# Patient Record
Sex: MALE | Race: WHITE | Employment: OTHER | URBAN - METROPOLITAN AREA
[De-identification: names, ages, dates, MRNs, and addresses within clinical notes are randomized per-mention and may not be internally consistent; named-entity substitution may affect disease eponyms.]

---

## 2018-10-15 LAB — DIABETIC RETINOPATHY: NEGATIVE

## 2019-08-13 LAB
AVERAGE GLUCOSE: NORMAL
CHOLESTEROL, TOTAL: 113 MG/DL
CHOLESTEROL/HDL RATIO: 4.7
CREATININE, URINE: NORMAL
CREATININE: 1 MG/DL
HBA1C MFR BLD: 6.4 %
HDLC SERPL-MCNC: 24 MG/DL (ref 35–70)
LDL CHOLESTEROL CALCULATED: 56 MG/DL (ref 0–160)
MICROALBUMIN/CREAT 24H UR: 0.3 MG/G{CREAT}
MICROALBUMIN/CREAT UR-RTO: NORMAL
POTASSIUM (K+): 4.2
TRIGL SERPL-MCNC: 312 MG/DL
VLDLC SERPL CALC-MCNC: ABNORMAL MG/DL

## 2019-09-19 ENCOUNTER — APPOINTMENT (OUTPATIENT)
Dept: CT IMAGING | Age: 69
End: 2019-09-19
Payer: MEDICARE

## 2019-09-19 ENCOUNTER — HOSPITAL ENCOUNTER (EMERGENCY)
Age: 69
Discharge: HOME | End: 2019-09-19
Attending: EMERGENCY MEDICINE
Payer: MEDICARE

## 2019-09-19 VITALS
BODY MASS INDEX: 32.96 KG/M2 | SYSTOLIC BLOOD PRESSURE: 157 MMHG | RESPIRATION RATE: 20 BRPM | TEMPERATURE: 99.2 F | DIASTOLIC BLOOD PRESSURE: 85 MMHG | HEART RATE: 81 BPM | HEIGHT: 67 IN | OXYGEN SATURATION: 96 % | WEIGHT: 210 LBS

## 2019-09-19 DIAGNOSIS — R16.1 SPLENOMEGALY: ICD-10-CM

## 2019-09-19 DIAGNOSIS — N28.9 RENAL INSUFFICIENCY: ICD-10-CM

## 2019-09-19 DIAGNOSIS — R04.2 HEMOPTYSIS: Primary | ICD-10-CM

## 2019-09-19 LAB
APTT: 38.2 SEC (ref 24.5–35.1)
BASOPHILS ABSOLUTE: 0.08 E9/L (ref 0–0.2)
BASOPHILS RELATIVE PERCENT: 1 % (ref 0–2)
CO2: 24 MMOL/L (ref 22–29)
CO2: 26 MMOL/L (ref 22–29)
EOSINOPHILS ABSOLUTE: 0.21 E9/L (ref 0.05–0.5)
EOSINOPHILS RELATIVE PERCENT: 2.6 % (ref 0–6)
GFR AFRICAN AMERICAN: 52
GFR AFRICAN AMERICAN: 52
GFR NON-AFRICAN AMERICAN: 43 ML/MIN/1.73
GFR NON-AFRICAN AMERICAN: 43 ML/MIN/1.73
GLUCOSE BLD-MCNC: 189 MG/DL (ref 74–99)
GLUCOSE BLD-MCNC: 194 MG/DL (ref 74–99)
HCT VFR BLD CALC: 44.7 % (ref 37–54)
HEMOGLOBIN: 16 G/DL (ref 12.5–16.5)
IMMATURE GRANULOCYTES #: 0.03 E9/L
IMMATURE GRANULOCYTES %: 0.4 % (ref 0–5)
INR BLD: 1.1
LYMPHOCYTES ABSOLUTE: 2.46 E9/L (ref 1.5–4)
LYMPHOCYTES RELATIVE PERCENT: 30.7 % (ref 20–42)
MCH RBC QN AUTO: 30.7 PG (ref 26–35)
MCHC RBC AUTO-ENTMCNC: 35.8 % (ref 32–34.5)
MCV RBC AUTO: 85.8 FL (ref 80–99.9)
MONOCYTES ABSOLUTE: 0.6 E9/L (ref 0.1–0.95)
MONOCYTES RELATIVE PERCENT: 7.5 % (ref 2–12)
NEUTROPHILS ABSOLUTE: 4.64 E9/L (ref 1.8–7.3)
NEUTROPHILS RELATIVE PERCENT: 57.8 % (ref 43–80)
PDW BLD-RTO: 12.5 FL (ref 11.5–15)
PLATELET # BLD: 213 E9/L (ref 130–450)
PMV BLD AUTO: 10.4 FL (ref 7–12)
POC ANION GAP: 11 MMOL/L (ref 7–16)
POC ANION GAP: 11 MMOL/L (ref 7–16)
POC BUN: 24 MG/DL (ref 8–23)
POC BUN: 26 MG/DL (ref 8–23)
POC CHLORIDE: 101 MMOL/L (ref 100–108)
POC CHLORIDE: 103 MMOL/L (ref 100–108)
POC CREATININE: 1.6 MG/DL (ref 0.7–1.2)
POC CREATININE: 1.6 MG/DL (ref 0.7–1.2)
POC POTASSIUM: 3.9 MMOL/L (ref 3.5–5)
POC POTASSIUM: 3.9 MMOL/L (ref 3.5–5)
POC SODIUM: 138 MMOL/L (ref 132–146)
POC SODIUM: 138 MMOL/L (ref 132–146)
PROTHROMBIN TIME: 12.2 SEC (ref 9.3–12.4)
RBC # BLD: 5.21 E12/L (ref 3.8–5.8)
WBC # BLD: 8 E9/L (ref 4.5–11.5)

## 2019-09-19 PROCEDURE — 2580000003 HC RX 258

## 2019-09-19 PROCEDURE — 6360000004 HC RX CONTRAST MEDICATION: Performed by: RADIOLOGY

## 2019-09-19 PROCEDURE — 71275 CT ANGIOGRAPHY CHEST: CPT

## 2019-09-19 PROCEDURE — 85730 THROMBOPLASTIN TIME PARTIAL: CPT

## 2019-09-19 PROCEDURE — 82565 ASSAY OF CREATININE: CPT

## 2019-09-19 PROCEDURE — 36415 COLL VENOUS BLD VENIPUNCTURE: CPT

## 2019-09-19 PROCEDURE — 84520 ASSAY OF UREA NITROGEN: CPT

## 2019-09-19 PROCEDURE — 80051 ELECTROLYTE PANEL: CPT

## 2019-09-19 PROCEDURE — 99284 EMERGENCY DEPT VISIT MOD MDM: CPT

## 2019-09-19 PROCEDURE — 82947 ASSAY GLUCOSE BLOOD QUANT: CPT

## 2019-09-19 PROCEDURE — 85610 PROTHROMBIN TIME: CPT

## 2019-09-19 PROCEDURE — 85025 COMPLETE CBC W/AUTO DIFF WBC: CPT

## 2019-09-19 RX ORDER — LISINOPRIL 2.5 MG/1
2.5 TABLET ORAL DAILY
COMMUNITY
End: 2020-11-10

## 2019-09-19 RX ORDER — SODIUM CHLORIDE 9 MG/ML
INJECTION, SOLUTION INTRAVENOUS
Status: COMPLETED
Start: 2019-09-19 | End: 2019-09-19

## 2019-09-19 RX ORDER — 0.9 % SODIUM CHLORIDE 0.9 %
1000 INTRAVENOUS SOLUTION INTRAVENOUS ONCE
Status: COMPLETED | OUTPATIENT
Start: 2019-09-19 | End: 2019-09-19

## 2019-09-19 RX ORDER — OMEPRAZOLE 20 MG/1
40 CAPSULE, DELAYED RELEASE ORAL DAILY
COMMUNITY
End: 2021-11-16 | Stop reason: SDUPTHER

## 2019-09-19 RX ADMIN — IOPAMIDOL 80 ML: 755 INJECTION, SOLUTION INTRAVENOUS at 02:32

## 2019-09-19 RX ADMIN — SODIUM CHLORIDE 1000 ML: 9 INJECTION, SOLUTION INTRAVENOUS at 01:16

## 2019-09-19 RX ADMIN — Medication 1000 ML: at 01:16

## 2019-09-19 ASSESSMENT — ENCOUNTER SYMPTOMS
COUGH: 1
SHORTNESS OF BREATH: 0
VOMITING: 0
NAUSEA: 0
SINUS PRESSURE: 0
WHEEZING: 0
EYE PAIN: 0
ABDOMINAL PAIN: 0
SORE THROAT: 0
EYE REDNESS: 0
DIARRHEA: 0
BACK PAIN: 0
EYE DISCHARGE: 0

## 2019-09-19 NOTE — ED NOTES
Pt presents to ED with c/o coughing up blood. Pt reports coughing a few times at home while laying in bed and noticing small amounts of bright red blood in sputum. Pt currently has not had any blood in sputum since arrival to ED. Pt reports any recent illness/infection. Pt denies hx of COPD, emphysema. Pt reports tobacco cessation x 5 years. Pt denies dizziness/lightheadedness. Pt denies CP and SOB. Pt denies abd pain. Pt denies [address]. Pt denies numbness/tingling/weakness in any extremity. Pt is A&O x 4. Call light within reach. Bed In lowest position. Both side-rails up. NAD noted. Will continue to monitor.           Reid Shrestha RN  09/19/19 [id number]

## 2019-09-19 NOTE — ED PROVIDER NOTES
Patient is a 76 y/o male who presents to the ED with hemoptysis. Patient states he coughed up bright red blood tonight. He states that it was enough blood to fill a paper towel. He denies any pain. He denies any fever. He has had increased cough recently. He is a former smoker who quit approximately 5 years ago. He denies use of blood thinners. Review of Systems   Constitutional: Negative for chills and fever. HENT: Negative for ear pain, sinus pressure and sore throat. Eyes: Negative for pain, discharge and redness. Respiratory: Positive for cough. Negative for shortness of breath and wheezing. Cardiovascular: Negative for chest pain. Gastrointestinal: Negative for abdominal pain, diarrhea, nausea and vomiting. Genitourinary: Negative for dysuria and frequency. Musculoskeletal: Negative for arthralgias and back pain. Skin: Negative for rash and wound. Neurological: Negative for weakness and headaches. Hematological: Negative for adenopathy. All other systems reviewed and are negative. Physical Exam   Constitutional: He is oriented to person, place, and time. He appears well-developed and well-nourished. No distress. HENT:   Head: Normocephalic and atraumatic. Right Ear: External ear normal.   Left Ear: External ear normal.   Nose: Nose normal.   Mouth/Throat: Oropharynx is clear and moist.   Eyes: Pupils are equal, round, and reactive to light. Conjunctivae are normal.   Neck: Normal range of motion. Neck supple. Cardiovascular: Normal rate, regular rhythm and normal heart sounds. No murmur heard. Pulmonary/Chest: Effort normal and breath sounds normal. No stridor. No respiratory distress. He has no wheezes. He has no rales. Abdominal: Soft. Bowel sounds are normal. He exhibits distension. There is no tenderness. There is no guarding. Musculoskeletal: Normal range of motion. He exhibits no edema.    Neurological: He is alert and oriented to person, place, and during the hospital encounter of 09/19/19   CBC auto differential   Result Value Ref Range    WBC 8.0 4.5 - 11.5 E9/L    RBC 5.21 3.80 - 5.80 E12/L    Hemoglobin 16.0 12.5 - 16.5 g/dL    Hematocrit 44.7 37.0 - 54.0 %    MCV 85.8 80.0 - 99.9 fL    MCH 30.7 26.0 - 35.0 pg    MCHC 35.8 (H) 32.0 - 34.5 %    RDW 12.5 11.5 - 15.0 fL    Platelets 621 640 - 122 E9/L    MPV 10.4 7.0 - 12.0 fL    Neutrophils % 57.8 43.0 - 80.0 %    Immature Granulocytes % 0.4 0.0 - 5.0 %    Lymphocytes % 30.7 20.0 - 42.0 %    Monocytes % 7.5 2.0 - 12.0 %    Eosinophils % 2.6 0.0 - 6.0 %    Basophils % 1.0 0.0 - 2.0 %    Neutrophils Absolute 4.64 1.80 - 7.30 E9/L    Immature Granulocytes # 0.03 E9/L    Lymphocytes Absolute 2.46 1.50 - 4.00 E9/L    Monocytes Absolute 0.60 0.10 - 0.95 E9/L    Eosinophils Absolute 0.21 0.05 - 0.50 E9/L    Basophils Absolute 0.08 0.00 - 0.20 E9/L   Protime-INR   Result Value Ref Range    Protime 12.2 9.3 - 12.4 sec    INR 1.1    APTT   Result Value Ref Range    aPTT 38.2 (H) 24.5 - 35.1 sec   POCT Venous   Result Value Ref Range    POC Sodium 138 132 - 146 mmol/L    POC Potassium 3.9 3.5 - 5.0 mmol/L    POC Chloride 101 100 - 108 mmol/L    CO2 26 22 - 29 mmol/L    POC Anion Gap 11 7 - 16 mmol/L    POC Glucose 189 (H) 74 - 99 mg/dl    POC BUN 26 (H) 8 - 23 mg/dL    POC Creatinine 1.6 (H) 0.7 - 1.2 mg/dL    GFR Non-African American 43 >=60 mL/min/1.73    GFR African American 52    POCT Venous   Result Value Ref Range    POC Sodium 138 132 - 146 mmol/L    POC Potassium 3.9 3.5 - 5.0 mmol/L    POC Chloride 103 100 - 108 mmol/L    CO2 24 22 - 29 mmol/L    POC Anion Gap 11 7 - 16 mmol/L    POC Glucose 194 (H) 74 - 99 mg/dl    POC BUN 24 (H) 8 - 23 mg/dL    POC Creatinine 1.6 (H) 0.7 - 1.2 mg/dL    GFR Non-African American 43 >=60 mL/min/1.73    GFR  52        Radiology:  CTA CHEST W CONTRAST    (Results Pending)     CTA chest (ONRAD read): No evidence for pulmonary embolism, aortic dissection, or

## 2019-10-17 ENCOUNTER — PREP FOR PROCEDURE (OUTPATIENT)
Dept: PULMONOLOGY | Age: 69
End: 2019-10-17

## 2019-10-17 DIAGNOSIS — Z01.818 PRE-OP TESTING: Primary | ICD-10-CM

## 2019-10-17 DIAGNOSIS — R04.2 HEMOPTYSIS: ICD-10-CM

## 2019-10-17 RX ORDER — SODIUM CHLORIDE 0.9 % (FLUSH) 0.9 %
10 SYRINGE (ML) INJECTION PRN
Status: CANCELLED | OUTPATIENT
Start: 2019-10-17

## 2019-10-17 RX ORDER — SODIUM CHLORIDE 0.9 % (FLUSH) 0.9 %
10 SYRINGE (ML) INJECTION EVERY 12 HOURS SCHEDULED
Status: CANCELLED | OUTPATIENT
Start: 2019-10-17

## 2019-10-23 RX ORDER — SODIUM CHLORIDE 9 MG/ML
INJECTION, SOLUTION INTRAVENOUS CONTINUOUS
Status: CANCELLED | OUTPATIENT
Start: 2019-10-28

## 2019-10-24 ENCOUNTER — HOSPITAL ENCOUNTER (OUTPATIENT)
Dept: PREADMISSION TESTING | Age: 69
Discharge: HOME | End: 2019-10-24
Payer: MEDICARE

## 2019-10-24 VITALS
HEART RATE: 69 BPM | BODY MASS INDEX: 30.92 KG/M2 | HEIGHT: 68 IN | DIASTOLIC BLOOD PRESSURE: 78 MMHG | WEIGHT: 204 LBS | OXYGEN SATURATION: 96 % | TEMPERATURE: 98.2 F | SYSTOLIC BLOOD PRESSURE: 140 MMHG | RESPIRATION RATE: 16 BRPM

## 2019-10-24 DIAGNOSIS — Z01.818 PRE-OP TESTING: ICD-10-CM

## 2019-10-24 DIAGNOSIS — R04.2 HEMOPTYSIS: ICD-10-CM

## 2019-10-24 LAB
ANION GAP SERPL CALCULATED.3IONS-SCNC: 10 MMOL/L (ref 7–16)
APTT: 37.5 SEC (ref 24.5–35.1)
BUN BLDV-MCNC: 16 MG/DL (ref 8–23)
CALCIUM SERPL-MCNC: 9.1 MG/DL (ref 8.6–10.2)
CHLORIDE BLD-SCNC: 105 MMOL/L (ref 98–107)
CO2: 25 MMOL/L (ref 22–29)
CREAT SERPL-MCNC: 1 MG/DL (ref 0.7–1.2)
GFR AFRICAN AMERICAN: >60
GFR NON-AFRICAN AMERICAN: >60 ML/MIN/1.73
GLUCOSE BLD-MCNC: 148 MG/DL (ref 74–99)
INR BLD: 1.1
POTASSIUM REFLEX MAGNESIUM: 4.1 MMOL/L (ref 3.5–5)
PROTHROMBIN TIME: 12 SEC (ref 9.3–12.4)
SODIUM BLD-SCNC: 140 MMOL/L (ref 132–146)

## 2019-10-24 PROCEDURE — 80048 BASIC METABOLIC PNL TOTAL CA: CPT

## 2019-10-24 PROCEDURE — 85610 PROTHROMBIN TIME: CPT

## 2019-10-24 PROCEDURE — 36415 COLL VENOUS BLD VENIPUNCTURE: CPT

## 2019-10-24 PROCEDURE — 85730 THROMBOPLASTIN TIME PARTIAL: CPT

## 2019-10-28 ENCOUNTER — ANESTHESIA EVENT (OUTPATIENT)
Dept: ENDOSCOPY | Age: 69
End: 2019-10-28
Payer: MEDICARE

## 2019-10-28 ENCOUNTER — HOSPITAL ENCOUNTER (OUTPATIENT)
Age: 69
Setting detail: OUTPATIENT SURGERY
Discharge: HOME | End: 2019-10-28
Attending: INTERNAL MEDICINE | Admitting: INTERNAL MEDICINE
Payer: MEDICARE

## 2019-10-28 ENCOUNTER — ANESTHESIA (OUTPATIENT)
Dept: ENDOSCOPY | Age: 69
End: 2019-10-28
Payer: MEDICARE

## 2019-10-28 VITALS
WEIGHT: 201 LBS | HEART RATE: 74 BPM | HEIGHT: 68 IN | DIASTOLIC BLOOD PRESSURE: 80 MMHG | BODY MASS INDEX: 30.46 KG/M2 | TEMPERATURE: 97.4 F | RESPIRATION RATE: 20 BRPM | OXYGEN SATURATION: 95 % | SYSTOLIC BLOOD PRESSURE: 134 MMHG

## 2019-10-28 VITALS
DIASTOLIC BLOOD PRESSURE: 98 MMHG | OXYGEN SATURATION: 95 % | RESPIRATION RATE: 15 BRPM | SYSTOLIC BLOOD PRESSURE: 140 MMHG

## 2019-10-28 LAB — METER GLUCOSE: 133 MG/DL (ref 74–99)

## 2019-10-28 PROCEDURE — 87205 SMEAR GRAM STAIN: CPT

## 2019-10-28 PROCEDURE — 3609027000 HC BRONCHOSCOPY: Performed by: INTERNAL MEDICINE

## 2019-10-28 PROCEDURE — 88112 CYTOPATH CELL ENHANCE TECH: CPT

## 2019-10-28 PROCEDURE — 2500000003 HC RX 250 WO HCPCS: Performed by: INTERNAL MEDICINE

## 2019-10-28 PROCEDURE — 3700000001 HC ADD 15 MINUTES (ANESTHESIA): Performed by: INTERNAL MEDICINE

## 2019-10-28 PROCEDURE — 6360000002 HC RX W HCPCS: Performed by: NURSE ANESTHETIST, CERTIFIED REGISTERED

## 2019-10-28 PROCEDURE — 6370000000 HC RX 637 (ALT 250 FOR IP): Performed by: INTERNAL MEDICINE

## 2019-10-28 PROCEDURE — 7100000000 HC PACU RECOVERY - FIRST 15 MIN: Performed by: INTERNAL MEDICINE

## 2019-10-28 PROCEDURE — 7100000001 HC PACU RECOVERY - ADDTL 15 MIN: Performed by: INTERNAL MEDICINE

## 2019-10-28 PROCEDURE — 82962 GLUCOSE BLOOD TEST: CPT

## 2019-10-28 PROCEDURE — 3700000000 HC ANESTHESIA ATTENDED CARE: Performed by: INTERNAL MEDICINE

## 2019-10-28 PROCEDURE — 2580000003 HC RX 258: Performed by: NURSE ANESTHETIST, CERTIFIED REGISTERED

## 2019-10-28 PROCEDURE — 87206 SMEAR FLUORESCENT/ACID STAI: CPT

## 2019-10-28 PROCEDURE — 87015 SPECIMEN INFECT AGNT CONCNTJ: CPT

## 2019-10-28 PROCEDURE — 87070 CULTURE OTHR SPECIMN AEROBIC: CPT

## 2019-10-28 PROCEDURE — 2709999900 HC NON-CHARGEABLE SUPPLY: Performed by: INTERNAL MEDICINE

## 2019-10-28 PROCEDURE — 87116 MYCOBACTERIA CULTURE: CPT

## 2019-10-28 PROCEDURE — 7100000010 HC PHASE II RECOVERY - FIRST 15 MIN: Performed by: INTERNAL MEDICINE

## 2019-10-28 PROCEDURE — 7100000011 HC PHASE II RECOVERY - ADDTL 15 MIN: Performed by: INTERNAL MEDICINE

## 2019-10-28 PROCEDURE — 87102 FUNGUS ISOLATION CULTURE: CPT

## 2019-10-28 PROCEDURE — 2580000003 HC RX 258: Performed by: ANESTHESIOLOGY

## 2019-10-28 RX ORDER — PROPOFOL 10 MG/ML
INJECTION, EMULSION INTRAVENOUS PRN
Status: DISCONTINUED | OUTPATIENT
Start: 2019-10-28 | End: 2019-10-28 | Stop reason: SDUPTHER

## 2019-10-28 RX ORDER — MORPHINE SULFATE 2 MG/ML
1 INJECTION, SOLUTION INTRAMUSCULAR; INTRAVENOUS EVERY 5 MIN PRN
Status: DISCONTINUED | OUTPATIENT
Start: 2019-10-28 | End: 2019-10-28 | Stop reason: HOSPADM

## 2019-10-28 RX ORDER — SODIUM CHLORIDE 9 MG/ML
INJECTION, SOLUTION INTRAVENOUS CONTINUOUS PRN
Status: DISCONTINUED | OUTPATIENT
Start: 2019-10-28 | End: 2019-10-28 | Stop reason: SDUPTHER

## 2019-10-28 RX ORDER — ONDANSETRON 2 MG/ML
4 INJECTION INTRAMUSCULAR; INTRAVENOUS
Status: DISCONTINUED | OUTPATIENT
Start: 2019-10-28 | End: 2019-10-28 | Stop reason: HOSPADM

## 2019-10-28 RX ORDER — IPRATROPIUM BROMIDE AND ALBUTEROL SULFATE 2.5; .5 MG/3ML; MG/3ML
SOLUTION RESPIRATORY (INHALATION) PRN
Status: DISCONTINUED | OUTPATIENT
Start: 2019-10-28 | End: 2019-10-28 | Stop reason: ALTCHOICE

## 2019-10-28 RX ORDER — SODIUM CHLORIDE 0.9 % (FLUSH) 0.9 %
10 SYRINGE (ML) INJECTION EVERY 12 HOURS SCHEDULED
Status: DISCONTINUED | OUTPATIENT
Start: 2019-10-28 | End: 2019-10-28 | Stop reason: HOSPADM

## 2019-10-28 RX ORDER — SODIUM CHLORIDE 0.9 % (FLUSH) 0.9 %
10 SYRINGE (ML) INJECTION PRN
Status: DISCONTINUED | OUTPATIENT
Start: 2019-10-28 | End: 2019-10-28 | Stop reason: HOSPADM

## 2019-10-28 RX ORDER — LIDOCAINE HYDROCHLORIDE AND EPINEPHRINE 10; 10 MG/ML; UG/ML
INJECTION, SOLUTION INFILTRATION; PERINEURAL PRN
Status: DISCONTINUED | OUTPATIENT
Start: 2019-10-28 | End: 2019-10-28 | Stop reason: ALTCHOICE

## 2019-10-28 RX ORDER — LIDOCAINE HYDROCHLORIDE 10 MG/ML
INJECTION, SOLUTION INFILTRATION; PERINEURAL PRN
Status: DISCONTINUED | OUTPATIENT
Start: 2019-10-28 | End: 2019-10-28 | Stop reason: ALTCHOICE

## 2019-10-28 RX ORDER — SODIUM CHLORIDE 9 MG/ML
INJECTION, SOLUTION INTRAVENOUS CONTINUOUS
Status: DISCONTINUED | OUTPATIENT
Start: 2019-10-28 | End: 2019-10-28 | Stop reason: HOSPADM

## 2019-10-28 RX ORDER — MEPERIDINE HYDROCHLORIDE 25 MG/ML
12.5 INJECTION INTRAMUSCULAR; INTRAVENOUS; SUBCUTANEOUS EVERY 5 MIN PRN
Status: DISCONTINUED | OUTPATIENT
Start: 2019-10-28 | End: 2019-10-28 | Stop reason: HOSPADM

## 2019-10-28 RX ADMIN — PROPOFOL 50 MG: 10 INJECTION, EMULSION INTRAVENOUS at 07:17

## 2019-10-28 RX ADMIN — SODIUM CHLORIDE: 9 INJECTION, SOLUTION INTRAVENOUS at 07:05

## 2019-10-28 RX ADMIN — PROPOFOL 50 MG: 10 INJECTION, EMULSION INTRAVENOUS at 07:19

## 2019-10-28 RX ADMIN — PROPOFOL 50 MG: 10 INJECTION, EMULSION INTRAVENOUS at 07:16

## 2019-10-28 RX ADMIN — PROPOFOL 50 MG: 10 INJECTION, EMULSION INTRAVENOUS at 07:14

## 2019-10-28 RX ADMIN — SODIUM CHLORIDE: 9 INJECTION, SOLUTION INTRAVENOUS at 05:55

## 2019-10-28 ASSESSMENT — PAIN SCALES - GENERAL
PAINLEVEL_OUTOF10: 0

## 2019-10-28 ASSESSMENT — PAIN - FUNCTIONAL ASSESSMENT: PAIN_FUNCTIONAL_ASSESSMENT: 0-10

## 2019-10-29 LAB — GRAM STAIN ORDERABLE: NORMAL

## 2019-10-30 LAB
CULTURE, RESPIRATORY: NORMAL
SMEAR, RESPIRATORY: NORMAL

## 2019-11-11 LAB
DIABETIC RETINOPATHY: NEGATIVE
DIABETIC RETINOPATHY: NEGATIVE

## 2019-12-02 LAB
FUNGUS (MYCOLOGY) CULTURE: NORMAL
FUNGUS STAIN: NORMAL

## 2019-12-17 LAB
AFB CULTURE (MYCOBACTERIA): NORMAL
AFB SMEAR: NORMAL

## 2020-03-02 LAB
AVERAGE GLUCOSE: 189
CHOLESTEROL, TOTAL: 135 MG/DL
CHOLESTEROL/HDL RATIO: 6.8
CREATININE: 1.1 MG/DL
HBA1C MFR BLD: 8.2 %
HDLC SERPL-MCNC: 20 MG/DL (ref 35–70)
LDL CHOLESTEROL CALCULATED: ABNORMAL
POTASSIUM (K+): 4.5
TRIGL SERPL-MCNC: 557 MG/DL
VLDLC SERPL CALC-MCNC: 111 MG/DL

## 2020-04-28 VITALS — DIASTOLIC BLOOD PRESSURE: 80 MMHG | SYSTOLIC BLOOD PRESSURE: 126 MMHG | RESPIRATION RATE: 14 BRPM | HEART RATE: 76 BPM

## 2020-04-28 RX ORDER — LISINOPRIL AND HYDROCHLOROTHIAZIDE 12.5; 1 MG/1; MG/1
1 TABLET ORAL DAILY
COMMUNITY
End: 2021-01-15 | Stop reason: SDUPTHER

## 2020-06-01 VITALS
HEART RATE: 78 BPM | WEIGHT: 198 LBS | SYSTOLIC BLOOD PRESSURE: 110 MMHG | TEMPERATURE: 97.5 F | BODY MASS INDEX: 30.11 KG/M2 | DIASTOLIC BLOOD PRESSURE: 7 MMHG | RESPIRATION RATE: 14 BRPM

## 2020-06-03 VITALS
WEIGHT: 198 LBS | DIASTOLIC BLOOD PRESSURE: 74 MMHG | BODY MASS INDEX: 30.11 KG/M2 | RESPIRATION RATE: 14 BRPM | SYSTOLIC BLOOD PRESSURE: 110 MMHG | HEART RATE: 78 BPM | TEMPERATURE: 97.5 F

## 2020-10-26 ENCOUNTER — HOSPITAL ENCOUNTER (OUTPATIENT)
Age: 70
Discharge: HOME | End: 2020-10-28
Payer: MEDICARE

## 2020-10-26 ENCOUNTER — NURSE ONLY (OUTPATIENT)
Dept: FAMILY MEDICINE CLINIC | Age: 70
End: 2020-10-26
Payer: MEDICARE

## 2020-10-26 LAB
ALBUMIN SERPL-MCNC: 4.4 G/DL (ref 3.5–5.2)
ALP BLD-CCNC: 48 U/L (ref 40–129)
ALT SERPL-CCNC: 29 U/L (ref 0–40)
ANION GAP SERPL CALCULATED.3IONS-SCNC: 12 MMOL/L (ref 7–16)
AST SERPL-CCNC: 19 U/L (ref 0–39)
BASOPHILS ABSOLUTE: 0.05 E9/L (ref 0–0.2)
BASOPHILS RELATIVE PERCENT: 0.7 % (ref 0–2)
BILIRUB SERPL-MCNC: 0.5 MG/DL (ref 0–1.2)
BUN BLDV-MCNC: 25 MG/DL (ref 8–23)
CALCIUM SERPL-MCNC: 9.8 MG/DL (ref 8.6–10.2)
CHLORIDE BLD-SCNC: 99 MMOL/L (ref 98–107)
CHOLESTEROL, TOTAL: 126 MG/DL (ref 0–199)
CO2: 28 MMOL/L (ref 22–29)
CREAT SERPL-MCNC: 1.1 MG/DL (ref 0.7–1.2)
EOSINOPHILS ABSOLUTE: 0.23 E9/L (ref 0.05–0.5)
EOSINOPHILS RELATIVE PERCENT: 3.2 % (ref 0–6)
GFR AFRICAN AMERICAN: >60
GFR NON-AFRICAN AMERICAN: >60 ML/MIN/1.73
GLUCOSE BLD-MCNC: 113 MG/DL (ref 74–99)
HBA1C MFR BLD: 5.9 % (ref 4–5.6)
HCT VFR BLD CALC: 48.3 % (ref 37–54)
HDLC SERPL-MCNC: 24 MG/DL
HEMOGLOBIN: 16 G/DL (ref 12.5–16.5)
IMMATURE GRANULOCYTES #: 0.02 E9/L
IMMATURE GRANULOCYTES %: 0.3 % (ref 0–5)
LDL CHOLESTEROL CALCULATED: 40 MG/DL (ref 0–99)
LYMPHOCYTES ABSOLUTE: 2.08 E9/L (ref 1.5–4)
LYMPHOCYTES RELATIVE PERCENT: 29.2 % (ref 20–42)
MCH RBC QN AUTO: 29.3 PG (ref 26–35)
MCHC RBC AUTO-ENTMCNC: 33.1 % (ref 32–34.5)
MCV RBC AUTO: 88.3 FL (ref 80–99.9)
MONOCYTES ABSOLUTE: 0.49 E9/L (ref 0.1–0.95)
MONOCYTES RELATIVE PERCENT: 6.9 % (ref 2–12)
NEUTROPHILS ABSOLUTE: 4.25 E9/L (ref 1.8–7.3)
NEUTROPHILS RELATIVE PERCENT: 59.7 % (ref 43–80)
PDW BLD-RTO: 13.2 FL (ref 11.5–15)
PLATELET # BLD: 110 E9/L (ref 130–450)
PMV BLD AUTO: 11.3 FL (ref 7–12)
POTASSIUM SERPL-SCNC: 4.1 MMOL/L (ref 3.5–5)
RBC # BLD: 5.47 E12/L (ref 3.8–5.8)
SODIUM BLD-SCNC: 139 MMOL/L (ref 132–146)
TOTAL PROTEIN: 7.3 G/DL (ref 6.4–8.3)
TRIGL SERPL-MCNC: 309 MG/DL (ref 0–149)
TSH SERPL DL<=0.05 MIU/L-ACNC: 3.12 UIU/ML (ref 0.27–4.2)
VLDLC SERPL CALC-MCNC: 62 MG/DL
WBC # BLD: 7.1 E9/L (ref 4.5–11.5)

## 2020-10-26 PROCEDURE — 80053 COMPREHEN METABOLIC PANEL: CPT

## 2020-10-26 PROCEDURE — 85025 COMPLETE CBC W/AUTO DIFF WBC: CPT

## 2020-10-26 PROCEDURE — 90694 VACC AIIV4 NO PRSRV 0.5ML IM: CPT | Performed by: INTERNAL MEDICINE

## 2020-10-26 PROCEDURE — 83036 HEMOGLOBIN GLYCOSYLATED A1C: CPT

## 2020-10-26 PROCEDURE — 90732 PPSV23 VACC 2 YRS+ SUBQ/IM: CPT | Performed by: INTERNAL MEDICINE

## 2020-10-26 PROCEDURE — 36415 COLL VENOUS BLD VENIPUNCTURE: CPT

## 2020-10-26 PROCEDURE — G0009 ADMIN PNEUMOCOCCAL VACCINE: HCPCS | Performed by: INTERNAL MEDICINE

## 2020-10-26 PROCEDURE — 80061 LIPID PANEL: CPT

## 2020-10-26 PROCEDURE — G0008 ADMIN INFLUENZA VIRUS VAC: HCPCS | Performed by: INTERNAL MEDICINE

## 2020-10-26 PROCEDURE — 84443 ASSAY THYROID STIM HORMONE: CPT

## 2020-11-10 ENCOUNTER — OFFICE VISIT (OUTPATIENT)
Dept: FAMILY MEDICINE CLINIC | Age: 70
End: 2020-11-10
Payer: MEDICARE

## 2020-11-10 VITALS
SYSTOLIC BLOOD PRESSURE: 128 MMHG | DIASTOLIC BLOOD PRESSURE: 80 MMHG | BODY MASS INDEX: 31.82 KG/M2 | HEART RATE: 94 BPM | TEMPERATURE: 98 F | WEIGHT: 198 LBS | OXYGEN SATURATION: 95 % | HEIGHT: 66 IN

## 2020-11-10 PROBLEM — I10 ESSENTIAL HYPERTENSION: Status: ACTIVE | Noted: 2020-11-10

## 2020-11-10 PROBLEM — E11.9 TYPE 2 DIABETES MELLITUS WITHOUT COMPLICATION, WITHOUT LONG-TERM CURRENT USE OF INSULIN (HCC): Status: ACTIVE | Noted: 2020-11-10

## 2020-11-10 PROBLEM — K27.9 PEPTIC ULCER: Status: ACTIVE | Noted: 2020-11-10

## 2020-11-10 PROCEDURE — 99214 OFFICE O/P EST MOD 30 MIN: CPT | Performed by: INTERNAL MEDICINE

## 2020-11-10 ASSESSMENT — PATIENT HEALTH QUESTIONNAIRE - PHQ9
SUM OF ALL RESPONSES TO PHQ QUESTIONS 1-9: 0
1. LITTLE INTEREST OR PLEASURE IN DOING THINGS: 0
SUM OF ALL RESPONSES TO PHQ QUESTIONS 1-9: 0
2. FEELING DOWN, DEPRESSED OR HOPELESS: 0
SUM OF ALL RESPONSES TO PHQ9 QUESTIONS 1 & 2: 0
SUM OF ALL RESPONSES TO PHQ QUESTIONS 1-9: 0

## 2020-11-10 NOTE — PROGRESS NOTES
Subjective:     Chief Complaint   Patient presents with    3 Month Follow-Up   Follow-up on the diabetes, peptic ulcer disease,  He did spit some blood few months ago he had a CTA chest which was negative for PE  CT chest no abnormalities  He was seen by pulmonologist had a bronchoscopy negative denies spitting any blood  He was sent to GI Dr. Felecia Guillaume found to have peptic ulcer just treated by the GI now he is taking omeprazole 40 every day denies any epigastric pain denies any black stools denies any blood in the stool overall feeling very well  He had a cardiac catheterization in March 2017 by  which was negative for coronary disease  Chronic thrombocytopenia seen by hematology in the past is drinking occasionally not smoking    Past Medical History:   Diagnosis Date    Arthritis     Diabetes mellitus ([facility] [address]     GERD (gastroesophageal reflux disease)     Hyperglycemia     Hyperlipidemia     Hypertension     Lyme disease     Thrombocytopenia ([facility]         Social History     Socioeconomic History    Marital status:      Spouse name: Not on file    Number of children: Not on file    Years of education: Not on file    Highest education level: Not on file   Occupational History    Not on file   Social Needs    Financial resource strain: Not on file    Food insecurity     Worry: Not on file     Inability: Not on file    Transportation needs     Medical: Not on file     Non-medical: Not on file   Tobacco Use    Smoking status: Former Smoker     Packs/day: 1.00     Years: 27.00     Pack years: 27.00     Types: Cigarettes     Start date: 1990     Last attempt to quit: 2017     Years since quitting: 3.8    Smokeless tobacco: Never Used   Substance and Sexual Activity    Alcohol use: Yes     Comment: social    Drug use: Never    Sexual activity: Not on file   Lifestyle    Physical activity     Days per week: Not on file     Minutes per session: Not on file    Stress: Not on file Relationships    Social connections     Talks on phone: Not on file     Gets together: Not on file     Attends Synagogue service: Not on file     Active member of club or organization: Not on file     Attends meetings of clubs or organizations: Not on file     Relationship status: Not on file    Intimate partner violence     Fear of current or ex partner: Not on file     Emotionally abused: Not on file     Physically abused: Not on file     Forced sexual activity: Not on file   Other Topics Concern    Not on file   Social History Narrative    ** Merged History Encounter **             Past Surgical History:   Procedure Laterality Date    BRONCHOSCOPY N/A 10/28/2019    BRONCHOSCOPY performed by Radha Yuen MD at [address]  2017    dr Robbie Melgar  03/2017    COLONOSCOPY  03/2019    VASECTOMY          No family history on file. No Known Allergies     ROS  No acute distress  Cardiac: Denies any chest pain or palpitation  Respiratory: Denies any cough or shortness of breath  GI: No abdominal pain. Denies any nausea vomiting or diarrhea  : Denies any dysuria frequency or hematuria  Neuro: No headache or dizziness  Endocrine: No diabetes  Skin: normal  No recent weight gain or weight loss  Denies any change in vision    Objective:    /80   Pulse 94   Temp 98 °F (36.7 °C)   Ht 5' 6\" (1.676 m)   Wt 198 lb (89.8 kg)   SpO2 95%   BMI 31.96 kg/m²     Constitutional: Alert awake and oriented  Eyes: Pupils equal bilaterally. Extraocular muscles intact  Neck: no JVD adenopathy no bruit  Heart:  RRR, no murmurs, gallops, or rubs.   Lungs:    no wheeze, rales or rhonchi  Abd: bowel sounds present, nontender, nondistended, no masses  Extrem:  No clubbing, cyanosis, or edema  Neuro: AAOx3,No Focal deficit  Psychological: no depression or anxiety       Current Outpatient Medications   Medication Sig Dispense Refill    lisinopril-hydroCHLOROthiazide (PRINZIDE;ZESTORETIC) 10-12.5 MG per tablet Take 1 tablet by mouth daily      metFORMIN (GLUCOPHAGE) 500 MG tablet Take 500 mg by mouth 2 times daily       omeprazole (PRILOSEC) 20 MG delayed release capsule Take 40 mg by mouth daily       psyllium (KONSYL) 28.3 % PACK Take 1 packet by mouth nightly       No current facility-administered medications for this visit.          Last 3 BMP  Lab Results   Component Value Date/Time     10/26/2020 11:04 AM     10/24/2019 10:00 AM     08/12/2015 11:03 AM    K 4.1 10/26/2020 11:04 AM    K 4.5 03/02/2020    K 4.1 10/24/2019 10:00 AM    K 4.2 08/13/2019    K 4.7 08/12/2015 11:03 AM    CL 99 10/26/2020 11:04 AM     10/24/2019 10:00 AM     08/12/2015 11:03 AM    CO2 28 10/26/2020 11:04 AM    CO2 25 10/24/2019 10:00 AM    CO2 24 09/19/2019 02:18 AM    BUN 25 (H) 10/26/2020 11:04 AM    BUN 16 10/24/2019 10:00 AM    BUN 15 08/12/2015 11:03 AM    CREATININE 1.1 10/26/2020 11:04 AM    CREATININE 1.1 03/02/2020    CREATININE 1.0 10/24/2019 10:00 AM    CREATININE 1.6 (H) 09/19/2019 02:18 AM    CREATININE 1.6 (H) 09/19/2019 01:09 AM    CREATININE 1.0 08/13/2019    CREATININE 0.9 08/12/2015 11:03 AM    GLUCOSE 113 (H) 10/26/2020 11:04 AM    GLUCOSE 148 (H) 10/24/2019 10:00 AM    GLUCOSE 101 08/12/2015 11:03 AM    CALCIUM 9.8 10/26/2020 11:04 AM    CALCIUM 9.1 10/24/2019 10:00 AM    CALCIUM 9.3 08/12/2015 11:03 AM       Last 3 CMP:    Lab Results   Component Value Date/Time     10/26/2020 11:04 AM     10/24/2019 10:00 AM     08/12/2015 11:03 AM    K 4.1 10/26/2020 11:04 AM    K 4.5 03/02/2020    K 4.1 10/24/2019 10:00 AM    K 4.2 08/13/2019    K 4.7 08/12/2015 11:03 AM    CL 99 10/26/2020 11:04 AM     10/24/2019 10:00 AM     08/12/2015 11:03 AM    CO2 28 10/26/2020 11:04 AM    CO2 25 10/24/2019 10:00 AM    CO2 24 09/19/2019 02:18 AM    BUN 25 (H) 10/26/2020 11:04 AM    BUN 16 10/24/2019 10:00 AM BUN 15 08/12/2015 11:03 AM    CREATININE 1.1 10/26/2020 11:04 AM    CREATININE 1.1 03/02/2020    CREATININE 1.0 10/24/2019 10:00 AM    CREATININE 1.6 (H) 09/19/2019 02:18 AM    CREATININE 1.6 (H) 09/19/2019 01:09 AM    CREATININE 1.0 08/13/2019    CREATININE 0.9 08/12/2015 11:03 AM    GLUCOSE 113 (H) 10/26/2020 11:04 AM    GLUCOSE 148 (H) 10/24/2019 10:00 AM    GLUCOSE 101 08/12/2015 11:03 AM    CALCIUM 9.8 10/26/2020 11:04 AM    CALCIUM 9.1 10/24/2019 10:00 AM    CALCIUM 9.3 08/12/2015 11:03 AM    PROT 7.3 10/26/2020 11:04 AM    PROT 7.5 08/12/2015 11:03 AM    LABALBU 4.4 10/26/2020 11:04 AM    LABALBU 4.3 08/12/2015 11:03 AM    BILITOT 0.5 10/26/2020 11:04 AM    BILITOT 0.5 08/12/2015 11:03 AM    ALKPHOS 48 10/26/2020 11:04 AM    ALKPHOS 58 08/12/2015 11:03 AM    AST 19 10/26/2020 11:04 AM    AST 22 08/12/2015 11:03 AM    ALT 29 10/26/2020 11:04 AM    ALT 38 08/12/2015 11:03 AM        CBC:   Lab Results   Component Value Date/Time    WBC 7.1 10/26/2020 11:04 AM    RBC 5.47 10/26/2020 11:04 AM    HGB 16.0 10/26/2020 11:04 AM    HCT 48.3 10/26/2020 11:04 AM    MCV 88.3 10/26/2020 11:04 AM    MCH 29.3 10/26/2020 11:04 AM    MCHC 33.1 10/26/2020 11:04 AM    RDW 13.2 10/26/2020 11:04 AM     (L) 10/26/2020 11:04 AM    MPV 11.3 10/26/2020 11:04 AM       A1C:  Lab Results   Component Value Date/Time    LABA1C 5.9 (H) 10/26/2020 11:04 AM       Lipid panel:  Lab Results   Component Value Date    CHOL 126 10/26/2020    CHOL 135 03/02/2020    CHOL 113 08/13/2019    TRIG 309 10/26/2020    TRIG 557 03/02/2020    TRIG 312 08/13/2019    HDL 24 10/26/2020    HDL 20 03/02/2020    HDL 24 08/13/2019        Lab Results   Component Value Date/Time    PROT 7.3 10/26/2020 11:04 AM    PROT 7.5 08/12/2015 11:03 AM    INR 1.1 10/24/2019 10:00 AM    INR 1.1 09/19/2019 01:04 AM       No results found for: MG      Assessment. Alan Benavidez was seen today for 3 month follow-up.     Diagnoses and all orders for this visit:    Type 2 diabetes mellitus without complication, without long-term current use of insulin (HCC)  -     Comprehensive Metabolic Panel; Future  -     Lipid Panel; Future  -     Hemoglobin A1C; Future    Essential hypertension  -     Comprehensive Metabolic Panel; Future    Peptic ulcer    Thrombocytopenia (HCC)  -     CBC Auto Differential; Future    Screening PSA (prostate specific antigen)  -     Psa screening; Future       Patient Active Problem List   Diagnosis    Essential hypertension    Type 2 diabetes mellitus without complication, without long-term current use of insulin ([facility] [address]    Peptic ulcer       Plan: Labs reviewed A1c 5.9%  Doing very well  Cholesterol doing very well  No bleeding or bruising due to thrombocytopenia  Epigastric pain peptic ulcer resolved repeat endoscopy negative as per patient done by Dr. Precious Ferrer diet modification discussed  Received flu vaccine last week  Shingles vaccine he will get from the pharmacy  Healthy lifestyle, regular exercise, lifestyle diet modification discussed  Physical next visit  Colonoscopy as per Dr. Elaine Worley he usually send the patient a letter    Return in about 6 months (around 5/10/2021) for ANNUAL PHYSICAL.        Chandra Mulligan MD  2:17 PM  11/10/2020     DE

## 2020-12-16 NOTE — TELEPHONE ENCOUNTER
Pt left  msg requesting refill.     Patient is requesting a refill of:  Medication: metformin  Dose: 500 mg  Frequency: twice daily  [facility]      Last seen 11/10/2020  Next appt 5/11/2021

## 2020-12-17 RX ORDER — METFORMIN HYDROCHLORIDE 500 MG/1
TABLET, EXTENDED RELEASE ORAL
COMMUNITY
Start: 2020-10-07 | End: 2020-12-17 | Stop reason: SDUPTHER

## 2020-12-17 RX ORDER — METFORMIN HYDROCHLORIDE 500 MG/1
500 TABLET, EXTENDED RELEASE ORAL 2 TIMES DAILY
Qty: 180 TABLET | Refills: 1 | Status: SHIPPED
Start: 2020-12-17 | End: 2021-06-21

## 2021-01-18 RX ORDER — LISINOPRIL AND HYDROCHLOROTHIAZIDE 12.5; 1 MG/1; MG/1
1 TABLET ORAL DAILY
Qty: 90 TABLET | Refills: 1 | Status: SHIPPED
Start: 2021-01-18 | End: 2021-08-18

## 2021-05-11 ENCOUNTER — OFFICE VISIT (OUTPATIENT)
Dept: FAMILY MEDICINE CLINIC | Age: 71
End: 2021-05-11
Payer: MEDICARE

## 2021-05-11 VITALS
BODY MASS INDEX: 31.23 KG/M2 | OXYGEN SATURATION: 96 % | HEART RATE: 88 BPM | WEIGHT: 199 LBS | DIASTOLIC BLOOD PRESSURE: 70 MMHG | SYSTOLIC BLOOD PRESSURE: 112 MMHG | HEIGHT: 67 IN | TEMPERATURE: 97.7 F

## 2021-05-11 DIAGNOSIS — Z12.5 PROSTATE CANCER SCREENING: ICD-10-CM

## 2021-05-11 DIAGNOSIS — Z00.00 ROUTINE GENERAL MEDICAL EXAMINATION AT A HEALTH CARE FACILITY: Primary | ICD-10-CM

## 2021-05-11 DIAGNOSIS — E11.9 TYPE 2 DIABETES MELLITUS WITHOUT COMPLICATION, WITHOUT LONG-TERM CURRENT USE OF INSULIN (HCC): ICD-10-CM

## 2021-05-11 DIAGNOSIS — E78.5 HYPERLIPIDEMIA, UNSPECIFIED HYPERLIPIDEMIA TYPE: ICD-10-CM

## 2021-05-11 DIAGNOSIS — Z12.11 COLON CANCER SCREENING: ICD-10-CM

## 2021-05-11 DIAGNOSIS — I10 ESSENTIAL HYPERTENSION: ICD-10-CM

## 2021-05-11 DIAGNOSIS — M17.9 OSTEOARTHRITIS OF KNEE, UNSPECIFIED LATERALITY, UNSPECIFIED OSTEOARTHRITIS TYPE: ICD-10-CM

## 2021-05-11 DIAGNOSIS — D69.6 THROMBOCYTOPENIA (HCC): ICD-10-CM

## 2021-05-11 PROCEDURE — G0438 PPPS, INITIAL VISIT: HCPCS | Performed by: INTERNAL MEDICINE

## 2021-05-11 PROCEDURE — 99213 OFFICE O/P EST LOW 20 MIN: CPT | Performed by: INTERNAL MEDICINE

## 2021-05-11 SDOH — ECONOMIC STABILITY: FOOD INSECURITY: WITHIN THE PAST 12 MONTHS, THE FOOD YOU BOUGHT JUST DIDN'T LAST AND YOU DIDN'T HAVE MONEY TO GET MORE.: NEVER TRUE

## 2021-05-11 SDOH — ECONOMIC STABILITY: TRANSPORTATION INSECURITY
IN THE PAST 12 MONTHS, HAS LACK OF TRANSPORTATION KEPT YOU FROM MEETINGS, WORK, OR FROM GETTING THINGS NEEDED FOR DAILY LIVING?: NO

## 2021-05-11 ASSESSMENT — PATIENT HEALTH QUESTIONNAIRE - PHQ9
SUM OF ALL RESPONSES TO PHQ9 QUESTIONS 1 & 2: 0
SUM OF ALL RESPONSES TO PHQ QUESTIONS 1-9: 0
2. FEELING DOWN, DEPRESSED OR HOPELESS: 0

## 2021-05-11 ASSESSMENT — LIFESTYLE VARIABLES
HOW OFTEN DURING THE LAST YEAR HAVE YOU BEEN UNABLE TO REMEMBER WHAT HAPPENED THE NIGHT BEFORE BECAUSE YOU HAD BEEN DRINKING: 0
HOW OFTEN DO YOU HAVE A DRINK CONTAINING ALCOHOL: MONTHLY OR LESS
HOW OFTEN DO YOU HAVE SIX OR MORE DRINKS ON ONE OCCASION: 1
HOW OFTEN DURING THE LAST YEAR HAVE YOU BEEN UNABLE TO REMEMBER WHAT HAPPENED THE NIGHT BEFORE BECAUSE YOU HAD BEEN DRINKING: NEVER
AUDIT TOTAL SCORE: 0
HAVE YOU OR SOMEONE ELSE BEEN INJURED AS A RESULT OF YOUR DRINKING: NO
HOW OFTEN DURING THE LAST YEAR HAVE YOU FOUND THAT YOU WERE NOT ABLE TO STOP DRINKING ONCE YOU HAD STARTED: NEVER
HOW OFTEN DURING THE LAST YEAR HAVE YOU HAD A FEELING OF GUILT OR REMORSE AFTER DRINKING: NEVER
HAS A RELATIVE, FRIEND, DOCTOR, OR ANOTHER HEALTH PROFESSIONAL EXPRESSED CONCERN ABOUT YOUR DRINKING OR SUGGESTED YOU CUT DOWN: NO
HOW OFTEN DURING THE LAST YEAR HAVE YOU FAILED TO DO WHAT WAS NORMALLY EXPECTED FROM YOU BECAUSE OF DRINKING: NEVER
HOW OFTEN DURING THE LAST YEAR HAVE YOU FAILED TO DO WHAT WAS NORMALLY EXPECTED FROM YOU BECAUSE OF DRINKING: 0
AUDIT-C TOTAL SCORE: 0
HAS A RELATIVE, FRIEND, DOCTOR, OR ANOTHER HEALTH PROFESSIONAL EXPRESSED CONCERN ABOUT YOUR DRINKING OR SUGGESTED YOU CUT DOWN: 0
HOW MANY STANDARD DRINKS CONTAINING ALCOHOL DO YOU HAVE ON A TYPICAL DAY: 0
HOW MANY STANDARD DRINKS CONTAINING ALCOHOL DO YOU HAVE ON A TYPICAL DAY: ONE OR TWO
HOW OFTEN DURING THE LAST YEAR HAVE YOU NEEDED AN ALCOHOLIC DRINK FIRST THING IN THE MORNING TO GET YOURSELF GOING AFTER A NIGHT OF HEAVY DRINKING: NEVER
HOW OFTEN DURING THE LAST YEAR HAVE YOU NEEDED AN ALCOHOLIC DRINK FIRST THING IN THE MORNING TO GET YOURSELF GOING AFTER A NIGHT OF HEAVY DRINKING: 0
HOW OFTEN DURING THE LAST YEAR HAVE YOU FOUND THAT YOU WERE NOT ABLE TO STOP DRINKING ONCE YOU HAD STARTED: 0
HOW OFTEN DO YOU HAVE A DRINK CONTAINING ALCOHOL: 1
HOW OFTEN DO YOU HAVE SIX OR MORE DRINKS ON ONE OCCASION: LESS THAN MONTHLY
AUDIT-C TOTAL SCORE: 2

## 2021-05-11 NOTE — PROGRESS NOTES
kg)     Vitals:    05/11/21 1314   BP: 112/70   Pulse: 88   Temp: 97.7 °F (36.5 °C)   SpO2: 96%   Weight: 199 lb (90.3 kg)   Height: 5' 6.5\" (1.689 m)     Body mass index is 31.64 kg/m². Based upon direct observation of the patient, evaluation of cognition reveals recent and remote memory intact. Patient's complete Health Risk Assessment and screening values have been reviewed and are found in Flowsheets. The following problems were reviewed today and where indicated follow up appointments were made and/or referrals ordered. Positive Risk Factor Screenings with Interventions:            General Health and ACP:  General  In general, how would you say your health is?: Good  In the past 7 days, have you experienced any of the following?  New or Increased Pain, New or Increased Fatigue, Loneliness, Social Isolation, Stress or Anger?: None of These  Do you get the social and emotional support that you need?: Yes  Do you have a Living Will?: (!) No  Advance Directives     Power of [address] [place] [address] Will ACP-Advance Directive ACP-Power of     Not on File Not on File Not on File Not on File      General Health Risk Interventions:  · He denies any depression anxiety, working very hard in the yard, keeps very active, eating right, sleeping good, denies any suicidal ideation, physically very active, no falls,    Health Habits/Nutrition:  Health Habits/Nutrition  Do you exercise for at least 20 minutes 2-3 times per week?: Yes  Have you lost any weight without trying in the past 3 months?: No  Do you eat only one meal per day?: (!) Yes  Have you seen the dentist within the past year?: (!) No  Body mass index: (!) 31.63  Health Habits/Nutrition Interventions:  · Physically active, eating right, good appetite, eats only 1 meal a day, has a good appetite,    Hearing/Vision:  No exam data present  Hearing/Vision  Do you or your family notice any trouble with your hearing that hasn't been managed with hearing aids?: No  Do you have difficulty driving, watching TV, or doing any of your daily activities because of your eyesight?: (!) Yes  Have you had an eye exam within the past year?: Yes  Hearing/Vision Interventions:  · Vision concerns:  ophthalmology/optometry referral provided      Personalized Preventive Plan   Current Health Maintenance Status  Immunization History   Administered Date(s) Administered    COVID-19, Moderna, PF, 100mcg/0.5mL 01/27/2021, 02/24/2021    Influenza Virus Vaccine 12/19/2017    Influenza, Quadv, adjuvanted, 65 yrs +, IM, PF (Fluad) 10/26/2020    Pneumococcal Polysaccharide ([id number]) 10/26/2020        Health Maintenance   Topic Date Due    Hepatitis C screen  Never done    Diabetic foot exam  Never done    DTaP/Tdap/Td vaccine (1 - Tdap) Never done    Shingles Vaccine (1 of 2) Never done   [employer] Visit (AWV)  Never done    Diabetic microalbuminuria test  08/13/2020    A1C test (Diabetic or Prediabetic)  10/26/2021    Lipid screen  10/26/2021    Potassium monitoring  10/26/2021    Creatinine monitoring  10/26/2021    Diabetic retinal exam  11/11/2021    Colon cancer screen colonoscopy  03/11/2022    Flu vaccine  Completed    Pneumococcal 65+ years Vaccine  Completed    COVID-19 Vaccine  Completed    AAA screen  Completed    Hepatitis A vaccine  Aged Out    Hib vaccine  Aged Out    Meningococcal (ACWY) vaccine  Aged Out     Recommendations for [employer] Due: see orders and patient instructions/AVS.  . Recommended screening schedule for the next 5-10 years is provided to the patient in written form: see Patient Instructions/AVS.    Shruthi Mansfield was seen today for annual exam.    Diagnoses and all orders for this visit:    Routine general medical examination at a health care facility    Essential hypertension    Hyperlipidemia, unspecified hyperlipidemia type  -     COMPREHENSIVE METABOLIC PANEL; Future  -     LIPID PANEL;  Future    Type 2 diabetes mellitus without complication, without long-term current use of insulin ([facility]  -     COMPREHENSIVE METABOLIC PANEL; Future  -     HEMOGLOBIN A1C; Future    Osteoarthritis of knee, unspecified laterality, unspecified osteoarthritis type  -     CBC WITH AUTO DIFFERENTIAL; Future    Prostate cancer screening  -     PSA SCREENING;  Future    Thrombocytopenia (HCC)            Subjective:     Chief Complaint   Patient presents with    Annual Exam     medicare      Patient here for follow-up on the diabetes hypertension  Did not do the blood work as advised  Feeling very well overall,  Physically very active works hard in the yard,  Denies any chest pain or palpitation,  Denies any shortness of breath,  Been very compliant with the medication,  Sometimes throat is still dry in the morning when wakes up  Has snoring at night, denies any sore throat, denies any hoarseness, no dysphagia,    Had endoscopy by GI recently no acute pathology  Past Medical History:   Diagnosis Date    Arthritis     Diabetes mellitus ([facility]     GERD (gastroesophageal reflux disease)     Hyperglycemia     Hyperlipidemia     Hypertension     Lyme disease     Thrombocytopenia ([facility]         Social History     Socioeconomic History    Marital status:      Spouse name: Not on file    Number of children: Not on file    Years of education: Not on file    Highest education level: Not on file   Occupational History    Not on file   Social Needs    Financial resource strain: Not on file    Food insecurity     Worry: Never true     Inability: Never true    Transportation needs     Medical: No     Non-medical: No   Tobacco Use    Smoking status: Former Smoker     Packs/day: 1.00     Years: 27.00     Pack years: 27.00     Types: Cigarettes     Start date:      Quit date: 2017     Years since quittin.3    Smokeless tobacco: Never Used   Substance and Sexual Activity    Alcohol use: Yes     Comment: social    Drug use: Never    intact  Neck: no JVD adenopathy no bruit  Heart:  RRR, no murmurs, gallops, or rubs. Lungs:    no wheeze, rales or rhonchi  Abd: bowel sounds present, nontender, nondistended, no masses  Extrem:  No clubbing, cyanosis, or edema  Neuro: AAOx3,No Focal deficit  Psychological: no depression or anxiety   Rectal exam prostate slightly enlarged no masses no nodules stool Hemoccult negative    Current Outpatient Medications   Medication Sig Dispense Refill    lisinopril-hydroCHLOROthiazide (PRINZIDE;ZESTORETIC) 10-12.5 MG per tablet Take 1 tablet by mouth daily 90 tablet 1    metFORMIN (GLUCOPHAGE-XR) 500 MG extended release tablet Take 1 tablet by mouth 2 times daily 180 tablet 1    omeprazole (PRILOSEC) 20 MG delayed release capsule Take 40 mg by mouth daily       psyllium (KONSYL) 28.3 % PACK Take 1 packet by mouth nightly       No current facility-administered medications for this visit.          Last 3 BMP  Lab Results   Component Value Date/Time     10/26/2020 11:04 AM     10/24/2019 10:00 AM     08/12/2015 11:03 AM    K 4.1 10/26/2020 11:04 AM    K 4.5 03/02/2020    K 4.1 10/24/2019 10:00 AM    K 4.2 08/13/2019    K 4.7 08/12/2015 11:03 AM    CL 99 10/26/2020 11:04 AM     10/24/2019 10:00 AM     08/12/2015 11:03 AM    CO2 28 10/26/2020 11:04 AM    CO2 25 10/24/2019 10:00 AM    CO2 24 09/19/2019 02:18 AM    BUN 25 (H) 10/26/2020 11:04 AM    BUN 16 10/24/2019 10:00 AM    BUN 15 08/12/2015 11:03 AM    CREATININE 1.1 10/26/2020 11:04 AM    CREATININE 1.1 03/02/2020    CREATININE 1.0 10/24/2019 10:00 AM    CREATININE 1.6 (H) 09/19/2019 02:18 AM    CREATININE 1.6 (H) 09/19/2019 01:09 AM    CREATININE 1.0 08/13/2019    CREATININE 0.9 08/12/2015 11:03 AM    GLUCOSE 113 (H) 10/26/2020 11:04 AM    GLUCOSE 148 (H) 10/24/2019 10:00 AM    GLUCOSE 101 08/12/2015 11:03 AM    CALCIUM 9.8 10/26/2020 11:04 AM    CALCIUM 9.1 10/24/2019 10:00 AM    CALCIUM 9.3 08/12/2015 11:03 AM       Last 3 CMP: Lab Results   Component Value Date/Time     10/26/2020 11:04 AM     10/24/2019 10:00 AM     08/12/2015 11:03 AM    K 4.1 10/26/2020 11:04 AM    K 4.5 03/02/2020    K 4.1 10/24/2019 10:00 AM    K 4.2 08/13/2019    K 4.7 08/12/2015 11:03 AM    CL 99 10/26/2020 11:04 AM     10/24/2019 10:00 AM     08/12/2015 11:03 AM    CO2 28 10/26/2020 11:04 AM    CO2 25 10/24/2019 10:00 AM    CO2 24 09/19/2019 02:18 AM    BUN 25 (H) 10/26/2020 11:04 AM    BUN 16 10/24/2019 10:00 AM    BUN 15 08/12/2015 11:03 AM    CREATININE 1.1 10/26/2020 11:04 AM    CREATININE 1.1 03/02/2020    CREATININE 1.0 10/24/2019 10:00 AM    CREATININE 1.6 (H) 09/19/2019 02:18 AM    CREATININE 1.6 (H) 09/19/2019 01:09 AM    CREATININE 1.0 08/13/2019    CREATININE 0.9 08/12/2015 11:03 AM    GLUCOSE 113 (H) 10/26/2020 11:04 AM    GLUCOSE 148 (H) 10/24/2019 10:00 AM    GLUCOSE 101 08/12/2015 11:03 AM    CALCIUM 9.8 10/26/2020 11:04 AM    CALCIUM 9.1 10/24/2019 10:00 AM    CALCIUM 9.3 08/12/2015 11:03 AM    PROT 7.3 10/26/2020 11:04 AM    PROT 7.5 08/12/2015 11:03 AM    LABALBU 4.4 10/26/2020 11:04 AM    LABALBU 4.3 08/12/2015 11:03 AM    BILITOT 0.5 10/26/2020 11:04 AM    BILITOT 0.5 08/12/2015 11:03 AM    ALKPHOS 48 10/26/2020 11:04 AM    ALKPHOS 58 08/12/2015 11:03 AM    AST 19 10/26/2020 11:04 AM    AST 22 08/12/2015 11:03 AM    ALT 29 10/26/2020 11:04 AM    ALT 38 08/12/2015 11:03 AM        CBC:   Lab Results   Component Value Date/Time    WBC 7.1 10/26/2020 11:04 AM    RBC 5.47 10/26/2020 11:04 AM    HGB 16.0 10/26/2020 11:04 AM    HCT 48.3 10/26/2020 11:04 AM    MCV 88.3 10/26/2020 11:04 AM    MCH 29.3 10/26/2020 11:04 AM    MCHC 33.1 10/26/2020 11:04 AM    RDW 13.2 10/26/2020 11:04 AM     (L) 10/26/2020 11:04 AM    MPV 11.3 10/26/2020 11:04 AM       A1C:  Lab Results   Component Value Date/Time    LABA1C 5.9 (H) 10/26/2020 11:04 AM       Lipid panel:  Lab Results   Component Value Date    CHOL 126 10/26/2020 CHOL 135 03/02/2020    CHOL 113 08/13/2019    TRIG 309 10/26/2020    TRIG 557 03/02/2020    TRIG 312 08/13/2019    HDL 24 10/26/2020    HDL 20 03/02/2020    HDL 24 08/13/2019        Lab Results   Component Value Date/Time    PROT 7.3 10/26/2020 11:04 AM    PROT 7.5 08/12/2015 11:03 AM    INR 1.1 10/24/2019 10:00 AM    INR 1.1 09/19/2019 01:04 AM       No results found for: MG      Assessment. Omar Mon was seen today for annual exam.    Diagnoses and all orders for this visit:    Routine general medical examination at a health care facility    Essential hypertension    Hyperlipidemia, unspecified hyperlipidemia type  -     COMPREHENSIVE METABOLIC PANEL; Future  -     LIPID PANEL; Future    Type 2 diabetes mellitus without complication, without long-term current use of insulin (HCC)  -     COMPREHENSIVE METABOLIC PANEL; Future  -     HEMOGLOBIN A1C; Future    Osteoarthritis of knee, unspecified laterality, unspecified osteoarthritis type  -     CBC WITH AUTO DIFFERENTIAL; Future    Prostate cancer screening  -     PSA SCREENING; Future    Thrombocytopenia (HCC)       Patient Active Problem List   Diagnosis    Essential hypertension    Type 2 diabetes mellitus without complication, without long-term current use of insulin ([facility]    Peptic ulcer       Plan: Patient overall doing very well    Hypertension controlled continue lisinopril HCTZ    Diabetes type 2 continue Metformin check A1c    Hyperlipidemia low-cholesterol diet check lipid profile medication if not controlled    History of thrombocytopenia seen by hematologist in the past no bleeding    History of peptic ulcer disease doing okay taking Prilosec 40 mg daily    Needs repeat colonoscopy as per GI    Return in 6 months (on 11/11/2021) for Medicare Annual Wellness Visit in 1 year.        Ted Jimenez MD  1:57 PM  5/11/2021     [place]

## 2021-05-11 NOTE — PATIENT INSTRUCTIONS
Personalized Preventive Plan for [facility] - 5/11/2021  Medicare offers a range of preventive health benefits. Some of the tests and screenings are paid in full while other may be subject to a deductible, co-insurance, and/or copay. Some of these benefits include a comprehensive review of your medical history including lifestyle, illnesses that may run in your family, and various assessments and screenings as appropriate. After reviewing your medical record and screening and assessments performed today your provider may have ordered immunizations, labs, imaging, and/or referrals for you. A list of these orders (if applicable) as well as your Preventive Care list are included within your After Visit Summary for your review. Other Preventive Recommendations:    · A preventive eye exam performed by an eye specialist is recommended every 1-2 years to screen for glaucoma; cataracts, macular degeneration, and other eye disorders. · A preventive dental visit is recommended every 6 months. · Try to get at least 150 minutes of exercise per week or 10,000 steps per day on a pedometer . · Order or download the FREE \"Exercise & Physical Activity: Your Everyday Guide\" from [employer]. Call 5-505.189.6397 or search [employer] online. · You need 3524-0540 mg of calcium and 7950-7359 IU of vitamin D per day. It is possible to meet your calcium requirement with diet alone, but a vitamin D supplement is usually necessary to meet this goal.  · When exposed to the sun, use a sunscreen that protects against both UVA and UVB radiation with an SPF of 30 or greater. Reapply every 2 to 3 hours or after sweating, drying off with a towel, or swimming. · Always wear a seat belt when traveling in a car. Always wear a helmet when riding a bicycle or motorcycle.

## 2021-06-07 ENCOUNTER — HOSPITAL ENCOUNTER (EMERGENCY)
Age: 71
Discharge: HOME | End: 2021-06-07
Payer: MEDICARE

## 2021-06-07 ENCOUNTER — APPOINTMENT (OUTPATIENT)
Dept: GENERAL RADIOLOGY | Age: 71
End: 2021-06-07
Payer: MEDICARE

## 2021-06-07 VITALS
TEMPERATURE: 97.1 F | SYSTOLIC BLOOD PRESSURE: 146 MMHG | BODY MASS INDEX: 31.39 KG/M2 | WEIGHT: 200 LBS | OXYGEN SATURATION: 97 % | HEART RATE: 88 BPM | RESPIRATION RATE: 16 BRPM | HEIGHT: 67 IN | DIASTOLIC BLOOD PRESSURE: 87 MMHG

## 2021-06-07 DIAGNOSIS — S43.401A SPRAIN OF RIGHT SHOULDER, UNSPECIFIED SHOULDER SPRAIN TYPE, INITIAL ENCOUNTER: Primary | ICD-10-CM

## 2021-06-07 PROCEDURE — 6360000002 HC RX W HCPCS: Performed by: NURSE PRACTITIONER

## 2021-06-07 PROCEDURE — 73030 X-RAY EXAM OF SHOULDER: CPT

## 2021-06-07 PROCEDURE — 96372 THER/PROPH/DIAG INJ SC/IM: CPT

## 2021-06-07 PROCEDURE — 99283 EMERGENCY DEPT VISIT LOW MDM: CPT

## 2021-06-07 RX ORDER — NAPROXEN 375 MG/1
375 TABLET ORAL 2 TIMES DAILY PRN
Qty: 14 TABLET | Refills: 0 | Status: SHIPPED | OUTPATIENT
Start: 2021-06-07 | End: 2022-07-05

## 2021-06-07 RX ORDER — METHOCARBAMOL 750 MG/1
750 TABLET, FILM COATED ORAL
COMMUNITY
End: 2021-11-16

## 2021-06-07 RX ORDER — HYDROCODONE BITARTRATE AND ACETAMINOPHEN 5; 325 MG/1; MG/1
1 TABLET ORAL EVERY 6 HOURS PRN
Qty: 12 TABLET | Refills: 0 | Status: SHIPPED | OUTPATIENT
Start: 2021-06-07 | End: 2021-06-07 | Stop reason: CLARIF

## 2021-06-07 RX ORDER — KETOROLAC TROMETHAMINE 30 MG/ML
30 INJECTION, SOLUTION INTRAMUSCULAR; INTRAVENOUS ONCE
Status: COMPLETED | OUTPATIENT
Start: 2021-06-07 | End: 2021-06-07

## 2021-06-07 RX ORDER — HYDROCODONE BITARTRATE AND ACETAMINOPHEN 5; 325 MG/1; MG/1
1 TABLET ORAL EVERY 6 HOURS PRN
Qty: 12 TABLET | Refills: 0 | Status: SHIPPED | OUTPATIENT
Start: 2021-06-07 | End: 2021-06-10

## 2021-06-07 RX ADMIN — KETOROLAC TROMETHAMINE 30 MG: 30 INJECTION, SOLUTION INTRAMUSCULAR at 19:16

## 2021-06-07 ASSESSMENT — PAIN SCALES - GENERAL
PAINLEVEL_OUTOF10: 8
PAINLEVEL_OUTOF10: 8
PAINLEVEL_OUTOF10: 2

## 2021-06-07 ASSESSMENT — PAIN DESCRIPTION - DESCRIPTORS: DESCRIPTORS: ACHING

## 2021-06-07 ASSESSMENT — PAIN DESCRIPTION - ONSET: ONSET: ON-GOING

## 2021-06-07 ASSESSMENT — PAIN DESCRIPTION - ORIENTATION
ORIENTATION: RIGHT
ORIENTATION: RIGHT

## 2021-06-07 ASSESSMENT — PAIN DESCRIPTION - PAIN TYPE
TYPE: ACUTE PAIN
TYPE: ACUTE PAIN

## 2021-06-07 ASSESSMENT — PAIN DESCRIPTION - PROGRESSION: CLINICAL_PROGRESSION: RAPIDLY IMPROVING

## 2021-06-07 ASSESSMENT — PAIN DESCRIPTION - LOCATION
LOCATION: SHOULDER
LOCATION: SHOULDER

## 2021-06-07 ASSESSMENT — PAIN DESCRIPTION - FREQUENCY: FREQUENCY: CONTINUOUS

## 2021-06-07 NOTE — ED PROVIDER NOTES
[facility]  Department of Emergency Medicine   ED  Encounter Note  Admit Date/RoomTime: 2021  6:33 PM  ED Room:     NAME: Rachel Castillo  : 1950  MRN: [id number]     Chief Complaint:  Shoulder Pain (Pt had spider on right hand and threw it off hard, injuring right shoulder )    History of Present Illness       Rachel Castillo is a 72 y.o. old male who presents to the emergency department by private vehicle, for right shoulder pain that occurred approximately 2 hours prior to arrival.  He states he was taking the  out of his pool when a spider landed on his hand and he went to flick it off in the movement of his right arm went back so hard that he has difficulty and is unable to raise his right arm. He did take a methocarbamol about 1/2-hour and a half ago prior to arrival that was his significant others. Patient denies any previous injury to the area he is right-hand dominant. He denies any numbness or tingling. He denies any cervical pain. Patient has no prior history of pain/injury with regards to today's visit\"}. He is right handed. The patients tetanus status is unknown. Since onset the symptoms have been persistent and gradually worsening. His pain is aggraveated by any movement, any use of, certain movements or pressure on or palpation of painful area and relieved by nothing, as no treatment has been provided prior to this visit. He denies any head injury, headache, loss of consciousness, confusion, dizziness, neck pain, chest pain, abdominal pain, back pain, numbness, weakness, blurred vision, nausea or vomiting. ROS   Pertinent positives and negatives are stated within HPI, all other systems reviewed and are negative.     Past Medical History:  has a past medical history of Arthritis, Diabetes mellitus ([address] GERD (gastroesophageal reflux disease), Hyperglycemia, Hyperlipidemia, Hypertension, Lyme disease, and Thrombocytopenia [facility]).    Surgical History:  has a past surgical history that includes Cardiac catheterization (2017); bronchoscopy (N/A, 10/28/2019); Vasectomy; Colonoscopy (03/2019); and Cardiac catheterization (03/2017). Social History:  reports that he quit smoking about 4 years ago. His smoking use included cigarettes. He started smoking about 31 years ago. He has a 27.00 pack-year smoking history. He has never used smokeless tobacco. He reports current alcohol use. He reports that he does not use drugs. Family History: family history is not on file. Allergies: Patient has no known allergies. Physical Exam   Oxygen Saturation Interpretation: Normal.        ED Triage Vitals   BP Temp Temp Source Pulse Resp SpO2 Height Weight   06/07/21 1829 06/07/21 1829 06/07/21 1829 06/07/21 1829 06/07/21 1829 06/07/21 1829 06/07/21 1837 06/07/21 1837   (!) 146/87 97.1 °F (36.2 °C) Infrared 88 16 97 % 5' 7\" (1.702 m) 200 lb (90.7 kg)         Constitutional:  Alert, development consistent with age. Neck:  Normal ROM. Supple. Non-tender. Shoulder:  Right acromioclavicular joint. Tenderness: moderate              Swelling: None. Deformity: no.              ROM: diminished range with pain. Skin:  no wounds, erythema, or swelling. Neurovascular: Motor deficit: Patient unable to fully raise the arm above his head he is able to touch opposite shoulder and reach behind his back. Hand grasp strong. Sensory deficit: none. Sensation intact to light touch. Pulse deficit: none. Pulses intact. Capillary refill: normal.  Less than 3 seconds. Elbow:              Tenderness:  none. Swelling: None. Deformity: no.              ROM: full painless ROM. Skin:  no wounds, erythema, or swelling. Lymphatics: No lymphangitis or edema noted  Neurological:  Oriented. Motor functions intact.     Lab / Imaging Results   (All CNP reviewed today's visit with the patient and spouse / life partner in addition to providing specific details for the plan of care and counseling regarding the diagnosis and prognosis. Questions are answered at this time and are agreeable with the plan. Assessment      1. Sprain of right shoulder, unspecified shoulder sprain type, initial encounter      Plan   Discharge home. Patient condition is good    New Medications     Discharge Medication List as of 6/7/2021  8:33 PM      START taking these medications    Details   HYDROcodone-acetaminophen (NORCO) 5-325 MG per tablet Take 1 tablet by mouth every 6 hours as needed for Pain for up to 3 days. , Disp-12 tablet, R-0Print           Electronically signed by MARY Espinosa CNP   DD: 6/7/21  **This report was transcribed using voice recognition software. Every effort was made to ensure accuracy; however, inadvertent computerized transcription errors may be present.   END OF ED PROVIDER NOTE         MARY Espinosa CNP  06/10/21 0036

## 2021-06-08 ENCOUNTER — TELEPHONE (OUTPATIENT)
Dept: FAMILY MEDICINE CLINIC | Age: 71
End: 2021-06-08

## 2021-06-08 NOTE — TELEPHONE ENCOUNTER
Pt is requesting to sched ER f/u appt: [facility] 6/8, right shoulder pain. No availability for several weeks. Please advise.

## 2021-06-10 ENCOUNTER — OFFICE VISIT (OUTPATIENT)
Dept: FAMILY MEDICINE CLINIC | Age: 71
End: 2021-06-10
Payer: MEDICARE

## 2021-06-10 VITALS
DIASTOLIC BLOOD PRESSURE: 80 MMHG | BODY MASS INDEX: 31.79 KG/M2 | OXYGEN SATURATION: 97 % | HEART RATE: 85 BPM | SYSTOLIC BLOOD PRESSURE: 128 MMHG | WEIGHT: 203 LBS

## 2021-06-10 DIAGNOSIS — M77.8 SHOULDER TENDINITIS, RIGHT: Primary | ICD-10-CM

## 2021-06-10 DIAGNOSIS — M25.511 ACUTE PAIN OF RIGHT SHOULDER: ICD-10-CM

## 2021-06-10 DIAGNOSIS — E11.9 TYPE 2 DIABETES MELLITUS WITHOUT COMPLICATION, WITHOUT LONG-TERM CURRENT USE OF INSULIN (HCC): ICD-10-CM

## 2021-06-10 DIAGNOSIS — I10 ESSENTIAL HYPERTENSION: ICD-10-CM

## 2021-06-10 DIAGNOSIS — M77.8 TENDINITIS OF RIGHT SHOULDER: ICD-10-CM

## 2021-06-10 PROCEDURE — 96372 THER/PROPH/DIAG INJ SC/IM: CPT | Performed by: INTERNAL MEDICINE

## 2021-06-10 PROCEDURE — 99213 OFFICE O/P EST LOW 20 MIN: CPT | Performed by: INTERNAL MEDICINE

## 2021-06-10 RX ORDER — TRIAMCINOLONE ACETONIDE 40 MG/ML
40 INJECTION, SUSPENSION INTRA-ARTICULAR; INTRAMUSCULAR ONCE
Status: COMPLETED | OUTPATIENT
Start: 2021-06-10 | End: 2021-06-10

## 2021-06-10 RX ORDER — METHYLPREDNISOLONE 4 MG/1
TABLET ORAL
Qty: 1 KIT | Refills: 0 | Status: SHIPPED | OUTPATIENT
Start: 2021-06-10 | End: 2021-06-16

## 2021-06-10 RX ORDER — METHOCARBAMOL 750 MG/1
750 TABLET, FILM COATED ORAL 3 TIMES DAILY
Qty: 30 TABLET | Refills: 0 | Status: SHIPPED | OUTPATIENT
Start: 2021-06-10 | End: 2021-06-20

## 2021-06-10 RX ADMIN — TRIAMCINOLONE ACETONIDE 40 MG: 40 INJECTION, SUSPENSION INTRA-ARTICULAR; INTRAMUSCULAR at 15:40

## 2021-06-10 NOTE — PROGRESS NOTES
No Transportation Needs    Lack of Transportation (Medical): No    Lack of Transportation (Non-Medical): No   Physical Activity:     Days of Exercise per Week:     Minutes of Exercise per Session:    Stress:     Feeling of Stress :    Social Connections:     Frequency of Communication with Friends and Family:     Frequency of Social Gatherings with Friends and Family:     Attends Buddhism Services:     Active Member of Clubs or Organizations:     Attends Club or Organization Meetings:     Marital Status:    Intimate Partner Violence:     Fear of Current or Ex-Partner:     Emotionally Abused:     Physically Abused:     Sexually Abused:         Past Surgical History:   Procedure Laterality Date    BRONCHOSCOPY N/A 10/28/2019    BRONCHOSCOPY performed by Mary Ann Salguero MD at [address]  2017    dr Yousif Orf  03/2017    COLONOSCOPY  03/2019    VASECTOMY          No family history on file. No Known Allergies     ROS  No acute distress  Cardiac: Denies any chest pain or palpitation cardiac cath March 2017 with Dr. Maikol Connor no significant CAD  Respiratory: Denies any cough or shortness of breath  CTA chest November 2020 no acute pathology has seen Dr. Anne Sykes  GI: No abdominal pain. Denies any nausea vomiting or diarrhea  Colonoscopy March 2019 with Dr. Jesika Ortiz repeat in 3 years  : Denies any dysuria frequency or hematuria  Neuro: No headache or dizziness  Endocrine: hx  diabetes  Skin: normal  No recent weight gain or weight loss  Denies any change in vision    Objective:    /80   Pulse 85   Wt 203 lb (92.1 kg)   SpO2 97%   BMI 31.79 kg/m²     Constitutional: Alert awake and oriented  Eyes: Pupils equal bilaterally. Extraocular muscles intact  Neck: no JVD adenopathy no bruit  Heart:  RRR, no murmurs, gallops, or rubs.   Lungs:    no wheeze, rales or rhonchi  Abd: bowel sounds present, nontender, nondistended, no masses  Extrem: No clubbing, cyanosis, or edema  Neuro: AAOx3,No Focal deficit  Psychological: no depression or anxiety   Right shoulder localized tenderness at the deltoid tendon area, abduction against resistance not painful it was hard for him to touch the back with the right hand  There was no motor weakness  No sensory deficit  No neurological deficit    Current Outpatient Medications   Medication Sig Dispense Refill    methylPREDNISolone (MEDROL DOSEPACK) 4 MG tablet Take by mouth. 1 kit 0    methocarbamol (ROBAXIN-750) 750 MG tablet Take 1 tablet by mouth 3 times daily for 10 days 30 tablet 0    methocarbamol (ROBAXIN) 750 MG tablet Take 750 mg by mouth      HYDROcodone-acetaminophen (NORCO) 5-325 MG per tablet Take 1 tablet by mouth every 6 hours as needed for Pain for up to 3 days. 12 tablet 0    lisinopril-hydroCHLOROthiazide (PRINZIDE;ZESTORETIC) 10-12.5 MG per tablet Take 1 tablet by mouth daily 90 tablet 1    metFORMIN (GLUCOPHAGE-XR) 500 MG extended release tablet Take 1 tablet by mouth 2 times daily 180 tablet 1    omeprazole (PRILOSEC) 20 MG delayed release capsule Take 40 mg by mouth daily       psyllium (KONSYL) 28.3 % PACK Take 1 packet by mouth nightly       No current facility-administered medications for this visit.         Last 3 BMP  Lab Results   Component Value Date/Time     10/26/2020 11:04 AM     10/24/2019 10:00 AM     08/12/2015 11:03 AM    K 4.1 10/26/2020 11:04 AM    K 4.5 03/02/2020 12:00 AM    K 4.1 10/24/2019 10:00 AM    K 4.2 08/13/2019 12:00 AM    K 4.7 08/12/2015 11:03 AM    CL 99 10/26/2020 11:04 AM     10/24/2019 10:00 AM     08/12/2015 11:03 AM    CO2 28 10/26/2020 11:04 AM    CO2 25 10/24/2019 10:00 AM    CO2 24 09/19/2019 02:18 AM    BUN 25 (H) 10/26/2020 11:04 AM    BUN 16 10/24/2019 10:00 AM    BUN 15 08/12/2015 11:03 AM    CREATININE 1.1 10/26/2020 11:04 AM    CREATININE 1.1 03/02/2020 12:00 AM    CREATININE 1.0 10/24/2019 10:00 AM    CREATININE 1.6 10/26/2020 11:04 AM    HCT 48.3 10/26/2020 11:04 AM    MCV 88.3 10/26/2020 11:04 AM    MCH 29.3 10/26/2020 11:04 AM    MCHC 33.1 10/26/2020 11:04 AM    RDW 13.2 10/26/2020 11:04 AM     (L) 10/26/2020 11:04 AM    MPV 11.3 10/26/2020 11:04 AM       A1C:  Lab Results   Component Value Date/Time    LABA1C 5.9 (H) 10/26/2020 11:04 AM       Lipid panel:  Lab Results   Component Value Date    CHOL 126 10/26/2020    CHOL 135 03/02/2020    CHOL 113 08/13/2019    TRIG 309 10/26/2020    TRIG 557 03/02/2020    TRIG 312 08/13/2019    HDL 24 10/26/2020    HDL 20 03/02/2020    HDL 24 08/13/2019        Lab Results   Component Value Date/Time    PROT 7.3 10/26/2020 11:04 AM    PROT 7.5 08/12/2015 11:03 AM    INR 1.1 10/24/2019 10:00 AM    INR 1.1 09/19/2019 01:04 AM       No results found for: MG      Assessment. Weston Calvo was seen today for shoulder injury. Diagnoses and all orders for this visit:    Shoulder tendinitis, right  -     triamcinolone acetonide (KENALOG-40) injection 40 mg    Acute pain of right shoulder    Essential hypertension    Type 2 diabetes mellitus without complication, without long-term current use of insulin (HCC)    Tendinitis of right shoulder  -     External Referral To Physical Therapy    Other orders  -     methylPREDNISolone (MEDROL DOSEPACK) 4 MG tablet; Take by mouth. -     methocarbamol (ROBAXIN-750) 750 MG tablet;  Take 1 tablet by mouth 3 times daily for 10 days       Patient Active Problem List   Diagnosis    Essential hypertension    Type 2 diabetes mellitus without complication, without long-term current use of insulin (HCC)    Peptic ulcer    Thrombocytopenia (HCC)    Hyperlipidemia       Plan: Tendinitis right shoulder due to injury as above Medrol Dosepak, Kenalog IM right shoulder, physical therapy prescription given      For pain in the right shoulder try Robaxin-750 twice daily      Diabetes control    Patient was given [place] at the urgent care use only sparingly    Hypertension continue same meds  No follow-ups on file.        Monique Ignacio MD  3:46 PM  6/10/2021     DE

## 2021-06-21 RX ORDER — METFORMIN HYDROCHLORIDE 500 MG/1
500 TABLET, EXTENDED RELEASE ORAL 2 TIMES DAILY
Qty: 180 TABLET | Refills: 1 | Status: SHIPPED
Start: 2021-06-21 | End: 2021-10-28 | Stop reason: SDUPTHER

## 2021-08-18 RX ORDER — LISINOPRIL AND HYDROCHLOROTHIAZIDE 12.5; 1 MG/1; MG/1
1 TABLET ORAL DAILY
Qty: 30 TABLET | Refills: 1 | Status: SHIPPED
Start: 2021-08-18 | End: 2021-10-28 | Stop reason: SDUPTHER

## 2021-10-07 ENCOUNTER — NURSE TRIAGE (OUTPATIENT)
Dept: OTHER | Facility: CLINIC | Age: 71
End: 2021-10-07

## 2021-10-07 ENCOUNTER — TELEPHONE (OUTPATIENT)
Dept: ADMINISTRATIVE | Age: 71
End: 2021-10-07

## 2021-10-07 ENCOUNTER — TELEPHONE (OUTPATIENT)
Dept: FAMILY MEDICINE CLINIC | Age: 71
End: 2021-10-07

## 2021-10-07 DIAGNOSIS — J01.91 ACUTE RECURRENT SINUSITIS, UNSPECIFIED LOCATION: Primary | ICD-10-CM

## 2021-10-07 PROCEDURE — 99284 EMERGENCY DEPT VISIT MOD MDM: CPT

## 2021-10-07 NOTE — TELEPHONE ENCOUNTER
pts wife called in wanting to get Salli Binning scheduled with Dr Savannah Rutherford for a sinus infection. She stated that he has been spitting up mucous that contains blood. The call was transferred to [facility] @ nurse access.

## 2021-10-07 NOTE — TELEPHONE ENCOUNTER
----- Message from Manoj Santana sent at 10/7/2021  2:41 PM EDT -----  Subject: Appointment Request    Reason for Call: Routine Return from RN Triage    QUESTIONS  Type of Appointment? Established Patient  Reason for appointment request? Available appointments did not meet   patient need  Additional Information for Provider? Pt.'s wife Ernesto would like a call to   discuss an ENT for ptStephen Orozco spoke with someone earlier regarding referral   and would like to follow. Please advise.   ---------------------------------------------------------------------------  --------------  CALL BACK INFO  What is the best way for the office to contact you? OK to leave message on   voicemail  Preferred Call Back Phone Number? [phone]  ---------------------------------------------------------------------------  --------------  SCRIPT ANSWERS  Patient can be seen for a routine visit? Yes   Nurse Name? Kel Zhang  Have you been diagnosed with, awaiting test results for, or told that you   are suspected of having COVID-19 (Coronavirus)? (If patient has tested   negative or was tested as a requirement for work, school, or travel and   not based on symptoms, answer no)? No  Within the past two weeks have you developed any of the following symptoms   (answer no if symptoms have been present longer than 2 weeks or began   more than 2 weeks ago)? Fever or Chills, Cough, Shortness of breath or   difficulty breathing, Loss of taste or smell, Sore throat, Nasal   congestion, Sneezing or runny nose, Fatigue or generalized body aches   (answer no if pain is specific to a body part e.g. back pain), Diarrhea,   Headache? No  Have you had close contact with someone with COVID-19 in the last 14 days? No  (Service Expert - click yes below to proceed with [employer]   Scheduling)?  Yes

## 2021-10-07 NOTE — TELEPHONE ENCOUNTER
Reason for Disposition   Caller requesting an appointment, triage offered and declined   Nursing judgment    Answer Assessment - Initial Assessment Questions  1. REASON FOR CALL or QUESTION: \"What is your reason for calling today? \" or \"How can I best help you? \" or \"What question do you have that I can help answer? \"      Spouse is calling back in regards to referral to ENT . Sates that office/ PCP had asked that spouse call for referral . Assistance provided to reach Cara in Dr. Earl Rivas office for assistance with this. Protocols used: PCP CALL - NO TRIAGE-ADULT-AH, INFORMATION ONLY CALL - NO TRIAGE-ADULT-[place]    Received call from [facility]  at [facility]  with [employer]. States that spouse is on file having HIPPA consent. No Triage. Patient is not present. Care advice provided, patient verbalizes understanding; denies any other questions or concerns; instructed to call back for any new or worsening symptoms. Writer provided warm transfer to Cara at Dr Earl Rivas office. Attention Provider: Thank you for allowing me to participate in the care of your patient. The patient was connected to triage in response to information provided to the ECC/PSC. Please do not respond through this encounter as the response is not directed to a shared pool.

## 2021-10-08 ENCOUNTER — TELEPHONE (OUTPATIENT)
Dept: ADMINISTRATIVE | Age: 71
End: 2021-10-08

## 2021-10-08 ENCOUNTER — HOSPITAL ENCOUNTER (EMERGENCY)
Age: 71
Discharge: HOME | End: 2021-10-08
Attending: EMERGENCY MEDICINE
Payer: MEDICARE

## 2021-10-08 VITALS
OXYGEN SATURATION: 95 % | TEMPERATURE: 96.5 F | RESPIRATION RATE: 18 BRPM | DIASTOLIC BLOOD PRESSURE: 84 MMHG | HEART RATE: 78 BPM | SYSTOLIC BLOOD PRESSURE: 164 MMHG

## 2021-10-08 DIAGNOSIS — R04.0 EPISTAXIS: Primary | ICD-10-CM

## 2021-10-08 LAB
ANION GAP SERPL CALCULATED.3IONS-SCNC: 10 MMOL/L (ref 7–16)
BASOPHILS ABSOLUTE: 0.07 E9/L (ref 0–0.2)
BASOPHILS RELATIVE PERCENT: 0.9 % (ref 0–2)
BUN BLDV-MCNC: 17 MG/DL (ref 6–23)
CALCIUM SERPL-MCNC: 9.9 MG/DL (ref 8.6–10.2)
CHLORIDE BLD-SCNC: 103 MMOL/L (ref 98–107)
CO2: 27 MMOL/L (ref 22–29)
CREAT SERPL-MCNC: 0.9 MG/DL (ref 0.7–1.2)
EOSINOPHILS ABSOLUTE: 0.25 E9/L (ref 0.05–0.5)
EOSINOPHILS RELATIVE PERCENT: 3.1 % (ref 0–6)
GFR AFRICAN AMERICAN: >60
GFR NON-AFRICAN AMERICAN: >60 ML/MIN/1.73
GLUCOSE BLD-MCNC: 153 MG/DL (ref 74–99)
HCT VFR BLD CALC: 46.6 % (ref 37–54)
HEMOGLOBIN: 16.4 G/DL (ref 12.5–16.5)
IMMATURE GRANULOCYTES #: 0.02 E9/L
IMMATURE GRANULOCYTES %: 0.2 % (ref 0–5)
LYMPHOCYTES ABSOLUTE: 1.67 E9/L (ref 1.5–4)
LYMPHOCYTES RELATIVE PERCENT: 20.7 % (ref 20–42)
MCH RBC QN AUTO: 30 PG (ref 26–35)
MCHC RBC AUTO-ENTMCNC: 35.2 % (ref 32–34.5)
MCV RBC AUTO: 85.2 FL (ref 80–99.9)
MONOCYTES ABSOLUTE: 0.74 E9/L (ref 0.1–0.95)
MONOCYTES RELATIVE PERCENT: 9.2 % (ref 2–12)
NEUTROPHILS ABSOLUTE: 5.32 E9/L (ref 1.8–7.3)
NEUTROPHILS RELATIVE PERCENT: 65.9 % (ref 43–80)
PDW BLD-RTO: 12.8 FL (ref 11.5–15)
PLATELET # BLD: 223 E9/L (ref 130–450)
PMV BLD AUTO: 10.1 FL (ref 7–12)
POTASSIUM SERPL-SCNC: 3.9 MMOL/L (ref 3.5–5)
RBC # BLD: 5.47 E12/L (ref 3.8–5.8)
SODIUM BLD-SCNC: 140 MMOL/L (ref 132–146)
WBC # BLD: 8.1 E9/L (ref 4.5–11.5)

## 2021-10-08 PROCEDURE — 80048 BASIC METABOLIC PNL TOTAL CA: CPT

## 2021-10-08 PROCEDURE — 36415 COLL VENOUS BLD VENIPUNCTURE: CPT

## 2021-10-08 PROCEDURE — 85025 COMPLETE CBC W/AUTO DIFF WBC: CPT

## 2021-10-08 ASSESSMENT — ENCOUNTER SYMPTOMS
VOMITING: 0
DIARRHEA: 0
COUGH: 0
SORE THROAT: 0
SHORTNESS OF BREATH: 0
ABDOMINAL PAIN: 0
EYE REDNESS: 0
SINUS PRESSURE: 0
NAUSEA: 0
WHEEZING: 0
BACK PAIN: 0
EYE DISCHARGE: 0
EYE PAIN: 0

## 2021-10-08 NOTE — TELEPHONE ENCOUNTER
Pt was seen in the [address] [place] [address] ED yesterday for epistaxis, that was bleeding down into his throat. He is scheduled for the first available appt on 12/7. Pt wanted Dr to know that it is still bleeding today. Please review and r/s appt accordingly. Thank you.

## 2021-10-08 NOTE — ED PROVIDER NOTES
Patient with several days of scant bleeding in the posterior pharynx. He states he can feel it streaming down from his sinuses. He states this happened last year and he was evaluated with CTA and no exact source was pinpointed. No anticoagulation. No trauma. No recent illness or injury. The history is provided by the patient. Epistaxis  Location:  Unable to specify  Severity:  Mild  Duration:  3 days  Timing:  Constant  Progression:  Unchanged  Chronicity:  New  Context: not anticoagulants    Relieved by:  None tried  Worsened by:  Nothing  Ineffective treatments:  None tried  Associated symptoms: blood in oropharynx    Associated symptoms: no cough, no fever, no headaches and no sore throat         Review of Systems   Constitutional: Negative for chills and fever. HENT: Positive for nosebleeds. Negative for ear pain, sinus pressure and sore throat. Eyes: Negative for pain, discharge and redness. Respiratory: Negative for cough, shortness of breath and wheezing. Cardiovascular: Negative for chest pain. Gastrointestinal: Negative for abdominal pain, diarrhea, nausea and vomiting. Genitourinary: Negative for dysuria and frequency. Musculoskeletal: Negative for arthralgias and back pain. Skin: Negative for rash and wound. Neurological: Negative for weakness and headaches. Hematological: Negative for adenopathy. All other systems reviewed and are negative. Physical Exam  Vitals and nursing note reviewed. Constitutional:       Appearance: He is well-developed. HENT:      Head: Normocephalic and atraumatic. Nose: Nose normal.      Comments: No obvious source of bleeding in the nasal passages. Mouth/Throat:      Comments: There is small amount of blood in the posterior oropharynx. Eyes:      Pupils: Pupils are equal, round, and reactive to light. Cardiovascular:      Rate and Rhythm: Normal rate and regular rhythm. Heart sounds: Normal heart sounds.  No murmur heard.     Pulmonary:      Effort: Pulmonary effort is normal. No respiratory distress. Breath sounds: Normal breath sounds. No wheezing or rales. Abdominal:      General: Bowel sounds are normal.      Palpations: Abdomen is soft. Tenderness: There is no abdominal tenderness. There is no guarding or rebound. Musculoskeletal:      Cervical back: Normal range of motion and neck supple. Skin:     General: Skin is warm and dry. Neurological:      Mental Status: He is alert and oriented to person, place, and time. Cranial Nerves: No cranial nerve deficit. Coordination: Coordination normal.          Procedures     MDM            --------------------------------------------- PAST HISTORY ---------------------------------------------  Past Medical History:  has a past medical history of Arthritis, Diabetes mellitus ([facility] GERD (gastroesophageal reflux disease), Hyperglycemia, Hyperlipidemia, Hypertension, Lyme disease, and Thrombocytopenia ([facility] Past Surgical History:  has a past surgical history that includes Cardiac catheterization (2017); bronchoscopy (N/A, 10/28/2019); Vasectomy; Colonoscopy (03/2019); and Cardiac catheterization (03/2017). Social History:  reports that he quit smoking about 4 years ago. His smoking use included cigarettes. He started smoking about 31 years ago. He has a 27.00 pack-year smoking history. He has never used smokeless tobacco. He reports current alcohol use. He reports that he does not use drugs. Family History: family history is not on file. The patients home medications have been reviewed. Allergies: Patient has no known allergies.     -------------------------------------------------- RESULTS -------------------------------------------------  Labs:  Results for orders placed or performed during the hospital encounter of 10/08/21   CBC auto differential   Result Value Ref Range    WBC 8.1 4.5 - 11.5 E9/L    RBC 5.47 3.80 - 5.80 E12/L Hemoglobin 16.4 12.5 - 16.5 g/dL    Hematocrit 46.6 37.0 - 54.0 %    MCV 85.2 80.0 - 99.9 fL    MCH 30.0 26.0 - 35.0 pg    MCHC 35.2 (H) 32.0 - 34.5 %    RDW 12.8 11.5 - 15.0 fL    Platelets 216 417 - 724 E9/L    MPV 10.1 7.0 - 12.0 fL    Neutrophils % 65.9 43.0 - 80.0 %    Immature Granulocytes % 0.2 0.0 - 5.0 %    Lymphocytes % 20.7 20.0 - 42.0 %    Monocytes % 9.2 2.0 - 12.0 %    Eosinophils % 3.1 0.0 - 6.0 %    Basophils % 0.9 0.0 - 2.0 %    Neutrophils Absolute 5.32 1.80 - 7.30 E9/L    Immature Granulocytes # 0.02 E9/L    Lymphocytes Absolute 1.67 1.50 - 4.00 E9/L    Monocytes Absolute 0.74 0.10 - 0.95 E9/L    Eosinophils Absolute 0.25 0.05 - 0.50 E9/L    Basophils Absolute 0.07 0.00 - 0.20 K1/T   Basic Metabolic Panel   Result Value Ref Range    Sodium 140 132 - 146 mmol/L    Potassium 3.9 3.5 - 5.0 mmol/L    Chloride 103 98 - 107 mmol/L    CO2 27 22 - 29 mmol/L    Anion Gap 10 7 - 16 mmol/L    Glucose 153 (H) 74 - 99 mg/dL    BUN 17 6 - 23 mg/dL    CREATININE 0.9 0.7 - 1.2 mg/dL    GFR Non-African American >60 >=60 mL/min/1.73    GFR African American >60     Calcium 9.9 8.6 - 10.2 mg/dL       Radiology:  No orders to display       ------------------------- NURSING NOTES AND VITALS REVIEWED ---------------------------  Date / Time Roomed:  10/8/2021  1:23 AM  ED Bed Assignment:  [facility]/    The nursing notes within the ED encounter and vital signs as below have been reviewed. BP (!) 164/84   Pulse 78   Temp 96.5 °F (35.8 °C) (Temporal)   Resp 18   SpO2 95%   Oxygen Saturation Interpretation: Normal      ------------------------------------------ PROGRESS NOTES ------------------------------------------  2:59 AM EDT  I have spoken with the patient and discussed todays results, in addition to providing specific details for the plan of care and counseling regarding the diagnosis and prognosis. Their questions are answered at this time and they are agreeable with the plan.  I discussed at length with them reasons for immediate return here for re evaluation. They will followup with their Otolaryngologist and primary care physician by calling their office tomorrow. Patient has had no further epistaxis while in emergency department. Discussed the need for close outpatient follow-up. He understands the need for immediate return should he begin to have hemorrhage.    --------------------------------- ADDITIONAL PROVIDER NOTES ---------------------------------  At this time the patient is without objective evidence of an acute process requiring hospitalization or inpatient management. They have remained hemodynamically stable throughout their entire ED visit and are stable for discharge with outpatient follow-up. The plan has been discussed in detail and they are aware of the specific conditions for emergent return, as well as the importance of follow-up. New Prescriptions    No medications on file       Diagnosis:  1. Epistaxis        Disposition:  Patient's disposition: Discharge to home  Patient's condition is stable.           Vamsi Connell DO  10/08/21 0300

## 2021-10-11 ENCOUNTER — OFFICE VISIT (OUTPATIENT)
Dept: ENT CLINIC | Age: 71
End: 2021-10-11
Payer: MEDICARE

## 2021-10-11 VITALS
DIASTOLIC BLOOD PRESSURE: 89 MMHG | WEIGHT: 203 LBS | BODY MASS INDEX: 31.86 KG/M2 | HEIGHT: 67 IN | SYSTOLIC BLOOD PRESSURE: 136 MMHG

## 2021-10-11 DIAGNOSIS — R04.0 EPISTAXIS: Primary | ICD-10-CM

## 2021-10-11 PROCEDURE — 31231 NASAL ENDOSCOPY DX: CPT | Performed by: OTOLARYNGOLOGY

## 2021-10-11 PROCEDURE — 99204 OFFICE O/P NEW MOD 45 MIN: CPT | Performed by: OTOLARYNGOLOGY

## 2021-10-11 ASSESSMENT — ENCOUNTER SYMPTOMS
COUGH: 0
VOMITING: 0
SHORTNESS OF BREATH: 0

## 2021-10-11 NOTE — PROGRESS NOTES
[facility] Otolaryngology  Dr. Sri Shanks. SRINIVAS Hernandez Ms.Ed. New Consult       Patient Name:  Artur Juares  :  1950     CHIEF C/O:    Chief Complaint   Patient presents with    New Patient     patient states it started last monday. it stopped on Saturday 10/9/21    Epistaxis       HISTORY OBTAINED FROM:  patient    HISTORY OF PRESENT ILLNESS:       Julien Merida is a 80y.o. year old male, here today for seen in ed persistent epistaxis; unknown source coming more down back of throat more than anything else; stops spontaneous; has history remotely bleeding in the past; has not been cauterized in the recent timeframe, has no prior history of recent nasal packing. No current complaints of headaches vision change, blood pressure has been under control until today's appointment. No prior history of recurrent epistaxis requiring surgical interventions. No other complaints today of sore throat hoarseness shortness of breath neck mass tumor lesions no imaging studies, no recent medical therapies have been initiated. No change in medical therapy for now, no change in environment at home. Patient is not doing any, intranasal humidification.       Past Medical History:   Diagnosis Date    Arthritis     Diabetes mellitus ([facility]     GERD (gastroesophageal reflux disease)     Hyperglycemia     Hyperlipidemia     Hypertension     Lyme disease     Thrombocytopenia ([facility]      Past Surgical History:   Procedure Laterality Date    BRONCHOSCOPY N/A 10/28/2019    BRONCHOSCOPY performed by Arletha Schaumann, MD at [address] [facility]      dr Marquise Buitrago  2017    COLONOSCOPY  2019    VASECTOMY         Current Outpatient Medications:     lisinopril-hydroCHLOROthiazide (PRINZIDE;ZESTORETIC) 10-12.5 MG per tablet, TAKE 1 TABLET BY MOUTH DAILY, Disp: 30 tablet, Rfl: 1    metFORMIN (GLUCOPHAGE-XR) 500 MG extended release tablet, TAKE 1 TABLET BY MOUTH 2 TIMES DAILY, Disp: 180 tablet, Rfl: 1    methocarbamol (ROBAXIN) 750 MG tablet, Take 750 mg by mouth, Disp: , Rfl:     omeprazole (PRILOSEC) 20 MG delayed release capsule, Take 40 mg by mouth daily , Disp: , Rfl:     psyllium (KONSYL) 28.3 % PACK, Take 1 packet by mouth nightly, Disp: , Rfl:   Patient has no known allergies. Social History     Tobacco Use    Smoking status: Former Smoker     Packs/day: 1.00     Years: 27.00     Pack years: 27.00     Types: Cigarettes     Start date: 200     Quit date:      Years since quittin.7    Smokeless tobacco: Never Used   Vaping Use    Vaping Use: Never used   Substance Use Topics    Alcohol use: Yes     Comment: social    Drug use: Never     History reviewed. No pertinent family history. Review of Systems   Constitutional: Negative for chills and fever. HENT: Negative for ear discharge, hearing loss and nosebleeds. Respiratory: Negative for cough and shortness of breath. Cardiovascular: Negative for chest pain and palpitations. Gastrointestinal: Negative for vomiting. Skin: Negative for rash. Allergic/Immunologic: Negative for environmental allergies. Neurological: Negative for dizziness and headaches. Hematological: Does not bruise/bleed easily. All other systems reviewed and are negative. /89   Ht 5' 7\" (1.702 m)   Wt 203 lb (92.1 kg)   BMI 31.79 kg/m²   Physical Exam  Vitals and nursing note reviewed. Constitutional:       Appearance: He is well-developed. HENT:      Head: No contusion, masses or laceration. Jaw: No tenderness or swelling. Salivary Glands: Right salivary gland is not diffusely enlarged. Left salivary gland is not diffusely enlarged. Right Ear: Tympanic membrane, ear canal and external ear normal. No decreased hearing noted. No drainage. There is no impacted cerumen. Left Ear: Tympanic membrane, ear canal and external ear normal. No decreased hearing noted. No drainage.  There is no impacted cerumen. Nose: Septal deviation (dns to right) present. No nasal tenderness or mucosal edema. Right Nostril: No epistaxis. Left Nostril: No epistaxis. Right Turbinates: Not enlarged. Left Turbinates: Not enlarged. Mouth/Throat:      Mouth: No oral lesions. Palate: No mass. Eyes:      Pupils: Pupils are equal, round, and reactive to light. Neck:      Thyroid: No thyromegaly. Trachea: No tracheal deviation. Cardiovascular:      Rate and Rhythm: Normal rate. Pulmonary:      Effort: Pulmonary effort is normal. No respiratory distress. Musculoskeletal:         General: Normal range of motion. Cervical back: Normal range of motion. Lymphadenopathy:      Cervical: No cervical adenopathy. Skin:     General: Skin is warm. Findings: No erythema. Neurological:      Mental Status: He is alert. Cranial Nerves: No cranial nerve deficit. Procedure Note    Pre-operative Diagnosis: Epistaxis    Post-operative Diagnosis: same    Anesthesia: Lidocaine 2% and Ry-Synephrine 1/2%    Endoscopy Type: Bilateral nasal endoscopy    Procedure Details: The patient was placed in the sitting position. After topical anesthesia and decongestion, the 4 mm laryngoscope was passed. The nasal cavities, nasopharynx, oropharynx, hypopharynx, and larynx were all examined. Vocal cords were examined during respiration and phonation. The following findings were noted:    Findings: Nasal endoscopy reveals no significant vascularity requiring intranasal cautery, no sign of anterior or posterior varicosities or area of concern for possible epistaxis. Condition:  Stable. Patient tolerated procedure well.     Complications:  None    IMPRESSION/PLAN:  Patient seen and examined for history of recurrent epistaxis, no sign of active bleeding today, nasal endoscopy bilaterally reveals no significant areas for concern of possible epistaxis, patient will be placed on a short course of intranasal aspirin for nasal decongestion followed by significant increase in nasal hydration including saline rinses and may follow-up with ENT as needed    Dr. Jennifer Sarkar Otolaryngology/Facial Plastic Surgery Residency  Associate Clinical Professor:  Hiwot Grubbs, [facility]

## 2021-10-11 NOTE — PATIENT INSTRUCTIONS
Please get over the counter Afrin and use 2 sprays each nostril 3 daily for 3 days then throw it away. Do not blow your nose. After the 3 days of using afrin start using plain nasal saline 2 sprays each nostril 3-4 times daily. If you have to cough or sneeze, please do so with your mouth open. If nose starts to bleed again hold soft part of your nose tightly for minimum of 15 minutes without releasing.  If you cannot get it to stop after 20 minutes please call the office to be seen or if its out of office hours go to ED

## 2021-11-01 RX ORDER — METFORMIN HYDROCHLORIDE 500 MG/1
500 TABLET, EXTENDED RELEASE ORAL 2 TIMES DAILY
Qty: 180 TABLET | Refills: 1 | Status: SHIPPED
Start: 2021-11-01 | End: 2021-11-16 | Stop reason: SDUPTHER

## 2021-11-01 RX ORDER — LISINOPRIL AND HYDROCHLOROTHIAZIDE 12.5; 1 MG/1; MG/1
1 TABLET ORAL DAILY
Qty: 90 TABLET | Refills: 1 | Status: SHIPPED
Start: 2021-11-01 | End: 2021-11-16 | Stop reason: SDUPTHER

## 2021-11-16 ENCOUNTER — OFFICE VISIT (OUTPATIENT)
Dept: FAMILY MEDICINE CLINIC | Age: 71
End: 2021-11-16
Payer: MEDICARE

## 2021-11-16 VITALS
HEART RATE: 78 BPM | OXYGEN SATURATION: 97 % | BODY MASS INDEX: 31.95 KG/M2 | WEIGHT: 204 LBS | DIASTOLIC BLOOD PRESSURE: 80 MMHG | SYSTOLIC BLOOD PRESSURE: 130 MMHG | TEMPERATURE: 97.7 F

## 2021-11-16 DIAGNOSIS — Z12.5 PROSTATE CANCER SCREENING: ICD-10-CM

## 2021-11-16 DIAGNOSIS — E78.5 HYPERLIPIDEMIA, UNSPECIFIED HYPERLIPIDEMIA TYPE: ICD-10-CM

## 2021-11-16 DIAGNOSIS — K92.0 HEMATEMESIS WITHOUT NAUSEA: ICD-10-CM

## 2021-11-16 DIAGNOSIS — E11.9 TYPE 2 DIABETES MELLITUS WITHOUT COMPLICATION, WITHOUT LONG-TERM CURRENT USE OF INSULIN (HCC): ICD-10-CM

## 2021-11-16 DIAGNOSIS — M17.9 OSTEOARTHRITIS OF KNEE, UNSPECIFIED LATERALITY, UNSPECIFIED OSTEOARTHRITIS TYPE: ICD-10-CM

## 2021-11-16 DIAGNOSIS — I10 PRIMARY HYPERTENSION: ICD-10-CM

## 2021-11-16 DIAGNOSIS — R04.2 HEMOPTYSIS: Primary | ICD-10-CM

## 2021-11-16 LAB
ALBUMIN SERPL-MCNC: NORMAL G/DL
ALP BLD-CCNC: NORMAL U/L
ALT SERPL-CCNC: NORMAL U/L
ANION GAP SERPL CALCULATED.3IONS-SCNC: NORMAL MMOL/L
AST SERPL-CCNC: NORMAL U/L
AVERAGE GLUCOSE: NORMAL
BASOPHILS ABSOLUTE: NORMAL
BASOPHILS RELATIVE PERCENT: NORMAL
BILIRUB SERPL-MCNC: NORMAL MG/DL
BUN BLDV-MCNC: NORMAL MG/DL
CALCIUM SERPL-MCNC: NORMAL MG/DL
CHLORIDE BLD-SCNC: NORMAL MMOL/L
CHOLESTEROL, TOTAL: NORMAL
CHOLESTEROL/HDL RATIO: NORMAL
CO2: NORMAL
CREAT SERPL-MCNC: NORMAL MG/DL
EOSINOPHILS ABSOLUTE: NORMAL
EOSINOPHILS RELATIVE PERCENT: NORMAL
GFR CALCULATED: NORMAL
GLUCOSE BLD-MCNC: NORMAL MG/DL
HBA1C MFR BLD: 6.4 %
HCT VFR BLD CALC: NORMAL %
HDLC SERPL-MCNC: NORMAL MG/DL
HEMOGLOBIN: NORMAL
LDL CHOLESTEROL CALCULATED: NORMAL
LYMPHOCYTES ABSOLUTE: NORMAL
LYMPHOCYTES RELATIVE PERCENT: NORMAL
MCH RBC QN AUTO: NORMAL PG
MCHC RBC AUTO-ENTMCNC: NORMAL G/DL
MCV RBC AUTO: NORMAL FL
MONOCYTES ABSOLUTE: NORMAL
MONOCYTES RELATIVE PERCENT: NORMAL
NEUTROPHILS ABSOLUTE: NORMAL
NEUTROPHILS RELATIVE PERCENT: NORMAL
NONHDLC SERPL-MCNC: NORMAL MG/DL
PDW BLD-RTO: NORMAL %
PLATELET # BLD: NORMAL 10*3/UL
PMV BLD AUTO: NORMAL FL
POTASSIUM SERPL-SCNC: NORMAL MMOL/L
PROSTATE SPECIFIC ANTIGEN: NORMAL
RBC # BLD: NORMAL 10*6/UL
SODIUM BLD-SCNC: NORMAL MMOL/L
TOTAL PROTEIN: NORMAL
TRIGL SERPL-MCNC: NORMAL MG/DL
VLDLC SERPL CALC-MCNC: NORMAL MG/DL
WBC # BLD: NORMAL 10*3/UL

## 2021-11-16 PROCEDURE — 99214 OFFICE O/P EST MOD 30 MIN: CPT | Performed by: INTERNAL MEDICINE

## 2021-11-16 RX ORDER — FENOFIBRATE 145 MG/1
145 TABLET, COATED ORAL DAILY
Qty: 90 TABLET | Refills: 0 | Status: SHIPPED | OUTPATIENT
Start: 2021-11-16 | End: 2022-02-16 | Stop reason: SDUPTHER

## 2021-11-16 RX ORDER — LISINOPRIL AND HYDROCHLOROTHIAZIDE 12.5; 1 MG/1; MG/1
1 TABLET ORAL DAILY
Qty: 90 TABLET | Refills: 1 | Status: SHIPPED | OUTPATIENT
Start: 2021-11-16 | End: 2022-03-03 | Stop reason: SDUPTHER

## 2021-11-16 RX ORDER — OMEPRAZOLE 40 MG/1
40 CAPSULE, DELAYED RELEASE ORAL DAILY
Qty: 90 CAPSULE | Refills: 1 | Status: SHIPPED | OUTPATIENT
Start: 2021-11-16 | End: 2022-03-03 | Stop reason: SDUPTHER

## 2021-11-16 RX ORDER — METFORMIN HYDROCHLORIDE 500 MG/1
500 TABLET, EXTENDED RELEASE ORAL 2 TIMES DAILY
Qty: 180 TABLET | Refills: 1 | Status: SHIPPED | OUTPATIENT
Start: 2021-11-16 | End: 2022-03-03 | Stop reason: SDUPTHER

## 2021-11-16 NOTE — PROGRESS NOTES
Subjective:     Chief Complaint   Patient presents with    3 Month Follow-Up    Other     dry throat      Patient complains of dryness in the throat,  He was spitting up blood for 1 week he went to the ER on 2021 diagnosis epistaxis he was referred to ENT seen by Dr. Annalise Keith who did the nasal endoscopy which was okay advised to use nasal saline solution  Patient has similar symptoms in 2020, he had a CTA chest which was negative for PE  He was seen by Dr. Mario Michel found to have peptic ulcer taking omeprazole 40 mg    He was also seen by pulmonary Dr. Juan J Carballo had bronchoscopy which was negative    He has history of chronic thrombocytopenia doing better    Past Medical History:   Diagnosis Date    Arthritis     Diabetes mellitus ([facility] [address] [address]     GERD (gastroesophageal reflux disease)     Hyperglycemia     Hyperlipidemia     Hypertension     Lyme disease     Thrombocytopenia ([facility]         Social History     Socioeconomic History    Marital status:      Spouse name: Not on file    Number of children: Not on file    Years of education: Not on file    Highest education level: Not on file   Occupational History    Not on file   Tobacco Use    Smoking status: Former Smoker     Packs/day: 1.00     Years: 27.00     Pack years: 27.00     Types: Cigarettes     Start date:      Quit date:      Years since quittin.8    Smokeless tobacco: Never Used   Vaping Use    Vaping Use: Never used   Substance and Sexual Activity    Alcohol use: Yes     Comment: social    Drug use: Never    Sexual activity: Not on file   Other Topics Concern    Not on file   Social History Narrative    ** Merged History Encounter **          Social Determinants of Health     Financial Resource Strain:     Difficulty of Paying Living Expenses: Not on file   Food Insecurity: No Food Insecurity    Worried About Running Out of Food in the Last Year: Never true    [address] in the Last Year: Never true   Transportation Needs: No Transportation Needs    Lack of Transportation (Medical): No    Lack of Transportation (Non-Medical): No   Physical Activity:     Days of Exercise per Week: Not on file    Minutes of Exercise per Session: Not on file   Stress:     Feeling of Stress : Not on file   Social Connections:     Frequency of Communication with Friends and Family: Not on file    Frequency of Social Gatherings with Friends and Family: Not on file    Attends Rastafari Services: Not on file    Active Member of [address] [place] [address] or Organizations: Not on file    Attends Club or Organization Meetings: Not on file    Marital Status: Not on file   Intimate Partner Violence:     Fear of Current or Ex-Partner: Not on file    Emotionally Abused: Not on file    Physically Abused: Not on file    Sexually Abused: Not on file   Housing Stability:     Unable to Pay for Housing in the Last Year: Not on file    Number of [place] in the Last Year: Not on file    Unstable Housing in the Last Year: Not on file        Past Surgical History:   Procedure Laterality Date    BRONCHOSCOPY N/A 10/28/2019    BRONCHOSCOPY performed by Toshia Dixon MD at [address] [place] [address]  2017    dr Karne Castañeda  03/2017    COLONOSCOPY  03/2019    VASECTOMY          History reviewed. No pertinent family history. No Known Allergies     ROS  No acute distress  Cardiac: Denies any chest pain or palpitation  Cardiac catheterization March 2017 by Dr. Isabel Nuñez no significant coronary artery disease  Respiratory: Denies any cough or shortness of breath  CTA chest November 2020 - seen by pulmonologist Dr. Ehsan Andrade  GI: No abdominal pain.  Denies any nausea vomiting or diarrhea  Endoscopy November 2020  Colonoscopy March 2019 dr Jay Braden repeat in 3 years  : Denies any dysuria frequency or hematuria  Neuro: No headache or dizziness  Endocrine: History of diabetes   Skin: normal  No recent weight gain or weight loss  Denies any change in vision    Objective:    /80   Pulse 78   Temp 97.7 °F (36.5 °C)   Wt 204 lb (92.5 kg)   SpO2 97%   BMI 31.95 kg/m²     Constitutional: Alert awake and oriented  Eyes: Pupils equal bilaterally. Extraocular muscles intact  Neck: no JVD adenopathy no bruit  Heart:  RRR, no murmurs, gallops, or rubs. Lungs:    no wheeze, rales or rhonchi  Abd: bowel sounds present, nontender, nondistended, no masses  Extrem:  No clubbing, cyanosis, or edema  Neuro: AAOx3,No Focal deficit  Psychological: no depression or anxiety       Current Outpatient Medications   Medication Sig Dispense Refill    lisinopril-hydroCHLOROthiazide (PRINZIDE;ZESTORETIC) 10-12.5 MG per tablet Take 1 tablet by mouth daily 90 tablet 1    metFORMIN (GLUCOPHAGE-XR) 500 MG extended release tablet Take 1 tablet by mouth 2 times daily 180 tablet 1    omeprazole (PRILOSEC) 40 MG delayed release capsule Take 1 capsule by mouth daily 90 capsule 1    fenofibrate (TRICOR) 145 MG tablet Take 1 tablet by mouth daily 90 tablet 0    psyllium (KONSYL) 28.3 % PACK Take 1 packet by mouth nightly       No current facility-administered medications for this visit.         Last 3 BMP  Lab Results   Component Value Date/Time     10/08/2021 01:59 AM     10/26/2020 11:04 AM     10/24/2019 10:00 AM    K 3.9 10/08/2021 01:59 AM    K 4.1 10/26/2020 11:04 AM    K 4.5 03/02/2020 12:00 AM    K 4.1 10/24/2019 10:00 AM    K 4.2 08/13/2019 12:00 AM    K 4.7 08/12/2015 11:03 AM     10/08/2021 01:59 AM    CL 99 10/26/2020 11:04 AM     10/24/2019 10:00 AM    CO2 27 10/08/2021 01:59 AM    CO2 28 10/26/2020 11:04 AM    CO2 25 10/24/2019 10:00 AM    BUN 17 10/08/2021 01:59 AM    BUN 25 (H) 10/26/2020 11:04 AM    BUN 16 10/24/2019 10:00 AM    CREATININE 0.9 10/08/2021 01:59 AM    CREATININE 1.1 10/26/2020 11:04 AM    CREATININE 1.1 03/02/2020 12:00 AM    CREATININE 1.0 10/24/2019 10:00 AM    CREATININE 1.0 08/13/2019 12:00 AM    GLUCOSE 153 (H) 10/08/2021 01:59 AM    GLUCOSE 113 (H) 10/26/2020 11:04 AM    GLUCOSE 148 (H) 10/24/2019 10:00 AM    CALCIUM 9.9 10/08/2021 01:59 AM    CALCIUM 9.8 10/26/2020 11:04 AM    CALCIUM 9.1 10/24/2019 10:00 AM       Last 3 CMP:    Lab Results   Component Value Date/Time     10/08/2021 01:59 AM     10/26/2020 11:04 AM     10/24/2019 10:00 AM    K 3.9 10/08/2021 01:59 AM    K 4.1 10/26/2020 11:04 AM    K 4.5 03/02/2020 12:00 AM    K 4.1 10/24/2019 10:00 AM    K 4.2 08/13/2019 12:00 AM    K 4.7 08/12/2015 11:03 AM     10/08/2021 01:59 AM    CL 99 10/26/2020 11:04 AM     10/24/2019 10:00 AM    CO2 27 10/08/2021 01:59 AM    CO2 28 10/26/2020 11:04 AM    CO2 25 10/24/2019 10:00 AM    BUN 17 10/08/2021 01:59 AM    BUN 25 (H) 10/26/2020 11:04 AM    BUN 16 10/24/2019 10:00 AM    CREATININE 0.9 10/08/2021 01:59 AM    CREATININE 1.1 10/26/2020 11:04 AM    CREATININE 1.1 03/02/2020 12:00 AM    CREATININE 1.0 10/24/2019 10:00 AM    CREATININE 1.0 08/13/2019 12:00 AM    GLUCOSE 153 (H) 10/08/2021 01:59 AM    GLUCOSE 113 (H) 10/26/2020 11:04 AM    GLUCOSE 148 (H) 10/24/2019 10:00 AM    CALCIUM 9.9 10/08/2021 01:59 AM    CALCIUM 9.8 10/26/2020 11:04 AM    CALCIUM 9.1 10/24/2019 10:00 AM    PROT 7.3 10/26/2020 11:04 AM    PROT 7.5 08/12/2015 11:03 AM    LABALBU 4.4 10/26/2020 11:04 AM    LABALBU 4.3 08/12/2015 11:03 AM    BILITOT 0.5 10/26/2020 11:04 AM    BILITOT 0.5 08/12/2015 11:03 AM    ALKPHOS 48 10/26/2020 11:04 AM    ALKPHOS 58 08/12/2015 11:03 AM    AST 19 10/26/2020 11:04 AM    AST 22 08/12/2015 11:03 AM    ALT 29 10/26/2020 11:04 AM    ALT 38 08/12/2015 11:03 AM        CBC:   Lab Results   Component Value Date/Time    WBC 8.1 10/08/2021 02:11 AM    RBC 5.47 10/08/2021 02:11 AM    HGB 16.4 10/08/2021 02:11 AM    HCT 46.6 10/08/2021 02:11 AM    MCV 85.2 10/08/2021 02:11 AM    MCH 30.0 10/08/2021 02:11 AM    MCHC 35.2 (H) 10/08/2021 02:11 AM    RDW 12.8 10/08/2021 02:11 AM     10/08/2021 02:11 AM    MPV 10.1 10/08/2021 02:11 AM       A1C:  Lab Results   Component Value Date/Time    LABA1C 6.4 11/15/2021 12:00 AM       Lipid panel:  Lab Results   Component Value Date    CHOL 126 10/26/2020    CHOL 135 03/02/2020    CHOL 113 08/13/2019    TRIG 309 10/26/2020    TRIG 557 03/02/2020    TRIG 312 08/13/2019    HDL 24 10/26/2020    HDL 20 03/02/2020    HDL 24 08/13/2019        Lab Results   Component Value Date/Time    PROT 7.3 10/26/2020 11:04 AM    PROT 7.5 08/12/2015 11:03 AM    INR 1.1 10/24/2019 10:00 AM    INR 1.1 09/19/2019 01:04 AM       No results found for: MG      Assessment. Sara Torres was seen today for 3 month follow-up and other. Diagnoses and all orders for this visit:    Hemoptysis  -     CT CHEST W CONTRAST; Future    Hematemesis without nausea  -     External Referral To Gastroenterology    Primary hypertension    Type 2 diabetes mellitus without complication, without long-term current use of insulin ([facility] [address]  -     COMPREHENSIVE METABOLIC PANEL; Future  -     LIPID PANEL; Future    Hyperlipidemia, unspecified hyperlipidemia type  -     COMPREHENSIVE METABOLIC PANEL; Future  -     LIPID PANEL; Future    Other orders  -     lisinopril-hydroCHLOROthiazide (PRINZIDE;ZESTORETIC) 10-12.5 MG per tablet; Take 1 tablet by mouth daily  -     metFORMIN (GLUCOPHAGE-XR) 500 MG extended release tablet; Take 1 tablet by mouth 2 times daily  -     omeprazole (PRILOSEC) 40 MG delayed release capsule; Take 1 capsule by mouth daily  -     fenofibrate (TRICOR) 145 MG tablet; Take 1 tablet by mouth daily       Patient Active Problem List   Diagnosis    Essential hypertension    Type 2 diabetes mellitus without complication, without long-term current use of insulin (HCC)    Peptic ulcer    Thrombocytopenia (HCC)    Hyperlipidemia       Plan: Hemoptysis.   Breathing still not clear he says he has to spit out blood from the throat throat feels dry seen by ENT work-up negative repeat CT chest with contrast do not take Metformin 1 day before 1 day after  Patient was seen in pulmonary last year had a bronchoscopy which was negative  Advised to see pulmonary again patient will call and make his appointment after the CAT scan    Rule out hematemesis he had a peptic ulcer disease refer back to Dr. Pepe Saha    Hypertension good control continue lisinopril HCTZ 10/12.5    Hyperlipidemia triglycerides are high 972 start on fenofibrate 148 mg and repeat blood work in 3 months    Diabetes type 2 good control A1c 6.4% continue Metformin and diet control    Lab reports reviewed    Advised to follow with the GI and pulmonary  Referral was sent for Dr. Pepe Saha  Patient will call himself Dr. Nancy Skelton    Call for the CT chest report    I will be retiring in 2 months  He will see a another physician next visit  He will do blood work again  Appointment was made for 3 months to follow-up at  [facility] here    Return in about 4 months (around 3/16/2022).        Beth Gonzalez MD  11:40 AM  11/16/2021     DE

## 2021-11-18 ENCOUNTER — TELEPHONE (OUTPATIENT)
Dept: FAMILY MEDICINE CLINIC | Age: 71
End: 2021-11-18

## 2021-11-18 NOTE — TELEPHONE ENCOUNTER
I called to schedule patient for CT chest with contrast. Patient stated that he had changed his mind and was not going to have the CT done.

## 2021-11-18 NOTE — TELEPHONE ENCOUNTER
Patient was declining CT chest, I called and left a message for the patient,  I advised the patient to call back so I can discuss with

## 2021-12-16 ENCOUNTER — TELEPHONE (OUTPATIENT)
Dept: FAMILY MEDICINE CLINIC | Age: 71
End: 2021-12-16

## 2021-12-16 DIAGNOSIS — E11.9 TYPE 2 DIABETES MELLITUS WITHOUT COMPLICATION, WITHOUT LONG-TERM CURRENT USE OF INSULIN (HCC): ICD-10-CM

## 2021-12-16 DIAGNOSIS — R04.2 HEMOPTYSIS: Primary | ICD-10-CM

## 2021-12-16 NOTE — TELEPHONE ENCOUNTER
CT chest with contrast ordered, BMP ordered, office visit after the CT chest  Office visit and must keep office visit  Inform the patient about Metformin

## 2021-12-20 ENCOUNTER — TELEPHONE (OUTPATIENT)
Dept: FAMILY MEDICINE CLINIC | Age: 71
End: 2021-12-20

## 2021-12-20 DIAGNOSIS — R04.2 HEMOPTYSIS: Primary | ICD-10-CM

## 2021-12-20 NOTE — TELEPHONE ENCOUNTER
I submitted the authorization request to [employer] and they are requesting a chest xray be done before the ct chest will be approved. Please advise.  Thank you

## 2021-12-22 DIAGNOSIS — E11.9 TYPE 2 DIABETES MELLITUS WITHOUT COMPLICATION, WITHOUT LONG-TERM CURRENT USE OF INSULIN (HCC): ICD-10-CM

## 2021-12-22 DIAGNOSIS — E78.5 HYPERLIPIDEMIA, UNSPECIFIED HYPERLIPIDEMIA TYPE: ICD-10-CM

## 2021-12-22 LAB
ALBUMIN SERPL-MCNC: 4.3 G/DL (ref 3.5–5.2)
ALP BLD-CCNC: 47 U/L (ref 40–129)
ALT SERPL-CCNC: 22 U/L (ref 0–40)
ANION GAP SERPL CALCULATED.3IONS-SCNC: 14 MMOL/L (ref 7–16)
AST SERPL-CCNC: 17 U/L (ref 0–39)
BILIRUB SERPL-MCNC: 0.4 MG/DL (ref 0–1.2)
BUN BLDV-MCNC: 30 MG/DL (ref 6–23)
CALCIUM SERPL-MCNC: 10 MG/DL (ref 8.6–10.2)
CHLORIDE BLD-SCNC: 102 MMOL/L (ref 98–107)
CHOLESTEROL, TOTAL: 119 MG/DL (ref 0–199)
CO2: 25 MMOL/L (ref 22–29)
CREAT SERPL-MCNC: 1.2 MG/DL (ref 0.7–1.2)
GFR AFRICAN AMERICAN: >60
GFR NON-AFRICAN AMERICAN: 60 ML/MIN/1.73
GLUCOSE BLD-MCNC: 104 MG/DL (ref 74–99)
HDLC SERPL-MCNC: 28 MG/DL
LDL CHOLESTEROL CALCULATED: 62 MG/DL (ref 0–99)
POTASSIUM SERPL-SCNC: 4.1 MMOL/L (ref 3.5–5)
SODIUM BLD-SCNC: 141 MMOL/L (ref 132–146)
TOTAL PROTEIN: 7.3 G/DL (ref 6.4–8.3)
TRIGL SERPL-MCNC: 145 MG/DL (ref 0–149)
VLDLC SERPL CALC-MCNC: 29 MG/DL

## 2022-01-06 ENCOUNTER — OFFICE VISIT (OUTPATIENT)
Dept: FAMILY MEDICINE CLINIC | Age: 72
End: 2022-01-06
Payer: MEDICARE

## 2022-01-06 VITALS
SYSTOLIC BLOOD PRESSURE: 128 MMHG | HEART RATE: 64 BPM | OXYGEN SATURATION: 96 % | BODY MASS INDEX: 29.6 KG/M2 | WEIGHT: 189 LBS | DIASTOLIC BLOOD PRESSURE: 80 MMHG | TEMPERATURE: 97 F

## 2022-01-06 DIAGNOSIS — R59.0 ENLARGED LYMPH NODE IN NECK: Primary | ICD-10-CM

## 2022-01-06 DIAGNOSIS — R63.4 WEIGHT LOSS: ICD-10-CM

## 2022-01-06 DIAGNOSIS — R04.0 EPISTAXIS: ICD-10-CM

## 2022-01-06 DIAGNOSIS — R07.0 THROAT PAIN IN ADULT: ICD-10-CM

## 2022-01-06 DIAGNOSIS — R59.0 LYMPHADENOPATHY, ABDOMINAL: ICD-10-CM

## 2022-01-06 DIAGNOSIS — K27.9 PEPTIC ULCER: ICD-10-CM

## 2022-01-06 DIAGNOSIS — I10 ESSENTIAL HYPERTENSION: ICD-10-CM

## 2022-01-06 DIAGNOSIS — E11.9 TYPE 2 DIABETES MELLITUS WITHOUT COMPLICATION, WITHOUT LONG-TERM CURRENT USE OF INSULIN (HCC): ICD-10-CM

## 2022-01-06 DIAGNOSIS — K04.7 INFECTED TOOTH: ICD-10-CM

## 2022-01-06 PROCEDURE — 99214 OFFICE O/P EST MOD 30 MIN: CPT | Performed by: INTERNAL MEDICINE

## 2022-01-06 RX ORDER — AMOXICILLIN AND CLAVULANATE POTASSIUM 875; 125 MG/1; MG/1
1 TABLET, FILM COATED ORAL 2 TIMES DAILY
Qty: 20 TABLET | Refills: 0 | Status: SHIPPED | OUTPATIENT
Start: 2022-01-06 | End: 2022-01-16

## 2022-01-06 NOTE — PROGRESS NOTES
Subjective:     Chief Complaint   Patient presents with    Pharyngitis   Patient here for follow-up, from the office visit November 16, 2021,  He was spitting some blood for 1 week in October 2021 he had gone to the ER, he was diagnosed epistaxis, he was referred to Dr. Shayy Alanis, patient had nasal endoscopy which was negative,    Patient had similar symptoms in November 2020, he had a CTA chest negative for PE, he was seen by Dr. Chinedu Yu he had endoscopy found to have peptic ulcer he repeatedly endoscopy ulcer had healed,    Patient was also seen by Dr. Ángel Sanon, he had a bronchoscopy which was negative    Patient does have chronic thrombocytopenia doing better,    I had ordered the CT chest in November 2021 patient did not do the CAT scan I spoke to him, finally he agreed for the CT chest which was done January 5, 2021, it showed emphysema,  Nonspecific lymph nodes in the port of the liver measuring 1 cm,      He has noted a lump on the left side of the neck, he saw that 3 weeks ago,    He had a infected tooth on the left side  He had a tooth removed from the left side by Dr. Sarah Orourke oral surgeon in [facility]  2 weeks ago, patient had a lymph node that time on the left side of the neck, he was given amoxicillin which has not helped    He has sore throat on and off for the past 2 weeks      He has been trying to lose weight, he has quit drinking beer, no pop, no sugar, not eating junk, his triglycerides were high so he was working on diet and to control his sugar, he is exercising every day, he walks around his swimming pool about 12-14 laps every day,  He feels much better since he lost weight he has lost 14 pounds of weight over the past few    He is feeling much better since he lost weight he has more energy, he has a great appetite,    Past Medical History:   Diagnosis Date    Arthritis     Diabetes mellitus ([address]     GERD (gastroesophageal reflux disease)     Hyperglycemia     Hyperlipidemia     Hypertension  Lyme disease     Thrombocytopenia (HCC)         Social History     Socioeconomic History    Marital status:      Spouse name: Not on file    Number of children: Not on file    Years of education: Not on file    Highest education level: Not on file   Occupational History    Not on file   Tobacco Use    Smoking status: Former Smoker     Packs/day: 1.00     Years: 27.00     Pack years: 27.00     Types: Cigarettes     Start date: 200     Quit date: 2017     Years since quittin.0    Smokeless tobacco: Never Used   Vaping Use    Vaping Use: Never used   Substance and Sexual Activity    Alcohol use: Yes     Comment: social    Drug use: Never    Sexual activity: Not on file   Other Topics Concern    Not on file   Social History Narrative    ** Merged History Encounter **          Social Determinants of Health     Financial Resource Strain:     Difficulty of Paying Living Expenses: Not on file   Food Insecurity: No Food Insecurity    Worried About [address] [employer] in the Last Year: Never true    [facility] in the Last Year: Never true   Transportation Needs: No Transportation Needs    Lack of Transportation (Medical): No    Lack of Transportation (Non-Medical):  No   Physical Activity:     Days of Exercise per Week: Not on file    Minutes of Exercise per Session: Not on file   Stress:     Feeling of Stress : Not on file   Social Connections:     Frequency of Communication with Friends and Family: Not on file    Frequency of Social Gatherings with Friends and Family: Not on file    Attends Jehovah's witness Services: Not on file    Active Member of Clubs or Organizations: Not on file    Attends Club or Organization Meetings: Not on file    Marital Status: Not on file   Intimate Partner Violence:     Fear of Current or Ex-Partner: Not on file    Emotionally Abused: Not on file    Physically Abused: Not on file    Sexually Abused: Not on file   Housing Stability:     Unable to Pay 875-125 MG per tablet Take 1 tablet by mouth 2 times daily for 10 days 20 tablet 0    lisinopril-hydroCHLOROthiazide (PRINZIDE;ZESTORETIC) 10-12.5 MG per tablet Take 1 tablet by mouth daily 90 tablet 1    metFORMIN (GLUCOPHAGE-XR) 500 MG extended release tablet Take 1 tablet by mouth 2 times daily 180 tablet 1    omeprazole (PRILOSEC) 40 MG delayed release capsule Take 1 capsule by mouth daily 90 capsule 1    fenofibrate (TRICOR) 145 MG tablet Take 1 tablet by mouth daily 90 tablet 0    psyllium (KONSYL) 28.3 % PACK Take 1 packet by mouth nightly       No current facility-administered medications for this visit.         Last 3 BMP  Lab Results   Component Value Date/Time     12/22/2021 11:26 AM     10/08/2021 01:59 AM     10/26/2020 11:04 AM    K 4.1 12/22/2021 11:26 AM    K 3.9 10/08/2021 01:59 AM    K 4.1 10/26/2020 11:04 AM    K 4.1 10/24/2019 10:00 AM     12/22/2021 11:26 AM     10/08/2021 01:59 AM    CL 99 10/26/2020 11:04 AM    CO2 25 12/22/2021 11:26 AM    CO2 27 10/08/2021 01:59 AM    CO2 28 10/26/2020 11:04 AM    BUN 30 (H) 12/22/2021 11:26 AM    BUN 17 10/08/2021 01:59 AM    BUN 25 (H) 10/26/2020 11:04 AM    CREATININE 1.2 12/22/2021 11:26 AM    CREATININE 0.9 10/08/2021 01:59 AM    CREATININE 1.1 10/26/2020 11:04 AM    CREATININE 1.1 03/02/2020 12:00 AM    CREATININE 1.0 08/13/2019 12:00 AM    GLUCOSE 104 (H) 12/22/2021 11:26 AM    GLUCOSE 153 (H) 10/08/2021 01:59 AM    GLUCOSE 113 (H) 10/26/2020 11:04 AM    CALCIUM 10.0 12/22/2021 11:26 AM    CALCIUM 9.9 10/08/2021 01:59 AM    CALCIUM 9.8 10/26/2020 11:04 AM       Last 3 CMP:    Lab Results   Component Value Date/Time     12/22/2021 11:26 AM     10/08/2021 01:59 AM     10/26/2020 11:04 AM    K 4.1 12/22/2021 11:26 AM    K 3.9 10/08/2021 01:59 AM    K 4.1 10/26/2020 11:04 AM    K 4.1 10/24/2019 10:00 AM     12/22/2021 11:26 AM     10/08/2021 01:59 AM    CL 99 10/26/2020 11:04 AM    CO2 25 12/22/2021 11:26 AM    CO2 27 10/08/2021 01:59 AM    CO2 28 10/26/2020 11:04 AM    BUN 30 (H) 12/22/2021 11:26 AM    BUN 17 10/08/2021 01:59 AM    BUN 25 (H) 10/26/2020 11:04 AM    CREATININE 1.2 12/22/2021 11:26 AM    CREATININE 0.9 10/08/2021 01:59 AM    CREATININE 1.1 10/26/2020 11:04 AM    CREATININE 1.1 03/02/2020 12:00 AM    CREATININE 1.0 08/13/2019 12:00 AM    GLUCOSE 104 (H) 12/22/2021 11:26 AM    GLUCOSE 153 (H) 10/08/2021 01:59 AM    GLUCOSE 113 (H) 10/26/2020 11:04 AM    CALCIUM 10.0 12/22/2021 11:26 AM    CALCIUM 9.9 10/08/2021 01:59 AM    CALCIUM 9.8 10/26/2020 11:04 AM    PROT 7.3 12/22/2021 11:26 AM    PROT 7.3 10/26/2020 11:04 AM    PROT 7.5 08/12/2015 11:03 AM    LABALBU 4.3 12/22/2021 11:26 AM    LABALBU 4.4 10/26/2020 11:04 AM    LABALBU 4.3 08/12/2015 11:03 AM    BILITOT 0.4 12/22/2021 11:26 AM    BILITOT 0.5 10/26/2020 11:04 AM    BILITOT 0.5 08/12/2015 11:03 AM    ALKPHOS 47 12/22/2021 11:26 AM    ALKPHOS 48 10/26/2020 11:04 AM    ALKPHOS 58 08/12/2015 11:03 AM    AST 17 12/22/2021 11:26 AM    AST 19 10/26/2020 11:04 AM    AST 22 08/12/2015 11:03 AM    ALT 22 12/22/2021 11:26 AM    ALT 29 10/26/2020 11:04 AM    ALT 38 08/12/2015 11:03 AM        CBC:   Lab Results   Component Value Date/Time    WBC 8.1 10/08/2021 02:11 AM    RBC 5.47 10/08/2021 02:11 AM    HGB 16.4 10/08/2021 02:11 AM    HCT 46.6 10/08/2021 02:11 AM    MCV 85.2 10/08/2021 02:11 AM    MCH 30.0 10/08/2021 02:11 AM    MCHC 35.2 (H) 10/08/2021 02:11 AM    RDW 12.8 10/08/2021 02:11 AM     10/08/2021 02:11 AM    MPV 10.1 10/08/2021 02:11 AM       A1C:  Lab Results   Component Value Date/Time    LABA1C 6.4 11/15/2021 12:00 AM       Lipid panel:  Lab Results   Component Value Date    CHOL 119 12/22/2021    CHOL 126 10/26/2020    CHOL 135 03/02/2020    TRIG 145 12/22/2021    TRIG 309 10/26/2020    TRIG 557 03/02/2020    HDL 28 12/22/2021    HDL 24 10/26/2020    HDL 20 03/02/2020        Lab Results   Component Value Date/Time PROT 7.3 12/22/2021 11:26 AM    PROT 7.3 10/26/2020 11:04 AM    PROT 7.5 08/12/2015 11:03 AM    INR 1.1 10/24/2019 10:00 AM    INR 1.1 09/19/2019 01:04 AM       No results found for: MG      Daniela Doherty was seen today for pharyngitis. Diagnoses and all orders for this visit:    Enlarged lymph node in neck  -     [facility], Sheree Lindo DO, Otolaryngology, [place]    Peptic ulcer  -     External Referral To Gastroenterology    Lymphadenopathy, abdominal  -     External Referral To Gastroenterology    Weight loss  -     External Referral To Gastroenterology    Epistaxis  -     [facility] - Lesa Sherman DO, Otolaryngology, [place]    Throat pain in adult  -     [facility], Sheree Lindo DO, Otolaryngology, [place]    Infected tooth    Essential hypertension    Type 2 diabetes mellitus without complication, without long-term current use of insulin ([facility] [address]    Other orders  -     amoxicillin-clavulanate (AUGMENTIN) 875-125 MG per tablet;  Take 1 tablet by mouth 2 times daily for 10 days       Patient Active Problem List   Diagnosis    Essential hypertension    Type 2 diabetes mellitus without complication, without long-term current use of insulin (HCC)    Peptic ulcer    Thrombocytopenia (HCC)    Hyperlipidemia       Plan: Enlarged lymph node on the left side of the neck, this is reactive due to tooth infection on the left side, the tooth was removed 2 weeks ago, Augmentin 875 twice daily for 10 days,  Refer to ENT Dr. Claudeen Bishop, patient was seen by him recently, patient did have a nasal endoscopy,  I advised the patient to see ENT as soon as possible, if lymph node does not disappear he may need biopsy  He will discuss with the ENT      Peptic ulcer had healed patient had lymph node in the phylicia hepatis, on CT chest, referred to Dr. Fabiola Cardenas,  Patient had peptic ulcer before he had endoscopy with Dr. Vel Esparza  of last year  Referral was sent patient will make his appointment  Patient may need CT abdomen pelvis he will discuss with Dr. Nik Bonner, I did not order that I will be retiring in 3 days  He will discuss with Dr. Nik Bonner      Intentional weight due to aggressive diet control, he want to control his triglycerides and sugar, not drinking any pop, he used to drink 3 puffs/day,  Walking around the swimming pool regularly, 20-25 laps without difficulty  He feels much better since he lost weight his triglycerides have improved  He is not drinking any beer anymore    Hypertension very good control with lisinopril continue the      Diabetes type 2 much better controlled      Hyperlipidemia much improved triglycerides 145 with diet modification and exercise    I am retiring in 3 days, so I did not order any testing, I will not be here to look at there,    Told him he should follow-up with the ENT and discuss with him about the lymph node enlargement on the left side of the neck    At the same time follow-up with Dr. Nik Bonner for further evaluation of the lymph node around the phylicia hepatis    Patient will call and make an appointment as soon as possible      Patient will follow-up here in 6 weeks with the doctor who is coming here after I retire patient will make an appointment  Return in about 6 weeks (around 2/17/2022).        Teodora Caruso MD  4:00 PM  1/6/2022     DE

## 2022-01-19 ENCOUNTER — OFFICE VISIT (OUTPATIENT)
Dept: ENT CLINIC | Age: 72
End: 2022-01-19
Payer: MEDICARE

## 2022-01-19 VITALS
BODY MASS INDEX: 29.66 KG/M2 | DIASTOLIC BLOOD PRESSURE: 81 MMHG | WEIGHT: 189 LBS | HEART RATE: 81 BPM | HEIGHT: 67 IN | SYSTOLIC BLOOD PRESSURE: 140 MMHG

## 2022-01-19 DIAGNOSIS — K04.7 DENTAL INFECTION: Primary | ICD-10-CM

## 2022-01-19 DIAGNOSIS — R59.0 CERVICAL ADENOPATHY: ICD-10-CM

## 2022-01-19 PROCEDURE — 99204 OFFICE O/P NEW MOD 45 MIN: CPT | Performed by: OTOLARYNGOLOGY

## 2022-01-19 RX ORDER — CHLORHEXIDINE GLUCONATE 0.12 MG/ML
15 RINSE ORAL 2 TIMES DAILY
Qty: 420 ML | Refills: 0 | Status: SHIPPED | OUTPATIENT
Start: 2022-01-19 | End: 2022-02-02

## 2022-01-19 RX ORDER — METHYLPREDNISOLONE 4 MG/1
4 TABLET ORAL SEE ADMIN INSTRUCTIONS
Qty: 1 KIT | Refills: 0 | Status: SHIPPED | OUTPATIENT
Start: 2022-01-19 | End: 2022-01-25

## 2022-01-19 ASSESSMENT — ENCOUNTER SYMPTOMS
COUGH: 0
TROUBLE SWALLOWING: 0
SORE THROAT: 0
SHORTNESS OF BREATH: 0
FACIAL SWELLING: 0
VOICE CHANGE: 0
VOMITING: 0

## 2022-01-19 NOTE — PROGRESS NOTES
[facility] Otolaryngology  Dr. Bev Garcia. Diego Winn. Ms.Ed        Patient Name:  Emilia Maldonado  :  1950     CHIEF C/O:    Chief Complaint   Patient presents with    Other     Acute recurrent sinusitis       HISTORY OBTAINED FROM:  patient    HISTORY OF PRESENT ILLNESS:       Ambrosio Serrano is a 80y.o. year old male, here today for follow up of number 15 upper molar pulled since pulled had soreness in that area and submandibular swelling in left neck. Was placed on Augmentin by pcp and has no improvement. He does have submandibular fullness and swelling, without any associated hoarseness difficulty swallowing. No complaints of shortness of breath. No recent fever chills, no other complaint complaints of previous oral pharyngeal mass tumor lesions. No changes in voice. No current imaging studies have been conducted.       Past Medical History:   Diagnosis Date    Arthritis     Diabetes mellitus ([facility]     GERD (gastroesophageal reflux disease)     Hyperglycemia     Hyperlipidemia     Hypertension     Lyme disease     Thrombocytopenia ([facility]      Past Surgical History:   Procedure Laterality Date    BRONCHOSCOPY N/A 10/28/2019    BRONCHOSCOPY performed by Mike Romo MD at [address]  2017    dr Britany Gamez  2017    COLONOSCOPY  2019    TOOTH EXTRACTION Left 2021    VASECTOMY         Current Outpatient Medications:     lisinopril-hydroCHLOROthiazide (PRINZIDE;ZESTORETIC) 10-12.5 MG per tablet, Take 1 tablet by mouth daily, Disp: 90 tablet, Rfl: 1    metFORMIN (GLUCOPHAGE-XR) 500 MG extended release tablet, Take 1 tablet by mouth 2 times daily, Disp: 180 tablet, Rfl: 1    omeprazole (PRILOSEC) 40 MG delayed release capsule, Take 1 capsule by mouth daily, Disp: 90 capsule, Rfl: 1    fenofibrate (TRICOR) 145 MG tablet, Take 1 tablet by mouth daily, Disp: 90 tablet, Rfl: 0    psyllium (KONSYL) 28.3 % PACK, Take 1 packet by mouth nightly, Disp: , Rfl:   Patient has no known allergies. Social History     Tobacco Use    Smoking status: Former Smoker     Packs/day: 1.00     Years: 27.00     Pack years: 27.00     Types: Cigarettes     Start date:      Quit date: 2017     Years since quittin.0    Smokeless tobacco: Never Used   Vaping Use    Vaping Use: Never used   Substance Use Topics    Alcohol use: Yes     Comment: social    Drug use: Never     No family history on file. Review of Systems   Constitutional: Negative for chills and fever. HENT: Negative for congestion, ear discharge, facial swelling, hearing loss, mouth sores, sore throat, trouble swallowing and voice change. Respiratory: Negative for cough and shortness of breath. Cardiovascular: Negative for chest pain and palpitations. Gastrointestinal: Negative for vomiting. Skin: Negative for rash. Allergic/Immunologic: Negative for environmental allergies. Neurological: Negative for dizziness and headaches. Hematological: Does not bruise/bleed easily. All other systems reviewed and are negative. BP (!) 140/81 (Site: Left Upper Arm, Position: Sitting, Cuff Size: Medium Adult)   Pulse 81   Ht 5' 7\" (1.702 m)   Wt 189 lb (85.7 kg)   BMI 29.60 kg/m²   Physical Exam  Vitals and nursing note reviewed. Constitutional:       Appearance: He is well-developed. HENT:      Head: Normocephalic and atraumatic. No contusion, masses or laceration. Jaw: Swelling present. No tenderness. Comments: Left sided submandibular swelling after dental extraction     Right Ear: Tympanic membrane, ear canal and external ear normal. No decreased hearing noted. There is no impacted cerumen. Left Ear: Tympanic membrane, ear canal and external ear normal. No decreased hearing noted. There is no impacted cerumen. Nose: No congestion or rhinorrhea. Right Nostril: No epistaxis. Left Nostril: No epistaxis.       Mouth/Throat:      Mouth: No oral lesions. Dentition: Abnormal dentition. Tongue: No lesions. Palate: No mass. Eyes:      Pupils: Pupils are equal, round, and reactive to light. Neck:      Thyroid: No thyromegaly. Trachea: No tracheal deviation. Cardiovascular:      Rate and Rhythm: Normal rate. Pulmonary:      Effort: Pulmonary effort is normal. No respiratory distress. Musculoskeletal:         General: Normal range of motion. Cervical back: Normal range of motion. Lymphadenopathy:      Cervical: No cervical adenopathy. Skin:     General: Skin is warm. Findings: No erythema. Neurological:      Mental Status: He is alert. Cranial Nerves: No cranial nerve deficit. IMPRESSION/PLAN:  peridex swish and spit  Medrol does pack for neck swelling  Call oral surgeon for further workup regarding second maxillary molar likely contributing to active infection and associated lymphadenopathy  Follow up in 4 weeks  Continue nasal saline  Dr. Latosha Mcclain.  Otolaryngology Facial Plastic Surgery  :  [facility] Otolaryngology/Facial Plastic Surgery Residency  Associate Clinical Professor:  RIN Frederick  [facility]

## 2022-02-16 RX ORDER — FENOFIBRATE 145 MG/1
145 TABLET, COATED ORAL DAILY
Qty: 90 TABLET | Refills: 0 | Status: SHIPPED
Start: 2022-02-16 | End: 2022-05-03 | Stop reason: SDUPTHER

## 2022-02-18 ENCOUNTER — OFFICE VISIT (OUTPATIENT)
Dept: ENT CLINIC | Age: 72
End: 2022-02-18
Payer: MEDICARE

## 2022-02-18 VITALS
HEIGHT: 67 IN | HEART RATE: 75 BPM | SYSTOLIC BLOOD PRESSURE: 138 MMHG | BODY MASS INDEX: 29.66 KG/M2 | DIASTOLIC BLOOD PRESSURE: 83 MMHG | WEIGHT: 189 LBS

## 2022-02-18 DIAGNOSIS — Z01.812 PRE-OPERATIVE LABORATORY EXAMINATION: ICD-10-CM

## 2022-02-18 DIAGNOSIS — R22.1 MASS OF LEFT SIDE OF NECK: Primary | ICD-10-CM

## 2022-02-18 LAB
BUN BLDV-MCNC: 18 MG/DL (ref 6–23)
CREAT SERPL-MCNC: 1.1 MG/DL (ref 0.7–1.2)
GFR AFRICAN AMERICAN: >60
GFR NON-AFRICAN AMERICAN: >60 ML/MIN/1.73

## 2022-02-18 PROCEDURE — 99214 OFFICE O/P EST MOD 30 MIN: CPT | Performed by: OTOLARYNGOLOGY

## 2022-02-18 NOTE — PROGRESS NOTES
[facility] Otolaryngology  Dr. Vaibhav Jerez. Delroy Jung. Ms.Ed        Patient Name:  Halie Inman  :  1950     CHIEF C/O:    Chief Complaint   Patient presents with    Sinus Problem     still have the lymph node on the left side        HISTORY OBTAINED FROM:  patient    HISTORY OF PRESENT ILLNESS:       Frank Loo is a 80y.o. year old male, here today for follow up of history of nasal congestion and postnasal drainage, had a history of a second left lower mandibular molar infection, with associated lymphadenopathy, was seen by dental at that time and has been appropriately treated. He continues to have left-sided submandibular swelling, that he feels is mildly increased in size since his last follow-up. Denies any difficulty swallowing no sore throat fever chills hemoptysis shortness of breath stridor change in voice, no complaint of headaches or vision changes. Patient denies any other associated fever chills, no complaints of nausea vomiting chest pain.       Past Medical History:   Diagnosis Date    Arthritis     Diabetes mellitus ([address]     GERD (gastroesophageal reflux disease)     Hyperglycemia     Hyperlipidemia     Hypertension     Lyme disease     Thrombocytopenia ([address]      Past Surgical History:   Procedure Laterality Date    BRONCHOSCOPY N/A 10/28/2019    BRONCHOSCOPY performed by Cb Dejesus MD at [address]  2017    dr Ki Noel  2017    COLONOSCOPY  2019    TOOTH EXTRACTION Left 2021    VASECTOMY         Current Outpatient Medications:     fenofibrate (TRICOR) 145 MG tablet, Take 1 tablet by mouth daily, Disp: 90 tablet, Rfl: 0    lisinopril-hydroCHLOROthiazide (PRINZIDE;ZESTORETIC) 10-12.5 MG per tablet, Take 1 tablet by mouth daily, Disp: 90 tablet, Rfl: 1    metFORMIN (GLUCOPHAGE-XR) 500 MG extended release tablet, Take 1 tablet by mouth 2 times daily, Disp: 180 tablet, Rfl: 1    omeprazole (PRILOSEC) 40 MG delayed release capsule, Take 1 capsule by mouth daily, Disp: 90 capsule, Rfl: 1    psyllium (KONSYL) 28.3 % PACK, Take 1 packet by mouth nightly, Disp: , Rfl:   Patient has no known allergies. Social History     Tobacco Use    Smoking status: Former Smoker     Packs/day: 1.00     Years: 27.00     Pack years: 27.00     Types: Cigarettes     Start date:      Quit date:      Years since quittin.1    Smokeless tobacco: Never Used   Vaping Use    Vaping Use: Never used   Substance Use Topics    Alcohol use: Yes     Comment: social    Drug use: Never     No family history on file. Review of Systems   Constitutional: Negative for chills and fever. HENT: Positive for congestion. Negative for ear discharge, hearing loss, sore throat, tinnitus and voice change. Respiratory: Negative for cough and shortness of breath. Cardiovascular: Negative for chest pain and palpitations. Gastrointestinal: Negative for vomiting. Skin: Negative for rash. Allergic/Immunologic: Negative for environmental allergies. Neurological: Negative for dizziness and headaches. Hematological: Does not bruise/bleed easily. All other systems reviewed and are negative. /83 (Site: Left Upper Arm, Position: Sitting, Cuff Size: Medium Adult)   Pulse 75   Ht 5' 7\" (1.702 m)   Wt 189 lb (85.7 kg)   BMI 29.60 kg/m²   Physical Exam  Vitals and nursing note reviewed. Constitutional:       Appearance: He is well-developed. HENT:      Head: Normocephalic and atraumatic. Right Ear: Ear canal and external ear normal.      Left Ear: Ear canal and external ear normal.      Nose: No nasal deformity or septal deviation. Right Nostril: No foreign body. Left Nostril: No foreign body. Right Turbinates: Not enlarged or swollen. Left Turbinates: Not enlarged or swollen. Mouth/Throat:     Eyes:      Pupils: Pupils are equal, round, and reactive to light.    Neck:      Thyroid: No thyromegaly. Trachea: No tracheal deviation. Cardiovascular:      Rate and Rhythm: Normal rate. Pulmonary:      Effort: Pulmonary effort is normal. No respiratory distress. Musculoskeletal:         General: Normal range of motion. Cervical back: Normal range of motion. Lymphadenopathy:      Cervical: Cervical adenopathy present. Left cervical: Deep cervical adenopathy present. No superficial cervical adenopathy. Skin:     General: Skin is warm. Findings: No erythema. Neurological:      Mental Status: He is alert. Cranial Nerves: No cranial nerve deficit. IMPRESSION/PLAN:  Patient seen and examined today, lymphadenopathy of the left neck is approximate 2 and half centimeters, possible lingual fullness on physical exam today, recommend panendoscopy for left neck mass refractory to medical therapy and clearance of previous oral mandibular infection I will obtain a CT scan prior to surgery as well for localization for possible biopsy, risk and benefits reviewed and follow-up as indicated 1 week postoperatively. Patient also with a history of chronic allergic rhinitis congestion, will start Flonase daily to curb any other further symptoms. Dr. Red Moore.  Otolaryngology Facial Plastic Surgery  :  [address] [place] [address] Otolaryngology/Facial Plastic Surgery Residency  Associate Clinical Professor:  Rivas Tenorio, [facility]

## 2022-02-22 ENCOUNTER — TELEPHONE (OUTPATIENT)
Dept: ENT CLINIC | Age: 72
End: 2022-02-22

## 2022-02-22 ASSESSMENT — ENCOUNTER SYMPTOMS
VOICE CHANGE: 0
COUGH: 0
SORE THROAT: 0
VOMITING: 0
SHORTNESS OF BREATH: 0

## 2022-02-22 NOTE — TELEPHONE ENCOUNTER
Prior Authorization Form:      DEMOGRAPHICS:                     Patient Name:  Nelson Casey  Patient :  1950            Insurance:  Payor: Den Pagan / Plan: Dot [place] ESSENTIAL/PLUS / Product Type: *No Product type* /   Insurance ID Number:    Payor/Plan Subscr  Sex Relation Sub. Ins. ID Effective Group Num   1.  BCBS MEDICAREBailey Hailey 1950 Male Self [id number]                                    [id number]         DIAGNOSIS & PROCEDURE:                       Procedure/Operation: panendoscopy with biopsy           CPT Code: [id number]    Diagnosis:  Neck mass    ICD10 Code: r22.1    [place]  [facility] [place]geon:  Jay Garnett INFORMATION:                          Date: 3/17/22    Time: n/a              Anesthesia:  General                                                       Status:  Outpatient        Special Comments:  n/a       Electronically signed by Margoth Rivera MA on 2022 at 2:51 PM

## 2022-03-03 ENCOUNTER — HOSPITAL ENCOUNTER (OUTPATIENT)
Age: 72
Discharge: HOME | End: 2022-03-03
Payer: MEDICARE

## 2022-03-03 ENCOUNTER — OFFICE VISIT (OUTPATIENT)
Dept: FAMILY MEDICINE CLINIC | Age: 72
End: 2022-03-03
Payer: MEDICARE

## 2022-03-03 VITALS
WEIGHT: 189.2 LBS | TEMPERATURE: 97.6 F | BODY MASS INDEX: 29.7 KG/M2 | OXYGEN SATURATION: 94 % | DIASTOLIC BLOOD PRESSURE: 86 MMHG | RESPIRATION RATE: 18 BRPM | HEIGHT: 67 IN | HEART RATE: 66 BPM | SYSTOLIC BLOOD PRESSURE: 128 MMHG

## 2022-03-03 DIAGNOSIS — I10 ESSENTIAL HYPERTENSION: ICD-10-CM

## 2022-03-03 DIAGNOSIS — Z01.818 PREOPERATIVE CLEARANCE: Primary | ICD-10-CM

## 2022-03-03 DIAGNOSIS — E78.5 HYPERLIPIDEMIA, UNSPECIFIED HYPERLIPIDEMIA TYPE: ICD-10-CM

## 2022-03-03 DIAGNOSIS — E11.9 TYPE 2 DIABETES MELLITUS WITHOUT COMPLICATION, WITHOUT LONG-TERM CURRENT USE OF INSULIN (HCC): ICD-10-CM

## 2022-03-03 DIAGNOSIS — K21.9 GASTROESOPHAGEAL REFLUX DISEASE, UNSPECIFIED WHETHER ESOPHAGITIS PRESENT: ICD-10-CM

## 2022-03-03 LAB
ALBUMIN SERPL-MCNC: 4.7 G/DL (ref 3.5–5.2)
ALP BLD-CCNC: 50 U/L (ref 40–129)
ALT SERPL-CCNC: 20 U/L (ref 0–40)
ANION GAP SERPL CALCULATED.3IONS-SCNC: 9 MMOL/L (ref 7–16)
AST SERPL-CCNC: 17 U/L (ref 0–39)
BASOPHILS ABSOLUTE: 0.07 E9/L (ref 0–0.2)
BASOPHILS RELATIVE PERCENT: 0.9 % (ref 0–2)
BILIRUB SERPL-MCNC: 0.4 MG/DL (ref 0–1.2)
BUN BLDV-MCNC: 19 MG/DL (ref 6–23)
CALCIUM SERPL-MCNC: 10 MG/DL (ref 8.6–10.2)
CHLORIDE BLD-SCNC: 100 MMOL/L (ref 98–107)
CO2: 27 MMOL/L (ref 22–29)
CREAT SERPL-MCNC: 1.1 MG/DL (ref 0.7–1.2)
EOSINOPHILS ABSOLUTE: 0.21 E9/L (ref 0.05–0.5)
EOSINOPHILS RELATIVE PERCENT: 2.7 % (ref 0–6)
GFR AFRICAN AMERICAN: >60
GFR NON-AFRICAN AMERICAN: >60 ML/MIN/1.73
GLUCOSE BLD-MCNC: 94 MG/DL (ref 74–99)
HBA1C MFR BLD: 5.7 %
HCT VFR BLD CALC: 44.9 % (ref 37–54)
HEMOGLOBIN: 15.3 G/DL (ref 12.5–16.5)
IMMATURE GRANULOCYTES #: 0.02 E9/L
IMMATURE GRANULOCYTES %: 0.3 % (ref 0–5)
LYMPHOCYTES ABSOLUTE: 1.11 E9/L (ref 1.5–4)
LYMPHOCYTES RELATIVE PERCENT: 14.4 % (ref 20–42)
MCH RBC QN AUTO: 28.8 PG (ref 26–35)
MCHC RBC AUTO-ENTMCNC: 34.1 % (ref 32–34.5)
MCV RBC AUTO: 84.4 FL (ref 80–99.9)
MONOCYTES ABSOLUTE: 0.65 E9/L (ref 0.1–0.95)
MONOCYTES RELATIVE PERCENT: 8.4 % (ref 2–12)
NEUTROPHILS ABSOLUTE: 5.65 E9/L (ref 1.8–7.3)
NEUTROPHILS RELATIVE PERCENT: 73.3 % (ref 43–80)
PDW BLD-RTO: 13.7 FL (ref 11.5–15)
PLATELET # BLD: 274 E9/L (ref 130–450)
PMV BLD AUTO: 10.2 FL (ref 7–12)
POTASSIUM SERPL-SCNC: 4 MMOL/L (ref 3.5–5)
RBC # BLD: 5.32 E12/L (ref 3.8–5.8)
SODIUM BLD-SCNC: 136 MMOL/L (ref 132–146)
TOTAL PROTEIN: 8 G/DL (ref 6.4–8.3)
WBC # BLD: 7.7 E9/L (ref 4.5–11.5)

## 2022-03-03 PROCEDURE — 93000 ELECTROCARDIOGRAM COMPLETE: CPT | Performed by: FAMILY MEDICINE

## 2022-03-03 PROCEDURE — 80053 COMPREHEN METABOLIC PANEL: CPT

## 2022-03-03 PROCEDURE — 99204 OFFICE O/P NEW MOD 45 MIN: CPT | Performed by: FAMILY MEDICINE

## 2022-03-03 PROCEDURE — 83036 HEMOGLOBIN GLYCOSYLATED A1C: CPT | Performed by: FAMILY MEDICINE

## 2022-03-03 PROCEDURE — 85025 COMPLETE CBC W/AUTO DIFF WBC: CPT

## 2022-03-03 PROCEDURE — 36415 COLL VENOUS BLD VENIPUNCTURE: CPT

## 2022-03-03 RX ORDER — LISINOPRIL AND HYDROCHLOROTHIAZIDE 12.5; 1 MG/1; MG/1
1 TABLET ORAL DAILY
Qty: 90 TABLET | Refills: 1 | Status: SHIPPED
Start: 2022-03-03 | End: 2022-08-18 | Stop reason: SDUPTHER

## 2022-03-03 RX ORDER — METFORMIN HYDROCHLORIDE 500 MG/1
500 TABLET, EXTENDED RELEASE ORAL 2 TIMES DAILY
Qty: 180 TABLET | Refills: 1 | Status: SHIPPED
Start: 2022-03-03 | End: 2022-08-18 | Stop reason: SDUPTHER

## 2022-03-03 RX ORDER — OMEPRAZOLE 40 MG/1
40 CAPSULE, DELAYED RELEASE ORAL DAILY
Qty: 90 CAPSULE | Refills: 1 | Status: SHIPPED
Start: 2022-03-03 | End: 2022-08-18 | Stop reason: SDUPTHER

## 2022-03-03 NOTE — PROGRESS NOTES
mellitus ([facility]     GERD (gastroesophageal reflux disease)     Hyperglycemia     Hyperlipidemia     Hypertension     Lyme disease     [facility]        FAMILY HISTORY  History reviewed. No pertinent family history. SOCIAL HISTORY  Social History     Socioeconomic History    Marital status:      Spouse name: None    Number of children: None    Years of education: None    Highest education level: None   Occupational History    None   Tobacco Use    Smoking status: Former Smoker     Packs/day: 1.00     Years: 27.00     Pack years: 27.00     Types: Cigarettes     Start date:      Quit date:      Years since quittin.1    Smokeless tobacco: Never Used   Vaping Use    Vaping Use: Never used   Substance and Sexual Activity    Alcohol use: Yes     Comment: social    Drug use: Never    Sexual activity: None   Other Topics Concern    None   Social History Narrative    ** Merged History Encounter **          Social Determinants of Health     Financial Resource Strain:     Difficulty of Paying Living Expenses: Not on file   Food Insecurity: No Food Insecurity    Worried About Running Out of Food in the Last Year: Never true    Cheryle of Food in the Last Year: Never true   Transportation Needs: No Transportation Needs    Lack of Transportation (Medical): No    Lack of Transportation (Non-Medical):  No   Physical Activity:     Days of Exercise per Week: Not on file    Minutes of Exercise per Session: Not on file   Stress:     Feeling of Stress : Not on file   Social Connections:     Frequency of Communication with Friends and Family: Not on file    Frequency of Social Gatherings with Friends and Family: Not on file    Attends Latter day Services: Not on file    Active Member of Clubs or Organizations: Not on file    Attends Club or Organization Meetings: Not on file    Marital Status: Not on file   Intimate Partner Violence:     Fear of Current or Ex-Partner: Not on file    Emotionally Abused: Not on file    Physically Abused: Not on file    Sexually Abused: Not on file   Housing Stability:     Unable to Pay for Housing in the Last Year: Not on file    Number of [place] in the Last Year: Not on file    Unstable Housing in the Last Year: Not on file        SURGICAL HISTORY  Past Surgical History:   Procedure Laterality Date    BRONCHOSCOPY N/A 10/28/2019    BRONCHOSCOPY performed by Edilberto Rosenbaum MD at [address] [facility]  2017    dr Serina Mchugh  03/2017    COLONOSCOPY  03/2019    TOOTH EXTRACTION Left 12/28/2021    VASECTOMY                   CURRENT MEDICATIONS  Current Outpatient Medications   Medication Sig Dispense Refill    omeprazole (PRILOSEC) 40 MG delayed release capsule Take 1 capsule by mouth daily 90 capsule 1    metFORMIN (GLUCOPHAGE-XR) 500 MG extended release tablet Take 1 tablet by mouth 2 times daily 180 tablet 1    lisinopril-hydroCHLOROthiazide (PRINZIDE;ZESTORETIC) 10-12.5 MG per tablet Take 1 tablet by mouth daily 90 tablet 1    fenofibrate (TRICOR) 145 MG tablet Take 1 tablet by mouth daily 90 tablet 0    psyllium (KONSYL) 28.3 % PACK Take 1 packet by mouth nightly       No current facility-administered medications for this visit. ALLERGIES  No Known Allergies    PHYSICAL EXAM    /86   Pulse 66   Temp 97.6 °F (36.4 °C)   Resp 18   Ht 5' 7\" (1.702 m)   Wt 189 lb 3.2 oz (85.8 kg)   SpO2 94%   BMI 29.63 kg/m²     Physical Exam  Vitals and nursing note reviewed. Constitutional:       Appearance: Normal appearance. HENT:      Head: Normocephalic and atraumatic. Nose: No congestion or rhinorrhea. Mouth/Throat:      Mouth: Mucous membranes are moist.      Pharynx: Oropharynx is clear. Eyes:      Conjunctiva/sclera: Conjunctivae normal.   Cardiovascular:      Rate and Rhythm: Normal rate and regular rhythm. Heart sounds: Normal heart sounds.    Pulmonary: Effort: Pulmonary effort is normal.      Breath sounds: Normal breath sounds. Abdominal:      General: Bowel sounds are normal.      Palpations: Abdomen is soft. Tenderness: There is no abdominal tenderness. Musculoskeletal:      Cervical back: Neck supple. Skin:     General: Skin is warm. Neurological:      Mental Status: He is alert and oriented to person, place, and time.    Psychiatric:         Mood and Affect: Mood normal.       Component Ref Range & Units 3/3/22 1425 11/15/21 10/8/21 0211 10/26/20 1104 9/19/19 0104 8/12/15 1103   WBC 4.5 - 11.5 E9/L 7.7   R  8.1  7.1  8.0  7.7    RBC 3.80 - 5.80 E12/L 5.32   R  5.47  5.47  5.21  5.49    Hemoglobin 12.5 - 16.5 g/dL 15.3   R  16.4  16.0  16.0  16.4    Hematocrit 37.0 - 54.0 % 44.9   R  46.6  48.3  44.7  49.2    MCV 80.0 - 99.9 fL 84.4   R  85.2  88.3  85.8  89.5    MCH 26.0 - 35.0 pg 28.8   R  30.0  29.3  30.7  29.9    MCHC 32.0 - 34.5 % 34.1   R  35.2 High   33.1  35.8 High   33.4    RDW 11.5 - 15.0 fL 13.7   R  12.8  13.2  12.5  14.4    Platelets 951 - 259 S6/Q 274   R  223  110 Low   213  176    MPV 7.0 - 12.0 fL 10.2   R  10.1  11.3  10.4  9.7    Neutrophils % 43.0 - 80.0 % 73.3   R  65.9  59.7  57.8  57 R    Immature Granulocytes % 0.0 - 5.0 % 0.3   0.2  0.3  0.4     Lymphocytes % 20.0 - 42.0 % 14.4 Low    R  20.7  29.2  30.7  32 R    Monocytes % 2.0 - 12.0 % 8.4   R  9.2  6.9  7.5  7 R    Eosinophils % 0.0 - 6.0 % 2.7   3.1  3.2  2.6  3 R    Basophils % 0.0 - 2.0 % 0.9   R  0.9  0.7  1.0  1 R    Neutrophils Absolute 1.80 - 7.30 E9/L 5.65   R  5.32  4.25  4.64  4.37    Immature Granulocytes # E9/L 0.02   0.02  0.02  0.03     Lymphocytes Absolute 1.50 - 4.00 E9/L 1.11 Low    R  1.67  2.08  2.46  2.48    Monocytes Absolute 0.10 - 0.95 E9/L 0.65   R  0.74  0.49  0.60  0.55    Eosinophils Absolute 0.05 - 0.50 E9/L 0.21   R  0.25  0.23  0.21  0.26    Basophils Absolute 0.00 - 0.20 E9/L 0.07   R  0.07  0.05  0.08  0.04    Eosinophils % Sodium 132 - 146 mmol/L 136    141   R  140  139    Potassium 3.5 - 5.0 mmol/L 4.0    4.1   R  3.9  4.1    Chloride 98 - 107 mmol/L 100    102   R  103  99    CO2 22 - 29 mmol/L 27    25   R  27  28    Anion Gap 7 - 16 mmol/L 9    14   R  10  12    Glucose 74 - 99 mg/dL 94    104 High    R  153 High   113 High     BUN 6 - 23 mg/dL 19   18  30 High    R  17  25 High  R    CREATININE 0.7 - 1.2 mg/dL 1.1  1.1   1.2   R  0.9  1.1    GFR Non- >=60 mL/min/1.73 >60  >60 CM   60 CM   >60 CM  >60 CM    Comment: Chronic Kidney Disease: less than 60 ml/min/1.73 sq. m.         Kidney Failure: less than 15 ml/min/1.73 sq.m. Results valid for patients 18 years and older. GFR   >60  >60   >60   >60  >60    Calcium 8.6 - 10.2 mg/dL 10.0    10.0   R  9.9  9.8    Total Protein 6.4 - 8.3 g/dL 8.0    7.3   R   7.3    Albumin 3.5 - 5.2 g/dL 4.7    4.3   R   4.4    Total Bilirubin 0.0 - 1.2 mg/dL 0.4    0.4   R   0.5    Alkaline Phosphatase 40 - 129 U/L 50    47   R   48    ALT 0 - 40 U/L 20    22   R   29    AST 0 - 39 U/L 17    17   R   19      Component Ref Range & Units 3/3/22 1316 11/15/21 10/26/20 1104 3/2/20 8/13/19 8/12/15 1103   Hemoglobin A1C % 5.7  6.4  5.9 High  R  8.2  6.4  6.0 High       EKG done today shows a normal sinus rhythm without any ST or T wave changes    ASSESSMENT & PLAN    Anthony Maldonado was seen today for establish care. Diagnoses and all orders for this visit:    Preoperative clearance    Essential hypertension  -     lisinopril-hydroCHLOROthiazide (PRINZIDE;ZESTORETIC) 10-12.5 MG per tablet; Take 1 tablet by mouth daily  -     EKG 12 Lead  -     CBC with Auto Differential; Future    Hyperlipidemia, unspecified hyperlipidemia type    Type 2 diabetes mellitus without complication, without long-term current use of insulin (HCC)  -     metFORMIN (GLUCOPHAGE-XR) 500 MG extended release tablet;  Take 1 tablet by mouth 2 times daily  -     POCT glycosylated hemoglobin (Hb A1C)  -

## 2022-03-04 ASSESSMENT — ENCOUNTER SYMPTOMS
CONSTIPATION: 0
EYE DISCHARGE: 0
DIARRHEA: 0
SINUS PAIN: 0
PHOTOPHOBIA: 0
ABDOMINAL PAIN: 0
SHORTNESS OF BREATH: 0
EYE REDNESS: 0
RHINORRHEA: 0
BLOOD IN STOOL: 0
COUGH: 0

## 2022-03-11 NOTE — PROGRESS NOTES
Rishabh 37 PRE-ADMISSION TESTING GENERAL INSTRUCTIONS- [facility]-phone number:[phone]    GENERAL INSTRUCTIONS  [x] Antibacterial Soap shower Night before and/or AM of Surgery  [] Quan wipe instruction sheet and wipes given. [x] Nothing by mouth after midnight, including gum, candy, mints, or water. [x] You may brush your teeth, gargle, but do NOT swallow water. []Hibiclens shower  the night before and the morning of surgery. Do not use             Hibiclens on your face or head. [x]No smoking, chewing tobacco, illegal drugs, or alcohol within 24 hours of your surgery. [x] Jewelry, valuables or body piercing's should not be brought to the hospital. All body and/or tongue piercing's must be removed prior to arriving to hospital.  ALL hair pins must be removed. [x] Do not wear makeup, lotions, powders, deodorant. Nail polish as directed by the nurse. [x] Arrange transportation with a responsible adult  to and from the hospital. If you do not have a responsible adult  to transport you, you will need to make arrangements with a medical transportation company (i.e. [employer]. A Uber/taxi/bus is not appropriate unless you are accompanied by a responsible adult ). Arrange for someone to be with you for the remainder of the day and for 24 hours after your procedure due to having had anesthesia. Who will be your  for transportation? __wife________________   Who will be staying with you for 24 hrs after your procedure? ___wife_______________  [x] Bring insurance card and photo ID.  [] Transfusion Bracelet: Please bring with you to hospital, day of surgery  [] Bring urine specimen day of surgery. Any small container is acceptable. [] Use inhalers the morning of surgery and bring with you to hospital.  [] Bring copy of living will or healthcare power of  papers to be placed in your electronic record.   [] CPAP/BI-PAP: Please bring your machine if you are to spend the night in the hospital.     PARKING INSTRUCTIONS:   [x] Arrival Time:__1300___________  · [x] Parking lot '\"I\"  is located on [facility] (the corner of [facility] and [facility]). To enter, press the button and the gate will lift. A free token will be provided to exit the lot. One car per patient is allowed to park in this lot. All other cars are to park on [address] [place] [address] Street either in the parking garage or the handicap lot. [] To reach the [facility] lobby from [address] [place] [address], upon entering the hospital, take elevator B to the 3rd floor. EDUCATION INSTRUCTIONS:      [] Knee or hip replacement booklet & exercise pamphlets given. [] Alhaji Joshi placed in chart. [] Pre-admission Testing educational folder given  [] Incentive Spirometry,coughing & deep breathing exercises reviewed. []Medication information sheet(s)   []Fluoroscopy-Xray used in surgery reviewed with patient. Educational pamphlet placed in chart. []Pain: Post-op pain is normal and to be expected. You will be asked to rate your pain from 0-10(a zero is not acceptable-education is needed). Your post-op pain goal is:  [] Ask your nurse for your pain medication. [] Joint camp offered. [] Joint replacement booklets given. [] Other:___________________________    MEDICATION INSTRUCTIONS:   [x]Bring a complete list of your medications, please write the last time you took the medicine, give this list to the nurse. [x] Take the following medications the morning of surgery with 1-2 ounces of water:  prilosec  [x] Stop herbal supplements and vitamins 5 days before your surgery. [x] DO NOT take any diabetic medicine the morning of surgery. Follow instructions for insulin the day before surgery. [x] If you are diabetic and your blood sugar is low or you feel symptomatic, you may drink 1-2 ounces of apple juice or take a glucose tablet.   The morning of your procedure, you may call the pre-op area if you have concerns about your blood sugar [phone]. [] Use your inhalers the morning of surgery. Bring your emergency inhaler with you day of surgery. [x] Follow physician instructions regarding any blood thinners you may be taking. WHAT TO EXPECT:  [x] The day of surgery you will be greeted and checked in by the [facility] [employer] [facility].  In addition, you will be registered in the [facility] by a Patient Access Representative. Please bring your photo ID and insurance card. A nurse will greet you in accordance to the time you are needed in the pre-op area to prepare you for surgery. Please do not be discouraged if you are not greeted in the order you arrive as there are many variables that are involved in patient preparation. Your patience is greatly appreciated as you wait for your nurse. Please bring in items such as: books, magazines, newspapers, electronics, or any other items  to occupy your time in the waiting area. [x]  Delays may occur with surgery and staff will make a sincere effort to keep you informed of delays. If any delays occur with your procedure, we apologize ahead of time for your inconvenience as we recognize the value of your time.

## 2022-03-16 ENCOUNTER — ANESTHESIA EVENT (OUTPATIENT)
Dept: OPERATING ROOM | Age: 72
End: 2022-03-16
Payer: MEDICARE

## 2022-03-16 NOTE — H&P
MG extended release tablet, Take 1 tablet by mouth 2 times daily, Disp: 180 tablet, Rfl: 1    omeprazole (PRILOSEC) 40 MG delayed release capsule, Take 1 capsule by mouth daily, Disp: 90 capsule, Rfl: 1    psyllium (KONSYL) 28.3 % PACK, Take 1 packet by mouth nightly, Disp: , Rfl:      Patient has no known allergies. Social History            Tobacco Use    Smoking status: Former Smoker       Packs/day: 1.00       Years: 27.00       Pack years: 27.00       Types: Cigarettes       Start date:        Quit date:        Years since quittin.1    Smokeless tobacco: Never Used   Vaping Use    Vaping Use: Never used   Substance Use Topics    Alcohol use: Yes       Comment: social    Drug use: Never      Family History   No family history on file.        Review of Systems   Constitutional: Negative for chills and fever. HENT: Positive for congestion. Negative for ear discharge, hearing loss, sore throat, tinnitus and voice change. Respiratory: Negative for cough and shortness of breath. Cardiovascular: Negative for chest pain and palpitations. Gastrointestinal: Negative for vomiting. Skin: Negative for rash. Allergic/Immunologic: Negative for environmental allergies. Neurological: Negative for dizziness and headaches. Hematological: Does not bruise/bleed easily. All other systems reviewed and are negative.        /83 (Site: Left Upper Arm, Position: Sitting, Cuff Size: Medium Adult)   Pulse 75   Ht 5' 7\" (1.702 m)   Wt 189 lb (85.7 kg)   BMI 29.60 kg/m²   Physical Exam  Vitals and nursing note reviewed. Constitutional:       Appearance: He is well-developed. HENT:      Head: Normocephalic and atraumatic. Right Ear: Ear canal and external ear normal.      Left Ear: Ear canal and external ear normal.      Nose: No nasal deformity or septal deviation. Right Nostril: No foreign body. Left Nostril: No foreign body. Right Turbinates: Not enlarged or swollen.

## 2022-03-17 ENCOUNTER — HOSPITAL ENCOUNTER (OUTPATIENT)
Age: 72
Setting detail: OUTPATIENT SURGERY
Discharge: HOME | End: 2022-03-17
Attending: OTOLARYNGOLOGY | Admitting: OTOLARYNGOLOGY
Payer: MEDICARE

## 2022-03-17 ENCOUNTER — ANESTHESIA (OUTPATIENT)
Dept: OPERATING ROOM | Age: 72
End: 2022-03-17
Payer: MEDICARE

## 2022-03-17 VITALS
HEIGHT: 67 IN | HEART RATE: 73 BPM | WEIGHT: 189 LBS | RESPIRATION RATE: 18 BRPM | DIASTOLIC BLOOD PRESSURE: 85 MMHG | TEMPERATURE: 97 F | BODY MASS INDEX: 29.66 KG/M2 | OXYGEN SATURATION: 98 % | SYSTOLIC BLOOD PRESSURE: 127 MMHG

## 2022-03-17 VITALS — OXYGEN SATURATION: 98 % | DIASTOLIC BLOOD PRESSURE: 66 MMHG | SYSTOLIC BLOOD PRESSURE: 110 MMHG

## 2022-03-17 DIAGNOSIS — Z01.812 PRE-OPERATIVE LABORATORY EXAMINATION: Primary | ICD-10-CM

## 2022-03-17 DIAGNOSIS — G89.18 POST-OPERATIVE PAIN: ICD-10-CM

## 2022-03-17 LAB
ANION GAP SERPL CALCULATED.3IONS-SCNC: 9 MMOL/L (ref 7–16)
BUN BLDV-MCNC: 28 MG/DL (ref 6–23)
CALCIUM SERPL-MCNC: 9.5 MG/DL (ref 8.6–10.2)
CHLORIDE BLD-SCNC: 102 MMOL/L (ref 98–107)
CO2: 26 MMOL/L (ref 22–29)
CREAT SERPL-MCNC: 1 MG/DL (ref 0.7–1.2)
GFR AFRICAN AMERICAN: >60
GFR NON-AFRICAN AMERICAN: >60 ML/MIN/1.73
GLUCOSE BLD-MCNC: 98 MG/DL (ref 74–99)
HCT VFR BLD CALC: 43.2 % (ref 37–54)
HEMOGLOBIN: 14.8 G/DL (ref 12.5–16.5)
MCH RBC QN AUTO: 29.2 PG (ref 26–35)
MCHC RBC AUTO-ENTMCNC: 34.3 % (ref 32–34.5)
MCV RBC AUTO: 85.2 FL (ref 80–99.9)
METER GLUCOSE: 101 MG/DL (ref 74–99)
PDW BLD-RTO: 14.2 FL (ref 11.5–15)
PLATELET # BLD: 210 E9/L (ref 130–450)
PMV BLD AUTO: 10.4 FL (ref 7–12)
POTASSIUM SERPL-SCNC: 5.3 MMOL/L (ref 3.5–5)
RBC # BLD: 5.07 E12/L (ref 3.8–5.8)
SODIUM BLD-SCNC: 137 MMOL/L (ref 132–146)
WBC # BLD: 5.7 E9/L (ref 4.5–11.5)

## 2022-03-17 PROCEDURE — 3600000002 HC SURGERY LEVEL 2 BASE: Performed by: OTOLARYNGOLOGY

## 2022-03-17 PROCEDURE — 3600000012 HC SURGERY LEVEL 2 ADDTL 15MIN: Performed by: OTOLARYNGOLOGY

## 2022-03-17 PROCEDURE — 80048 BASIC METABOLIC PNL TOTAL CA: CPT

## 2022-03-17 PROCEDURE — 88305 TISSUE EXAM BY PATHOLOGIST: CPT

## 2022-03-17 PROCEDURE — 3700000000 HC ANESTHESIA ATTENDED CARE: Performed by: OTOLARYNGOLOGY

## 2022-03-17 PROCEDURE — 43191 ESOPHAGOSCOPY RIGID TRNSO DX: CPT | Performed by: OTOLARYNGOLOGY

## 2022-03-17 PROCEDURE — 6360000002 HC RX W HCPCS: Performed by: NURSE ANESTHETIST, CERTIFIED REGISTERED

## 2022-03-17 PROCEDURE — 6370000000 HC RX 637 (ALT 250 FOR IP): Performed by: OTOLARYNGOLOGY

## 2022-03-17 PROCEDURE — 7100000010 HC PHASE II RECOVERY - FIRST 15 MIN: Performed by: OTOLARYNGOLOGY

## 2022-03-17 PROCEDURE — 2580000003 HC RX 258: Performed by: NURSE ANESTHETIST, CERTIFIED REGISTERED

## 2022-03-17 PROCEDURE — 7100000011 HC PHASE II RECOVERY - ADDTL 15 MIN: Performed by: OTOLARYNGOLOGY

## 2022-03-17 PROCEDURE — 7100000001 HC PACU RECOVERY - ADDTL 15 MIN: Performed by: OTOLARYNGOLOGY

## 2022-03-17 PROCEDURE — 88331 PATH CONSLTJ SURG 1 BLK 1SPC: CPT

## 2022-03-17 PROCEDURE — 36415 COLL VENOUS BLD VENIPUNCTURE: CPT

## 2022-03-17 PROCEDURE — 31622 DX BRONCHOSCOPE/WASH: CPT | Performed by: OTOLARYNGOLOGY

## 2022-03-17 PROCEDURE — 88173 CYTOPATH EVAL FNA REPORT: CPT

## 2022-03-17 PROCEDURE — 85027 COMPLETE CBC AUTOMATED: CPT

## 2022-03-17 PROCEDURE — 2500000003 HC RX 250 WO HCPCS: Performed by: NURSE ANESTHETIST, CERTIFIED REGISTERED

## 2022-03-17 PROCEDURE — 3700000001 HC ADD 15 MINUTES (ANESTHESIA): Performed by: OTOLARYNGOLOGY

## 2022-03-17 PROCEDURE — 10021 FNA BX W/O IMG GDN 1ST LES: CPT | Performed by: OTOLARYNGOLOGY

## 2022-03-17 PROCEDURE — 31535 LARYNGOSCOPY W/BIOPSY: CPT | Performed by: OTOLARYNGOLOGY

## 2022-03-17 PROCEDURE — 2709999900 HC NON-CHARGEABLE SUPPLY: Performed by: OTOLARYNGOLOGY

## 2022-03-17 PROCEDURE — 82962 GLUCOSE BLOOD TEST: CPT

## 2022-03-17 PROCEDURE — 7100000000 HC PACU RECOVERY - FIRST 15 MIN: Performed by: OTOLARYNGOLOGY

## 2022-03-17 RX ORDER — SODIUM CHLORIDE 0.9 % (FLUSH) 0.9 %
5-40 SYRINGE (ML) INJECTION EVERY 12 HOURS SCHEDULED
Status: DISCONTINUED | OUTPATIENT
Start: 2022-03-17 | End: 2022-03-17 | Stop reason: HOSPADM

## 2022-03-17 RX ORDER — SODIUM CHLORIDE 0.9 % (FLUSH) 0.9 %
5-40 SYRINGE (ML) INJECTION PRN
Status: DISCONTINUED | OUTPATIENT
Start: 2022-03-17 | End: 2022-03-17 | Stop reason: HOSPADM

## 2022-03-17 RX ORDER — ESMOLOL HYDROCHLORIDE 10 MG/ML
INJECTION INTRAVENOUS PRN
Status: DISCONTINUED | OUTPATIENT
Start: 2022-03-17 | End: 2022-03-17 | Stop reason: SDUPTHER

## 2022-03-17 RX ORDER — PROPOFOL 10 MG/ML
INJECTION, EMULSION INTRAVENOUS PRN
Status: DISCONTINUED | OUTPATIENT
Start: 2022-03-17 | End: 2022-03-17 | Stop reason: SDUPTHER

## 2022-03-17 RX ORDER — AMOXICILLIN 500 MG/1
500 CAPSULE ORAL 3 TIMES DAILY
Qty: 21 CAPSULE | Refills: 0 | Status: SHIPPED | OUTPATIENT
Start: 2022-03-17 | End: 2022-03-24

## 2022-03-17 RX ORDER — FENTANYL CITRATE 50 UG/ML
INJECTION, SOLUTION INTRAMUSCULAR; INTRAVENOUS PRN
Status: DISCONTINUED | OUTPATIENT
Start: 2022-03-17 | End: 2022-03-17 | Stop reason: SDUPTHER

## 2022-03-17 RX ORDER — SODIUM CHLORIDE 9 MG/ML
25 INJECTION, SOLUTION INTRAVENOUS PRN
Status: DISCONTINUED | OUTPATIENT
Start: 2022-03-17 | End: 2022-03-17 | Stop reason: HOSPADM

## 2022-03-17 RX ORDER — ONDANSETRON 8 MG/1
8 TABLET, ORALLY DISINTEGRATING ORAL EVERY 8 HOURS PRN
Qty: 30 TABLET | Refills: 0 | Status: SHIPPED | OUTPATIENT
Start: 2022-03-17 | End: 2022-07-05 | Stop reason: CLARIF

## 2022-03-17 RX ORDER — GLYCOPYRROLATE 1 MG/5 ML
SYRINGE (ML) INTRAVENOUS PRN
Status: DISCONTINUED | OUTPATIENT
Start: 2022-03-17 | End: 2022-03-17 | Stop reason: SDUPTHER

## 2022-03-17 RX ORDER — ONDANSETRON 2 MG/ML
INJECTION INTRAMUSCULAR; INTRAVENOUS PRN
Status: DISCONTINUED | OUTPATIENT
Start: 2022-03-17 | End: 2022-03-17 | Stop reason: SDUPTHER

## 2022-03-17 RX ORDER — ONDANSETRON 2 MG/ML
4 INJECTION INTRAMUSCULAR; INTRAVENOUS
Status: DISCONTINUED | OUTPATIENT
Start: 2022-03-17 | End: 2022-03-17 | Stop reason: HOSPADM

## 2022-03-17 RX ORDER — NEOSTIGMINE METHYLSULFATE 1 MG/ML
INJECTION, SOLUTION INTRAVENOUS PRN
Status: DISCONTINUED | OUTPATIENT
Start: 2022-03-17 | End: 2022-03-17 | Stop reason: SDUPTHER

## 2022-03-17 RX ORDER — LIDOCAINE HYDROCHLORIDE 10 MG/ML
INJECTION, SOLUTION EPIDURAL; INFILTRATION; INTRACAUDAL; PERINEURAL PRN
Status: DISCONTINUED | OUTPATIENT
Start: 2022-03-17 | End: 2022-03-17 | Stop reason: SDUPTHER

## 2022-03-17 RX ORDER — FENTANYL CITRATE 50 UG/ML
25 INJECTION, SOLUTION INTRAMUSCULAR; INTRAVENOUS EVERY 5 MIN PRN
Status: DISCONTINUED | OUTPATIENT
Start: 2022-03-17 | End: 2022-03-17 | Stop reason: HOSPADM

## 2022-03-17 RX ORDER — METHYLPREDNISOLONE 4 MG/1
TABLET ORAL
Qty: 1 KIT | Refills: 0 | Status: SHIPPED | OUTPATIENT
Start: 2022-03-17 | End: 2022-03-23

## 2022-03-17 RX ORDER — ROCURONIUM BROMIDE 10 MG/ML
INJECTION, SOLUTION INTRAVENOUS PRN
Status: DISCONTINUED | OUTPATIENT
Start: 2022-03-17 | End: 2022-03-17 | Stop reason: SDUPTHER

## 2022-03-17 RX ORDER — TRAMADOL HYDROCHLORIDE 50 MG/1
50 TABLET ORAL EVERY 6 HOURS PRN
Qty: 8 TABLET | Refills: 0 | Status: SHIPPED | OUTPATIENT
Start: 2022-03-17 | End: 2022-03-24

## 2022-03-17 RX ORDER — DEXAMETHASONE SODIUM PHOSPHATE 10 MG/ML
INJECTION INTRAMUSCULAR; INTRAVENOUS PRN
Status: DISCONTINUED | OUTPATIENT
Start: 2022-03-17 | End: 2022-03-17 | Stop reason: SDUPTHER

## 2022-03-17 RX ORDER — SODIUM CHLORIDE 9 MG/ML
INJECTION, SOLUTION INTRAVENOUS CONTINUOUS PRN
Status: DISCONTINUED | OUTPATIENT
Start: 2022-03-17 | End: 2022-03-17 | Stop reason: SDUPTHER

## 2022-03-17 RX ADMIN — Medication 3 MG: at 17:13

## 2022-03-17 RX ADMIN — ESMOLOL HYDROCHLORIDE 30 MG: 100 INJECTION, SOLUTION INTRAVENOUS at 16:59

## 2022-03-17 RX ADMIN — FENTANYL CITRATE 150 MCG: 50 INJECTION, SOLUTION INTRAMUSCULAR; INTRAVENOUS at 16:38

## 2022-03-17 RX ADMIN — SODIUM CHLORIDE: 9 INJECTION, SOLUTION INTRAVENOUS at 16:30

## 2022-03-17 RX ADMIN — ROCURONIUM BROMIDE 50 MG: 10 SOLUTION INTRAVENOUS at 16:38

## 2022-03-17 RX ADMIN — ONDANSETRON 4 MG: 2 INJECTION INTRAMUSCULAR; INTRAVENOUS at 16:43

## 2022-03-17 RX ADMIN — Medication 0.6 MG: at 17:13

## 2022-03-17 RX ADMIN — FENTANYL CITRATE 50 MCG: 50 INJECTION, SOLUTION INTRAMUSCULAR; INTRAVENOUS at 16:36

## 2022-03-17 RX ADMIN — DEXAMETHASONE SODIUM PHOSPHATE 10 MG: 10 INJECTION INTRAMUSCULAR; INTRAVENOUS at 16:43

## 2022-03-17 RX ADMIN — PROPOFOL 170 MG: 10 INJECTION, EMULSION INTRAVENOUS at 16:38

## 2022-03-17 RX ADMIN — LIDOCAINE HYDROCHLORIDE 2 ML: 10 INJECTION, SOLUTION EPIDURAL; INFILTRATION; INTRACAUDAL; PERINEURAL at 16:38

## 2022-03-17 ASSESSMENT — PULMONARY FUNCTION TESTS
PIF_VALUE: 1
PIF_VALUE: 18
PIF_VALUE: 20
PIF_VALUE: 1
PIF_VALUE: 10
PIF_VALUE: 17
PIF_VALUE: 11
PIF_VALUE: 28
PIF_VALUE: 15
PIF_VALUE: 19
PIF_VALUE: 20
PIF_VALUE: 19
PIF_VALUE: 18
PIF_VALUE: 18
PIF_VALUE: 0
PIF_VALUE: 21
PIF_VALUE: 10
PIF_VALUE: 1
PIF_VALUE: 18
PIF_VALUE: 15
PIF_VALUE: 21
PIF_VALUE: 18
PIF_VALUE: 35
PIF_VALUE: 18
PIF_VALUE: 20
PIF_VALUE: 18
PIF_VALUE: 21
PIF_VALUE: 37
PIF_VALUE: 1
PIF_VALUE: 20
PIF_VALUE: 17
PIF_VALUE: 20
PIF_VALUE: 16
PIF_VALUE: 20
PIF_VALUE: 2
PIF_VALUE: 20
PIF_VALUE: 10
PIF_VALUE: 18
PIF_VALUE: 20
PIF_VALUE: 2
PIF_VALUE: 19
PIF_VALUE: 11
PIF_VALUE: 1
PIF_VALUE: 24
PIF_VALUE: 18
PIF_VALUE: 19

## 2022-03-17 ASSESSMENT — PAIN SCALES - GENERAL
PAINLEVEL_OUTOF10: 0

## 2022-03-17 ASSESSMENT — PAIN - FUNCTIONAL ASSESSMENT: PAIN_FUNCTIONAL_ASSESSMENT: 0-10

## 2022-03-17 NOTE — ANESTHESIA POSTPROCEDURE EVALUATION
Department of Anesthesiology  Postprocedure Note    Patient: Tomás Crisostomo  MRN: [id number]  YOB: 1950  Date of evaluation: 3/17/2022  Time:  5:51 PM     Procedure Summary     Date: 03/17/22 Room / Location: [facility] OR 08 / [facility]    Anesthesia Start: 8585 Anesthesia Stop: 9857    Procedure: PANENDOSCOPY WITH BIOPSY (Left Mouth) Diagnosis: (NECK MASS)    Surgeons: Don Meléndez DO Responsible Provider: Laura Matos MD    Anesthesia Type: general ASA Status: 3          Anesthesia Type: general    Eneida Phase I: Eneida Score: 8    Eneida Phase II:      Last vitals: Reviewed and per EMR flowsheets.        Anesthesia Post Evaluation    Patient location during evaluation: PACU  Patient participation: complete - patient participated  Level of consciousness: awake  Pain score: 0  Airway patency: patent  Nausea & Vomiting: no nausea  Complications: no  Cardiovascular status: hemodynamically stable  Respiratory status: acceptable  Hydration status: stable

## 2022-03-17 NOTE — ANESTHESIA PRE PROCEDURE
Department of Anesthesiology  Preprocedure Note       Name:  Adiel Davis   Age:  80 y.o.  :  1950                                          MRN:  [id number]         Date:  3/17/2022      Surgeon: Sam Casas):  Madina Harris DO    Procedure: Procedure(s):  PANENDOSCOPY WITH BIOPSY    Medications prior to admission:   Prior to Admission medications    Medication Sig Start Date End Date Taking? Authorizing Provider   traMADol (ULTRAM) 50 MG tablet Take 1 tablet by mouth every 6 hours as needed for Pain for up to 7 days. Intended supply: 3 days. Take lowest dose possible to manage pain 3/17/22 3/24/22 Yes Heidi Leal DO   amoxicillin (AMOXIL) 500 MG capsule Take 1 capsule by mouth 3 times daily for 7 days 3/17/22 3/24/22 Yes Heidi Leal DO   ondansetron (ZOFRAN ODT) 8 MG TBDP disintegrating tablet Take 1 tablet by mouth every 8 hours as needed for Nausea or Vomiting 3/17/22  Yes Heidi Herrera DO   methylPREDNISolone (MEDROL DOSEPACK) 4 MG tablet Take by mouth.  3/17/22 3/23/22 Yes Heidi Leal DO   omeprazole (PRILOSEC) 40 MG delayed release capsule Take 1 capsule by mouth daily 3/3/22  Yes Gerson Musa DO   metFORMIN (GLUCOPHAGE-XR) 500 MG extended release tablet Take 1 tablet by mouth 2 times daily 3/3/22  Yes Gerson Musa DO   lisinopril-hydroCHLOROthiazide (PRINZIDE;ZESTORETIC) 10-12.5 MG per tablet Take 1 tablet by mouth daily 3/3/22  Yes Gerson Musa DO   fenofibrate (TRICOR) 145 MG tablet Take 1 tablet by mouth daily 22  Yes ReplyBuy Player, DO   psyllium (KONSYL) 28.3 % PACK Take 1 packet by mouth nightly   Yes Historical Provider, MD   naproxen (NAPROSYN) 375 MG tablet Take 1 tablet by mouth 2 times daily as needed for Pain (take with food and full glass of water.) 21  Adama Herbert, APRN - CNP       Current medications:    Current Facility-Administered Medications   Medication Dose Route Frequency Provider Last Rate Last Admin    sodium chloride flush 0.9 % injection 5-40 mL  5-40 mL IntraVENous 2 times per day Geroldine Anna, DO        sodium chloride flush 0.9 % injection 5-40 mL  5-40 mL IntraVENous PRN Geroldine Anna, DO        0.9 % sodium chloride infusion  25 mL IntraVENous PRN Geroldine Anna, DO           Allergies:  No Known Allergies    Problem List:    Patient Active Problem List   Diagnosis Code    Essential hypertension I10    Type 2 diabetes mellitus without complication, without long-term current use of insulin ([address] E11.9    Peptic ulcer K27.9    Thrombocytopenia ([address] D69.6    Hyperlipidemia E78.5       Past Medical History:        Diagnosis Date    Arthritis     Diabetes mellitus ([address]     GERD (gastroesophageal reflux disease)     Hyperglycemia     Hyperlipidemia     Hypertension     Lyme disease     Thrombocytopenia ([address]        Past Surgical History:        Procedure Laterality Date    BRONCHOSCOPY N/A 10/28/2019    BRONCHOSCOPY performed by Nils Chadwick MD at [address] Kitty Boateng  2017    COLONOSCOPY  2019    TOOTH EXTRACTION Left 2021    VASECTOMY         Social History:    Social History     Tobacco Use    Smoking status: Former Smoker     Packs/day: 1.00     Years: 27.00     Pack years: 27.00     Types: Cigarettes     Start date:      Quit date: 2017     Years since quittin.2    Smokeless tobacco: Never Used   Substance Use Topics    Alcohol use: Yes     Comment: social                                Counseling given: Not Answered      Vital Signs (Current):   Vitals:    22 1306   BP: (!) 148/82   Pulse: 70   Resp: 16   Temp: 98 °F (36.7 °C)   TempSrc: Temporal   SpO2: 99%   Weight: 189 lb (85.7 kg)   Height: 5' 7\" (1.702 m)                                              BP Readings from Last 3 Encounters:   22 (!) 148/82   22 128/86   22 138/83       NPO Status: Time of last liquid consumption: 2300                        Time of last solid consumption: 2100                        Date of last liquid consumption: 03/16/22                        Date of last solid food consumption: 03/16/22    BMI:   Wt Readings from Last 3 Encounters:   03/17/22 189 lb (85.7 kg)   03/03/22 189 lb 3.2 oz (85.8 kg)   02/18/22 189 lb (85.7 kg)     Body mass index is 29.6 kg/m². CBC:   Lab Results   Component Value Date    WBC 5.7 03/17/2022    RBC 5.07 03/17/2022    HGB 14.8 03/17/2022    HCT 43.2 03/17/2022    MCV 85.2 03/17/2022    RDW 14.2 03/17/2022     03/17/2022       CMP:   Lab Results   Component Value Date     03/17/2022    K 5.3 03/17/2022    K 4.1 10/24/2019     03/17/2022    CO2 26 03/17/2022    BUN 28 03/17/2022    CREATININE 1.0 03/17/2022    CREATININE 1.1 03/02/2020    GFRAA >60 03/17/2022    LABGLOM >60 03/17/2022    GLUCOSE 98 03/17/2022    PROT 8.0 03/03/2022    CALCIUM 9.5 03/17/2022    BILITOT 0.4 03/03/2022    ALKPHOS 50 03/03/2022    AST 17 03/03/2022    ALT 20 03/03/2022       POC Tests: No results for input(s): POCGLU, POCNA, POCK, POCCL, POCBUN, POCHEMO, POCHCT in the last 72 hours.     Coags:   Lab Results   Component Value Date    PROTIME 12.0 10/24/2019    INR 1.1 10/24/2019    APTT 37.5 10/24/2019       HCG (If Applicable): No results found for: PREGTESTUR, PREGSERUM, HCG, HCGQUANT     ABGs: No results found for: PHART, PO2ART, PPD4ZGG, WQG9PDP, BEART, P9IHMEIQ     Type & Screen (If Applicable):  No results found for: LABABO, LABRH    Drug/Infectious Status (If Applicable):  No results found for: HIV, HEPCAB    COVID-19 Screening (If Applicable): No results found for: COVID19        Anesthesia Evaluation    Airway: Mallampati: II  TM distance: >3 FB   Neck ROM: full  Mouth opening: > = 3 FB Dental:          Pulmonary: breath sounds clear to auscultation                             Cardiovascular:    (+) hypertension:,         Rhythm: regular                      Neuro/Psych: GI/Hepatic/Renal:   (+) GERD:, PUD,           Endo/Other:    (+) DiabetesType II DM, , .                 Abdominal:             Vascular: Other Findings:             Anesthesia Plan      general     ASA 3       Induction: intravenous. MIPS: Postoperative opioids intended and Prophylactic antiemetics administered. Anesthetic plan and risks discussed with patient. Plan discussed with CRNA.                   Maria Madden MD   3/17/2022

## 2022-03-17 NOTE — PROGRESS NOTES
INTRAOPERATIVE CONSULTATION (with FROZEN SECTION)    A. Left tongue base: Lingual tonsil. Negative for malignancy.

## 2022-03-25 ENCOUNTER — OFFICE VISIT (OUTPATIENT)
Dept: ENT CLINIC | Age: 72
End: 2022-03-25

## 2022-03-25 VITALS
DIASTOLIC BLOOD PRESSURE: 80 MMHG | BODY MASS INDEX: 29.66 KG/M2 | WEIGHT: 189 LBS | SYSTOLIC BLOOD PRESSURE: 120 MMHG | HEIGHT: 67 IN

## 2022-03-25 DIAGNOSIS — R59.0 CERVICAL ADENOPATHY: Primary | ICD-10-CM

## 2022-03-25 PROCEDURE — 99024 POSTOP FOLLOW-UP VISIT: CPT | Performed by: OTOLARYNGOLOGY

## 2022-03-25 ASSESSMENT — ENCOUNTER SYMPTOMS
COUGH: 0
VOMITING: 0
SHORTNESS OF BREATH: 0
VOICE CHANGE: 0
SORE THROAT: 0

## 2022-03-25 NOTE — PROGRESS NOTES
[facility] Otolaryngology  Dr. Ni Nascimento. MANUEL Hernandez. Ms.Ed. New Consult       Patient Name:  Johnson Rm  :  1950     CHIEF C/O:    Chief Complaint   Patient presents with    Post-Op Check     patient here for biopsy results panendoscopy        HISTORY OBTAINED FROM:  patient    HISTORY OF PRESENT ILLNESS:       Carly Oshea is a 80y.o. year old male, here today for 1-week follow-up for biopsy and panendoscopy for left oropharyngeal soft tissue lesion seen on CT scan. Reports that neck swelling has gone down, not currently having any pain, difficulties chewing/swallowing, or trismus. No recent weight changes, fevers, or chills.        Past Medical History:   Diagnosis Date    Arthritis     Diabetes mellitus ([facility]     GERD (gastroesophageal reflux disease)     Hyperglycemia     Hyperlipidemia     Hypertension     Lyme disease     Thrombocytopenia ([facility]      Past Surgical History:   Procedure Laterality Date    BRONCHOSCOPY N/A 10/28/2019    BRONCHOSCOPY performed by Argenis Alejo MD at [address] [facility]      dr Carole Luciano  2017    COLONOSCOPY  2019    LARYNGOSCOPY Left 3/17/2022    PANENDOSCOPY WITH BIOPSY performed by Truman Willams DO at [address] [facility] Left 2021    VASECTOMY         Current Outpatient Medications:     ondansetron (ZOFRAN ODT) 8 MG TBDP disintegrating tablet, Take 1 tablet by mouth every 8 hours as needed for Nausea or Vomiting, Disp: 30 tablet, Rfl: 0    omeprazole (PRILOSEC) 40 MG delayed release capsule, Take 1 capsule by mouth daily, Disp: 90 capsule, Rfl: 1    metFORMIN (GLUCOPHAGE-XR) 500 MG extended release tablet, Take 1 tablet by mouth 2 times daily, Disp: 180 tablet, Rfl: 1    lisinopril-hydroCHLOROthiazide (PRINZIDE;ZESTORETIC) 10-12.5 MG per tablet, Take 1 tablet by mouth daily, Disp: 90 tablet, Rfl: 1    fenofibrate (TRICOR) 145 MG tablet, Take 1 tablet by mouth daily, Disp: 90 tablet, Rfl: 0    psyllium (KONSYL) 28.3 % PACK, Take 1 packet by mouth nightly, Disp: , Rfl:   Patient has no known allergies. Social History     Tobacco Use    Smoking status: Former Smoker     Packs/day: 1.00     Years: 27.00     Pack years: 27.00     Types: Cigarettes     Start date:      Quit date:      Years since quittin.2    Smokeless tobacco: Never Used   Vaping Use    Vaping Use: Never used   Substance Use Topics    Alcohol use: Yes     Comment: social    Drug use: Never     History reviewed. No pertinent family history. Review of Systems   Constitutional: Negative for chills and fever. HENT: Negative for congestion, ear discharge, hearing loss, sore throat, tinnitus and voice change. Respiratory: Negative for cough and shortness of breath. Cardiovascular: Negative for chest pain and palpitations. Gastrointestinal: Negative for vomiting. Skin: Negative for rash. Allergic/Immunologic: Negative for environmental allergies. Neurological: Negative for dizziness and headaches. Hematological: Does not bruise/bleed easily. All other systems reviewed and are negative. /80   Ht 5' 7\" (1.702 m)   Wt 189 lb (85.7 kg)   BMI 29.60 kg/m²   Physical Exam  Vitals and nursing note reviewed. Constitutional:       Appearance: He is well-developed. HENT:      Head: Normocephalic and atraumatic. Right Ear: Ear canal and external ear normal.      Left Ear: Ear canal and external ear normal.      Nose: No nasal deformity or septal deviation. Right Nostril: No foreign body. Left Nostril: No foreign body. Right Turbinates: Not enlarged or swollen. Left Turbinates: Not enlarged or swollen. Mouth/Throat:     Eyes:      Pupils: Pupils are equal, round, and reactive to light. Neck:      Thyroid: No thyromegaly. Trachea: No tracheal deviation. Cardiovascular:      Rate and Rhythm: Normal rate.    Pulmonary:      Effort: Pulmonary effort is normal. No respiratory distress. Musculoskeletal:         General: Normal range of motion. Cervical back: Normal range of motion. Lymphadenopathy:      Cervical: Cervical adenopathy present. Left cervical: Deep cervical adenopathy present. No superficial cervical adenopathy. Skin:     General: Skin is warm. Findings: No erythema. Neurological:      Mental Status: He is alert. Cranial Nerves: No cranial nerve deficit. IMPRESSION/PLAN:  Biopsies negative for malignancy  Finish antibiotics  Follow-up in 8 weeks to continue to monitor lesion and if any symptoms arise call to come in sooner. Dr. Jules Oakley.  Otolaryngology Facial Plastic Surgery  :[facility] Otolaryngology/Facial Plastic Surgery Residency  Associate Clinical Professor:  Camille Castañeda [facility]  1950      I have discussed the case, including pertinent history and exam findings with the resident. I have seen and examined the patient and the key elements of the encounter have been performed by me. I agree with the assessment, plan and orders as documented by the resident. Patient here for follow up of medical problems. Remainder of medical problems as per resident note.       [address], DO  4/7/22

## 2022-05-03 ENCOUNTER — OFFICE VISIT (OUTPATIENT)
Dept: FAMILY MEDICINE CLINIC | Age: 72
End: 2022-05-03
Payer: MEDICARE

## 2022-05-03 VITALS
OXYGEN SATURATION: 96 % | SYSTOLIC BLOOD PRESSURE: 130 MMHG | WEIGHT: 193 LBS | RESPIRATION RATE: 18 BRPM | HEART RATE: 74 BPM | HEIGHT: 67 IN | BODY MASS INDEX: 30.29 KG/M2 | TEMPERATURE: 98.2 F | DIASTOLIC BLOOD PRESSURE: 82 MMHG

## 2022-05-03 DIAGNOSIS — I10 ESSENTIAL HYPERTENSION: ICD-10-CM

## 2022-05-03 DIAGNOSIS — E78.5 HYPERLIPIDEMIA, UNSPECIFIED HYPERLIPIDEMIA TYPE: Primary | ICD-10-CM

## 2022-05-03 PROCEDURE — 99214 OFFICE O/P EST MOD 30 MIN: CPT | Performed by: FAMILY MEDICINE

## 2022-05-03 RX ORDER — FENOFIBRATE 145 MG/1
145 TABLET, COATED ORAL DAILY
Qty: 90 TABLET | Refills: 0 | Status: SHIPPED
Start: 2022-05-03 | End: 2022-08-18 | Stop reason: SDUPTHER

## 2022-05-03 ASSESSMENT — ENCOUNTER SYMPTOMS
GASTROINTESTINAL NEGATIVE: 1
PHOTOPHOBIA: 0
EYE REDNESS: 0
SINUS PAIN: 0
RHINORRHEA: 0
COUGH: 0
EYE DISCHARGE: 0
SHORTNESS OF BREATH: 0

## 2022-05-03 NOTE — PROGRESS NOTES
5/3/2022    Antonio Bustamante    Chief Complaint   Patient presents with    Diabetes     Patient here for follow up. HPI  History was obtained from patient. Parker Aldridge is a 80 y.o. male who presents today with the followin. Hyperlipidemia, unspecified hyperlipidemia type    2. Essential hypertension    Patient is here for follow-up of hypertension hypertriglyceridemia and diabetes. Patient has no complaints today. Patient is taking all of his medications as prescribed. REVIEW OF SYMPTOMS    Review of Systems   Constitutional: Negative for chills, fatigue and fever. HENT: Negative for congestion, mouth sores, postnasal drip, rhinorrhea and sinus pain. Eyes: Negative for photophobia, discharge and redness. Respiratory: Negative for cough and shortness of breath. Cardiovascular: Negative for chest pain. Gastrointestinal: Negative. Genitourinary: Negative for difficulty urinating. Neurological: Negative for headaches. PAST MEDICAL HISTORY  Past Medical History:   Diagnosis Date    Arthritis     [facility]     GERD (gastroesophageal reflux disease)     Hyperglycemia     Hyperlipidemia     Hypertension     Lyme disease     Thrombocytopenia ([facility]        FAMILY HISTORY  No family history on file.     SOCIAL HISTORY  Social History     Socioeconomic History    Marital status:      Spouse name: None    Number of children: None    Years of education: None    Highest education level: None   Occupational History    None   Tobacco Use    Smoking status: Former Smoker     Packs/day: 1.00     Years: 27.00     Pack years: 27.00     Types: Cigarettes     Start date:      Quit date:      Years since quittin.3    Smokeless tobacco: Never Used   Vaping Use    Vaping Use: Never used   Substance and Sexual Activity    Alcohol use: Yes     Comment: social    Drug use: Never    Sexual activity: None   Other Topics Concern    None   Social History Narrative ** Merged History Encounter **          Social Determinants of Health     Financial Resource Strain:     Difficulty of Paying Living Expenses: Not on file   Food Insecurity: No Food Insecurity    Worried About Running Out of Food in the Last Year: Never true    Cheryle of Food in the Last Year: Never true   Transportation Needs: No Transportation Needs    Lack of Transportation (Medical): No    Lack of Transportation (Non-Medical):  No   Physical Activity:     Days of Exercise per Week: Not on file    Minutes of Exercise per Session: Not on file   Stress:     Feeling of Stress : Not on file   Social Connections:     Frequency of Communication with Friends and Family: Not on file    Frequency of Social Gatherings with Friends and Family: Not on file    Attends Restorationist Services: Not on file    Active Member of [address] [place] [address] [employer] or Organizations: Not on file    Attends Club or Organization Meetings: Not on file    Marital Status: Not on file   Intimate Partner Violence:     Fear of Current or Ex-Partner: Not on file    Emotionally Abused: Not on file    Physically Abused: Not on file    Sexually Abused: Not on file   Housing Stability:     Unable to Pay for Housing in the Last Year: Not on file    Number of [place] in the Last Year: Not on file    Unstable Housing in the Last Year: Not on file        SURGICAL HISTORY  Past Surgical History:   Procedure Laterality Date    BRONCHOSCOPY N/A 10/28/2019    BRONCHOSCOPY performed by Jessa Grove MD at [address] [facility]  2017    dr Boris Cobb  03/2017    COLONOSCOPY  03/2019    LARYNGOSCOPY Left 3/17/2022    PANENDOSCOPY WITH BIOPSY performed by Cordelia Orellana DO at [address] Left 12/28/2021    VASECTOMY                   CURRENT MEDICATIONS  Current Outpatient Medications   Medication Sig Dispense Refill    fenofibrate (TRICOR) 145 MG tablet Take 1 tablet by mouth daily 90 tablet 0    ondansetron (ZOFRAN ODT) 8 MG TBDP disintegrating tablet Take 1 tablet by mouth every 8 hours as needed for Nausea or Vomiting 30 tablet 0    omeprazole (PRILOSEC) 40 MG delayed release capsule Take 1 capsule by mouth daily 90 capsule 1    metFORMIN (GLUCOPHAGE-XR) 500 MG extended release tablet Take 1 tablet by mouth 2 times daily 180 tablet 1    lisinopril-hydroCHLOROthiazide (PRINZIDE;ZESTORETIC) 10-12.5 MG per tablet Take 1 tablet by mouth daily 90 tablet 1    psyllium (KONSYL) 28.3 % PACK Take 1 packet by mouth nightly       No current facility-administered medications for this visit. ALLERGIES  No Known Allergies    PHYSICAL EXAM    /82   Pulse 74   Temp 98.2 °F (36.8 °C)   Resp 18   Ht 5' 7\" (1.702 m)   Wt 193 lb (87.5 kg)   SpO2 96%   BMI 30.23 kg/m²     Physical Exam  Vitals and nursing note reviewed. Constitutional:       Appearance: Normal appearance. HENT:      Nose: No congestion or rhinorrhea. Mouth/Throat:      Mouth: Mucous membranes are moist.      Pharynx: Oropharynx is clear. Eyes:      Conjunctiva/sclera: Conjunctivae normal.   Cardiovascular:      Rate and Rhythm: Normal rate and regular rhythm. Heart sounds: Normal heart sounds. Pulmonary:      Effort: Pulmonary effort is normal.      Breath sounds: Normal breath sounds. Musculoskeletal:      Cervical back: Neck supple. Skin:     General: Skin is warm. Neurological:      Mental Status: He is alert and oriented to person, place, and time. ASSESSMENT & PLAN    Alan Benavidez was seen today for diabetes. Diagnoses and all orders for this visit:    Hyperlipidemia, unspecified hyperlipidemia type  -     fenofibrate (TRICOR) 145 MG tablet; Take 1 tablet by mouth daily    Essential hypertension    Patient will continue with all his current medications. Return in about 3 months (around 8/3/2022). Electronically signed by Joan Espinoza.  Marika Ramirez DO on 5/3/2022

## 2022-05-27 ENCOUNTER — OFFICE VISIT (OUTPATIENT)
Dept: ENT CLINIC | Age: 72
End: 2022-05-27
Payer: MEDICARE

## 2022-05-27 VITALS
SYSTOLIC BLOOD PRESSURE: 148 MMHG | DIASTOLIC BLOOD PRESSURE: 83 MMHG | BODY MASS INDEX: 30.04 KG/M2 | HEART RATE: 75 BPM | HEIGHT: 67 IN | WEIGHT: 191.4 LBS

## 2022-05-27 DIAGNOSIS — R59.0 CERVICAL ADENOPATHY: Primary | ICD-10-CM

## 2022-05-27 DIAGNOSIS — R22.1 MASS OF LEFT SIDE OF NECK: ICD-10-CM

## 2022-05-27 PROCEDURE — 1123F ACP DISCUSS/DSCN MKR DOCD: CPT | Performed by: OTOLARYNGOLOGY

## 2022-05-27 PROCEDURE — 99213 OFFICE O/P EST LOW 20 MIN: CPT | Performed by: OTOLARYNGOLOGY

## 2022-05-27 NOTE — PATIENT INSTRUCTIONS
SURGERY:_____/_____/_____    Nothing to eat or drink after midnight the night before surgery unless surgery is at [facility] or otherwise instructed by the hospital.    DO NOT TAKE ANY ASPIRIN PRODUCTS 7 days prior to surgery. Tylenol only. No Advil, Motrin, Aleve, or Ibuprofen. IF YOU ARE ON BLOOD THINNERS (PLAVIX, COUMADIN, ELIQUIS ETC) THESE WILL ALSO NEED TO BE HELD. Any illegal drugs in your system (including Marijuana even if legally prescribed) will result in your surgery being cancelled. Please be sure to check with our office or the hospital on time frame for the drugs to be out of your system. SHOULD YOUR INSURANCE CHANGE AT ANY TIME YOU MUST CONTACT OUR OFFICE. FAILURE TO DO SO MAY RESULT IN YOUR SURGERY BEING RESCHEDULED OR YOU MAY BE CHARGED AS SELF-PAY. Due to the high demand for surgery at our practice, if you cancel or reschedule your surgery two (2) times we may not reschedule you. If you need FMLA or Short Term Disability paperwork completed for your surgery, please complete your portion, ensure your name and date of birth are on them and fax them to [phone] asap. Paperwork can take up to 2 weeks to be completed. Per current [facility] protocols, COVID Testing is NOT required prior to procedure UNLESS you are symptomatic. (Vaccinated or Unvaccinated)- [facility] requires COVID Testing 72 hours prior to surgery. If you need medical clearance, you are responsible to contact your physician(s) to schedule the appointment. If clearance is not completed within 30 days of your surgery it may be cancelled. Our office will fax the appropriate forms that need to be completed to your physician(s).     The location of your surgery will call you the day prior to your surgery date to let you know what time you have to be there and any other details. (they usually don't call until late afternoon- early evening.)- IF YOU HAVE QUESTIONS REGARDING THE TIME OF YOUR SURGERY, PLEASE CALL THE FACILITY YOU ARE SCHEDULED AT.     ·       [address], [address] [facility] will call you a couple days prior to surgery and give you further instructions, if you have any questions, you can reach them at [phone]    ·       [address] [facility] will call you a couple days prior to surgery and give you further instructions, if you have any questions, you can reach them at [phone]    ·       [facility], [address] [facility], [facility] will call you a couple days prior to surgery and give you further instructions, if you have any questions, you can reach them at [phone]     ·       [facility]), [address],  [place], [address] [facility] will call you a couple days prior to surgery and give you further instructions, if you have any questions, you can reach them at [phone]      Pre-Surgery/Anesthesia Video ([facility] ONLY)  Located on [address]:  1. Scroll over Health Information  2. Select Audio and Video  3. Select [employer]  4. Select Your child and Anesthesia  5.  Select Pre surgery [facility]    FOOD RESTRICTIONS--[facility] ONLY  Solid Food/Milk Products --------- Stop 8 hours prior to Surgery  Formula --------- Stop 6 hours prior to Surgery  Breast Milk ------- Stop 4 hours prior to Surgery  Clear liquids (water, Gatorade, Pedialyte) - Stop 2 hours prior to Surgery

## 2022-05-27 NOTE — PROGRESS NOTES
[facility] Otolaryngology  Dr. Andriy Linares. Ms.Ed        Patient Name:  Ragini Fernández  :  1950     CHIEF C/O:    Chief Complaint   Patient presents with    Other     pt. states after panendo he has had a sore throat, sharp pain        HISTORY OBTAINED FROM:  patient    HISTORY OF PRESENT ILLNESS:       Mildred Castleman is a 80y.o. year old male, here today for follow up of persistent left-sided neck fullness without associated difficulty swallowing. Patient has a known history of previous lymph node lymphadenopathy with panendoscopy and biopsy that was negative for malignancy. No complaints of new shortness of breath or stridor weight loss headaches vision changes no complaints of ear pain tinnitus vertigo or hearing loss.       Past Medical History:   Diagnosis Date    Arthritis     [facility] ([facility]     GERD (gastroesophageal reflux disease)     Hyperglycemia     Hyperlipidemia     Hypertension     Lyme disease     Thrombocytopenia ([facility]      Past Surgical History:   Procedure Laterality Date    BRONCHOSCOPY N/A 10/28/2019    BRONCHOSCOPY performed by Marc Martinez MD at [address] [facility]  2017    dr Breanne Wong  2017    COLONOSCOPY  2019    LARYNGOSCOPY Left 3/17/2022    PANENDOSCOPY WITH BIOPSY performed by Phil Seymour DO at [address] [facility] Left 2021    VASECTOMY         Current Outpatient Medications:     fenofibrate (TRICOR) 145 MG tablet, Take 1 tablet by mouth daily, Disp: 90 tablet, Rfl: 0    omeprazole (PRILOSEC) 40 MG delayed release capsule, Take 1 capsule by mouth daily, Disp: 90 capsule, Rfl: 1    metFORMIN (GLUCOPHAGE-XR) 500 MG extended release tablet, Take 1 tablet by mouth 2 times daily, Disp: 180 tablet, Rfl: 1    lisinopril-hydroCHLOROthiazide (PRINZIDE;ZESTORETIC) 10-12.5 MG per tablet, Take 1 tablet by mouth daily, Disp: 90 tablet, Rfl: 1    ondansetron (ZOFRAN ODT) 8 MG TBDP disintegrating tablet, Take 1 tablet by mouth every 8 hours as needed for Nausea or Vomiting, Disp: 30 tablet, Rfl: 0    psyllium (KONSYL) 28.3 % PACK, Take 1 packet by mouth nightly (Patient not taking: Reported on 2022), Disp: , Rfl:   Patient has no known allergies. Social History     Tobacco Use    Smoking status: Former Smoker     Packs/day: 1.00     Years: 27.00     Pack years: 27.00     Types: Cigarettes     Start date: 200     Quit date:      Years since quittin.4    Smokeless tobacco: Never Used   Vaping Use    Vaping Use: Never used   Substance Use Topics    Alcohol use: Not Currently    Drug use: Never     No family history on file. Review of Systems   Constitutional: Negative for chills and fever. HENT: Positive for sore throat. Negative for congestion, ear discharge, hearing loss, mouth sores, postnasal drip, rhinorrhea, sinus pressure, trouble swallowing and voice change. Respiratory: Negative for cough and shortness of breath. Cardiovascular: Negative for chest pain and palpitations. Gastrointestinal: Negative for vomiting. Skin: Negative for rash. Allergic/Immunologic: Negative for environmental allergies. Neurological: Negative for dizziness and headaches. Hematological: Does not bruise/bleed easily. All other systems reviewed and are negative. BP (!) 148/83 (Site: Left Upper Arm, Position: Sitting, Cuff Size: Medium Adult)   Pulse 75   Ht 5' 7\" (1.702 m)   Wt 191 lb 6.4 oz (86.8 kg)   BMI 29.98 kg/m²   Physical Exam  Vitals and nursing note reviewed. Constitutional:       Appearance: He is well-developed. HENT:      Head: Normocephalic. Right Ear: Tympanic membrane and ear canal normal.      Left Ear: Tympanic membrane and ear canal normal.      Nose: Congestion present. No rhinorrhea. Mouth/Throat:      Mouth: No injury, oral lesions or angioedema. Tongue: No lesions. Tongue does not deviate from midline. Palate:  Mass and lesions present. Pharynx: No pharyngeal swelling, oropharyngeal exudate, posterior oropharyngeal erythema or uvula swelling. Eyes:      Pupils: Pupils are equal, round, and reactive to light. Neck:      Thyroid: No thyromegaly. Trachea: No tracheal deviation. Cardiovascular:      Rate and Rhythm: Normal rate. Pulmonary:      Effort: Pulmonary effort is normal. No respiratory distress. Musculoskeletal:         General: Normal range of motion. Cervical back: Normal range of motion. Lymphadenopathy:      Cervical: No cervical adenopathy. Skin:     General: Skin is warm. Findings: No erythema. Neurological:      Mental Status: He is alert. Cranial Nerves: No cranial nerve deficit. IMPRESSION/PLAN:  Patient seen and examined with a previous history of panendoscopy with biopsy negative for malignancy with persistent left-sided neck lymphadenopathy, with possible now left lingual tonsil hypertrophy and firmness to palpation concerning for persistent malignancy. Recommend patient undergo repeat panendoscopy with biopsy risk and benefits reviewed patient will follow-up postoperatively with results. Dr. Sudheer Jones.  Otolaryngology Facial Plastic Surgery  :  [facility] Otolaryngology/Facial Plastic Surgery Residency  Associate Clinical Professor:  Irma Singh [facility]

## 2022-06-03 ENCOUNTER — TELEPHONE (OUTPATIENT)
Dept: ENT CLINIC | Age: 72
End: 2022-06-03

## 2022-06-03 NOTE — TELEPHONE ENCOUNTER
Prior Authorization Form:      DEMOGRAPHICS:                     Patient Name:  Kwame Lees  Patient :  1950            Insurance:  Payor: Arjun Jung / Plan: Omar Yan ESSENTIAL/PLUS / Product Type: *No Product type* /   Insurance ID Number:    Payor/Plan Subscr  Sex Relation Sub. Ins. ID Effective Group Num   1.  [address] REYES 1950 Male Self [id number]                                    [id number]         DIAGNOSIS & PROCEDURE:                       Procedure/Operation: excision left level II lymph node; panendoscopy            CPT Code: 97220 [id number]    Diagnosis:  Neck mass, lingual tonsil hypertrophy     ICD10 Code: r22.1 j35.1    [place]  [facility]    Surgeon:  J Luis Alvarado INFORMATION:                          Date: 22    Time: n/a              Anesthesia:  General                                                       Status:  Outpatient        Special Comments:  ** rescheduled from 22 **     Electronically signed by Radha Grant on 6/3/2022 at 3:08 PM

## 2022-06-09 ENCOUNTER — TELEPHONE (OUTPATIENT)
Dept: ENT CLINIC | Age: 72
End: 2022-06-09

## 2022-06-09 NOTE — TELEPHONE ENCOUNTER
Spoke w/ Andrew Ignacio. Pt is unable to keep the Sx date for 07/05/22. Andrew Ignacio has a procedure herself, and and Vinod Nuno has a NP appt w/a new PCP same day. Requesting to be rescheduled.

## 2022-06-13 ENCOUNTER — TELEPHONE (OUTPATIENT)
Dept: FAMILY MEDICINE CLINIC | Age: 72
End: 2022-06-13

## 2022-06-13 NOTE — TELEPHONE ENCOUNTER
for Pt to call and schedule appt with dr Teja Carmona for surgical clearance.  Pt is scheduled for surgery with ENT on 7/5/22       Surgical clearance form is in [facility]

## 2022-06-14 NOTE — TELEPHONE ENCOUNTER
Erin is not on patient's HIPPA; per HIPPA/communications form do not discuss anything with anyone other than pt. Lm for pt to call office regarding this request.     Lm for erin that I unfortunately cannot reschedule with her the pt will need to call the office himself.

## 2022-06-14 NOTE — TELEPHONE ENCOUNTER
Pt called back; sx rsd to 7/21/22. Pt will be est w/ Dr [employer] on 7/5/22 for clearance. Will fax form to be completed.

## 2022-06-23 ASSESSMENT — ENCOUNTER SYMPTOMS
VOMITING: 0
SINUS PRESSURE: 0
SORE THROAT: 1
SHORTNESS OF BREATH: 0
RHINORRHEA: 0
VOICE CHANGE: 0
TROUBLE SWALLOWING: 0
COUGH: 0

## 2022-07-05 PROBLEM — K21.9 GASTROESOPHAGEAL REFLUX DISEASE WITHOUT ESOPHAGITIS: Status: ACTIVE | Noted: 2022-07-05

## 2022-07-15 NOTE — PROGRESS NOTES
[facility] PRE-ADMISSION TESTING GENERAL INSTRUCTIONS- [facility]-phone number:[phone]    GENERAL INSTRUCTIONS  [x] Antibacterial Soap shower Night before and/or AM of Surgery  [] Quan wipe instruction sheet and wipes given. [x] Nothing by mouth after midnight, including gum, candy, mints, or water. [x] You may brush your teeth, gargle, but do NOT swallow water. []Hibiclens shower  the night before and the morning of surgery. Do not use             Hibiclens on your face or head. [x]No smoking, chewing tobacco, illegal drugs, or alcohol within 24 hours of your surgery. [x] Jewelry, valuables or body piercing's should not be brought to the hospital. All body and/or tongue piercing's must be removed prior to arriving to hospital.  ALL hair pins must be removed. [x] Do not wear makeup, lotions, powders, deodorant. Nail polish as directed by the nurse. [x] Arrange transportation with a responsible adult  to and from the hospital. If you do not have a responsible adult  to transport you, you will need to make arrangements with a medical transportation company (i.e. [employer]. A Uber/taxi/bus is not appropriate unless you are accompanied by a responsible adult ). Arrange for someone to be with you for the remainder of the day and for 24 hours after your procedure due to having had anesthesia. Who will be your  for transportation?__________________   Who will be staying with you for 24 hrs after your procedure?__________________  [x] Bring insurance card and photo ID.  [] Transfusion Bracelet: Please bring with you to hospital, day of surgery  [] Bring urine specimen day of surgery. Any small container is acceptable. [] Use inhalers the morning of surgery and bring with you to hospital.  [] Bring copy of living will or healthcare power of  papers to be placed in your electronic record.   [] CPAP/BI-PAP: Please bring your machine if you are to spend the night in the hospital.     PARKING INSTRUCTIONS:   [x] Arrival Time:_____0815________  [x] Parking lot '\"I\"  is located on [facility] (the corner of [facility] and [facility]). To enter, press the button and the gate will lift. A free token will be provided to exit the lot. One car per patient is allowed to park in this lot. All other cars are to park on [address] [place] [address] either in the parking garage or the handicap lot. [] To reach the [facility] lobby from [address] [place] [address], upon entering the hospital, take elevator B to the 3rd floor. EDUCATION INSTRUCTIONS:      [] Knee or hip replacement booklet & exercise pamphlets given. [] Alhaji Joshi placed in chart. [] Pre-admission Testing educational folder given  [] Incentive Spirometry,coughing & deep breathing exercises reviewed. []Medication information sheet(s)   []Fluoroscopy-Xray used in surgery reviewed with patient. Educational pamphlet placed in chart. []Pain: Post-op pain is normal and to be expected. You will be asked to rate your pain from 0-10(a zero is not acceptable-education is needed). Your post-op pain goal is:  [] Ask your nurse for your pain medication. [] Joint camp offered. [] Joint replacement booklets given. [] Other:___________________________    MEDICATION INSTRUCTIONS:   [x]Bring a complete list of your medications, please write the last time you took the medicine, give this list to the nurse. [x] Take the following medications the morning of surgery with 1-2 ounces of water: prilosec  [] Stop herbal supplements and vitamins 5 days before your surgery. [] DO NOT take any diabetic medicine the morning of surgery. Follow instructions for insulin the day before surgery. [] If you are diabetic and your blood sugar is low or you feel symptomatic, you may drink 1-2 ounces of apple juice or take a glucose tablet.   The morning of your procedure, you may call the pre-op area if you have concerns about your blood sugar [phone]. [] Use your inhalers the morning of surgery. Bring your emergency inhaler with you day of surgery. [] Follow physician instructions regarding any blood thinners you may be taking. WHAT TO EXPECT:  [x] The day of surgery you will be greeted and checked in by the [employer].  In addition, you will be registered in the [facility] by a Patient Access Representative. Please bring your photo ID and insurance card. A nurse will greet you in accordance to the time you are needed in the pre-op area to prepare you for surgery. Please do not be discouraged if you are not greeted in the order you arrive as there are many variables that are involved in patient preparation. Your patience is greatly appreciated as you wait for your nurse. Please bring in items such as: books, magazines, newspapers, electronics, or any other items  to occupy your time in the waiting area. []  Delays may occur with surgery and staff will make a sincere effort to keep you informed of delays. If any delays occur with your procedure, we apologize ahead of time for your inconvenience as we recognize the value of your time.

## 2022-07-20 ENCOUNTER — ANESTHESIA EVENT (OUTPATIENT)
Dept: OPERATING ROOM | Age: 72
End: 2022-07-20
Payer: MEDICARE

## 2022-07-20 NOTE — PROGRESS NOTES
Patient notified of time change for surgery scheduled on 7/21.   Scheduled at 8:30 am.  Arrival time 6:30 am.

## 2022-07-20 NOTE — H&P
ondansetron (ZOFRAN ODT) 8 MG TBDP disintegrating tablet, Take 1 tablet by mouth every 8 hours as needed for Nausea or Vomiting, Disp: 30 tablet, Rfl: 0    psyllium (KONSYL) 28.3 % PACK, Take 1 packet by mouth nightly (Patient not taking: Reported on 2022), Disp: , Rfl:     Patient has no known allergies. Social History            Tobacco Use    Smoking status: Former Smoker       Packs/day: 1.00       Years: 27.00       Pack years: 27.00       Types: Cigarettes       Start date: 200       Quit date:        Years since quittin.4    Smokeless tobacco: Never Used   Vaping Use    Vaping Use: Never used   Substance Use Topics    Alcohol use: Not Currently    Drug use: Never      Family History   No family history on file. Review of Systems  Constitutional: Negative for chills and fever. HENT: Positive for sore throat. Negative for congestion, ear discharge, hearing loss, mouth sores, postnasal drip, rhinorrhea, sinus pressure, trouble swallowing and voice change. Respiratory: Negative for cough and shortness of breath. Cardiovascular: Negative for chest pain and palpitations. Gastrointestinal: Negative for vomiting. Skin: Negative for rash. Allergic/Immunologic: Negative for environmental allergies. Neurological: Negative for dizziness and headaches. Hematological: Does not bruise/bleed easily. All other systems reviewed and are negative. BP (!) 148/83 (Site: Left Upper Arm, Position: Sitting, Cuff Size: Medium Adult)   Pulse 75   Ht 5' 7\" (1.702 m)   Wt 191 lb 6.4 oz (86.8 kg)   BMI 29.98 kg/m²   Physical Exam  Vitals and nursing note reviewed. Constitutional:       Appearance: He is well-developed. HENT:     Head: Normocephalic. Right Ear: Tympanic membrane and ear canal normal.      Left Ear: Tympanic membrane and ear canal normal.      Nose: Congestion present. No rhinorrhea. Mouth/Throat:      Mouth: No injury, oral lesions or angioedema.       Tongue: No lesions. Tongue does not deviate from midline. Palate: Mass and lesions present. Pharynx: No pharyngeal swelling, oropharyngeal exudate, posterior oropharyngeal erythema or uvula swelling. Eyes:     Pupils: Pupils are equal, round, and reactive to light. Neck:     Thyroid: No thyromegaly. Trachea: No tracheal deviation. Cardiovascular:     Rate and Rhythm: Normal rate. Pulmonary:     Effort: Pulmonary effort is normal. No respiratory distress. Musculoskeletal:         General: Normal range of motion. Cervical back: Normal range of motion. Lymphadenopathy:     Cervical: No cervical adenopathy. Skin:     General: Skin is warm. Findings: No erythema. Neurological:     Mental Status: He is alert. Cranial Nerves: No cranial nerve deficit. IMPRESSION/PLAN:  Patient seen and examined with a previous history of panendoscopy with biopsy negative for malignancy with persistent left-sided neck lymphadenopathy, with possible now left lingual tonsil hypertrophy and firmness to palpation concerning for persistent malignancy. Recommend patient undergo repeat panendoscopy with biopsy risk and benefits reviewed patient will follow-up postoperatively with results.     Electronically signed by Alyce Lo DO on 7/20/2022 at 8:08 AM

## 2022-07-21 ENCOUNTER — ANESTHESIA (OUTPATIENT)
Dept: OPERATING ROOM | Age: 72
End: 2022-07-21
Payer: MEDICARE

## 2022-07-21 ENCOUNTER — HOSPITAL ENCOUNTER (OUTPATIENT)
Age: 72
Setting detail: OUTPATIENT SURGERY
Discharge: HOME HEALTH | End: 2022-07-21
Attending: OTOLARYNGOLOGY | Admitting: OTOLARYNGOLOGY
Payer: MEDICARE

## 2022-07-21 VITALS
HEIGHT: 67 IN | SYSTOLIC BLOOD PRESSURE: 108 MMHG | TEMPERATURE: 97.6 F | DIASTOLIC BLOOD PRESSURE: 53 MMHG | BODY MASS INDEX: 29.35 KG/M2 | WEIGHT: 187 LBS | RESPIRATION RATE: 16 BRPM | HEART RATE: 78 BPM | OXYGEN SATURATION: 96 %

## 2022-07-21 DIAGNOSIS — C10.9 SQUAMOUS CELL CARCINOMA OF OROPHARYNX (HCC): ICD-10-CM

## 2022-07-21 DIAGNOSIS — Z01.818 PRE-OP TESTING: Primary | ICD-10-CM

## 2022-07-21 DIAGNOSIS — G89.18 POST-OP PAIN: ICD-10-CM

## 2022-07-21 DIAGNOSIS — R22.1 NECK MASS: ICD-10-CM

## 2022-07-21 LAB
ANION GAP SERPL CALCULATED.3IONS-SCNC: 13 MMOL/L (ref 7–16)
BUN BLDV-MCNC: 27 MG/DL (ref 6–23)
CALCIUM SERPL-MCNC: 9.3 MG/DL (ref 8.6–10.2)
CHLORIDE BLD-SCNC: 104 MMOL/L (ref 98–107)
CO2: 22 MMOL/L (ref 22–29)
CREAT SERPL-MCNC: 1.2 MG/DL (ref 0.7–1.2)
GFR AFRICAN AMERICAN: >60
GFR NON-AFRICAN AMERICAN: 60 ML/MIN/1.73
GLUCOSE BLD-MCNC: 131 MG/DL (ref 74–99)
HCT VFR BLD CALC: 44.2 % (ref 37–54)
HEMOGLOBIN: 15.1 G/DL (ref 12.5–16.5)
MCH RBC QN AUTO: 28.7 PG (ref 26–35)
MCHC RBC AUTO-ENTMCNC: 34.2 % (ref 32–34.5)
MCV RBC AUTO: 83.9 FL (ref 80–99.9)
METER GLUCOSE: 131 MG/DL (ref 74–99)
PDW BLD-RTO: 13.5 FL (ref 11.5–15)
PLATELET # BLD: 234 E9/L (ref 130–450)
PMV BLD AUTO: 10.3 FL (ref 7–12)
POTASSIUM SERPL-SCNC: 3.9 MMOL/L (ref 3.5–5)
RBC # BLD: 5.27 E12/L (ref 3.8–5.8)
SODIUM BLD-SCNC: 139 MMOL/L (ref 132–146)
WBC # BLD: 6.8 E9/L (ref 4.5–11.5)

## 2022-07-21 PROCEDURE — 2580000003 HC RX 258: Performed by: STUDENT IN AN ORGANIZED HEALTH CARE EDUCATION/TRAINING PROGRAM

## 2022-07-21 PROCEDURE — 2709999900 HC NON-CHARGEABLE SUPPLY: Performed by: OTOLARYNGOLOGY

## 2022-07-21 PROCEDURE — 7100000001 HC PACU RECOVERY - ADDTL 15 MIN: Performed by: OTOLARYNGOLOGY

## 2022-07-21 PROCEDURE — 88342 IMHCHEM/IMCYTCHM 1ST ANTB: CPT

## 2022-07-21 PROCEDURE — 38510 BIOPSY/REMOVAL LYMPH NODES: CPT | Performed by: OTOLARYNGOLOGY

## 2022-07-21 PROCEDURE — 7100000000 HC PACU RECOVERY - FIRST 15 MIN: Performed by: OTOLARYNGOLOGY

## 2022-07-21 PROCEDURE — 3700000000 HC ANESTHESIA ATTENDED CARE: Performed by: OTOLARYNGOLOGY

## 2022-07-21 PROCEDURE — 6370000000 HC RX 637 (ALT 250 FOR IP): Performed by: OTOLARYNGOLOGY

## 2022-07-21 PROCEDURE — 31536 LARYNGOSCOPY W/BX & OP SCOPE: CPT | Performed by: OTOLARYNGOLOGY

## 2022-07-21 PROCEDURE — 7100000010 HC PHASE II RECOVERY - FIRST 15 MIN: Performed by: OTOLARYNGOLOGY

## 2022-07-21 PROCEDURE — 82962 GLUCOSE BLOOD TEST: CPT

## 2022-07-21 PROCEDURE — 3700000001 HC ADD 15 MINUTES (ANESTHESIA): Performed by: OTOLARYNGOLOGY

## 2022-07-21 PROCEDURE — 80048 BASIC METABOLIC PNL TOTAL CA: CPT

## 2022-07-21 PROCEDURE — 88305 TISSUE EXAM BY PATHOLOGIST: CPT

## 2022-07-21 PROCEDURE — 2500000003 HC RX 250 WO HCPCS: Performed by: OTOLARYNGOLOGY

## 2022-07-21 PROCEDURE — 3600000012 HC SURGERY LEVEL 2 ADDTL 15MIN: Performed by: OTOLARYNGOLOGY

## 2022-07-21 PROCEDURE — 6360000002 HC RX W HCPCS: Performed by: NURSE ANESTHETIST, CERTIFIED REGISTERED

## 2022-07-21 PROCEDURE — 7100000011 HC PHASE II RECOVERY - ADDTL 15 MIN: Performed by: OTOLARYNGOLOGY

## 2022-07-21 PROCEDURE — 85027 COMPLETE CBC AUTOMATED: CPT

## 2022-07-21 PROCEDURE — 3600000002 HC SURGERY LEVEL 2 BASE: Performed by: OTOLARYNGOLOGY

## 2022-07-21 PROCEDURE — 6360000002 HC RX W HCPCS: Performed by: STUDENT IN AN ORGANIZED HEALTH CARE EDUCATION/TRAINING PROGRAM

## 2022-07-21 PROCEDURE — 36415 COLL VENOUS BLD VENIPUNCTURE: CPT

## 2022-07-21 PROCEDURE — 6370000000 HC RX 637 (ALT 250 FOR IP)

## 2022-07-21 PROCEDURE — 2500000003 HC RX 250 WO HCPCS: Performed by: NURSE ANESTHETIST, CERTIFIED REGISTERED

## 2022-07-21 PROCEDURE — 6360000002 HC RX W HCPCS: Performed by: ANESTHESIOLOGY

## 2022-07-21 PROCEDURE — 88331 PATH CONSLTJ SURG 1 BLK 1SPC: CPT

## 2022-07-21 RX ORDER — GLYCOPYRROLATE 0.2 MG/ML
INJECTION INTRAMUSCULAR; INTRAVENOUS PRN
Status: DISCONTINUED | OUTPATIENT
Start: 2022-07-21 | End: 2022-07-21 | Stop reason: SDUPTHER

## 2022-07-21 RX ORDER — SODIUM CHLORIDE, SODIUM LACTATE, POTASSIUM CHLORIDE, CALCIUM CHLORIDE 600; 310; 30; 20 MG/100ML; MG/100ML; MG/100ML; MG/100ML
INJECTION, SOLUTION INTRAVENOUS CONTINUOUS
Status: DISCONTINUED | OUTPATIENT
Start: 2022-07-21 | End: 2022-07-21 | Stop reason: HOSPADM

## 2022-07-21 RX ORDER — SODIUM CHLORIDE 9 MG/ML
INJECTION, SOLUTION INTRAVENOUS PRN
Status: DISCONTINUED | OUTPATIENT
Start: 2022-07-21 | End: 2022-07-21 | Stop reason: HOSPADM

## 2022-07-21 RX ORDER — SODIUM CHLORIDE 0.9 % (FLUSH) 0.9 %
5-40 SYRINGE (ML) INJECTION PRN
Status: DISCONTINUED | OUTPATIENT
Start: 2022-07-21 | End: 2022-07-21 | Stop reason: HOSPADM

## 2022-07-21 RX ORDER — ONDANSETRON 4 MG/1
4 TABLET, ORALLY DISINTEGRATING ORAL EVERY 8 HOURS PRN
Qty: 12 TABLET | Refills: 0 | Status: SHIPPED | OUTPATIENT
Start: 2022-07-21 | End: 2022-07-26

## 2022-07-21 RX ORDER — FENTANYL CITRATE 50 UG/ML
INJECTION, SOLUTION INTRAMUSCULAR; INTRAVENOUS PRN
Status: DISCONTINUED | OUTPATIENT
Start: 2022-07-21 | End: 2022-07-21 | Stop reason: SDUPTHER

## 2022-07-21 RX ORDER — CEPHALEXIN 500 MG/1
500 CAPSULE ORAL 2 TIMES DAILY
Qty: 14 CAPSULE | Refills: 0 | Status: SHIPPED | OUTPATIENT
Start: 2022-07-21 | End: 2022-07-28

## 2022-07-21 RX ORDER — PREDNISONE 10 MG/1
40 TABLET ORAL DAILY
Qty: 20 TABLET | Refills: 0 | Status: SHIPPED | OUTPATIENT
Start: 2022-07-21 | End: 2022-07-26

## 2022-07-21 RX ORDER — DEXAMETHASONE SODIUM PHOSPHATE 10 MG/ML
INJECTION INTRAMUSCULAR; INTRAVENOUS PRN
Status: DISCONTINUED | OUTPATIENT
Start: 2022-07-21 | End: 2022-07-21 | Stop reason: SDUPTHER

## 2022-07-21 RX ORDER — ONDANSETRON 2 MG/ML
4 INJECTION INTRAMUSCULAR; INTRAVENOUS
Status: DISCONTINUED | OUTPATIENT
Start: 2022-07-21 | End: 2022-07-21 | Stop reason: HOSPADM

## 2022-07-21 RX ORDER — LIDOCAINE HYDROCHLORIDE AND EPINEPHRINE 10; 10 MG/ML; UG/ML
INJECTION, SOLUTION INFILTRATION; PERINEURAL PRN
Status: DISCONTINUED | OUTPATIENT
Start: 2022-07-21 | End: 2022-07-21 | Stop reason: HOSPADM

## 2022-07-21 RX ORDER — SODIUM CHLORIDE 0.9 % (FLUSH) 0.9 %
5-40 SYRINGE (ML) INJECTION EVERY 12 HOURS SCHEDULED
Status: DISCONTINUED | OUTPATIENT
Start: 2022-07-21 | End: 2022-07-21 | Stop reason: HOSPADM

## 2022-07-21 RX ORDER — ONDANSETRON 2 MG/ML
INJECTION INTRAMUSCULAR; INTRAVENOUS PRN
Status: DISCONTINUED | OUTPATIENT
Start: 2022-07-21 | End: 2022-07-21 | Stop reason: SDUPTHER

## 2022-07-21 RX ORDER — PROPOFOL 10 MG/ML
INJECTION, EMULSION INTRAVENOUS PRN
Status: DISCONTINUED | OUTPATIENT
Start: 2022-07-21 | End: 2022-07-21 | Stop reason: SDUPTHER

## 2022-07-21 RX ORDER — MIDAZOLAM HYDROCHLORIDE 1 MG/ML
INJECTION INTRAMUSCULAR; INTRAVENOUS PRN
Status: DISCONTINUED | OUTPATIENT
Start: 2022-07-21 | End: 2022-07-21 | Stop reason: SDUPTHER

## 2022-07-21 RX ORDER — HYDROCODONE BITARTRATE AND ACETAMINOPHEN 7.5; 325 MG/1; MG/1
1 TABLET ORAL EVERY 6 HOURS PRN
Qty: 20 TABLET | Refills: 0 | Status: SHIPPED | OUTPATIENT
Start: 2022-07-21 | End: 2022-07-26

## 2022-07-21 RX ORDER — NEOSTIGMINE METHYLSULFATE 1 MG/ML
INJECTION, SOLUTION INTRAVENOUS PRN
Status: DISCONTINUED | OUTPATIENT
Start: 2022-07-21 | End: 2022-07-21 | Stop reason: SDUPTHER

## 2022-07-21 RX ORDER — HYDROCODONE BITARTRATE AND ACETAMINOPHEN 7.5; 325 MG/1; MG/1
TABLET ORAL
Status: COMPLETED
Start: 2022-07-21 | End: 2022-07-21

## 2022-07-21 RX ORDER — MORPHINE SULFATE 2 MG/ML
1 INJECTION, SOLUTION INTRAMUSCULAR; INTRAVENOUS EVERY 5 MIN PRN
Status: DISCONTINUED | OUTPATIENT
Start: 2022-07-21 | End: 2022-07-21 | Stop reason: HOSPADM

## 2022-07-21 RX ORDER — ROCURONIUM BROMIDE 10 MG/ML
INJECTION, SOLUTION INTRAVENOUS PRN
Status: DISCONTINUED | OUTPATIENT
Start: 2022-07-21 | End: 2022-07-21 | Stop reason: SDUPTHER

## 2022-07-21 RX ORDER — HYDROCODONE BITARTRATE AND ACETAMINOPHEN 7.5; 325 MG/1; MG/1
1 TABLET ORAL ONCE
Status: COMPLETED | OUTPATIENT
Start: 2022-07-21 | End: 2022-07-21

## 2022-07-21 RX ORDER — LIDOCAINE HYDROCHLORIDE 20 MG/ML
INJECTION, SOLUTION EPIDURAL; INFILTRATION; INTRACAUDAL; PERINEURAL PRN
Status: DISCONTINUED | OUTPATIENT
Start: 2022-07-21 | End: 2022-07-21 | Stop reason: SDUPTHER

## 2022-07-21 RX ADMIN — HYDROMORPHONE HYDROCHLORIDE 0.5 MG: 1 INJECTION, SOLUTION INTRAMUSCULAR; INTRAVENOUS; SUBCUTANEOUS at 12:03

## 2022-07-21 RX ADMIN — HYDROMORPHONE HYDROCHLORIDE 0.5 MG: 1 INJECTION, SOLUTION INTRAMUSCULAR; INTRAVENOUS; SUBCUTANEOUS at 11:55

## 2022-07-21 RX ADMIN — FENTANYL CITRATE 25 MCG: 50 INJECTION, SOLUTION INTRAMUSCULAR; INTRAVENOUS at 11:28

## 2022-07-21 RX ADMIN — CEFAZOLIN 2000 MG: 2 INJECTION, POWDER, FOR SOLUTION INTRAMUSCULAR; INTRAVENOUS at 10:13

## 2022-07-21 RX ADMIN — GLYCOPYRROLATE 0.6 MG: 0.2 INJECTION, SOLUTION INTRAMUSCULAR; INTRAVENOUS at 11:12

## 2022-07-21 RX ADMIN — HYDROCODONE BITARTRATE AND ACETAMINOPHEN 1 TABLET: 7.5; 325 TABLET ORAL at 13:59

## 2022-07-21 RX ADMIN — DEXAMETHASONE SODIUM PHOSPHATE 10 MG: 10 INJECTION INTRAMUSCULAR; INTRAVENOUS at 10:14

## 2022-07-21 RX ADMIN — FENTANYL CITRATE 100 MCG: 50 INJECTION, SOLUTION INTRAMUSCULAR; INTRAVENOUS at 09:58

## 2022-07-21 RX ADMIN — ROCURONIUM BROMIDE 40 MG: 10 INJECTION, SOLUTION INTRAVENOUS at 09:58

## 2022-07-21 RX ADMIN — Medication 60 MG: at 09:58

## 2022-07-21 RX ADMIN — MIDAZOLAM 2 MG: 1 INJECTION INTRAMUSCULAR; INTRAVENOUS at 09:51

## 2022-07-21 RX ADMIN — SODIUM CHLORIDE, POTASSIUM CHLORIDE, SODIUM LACTATE AND CALCIUM CHLORIDE: 600; 310; 30; 20 INJECTION, SOLUTION INTRAVENOUS at 07:35

## 2022-07-21 RX ADMIN — ONDANSETRON 4 MG: 2 INJECTION INTRAMUSCULAR; INTRAVENOUS at 10:57

## 2022-07-21 RX ADMIN — SODIUM CHLORIDE, POTASSIUM CHLORIDE, SODIUM LACTATE AND CALCIUM CHLORIDE: 600; 310; 30; 20 INJECTION, SOLUTION INTRAVENOUS at 11:25

## 2022-07-21 RX ADMIN — ROCURONIUM BROMIDE 10 MG: 10 INJECTION, SOLUTION INTRAVENOUS at 10:47

## 2022-07-21 RX ADMIN — FENTANYL CITRATE 50 MCG: 50 INJECTION, SOLUTION INTRAMUSCULAR; INTRAVENOUS at 11:23

## 2022-07-21 RX ADMIN — Medication 3 MG: at 11:12

## 2022-07-21 RX ADMIN — FENTANYL CITRATE 25 MCG: 50 INJECTION, SOLUTION INTRAMUSCULAR; INTRAVENOUS at 10:57

## 2022-07-21 RX ADMIN — FENTANYL CITRATE 50 MCG: 50 INJECTION, SOLUTION INTRAMUSCULAR; INTRAVENOUS at 10:47

## 2022-07-21 RX ADMIN — PROPOFOL 200 MG: 10 INJECTION, EMULSION INTRAVENOUS at 09:58

## 2022-07-21 ASSESSMENT — PAIN DESCRIPTION - DESCRIPTORS
DESCRIPTORS: ACHING;BURNING
DESCRIPTORS: SHARP
DESCRIPTORS: SORE

## 2022-07-21 ASSESSMENT — PAIN SCALES - GENERAL
PAINLEVEL_OUTOF10: 6
PAINLEVEL_OUTOF10: 4
PAINLEVEL_OUTOF10: 6
PAINLEVEL_OUTOF10: 6
PAINLEVEL_OUTOF10: 4
PAINLEVEL_OUTOF10: 6

## 2022-07-21 ASSESSMENT — PAIN DESCRIPTION - LOCATION
LOCATION: NECK
LOCATION: NECK

## 2022-07-21 ASSESSMENT — PAIN - FUNCTIONAL ASSESSMENT: PAIN_FUNCTIONAL_ASSESSMENT: 0-10

## 2022-07-21 ASSESSMENT — LIFESTYLE VARIABLES: SMOKING_STATUS: 0

## 2022-07-21 ASSESSMENT — PAIN DESCRIPTION - ORIENTATION: ORIENTATION: LEFT

## 2022-07-21 NOTE — ANESTHESIA PRE PROCEDURE
Department of Anesthesiology  Preprocedure Note       Name:  Kelsie Westbrook   Age:  71 y.o.  :  1950                                          MRN:  [id number]         Date:  2022      Surgeon: Hugo Molina):  Aidan Pardo DO    Procedure: Procedure(s):  EXCISION LEFT LEVEL II LYMPH NODE, PANENDOSCOPY  PANENDOSCOPY    Medications prior to admission:   Prior to Admission medications    Medication Sig Start Date End Date Taking?  Authorizing Provider   fenofibrate (TRICOR) 145 MG tablet Take 1 tablet by mouth daily 5/3/22   Emerita, DO   omeprazole (PRILOSEC) 40 MG delayed release capsule Take 1 capsule by mouth daily 3/3/22   Emerita Sires, DO   metFORMIN (GLUCOPHAGE-XR) 500 MG extended release tablet Take 1 tablet by mouth 2 times daily 3/3/22   Emerita Sires, DO   lisinopril-hydroCHLOROthiazide (PRINZIDE;ZESTORETIC) 10-12.5 MG per tablet Take 1 tablet by mouth daily 3/3/22   Emerita Sires, DO   psyllium (KONSYL) 28.3 % PACK Take 1 packet by mouth nightly     Historical Provider, MD       Current medications:    Current Facility-Administered Medications   Medication Dose Route Frequency Provider Last Rate Last Admin    lactated ringers infusion   IntraVENous Continuous Hemant Hudson,  mL/hr at 22 0735 New Bag at 22 0735    sodium chloride flush 0.9 % injection 5-40 mL  5-40 mL IntraVENous 2 times per day Hemant Emmanuelwood, DO        sodium chloride flush 0.9 % injection 5-40 mL  5-40 mL IntraVENous PRN Hemant Emmanuelwood, DO        0.9 % sodium chloride infusion   IntraVENous PRN Hemant Hudson, DO        ceFAZolin (ANCEF) 2,000 mg in sterile water 20 mL IV syringe  2,000 mg IntraVENous On Call to [address], DO           Allergies:  No Known Allergies    Problem List:    Patient Active Problem List   Diagnosis Code    Essential hypertension I10    Type 2 diabetes mellitus without complication, without long-term current use of insulin ([facility] E11.9    Peptic ulcer K27.9    Thrombocytopenia (HCC) D69.6    Dyslipidemia E78.5    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    Cervical adenopathy R59.0    Gastroesophageal reflux disease without esophagitis K21.9       Past Medical History:        Diagnosis Date    Arthritis     Diabetes mellitus ([facility]     GERD (gastroesophageal reflux disease)     Hyperlipidemia     Hypertension     Lyme disease     Thrombocytopenia ([facility]        Past Surgical History:        Procedure Laterality Date    BRONCHOSCOPY N/A 10/28/2019    BRONCHOSCOPY performed by Olamide Patel MD at [address]  2017    dr Lisa Dominguez  2017    COLONOSCOPY  2019    LARYNGOSCOPY Left 3/17/2022    PANENDOSCOPY WITH BIOPSY performed by Shauna Thompson DO at [address] EXTRACTION Left 2021    VASECTOMY         Social History:    Social History     Tobacco Use    Smoking status: Former     Packs/day: 1.00     Years: 27.00     Pack years: 27.00     Types: Cigarettes     Start date:      Quit date:      Years since quittin.5    Smokeless tobacco: Never   Substance Use Topics    Alcohol use: Not Currently                                Counseling given: Not Answered      Vital Signs (Current):   Vitals:    07/15/22 1146 22 0656   BP:  (!) 143/79   Pulse:  82   Resp:  18   Temp:  36.1 °C (97 °F)   TempSrc:  Temporal   SpO2:  97%   Weight: 195 lb (88.5 kg) 187 lb (84.8 kg)   Height: 5' 7\" (1.702 m) 5' 7\" (1.702 m)                                              BP Readings from Last 3 Encounters:   22 (!) 143/79   22 128/80   22 108/72       NPO Status: Time of last liquid consumption: 430                        Time of last solid consumption:                         Date of last liquid consumption: 22                        Date of last solid food consumption: 22    BMI:   Wt Readings from Last 3 Encounters:   07/21/22 187 lb (84.8 kg)   07/06/22 190 lb (86.2 kg)   07/05/22 189 lb (85.7 kg)     Body mass index is 29.29 kg/m². CBC:   Lab Results   Component Value Date/Time    WBC 5.6 07/06/2022 08:16 AM    WBC 5.7 03/17/2022 01:20 PM    RBC 5.38 07/06/2022 08:16 AM    HGB 15.8 07/06/2022 08:16 AM    HCT 47.2 07/06/2022 08:16 AM    MCV 87.7 07/06/2022 08:16 AM    RDW 13.0 07/06/2022 08:16 AM     07/06/2022 08:16 AM     03/17/2022 01:20 PM       CMP:   Lab Results   Component Value Date/Time     07/06/2022 08:16 AM    K 4.7 07/06/2022 08:16 AM    K 4.1 10/24/2019 10:00 AM     07/06/2022 08:16 AM    CO2 25 07/06/2022 08:16 AM    BUN 25 07/06/2022 08:16 AM    CREATININE 1.22 07/06/2022 08:16 AM    CREATININE 1.1 03/02/2020 12:00 AM    GFRAA >60 03/17/2022 01:20 PM    LABGLOM >60 03/17/2022 01:20 PM    GLUCOSE 114 07/06/2022 08:16 AM    PROT 7.3 07/06/2022 08:16 AM    CALCIUM 9.5 07/06/2022 08:16 AM    BILITOT 0.3 07/06/2022 08:16 AM    ALKPHOS 50 07/06/2022 08:16 AM    ALKPHOS 50 03/03/2022 02:25 PM    AST 20 07/06/2022 08:16 AM    ALT 26 07/06/2022 08:16 AM       POC Tests: No results for input(s): POCGLU, POCNA, POCK, POCCL, POCBUN, POCHEMO, POCHCT in the last 72 hours. Coags:   Lab Results   Component Value Date/Time    PROTIME 12.0 10/24/2019 10:00 AM    INR 1.1 10/24/2019 10:00 AM    APTT 37.5 10/24/2019 10:00 AM       HCG (If Applicable): No results found for: PREGTESTUR, PREGSERUM, HCG, HCGQUANT     ABGs: No results found for: PHART, PO2ART, GKI4ZDS, OFC4VCL, BEART, Y8CEVYET     Type & Screen (If Applicable):  No results found for: LABABO, LABRH    Drug/Infectious Status (If Applicable):  Lab Results   Component Value Date/Time    HEPCAB NON-REACTIVE 07/06/2022 08:16 AM       COVID-19 Screening (If Applicable): No results found for: COVID19    EKG 03/03/2022:  Sinus  Rhythm   Low voltage with rightward P-axis and rotation -possible pulmonary disease.       Anesthesia Evaluation  Patient summary reviewed and Nursing notes reviewed no history of anesthetic complications:   Airway: Mallampati: III  TM distance: >3 FB   Neck ROM: limited  Comment: Patient reports soreness of neck with movement and extension of the neck. Patient complains of soreness of the throat on the left. Mouth opening: > = 3 FB   Dental:      Comment: Patient denies any chipped or loose teeth. Pulmonary: breath sounds clear to auscultation      (-) not a current smoker (Former smoker)                          ROS comment: Persistent Lt. Neck fullness. Lymphadenopathy  Sore throat Lt. Side of mouth   Cardiovascular:  Exercise tolerance: good (>4 METS),   (+) hypertension:, past MI (Patient state he has a hx of MI. Cardiac catherization showed no blockages. ):, hyperlipidemia      ECG reviewed  Rhythm: regular  Rate: normal           Beta Blocker:  Not on Beta Blocker         Neuro/Psych:   (+) headaches: tension headaches,             GI/Hepatic/Renal:   (+) GERD: well controlled, PUD, liver disease (Benign polyp on liver):,          ROS comment: Dysphagia Lt. Side of mouth. Endo/Other:    (+) DiabetesType II DM, well controlled, , : arthritis:., .                  ROS comment: Lyme disease Abdominal:             Vascular: negative vascular ROS. - DVT and PE. Other Findings:           Anesthesia Plan      general     ASA 3       Induction: intravenous. Anesthetic plan and risks discussed with patient. Use of blood products discussed with patient whom consented to blood products. Plan discussed with CRNA and attending. Jasper Zhu RN, [facility]   7/21/2022      Patient seen and examined, chart reviewed, agree with above findings. Anesthetic plan, risks, benefits, alternatives, and personnel involved discussed with patient and his wife. Patient verbalized an understanding and agreed to proceed. NPO status confirmed.     Anesthetic plan discussed with care team members and agreed upon.     Felicity Pitts DO   7/21/2022  9:23 AM

## 2022-07-21 NOTE — OP NOTE
Operative Note      Patient: Ender Rubio  YOB: 1950  MRN: [id number]    Date of Procedure: 7/21/2022    Pre-Op Diagnosis: NECK MASS, LINGUAL TONSIL HYPERTROPHY    Post-Op Diagnosis: Same       Procedure(s):  EXCISION LEFT LEVEL II LYMPH NODE, PANENDOSCOPY  PANENDOSCOPY    Surgeon(s):  Joaquina Bee DO    Assistant:   First Assistant: Niki Isaacs  Resident: Beto Burden DO    Anesthesia: General    Estimated Blood Loss (mL): less than 50     Complications: None    Specimens:   ID Type Source Tests Collected by Time Destination   A : LEFT NECK MASS Tissue Tissue SURGICAL PATHOLOGY Joaquina Bee,  7/21/2022 1027    B : LEFT NECK MASS LEVEL II Tissue Tissue SURGICAL PATHOLOGY Joaquina Bee,  7/21/2022 1034    C : LEFT BASE OF TONGUE Tissue Tissue SURGICAL PATHOLOGY Joaquina Bee, DO 7/21/2022 1054    D : RIGHT BASE OF TONGUE Tissue Tissue SURGICAL PATHOLOGY Joaquina Bee, DO 7/21/2022 1057    E : MIDLINE TONGUE Tissue Tissue SURGICAL PATHOLOGY Joaquina Bee,  7/21/2022 1059        Implants:  * No implants in log *      Drains: * No LDAs found *    Findings: left base of tongue mass, left cervical lymphadenopathy    Detailed Description of Procedure: Indication: PT is a 71 y.o.   male who presents with a history of persistent left cervical lymphadenopathy and oropharyngeal mass. Procedure:  Pt was consented preoperatively, taken to the OR and identified appropriately. Pt was placed in the supine position and given to anesthesia for induction. Once under anesthesia pt was prepped and draped in sterile fashion. Local anesthetic consisting of 10 mL of 1% lidocaine with epinephrine was then injected into the left neck just below the angle of the mandible. A #15 blade scalpel was used to make an incision 5 cm wide over the left submandibular area where the most prominent lymph node was found.  Once the blade was through the skin and subcutaneous tissue, Hemostats were used to dissect around the lymph node. The greater auricular nerve and external jugular vein were identified and spared. The engaged lymph node was too large and connected to surrounding structures to remove in its entirety so the decision was made to obtain a wedge resection of a portion of the lymph node which was sent for both frozen section analysis (results shown below) and permanent pathology. Hemostasis was achieved with bipolar cautery and placement of surgicel. Wound was thoroughly irrigated with normal saline. 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 A 3-0 vicryl suture was then used to close the deep dermal layers. 4-0 monocryl  was then used to close the skin incision. Mastisol and steri strips were then used to cover the incision. Patient was then turned 90 degrees, and the bed was elevated to my hip   height. A tooth guard was placed in the patient's   oral cavity to protect his upper teeth. Direct laryngoscopy  A Dedo scope was placed in the patient's oral cavity. All areas of oropharynx and hypopharynx were evaluated starting with the right base of the tongue, left base of tongue right vallecula and left vallecula, then proceeding down to the left piriform sinus and left parapharyngeal space. The scope was then moved to the right piriform sinus and parapharyngeal area. The scope was then used to lift the posterior aspect of the epiglottis to evaluate the larynx, bilateral aryepiglottic folds, false vocal cords, anterior commissure, true vocal cords and subglottis. There were abnormalities found. Biopsies were taken of the right base of the tongue and left base of tongue  as well as midline base of tongue. These were sent off for pathology. Once this was all accomplished, some adrenaline pledgets were placed on the area where the biopsies were   taken to calm the bleeding down. These were then removed. The Dedo   scope was removed. The patient was then turned back to Anesthesia for appropriate awakening. The patient's oral cavity was examined after the tooth guard was removed and teeth were all intact. Patient was then turned back to anesthesia for appropriate awakening. Patient tolerated procedure well.     Dr. Kat Correa was present for the entire case    Electronically signed by Jamin Garcia DO on 7/21/2022 at 11:26 AM

## 2022-07-21 NOTE — PROGRESS NOTES
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     Michelle De La Cruz MD

## 2022-07-21 NOTE — PROGRESS NOTES
CLINICAL PHARMACY NOTE: MEDS TO BEDS    Total # of Prescriptions Filled: 4   The following medications were delivered to the patient:  Cephalexin 500 mg  Ondansetron 4 mg ODT  Prednisone 10 mg  Hydrocodone/apap 7.5-325  Picked up in the phamacy    Additional Documentation:

## 2022-07-21 NOTE — DISCHARGE INSTRUCTIONS
Esophagoscopy/Laryngoscopy Post-op Instructions  Dr George Bellamy  [phone]      After surgery you should feel well, although you may not feel your normal self immediately. Rest at home and do not make any important decisions or sign important documents until the day after your surgery. You may experience any of the following after your operation:    Sleepiness from anesthesia or other drugs given to you at the time of your surgery. Fatigue just from having surgery. Nausea occurs sometimes, but depends largely on your own reaction to surgery and drugs. These discomforts should improve rapidly and are usually gone the next day. You may speed your own recovery by the following precautions and self-care:    Rest your voice. You should have an appointment to see your doctor after surgery. Please call the office tomorrow if you do not already have an appointment. Start out with liquids to soft food today, and advance as tolerated. Resume pre-op medications (unless otherwise instructed by your doctor)      You may generally resume your usual activities as rapidly as you feel like it. Some people bounce back faster than others; some people need more time to feel good. Take your time and look after yourself and you will soon return to your usual good health. However, no drinking alcohol, driving, running, bicycle riding for 24 hours. You should have responsible adult  with you for 24 hours. 5. You have steri strips covering the incision in your neck. Leave steri strips in place. You may shower after 24 hours and pat dry the steri strips. If any problems occur or if you have any further questions, please call your doctor as soon as possible. If you find that you cannot reach your doctor but feel that your condition needs a doctors attention go to an emergency room.       I have received a copy and understand these instructions:      ____________________________________________________________________  (signature patient/responsible adult )

## 2022-07-21 NOTE — H&P
Kelsie Westbrook was seen and re-examined preoperatively today, July 21, 2022. There was no substantial change in his physical and medical status. Patient is fit for the proposed surgical procedure. All questions were appropriately addressed and had no further questions regarding the risks, benefits, and alternatives of the procedure. Kelsie Westbrook and family wished to proceed.     Madhu Arroyo DO  Resident Physician  [facility]  Otolaryngology Residency  7/21/2022  7:00 AM

## 2022-07-22 DIAGNOSIS — C10.2 MALIGNANT NEOPLASM OF LATERAL WALL OF OROPHARYNX (HCC): ICD-10-CM

## 2022-07-22 DIAGNOSIS — C10.9 SQUAMOUS CELL CARCINOMA OF OROPHARYNX (HCC): Primary | ICD-10-CM

## 2022-07-22 NOTE — ANESTHESIA POSTPROCEDURE EVALUATION
Department of Anesthesiology  Postprocedure Note    Patient: Oda Lesch  MRN: [id number]  YOB: 1950  Date of evaluation: 7/21/2022      Procedure Summary     Date: 07/21/22 Room / Location: [facility] / [address]    Anesthesia Start: 8002 Anesthesia Stop: 5226    Procedures:       EXCISION LEFT LEVEL II LYMPH NODE, PANENDOSCOPY (Left: Neck)      PANENDOSCOPY (Left: Throat) Diagnosis:       Neck mass      (NECK MASS, LINGUAL TONSIL HYPERTROPHY)    Surgeons: Shauna Thompson DO Responsible Provider: Lali Carrizales DO    Anesthesia Type: general ASA Status: 3          Anesthesia Type: No value filed.     Eneida Phase I: Eneida Score: 10    Eneida Phase II: Eneida Score: 10      Anesthesia Post Evaluation    Patient location during evaluation: PACU  Patient participation: complete - patient participated  Level of consciousness: awake and alert  Pain score: 1  Airway patency: patent  Nausea & Vomiting: no nausea and no vomiting  Complications: no  Cardiovascular status: hemodynamically stable  Respiratory status: acceptable  Hydration status: euvolemic

## 2022-07-29 ENCOUNTER — OFFICE VISIT (OUTPATIENT)
Dept: ENT CLINIC | Age: 72
End: 2022-07-29

## 2022-07-29 VITALS
DIASTOLIC BLOOD PRESSURE: 82 MMHG | HEART RATE: 98 BPM | HEIGHT: 67 IN | SYSTOLIC BLOOD PRESSURE: 141 MMHG | WEIGHT: 185.1 LBS | BODY MASS INDEX: 29.05 KG/M2

## 2022-07-29 DIAGNOSIS — R59.0 CERVICAL ADENOPATHY: ICD-10-CM

## 2022-07-29 DIAGNOSIS — C10.9 SQUAMOUS CELL CARCINOMA OF OROPHARYNX (HCC): Primary | ICD-10-CM

## 2022-07-29 DIAGNOSIS — C10.2 MALIGNANT NEOPLASM OF LATERAL WALL OF OROPHARYNX (HCC): ICD-10-CM

## 2022-07-29 PROCEDURE — 99024 POSTOP FOLLOW-UP VISIT: CPT | Performed by: OTOLARYNGOLOGY

## 2022-07-29 RX ORDER — GLUCOSAMINE HCL/CHONDROITIN SU 500-400 MG
CAPSULE ORAL
Qty: 50 STRIP | Refills: 0 | Status: SHIPPED | OUTPATIENT
Start: 2022-07-29

## 2022-08-01 ENCOUNTER — HOSPITAL ENCOUNTER (OUTPATIENT)
Dept: NUCLEAR MEDICINE | Age: 72
Discharge: HOME | End: 2022-08-01
Payer: MEDICARE

## 2022-08-01 DIAGNOSIS — C10.2 MALIGNANT NEOPLASM OF LATERAL WALL OF OROPHARYNX (HCC): ICD-10-CM

## 2022-08-01 DIAGNOSIS — C10.9 SQUAMOUS CELL CARCINOMA OF OROPHARYNX (HCC): ICD-10-CM

## 2022-08-01 PROCEDURE — 78815 PET IMAGE W/CT SKULL-THIGH: CPT

## 2022-08-01 PROCEDURE — 3430000000 HC RX DIAGNOSTIC RADIOPHARMACEUTICAL: Performed by: RADIOLOGY

## 2022-08-01 PROCEDURE — A9552 F18 FDG: HCPCS | Performed by: RADIOLOGY

## 2022-08-01 RX ORDER — FLUDEOXYGLUCOSE F 18 200 MCI/ML
10 INJECTION, SOLUTION INTRAVENOUS
Status: COMPLETED | OUTPATIENT
Start: 2022-08-01 | End: 2022-08-01

## 2022-08-01 RX ADMIN — FLUDEOXYGLUCOSE F 18 10 MILLICURIE: 200 INJECTION, SOLUTION INTRAVENOUS at 11:06

## 2022-08-02 PROBLEM — C10.9 SQUAMOUS CELL CARCINOMA OF OROPHARYNX (HCC): Status: ACTIVE | Noted: 2022-08-02

## 2022-08-03 ENCOUNTER — HOSPITAL ENCOUNTER (OUTPATIENT)
Dept: INFUSION THERAPY | Age: 72
Discharge: HOME | End: 2022-08-03
Payer: MEDICARE

## 2022-08-03 ENCOUNTER — OFFICE VISIT (OUTPATIENT)
Dept: ONCOLOGY | Age: 72
End: 2022-08-03
Payer: MEDICARE

## 2022-08-03 ENCOUNTER — TELEPHONE (OUTPATIENT)
Dept: CASE MANAGEMENT | Age: 72
End: 2022-08-03

## 2022-08-03 VITALS
HEART RATE: 83 BPM | WEIGHT: 184 LBS | HEIGHT: 67 IN | BODY MASS INDEX: 28.88 KG/M2 | TEMPERATURE: 97.7 F | OXYGEN SATURATION: 97 % | SYSTOLIC BLOOD PRESSURE: 114 MMHG | DIASTOLIC BLOOD PRESSURE: 65 MMHG

## 2022-08-03 DIAGNOSIS — C10.9 OROPHARYNGEAL CANCER (HCC): ICD-10-CM

## 2022-08-03 DIAGNOSIS — C10.9 OROPHARYNGEAL CANCER (HCC): Primary | ICD-10-CM

## 2022-08-03 LAB — TSH SERPL DL<=0.05 MIU/L-ACNC: 2.2 UIU/ML (ref 0.27–4.2)

## 2022-08-03 PROCEDURE — 36415 COLL VENOUS BLD VENIPUNCTURE: CPT

## 2022-08-03 PROCEDURE — 99214 OFFICE O/P EST MOD 30 MIN: CPT

## 2022-08-03 PROCEDURE — 99205 OFFICE O/P NEW HI 60 MIN: CPT | Performed by: INTERNAL MEDICINE

## 2022-08-03 NOTE — TELEPHONE ENCOUNTER
Met with patient and his wife, Sadie Rene, during his initial consultation with Dr. Enrike Chatterjee for his  recent head/neck cancer diagnosis. Introduced myself and explained my role with patients receiving treatment at our center. Patient was friendly and receptive. Instructed on next steps including radiation oncologist consult, scheduled 8/16/22 at [facility], audiology test and palliative care consult per Dr. Enrike Chatterjee' recommendations and follow up care. Provided patient with transportation resource list and literature on [employer] and Patient Resource Head and Neck Cancer booklet. Reviewed resources available to him  such as Social Work and [employer]. Patient denies any weight loss and states appetite is good but he states he is having a lot of anxiety affecting his sleep. Dr. Enrike Chatterjee to place referral to palliative care at [facility]. Patient and family are requesting to follow up after initial Oncology and Radiation Oncology consults at [facility] ([facility] AT [place] due to where they live. Dr. Enrike Chatterjee aware and agreeable to this. New patient nursing assessment, medical history, surgical history, family history completed. Medication list reviewed and updated. Provided with my contact information and instructed patient to call me with questions or concerns. Verbalizes understanding. Patient appreciative of visit. Will send update to Nurse Navigator at [address] [place] [address]  to follow.

## 2022-08-03 NOTE — PROGRESS NOTES
Astrid Pendleton  1950 71 y.o. Referring Physician: Dr. Marley Gil    PCP: Haritha Smith,     Vitals:    08/03/22 1446   BP: 114/65   Pulse: 83   Temp: 97.7 °F (36.5 °C)   SpO2: 97%        Wt Readings from Last 3 Encounters:   08/03/22 184 lb (83.5 kg)   08/02/22 179 lb (81.2 kg)   07/29/22 185 lb 1.6 oz (84 kg)        Body mass index is 28.82 kg/m². Chief Complaint:   Chief Complaint   Patient presents with    New Patient    Cancer     SCC OF OROPHARYNX, NECK MASS          Cancer Staging  No matching staging information was found for the patient. Prior Radiation Therapy? NO    Concurrent Chemo/radiation? NO    Prior Chemotherapy? NO    Prior Hormonal Therapy? NO    Head and Neck Cancer? YES        Current Outpatient Medications:     NONFORMULARY, Take by mouth daily Hemp gummies OTC - patient states helps with his blood sugar Not ordered by physician per patient, Disp: , Rfl:     blood glucose monitor strips, Test 3 times a day & as needed for symptoms of irregular blood glucose. Dispense sufficient amount for indicated testing frequency plus additional to accommodate PRN testing needs. , Disp: 50 strip, Rfl: 0    fenofibrate (TRICOR) 145 MG tablet, Take 1 tablet by mouth daily, Disp: 90 tablet, Rfl: 0    omeprazole (PRILOSEC) 40 MG delayed release capsule, Take 1 capsule by mouth daily, Disp: 90 capsule, Rfl: 1    metFORMIN (GLUCOPHAGE-XR) 500 MG extended release tablet, Take 1 tablet by mouth 2 times daily, Disp: 180 tablet, Rfl: 1    lisinopril-hydroCHLOROthiazide (PRINZIDE;ZESTORETIC) 10-12.5 MG per tablet, Take 1 tablet by mouth daily, Disp: 90 tablet, Rfl: 1    psyllium (KONSYL) 28.3 % PACK, Take 1 packet by mouth nightly , Disp: , Rfl:        Past Medical History:   Diagnosis Date    Arthritis     Diabetes mellitus ([facility] [address]     GERD (gastroesophageal reflux disease)     Hyperlipidemia     Hypertension     Lyme disease     Thrombocytopenia ([facility]        Past Surgical History:   Procedure Laterality Date    BRONCHOSCOPY N/A 10/28/2019    BRONCHOSCOPY performed by Vy Sheets MD at [address]      dr Linda Leung  2017    COLONOSCOPY  2019    LARYNGOSCOPY Left 3/17/2022    PANENDOSCOPY WITH BIOPSY performed by Sree Macias DO at [facility] 15 Left 2022    PANENDOSCOPY performed by Sree Macias DO at [address] [facility] Left 2022    EXCISION LEFT LEVEL II LYMPH NODE, PANENDOSCOPY performed by Sree Macias DO at [address] EXTRACTION Left 2021    VASECTOMY         History reviewed. No pertinent family history. Social History     Socioeconomic History    Marital status:      Spouse name: Not on file    Number of children: 1    Years of education: Not on file    Highest education level: Not on file   Occupational History    Not on file   Tobacco Use    Smoking status: Former     Packs/day: 1.00     Years: 27.00     Pack years: 27.00     Types: Cigarettes     Start date: [address] [facility]     Quit date:      Years since quittin.5    Smokeless tobacco: Never   Vaping Use    Vaping Use: Never used   Substance and Sexual Activity    Alcohol use: Not Currently    Drug use: Never    Sexual activity: Not on file   Other Topics Concern    Not on file   Social History Narrative    ** Merged History Encounter **          Social Determinants of Health     Financial Resource Strain: Low Risk     Difficulty of Paying Living Expenses: Not hard at all   Food Insecurity: No Food Insecurity    Worried About Running Out of Food in the Last Year: Never true    Ran Out of Food in the Last Year: Never true   Transportation Needs: Not on file   Physical Activity: Not on file   Stress: Not on file   Social Connections: Not on file   Intimate Partner Violence: Not on file   Housing Stability: Not on file           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           REVIEW OF SYSTEMS: <<For Level 5, 10 or more systems>>     Pacemaker/Defibulator/ICD:  No    Mediport: No           FALLS RISK SCREENING ASSESSMENT    Instructions:  Assess the patient and [place] the appropriate indicators that are present for fall risk identification. Total the numbers circled and assign a fall risk score from Table 2.  Reassess patient at a minimum every 12 weeks or with status change. Assessment   Date  8/3/2022     1. Mental Ability: confusion/cognitively impaired No - 0       2. Elimination Issues: incontinence, frequency No - 0       3. Ambulatory: use of assistive devices (walker, cane, off-loading devices), attached to equipment (IV pole, oxygen) No - 0     4. Sensory Limitations: dizziness, vertigo, impaired vision No - 0       5. Age 72 years or greater - 1       10. Medication: diuretics, strong analgesics, hypnotics, sedatives, antihypertensive agents   Yes - 3   7. Falls:  recent history of falls within the last 3 months (not to include slipping or tripping)   No - 0   TOTAL 4    If score of 4 or greater was education given? Yes       TABLE 2   Risk Score Risk Level Plan of Care   0-3 Little or  No Risk 1. Provide assistance as indicated for ambulation activities  2. Reorient confused/cognitively impaired patient  3. Call-light/bell within patient's reach  4. Chair/bed in low position, stretcher/bed with siderails up except when performing patient care activities  5. Educate patient/family/caregiver on falls prevention  6.  Reassess in 12 weeks or with any noted change in patient condition which places them at a risk for a fall   4-6 Moderate Risk 1. Provide assistance as indicated for ambulation activities  2. Reorient confused/cognitively impaired patient  3. Call-light/bell within patient's reach  4. Chair/bed in low position, stretcher/bed with siderails up except when performing patient care activities  5. Educate patient/family/caregiver on falls prevention  6. Falls risk precaution (Yellow sticker Level II) placed on patient chart   7 or   Higher High Risk 1. Place patient in easily observable treatment room  2. Patient attended at all times by family member or staff  3. Provide assistance as indicated for ambulation activities  4. Reorient confused/cognitively impaired patient  5. Call-light/bell within patient's reach  6. Chair/bed in low position, stretcher/bed with siderails up except when performing patient care activities  7. Educate patient/family/caregiver on falls prevention  8. Falls risk precaution (Yellow sticker Level III) placed on patient chart           MALNUTRITION RISK SCREENING ASSESSMENT    Instructions:  Assess the patient and enter the appropriate indicators that are present for nutrition risk identification. Total the numbers entered and assign a risk score. Follow the appropriate action for total score listed below. Assessment   Date  8/3/2022     Have you lost weight without trying? 0- No     Have you been eating poorly because of a decreased appetite? 0- No   3. Do you have a diagnosis of head and neck cancer? 2- Yes                                                                                    TOTAL 2        Score of 0-1: No action  Score 2 or greater:   For Non-Diabetic Patient: Recommend adding Ensure Enlive 2 x daily and provide patient with Ensure wellness bag with coupons  For Diabetic Patient: Recommend adding Glucerna Shake 2 x daily and provide patient with Glucerna Wellness bag with coupons  Route to the dietitian via [address]    Are you having  difficulty performing daily routine tasks  due to fatigue or weakness (ie: bathing/showering, dressing, housework, meal prep, work, child Dairl Meyer): No     Do you have any arm flexibility/ROM restrictions, swelling or pain that limit activity: No     Any changes in memory, attention/focus that impact daily activities: No     Do you avoid participation in leisure/social activity due weakness, fatigue or pain: No     ARE ANY OF THE ABOVE ARE ANSWERED YES: No          PT ASSESSMENT FOR REFERRAL    Have you had any recent falls in past 2 months: No     Do you have difficulty  going up/down stairs: No     Are you having difficulty walking: No     Do you often hold onto furniture/environmental supports or feel off balance when you are walking: No     Do you need to take rest breaks when you are walking: No     Any pain on scale of 1-10 that limits your mobility: No 0/10    ARE ANY OF THE ABOVE ARE ANSWERED YES: No             PREHAB AUDIOLOGY REFERRAL    - Is patient planned to receive Cisplatin? Yes. Audiology referral request sent to Dr. Rachel Andrade MD.    - Is patient complaining of new onset hearing loss? No. Patient is not complaining of new onset hearing loss.         Neville Moreno RN

## 2022-08-03 NOTE — PROGRESS NOTES
Department of [facility] Oncology  Attending Consult Note    Reason for Visit: Consultation on a patient with p16 + Oropharyngeal Squamous Cell Carcinoma    Referring Physician: Oneyda Holguin DO    PCP:  Peter Phan DO    History of Present Illness:  67-year-old male who presented to the ENT team for persistent left-sided neck fullness without associated difficulty swallowing    CT soft tissue neck 02/23/2022: Mass located in the left aspect of floor of the mouth extending into the left oropharyngeal soft tissue concerning for squamous cell carcinoma  Multiple enlarged necrotic left anterior cervical chain lymph nodes    Panendoscopy on 3/17/2022:  Findings: L lingual tonsil hypertrophy, biopsy negative, left level II neck node FNA performed   FNA left neck mass level 2: Inconclusive for malignant cells. Few atypical squamous cells with degenerative change  A. Tongue, left base, biopsy:   Squamous epithelial-lined lymphoid tissue with reactive germinal centers, compatible with lingual tonsil. Negative for dysplasia; Negative for malignancy. B.  Tongue, left base, biopsy:   Squamous epithelial-lined lymphoid tissue with reactive germinal centers, compatible with lingual tonsil. Focal lobules of benign mucinous glands. Negative for dysplasia; Negative for malignancy    On 07/21/2022: Panendoscopy excision left level 2 lymph node  Findings:  left base of tongue mass, left cervical lymphadenopathy  A. 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 See comment. B. 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 C.  Left base of tongue, biopsy: Squamous mucosa, negative for neoplasm. D.  Right base of tongue, biopsy: Squamous mucosa, negative for neoplasm. E.  Midline tongue, biopsy: Squamous mucosa, negative for neoplasm.    Comment:   The squamous carcinoma is diffusely and strongly positive for p16 immunostain, supporting the above interpretation    PET/CT scan 08/01/2022: There is a large hypermetabolic mass centered in the left oropharynx involving the left lateral oropharynx, left tonsillar and left base of tongue regions extending superiorly to the left soft palate and inferiorly to the left floor of mouth and left vallecula. Maximum SUV of this mass measures 17.4. The mass causes narrowing of the oropharyngeal airway. There are multiple hypermetabolic enlarged, necrotic and someone confluent left level 2 and left level 3 lymph nodes. Confluent left level 2 adenopathy demonstrates a maximum SUV of 15.8. Additional hypermetabolic left level 5/3 junction lymph node is noted with maximum SUV 5.0  Mild focal hypermetabolic activity is noted in the left parapharyngeal/retropharyngeal region (maximum SUV 3.8) which could represent an underlying small lymph node. There is a hypermetabolic right level 2 lymph node with maximum SUV 6.5    Referred to our clinic for further evaluation and treatment    Review of Systems;  CONSTITUTIONAL: No fever, chills. Fair appetite and energy level. ENMT: Eyes: No diplopia; Nose: No epistaxis. Mouth: + sore throat. Neck: Left neck swelling  RESPIRATORY: No hemoptysis, shortness of breath, cough. CARDIOVASCULAR: No chest pain, palpitations. GASTROINTESTINAL: No nausea/vomiting, abdominal pain  GENITOURINARY: No dysuria, urinary frequency, hematuria. NEURO: No syncope, presyncope, headache.   Remainder:  ROS NEGATIVE    Past Medical History:      Diagnosis Date    Arthritis     Diabetes mellitus (HCC)     GERD (gastroesophageal reflux disease)     Hyperlipidemia     Hypertension     Lyme disease     Thrombocytopenia ([facility] [address]      Past Surgical History:      Procedure Laterality Date    BRONCHOSCOPY N/A 10/28/2019    BRONCHOSCOPY performed by Treva Ragland MD at [address] [place] [address] Road  2017    dr Jane Mccloud 2017    COLONOSCOPY  2019    LARYNGOSCOPY Left 3/17/2022    PANENDOSCOPY WITH BIOPSY performed by Macey Alford DO at [facility] 15 Left 2022    PANENDOSCOPY performed by Macey Alford DO at [place] [facility] Left 2022    EXCISION LEFT LEVEL II LYMPH NODE, PANENDOSCOPY performed by Macey Alford, DO at [address] EXTRACTION Left 2021    VASECTOMY       Family History:  No family history on file. Medications:  Reviewed and reconciled.     Social History:  Social History     Socioeconomic History    Marital status:      Spouse name: Not on file    Number of children: 1    Years of education: Not on file    Highest education level: Not on file   Occupational History    Not on file   Tobacco Use    Smoking status: Former     Packs/day: 1.00     Years: 27.00     Pack years: 27.00     Types: Cigarettes     Start date:      Quit date:      Years since quittin.5    Smokeless tobacco: Never   Vaping Use    Vaping Use: Never used   Substance and Sexual Activity    Alcohol use: Not Currently    Drug use: Never    Sexual activity: Not on file   Other Topics Concern    Not on file   Social History Narrative    ** Merged History Encounter **          Social Determinants of Health     Financial Resource Strain: Low Risk     Difficulty of Paying Living Expenses: Not hard at all   Food Insecurity: No Food Insecurity    Worried About Running Out of Food in the Last Year: Never true    Ran Out of Food in the Last Year: Never true   Transportation Needs: Not on file   Physical Activity: Not on file   Stress: Not on file   Social Connections: Not on file   Intimate Partner Violence: Not on file   Housing Stability: Not on file     Allergies:  No Known Allergies    Physical Exam:  /65   Pulse 83   Temp 97.7 °F (36.5 °C)   Ht 5' 7\" (1.702 m)   Wt 184 lb (83.5 kg)   SpO2 97%   BMI 28.82 kg/m²   GENERAL: Alert, oriented x 3, not in acute distress. HEENT: PERRLA; EOMI. Oropharynx clear. NECK: Supple. Left Cervical LN  LUNGS: Good air entry bilaterally. No wheezing, crackles or ronchi. CARDIOVASCULAR: Regular rate. No murmurs, rubs or gallops. ABDOMEN: Soft. Non-tender, non-distended. EXTREMITIES: Without clubbing, cyanosis, or edema. NEUROLOGIC: No focal deficits. ECOG PS 1    Lab Results   Component Value Date    WBC 6.8 07/21/2022    HGB 15.1 07/21/2022    HCT 44.2 07/21/2022    MCV 83.9 07/21/2022     07/21/2022     Lab Results   Component Value Date     07/21/2022    K 3.9 07/21/2022     07/21/2022    CO2 22 07/21/2022    BUN 27 (H) 07/21/2022    CREATININE 1.2 07/21/2022    GLUCOSE 131 (H) 07/21/2022    CALCIUM 9.3 07/21/2022    PROT 7.3 07/06/2022    LABALBU 4.7 07/06/2022    BILITOT 0.3 07/06/2022    ALKPHOS 50 07/06/2022    AST 20 07/06/2022    ALT 26 07/06/2022    LABGLOM 60 07/21/2022    GFRAA >60 07/21/2022     Impression/Plan:  80-year-old male with history of p16+ left Oropharyngeal Squamous Cell Carcinoma    CT soft tissue neck 02/23/2022: Mass located in the left aspect of floor of the mouth extending into the left oropharyngeal soft tissue concerning for squamous cell carcinoma  Multiple enlarged necrotic left anterior cervical chain lymph nodes    Panendoscopy on 3/17/2022:  Findings: L lingual tonsil hypertrophy, biopsy negative, left level II neck node FNA performed   FNA left neck mass level 2: Inconclusive for malignant cells. Few atypical squamous cells with degenerative change  A. Tongue, left base, biopsy:   Squamous epithelial-lined lymphoid tissue with reactive germinal centers, compatible with lingual tonsil. Negative for dysplasia; Negative for malignancy. B.  Tongue, left base, biopsy:   Squamous epithelial-lined lymphoid tissue with reactive germinal centers, compatible with lingual tonsil. Focal lobules of benign mucinous glands. Negative for dysplasia;  Negative for malignancy    On 07/21/2022: Panendoscopy excision left level 2 lymph node  Findings:  left base of tongue mass, left cervical lymphadenopathy  A. 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 See comment. B. 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 C.  Left base of tongue, biopsy: Squamous mucosa, negative for neoplasm. D.  Right base of tongue, biopsy: Squamous mucosa, negative for neoplasm. E.  Midline tongue, biopsy: Squamous mucosa, negative for neoplasm. Comment:   The squamous carcinoma is diffusely and strongly positive for p16 immunostain, supporting the above interpretation    PET/CT scan 08/01/2022: There is a large hypermetabolic mass centered in the left oropharynx involving the left lateral oropharynx, left tonsillar and left base of tongue regions extending superiorly to the left soft palate and inferiorly to the left floor of mouth and left vallecula. Maximum SUV of this mass measures 17.4. The mass causes narrowing of the oropharyngeal airway. There are multiple hypermetabolic enlarged, necrotic and someone confluent left level 2 and left level 3 lymph nodes. Confluent left level 2 adenopathy demonstrates a maximum SUV of 15.8. Additional hypermetabolic left level 5/3 junction lymph node is noted with maximum SUV 5.0  Mild focal hypermetabolic activity is noted in the left parapharyngeal/retropharyngeal region (maximum SUV 3.8) which could represent an underlying small lymph node. There is a hypermetabolic right level 2 lymph node with maximum SUV 6.5    Pathology NCCN guidelines reviewed  Recommended concurrent chemoradiation therapy with weekly Cisplatin. Side effects reviewed. He agreed to proceed. Authorization to be obtained. Radiation Oncology consult on 08/16/2022.  Prehab    Return to clinic when authorization obtained for Cycle # 1 weekly Cisplatin     Thank you for allowing us to participate in the care of Mr. Denise Lopez MD   8/3/2022

## 2022-08-04 ENCOUNTER — TELEPHONE (OUTPATIENT)
Dept: PALLATIVE CARE | Age: 72
End: 2022-08-04

## 2022-08-04 NOTE — TELEPHONE ENCOUNTER
Called and spoke with [facility] regarding referral for Palliative care from Dr. Ioana Cornell. [facility] accepted appointment 8/24/22.

## 2022-08-05 ENCOUNTER — TELEPHONE (OUTPATIENT)
Dept: ONCOLOGY | Age: 72
End: 2022-08-05

## 2022-08-05 ENCOUNTER — PROCEDURE VISIT (OUTPATIENT)
Dept: AUDIOLOGY | Age: 72
End: 2022-08-05
Payer: MEDICARE

## 2022-08-05 DIAGNOSIS — C10.9 SQUAMOUS CELL CARCINOMA OF OROPHARYNX (HCC): Primary | ICD-10-CM

## 2022-08-05 PROCEDURE — 92557 COMPREHENSIVE HEARING TEST: CPT | Performed by: AUDIOLOGIST

## 2022-08-05 PROCEDURE — 92567 TYMPANOMETRY: CPT | Performed by: AUDIOLOGIST

## 2022-08-05 PROCEDURE — 92588 EVOKED AUDITORY TST COMPLETE: CPT | Performed by: AUDIOLOGIST

## 2022-08-05 NOTE — TELEPHONE ENCOUNTER
Sw followed up with pt at the request of cancer center staff. Pt denied any needs at this time. Pt was encouraged to reach out should any needs arise. Pt reported being appreciative of the call.       Paz Sykes MSW, JOSE ANTONIOW-S  Oncology Social Worker

## 2022-08-05 NOTE — PROGRESS NOTES
This patient was referred for Ototoxic Monitoring. Therefore, baselines were established for audiometric thresholds. In order to monitor hearing changes in a timely manner, testing included high frequency audiometry, per [employer] Guidelines. Testing was accomplished using an AudioStar Pro Clinical Audiometer and utilizing calibrated ear phones/inserts. Extra time to test high frequencies was 15 minutes. This patient was referred for high frequency audiometric/tympanometric testing by Gary Ramos MD due to cisplatin treatment. Patient reported tinnitus in both ears, pain in the left ear due to surgery and noise exposure from . Patient denied any drainage, dizziness, or fullness. Audiometry using pure tone air and bone conduction testing revealed hearing sensitivity within normal limits through 2000 Hz, sloping to a moderately-severe sensorineural hearing loss at 3000 Hz, rising to normal in the right ear. The left ear revealed essentially normal hearing through 2000 Hz sloping to moderately-severe sensorineural hearing loss. Reliability was good. Speech reception thresholds were in good agreement with the pure tone averages, bilaterally. Speech discrimination scores were excellent, bilaterally. High frequency audiometry was completed and revealed mild sloping to severe at 12.9 kHz and no responses through 20 kHz in the right ear. The left ear showed a moderately severe sloping to severe at 14 kHz rising to normal through 20 kHz with no response at 18 kHz. Otoacoustic Emissions were completed and revealed absent cochlear responses, bilaterally. Tympanometry revealed normal middle ear peak pressure and compliance, in the right ear and normal middle ear peak pressure and low compliance in the left ear. The results were reviewed with the patient.    Repeat testing is suggested with any of the following symptoms: tinnitus, aural fullness, decreased hearing or vertigo. ANNE Rosales. Audiology Intern (4th Year)     I have discussed the case, including pertinent history and exam findings with the audiology extern. I have seen and examined the patient and the key elements of the encounter have been performed by me. I agree with the assessment, plan and orders as documented by the audiology extern.    Leidy Miller CCC/CHAY  Audiologist  [id number]  NPI#:  [id number]

## 2022-08-11 ENCOUNTER — TELEPHONE (OUTPATIENT)
Dept: INFUSION THERAPY | Age: 72
End: 2022-08-11

## 2022-08-11 ENCOUNTER — EVALUATION (OUTPATIENT)
Dept: SPEECH THERAPY | Age: 72
End: 2022-08-11
Payer: MEDICARE

## 2022-08-11 DIAGNOSIS — R13.12 OROPHARYNGEAL DYSPHAGIA: Primary | ICD-10-CM

## 2022-08-11 PROCEDURE — 92610 EVALUATE SWALLOWING FUNCTION: CPT | Performed by: SPEECH-LANGUAGE PATHOLOGIST

## 2022-08-11 NOTE — TELEPHONE ENCOUNTER
Phone call placed to Julien unno/t dx of oropharyngeal cancer to discuss his nutritional needs for possible upcoming treatment. Left message and asked for him to return call.

## 2022-08-16 ENCOUNTER — TELEPHONE (OUTPATIENT)
Dept: CASE MANAGEMENT | Age: 72
End: 2022-08-16

## 2022-08-16 ENCOUNTER — HOSPITAL ENCOUNTER (OUTPATIENT)
Dept: RADIATION ONCOLOGY | Age: 72
Discharge: HOME | End: 2022-08-16
Payer: MEDICARE

## 2022-08-16 ENCOUNTER — TELEPHONE (OUTPATIENT)
Dept: INFUSION THERAPY | Age: 72
End: 2022-08-16

## 2022-08-16 VITALS
HEART RATE: 79 BPM | DIASTOLIC BLOOD PRESSURE: 70 MMHG | WEIGHT: 187 LBS | TEMPERATURE: 97.4 F | SYSTOLIC BLOOD PRESSURE: 130 MMHG | BODY MASS INDEX: 29.29 KG/M2 | RESPIRATION RATE: 18 BRPM | OXYGEN SATURATION: 97 %

## 2022-08-16 PROCEDURE — 99205 OFFICE O/P NEW HI 60 MIN: CPT

## 2022-08-16 PROCEDURE — 99205 OFFICE O/P NEW HI 60 MIN: CPT | Performed by: SPECIALIST

## 2022-08-16 NOTE — PROGRESS NOTES
Radiation Oncology Consult Note         8/16/2022    Lashawn Zurita  71 y.o.   1950    REFERRING PROVIDER: Vika Novoa    PCP:  Estevan Andrea DO    CHIEF COMPLAINT: Pain & sore throat    DIAGNOSIS: [id number] p16-positive squamous cell carcinoma, Left Base of Tongue/Tonsil    STAGING: Cancer Staging  No matching staging information was found for the patient. HISTORY OF PRESENT ILLNESS: Mr. Lashawn Zurita  is a 71y.o. year old male presenting with a persistent left-sided neck mass with difficulty swallowing. Incidentally has a history of epistaxis and underwent a panendoscopy and biopsy in March 17, 2022. This was essentially negative for malignancy. Unfortunately the patient continued to have persistent sore throat with sharp pain which was initially associated with a second lower mandibular molar infection with associated lymphadenopathy. However given the persistence the patient was brought back for a panendoscopy and both biopsy on July 21, 2022. This yielded a HPV positive, p16 positive squamous cell carcinoma involving the left level 2 venita mass. . This is of the base of tongue however for negative for malignancy. The patient's work-up which included a PET/CT on 8/1/2022 revealed a large hypermetabolic mass centered involving the left oropharynx involving the left lateral oropharynx left tonsil and left base of tongue extending superiorly to involve the soft palate and inferiorly to involve the left floor of mouth and left vallecula. Also noted were multiple hypermetabolic and large necrotic and confluent left zones 2 and 3 lymph nodes. Also noted was mild activity in a left parapharyngeal retropharyngeal node. Also noted was a right level 2 node. No other evidence of hypermetabolic metastatic disease is appreciated. The patient now comes to us for consideration of nonoperative treatment options.     PAST MEDICAL HISTORY:      Diagnosis Date    Arthritis     Cancer ([facility] [address] 2022 oropharynx    Diabetes mellitus ([facility] [address]     GERD (gastroesophageal reflux disease)     Hyperlipidemia     Hypertension     Lyme disease     Thrombocytopenia ([facility] [address]        PAST SURGICAL HISTORY:      Procedure Laterality Date    BRONCHOSCOPY N/A 10/28/2019    BRONCHOSCOPY performed by Felicity Sanchez MD at [facility]  2017    dr Manuel Woodward  03/2017    COLONOSCOPY  03/2019    LARYNGOSCOPY Left 3/17/2022    PANENDOSCOPY WITH BIOPSY performed by Brandon Kirkland DO at [place] [address] Left 7/21/2022    PANENDOSCOPY performed by Brandon Kirkland DO at [facility] Left 7/21/2022    EXCISION LEFT LEVEL II LYMPH NODE, PANENDOSCOPY performed by Brandon Kirkland DO at [address] EXTRACTION Left 12/28/2021    VASECTOMY         No Known Allergies    MEDICATIONS:  Medications reviewed and reconciled. Current Outpatient Medications   Medication Sig Dispense Refill    NONFORMULARY Take by mouth daily Hemp gummies OTC - patient states helps with his blood sugar  Not ordered by physician per patient      blood glucose monitor strips Test 3 times a day & as needed for symptoms of irregular blood glucose. Dispense sufficient amount for indicated testing frequency plus additional to accommodate PRN testing needs. 50 strip 0    fenofibrate (TRICOR) 145 MG tablet Take 1 tablet by mouth daily 90 tablet 0    omeprazole (PRILOSEC) 40 MG delayed release capsule Take 1 capsule by mouth daily 90 capsule 1    metFORMIN (GLUCOPHAGE-XR) 500 MG extended release tablet Take 1 tablet by mouth 2 times daily 180 tablet 1    lisinopril-hydroCHLOROthiazide (PRINZIDE;ZESTORETIC) 10-12.5 MG per tablet Take 1 tablet by mouth daily 90 tablet 1    psyllium (KONSYL) 28.3 % PACK Take 1 packet by mouth nightly        No current facility-administered medications for this encounter.        FAMILY HISTORY:  Family History   Problem Relation Age of Onset    High Blood Pressure Mother High Blood Pressure Father     Cancer Father 80        kidney    Abdominal aortic aneurysm Father        SOCIAL HISTORY:       reports that he quit smoking about 5 years ago. His smoking use included cigarettes. He started smoking about 32 years ago. He has a 27.00 pack-year smoking history. He has never used smokeless tobacco..   reports that he does not currently use alcohol. .   reports that he does not currently use drugs. REVIEW OF SYSTEMS:  Obtained from the patient, chart review and nursing assessment. General ROS: positive for  - fatigue  Psychological ROS: positive for - anxiety  Ophthalmic ROS: negative  ENT ROS: positive for - epistaxis, nasal congestion, nasal discharge, and sore throat  negative for - headaches, hearing change, oral lesions, sneezing, tinnitus, or visual changes  Allergy and Immunology ROS: negative  Hematological and Lymphatic ROS: negative  Endocrine ROS: negative  Breast ROS: negative  Respiratory ROS: no cough, shortness of breath, or wheezing  Cardiovascular ROS: no chest pain or dyspnea on exertion  Gastrointestinal ROS: no abdominal pain, change in bowel habits, or black or bloody stools  Genito-Urinary ROS: no dysuria, trouble voiding, or hematuria  Musculoskeletal ROS: negative  Neurological ROS: no TIA or stroke symptoms  Dermatological ROS: negative    PHYSICAL EXAMINATION:      There were no vitals filed for this visit. Wt Readings from Last 3 Encounters:   08/03/22 184 lb (83.5 kg)   08/02/22 179 lb (81.2 kg)   07/29/22 185 lb 1.6 oz (84 kg)       KARNOSKY PERFORMANCE STATUS:  100    Constitutional: A well developed, well nourished 71 y.o. male who is alert, oriented, cooperative and in no apparent distress. EENT: EOMI LUIS M. No icterus. No conjunctival injections. External canals are patent and no discharge was appreciated. Neck: Supple without thyromegaly. There is bilateral cervical adenopathy. On the left it measures 5 x 4.5 cm fixed and matted. Also satellite nodules are noted in zone III. On the right there is a 1 cm nodule in zone II, soft, nontender and mobile. Respiratory: Chest was symmetrical without dullness to percussion. Breath sounds bilaterally were clear to auscultation. No wheezes, rhonchi or rales. Breasts: Deferred    Cardiovascular: Regular without murmur. Abdomen: Obese, rounded and soft without organomegaly. No rebound, guarding or  rigidity. Lymphatic: No lymphadenopathy. Musculoskeletal: Ambulates without assistance. No gross muscle weakness. Muscle size, tone and strength are normal. No involuntary movements. Extremities:  No lower extremity edema, ulcerations, tenderness, varicosities or erythema. Coordination appears adequate. Sensory function appears intact. Skin:  Warm and dry. Examination of color, turgor and pigmentation was relatively normal. No bruises or skin rashes. Neurological/Psychiatric: General behavior, level of consciousness, thought content is normal. The patient's emotional status is normal.  Cranial nerves II-XII are grossly intact. RADIATION SAFETY AND ONCOLOGIC TREATMENT SUPPORT:    - Previous radiation history:  No  - History of autoimmune or connective tissue disease:  No  - Nutritional support/ PEG:  Not applicable  - Dental evaluation:  Not applicable  -  requested:  Not asked for.  - Oncology Nurse navigator requested:  - Transportation for daily treatment:  Self    TUMOR MARKERS:   Lab Results   Component Value Date/Time    PSA 3.27 08/12/2015 11:03 AM       IMAGING REVIEWED:  2/23/2022 CT of the soft tissue of the neck with contrast:  Impression   1. Mass located in left aspect of floor of the mouth extending into left   oropharyngeal soft tissue concerning for squamous cell carcinoma. 2. Multiple enlarged necrotic left anterior cervical chain lymph nodes. PET/CT could be helpful for further evaluation.        8/1/2022 PET/CT skull through the mid thigh:  Impression   Large hypermetabolic mass centered in the left oropharynx as described above   is consistent with malignancy. Multiple hypermetabolic lymph nodes in the left neck are concerning for venita   involvement. There is a hypermetabolic right level 2 cervical lymph node which is   indeterminate, could be reactive from recent procedure or from malignant   involvement. Otherwise, no evidence of FDG avid distant metastatic disease. PATHOLOGY REVIEWED:  3/17/2022 biopsy of the left base of tongue:  Diagnosis:   A. Tongue, left base, biopsy:   Squamous epithelial-lined lymphoid tissue with reactive germinal centers,   compatible with lingual tonsil. Negative for dysplasia; Negative for malignancy. B.  Tongue, left base, biopsy:   Squamous epithelial-lined lymphoid tissue with reactive germinal centers,   compatible with lingual tonsil. Focal lobules of benign mucinous glands. Negative for dysplasia; Negative for malignancy. 7/21/2022 panendoscopy with biopsy of the base of tongue:  Diagnosis:   A. 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 See comment. B. Left neck mass, level 2: Metastatic HPV-related squamous carcinoma   involving lymphoid tissue, see comment. C.  Left base of tongue, biopsy: Squamous mucosa, negative for neoplasm. D.  Right base of tongue, biopsy: Squamous mucosa, negative for neoplasm. E.  Midline tongue, biopsy: Squamous mucosa, negative for neoplasm. Comment:   The squamous carcinoma is diffusely and strongly positive for   p16 immunostain, supporting the above interpretation. Intradepartmental   consultation is obtained.      IMPRESSION:   52-year-old male with a [id number] p16-positive squamous cell carcinoma of the left base of tongue    DISCUSSION/PLAN:     Had an extensive discussion with the patient and his wife Enrrique Marino today regarding our clinical and radiographic findings of a left oropharyngeal carcinoma. I discussed the typical approach for chemoradiotherapy for this disease as summarized below:    HPV/p16 status:  Although HPV positive carcinomas have been reported to portend a better prognosis, the approach to treatment is currently the same for oropharyngeal tumors of the same stage. This is further complicated by the presence or absence of a smoking history. To date, no prospective randomized studies have confirmed an alternative approach for p16+ tumors. Surgery vs. XRT  The  ORATOR (phase II) trial compared radiation therapy to surgical resection surgery versus for patients with  oropharyngeal cancer suggested similar survival & functional outcomes between RT and surgery, but different toxicities profiles based on each approach [Lancet Oncol. 2019;20(10):1349. Epub 2019 Aug 12]. In 68 patients with mostly HPV+ (88 percent, T1-2N0-2, with venita disease ? 4 cm) resectable disease were randomly assigned to initial treatment with either definitive RT (70 Gy) or TORS plus elective neck dissection [Lancet Oncol. 2019;20(10):1349. Epub 2019 Aug 12]. Patients in the definitive RT group who were LN positive received chemotherapy. Those in the surgery group received adjuvant RT, with or without chemotherapy, based on the presence of intermediate- or high-risk pathologic features. With a median follow-up of 27 months, definitive RT modest, though not statistically significant improvement in swallowing-related quality of life when compared to surgery, based on the MD [facility]. Among those treated with TORS, adjuvant RT was not associated with worsened swallowing-related quality of life at 1-year compared with surgery alone. Overall and progression-free survival were similar for both groups (hazard ratio [HR] for OS 0.83, 95% CI 0.21-8.35; HR for progression-free survival 1.07, 95% CI 0.28-4.01). Grade ? 2 toxicities were also similar (97 versus 91 percent).  Not surprisingly, the toxicity profiles varied based on the treatment approach. With definitive RT resulted in higher rates of hearing loss (21 v 15%), tinnitus (32 v 6%), neutropenia (9 v 0%), and constipation (26 v 6%), with most toxicities likely related to the addition of chemotherapy, which was more frequent in the definitive RT group (approximately 68% v only 24% in the surgery group). In contrast, surgery resulted in higher rates of trismus (24 v 3%) and oropharyngeal bleeding (12 v. 3%), including one bleeding-related death. Concurrent Chemotherapy  Although multiple clinical trials and the [facility] meta-analysis demonstrated a survival benefit from chemoradiation [Radiother Oncol. 2009;92(1):4], the optimal concurrent regimen has not been defined. The [address] [place] [address] meta-analysis found a larger benefit for platinum-based as compared with other chemotherapy regimens. Although these various regimens have not been compared to each other in a head-to-head comparison in appropriately powered randomized control clinical trials. The benefit of concurrent chemoradiation can also be characterized as age-related  with a loss of benefit in older adult, medically-impaired patients, and thus substituting concurrent chemoradiation for altered fractionation or targeted or  immunotherapeutic regimens may be a reasonable alternative for this patient population Thersa [employer] Oncol Biol Phys. 4606;62(3 Suppl):S112-4]. Radiation therapy  The standard RT approach for patients with advanced locoregional disease treated with concurrent chemoradiotherapy is to use once-daily fractionation with intensity-modulated RT (IMRT) and image-guided RT (IGRT). Altered-fractionation RT schedules, including accelerated RT and hyperfractionation, have been investigated to overcome accelerated repopulation and to safely escalate the dose.  Hyperfractionation regimens improve both local control and overall survival compared with once-daily (conventional) fractionation when RT is administered as a single modality. Accelerated fractionation improves locoregional control, but its effect on overall survival is less than that with hyperfractionation [Lancet. 0467;687(1301):319]. Furthermore, twice-daily treatment schedules place greater time demands on the patient and the treatment facility, and they may create problems with compliance. The Radiation Therapy Oncology Group (RTOG) 0129 trial compared standard-fractionation RT plus concurrent cisplatin versus accelerated-fractionation RT plus cisplatin and found that the two treatment schedules were equivalent to one another [J Clin Oncol. 2014;3234):3857. Epub 2014 Nov 3]. Results of RTOG 1016, a non-inferiority trial of radiation therapy plus cetuximab or cisplatin in HPV+ oropharyngeal cancer showed cetuximab had inferior overall survival compared to cisplatin containing regimen. Raúl et al. RTOG [address]; 48(1) 7-16. Marc et al. MELANIE Guidelines. Pract Radiat Oncol 2017; 7: 246-253. NCCN guidelines, version 2020 is used to help review treatment recommendations. Based on the above I recommended [address] in 37 fractions with concurrent chemoradiotherapy. Our encounter today lasted 75 minutes 72 of which was spent counseling him regarding concurrent therapy advising him of the increased risk of treatment related toxicity with concurrent therapy and the need for more aggressive management including nutritional support. Procedures and process involved with CT simulation and daily radiation treatments were explained. Toxicities, both expected and less common, were reviewed in detail. Questions were answered to their apparent satisfaction. Thank you for the opportunity to participate in multidisciplinary management of this remarkable and pleasant patient.       Opal Apgar, MD, GEOVANNY, Ananya Bernabe, [address] [place] [address]    Department of Radiation Oncology  [facility])

## 2022-08-16 NOTE — TELEPHONE ENCOUNTER
Met with patient and wife during his initial consultation with Dr Anika Mohan for his Head and Neck cancer diagnosis. Introduced myself and explained Nurse Navigator role with patients receiving treatment at our centers. He previously met with Barak Brown, Nurse Navigator at his consult with Dr Dank Moody. Patient requests treatment at [facility] and was updated that he will follow with Adam Montalvo Nurse Navigator at that facility. Patient was friendly and receptive. Next steps include Dental clearance, CT simulation and concurrent Chemo/Radiation treatments per Dr Anson Macias recommendations and follow up care. Provided patient with literature on ACS Radiation Therapy and Head and Neck Radiation Treatment and Your Mouth booklet. Reviewed resources available including Social Work and [employer]. [address] available appointment for clearance was 8/29/22 at 3pm. They will call if an opening comes up sooner. Provided patient with Dental Clinic appointment information and provided with their phone number. Patient is going to call to see if he can get a sooner appointment. Encouraged patient to call our office with questions or concerns. Verbalizes understanding. Patient appreciative of visit. Will forward information on to [address] at [facility].

## 2022-08-16 NOTE — TELEPHONE ENCOUNTER
Scarlet Allan returned previous phone call from this clinician. Discussed some of the challenges that he may face during his upcoming concurrent chemotherapy/rad treatment for oropharyngeal cancer. Scarlet Allan reports usual weight of 185-187# but had lost some weight and now trying to gain back. He reports good appetite now, but trying to watch sugars d/t dx of DM. Reports blood sugars usually not over 156. He tried Ensure Supplement yesterday and liked it. Explained different supplements that can be tried and recipes to use with supplements to increase nutritional value. Encouraged him to try to gain 5-10# to help prepare for upcoming months and risk of weight loss during treatment. Also discussed with patient that if oral nutrition can not be maintained that a feeding tube may need to be placed. He reported that Dr. vIon Zee also mentioned that to him today during his radiation consult. He agreed to have information sent via traditional mail. Items sent included Nutrition for Head and Neck Cancer, Recipes for High-Calorie Drinks, High Calorie/Protein Foods. Contact information included if further questions arise.

## 2022-08-16 NOTE — PROGRESS NOTES
Radhaangel Gil  1950 71 y.o. Referring Physician: Dr. Dannie Juarez    PCP: Kunal Larry, DO     Vitals:    08/16/22 1008   BP: 130/70   Pulse: 79   Resp: 18   Temp: 97.4 °F (36.3 °C)   SpO2: 97%        Wt Readings from Last 3 Encounters:   08/16/22 187 lb (84.8 kg)   08/03/22 184 lb (83.5 kg)   08/02/22 179 lb (81.2 kg)        Body mass index is 29.29 kg/m². Chief Complaint: No chief complaint on file. Cancer Staging  No matching staging information was found for the patient. Prior Radiation Therapy? NO    Concurrent Chemo/radiation? NO    Prior Chemotherapy? NO    Prior Hormonal Therapy? NO    Head and Neck Cancer? No, patient does NOT have HN cancer. Current Outpatient Medications   Medication Sig Dispense Refill    NONFORMULARY Take by mouth daily Hemp gummies OTC - patient states helps with his blood sugar  Not ordered by physician per patient      blood glucose monitor strips Test 3 times a day & as needed for symptoms of irregular blood glucose. Dispense sufficient amount for indicated testing frequency plus additional to accommodate PRN testing needs. 50 strip 0    fenofibrate (TRICOR) 145 MG tablet Take 1 tablet by mouth daily 90 tablet 0    omeprazole (PRILOSEC) 40 MG delayed release capsule Take 1 capsule by mouth daily 90 capsule 1    metFORMIN (GLUCOPHAGE-XR) 500 MG extended release tablet Take 1 tablet by mouth 2 times daily 180 tablet 1    lisinopril-hydroCHLOROthiazide (PRINZIDE;ZESTORETIC) 10-12.5 MG per tablet Take 1 tablet by mouth daily 90 tablet 1    psyllium (KONSYL) 28.3 % PACK Take 1 packet by mouth nightly        No current facility-administered medications for this encounter.        Past Medical History:   Diagnosis Date    Arthritis     Cancer ([facility] 2022    oropharynx    Diabetes mellitus ([facility]     GERD (gastroesophageal reflux disease)     Hyperlipidemia     Hypertension     Lyme disease     Thrombocytopenia ([facility]        Past Surgical History: Procedure Laterality Date    BRONCHOSCOPY N/A 10/28/2019    BRONCHOSCOPY performed by Laura Aguilera MD at [address]      dr Daniel Jacobs  2017    COLONOSCOPY  2019    LARYNGOSCOPY Left 3/17/2022    PANENDOSCOPY WITH BIOPSY performed by Cheryle Agee, DO at [address] Left 2022    PANENDOSCOPY performed by Cheryle Agee, DO at [address] Left 2022    EXCISION LEFT LEVEL II LYMPH NODE, PANENDOSCOPY performed by Cheryle Agee, DO at [address] EXTRACTION Left 2021    VASECTOMY         Family History   Problem Relation Age of Onset    High Blood Pressure Mother     High Blood Pressure Father     Cancer Father 80        kidney    Abdominal aortic aneurysm Father        Social History     Socioeconomic History    Marital status:      Spouse name: Not on file    Number of children: 1    Years of education: Not on file    Highest education level: Not on file   Occupational History    Not on file   Tobacco Use    Smoking status: Former     Packs/day: 1.00     Years: 27.00     Pack years: 27.00     Types: Cigarettes     Start date:      Quit date:      Years since quittin.6    Smokeless tobacco: Never   Vaping Use    Vaping Use: Never used   Substance and Sexual Activity    Alcohol use: Not Currently    Drug use: Not Currently     Comment: marijuana in past...not for years    Sexual activity: Not on file   Other Topics Concern    Not on file   Social History Narrative    ** Merged History Encounter **          Social Determinants of Health     Financial Resource Strain: Low Risk     Difficulty of Paying Living Expenses: Not hard at all   Food Insecurity: No Food Insecurity    Worried About Running Out of Food in the Last Year: Never true    Ran Out of Food in the Last Year: Never true   Transportation Needs: Not on file   Physical Activity: Not on file   Stress: Not on file Social Connections: Not on file   Intimate Partner Violence: Not on file   Housing Stability: Not on file           Occupation: retired  Retired:  YES: Patient is retired from [employer]. REVIEW OF SYSTEMS: <<For Level 5, 10 or more systems>> Approximately >20mins spent with patient and his wife about radiation therapy to the head and neck utilizing handouts and slides. He noted for some length in the past that he would be spitting out blood. He saw his PCP ,Dr. Eli Montes who referred him to Dr. Timi Montenegro. He has been following this issue and had a repeat panendoscopy done on 7/21/22 with pathology the presented p16+ left oropharyngeal SCC. PET scan done on 8/1/22 presented venita involvement with the tumor at left BOT. He is following with Dr. Milton Pacheco for medical oncology for concurrent chemo/RT. He did sign consent and we gave him a dental appointment with our dental clinic for clearance. [facility]. All questions were answered from a nursing perspective and they expressed understanding of care. Pacemaker/Defibulator/ICD:  No    Mediport: No        FALLS RISK SCREENING ASSESSMENT    Instructions:  Assess the patient and enter the appropriate indicators that are present for fall risk identification. Total the numbers entered and assign a fall risk score from Table 2.  Reassess patient at a minimum every 12 weeks or with status change. Assessment   Date  8/16/2022     1. Mental Ability: confusion/cognitively impaired No - 0       2. Elimination Issues: incontinence, frequency No - 0       3. Ambulatory: use of assistive devices (walker, cane, off-loading devices), attached to equipment (IV pole, oxygen) No - 0     4. Sensory Limitations: dizziness, vertigo, impaired vision Yes-3       5. Age 72 years or greater - 1       10. Medication: diuretics, strong analgesics, hypnotics, sedatives, antihypertensive agents   Yes - 3   7.   Falls: recent history of falls within the last 3 months (not to include slipping or tripping)   No - 0   TOTAL 7    If score of 4 or greater was education given? Yes       TABLE 2   Risk Score Risk Level Plan of Care   0-3 Little or  No Risk 1. Provide assistance as indicated for ambulation activities  2. Reorient confused/cognitively impaired patient  3. Call-light/bell within patient's reach  4. Chair/bed in low position, stretcher/bed with siderails up except when performing patient care activities  5. Educate patient/family/caregiver on falls prevention  6.  Reassess in 12 weeks or with any noted change in patient condition which places them at a risk for a fall   4-6 Moderate Risk 1. Provide assistance as indicated for ambulation activities  2. Reorient confused/cognitively impaired patient  3. Call-light/bell within patient's reach  4. Chair/bed in low position, stretcher/bed with siderails up except when performing patient care activities  5. Educate patient/family/caregiver on falls prevention  6. Falls risk precaution (Yellow sticker Level II) placed on patient chart   7 or   Higher High Risk 1. Place patient in easily observable treatment room  2. Patient attended at all times by family member or staff  3. Provide assistance as indicated for ambulation activities  4. Reorient confused/cognitively impaired patient  5. Call-light/bell within patient's reach  6. Chair/bed in low position, stretcher/bed with siderails up except when performing patient care activities  7. Educate patient/family/caregiver on falls prevention  8. Falls risk precaution (Yellow sticker Level III) placed on patient chart           MALNUTRITION RISK SCREENING ASSESSMENT    Instructions:  Assess the patient and enter the appropriate indicators that are present for nutrition risk identification. Total the numbers entered and assign a risk score. Follow the appropriate action for total score listed below.      Assessment Date  8/16/2022     Have you lost weight without trying? 2- Yes, 5.1 kg to 10 kg     Have you been eating poorly because of a decreased appetite? 0- No   3. Do you have a diagnosis of head and neck cancer? 2- Yes                                                                                    TOTAL 2          Score of 0-1: No action  Score 2 or greater: For Non-Diabetic Patient: Recommend adding Ensure Complete 2 x daily and provide patient with Ensure wellness bag with coupons  For Diabetic Patient: Recommend adding Glucerna Shake 2 x daily and provide patient with Glucerna Wellness bag with coupons  Route to the dietitian via [employer]    Are you having difficulty performing daily routine tasks due to fatigue or weakness (ie: bathing/showering, dressing, housework, meal prep, work, , etc): No     Do you have any arm flexibility/ROM restrictions, swelling or pain that limit activity: No     Any changes in memory, attention/focus that impact daily activities: No     Do you avoid participation in leisure/social activity due to weakness, fatigue or pain: No     ARE ANY OF THE ABOVE ARE ANSWERED YES: No          PT ASSESSMENT FOR REFERRAL    Have you had any recent falls in the past 2 months: No     Do you have difficulty going up/down stairs: No     Are you having difficulty walking: No     Do you often hold onto furniture/environmental supports or feel off balance when you are walking: No     Do you need to take rest breaks when you are walking: No     Any pain on a scale of 1-10 that limits your mobility: No 0/10    ARE ANY OF THE ABOVE ARE ANSWERED YES: No                     PELVIC FLOOR DYSFUNCTION SCREENING ASSESSMENT    - Do you have any pelvic pain? No     - Do you have any fecal constipation or fecal incontinence? No     - Do you have any urinary incontinence or difficulty starting to urinate?  No     ARE ANY OF THE ABOVE ARE ANSWERED YES: No          PREHAB AUDIOLOGY REFERRAL    - Is patient planned to receive Cisplatin? No. This patient is not planned to start Cisplatin. - Is patient planned to receive radiation therapy that may be directed toward auditory canals or nerves? No. Patient is not planned to start radiation therapy to auditory canals or nerves. - Is patient complaining of new onset hearing loss? No. Patient is not complaining of new onset hearing loss. Patient education given on radiation therapy to the head and neck. The patient expresses understanding and acceptance of instructions.  Georgie Shoemaker RN 8/16/2022 10:12 AM           Georgie Shoemaker RN

## 2022-08-18 ENCOUNTER — TELEPHONE (OUTPATIENT)
Dept: RADIATION ONCOLOGY | Age: 72
End: 2022-08-18

## 2022-08-18 NOTE — TELEPHONE ENCOUNTER
Rad Onc nurse revisited with [facility] and moved his appointment to 1000 8/19/2022 to see if bruno has clearance for RT and then he will go to [address] [place] [address] at 1200. Sam Lyles for [facility] was notified of all the instructions given to the patient and Raymon Vo his wife was given all the details and confirmed he would be at both appointments.

## 2022-08-19 ENCOUNTER — TELEPHONE (OUTPATIENT)
Dept: CASE MANAGEMENT | Age: 72
End: 2022-08-19

## 2022-08-19 ENCOUNTER — HOSPITAL ENCOUNTER (OUTPATIENT)
Dept: RADIATION ONCOLOGY | Age: 72
Discharge: HOME | End: 2022-08-19
Payer: MEDICARE

## 2022-08-19 PROCEDURE — 77334 RADIATION TREATMENT AID(S): CPT | Performed by: SPECIALIST

## 2022-08-20 PROBLEM — Z01.818 PRE-OP TESTING: Status: RESOLVED | Noted: 2022-07-21 | Resolved: 2022-08-20

## 2022-08-23 NOTE — PROGRESS NOTES
[facility]  Outpatient Speech Therapy  Phone: [phone]   Fax:  [phone]    SPEECH/LANGUAGE PATHOLOGY  CLINICAL ASSESSMENT OF SWALLOWING FUNCTION   and PLAN OF CARE:      PATIENT NAME:  Beni Gamez  (male)     MRN:  [id number]    :  1950  (71 y.o.)  STATUS:  Outpatient clinic   TODAY'S DATE:  2022  REFERRING PROVIDER:    Dr. Bertha Sales: [facility]  Date of order:  2022  EVALUATING THERAPIST: TIMI Bridges    CERTIFICATION/RECERTIFICATION PERIOD: 2022 to 11/10/22  INSURANCE PROVIDER:  Payor: Candice Jefferson / Plan: Corbin Puff ESSENTIAL/PLUS / Product Type: *No Product type* /    INSURANCE ID:  [id number] - (Medicare Managed)    CPT Codes  EVALUATION: 85374  bedside swallow eval    TREATMENT:   Requesting treatment authorization for  12 visits over 12 weeks focusing on the following CPT codes:     12416  dysphagia tx        REFERRING/TREATMENT DIAGNOSIS: Oropharyngeal cancer                  RESULTS:    DYSPHAGIA DIAGNOSIS:   Clinical indicators of normal swallow function      DIET RECOMMENDATIONS:  Regular consistency solids (IDDSI level 7) with  thin liquids (IDDSI level 0). This may be modified as he progresses through radiation treatments and will be reported in therapy progress notes.       FEEDING RECOMMENDATIONS:     Assistance level:  No assistance needed      Compensatory strategies recommended: Small bites/sips and Alternate solids and liquids    SPEECH THERAPY  PLAN OF CARE   The dysphagia POC is established based on physician order, dysphagia diagnosis and results of clinical assessment     Outpatient SLP intervention for dysphagia management is recommended 1 time per week to address the established treatment plan    Conditions Requiring Skilled Therapeutic Intervention for dysphagia:    Patients with head and neck cancer can experience a series of acute and chronic sequelae. Dysphagia can be the most life-changing and challenging symptom to rehabilitate [employer] 2021.). Both acute and late effects result in adverse sequelae including aspiration, feeding tube dependence, and nutritional deficiencies (Blanka Cage & Jostin Kramer., 2009.). Specific dysphagia interventions to include:     Compensatory strategy training   Therapeutic exercises    Specific instructions for next treatment:  initiate instruction of therapeutic exercises  and initiate instruction of compensatory strategies    Patient Treatment Goals:    Short Term Goals:  Pt will complete oral motor strength/ coordination exercises; BOTR strength/ROM exercises; and laryngeal strength/ROM exercises to maintain current level of function as he progresses through radiation treatments for head/neck cancer. Long Term Goals:   Education regarding the Prehab program for head and neck cancer patients was provided. Per patient request, contact information was provided for patient to make an appointment . Patient/family Goal:    To continue to be able to eat regular foods and drink regular liquids through his radiation treatments for head/neck cancer    Plan of care discussed with Patient   The Patient understand(s) the diagnosis, prognosis and plan of care     Rehabilitation Potential/Prognosis: good                    ADMITTING DIAGNOSIS: oropharyngeal cancer    PATIENT REPORT/COMPLAINT: denies difficulty swallowing      PRIOR LEVEL OF SWALLOW FUNCTION:    PAST HISTORY OF DYSPHAGIA?: none reported    Home diet: Regular consistency solids (IDDSI level 7) with  thin liquids (IDDSI level 0)    PROCEDURE:  Consistencies Administered During the Evaluation   Liquids: thin liquid   Solids:  pureed foods and solid foods      Method of Intake:   cup, straw, spoon  Self fed      Position:   Seated, upright    CLINICAL ASSESSMENT:  Oral Stage:        The oral stage of swallowing was within functional limits for consistencies administered      Pharyngeal Stage:    No signs of aspiration were noted during this evaluation however, silent aspiration cannot be ruled out at bedside. If silent aspiration is suspected, a Videofluoroscopic Study of Swallowing (MBS) is recommended and requires a physician order. Cognition:   Within functional limits for this exam    Oral Peripheral Examination   Adequate lingual/labial strength     Current Respiratory Status    room air     Parameters of Speech Production  Respiration:  Adequate for speech production  Quality:   Within functional limits  Intensity: Within functional limits    Volitional Swallow: present     Volitional Cough:   present     Pain: No pain reported. EDUCATION:   The Speech Language Pathologist (SLP) completed education regarding results of evaluation and that intervention is warranted at this time. Learner: Patient  Education: Reviewed results and recommendations of this evaluation, Reviewed diet and strategies, and Reviewed recommendations for follow-up  Evaluation of Education:  Ardena Sever understanding    This plan may be re-evaluated and revised as warranted. Treatment goals discussed with Patient   The Patient understand(s) the diagnosis, prognosis and plan of care     Evaluation Time includes thorough review of current medical information, gathering information on past medical history/social history and prior level of function, completion of standardized testing/informal observation of tasks, assessment of data and education on plan of care and goals. The admitting diagnosis and active problem list, as listed below have been reviewed prior to initiation of this evaluation.         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 Jacque Shah MA/KYM-SLP  [address] [facility].           Phone: [phone]       If you have any questions or concerns, please don't hesitate to call. Thank you for your referral.    Physician/Provider Signature:________________________________Date:__________________  By signing above, the therapists plan is approved by the physician/provider. All patients under [employer] must be signed by physician/provider.

## 2022-08-24 ENCOUNTER — OFFICE VISIT (OUTPATIENT)
Dept: PALLATIVE CARE | Age: 72
End: 2022-08-24
Payer: MEDICARE

## 2022-08-24 VITALS
HEART RATE: 76 BPM | SYSTOLIC BLOOD PRESSURE: 128 MMHG | DIASTOLIC BLOOD PRESSURE: 76 MMHG | WEIGHT: 186.4 LBS | BODY MASS INDEX: 29.19 KG/M2 | TEMPERATURE: 97.2 F

## 2022-08-24 DIAGNOSIS — C10.9 OROPHARYNGEAL CANCER (HCC): Primary | ICD-10-CM

## 2022-08-24 PROCEDURE — 99203 OFFICE O/P NEW LOW 30 MIN: CPT | Performed by: NURSE PRACTITIONER

## 2022-08-24 PROCEDURE — 1123F ACP DISCUSS/DSCN MKR DOCD: CPT | Performed by: NURSE PRACTITIONER

## 2022-08-24 PROCEDURE — 99204 OFFICE O/P NEW MOD 45 MIN: CPT | Performed by: NURSE PRACTITIONER

## 2022-08-24 RX ORDER — IBUPROFEN 800 MG/1
800 TABLET ORAL
Qty: 270 TABLET | Refills: 1 | Status: CANCELLED | OUTPATIENT
Start: 2022-08-24

## 2022-08-24 RX ORDER — IBUPROFEN 600 MG/1
600 TABLET ORAL EVERY 6 HOURS PRN
Qty: 360 TABLET | Refills: 1 | Status: SHIPPED | OUTPATIENT
Start: 2022-08-24

## 2022-08-24 RX ORDER — HYDROXYZINE PAMOATE 25 MG/1
25 CAPSULE ORAL 3 TIMES DAILY PRN
Qty: 28 CAPSULE | Refills: 0 | Status: SHIPPED | OUTPATIENT
Start: 2022-08-24 | End: 2022-09-07

## 2022-08-24 NOTE — PROGRESS NOTES
Department of Palliative Medicine  Ambulatory Note  Provider: MARY Tobias - CNP      Location of service: [place] [address]  Chief Complaint: Manoj Cobos is a 71 y.o. 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 Deisy Garcia for oncology    Pain due to neoplasm  -Tylenol 650 mg as needed- using once daily  -Start ibuprofen 600 mg every 6 hours as needed  -Start Magic mouthwash    Insomnia  -Recently started melatonin   -Vistaril as needed    Anxiety  -Start vistaril 25 mg every 8 hours as needed    Follow Up:  4 weeks. Encouraged to call with any questions, concerns, needs, or changes in symptoms. Subjective:     Manoj Cobos is a 71 y.o. male with significant past medical history of squamous cell carcinoma of the left base of the tongue and tonsil, diabetes mellitus, Lyme disease, hypertension, arthritis who was referred to [address] [place] [address] for symptom management. Plan is for concurrent chemo and radiation. Met with Mayankgonzalo Levi and his wife at the outpatient clinic. I introduced palliative medicine. He expresses that he is very anxious to start radiation treatments. His main complaints are pain and anxiety. He complains of pain in the left side of his neck. He describes the pain as a tightness and swelling of his neck, sore throat, and some sharp and stabbing pains. Currently he is using Tylenol once a day. He states that the Tylenol is helpful. We discussed increasing the frequency of the Tylenol and also alternating with ibuprofen. Also will add Magic mouthwash. We discussed good oral hygiene and continuing to use his hydrogen peroxide as needed. His wife explains that he has been much more anxious recently. He has also had difficulty falling asleep. He states that last night was the first night he took melatonin which she states was helpful.   Explained to continue the minutes in face-to-face counseling and coordination of care regarding goals of care and symptom management. Note: This report was completed using [employer] voiced recognition software. Every effort has been made to ensure accuracy; however, inadvertent computerized transcription errors may be present.

## 2022-08-25 NOTE — PROGRESS NOTES
[facility] Otolaryngology  Dr. Rianna Beaulieu. Cesar Moyer, [address] [facility] Follow Up        Patient Name:  Marla Urena  :  1950     CHIEF C/O:    Chief Complaint   Patient presents with    Post-Op Check     1 wk, L jaw lymph node removal, pt denies pain at incision, sore throat is resolving, pt would like to discuss bandage changes        HISTORY OBTAINED FROM:  patient    HISTORY OF PRESENT ILLNESS:       Valentin Trent is a 71y.o. year old male, here today for follow up of status post excisional biopsy of lymphadenopathy, as well as direct laryngoscopy and biopsy. Patient was found to have squamous cell carcinoma p16 positive consistent with radiographic and concerning findings on previous examinations. Patient has been referred to radiation, medical oncology as well as PET scan for continued evaluation and work-up. Extensive conversation was had with the patient about the current diagnosis and likely prognosis as well as treatment options which appear to be much more favorable for a chemo and radiation based therapeutics due to the unknown location and significant bulky lymphadenopathy that is currently present. Review of Systems    BP (!) 141/82 (Site: Left Upper Arm, Position: Sitting, Cuff Size: Large Adult)   Pulse 98   Ht 5' 7\" (1.702 m)   Wt 185 lb 1.6 oz (84 kg)   BMI 28.99 kg/m²   Physical Exam      IMPRESSION/PLAN:  Patient is seen and examined with [id number] p16 positive squamous of carcinoma of the left head neck. Recommendation for the patient undergo chemo and radiation per radiation and medical oncology close follow-up with ENT with repeat serial endoscopy, as well as undergo PET scan for continued work-up. Dr. July Burns Otolaryngology/Facial Plastic Surgery Residency  Associate Clinical Professor:  Mortimer Rummer, [facility]

## 2022-08-29 ENCOUNTER — HOSPITAL ENCOUNTER (OUTPATIENT)
Dept: RADIATION ONCOLOGY | Age: 72
Discharge: HOME | End: 2022-08-29
Payer: MEDICARE

## 2022-08-29 PROCEDURE — 77300 RADIATION THERAPY DOSE PLAN: CPT | Performed by: SPECIALIST

## 2022-08-29 PROCEDURE — 77301 RADIOTHERAPY DOSE PLAN IMRT: CPT | Performed by: SPECIALIST

## 2022-08-29 PROCEDURE — 77338 DESIGN MLC DEVICE FOR IMRT: CPT | Performed by: SPECIALIST

## 2022-08-30 ENCOUNTER — TELEPHONE (OUTPATIENT)
Dept: CASE MANAGEMENT | Age: 72
End: 2022-08-30

## 2022-08-30 DIAGNOSIS — C10.9 OROPHARYNGEAL CANCER (HCC): Primary | ICD-10-CM

## 2022-08-30 DIAGNOSIS — C10.9 SQUAMOUS CELL CARCINOMA OF OROPHARYNX (HCC): ICD-10-CM

## 2022-08-30 NOTE — TELEPHONE ENCOUNTER
[address] department stated patient's radiation therapy plan is ready to start. Updated Dr. Oliverio Dixon that patient will be scheduled for his concurrent treatments on 09/06/2022. He was agreeable. Updated radiation techKATHERIN, she contacted patient to inform he'll come to the clinic on 09/02/2022 for port films, then lab work will be done the same day for his Cisplatin treatment on 09/06/2022. Audiology evaluation was done 08/09/2022 and dental clearance done 08/19/2022. Paulina Aviles  RN, ADN, BSE, OCN  Patient Nurse Navigator

## 2022-08-31 DIAGNOSIS — C10.9 SQUAMOUS CELL CARCINOMA OF OROPHARYNX (HCC): Primary | ICD-10-CM

## 2022-08-31 RX ORDER — PROCHLORPERAZINE MALEATE 10 MG
10 TABLET ORAL EVERY 6 HOURS PRN
Qty: 40 TABLET | Refills: 3 | Status: SHIPPED | OUTPATIENT
Start: 2022-08-31

## 2022-09-02 ENCOUNTER — HOSPITAL ENCOUNTER (OUTPATIENT)
Dept: INFUSION THERAPY | Age: 72
Discharge: HOME | End: 2022-09-02
Payer: MEDICARE

## 2022-09-02 ENCOUNTER — TELEPHONE (OUTPATIENT)
Dept: CASE MANAGEMENT | Age: 72
End: 2022-09-02

## 2022-09-02 DIAGNOSIS — C10.9 OROPHARYNGEAL CANCER (HCC): ICD-10-CM

## 2022-09-02 LAB
ALBUMIN SERPL-MCNC: 4.4 G/DL (ref 3.5–5.2)
ALP BLD-CCNC: 42 U/L (ref 40–129)
ALT SERPL-CCNC: 24 U/L (ref 0–40)
ANION GAP SERPL CALCULATED.3IONS-SCNC: 9 MMOL/L (ref 7–16)
AST SERPL-CCNC: 17 U/L (ref 0–39)
BASOPHILS ABSOLUTE: 0.04 E9/L (ref 0–0.2)
BASOPHILS RELATIVE PERCENT: 0.8 % (ref 0–2)
BILIRUB SERPL-MCNC: 0.4 MG/DL (ref 0–1.2)
BUN BLDV-MCNC: 19 MG/DL (ref 6–23)
CALCIUM SERPL-MCNC: 10.2 MG/DL (ref 8.6–10.2)
CHLORIDE BLD-SCNC: 102 MMOL/L (ref 98–107)
CO2: 28 MMOL/L (ref 22–29)
CREAT SERPL-MCNC: 1.2 MG/DL (ref 0.7–1.2)
EOSINOPHILS ABSOLUTE: 0.19 E9/L (ref 0.05–0.5)
EOSINOPHILS RELATIVE PERCENT: 3.6 % (ref 0–6)
GFR AFRICAN AMERICAN: >60
GFR NON-AFRICAN AMERICAN: 60 ML/MIN/1.73
GLUCOSE BLD-MCNC: 136 MG/DL (ref 74–99)
HCT VFR BLD CALC: 43.7 % (ref 37–54)
HEMOGLOBIN: 14.8 G/DL (ref 12.5–16.5)
IMMATURE GRANULOCYTES #: 0.02 E9/L
IMMATURE GRANULOCYTES %: 0.4 % (ref 0–5)
LYMPHOCYTES ABSOLUTE: 0.61 E9/L (ref 1.5–4)
LYMPHOCYTES RELATIVE PERCENT: 11.5 % (ref 20–42)
MAGNESIUM: 1.9 MG/DL (ref 1.6–2.6)
MCH RBC QN AUTO: 29.5 PG (ref 26–35)
MCHC RBC AUTO-ENTMCNC: 33.9 % (ref 32–34.5)
MCV RBC AUTO: 87.2 FL (ref 80–99.9)
MONOCYTES ABSOLUTE: 0.45 E9/L (ref 0.1–0.95)
MONOCYTES RELATIVE PERCENT: 8.5 % (ref 2–12)
NEUTROPHILS ABSOLUTE: 3.98 E9/L (ref 1.8–7.3)
NEUTROPHILS RELATIVE PERCENT: 75.2 % (ref 43–80)
PDW BLD-RTO: 13.2 FL (ref 11.5–15)
PLATELET # BLD: 222 E9/L (ref 130–450)
PMV BLD AUTO: 10.2 FL (ref 7–12)
POTASSIUM SERPL-SCNC: 3.8 MMOL/L (ref 3.5–5)
RBC # BLD: 5.01 E12/L (ref 3.8–5.8)
SODIUM BLD-SCNC: 139 MMOL/L (ref 132–146)
TOTAL PROTEIN: 7.1 G/DL (ref 6.4–8.3)
WBC # BLD: 5.3 E9/L (ref 4.5–11.5)

## 2022-09-02 PROCEDURE — 80053 COMPREHEN METABOLIC PANEL: CPT

## 2022-09-02 PROCEDURE — 85025 COMPLETE CBC W/AUTO DIFF WBC: CPT

## 2022-09-02 PROCEDURE — 99214 OFFICE O/P EST MOD 30 MIN: CPT

## 2022-09-02 PROCEDURE — 77470 SPECIAL RADIATION TREATMENT: CPT | Performed by: SPECIALIST

## 2022-09-02 PROCEDURE — 83735 ASSAY OF MAGNESIUM: CPT

## 2022-09-02 PROCEDURE — 36415 COLL VENOUS BLD VENIPUNCTURE: CPT

## 2022-09-02 NOTE — PROGRESS NOTES
Spoke with patient about treatment medications (cisplatin). We went over the protocol to be used, what to expect as far as side effects, and when to call with problems. This was intended to reinforce the education done by Dr. Tammy Nick. Prescriptions were sent to patient's local pharmacy for prochlorperazine. Patient was provided medication handout to take home and patient was told to call if there are any questions once they had a chance to absorb the information. Patient had the opportunity to ask questions with all questions being answered to their satisfaction. The patient expressed understanding and acceptance of instructions.     Rosemary Chaney [place] 9/2/2022 11:26 AM

## 2022-09-02 NOTE — TELEPHONE ENCOUNTER
Met with patient while he was in the clinic for his chemotherapy teaching and radiation port films. He's already met with nurse navigators at [facility], but will start his concurrent treatment at [address] [place] [address],[address] on 09/06/2022 since it's closer to his home. Introduced this nurse navigator, gave contact information and reminded of other supportive services in clinic:  and dietitian. Patient is , denied issues with living arrangements, transportation, insurance and prescription coverage. He reported current appetite as good, denying unplanned weight loss. He denied issues with swallowing and said drinks a lot of water due to the accumulation of mucus in his throat. Patient is diabetic, informed he'll need to monitor his blood sugars well since he'll receive a steroid as a pre-medication for chemo. Informed the clinic dietitian will be following with him for additional support while on active treatments. Reviewed his concurrent regime, informing to notify Dr. Claudius Hammans if he has issues with hearing loss or problems with urination. Reviewed gentle skin care, encouraged to apply Aquaphor/Eucerin Cream, 2-3 times a day only after radiation therapy, to use an electric razor if needing to shave. Discussed doing baking soda and salt water rinses, provided a copy of the recipe. Informed this nurse navigator spoke with KATHERIN Austin, speech therapist, this morning to update he'll be starting his concurrent treatments on 09/06/2022. Provided patient with her contact information to contact her so she can discuss when he'll begin therapy. Reviewed appointments and provided patient with an updated clinic calendar. Informed he'll see Dr. Claudius Hammans and receive his cycle 1 Cisplatin treatments weekly while on radiation therapy and both treatments start 09/06/2022. Informed he'll see both medical and radiation oncologists weekly and have lab work drawn the day prior to each chemo treatment.  Patient is aware he sees palliative medicine on 09/14/2022 in this clinic. Encouraged patient to notify staff during his concurrent treatments of any concerns, he verbalized understanding. Discussed and gave patient the following information: ACS What Can I Do to Take Care of Myself During Chemotherapy, ACS Getting Help for Fatigue, ACS Dry Mouth handout/thick saliva handout/saliva substitute product information, [address] [place] [address] brochure, list of hydration options, Eating and Drinking During Chemotherapy information, high calorie food list and Cancer [facility] Online support handout. Patient denied needs today, encouraged to call should any arise, he verbalized understanding. JoseR amon Winn RN, ADN, BSE, OCN Patient Nurse Navigator

## 2022-09-02 NOTE — PROGRESS NOTES
CHEMOTHERAPY TEACHING    Chemotherapy teaching performed today for patient   The teaching included:    1. Rationale for chemotherapy    2. Actions of chemotherapy    3. Actions of pre and/or post chemotherapy medications    4. Administration plan: approximate length of treatment and interval between doses. A.  Chemotherapeutic Agents:  CISplatin    5. Management of side effects:     A. Nausea and vomiting:  Eat light the day of treatment  Dietary alterations: no greasy or spicy foods. Stay away from favorites. B.  Alopecia:  Obtain hairpiece prior to hair loss  Alternatives to wigs  C. Bone Marrow Depression:  Frequent CBC - Tyler count (lab time prior to appointment with doctor)   D.  WBC:  Function and recovery  Precautionary measures when low (temperatures BID - avoid ill people/crowds)  E. Hemoglobin:  Function and recovery  Signs/symptoms if low  Possibility of transfusion  F.  Platelets:  Function and recovery  Signs/symptoms if low    6. Mental status changes post chemotherapy:  A. Patient will need a  after 1st treatment (pre-meds)  B. Encourage family to stay with patient 24 hours post treatment. 7.  Sexuality and Reproduction:   A. Teratogenic effects of chemotherapy (prevent pregnancy during treatment                     and at least 2 months post therapy). B. Sperm banking (young males). 8.  Encourage patient to call clinic with questions. 9.  Copy of home going instructions    10. Written consent obtained  11. Patient viewed chemotherapy teaching DVD \"Understanding Chemotherapy\" by the          [facility]. After answering the patient's questions, the patient received \"Chemotherapy and You\" booklet. A thermometer was given to the patient.         Angeles Galo RN  9/2/2022

## 2022-09-06 ENCOUNTER — HOSPITAL ENCOUNTER (OUTPATIENT)
Dept: RADIATION ONCOLOGY | Age: 72
Discharge: HOME | End: 2022-09-06
Payer: MEDICARE

## 2022-09-06 ENCOUNTER — OFFICE VISIT (OUTPATIENT)
Dept: ONCOLOGY | Age: 72
End: 2022-09-06
Payer: MEDICARE

## 2022-09-06 ENCOUNTER — HOSPITAL ENCOUNTER (OUTPATIENT)
Dept: INFUSION THERAPY | Age: 72
Discharge: HOME | End: 2022-09-06
Payer: MEDICARE

## 2022-09-06 ENCOUNTER — TELEPHONE (OUTPATIENT)
Dept: INFUSION THERAPY | Age: 72
End: 2022-09-06

## 2022-09-06 VITALS
OXYGEN SATURATION: 99 % | DIASTOLIC BLOOD PRESSURE: 79 MMHG | HEIGHT: 67 IN | TEMPERATURE: 97.4 F | BODY MASS INDEX: 29.78 KG/M2 | WEIGHT: 189.7 LBS | SYSTOLIC BLOOD PRESSURE: 136 MMHG | HEART RATE: 77 BPM

## 2022-09-06 VITALS
OXYGEN SATURATION: 96 % | DIASTOLIC BLOOD PRESSURE: 70 MMHG | RESPIRATION RATE: 18 BRPM | BODY MASS INDEX: 29.63 KG/M2 | SYSTOLIC BLOOD PRESSURE: 138 MMHG | TEMPERATURE: 97.5 F | WEIGHT: 189.2 LBS | HEART RATE: 84 BPM

## 2022-09-06 VITALS — SYSTOLIC BLOOD PRESSURE: 136 MMHG | DIASTOLIC BLOOD PRESSURE: 87 MMHG | HEART RATE: 67 BPM

## 2022-09-06 DIAGNOSIS — C10.9 SQUAMOUS CELL CARCINOMA OF OROPHARYNX (HCC): Primary | ICD-10-CM

## 2022-09-06 DIAGNOSIS — C10.9 OROPHARYNGEAL CANCER (HCC): Primary | ICD-10-CM

## 2022-09-06 PROCEDURE — 96366 THER/PROPH/DIAG IV INF ADDON: CPT

## 2022-09-06 PROCEDURE — 6360000002 HC RX W HCPCS: Performed by: INTERNAL MEDICINE

## 2022-09-06 PROCEDURE — 2580000003 HC RX 258: Performed by: INTERNAL MEDICINE

## 2022-09-06 PROCEDURE — 96375 TX/PRO/DX INJ NEW DRUG ADDON: CPT

## 2022-09-06 PROCEDURE — 99214 OFFICE O/P EST MOD 30 MIN: CPT | Performed by: INTERNAL MEDICINE

## 2022-09-06 PROCEDURE — 77014 PR CT GUIDANCE PLACEMENT RAD THERAPY FIELDS: CPT | Performed by: SPECIALIST

## 2022-09-06 PROCEDURE — 96415 CHEMO IV INFUSION ADDL HR: CPT

## 2022-09-06 PROCEDURE — 1123F ACP DISCUSS/DSCN MKR DOCD: CPT | Performed by: INTERNAL MEDICINE

## 2022-09-06 PROCEDURE — 77386 HC NTSTY MODUL RAD TX DLVR CPLX: CPT | Performed by: SPECIALIST

## 2022-09-06 PROCEDURE — 96413 CHEMO IV INFUSION 1 HR: CPT

## 2022-09-06 PROCEDURE — 96367 TX/PROPH/DG ADDL SEQ IV INF: CPT

## 2022-09-06 RX ORDER — SODIUM CHLORIDE 9 MG/ML
INJECTION, SOLUTION INTRAVENOUS CONTINUOUS
Status: CANCELLED | OUTPATIENT
Start: 2022-10-11

## 2022-09-06 RX ORDER — SODIUM CHLORIDE 9 MG/ML
5-40 INJECTION INTRAVENOUS PRN
Status: CANCELLED | OUTPATIENT
Start: 2022-09-27

## 2022-09-06 RX ORDER — ONDANSETRON 2 MG/ML
8 INJECTION INTRAMUSCULAR; INTRAVENOUS
Status: CANCELLED | OUTPATIENT
Start: 2022-10-11

## 2022-09-06 RX ORDER — ONDANSETRON 2 MG/ML
8 INJECTION INTRAMUSCULAR; INTRAVENOUS
Status: CANCELLED | OUTPATIENT
Start: 2022-09-27

## 2022-09-06 RX ORDER — SODIUM CHLORIDE 9 MG/ML
5-250 INJECTION, SOLUTION INTRAVENOUS PRN
Status: CANCELLED | OUTPATIENT
Start: 2022-09-20

## 2022-09-06 RX ORDER — SODIUM CHLORIDE 9 MG/ML
INJECTION, SOLUTION INTRAVENOUS CONTINUOUS
Status: CANCELLED | OUTPATIENT
Start: 2022-10-04

## 2022-09-06 RX ORDER — PALONOSETRON HYDROCHLORIDE 0.05 MG/ML
0.25 INJECTION, SOLUTION INTRAVENOUS ONCE
Status: CANCELLED | OUTPATIENT
Start: 2022-10-04 | End: 2022-10-04

## 2022-09-06 RX ORDER — SODIUM CHLORIDE 9 MG/ML
5-250 INJECTION, SOLUTION INTRAVENOUS PRN
Status: CANCELLED | OUTPATIENT
Start: 2022-10-11

## 2022-09-06 RX ORDER — HEPARIN SODIUM (PORCINE) LOCK FLUSH IV SOLN 100 UNIT/ML 100 UNIT/ML
500 SOLUTION INTRAVENOUS PRN
Status: CANCELLED | OUTPATIENT
Start: 2022-09-06

## 2022-09-06 RX ORDER — ALBUTEROL SULFATE 90 UG/1
4 AEROSOL, METERED RESPIRATORY (INHALATION) PRN
Status: CANCELLED | OUTPATIENT
Start: 2022-10-04

## 2022-09-06 RX ORDER — HEPARIN SODIUM (PORCINE) LOCK FLUSH IV SOLN 100 UNIT/ML 100 UNIT/ML
500 SOLUTION INTRAVENOUS PRN
Status: CANCELLED | OUTPATIENT
Start: 2022-09-27

## 2022-09-06 RX ORDER — SODIUM CHLORIDE 0.9 % (FLUSH) 0.9 %
5-40 SYRINGE (ML) INJECTION PRN
Status: CANCELLED | OUTPATIENT
Start: 2022-10-11

## 2022-09-06 RX ORDER — DEXAMETHASONE SODIUM PHOSPHATE 10 MG/ML
10 INJECTION, SOLUTION INTRAMUSCULAR; INTRAVENOUS ONCE
Status: CANCELLED | OUTPATIENT
Start: 2022-09-20 | End: 2022-09-20

## 2022-09-06 RX ORDER — ALBUTEROL SULFATE 90 UG/1
4 AEROSOL, METERED RESPIRATORY (INHALATION) PRN
Status: CANCELLED | OUTPATIENT
Start: 2022-09-06

## 2022-09-06 RX ORDER — ALBUTEROL SULFATE 90 UG/1
4 AEROSOL, METERED RESPIRATORY (INHALATION) PRN
Status: CANCELLED | OUTPATIENT
Start: 2022-09-27

## 2022-09-06 RX ORDER — MEPERIDINE HYDROCHLORIDE 25 MG/ML
12.5 INJECTION INTRAMUSCULAR; INTRAVENOUS; SUBCUTANEOUS PRN
Status: CANCELLED | OUTPATIENT
Start: 2022-09-27

## 2022-09-06 RX ORDER — SODIUM CHLORIDE 9 MG/ML
5-250 INJECTION, SOLUTION INTRAVENOUS PRN
Status: CANCELLED | OUTPATIENT
Start: 2022-09-13

## 2022-09-06 RX ORDER — ONDANSETRON 2 MG/ML
8 INJECTION INTRAMUSCULAR; INTRAVENOUS
Status: CANCELLED | OUTPATIENT
Start: 2022-10-04

## 2022-09-06 RX ORDER — DIPHENHYDRAMINE HYDROCHLORIDE 50 MG/ML
50 INJECTION INTRAMUSCULAR; INTRAVENOUS
Status: CANCELLED | OUTPATIENT
Start: 2022-09-20

## 2022-09-06 RX ORDER — EPINEPHRINE 1 MG/ML
0.3 INJECTION, SOLUTION, CONCENTRATE INTRAVENOUS PRN
Status: CANCELLED | OUTPATIENT
Start: 2022-09-20

## 2022-09-06 RX ORDER — LANOLIN ALCOHOL/MO/W.PET/CERES
3 CREAM (GRAM) TOPICAL NIGHTLY PRN
COMMUNITY

## 2022-09-06 RX ORDER — PALONOSETRON HYDROCHLORIDE 0.05 MG/ML
0.25 INJECTION, SOLUTION INTRAVENOUS ONCE
Status: CANCELLED | OUTPATIENT
Start: 2022-09-27 | End: 2022-09-27

## 2022-09-06 RX ORDER — SODIUM CHLORIDE 0.9 % (FLUSH) 0.9 %
5-40 SYRINGE (ML) INJECTION PRN
Status: CANCELLED | OUTPATIENT
Start: 2022-09-20

## 2022-09-06 RX ORDER — ALBUTEROL SULFATE 90 UG/1
4 AEROSOL, METERED RESPIRATORY (INHALATION) PRN
Status: CANCELLED | OUTPATIENT
Start: 2022-09-20

## 2022-09-06 RX ORDER — SODIUM CHLORIDE 9 MG/ML
5-250 INJECTION, SOLUTION INTRAVENOUS PRN
Status: CANCELLED | OUTPATIENT
Start: 2022-09-06

## 2022-09-06 RX ORDER — SODIUM CHLORIDE 0.9 % (FLUSH) 0.9 %
5-40 SYRINGE (ML) INJECTION PRN
Status: CANCELLED | OUTPATIENT
Start: 2022-09-27

## 2022-09-06 RX ORDER — SODIUM CHLORIDE 9 MG/ML
INJECTION, SOLUTION INTRAVENOUS CONTINUOUS
Status: CANCELLED | OUTPATIENT
Start: 2022-09-06

## 2022-09-06 RX ORDER — DIPHENHYDRAMINE HYDROCHLORIDE 50 MG/ML
50 INJECTION INTRAMUSCULAR; INTRAVENOUS
Status: CANCELLED | OUTPATIENT
Start: 2022-10-11

## 2022-09-06 RX ORDER — DEXAMETHASONE SODIUM PHOSPHATE 10 MG/ML
10 INJECTION, SOLUTION INTRAMUSCULAR; INTRAVENOUS ONCE
Status: CANCELLED | OUTPATIENT
Start: 2022-10-11 | End: 2022-10-11

## 2022-09-06 RX ORDER — DEXAMETHASONE SODIUM PHOSPHATE 10 MG/ML
10 INJECTION, SOLUTION INTRAMUSCULAR; INTRAVENOUS ONCE
Status: CANCELLED | OUTPATIENT
Start: 2022-09-13 | End: 2022-09-13

## 2022-09-06 RX ORDER — SODIUM CHLORIDE 0.9 % (FLUSH) 0.9 %
5-40 SYRINGE (ML) INJECTION PRN
Status: CANCELLED | OUTPATIENT
Start: 2022-09-06

## 2022-09-06 RX ORDER — MEPERIDINE HYDROCHLORIDE 25 MG/ML
12.5 INJECTION INTRAMUSCULAR; INTRAVENOUS; SUBCUTANEOUS PRN
Status: CANCELLED | OUTPATIENT
Start: 2022-09-06

## 2022-09-06 RX ORDER — SODIUM CHLORIDE 9 MG/ML
INJECTION, SOLUTION INTRAVENOUS CONTINUOUS
Status: CANCELLED | OUTPATIENT
Start: 2022-09-13

## 2022-09-06 RX ORDER — SODIUM CHLORIDE 9 MG/ML
5-250 INJECTION, SOLUTION INTRAVENOUS PRN
Status: CANCELLED | OUTPATIENT
Start: 2022-09-27

## 2022-09-06 RX ORDER — HEPARIN SODIUM (PORCINE) LOCK FLUSH IV SOLN 100 UNIT/ML 100 UNIT/ML
500 SOLUTION INTRAVENOUS PRN
Status: CANCELLED | OUTPATIENT
Start: 2022-10-11

## 2022-09-06 RX ORDER — EPINEPHRINE 1 MG/ML
0.3 INJECTION, SOLUTION, CONCENTRATE INTRAVENOUS PRN
Status: CANCELLED | OUTPATIENT
Start: 2022-09-06

## 2022-09-06 RX ORDER — SODIUM CHLORIDE 0.9 % (FLUSH) 0.9 %
5-40 SYRINGE (ML) INJECTION PRN
Status: CANCELLED | OUTPATIENT
Start: 2022-09-13

## 2022-09-06 RX ORDER — DIPHENHYDRAMINE HYDROCHLORIDE 50 MG/ML
50 INJECTION INTRAMUSCULAR; INTRAVENOUS
Status: CANCELLED | OUTPATIENT
Start: 2022-09-13

## 2022-09-06 RX ORDER — DEXAMETHASONE SODIUM PHOSPHATE 10 MG/ML
10 INJECTION, SOLUTION INTRAMUSCULAR; INTRAVENOUS ONCE
Status: CANCELLED | OUTPATIENT
Start: 2022-09-27 | End: 2022-09-27

## 2022-09-06 RX ORDER — SODIUM CHLORIDE 9 MG/ML
5-40 INJECTION INTRAVENOUS PRN
Status: CANCELLED | OUTPATIENT
Start: 2022-09-13

## 2022-09-06 RX ORDER — HEPARIN SODIUM (PORCINE) LOCK FLUSH IV SOLN 100 UNIT/ML 100 UNIT/ML
500 SOLUTION INTRAVENOUS PRN
Status: CANCELLED | OUTPATIENT
Start: 2022-09-13

## 2022-09-06 RX ORDER — SODIUM CHLORIDE 9 MG/ML
INJECTION, SOLUTION INTRAVENOUS CONTINUOUS
Status: CANCELLED | OUTPATIENT
Start: 2022-09-27

## 2022-09-06 RX ORDER — DEXAMETHASONE SODIUM PHOSPHATE 10 MG/ML
10 INJECTION, SOLUTION INTRAMUSCULAR; INTRAVENOUS ONCE
Status: CANCELLED | OUTPATIENT
Start: 2022-10-04 | End: 2022-10-04

## 2022-09-06 RX ORDER — ONDANSETRON 2 MG/ML
8 INJECTION INTRAMUSCULAR; INTRAVENOUS
Status: CANCELLED | OUTPATIENT
Start: 2022-09-13

## 2022-09-06 RX ORDER — PALONOSETRON HYDROCHLORIDE 0.05 MG/ML
0.25 INJECTION, SOLUTION INTRAVENOUS ONCE
Status: CANCELLED | OUTPATIENT
Start: 2022-09-06 | End: 2022-09-06

## 2022-09-06 RX ORDER — EPINEPHRINE 1 MG/ML
0.3 INJECTION, SOLUTION, CONCENTRATE INTRAVENOUS PRN
Status: CANCELLED | OUTPATIENT
Start: 2022-09-13

## 2022-09-06 RX ORDER — SODIUM CHLORIDE 9 MG/ML
5-40 INJECTION INTRAVENOUS PRN
Status: CANCELLED | OUTPATIENT
Start: 2022-09-06

## 2022-09-06 RX ORDER — ACETAMINOPHEN 325 MG/1
650 TABLET ORAL
Status: CANCELLED | OUTPATIENT
Start: 2022-09-20

## 2022-09-06 RX ORDER — PALONOSETRON HYDROCHLORIDE 0.05 MG/ML
0.25 INJECTION, SOLUTION INTRAVENOUS ONCE
Status: CANCELLED | OUTPATIENT
Start: 2022-09-13 | End: 2022-09-13

## 2022-09-06 RX ORDER — ACETAMINOPHEN 325 MG/1
650 TABLET ORAL
Status: CANCELLED | OUTPATIENT
Start: 2022-10-11

## 2022-09-06 RX ORDER — PALONOSETRON HYDROCHLORIDE 0.05 MG/ML
0.25 INJECTION, SOLUTION INTRAVENOUS ONCE
Status: CANCELLED | OUTPATIENT
Start: 2022-09-20 | End: 2022-09-20

## 2022-09-06 RX ORDER — ACETAMINOPHEN 325 MG/1
650 TABLET ORAL
Status: CANCELLED | OUTPATIENT
Start: 2022-09-27

## 2022-09-06 RX ORDER — HEPARIN SODIUM (PORCINE) LOCK FLUSH IV SOLN 100 UNIT/ML 100 UNIT/ML
500 SOLUTION INTRAVENOUS PRN
Status: CANCELLED | OUTPATIENT
Start: 2022-10-04

## 2022-09-06 RX ORDER — ACETAMINOPHEN 325 MG/1
650 TABLET ORAL
Status: CANCELLED | OUTPATIENT
Start: 2022-09-13

## 2022-09-06 RX ORDER — SODIUM CHLORIDE 9 MG/ML
5-40 INJECTION INTRAVENOUS PRN
Status: CANCELLED | OUTPATIENT
Start: 2022-10-11

## 2022-09-06 RX ORDER — ALBUTEROL SULFATE 90 UG/1
4 AEROSOL, METERED RESPIRATORY (INHALATION) PRN
Status: CANCELLED | OUTPATIENT
Start: 2022-09-13

## 2022-09-06 RX ORDER — SODIUM CHLORIDE 9 MG/ML
5-250 INJECTION, SOLUTION INTRAVENOUS PRN
Status: CANCELLED | OUTPATIENT
Start: 2022-10-04

## 2022-09-06 RX ORDER — SODIUM CHLORIDE 9 MG/ML
INJECTION, SOLUTION INTRAVENOUS CONTINUOUS
Status: CANCELLED | OUTPATIENT
Start: 2022-09-20

## 2022-09-06 RX ORDER — MEPERIDINE HYDROCHLORIDE 25 MG/ML
12.5 INJECTION INTRAMUSCULAR; INTRAVENOUS; SUBCUTANEOUS PRN
Status: CANCELLED | OUTPATIENT
Start: 2022-10-04

## 2022-09-06 RX ORDER — PALONOSETRON HYDROCHLORIDE 0.05 MG/ML
0.25 INJECTION, SOLUTION INTRAVENOUS ONCE
Status: COMPLETED | OUTPATIENT
Start: 2022-09-06 | End: 2022-09-06

## 2022-09-06 RX ORDER — DEXAMETHASONE SODIUM PHOSPHATE 10 MG/ML
10 INJECTION INTRAMUSCULAR; INTRAVENOUS ONCE
Status: COMPLETED | OUTPATIENT
Start: 2022-09-06 | End: 2022-09-06

## 2022-09-06 RX ORDER — PALONOSETRON HYDROCHLORIDE 0.05 MG/ML
0.25 INJECTION, SOLUTION INTRAVENOUS ONCE
Status: CANCELLED | OUTPATIENT
Start: 2022-10-11 | End: 2022-10-11

## 2022-09-06 RX ORDER — SODIUM CHLORIDE 9 MG/ML
5-40 INJECTION INTRAVENOUS PRN
Status: CANCELLED | OUTPATIENT
Start: 2022-10-04

## 2022-09-06 RX ORDER — DIPHENHYDRAMINE HYDROCHLORIDE 50 MG/ML
50 INJECTION INTRAMUSCULAR; INTRAVENOUS
Status: CANCELLED | OUTPATIENT
Start: 2022-09-27

## 2022-09-06 RX ORDER — EPINEPHRINE 1 MG/ML
0.3 INJECTION, SOLUTION, CONCENTRATE INTRAVENOUS PRN
Status: CANCELLED | OUTPATIENT
Start: 2022-10-04

## 2022-09-06 RX ORDER — SODIUM CHLORIDE 9 MG/ML
5-250 INJECTION, SOLUTION INTRAVENOUS PRN
Status: DISCONTINUED | OUTPATIENT
Start: 2022-09-06 | End: 2022-09-07 | Stop reason: HOSPADM

## 2022-09-06 RX ORDER — ACETAMINOPHEN 325 MG/1
650 TABLET ORAL
Status: CANCELLED | OUTPATIENT
Start: 2022-09-06

## 2022-09-06 RX ORDER — ONDANSETRON 2 MG/ML
8 INJECTION INTRAMUSCULAR; INTRAVENOUS
Status: CANCELLED | OUTPATIENT
Start: 2022-09-06

## 2022-09-06 RX ORDER — SODIUM CHLORIDE 9 MG/ML
5-40 INJECTION INTRAVENOUS PRN
Status: CANCELLED | OUTPATIENT
Start: 2022-09-20

## 2022-09-06 RX ORDER — ACETAMINOPHEN 325 MG/1
650 TABLET ORAL
Status: CANCELLED | OUTPATIENT
Start: 2022-10-04

## 2022-09-06 RX ORDER — ONDANSETRON 2 MG/ML
8 INJECTION INTRAMUSCULAR; INTRAVENOUS
Status: CANCELLED | OUTPATIENT
Start: 2022-09-20

## 2022-09-06 RX ORDER — EPINEPHRINE 1 MG/ML
0.3 INJECTION, SOLUTION, CONCENTRATE INTRAVENOUS PRN
Status: CANCELLED | OUTPATIENT
Start: 2022-09-27

## 2022-09-06 RX ORDER — HEPARIN SODIUM (PORCINE) LOCK FLUSH IV SOLN 100 UNIT/ML 100 UNIT/ML
500 SOLUTION INTRAVENOUS PRN
Status: CANCELLED | OUTPATIENT
Start: 2022-09-20

## 2022-09-06 RX ORDER — DEXAMETHASONE SODIUM PHOSPHATE 10 MG/ML
10 INJECTION, SOLUTION INTRAMUSCULAR; INTRAVENOUS ONCE
Status: CANCELLED | OUTPATIENT
Start: 2022-09-06 | End: 2022-09-06

## 2022-09-06 RX ORDER — DIPHENHYDRAMINE HYDROCHLORIDE 50 MG/ML
50 INJECTION INTRAMUSCULAR; INTRAVENOUS
Status: CANCELLED | OUTPATIENT
Start: 2022-09-06

## 2022-09-06 RX ORDER — MEPERIDINE HYDROCHLORIDE 25 MG/ML
12.5 INJECTION INTRAMUSCULAR; INTRAVENOUS; SUBCUTANEOUS PRN
Status: CANCELLED | OUTPATIENT
Start: 2022-09-13

## 2022-09-06 RX ORDER — DIPHENHYDRAMINE HYDROCHLORIDE 50 MG/ML
50 INJECTION INTRAMUSCULAR; INTRAVENOUS
Status: CANCELLED | OUTPATIENT
Start: 2022-10-04

## 2022-09-06 RX ORDER — MEPERIDINE HYDROCHLORIDE 25 MG/ML
12.5 INJECTION INTRAMUSCULAR; INTRAVENOUS; SUBCUTANEOUS PRN
Status: CANCELLED | OUTPATIENT
Start: 2022-10-11

## 2022-09-06 RX ORDER — EPINEPHRINE 1 MG/ML
0.3 INJECTION, SOLUTION, CONCENTRATE INTRAVENOUS PRN
Status: CANCELLED | OUTPATIENT
Start: 2022-10-11

## 2022-09-06 RX ORDER — ALBUTEROL SULFATE 90 UG/1
4 AEROSOL, METERED RESPIRATORY (INHALATION) PRN
Status: CANCELLED | OUTPATIENT
Start: 2022-10-11

## 2022-09-06 RX ORDER — SODIUM CHLORIDE 0.9 % (FLUSH) 0.9 %
5-40 SYRINGE (ML) INJECTION PRN
Status: CANCELLED | OUTPATIENT
Start: 2022-10-04

## 2022-09-06 RX ORDER — MEPERIDINE HYDROCHLORIDE 25 MG/ML
12.5 INJECTION INTRAMUSCULAR; INTRAVENOUS; SUBCUTANEOUS PRN
Status: CANCELLED | OUTPATIENT
Start: 2022-09-20

## 2022-09-06 RX ADMIN — DEXAMETHASONE SODIUM PHOSPHATE 10 MG: 10 INJECTION INTRAMUSCULAR; INTRAVENOUS at 08:58

## 2022-09-06 RX ADMIN — SODIUM CHLORIDE 25 ML/HR: 9 INJECTION, SOLUTION INTRAVENOUS at 08:57

## 2022-09-06 RX ADMIN — FOSAPREPITANT 150 MG: 150 INJECTION, POWDER, LYOPHILIZED, FOR SOLUTION INTRAVENOUS at 09:09

## 2022-09-06 RX ADMIN — POTASSIUM CHLORIDE: 2 INJECTION, SOLUTION, CONCENTRATE INTRAVENOUS at 09:53

## 2022-09-06 RX ADMIN — PALONOSETRON 0.25 MG: 0.25 INJECTION, SOLUTION INTRAVENOUS at 08:58

## 2022-09-06 NOTE — TELEPHONE ENCOUNTER
Melony Haines  9/6/2022  Wt Readings from Last 10 Encounters:   09/06/22 189 lb 11.2 oz (86 kg)   08/24/22 186 lb 6.4 oz (84.6 kg)   08/16/22 187 lb (84.8 kg)   08/03/22 184 lb (83.5 kg)   08/02/22 179 lb (81.2 kg)   07/29/22 185 lb 1.6 oz (84 kg)   07/21/22 187 lb (84.8 kg)   07/06/22 190 lb (86.2 kg)   07/05/22 189 lb (85.7 kg)   05/27/22 191 lb 6.4 oz (86.8 kg)     Ht Readings from Last 1 Encounters:   09/06/22 5' 7\" (1.702 m)     BMI=29.71    Assessment: Visited with Salina Loyda and his wife Erin while receiving Cisplatin infusion today. 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 Today is first treatment for both. He reports he is anxious about treatment. He reports eating 3 meals per day and will snack throughout the day. Wife reports eating less amounts at meals. He was recently started on Magic Mouthwash for mouth pain and it has been effective with less pain reported. He utilizes Metamucil at times for constipation and adequate fluids was stressed with use of Metamucil. He reports drinking 5-6 bottles of water per day. Previous information was mailed to him and it was received and wife has been utilizing the information provided and encouraging eating frequently throughout the day. Discussed what to expect with radiation treatment, side effects and building of side effects. Addressed feeding tube and the benefits of tube feeding. He did not have a port placed but said it may be needed. Discussed a tube can be placed if needed and if a port is needed to consider getting both at the same time. He voiced understanding. Explained weight maintenance is important during treatment. Cindy Francis voices concern over blood sugars, and gets concerned when above 150. Explained if diabetic supplements such as Glucerna or Boost Glucose Control are used that additives such as heavy cream can be added to increase calories d/t they are generally lower in calories.  Encouraged Salina Scales and wife to call with questions as they arise. Will continue to follow. Weight change: 3.1% weight gain x 1 month, 1% weight loss x 3 months, weight stable compared to 6 months ago. Appetite: Eating 3 meals per day (wife reports eating less) and drinking ONS 1 per day (has tried Ensure, Boost Plus and lower sugar options and has liked them)  Nutritional Side Effects: constipation, mouth pain, phlegm  Calculated Needs: 32-35kcals/kg/ETI=4153-3156rrtnu, 1.3-1.5gm/kg/MYQ=733-902pr protein, 1ml/kcal=2750-3000kcals  Malnutrition Status: High risk  Nutrition Diagnosis: High risk r/t squamous cell ca of oropharynx (H&N cancer) and starting concurrent chemo/rad tx. Pre-Hab Eligible?: Yes    Recommendations: Encourage 5-6 nutrient dense meals per day with 2 bottles of oral nutritional supplement of choice, increase as needed when eating less or losing weight.      Cesar Elias RD

## 2022-09-06 NOTE — PROGRESS NOTES
Baljit Burrell  9/6/2022  Wt Readings from Last 3 Encounters:   09/06/22 189 lb 3.2 oz (85.8 kg)   09/06/22 189 lb 11.2 oz (86 kg)   08/24/22 186 lb 6.4 oz (84.6 kg)     Body mass index is 29.63 kg/m². [facility]    Patient was seen today for weekly visit. Comfort Alteration  KPS:90%  Fatigue: None    Mucous Membrane Alteration  Mucositis Due to Radiation: Yes  Thrush: No  Voice Changes: Yes, improvement    Nutritional Alteration  Anorexia: No   Nausea: No   Vomiting: No     Skin Alteration   Sensation:no    Radiation Dermatitis:  no    Ventilation Alteration  Cough:No    Emotional  Coping: effective    Injury, potential bleeding or infection: no    Radioprotectant Tolerance  No            Lab Results   Component Value Date    WBC 5.3 09/02/2022     09/02/2022         /70   Pulse 84   Temp 97.5 °F (36.4 °C) (Temporal)   Resp 18   Wt 189 lb 3.2 oz (85.8 kg)   SpO2 96%   BMI 29.63 kg/m²   BP within normal range? yes       Assessment/Plan:1/37fx;200/7400cGy completed and tolerating RT well.     Abby Devine RN

## 2022-09-06 NOTE — PROGRESS NOTES
Department of [facility] Oncology  Attending Clinic Note    Reason for Visit: Follow-up on a patient with p16 + Oropharyngeal Squamous Cell Carcinoma    PCP:  Tierney Hendrix DO    History of Present Illness:  69-year-old male who presented to the ENT team for persistent left-sided neck fullness without associated difficulty swallowing    CT soft tissue neck 02/23/2022: Mass located in the left aspect of floor of the mouth extending into the left oropharyngeal soft tissue concerning for squamous cell carcinoma  Multiple enlarged necrotic left anterior cervical chain lymph nodes    Panendoscopy on 3/17/2022:  Findings: L lingual tonsil hypertrophy, biopsy negative, left level II neck node FNA performed   FNA left neck mass level 2: Inconclusive for malignant cells. Few atypical squamous cells with degenerative change  A. Tongue, left base, biopsy:   Squamous epithelial-lined lymphoid tissue with reactive germinal centers, compatible with lingual tonsil. Negative for dysplasia; Negative for malignancy. B.  Tongue, left base, biopsy:   Squamous epithelial-lined lymphoid tissue with reactive germinal centers, compatible with lingual tonsil. Focal lobules of benign mucinous glands. Negative for dysplasia; Negative for malignancy    On 07/21/2022: Panendoscopy excision left level 2 lymph node  Findings:  left base of tongue mass, left cervical lymphadenopathy  A. 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 See comment. B. 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 C.  Left base of tongue, biopsy: Squamous mucosa, negative for neoplasm. D.  Right base of tongue, biopsy: Squamous mucosa, negative for neoplasm. E.  Midline tongue, biopsy: Squamous mucosa, negative for neoplasm.    Comment:   The squamous carcinoma is diffusely and strongly positive for p16 immunostain, supporting the above interpretation    PET/CT scan 08/01/2022: There is a large hypermetabolic mass centered in the left oropharynx involving the left lateral oropharynx, left tonsillar and left base of tongue regions extending superiorly to the left soft palate and inferiorly to the left floor of mouth and left vallecula. Maximum SUV of this mass measures 17.4. The mass causes narrowing of the oropharyngeal airway. There are multiple hypermetabolic enlarged, necrotic and someone confluent left level 2 and left level 3 lymph nodes. Confluent left level 2 adenopathy demonstrates a maximum SUV of 15.8. Additional hypermetabolic left level 5/3 junction lymph node is noted with maximum SUV 5.0  Mild focal hypermetabolic activity is noted in the left parapharyngeal/retropharyngeal region (maximum SUV 3.8) which could represent an underlying small lymph node. There is a hypermetabolic right level 2 lymph node with maximum SUV 6.5    Recommended concurrent chemoradiation therapy with weekly Cisplatin. Side effects reviewed. He agreed to proceed. Authorization obtained. Today 09/06/2022. No fever chills. Fair appetite and energy level. Anxious to get started    Review of Systems;  CONSTITUTIONAL: No fever, chills. Fair appetite and energy level. ENMT: Eyes: No diplopia; Nose: No epistaxis. Mouth: + sore throat. Neck: Left neck swelling  RESPIRATORY: No hemoptysis, shortness of breath, cough. CARDIOVASCULAR: No chest pain, palpitations. GASTROINTESTINAL: No nausea/vomiting, abdominal pain  GENITOURINARY: No dysuria, urinary frequency, hematuria. NEURO: No syncope, presyncope, headache. Remainder:  ROS NEGATIVE    Past Medical History:      Diagnosis Date    Arthritis     Cancer ([facility] [address] 2022    oropharynx    Diabetes mellitus ([facility] [address]     GERD (gastroesophageal reflux disease)     Hyperlipidemia     Hypertension     Lyme disease     Thrombocytopenia (HCC)      Medications:  Reviewed and reconciled.     Allergies:  No Known Allergies    Physical Exam:  /79   Pulse 77   Temp 97.4 °F (36.3 °C)   Ht 5' 7\" (1.702 m)   Wt 189 lb 11.2 oz (86 kg)   SpO2 99%   BMI 29.71 kg/m²   GENERAL: Alert, oriented x 3, not in acute distress. HEENT: PERRLA; EOMI. Oropharynx clear. NECK: Supple. Left Cervical LN  LUNGS: Good air entry bilaterally. No wheezing, crackles or ronchi. CARDIOVASCULAR: Regular rate. No murmurs, rubs or gallops. ABDOMEN: Soft. Non-tender, non-distended. EXTREMITIES: Without clubbing, cyanosis, or edema. NEUROLOGIC: No focal deficits. ECOG PS 1    Lab Results   Component Value Date    WBC 5.3 09/02/2022    HGB 14.8 09/02/2022    HCT 43.7 09/02/2022    MCV 87.2 09/02/2022     09/02/2022     Lab Results   Component Value Date     09/02/2022    K 3.8 09/02/2022     09/02/2022    CO2 28 09/02/2022    BUN 19 09/02/2022    CREATININE 1.2 09/02/2022    GLUCOSE 136 (H) 09/02/2022    CALCIUM 10.2 09/02/2022    PROT 7.1 09/02/2022    LABALBU 4.4 09/02/2022    BILITOT 0.4 09/02/2022    ALKPHOS 42 09/02/2022    AST 17 09/02/2022    ALT 24 09/02/2022    LABGLOM 60 09/02/2022    GFRAA >60 09/02/2022     Impression/Plan:  43-year-old male with history of p16+ left Oropharyngeal Squamous Cell Carcinoma    CT soft tissue neck 02/23/2022: Mass located in the left aspect of floor of the mouth extending into the left oropharyngeal soft tissue concerning for squamous cell carcinoma  Multiple enlarged necrotic left anterior cervical chain lymph nodes    Panendoscopy on 3/17/2022:  Findings: L lingual tonsil hypertrophy, biopsy negative, left level II neck node FNA performed   FNA left neck mass level 2: Inconclusive for malignant cells. Few atypical squamous cells with degenerative change  A. Tongue, left base, biopsy:   Squamous epithelial-lined lymphoid tissue with reactive germinal centers, compatible with lingual tonsil. Negative for dysplasia; Negative for malignancy.      B.  Tongue, left base, biopsy: Squamous epithelial-lined lymphoid tissue with reactive germinal centers, compatible with lingual tonsil. Focal lobules of benign mucinous glands. Negative for dysplasia; Negative for malignancy    On 07/21/2022: Panendoscopy excision left level 2 lymph node  Findings:  left base of tongue mass, left cervical lymphadenopathy  A. 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 See comment. B. 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 C.  Left base of tongue, biopsy: Squamous mucosa, negative for neoplasm. D.  Right base of tongue, biopsy: Squamous mucosa, negative for neoplasm. E.  Midline tongue, biopsy: Squamous mucosa, negative for neoplasm. Comment:   The squamous carcinoma is diffusely and strongly positive for p16 immunostain, supporting the above interpretation    PET/CT scan 08/01/2022: There is a large hypermetabolic mass centered in the left oropharynx involving the left lateral oropharynx, left tonsillar and left base of tongue regions extending superiorly to the left soft palate and inferiorly to the left floor of mouth and left vallecula. Maximum SUV of this mass measures 17.4. The mass causes narrowing of the oropharyngeal airway. There are multiple hypermetabolic enlarged, necrotic and someone confluent left level 2 and left level 3 lymph nodes. Confluent left level 2 adenopathy demonstrates a maximum SUV of 15.8. Additional hypermetabolic left level 5/3 junction lymph node is noted with maximum SUV 5.0  Mild focal hypermetabolic activity is noted in the left parapharyngeal/retropharyngeal region (maximum SUV 3.8) which could represent an underlying small lymph node. There is a hypermetabolic right level 2 lymph node with maximum SUV 6.5    Recommended concurrent chemoradiation therapy with weekly Cisplatin. Side effects reviewed. He agreed to proceed.   Authorization obtained. Cycle # 1 weekly Cisplatin is today 09/06/2022. Labs reviewed ok to proceed. Prescribed compazine prn for nausea.      RTC next week for Cycle # 2 weekly Cisplatin     Christina Cutler MD   9/6/2022

## 2022-09-07 ENCOUNTER — HOSPITAL ENCOUNTER (OUTPATIENT)
Dept: RADIATION ONCOLOGY | Age: 72
Discharge: HOME | End: 2022-09-07
Payer: MEDICARE

## 2022-09-07 PROCEDURE — 77386 HC NTSTY MODUL RAD TX DLVR CPLX: CPT | Performed by: SPECIALIST

## 2022-09-07 PROCEDURE — 77014 PR CT GUIDANCE PLACEMENT RAD THERAPY FIELDS: CPT | Performed by: SPECIALIST

## 2022-09-07 NOTE — PROGRESS NOTES
DEPARTMENT OF RADIATION ONCOLOGY ON TREATMENT VISIT         9/7/2022      NAME:  Madiha REYES Kika Larios OF BIRTH:  1950    Diagnosis: HN CA    SUBJECTIVE:   Karena Osman has now received fractionated external beam radiation therapy - ongoing. Past medical, surgical, social and family histories reviewed and updated as indicated. Pain: controlled    ALLERGIES:  Patient has no known allergies. Current Outpatient Medications   Medication Sig Dispense Refill    melatonin 3 MG TABS tablet Take 3 mg by mouth daily      prochlorperazine (COMPAZINE) 10 MG tablet Take 1 tablet by mouth every 6 hours as needed (nausea/vomiting) 40 tablet 3    hydrOXYzine pamoate (VISTARIL) 25 MG capsule Take 1 capsule by mouth 3 times daily as needed for Anxiety 28 capsule 0    Magic Mouthwash (MIRACLE MOUTHWASH) Swish and spit 5 mLs 4 times daily as needed for Irritation or Pain Benadryl 12.5 mg/ml Maalox 10 ml/5ml  lidocaine 2%/5ml 480 mL 0    ibuprofen (ADVIL;MOTRIN) 600 MG tablet Take 1 tablet by mouth every 6 hours as needed for Pain 360 tablet 1    fenofibrate (TRICOR) 145 MG tablet Take 1 tablet by mouth daily 90 tablet 1    lisinopril-hydroCHLOROthiazide (PRINZIDE;ZESTORETIC) 10-12.5 MG per tablet Take 1 tablet by mouth daily 90 tablet 1    metFORMIN (GLUCOPHAGE-XR) 500 MG extended release tablet Take 1 tablet by mouth 2 times daily 180 tablet 1    omeprazole (PRILOSEC) 40 MG delayed release capsule Take 1 capsule by mouth daily 90 capsule 1    NONFORMULARY Take by mouth daily Hemp gummies OTC - patient states helps with his blood sugar  Not ordered by physician per patient      blood glucose monitor strips Test 3 times a day & as needed for symptoms of irregular blood glucose. Dispense sufficient amount for indicated testing frequency plus additional to accommodate PRN testing needs.  50 strip 0    psyllium (KONSYL) 28.3 % PACK Take 1 packet by mouth nightly        No current facility-administered medications for this encounter. OBJECTIVE:  Alert and fully ambulatory. Pleasant and conversant. Physical Examination: General appearance - alert, well appearing, and in no distress. Wt Readings from Last 3 Encounters:   09/06/22 189 lb 3.2 oz (85.8 kg)   09/06/22 189 lb 11.2 oz (86 kg)   08/24/22 186 lb 6.4 oz (84.6 kg)         ASSESSMENT/PLAN:     Patient is tolerating treatments well with expected toxicities. RBA were reviewed prior to first fraction and PRN. Current and planned dose reviewed. Goals of treatment and potential side effects were reviewed with the patient PRN. Treatment imaging has been personally reviewed for accuracy and precision. Questions answered to apparent satisfaction. Treatments will continue as planned. Radha Ocampo.  Colin Daniel MD MS DABR  Radiation Oncologist        [facility]): [phone] /// FAX: [phone]  [facility]): [phone] /// FAX: [phone]  [address] [place] [address]): [phone] /// FAX: [phone]

## 2022-09-07 NOTE — PATIENT INSTRUCTIONS
Continue daily fractionated radiation therapy as scheduled. Please see weekly OTV note and intial consultation letter in [facility] for clinical details. Marcelyn Mohs. Austen Diggs MD MS [facility]:  [phone]   FAX: [phone] [address]:  [phone]   FAX:    [phone]  [facility]:  [phone]   FAX:  [phone]  Email: [email or web address]

## 2022-09-08 ENCOUNTER — HOSPITAL ENCOUNTER (OUTPATIENT)
Dept: RADIATION ONCOLOGY | Age: 72
Discharge: HOME | End: 2022-09-08
Payer: MEDICARE

## 2022-09-08 PROCEDURE — 77014 PR CT GUIDANCE PLACEMENT RAD THERAPY FIELDS: CPT | Performed by: SPECIALIST

## 2022-09-08 PROCEDURE — 77386 HC NTSTY MODUL RAD TX DLVR CPLX: CPT | Performed by: SPECIALIST

## 2022-09-09 ENCOUNTER — HOSPITAL ENCOUNTER (OUTPATIENT)
Dept: RADIATION ONCOLOGY | Age: 72
Discharge: HOME | End: 2022-09-09
Payer: MEDICARE

## 2022-09-09 PROCEDURE — 77014 PR CT GUIDANCE PLACEMENT RAD THERAPY FIELDS: CPT | Performed by: SPECIALIST

## 2022-09-09 PROCEDURE — 77386 HC NTSTY MODUL RAD TX DLVR CPLX: CPT | Performed by: SPECIALIST

## 2022-09-09 PROCEDURE — 77427 RADIATION TX MANAGEMENT X5: CPT | Performed by: RADIOLOGY

## 2022-09-12 ENCOUNTER — HOSPITAL ENCOUNTER (OUTPATIENT)
Dept: RADIATION ONCOLOGY | Age: 72
Discharge: HOME | End: 2022-09-12
Payer: MEDICARE

## 2022-09-12 ENCOUNTER — HOSPITAL ENCOUNTER (OUTPATIENT)
Dept: INFUSION THERAPY | Age: 72
Discharge: HOME | End: 2022-09-12
Payer: MEDICARE

## 2022-09-12 DIAGNOSIS — C10.9 SQUAMOUS CELL CARCINOMA OF OROPHARYNX (HCC): ICD-10-CM

## 2022-09-12 LAB
ALBUMIN SERPL-MCNC: 4.3 G/DL (ref 3.5–5.2)
ALP BLD-CCNC: 47 U/L (ref 40–129)
ALT SERPL-CCNC: 28 U/L (ref 0–40)
ANION GAP SERPL CALCULATED.3IONS-SCNC: 12 MMOL/L (ref 7–16)
AST SERPL-CCNC: 17 U/L (ref 0–39)
BASOPHILS ABSOLUTE: 0.04 E9/L (ref 0–0.2)
BASOPHILS RELATIVE PERCENT: 0.5 % (ref 0–2)
BILIRUB SERPL-MCNC: 0.4 MG/DL (ref 0–1.2)
BUN BLDV-MCNC: 22 MG/DL (ref 6–23)
CALCIUM SERPL-MCNC: 10.1 MG/DL (ref 8.6–10.2)
CHLORIDE BLD-SCNC: 98 MMOL/L (ref 98–107)
CO2: 26 MMOL/L (ref 22–29)
CREAT SERPL-MCNC: 1 MG/DL (ref 0.7–1.2)
EOSINOPHILS ABSOLUTE: 0.17 E9/L (ref 0.05–0.5)
EOSINOPHILS RELATIVE PERCENT: 2.2 % (ref 0–6)
GFR AFRICAN AMERICAN: >60
GFR NON-AFRICAN AMERICAN: >60 ML/MIN/1.73
GLUCOSE BLD-MCNC: 115 MG/DL (ref 74–99)
HCT VFR BLD CALC: 45.1 % (ref 37–54)
HEMOGLOBIN: 15.4 G/DL (ref 12.5–16.5)
IMMATURE GRANULOCYTES #: 0.02 E9/L
IMMATURE GRANULOCYTES %: 0.3 % (ref 0–5)
LYMPHOCYTES ABSOLUTE: 0.64 E9/L (ref 1.5–4)
LYMPHOCYTES RELATIVE PERCENT: 8.2 % (ref 20–42)
MAGNESIUM: 2 MG/DL (ref 1.6–2.6)
MCH RBC QN AUTO: 29 PG (ref 26–35)
MCHC RBC AUTO-ENTMCNC: 34.1 % (ref 32–34.5)
MCV RBC AUTO: 84.9 FL (ref 80–99.9)
MONOCYTES ABSOLUTE: 0.76 E9/L (ref 0.1–0.95)
MONOCYTES RELATIVE PERCENT: 9.7 % (ref 2–12)
NEUTROPHILS ABSOLUTE: 6.19 E9/L (ref 1.8–7.3)
NEUTROPHILS RELATIVE PERCENT: 79.1 % (ref 43–80)
PDW BLD-RTO: 13 FL (ref 11.5–15)
PLATELET # BLD: 263 E9/L (ref 130–450)
PMV BLD AUTO: 9.8 FL (ref 7–12)
POTASSIUM SERPL-SCNC: 4.4 MMOL/L (ref 3.5–5)
RBC # BLD: 5.31 E12/L (ref 3.8–5.8)
SODIUM BLD-SCNC: 136 MMOL/L (ref 132–146)
TOTAL PROTEIN: 7.1 G/DL (ref 6.4–8.3)
WBC # BLD: 7.8 E9/L (ref 4.5–11.5)

## 2022-09-12 PROCEDURE — 77386 HC NTSTY MODUL RAD TX DLVR CPLX: CPT | Performed by: SPECIALIST

## 2022-09-12 PROCEDURE — 80053 COMPREHEN METABOLIC PANEL: CPT

## 2022-09-12 PROCEDURE — 36415 COLL VENOUS BLD VENIPUNCTURE: CPT

## 2022-09-12 PROCEDURE — 85025 COMPLETE CBC W/AUTO DIFF WBC: CPT

## 2022-09-12 PROCEDURE — 77014 PR CT GUIDANCE PLACEMENT RAD THERAPY FIELDS: CPT | Performed by: SPECIALIST

## 2022-09-12 PROCEDURE — 83735 ASSAY OF MAGNESIUM: CPT

## 2022-09-12 PROCEDURE — 77336 RADIATION PHYSICS CONSULT: CPT | Performed by: SPECIALIST

## 2022-09-13 ENCOUNTER — HOSPITAL ENCOUNTER (OUTPATIENT)
Dept: RADIATION ONCOLOGY | Age: 72
Discharge: HOME | End: 2022-09-13
Payer: MEDICARE

## 2022-09-13 ENCOUNTER — TELEPHONE (OUTPATIENT)
Dept: INFUSION THERAPY | Age: 72
End: 2022-09-13

## 2022-09-13 ENCOUNTER — HOSPITAL ENCOUNTER (OUTPATIENT)
Dept: INFUSION THERAPY | Age: 72
Discharge: HOME | End: 2022-09-13
Payer: MEDICARE

## 2022-09-13 ENCOUNTER — OFFICE VISIT (OUTPATIENT)
Dept: ONCOLOGY | Age: 72
End: 2022-09-13
Payer: MEDICARE

## 2022-09-13 VITALS
HEART RATE: 83 BPM | RESPIRATION RATE: 16 BRPM | TEMPERATURE: 97.5 F | OXYGEN SATURATION: 96 % | DIASTOLIC BLOOD PRESSURE: 72 MMHG | SYSTOLIC BLOOD PRESSURE: 140 MMHG

## 2022-09-13 VITALS
HEART RATE: 92 BPM | TEMPERATURE: 97.6 F | BODY MASS INDEX: 29.02 KG/M2 | WEIGHT: 184.9 LBS | DIASTOLIC BLOOD PRESSURE: 73 MMHG | SYSTOLIC BLOOD PRESSURE: 111 MMHG | OXYGEN SATURATION: 99 % | HEIGHT: 67 IN

## 2022-09-13 VITALS
WEIGHT: 184.9 LBS | BODY MASS INDEX: 28.96 KG/M2 | OXYGEN SATURATION: 97 % | HEART RATE: 83 BPM | TEMPERATURE: 97.2 F | SYSTOLIC BLOOD PRESSURE: 131 MMHG | DIASTOLIC BLOOD PRESSURE: 77 MMHG | RESPIRATION RATE: 18 BRPM

## 2022-09-13 DIAGNOSIS — C10.9 SQUAMOUS CELL CARCINOMA OF OROPHARYNX (HCC): Primary | ICD-10-CM

## 2022-09-13 DIAGNOSIS — C76.0 HEAD AND NECK CANCER (HCC): Primary | ICD-10-CM

## 2022-09-13 PROCEDURE — 6360000002 HC RX W HCPCS

## 2022-09-13 PROCEDURE — 6360000002 HC RX W HCPCS: Performed by: INTERNAL MEDICINE

## 2022-09-13 PROCEDURE — 1123F ACP DISCUSS/DSCN MKR DOCD: CPT | Performed by: INTERNAL MEDICINE

## 2022-09-13 PROCEDURE — 77014 PR CT GUIDANCE PLACEMENT RAD THERAPY FIELDS: CPT | Performed by: SPECIALIST

## 2022-09-13 PROCEDURE — 96413 CHEMO IV INFUSION 1 HR: CPT

## 2022-09-13 PROCEDURE — 99214 OFFICE O/P EST MOD 30 MIN: CPT | Performed by: INTERNAL MEDICINE

## 2022-09-13 PROCEDURE — 96415 CHEMO IV INFUSION ADDL HR: CPT

## 2022-09-13 PROCEDURE — 96367 TX/PROPH/DG ADDL SEQ IV INF: CPT

## 2022-09-13 PROCEDURE — 77386 HC NTSTY MODUL RAD TX DLVR CPLX: CPT | Performed by: SPECIALIST

## 2022-09-13 PROCEDURE — 99999 PR OFFICE/OUTPT VISIT,PROCEDURE ONLY: CPT | Performed by: RADIOLOGY

## 2022-09-13 PROCEDURE — 96375 TX/PRO/DX INJ NEW DRUG ADDON: CPT

## 2022-09-13 PROCEDURE — 2580000003 HC RX 258: Performed by: INTERNAL MEDICINE

## 2022-09-13 RX ORDER — DEXAMETHASONE SODIUM PHOSPHATE 10 MG/ML
INJECTION INTRAMUSCULAR; INTRAVENOUS
Status: COMPLETED
Start: 2022-09-13 | End: 2022-09-13

## 2022-09-13 RX ORDER — SODIUM CHLORIDE 9 MG/ML
5-250 INJECTION, SOLUTION INTRAVENOUS PRN
Status: DISCONTINUED | OUTPATIENT
Start: 2022-09-13 | End: 2022-09-14 | Stop reason: HOSPADM

## 2022-09-13 RX ORDER — SODIUM CHLORIDE 0.9 % (FLUSH) 0.9 %
5-40 SYRINGE (ML) INJECTION PRN
Status: DISCONTINUED | OUTPATIENT
Start: 2022-09-13 | End: 2022-09-14 | Stop reason: HOSPADM

## 2022-09-13 RX ORDER — DEXAMETHASONE SODIUM PHOSPHATE 10 MG/ML
10 INJECTION INTRAMUSCULAR; INTRAVENOUS ONCE
Status: COMPLETED | OUTPATIENT
Start: 2022-09-13 | End: 2022-09-13

## 2022-09-13 RX ORDER — PALONOSETRON HYDROCHLORIDE 0.05 MG/ML
INJECTION, SOLUTION INTRAVENOUS
Status: COMPLETED
Start: 2022-09-13 | End: 2022-09-13

## 2022-09-13 RX ORDER — PALONOSETRON HYDROCHLORIDE 0.05 MG/ML
0.25 INJECTION, SOLUTION INTRAVENOUS ONCE
Status: COMPLETED | OUTPATIENT
Start: 2022-09-13 | End: 2022-09-13

## 2022-09-13 RX ADMIN — DEXAMETHASONE SODIUM PHOSPHATE 10 MG: 10 INJECTION INTRAMUSCULAR; INTRAVENOUS at 09:11

## 2022-09-13 RX ADMIN — SODIUM CHLORIDE 250 ML/HR: 9 INJECTION, SOLUTION INTRAVENOUS at 09:07

## 2022-09-13 RX ADMIN — PALONOSETRON 0.25 MG: 0.25 INJECTION, SOLUTION INTRAVENOUS at 09:09

## 2022-09-13 RX ADMIN — PALONOSETRON HYDROCHLORIDE 0.25 MG: 0.05 INJECTION, SOLUTION INTRAVENOUS at 09:09

## 2022-09-13 RX ADMIN — SODIUM CHLORIDE 150 MG: 9 INJECTION, SOLUTION INTRAVENOUS at 09:39

## 2022-09-13 RX ADMIN — POTASSIUM CHLORIDE: 2 INJECTION, SOLUTION, CONCENTRATE INTRAVENOUS at 10:15

## 2022-09-13 NOTE — PROGRESS NOTES
DEPARTMENT OF RADIATION ONCOLOGY ON TREATMENT VISIT         9/13/2022      NAME:  Daily Ogden OF BIRTH:  1950    Diagnosis: HN CA    SUBJECTIVE:   My Arce has now received fractionated external beam radiation therapy - ongoing. Past medical, surgical, social and family histories reviewed and updated as indicated. Pain: controlled    ALLERGIES:  Patient has no known allergies. Current Outpatient Medications   Medication Sig Dispense Refill    melatonin 3 MG TABS tablet Take 3 mg by mouth daily      prochlorperazine (COMPAZINE) 10 MG tablet Take 1 tablet by mouth every 6 hours as needed (nausea/vomiting) 40 tablet 3    Magic Mouthwash (MIRACLE MOUTHWASH) Swish and spit 5 mLs 4 times daily as needed for Irritation or Pain Benadryl 12.5 mg/ml Maalox 10 ml/5ml  lidocaine 2%/5ml 480 mL 0    ibuprofen (ADVIL;MOTRIN) 600 MG tablet Take 1 tablet by mouth every 6 hours as needed for Pain 360 tablet 1    fenofibrate (TRICOR) 145 MG tablet Take 1 tablet by mouth daily 90 tablet 1    lisinopril-hydroCHLOROthiazide (PRINZIDE;ZESTORETIC) 10-12.5 MG per tablet Take 1 tablet by mouth daily 90 tablet 1    metFORMIN (GLUCOPHAGE-XR) 500 MG extended release tablet Take 1 tablet by mouth 2 times daily 180 tablet 1    omeprazole (PRILOSEC) 40 MG delayed release capsule Take 1 capsule by mouth daily 90 capsule 1    NONFORMULARY Take by mouth daily Hemp gummies OTC - patient states helps with his blood sugar  Not ordered by physician per patient      blood glucose monitor strips Test 3 times a day & as needed for symptoms of irregular blood glucose. Dispense sufficient amount for indicated testing frequency plus additional to accommodate PRN testing needs. 50 strip 0    psyllium (KONSYL) 28.3 % PACK Take 1 packet by mouth nightly        No current facility-administered medications for this encounter.      Facility-Administered Medications Ordered in Other Encounters   Medication Dose Route Frequency Provider Last Rate Last Admin    0.9 % sodium chloride infusion  5-250 mL/hr IntraVENous PRN Dmitry Zamora MD   Stopped at 09/13/22 1238    sodium chloride flush 0.9 % injection 5-40 mL  5-40 mL IntraVENous PRN Dmitry Zamora MD               OBJECTIVE:  Alert and fully ambulatory. Pleasant and conversant. Physical Examination: General appearance - alert, well appearing, and in no distress. Wt Readings from Last 3 Encounters:   09/13/22 184 lb 14.4 oz (83.9 kg)   09/13/22 184 lb 14.4 oz (83.9 kg)   09/06/22 189 lb 3.2 oz (85.8 kg)         ASSESSMENT/PLAN:     Patient is tolerating treatments well with expected toxicities. RBA were reviewed prior to first fraction and PRN. Current and planned dose reviewed. Goals of treatment and potential side effects were reviewed with the patient PRN. Treatment imaging has been personally reviewed for accuracy and precision. Questions answered to apparent satisfaction. Treatments will continue as planned. Cory Pate.  Anika Zamarripa MD MS DABR  Radiation Oncologist        [facility] ([address] [place] [address]): [phone] /// FAX: [phone]  [facility]): [phone] /// FAX: [phone]  [address] [place] [address]): [phone] /// FAX: [phone]

## 2022-09-13 NOTE — TELEPHONE ENCOUNTER
32-35kcals/kg/ULD=3045-3256qvsmb, 1.3-1.5gm/kg/PZE=010-977wo protein, 1ml/kcal=2700-2900ml fluids  Malnutrition Status: High risk  Nutrition Diagnosis: High risk r/t H & N cancer and undergoing concurrent chemo/rad tx. Pre-Hab Eligible?: Yes    Recommendations: Encourage 3 meals/day with 3 snacks per day. Consume 2-3 ONS of choice per day.      Marielena Mejía RD

## 2022-09-13 NOTE — PATIENT INSTRUCTIONS
Continue daily fractionated radiation therapy as scheduled. Please see weekly OTV note and intial consultation letter in [facility] for clinical details. Vivi Beltran. Marcia Rashid MD MS [place] [facility]:  [phone]   FAX: [phone]  [facility] [address]:  [phone]   FAX:    [phone]  [address] [place] [address] Road:  [phone]   FAX:  [phone]  Email: [email or web address]

## 2022-09-13 NOTE — PROGRESS NOTES
René Falls  9/13/2022  Wt Readings from Last 3 Encounters:   09/13/22 181 lb 14.4 oz (82.5 kg)   09/13/22 184 lb 14.4 oz (83.9 kg)   09/06/22 189 lb 3.2 oz (85.8 kg)     Body mass index is 28.49 kg/m². Treatment Area:Oropharynx    Patient was seen today for weekly visit. Comfort Alteration  KPS:90%  Fatigue: None    Mucous Membrane Alteration  Mucositis Due to Radiation: Yes  Thrush: No  Voice Changes: Yes, pt feels it is improving    Nutritional Alteration  Anorexia: Yes, pt states he is eating well  Nausea: No   Vomiting: No     Skin Alteration   Sensation:none    Radiation Dermatitis:  no    Ventilation Alteration  Cough:No    Emotional  Coping: effective    Injury, potential bleeding or infection: no    Radioprotectant Tolerance  No            Lab Results   Component Value Date    WBC 7.8 09/12/2022     09/12/2022         /77   Pulse 83   Temp 97.2 °F (36.2 °C) (Temporal)   Resp 18   Wt 181 lb 14.4 oz (82.5 kg) Comment: pt weighed in Med Onc  SpO2 97%   BMI 28.49 kg/m²   BP within normal range? yes       Assessment/Plan:6/37fx;1200/7400cGy completed and tolerating. 5.2 Lb. Wt loss since last week, but patient states he feels he has a good appetite. No intake/output data recorded.      Eliezer Peck RN

## 2022-09-13 NOTE — PROGRESS NOTES
[facility] Oncology  Attending Clinic Note    Reason for Visit: Follow-up on a patient with p16 + Oropharyngeal Squamous Cell Carcinoma    PCP:  Sherlean Blizzard, DO    History of Present Illness:  67-year-old male who presented to the ENT team for persistent left-sided neck fullness without associated difficulty swallowing    CT soft tissue neck 02/23/2022: Mass located in the left aspect of floor of the mouth extending into the left oropharyngeal soft tissue concerning for squamous cell carcinoma  Multiple enlarged necrotic left anterior cervical chain lymph nodes    Panendoscopy on 3/17/2022:  Findings: L lingual tonsil hypertrophy, biopsy negative, left level II neck node FNA performed   FNA left neck mass level 2: Inconclusive for malignant cells. Few atypical squamous cells with degenerative change  A. Tongue, left base, biopsy:   Squamous epithelial-lined lymphoid tissue with reactive germinal centers, compatible with lingual tonsil. Negative for dysplasia; Negative for malignancy. B.  Tongue, left base, biopsy:   Squamous epithelial-lined lymphoid tissue with reactive germinal centers, compatible with lingual tonsil. Focal lobules of benign mucinous glands. Negative for dysplasia; Negative for malignancy    On 07/21/2022: Panendoscopy excision left level 2 lymph node  Findings:  left base of tongue mass, left cervical lymphadenopathy  A. 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 See comment. B. 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 C.  Left base of tongue, biopsy: Squamous mucosa, negative for neoplasm. D.  Right base of tongue, biopsy: Squamous mucosa, negative for neoplasm. E.  Midline tongue, biopsy: Squamous mucosa, negative for neoplasm.    Comment:   The squamous carcinoma is diffusely and strongly positive for p16 immunostain, supporting the above interpretation    PET/CT scan 08/01/2022: There is a large hypermetabolic mass centered in the left oropharynx involving the left lateral oropharynx, left tonsillar and left base of tongue regions extending superiorly to the left soft palate and inferiorly to the left floor of mouth and left vallecula. Maximum SUV of this mass measures 17.4. The mass causes narrowing of the oropharyngeal airway. There are multiple hypermetabolic enlarged, necrotic and someone confluent left level 2 and left level 3 lymph nodes. Confluent left level 2 adenopathy demonstrates a maximum SUV of 15.8. Additional hypermetabolic left level 5/3 junction lymph node is noted with maximum SUV 5.0  Mild focal hypermetabolic activity is noted in the left parapharyngeal/retropharyngeal region (maximum SUV 3.8) which could represent an underlying small lymph node. There is a hypermetabolic right level 2 lymph node with maximum SUV 6.5    Recommended concurrent chemoradiation therapy with weekly Cisplatin. Side effects reviewed. He agreed to proceed. Authorization obtained. Cycle # 1 weekly Cisplatin was on 09/06/2022  Today 09/13/2022. No fever chills. Fair appetite and energy level. Intermittent nausea vomiting. Left neck swelling going down. He would like to know what to eat while on chemoRT    Review of Systems;  CONSTITUTIONAL: No fever, chills. Fair appetite and energy level. ENMT: Eyes: No diplopia; Nose: No epistaxis. Mouth: + sore throat. Neck: Left neck swelling going down  RESPIRATORY: No hemoptysis, shortness of breath, cough. CARDIOVASCULAR: No chest pain, palpitations. GASTROINTESTINAL: Intermittent nausea vomiting  GENITOURINARY: No dysuria, urinary frequency, hematuria. NEURO: No syncope, presyncope, headache.   Remainder:  ROS NEGATIVE    Past Medical History:      Diagnosis Date    Arthritis     Cancer ([address] 2022    oropharynx    Diabetes mellitus ([address]     GERD (gastroesophageal reflux disease) Hyperlipidemia     Hypertension     Lyme disease     Thrombocytopenia (HCC)      Medications:  Reviewed and reconciled. Allergies:  No Known Allergies    Physical Exam:  /73   Pulse 92   Temp 97.6 °F (36.4 °C)   Ht 5' 7\" (1.702 m)   Wt 184 lb 14.4 oz (83.9 kg)   SpO2 99%   BMI 28.96 kg/m²   GENERAL: Alert, oriented x 3, not in acute distress. HEENT: PERRLA; EOMI. Oropharynx clear. NECK: Supple. Left Cervical LN decrease in size  LUNGS: Good air entry bilaterally. No wheezing, crackles or ronchi. CARDIOVASCULAR: Regular rate. No murmurs, rubs or gallops. ABDOMEN: Soft. Non-tender, non-distended. EXTREMITIES: Without clubbing, cyanosis, or edema. NEUROLOGIC: No focal deficits. ECOG PS 1    Lab Results   Component Value Date    WBC 7.8 09/12/2022    HGB 15.4 09/12/2022    HCT 45.1 09/12/2022    MCV 84.9 09/12/2022     09/12/2022     Lab Results   Component Value Date     09/12/2022    K 4.4 09/12/2022    CL 98 09/12/2022    CO2 26 09/12/2022    BUN 22 09/12/2022    CREATININE 1.0 09/12/2022    GLUCOSE 115 (H) 09/12/2022    CALCIUM 10.1 09/12/2022    PROT 7.1 09/12/2022    LABALBU 4.3 09/12/2022    BILITOT 0.4 09/12/2022    ALKPHOS 47 09/12/2022    AST 17 09/12/2022    ALT 28 09/12/2022    LABGLOM >60 09/12/2022    GFRAA >60 09/12/2022     Lab Results   Component Value Date/Time    MG 2.0 09/12/2022 11:20 AM     Impression/Plan:  72-year-old male with history of p16+ left Oropharyngeal Squamous Cell Carcinoma    CT soft tissue neck 02/23/2022: Mass located in the left aspect of floor of the mouth extending into the left oropharyngeal soft tissue concerning for squamous cell carcinoma  Multiple enlarged necrotic left anterior cervical chain lymph nodes    Panendoscopy on 3/17/2022:  Findings: L lingual tonsil hypertrophy, biopsy negative, left level II neck node FNA performed   FNA left neck mass level 2: Inconclusive for malignant cells.   Few atypical squamous cells with degenerative change  A. Tongue, left base, biopsy:   Squamous epithelial-lined lymphoid tissue with reactive germinal centers, compatible with lingual tonsil. Negative for dysplasia; Negative for malignancy. B.  Tongue, left base, biopsy:   Squamous epithelial-lined lymphoid tissue with reactive germinal centers, compatible with lingual tonsil. Focal lobules of benign mucinous glands. Negative for dysplasia; Negative for malignancy    On 07/21/2022: Panendoscopy excision left level 2 lymph node  Findings:  left base of tongue mass, left cervical lymphadenopathy  A. 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 See comment. B. 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 C.  Left base of tongue, biopsy: Squamous mucosa, negative for neoplasm. D.  Right base of tongue, biopsy: Squamous mucosa, negative for neoplasm. E.  Midline tongue, biopsy: Squamous mucosa, negative for neoplasm. Comment:   The squamous carcinoma is diffusely and strongly positive for p16 immunostain, supporting the above interpretation    PET/CT scan 08/01/2022: There is a large hypermetabolic mass centered in the left oropharynx involving the left lateral oropharynx, left tonsillar and left base of tongue regions extending superiorly to the left soft palate and inferiorly to the left floor of mouth and left vallecula. Maximum SUV of this mass measures 17.4. The mass causes narrowing of the oropharyngeal airway. There are multiple hypermetabolic enlarged, necrotic and someone confluent left level 2 and left level 3 lymph nodes. Confluent left level 2 adenopathy demonstrates a maximum SUV of 15.8.   Additional hypermetabolic left level 5/3 junction lymph node is noted with maximum SUV 5.0  Mild focal hypermetabolic activity is noted in the left parapharyngeal/retropharyngeal region (maximum SUV 3.8) which could represent an underlying small lymph node. There is a hypermetabolic right level 2 lymph node with maximum SUV 6.5    Recommended concurrent chemoradiation therapy with weekly Cisplatin. Side effects reviewed. He agreed to proceed. Authorization obtained. Cycle # 1 weekly Cisplatin was on 09/06/2022  Cycle # 2 weekly Cisplatin is today 09/13/2022. Labs reviewed ok to proceed. Recommended Compazine as needed for nausea.   Dietary team consulted    RTC next week for Cycle # 3 weekly Cisplatin     Angeles Cobos MD   9/13/2022

## 2022-09-14 ENCOUNTER — HOSPITAL ENCOUNTER (OUTPATIENT)
Dept: RADIATION ONCOLOGY | Age: 72
Discharge: HOME | End: 2022-09-14
Payer: MEDICARE

## 2022-09-14 ENCOUNTER — OFFICE VISIT (OUTPATIENT)
Dept: PALLATIVE CARE | Age: 72
End: 2022-09-14
Payer: MEDICARE

## 2022-09-14 VITALS
OXYGEN SATURATION: 97 % | TEMPERATURE: 97.3 F | WEIGHT: 187 LBS | DIASTOLIC BLOOD PRESSURE: 67 MMHG | HEART RATE: 68 BPM | BODY MASS INDEX: 29.29 KG/M2 | SYSTOLIC BLOOD PRESSURE: 114 MMHG

## 2022-09-14 DIAGNOSIS — Z51.5 PALLIATIVE CARE BY SPECIALIST: Primary | ICD-10-CM

## 2022-09-14 PROCEDURE — 99212 OFFICE O/P EST SF 10 MIN: CPT | Performed by: NURSE PRACTITIONER

## 2022-09-14 PROCEDURE — 77014 PR CT GUIDANCE PLACEMENT RAD THERAPY FIELDS: CPT | Performed by: SPECIALIST

## 2022-09-14 PROCEDURE — 77386 HC NTSTY MODUL RAD TX DLVR CPLX: CPT | Performed by: SPECIALIST

## 2022-09-14 PROCEDURE — 99213 OFFICE O/P EST LOW 20 MIN: CPT | Performed by: NURSE PRACTITIONER

## 2022-09-14 PROCEDURE — 1123F ACP DISCUSS/DSCN MKR DOCD: CPT | Performed by: NURSE PRACTITIONER

## 2022-09-14 RX ORDER — TRAZODONE HYDROCHLORIDE 50 MG/1
50 TABLET ORAL NIGHTLY
Qty: 30 TABLET | Refills: 0 | Status: SHIPPED | OUTPATIENT
Start: 2022-09-14 | End: 2022-10-26

## 2022-09-14 RX ORDER — GUAIFENESIN 600 MG/1
600 TABLET, EXTENDED RELEASE ORAL 2 TIMES DAILY
Qty: 60 TABLET | Refills: 0 | Status: SHIPPED | OUTPATIENT
Start: 2022-09-14 | End: 2022-10-14

## 2022-09-14 RX ORDER — DOCUSATE SODIUM 100 MG/1
100 CAPSULE, LIQUID FILLED ORAL 2 TIMES DAILY PRN
Qty: 60 CAPSULE | Refills: 0 | Status: SHIPPED | OUTPATIENT
Start: 2022-09-14 | End: 2022-10-14

## 2022-09-14 NOTE — PROGRESS NOTES
Department of Palliative Medicine  Ambulatory Note  Provider: MARY Peters CNP      Location of service: [place] [address]  Chief Complaint: Baljit Burrell is a 71 y.o. male with chief complaint of anxiety    HPI: Baljit Burrell is a 71 y.o. male with significant past medical history of squamous cell carcinoma of the left base of the tongue and tonsil, diabetes mellitus, Lyme disease, hypertension, arthritis who was referred to [address] [place] [address] for symptom management. Plan is for concurrent chemo and radiation. Assessment/Plan      Squamous cell carcinoma of the left base of tongue and tonsil  -Follows with Dr. Daja Shepherd for oncology    Pain due to neoplasm  -Tylenol 650 mg as needed  -Continue ibuprofen 600 mg every 6 hours as needed  -Magic mouthwash  -Tetracaine lollipops    Insomnia  -Continue melatonin   -Start trazodone 50 mg nightly    Anxiety  -DC vistaril 25 mg     Constipation  -Start Colace  -Continue Metamucil    Follow Up:  4 weeks. Encouraged to call with any questions, concerns, needs, or changes in symptoms. Subjective:     Met with Cristino Sandhoff at the outpatient clinic. His main complaints are a sore throat and not being able to sleep through the night. He tried the Vistaril, but it caused him to be short of breath. He states that he can fall asleep, but wakes up multiple times throughout the night. He does express that he believes his anxiety has been improving since starting treatment and that he has noticed the mass shrinking. He is using the ibuprofen once or twice daily and he states that it helps with the pain. He does complain of a sore throat and has been using the Magic mouthwash. He has not had trouble eating or swallowing. He denied any loss of appetite.   He has Compazine at home for nausea which she states is beneficial.    Pain Assessment   Ratin  Description: pressure, sharp, and stabbing  Duration: months  Frequency: daily  Location: left neck  Alleviating Factors: pain medication and ice  Exacerbating Factors: unable to associate with any factor  Effect: change in function, interference with activities, sleep, and mood    Past Medical History:   Diagnosis Date    Arthritis     Cancer ([facility] 2022    oropharynx    Diabetes mellitus ([facility]     GERD (gastroesophageal reflux disease)     Hyperlipidemia     Hypertension     Lyme disease     Thrombocytopenia ([facility]        Past Surgical History:   Procedure Laterality Date    BRONCHOSCOPY N/A 10/28/2019    BRONCHOSCOPY performed by Mindy Garcia MD at [facility]  2017    dr Mary Ceron  03/2017    COLONOSCOPY  03/2019    LARYNGOSCOPY Left 3/17/2022    PANENDOSCOPY WITH BIOPSY performed by Rodolfo Fontana DO at [place] [address] Left 7/21/2022    PANENDOSCOPY performed by Rodolfo Fontana DO at [address] [place] [address] Left 7/21/2022    EXCISION LEFT LEVEL II LYMPH NODE, PANENDOSCOPY performed by Rodolfo Fontana DO at [address] EXTRACTION Left 12/28/2021    VASECTOMY         Family History   Problem Relation Age of Onset    High Blood Pressure Mother     High Blood Pressure Father     Cancer Father 80        kidney    Abdominal aortic aneurysm Father        ROS: UNLESS STATED ABOVE PATIENT DENIES:  CONSTITUTIONAL:  fever, chill, rigors, nausea, vomiting, fatigue. HEENT: blurry vision, double vision, hearing problem, tinnitus, hoarseness, dysphagia, odynophagia  RESPIRATORY: cough, shortness of breath, sputum expectoration. CARDIOVASCULAR:  Chest pain/pressure, palpitation, syncope, irregular beats  GASTROINTESTINAL:  abdominal or rectal pain, diarrhea, constipation, . GENITOURINARY:  Burning, frequency, urgency, incontinence, discharge  INTEGUMENTARY: rash, wound, pruritis  HEMATOLOGIC/LYMPHATIC:  Swelling, sores, gum bleeding, easy bruising, pica.   MUSCULOSKELETAL:  pain, edema, joint swelling or redness  NEUROLOGICAL:  light headed, dizziness, loss of consciousness, weakness, change in memory, seizures, tremors    Objective:     Physical Exam  Wt Readings from Last 3 Encounters:   09/14/22 187 lb (84.8 kg)   09/13/22 184 lb 14.4 oz (83.9 kg)   09/13/22 184 lb 14.4 oz (83.9 kg)     /67   Pulse 68   Temp 97.3 °F (36.3 °C)   Wt 187 lb (84.8 kg)   SpO2 97% Comment: RA  BMI 29.29 kg/m²     Gen:  Alert, appears stated age, well nourished, in no acute distress  HEENT:  Normocephalic, no drainage,  Neck:  Supple  Lungs:  non labored breathing  Heart[de-identified]  Regular rate  Abd:  Soft,   M/S/Ext:  moving all extremities   Skin:  no visible lesions  Neuro:  PERRL, Alert, oriented x 3; following commands    [place] Symptom Assessment Score   [place] Score 9/14/2022 8/24/2022   Pain Score 5 6   Tiredness Score 4 2   Nausea Score 4 Not nauseated   Depression Score 7 3   Anxiety Score 8 8   Drowsiness Score 4 3   Appetite Score 8 3   Wellbeing Score 5 3   Dyspnea Score 3 No shortness of breath   Other Problem Score 8 Best possible response   Total Assessment Score(calculated) 56 28     Assessed by: patient and provider. Current Medications:  Medications reviewed: yes    Controlled Substances Monitoring: [place]ARRS reviewed 9/14/22. RX Monitoring 6/7/2021   Periodic Controlled Substance Monitoring Possible medication side effects, risk of tolerance/dependence & alternative treatments discussed. ;No signs of potential drug abuse or diversion identified. Amish Hill, APRN - CNP   Palliative Care Department     Time/Communication  Greater than 50% of time spent, total 20 minutes in face-to-face counseling and coordination of care regarding goals of care and symptom management. Note: This report was completed using [employer] voiced recognition software. Every effort has been made to ensure accuracy; however, inadvertent computerized transcription errors may be present.

## 2022-09-15 ENCOUNTER — HOSPITAL ENCOUNTER (OUTPATIENT)
Dept: RADIATION ONCOLOGY | Age: 72
Discharge: HOME | End: 2022-09-15
Payer: MEDICARE

## 2022-09-15 PROCEDURE — 77014 PR CT GUIDANCE PLACEMENT RAD THERAPY FIELDS: CPT | Performed by: SPECIALIST

## 2022-09-15 PROCEDURE — 77386 HC NTSTY MODUL RAD TX DLVR CPLX: CPT | Performed by: SPECIALIST

## 2022-09-16 ENCOUNTER — HOSPITAL ENCOUNTER (OUTPATIENT)
Dept: RADIATION ONCOLOGY | Age: 72
Discharge: HOME | End: 2022-09-16
Payer: MEDICARE

## 2022-09-16 PROCEDURE — 77386 HC NTSTY MODUL RAD TX DLVR CPLX: CPT | Performed by: SPECIALIST

## 2022-09-16 PROCEDURE — 77427 RADIATION TX MANAGEMENT X5: CPT | Performed by: RADIOLOGY

## 2022-09-16 PROCEDURE — 77014 PR CT GUIDANCE PLACEMENT RAD THERAPY FIELDS: CPT | Performed by: SPECIALIST

## 2022-09-19 ENCOUNTER — TELEPHONE (OUTPATIENT)
Dept: INFUSION THERAPY | Age: 72
End: 2022-09-19

## 2022-09-19 ENCOUNTER — HOSPITAL ENCOUNTER (OUTPATIENT)
Dept: RADIATION ONCOLOGY | Age: 72
Discharge: HOME | End: 2022-09-19
Payer: MEDICARE

## 2022-09-19 PROCEDURE — 77386 HC NTSTY MODUL RAD TX DLVR CPLX: CPT | Performed by: SPECIALIST

## 2022-09-19 PROCEDURE — 77014 PR CT GUIDANCE PLACEMENT RAD THERAPY FIELDS: CPT | Performed by: SPECIALIST

## 2022-09-19 PROCEDURE — 77336 RADIATION PHYSICS CONSULT: CPT | Performed by: SPECIALIST

## 2022-09-19 NOTE — TELEPHONE ENCOUNTER
Spoke with Randie Harada while in for radtx today was 10/37. He reports odynophagia and decreased intake because of it. He denies dysphagia. Reported taking supplements such as Ensure Complete and [place] Breakfast Essentials. Eating soft foods such as eggs, and also had toast. But d/t odynophagia he is eating less. Also complained of hypogeusia. Further discussed feeding tube as an option to meet his nutritional needs d/t odynophagia and pain will continue to build as radiation continues. Discussed how feeding is administered usually with a syringe using gravity and that the formula is similar to what he has been consuming orally (ONS). Explained usually home health will come to complete tube feeding teaching. He said he worries most about having a surgical procedure. Provided him with information about how formula is administered, flushes and cleaning of a feeding tube. He said he would think about it some more. He returns tomorrow for visit with Dr. Lisa Pleitez. Encouraged him to further discuss with Dr. Lisa Pleitez tomorrow. Will continue to follow.

## 2022-09-20 ENCOUNTER — TELEPHONE (OUTPATIENT)
Dept: INFUSION THERAPY | Age: 72
End: 2022-09-20

## 2022-09-20 ENCOUNTER — HOSPITAL ENCOUNTER (OUTPATIENT)
Dept: RADIATION ONCOLOGY | Age: 72
Discharge: HOME | End: 2022-09-20
Payer: MEDICARE

## 2022-09-20 ENCOUNTER — HOSPITAL ENCOUNTER (OUTPATIENT)
Dept: INFUSION THERAPY | Age: 72
Discharge: HOME | End: 2022-09-20
Payer: MEDICARE

## 2022-09-20 ENCOUNTER — OFFICE VISIT (OUTPATIENT)
Dept: ONCOLOGY | Age: 72
End: 2022-09-20
Payer: MEDICARE

## 2022-09-20 VITALS
BODY MASS INDEX: 27.69 KG/M2 | SYSTOLIC BLOOD PRESSURE: 117 MMHG | OXYGEN SATURATION: 96 % | WEIGHT: 176.8 LBS | DIASTOLIC BLOOD PRESSURE: 70 MMHG | RESPIRATION RATE: 18 BRPM | HEART RATE: 96 BPM | TEMPERATURE: 98.2 F

## 2022-09-20 VITALS
WEIGHT: 176.2 LBS | OXYGEN SATURATION: 99 % | HEART RATE: 98 BPM | TEMPERATURE: 97.6 F | BODY MASS INDEX: 27.65 KG/M2 | SYSTOLIC BLOOD PRESSURE: 115 MMHG | HEIGHT: 67 IN | DIASTOLIC BLOOD PRESSURE: 83 MMHG

## 2022-09-20 VITALS
SYSTOLIC BLOOD PRESSURE: 105 MMHG | HEART RATE: 80 BPM | OXYGEN SATURATION: 98 % | DIASTOLIC BLOOD PRESSURE: 67 MMHG | TEMPERATURE: 97.9 F | RESPIRATION RATE: 14 BRPM

## 2022-09-20 DIAGNOSIS — C10.9 SQUAMOUS CELL CARCINOMA OF OROPHARYNX (HCC): Primary | ICD-10-CM

## 2022-09-20 DIAGNOSIS — C76.0 HEAD AND NECK CANCER (HCC): Primary | ICD-10-CM

## 2022-09-20 LAB
ALBUMIN SERPL-MCNC: 4 G/DL (ref 3.5–5.2)
ALP BLD-CCNC: 52 U/L (ref 40–129)
ALT SERPL-CCNC: 31 U/L (ref 0–40)
ANION GAP SERPL CALCULATED.3IONS-SCNC: 12 MMOL/L (ref 7–16)
AST SERPL-CCNC: 21 U/L (ref 0–39)
BASOPHILS ABSOLUTE: 0 E9/L (ref 0–0.2)
BASOPHILS RELATIVE PERCENT: 0.3 % (ref 0–2)
BILIRUB SERPL-MCNC: 0.6 MG/DL (ref 0–1.2)
BUN BLDV-MCNC: 29 MG/DL (ref 6–23)
CALCIUM SERPL-MCNC: 10 MG/DL (ref 8.6–10.2)
CHLORIDE BLD-SCNC: 91 MMOL/L (ref 98–107)
CO2: 26 MMOL/L (ref 22–29)
CREAT SERPL-MCNC: 1 MG/DL (ref 0.7–1.2)
EOSINOPHILS ABSOLUTE: 0.26 E9/L (ref 0.05–0.5)
EOSINOPHILS RELATIVE PERCENT: 2.6 % (ref 0–6)
GFR AFRICAN AMERICAN: >60
GFR NON-AFRICAN AMERICAN: >60 ML/MIN/1.73
GLUCOSE BLD-MCNC: 119 MG/DL (ref 74–99)
HCT VFR BLD CALC: 41.5 % (ref 37–54)
HEMOGLOBIN: 14.3 G/DL (ref 12.5–16.5)
LYMPHOCYTES ABSOLUTE: 0.4 E9/L (ref 1.5–4)
LYMPHOCYTES RELATIVE PERCENT: 3.5 % (ref 20–42)
MAGNESIUM: 1.9 MG/DL (ref 1.6–2.6)
MCH RBC QN AUTO: 29.6 PG (ref 26–35)
MCHC RBC AUTO-ENTMCNC: 34.5 % (ref 32–34.5)
MCV RBC AUTO: 85.9 FL (ref 80–99.9)
MONOCYTES ABSOLUTE: 0.7 E9/L (ref 0.1–0.95)
MONOCYTES RELATIVE PERCENT: 7 % (ref 2–12)
NEUTROPHILS ABSOLUTE: 8.7 E9/L (ref 1.8–7.3)
NEUTROPHILS RELATIVE PERCENT: 86.8 % (ref 43–80)
NUCLEATED RED BLOOD CELLS: 0.9 /100 WBC
PDW BLD-RTO: 12.9 FL (ref 11.5–15)
PLATELET # BLD: 212 E9/L (ref 130–450)
PMV BLD AUTO: 9.6 FL (ref 7–12)
POTASSIUM SERPL-SCNC: 3.7 MMOL/L (ref 3.5–5)
RBC # BLD: 4.83 E12/L (ref 3.8–5.8)
SODIUM BLD-SCNC: 129 MMOL/L (ref 132–146)
TOTAL PROTEIN: 7.2 G/DL (ref 6.4–8.3)
WBC # BLD: 10 E9/L (ref 4.5–11.5)

## 2022-09-20 PROCEDURE — 99215 OFFICE O/P EST HI 40 MIN: CPT | Performed by: INTERNAL MEDICINE

## 2022-09-20 PROCEDURE — 96366 THER/PROPH/DIAG IV INF ADDON: CPT

## 2022-09-20 PROCEDURE — 77386 HC NTSTY MODUL RAD TX DLVR CPLX: CPT | Performed by: SPECIALIST

## 2022-09-20 PROCEDURE — 80053 COMPREHEN METABOLIC PANEL: CPT

## 2022-09-20 PROCEDURE — 77014 PR CT GUIDANCE PLACEMENT RAD THERAPY FIELDS: CPT | Performed by: SPECIALIST

## 2022-09-20 PROCEDURE — 96413 CHEMO IV INFUSION 1 HR: CPT

## 2022-09-20 PROCEDURE — 96375 TX/PRO/DX INJ NEW DRUG ADDON: CPT

## 2022-09-20 PROCEDURE — 6360000002 HC RX W HCPCS: Performed by: INTERNAL MEDICINE

## 2022-09-20 PROCEDURE — 1123F ACP DISCUSS/DSCN MKR DOCD: CPT | Performed by: INTERNAL MEDICINE

## 2022-09-20 PROCEDURE — 36415 COLL VENOUS BLD VENIPUNCTURE: CPT

## 2022-09-20 PROCEDURE — 96415 CHEMO IV INFUSION ADDL HR: CPT

## 2022-09-20 PROCEDURE — 85025 COMPLETE CBC W/AUTO DIFF WBC: CPT

## 2022-09-20 PROCEDURE — 99999 PR OFFICE/OUTPT VISIT,PROCEDURE ONLY: CPT | Performed by: RADIOLOGY

## 2022-09-20 PROCEDURE — 83735 ASSAY OF MAGNESIUM: CPT

## 2022-09-20 PROCEDURE — 96367 TX/PROPH/DG ADDL SEQ IV INF: CPT

## 2022-09-20 PROCEDURE — 2580000003 HC RX 258: Performed by: INTERNAL MEDICINE

## 2022-09-20 RX ORDER — SODIUM CHLORIDE 9 MG/ML
5-250 INJECTION, SOLUTION INTRAVENOUS PRN
Status: CANCELLED | OUTPATIENT
Start: 2022-09-20

## 2022-09-20 RX ORDER — SODIUM CHLORIDE 9 MG/ML
5-250 INJECTION, SOLUTION INTRAVENOUS PRN
Status: DISCONTINUED | OUTPATIENT
Start: 2022-09-20 | End: 2022-09-21 | Stop reason: HOSPADM

## 2022-09-20 RX ORDER — PALONOSETRON HYDROCHLORIDE 0.05 MG/ML
0.25 INJECTION, SOLUTION INTRAVENOUS ONCE
Status: COMPLETED | OUTPATIENT
Start: 2022-09-20 | End: 2022-09-20

## 2022-09-20 RX ORDER — DEXAMETHASONE SODIUM PHOSPHATE 10 MG/ML
10 INJECTION INTRAMUSCULAR; INTRAVENOUS ONCE
Status: COMPLETED | OUTPATIENT
Start: 2022-09-20 | End: 2022-09-20

## 2022-09-20 RX ORDER — HEPARIN SODIUM (PORCINE) LOCK FLUSH IV SOLN 100 UNIT/ML 100 UNIT/ML
500 SOLUTION INTRAVENOUS PRN
Status: CANCELLED | OUTPATIENT
Start: 2022-09-20

## 2022-09-20 RX ORDER — 0.9 % SODIUM CHLORIDE 0.9 %
1000 INTRAVENOUS SOLUTION INTRAVENOUS ONCE
Status: COMPLETED | OUTPATIENT
Start: 2022-09-20 | End: 2022-09-20

## 2022-09-20 RX ORDER — 0.9 % SODIUM CHLORIDE 0.9 %
1000 INTRAVENOUS SOLUTION INTRAVENOUS ONCE
Status: CANCELLED | OUTPATIENT
Start: 2022-09-20 | End: 2022-09-20

## 2022-09-20 RX ORDER — SODIUM CHLORIDE 0.9 % (FLUSH) 0.9 %
5-40 SYRINGE (ML) INJECTION PRN
Status: CANCELLED | OUTPATIENT
Start: 2022-09-20

## 2022-09-20 RX ADMIN — SODIUM CHLORIDE 150 MG: 9 INJECTION, SOLUTION INTRAVENOUS at 12:41

## 2022-09-20 RX ADMIN — SODIUM CHLORIDE 1000 ML: 9 INJECTION, SOLUTION INTRAVENOUS at 12:16

## 2022-09-20 RX ADMIN — SODIUM CHLORIDE 20 ML/HR: 9 INJECTION, SOLUTION INTRAVENOUS at 12:14

## 2022-09-20 RX ADMIN — DEXAMETHASONE SODIUM PHOSPHATE 10 MG: 10 INJECTION INTRAMUSCULAR; INTRAVENOUS at 12:18

## 2022-09-20 RX ADMIN — PALONOSETRON 0.25 MG: 0.25 INJECTION, SOLUTION INTRAVENOUS at 12:17

## 2022-09-20 RX ADMIN — POTASSIUM CHLORIDE: 2 INJECTION, SOLUTION, CONCENTRATE INTRAVENOUS at 13:25

## 2022-09-20 ASSESSMENT — PAIN DESCRIPTION - DESCRIPTORS: DESCRIPTORS: BURNING

## 2022-09-20 ASSESSMENT — PAIN DESCRIPTION - LOCATION: LOCATION: THROAT

## 2022-09-20 ASSESSMENT — PAIN DESCRIPTION - FREQUENCY: FREQUENCY: CONTINUOUS

## 2022-09-20 NOTE — TELEPHONE ENCOUNTER
Serena Ly  9/20/2022  Wt Readings from Last 10 Encounters:   09/20/22 176 lb 3.2 oz (79.9 kg)   09/20/22 176 lb 12.8 oz (80.2 kg)   09/14/22 187 lb (84.8 kg)   09/13/22 184 lb 14.4 oz (83.9 kg)   09/13/22 184 lb 14.4 oz (83.9 kg)   09/06/22 189 lb 3.2 oz (85.8 kg)   09/06/22 189 lb 11.2 oz (86 kg)   08/24/22 186 lb 6.4 oz (84.6 kg)   08/16/22 187 lb (84.8 kg)   08/03/22 184 lb (83.5 kg)     Ht Readings from Last 1 Encounters:   09/20/22 5' 7\" (1.702 m)     BMI=27.6    Assessment: Visited with Randie Harada prior to OTV with Dr. Cuate Rick today. Being treated with concurrent chemo/radiation treatment for oropharyngeal cancer. Discussed his  weight loss and poor intake associated with odynophagia. He said he is able to swallow but it is so painful that he only ate a few bites yesterday but did drink 2 Ensure Complete and a [place] Essential. He wondered if her could live off drinking Ensure Complete and no solid food. I explained that is possible but he would have to drink about 7 bottles per day and as the radiation treatments continue (currently 11/37) that the pain would also increase as radiation builds on itself. Randie Harada voiced that he would never be able to consume that much orally, that d/t the pain it was difficult drinking/eating what he did yesterday. Encouraged Randie Harada to talk with Dr. Cuate Rick and Dr. Lisa Pleitez regarding feeding tube. Patient spoke with physicians and has decided to proceed with PEG placement. Randie Harada voiced he does not have a preference in home health companies and agreed to referral to [facility] for TF teaching, EN formula and supplies including split guaze, piston sytringes and paper tape. Randie Harada will be dependent upon tube feeding for the provision of recommended calories and protein. PO intake is limited to only sips and small amounts, needed to maintain swallowing function. Enteral feeding is necessary for >90 days to maintain nutrition.      Weight change: 4.38%

## 2022-09-20 NOTE — ADDENDUM NOTE
Encounter addended by: Berna Walton MD on: 9/20/2022 2:25 PM   Actions taken: Order list changed, Diagnosis association updated

## 2022-09-20 NOTE — PROGRESS NOTES
Ariadne Netter  9/20/2022  Wt Readings from Last 3 Encounters:   09/20/22 176 lb 12.8 oz (80.2 kg)   09/14/22 187 lb (84.8 kg)   09/13/22 184 lb 14.4 oz (83.9 kg)     Body mass index is 27.69 kg/m². Treatment Area:[id number] Lt BOT BLHN    Patient was seen today for weekly visit. Comfort Alteration  KPS:100%  Fatigue: Mild    Mucous Membrane Alteration  Mucositis Due to Radiation: Yes  Thrush: Yes  Voice Changes: No    Nutritional Alteration  Anorexia: Yes   Nausea: No   Vomiting: No     Skin Alteration   Sensation:none    Radiation Dermatitis:  no    Ventilation Alteration  Cough:No    Emotional  Coping: effective    Injury, potential bleeding or infection: no    Radioprotectant Tolerance  No            Lab Results   Component Value Date    WBC 10.0 09/20/2022     09/20/2022         /70   Pulse 96   Temp 98.2 °F (36.8 °C) (Temporal)   Resp 18   Wt 176 lb 12.8 oz (80.2 kg)   SpO2 96%   BMI 27.69 kg/m²   BP within normal range? yes       Assessment/Plan:11/37;2200/7400cGy completed. Sore throat 10/10. Patient states he has magic mouthwash and it is not helping give him relief. Patient states he has the lollipop lozenges and does not like them. Patient cannot tolerate getting solid foods down. Using Ensure, but concerned if he is consuming enough caloric intake. Matteo, Cesar Bermeo, met with patient today.     Annia Mcgovern RN

## 2022-09-20 NOTE — PROGRESS NOTES
DEPARTMENT OF RADIATION ONCOLOGY ON TREATMENT VISIT         9/20/2022      NAME:  Valentin Lastred OF BIRTH:  1950    Diagnosis: HN CA    SUBJECTIVE:   Marla Urena has now received fractionated external beam radiation therapy - ongoing. Past medical, surgical, social and family histories reviewed and updated as indicated. Pain: controlled    ALLERGIES:  Patient has no known allergies. Current Outpatient Medications   Medication Sig Dispense Refill    traZODone (DESYREL) 50 MG tablet Take 1 tablet by mouth nightly 30 tablet 0    guaiFENesin (MUCINEX) 600 MG extended release tablet Take 1 tablet by mouth 2 times daily 60 tablet 0    docusate sodium (COLACE) 100 MG capsule Take 1 capsule by mouth 2 times daily as needed for Constipation 60 capsule 0    melatonin 3 MG TABS tablet Take 3 mg by mouth daily      prochlorperazine (COMPAZINE) 10 MG tablet Take 1 tablet by mouth every 6 hours as needed (nausea/vomiting) 40 tablet 3    Magic Mouthwash (MIRACLE MOUTHWASH) Swish and spit 5 mLs 4 times daily as needed for Irritation or Pain Benadryl 12.5 mg/ml Maalox 10 ml/5ml  lidocaine 2%/5ml 480 mL 0    ibuprofen (ADVIL;MOTRIN) 600 MG tablet Take 1 tablet by mouth every 6 hours as needed for Pain 360 tablet 1    fenofibrate (TRICOR) 145 MG tablet Take 1 tablet by mouth daily 90 tablet 1    lisinopril-hydroCHLOROthiazide (PRINZIDE;ZESTORETIC) 10-12.5 MG per tablet Take 1 tablet by mouth daily 90 tablet 1    metFORMIN (GLUCOPHAGE-XR) 500 MG extended release tablet Take 1 tablet by mouth 2 times daily 180 tablet 1    omeprazole (PRILOSEC) 40 MG delayed release capsule Take 1 capsule by mouth daily 90 capsule 1    NONFORMULARY Take by mouth daily Hemp gummies OTC - patient states helps with his blood sugar  Not ordered by physician per patient      blood glucose monitor strips Test 3 times a day & as needed for symptoms of irregular blood glucose.  Dispense sufficient amount for indicated testing frequency plus additional to accommodate PRN testing needs. 50 strip 0    psyllium (KONSYL) 28.3 % PACK Take 1 packet by mouth nightly        No current facility-administered medications for this encounter. OBJECTIVE:  Alert and fully ambulatory. Pleasant and conversant. Physical Examination: General appearance - alert, well appearing, and in no distress. Wt Readings from Last 3 Encounters:   09/20/22 176 lb 3.2 oz (79.9 kg)   09/20/22 176 lb 12.8 oz (80.2 kg)   09/14/22 187 lb (84.8 kg)         ASSESSMENT/PLAN:     Patient is tolerating treatments well with expected toxicities. RBA were reviewed prior to first fraction and PRN. Current and planned dose reviewed. Goals of treatment and potential side effects were reviewed with the patient PRN. Treatment imaging has been personally reviewed for accuracy and precision. Questions answered to apparent satisfaction. Treatments will continue as planned. -PEG indicated  -agree with Nutrition  -pt amenable         Juan Proctor.  MD MS ODALIS Velazco  Radiation Oncologist        [facility] ([address] [place] [address]): [phone] /// FAX: [phone]  [facility]): [phone] /// FAX: [phone]  [address] [place] [address]): [phone] /// FAX: [phone]

## 2022-09-20 NOTE — PATIENT INSTRUCTIONS
Continue daily fractionated radiation therapy as scheduled. Please see weekly OTV note and intial consultation letter in [facility] for clinical details. Radha Ocampo. Colin Daniel MD MS [address]:  [phone]   FAX: [phone] [address]:  [phone]   FAX:    [phone] [address]:  [phone]   FAX:  [phone]  Email: [email or web address]

## 2022-09-20 NOTE — PROGRESS NOTES
Department of [facility] Oncology  Attending Clinic Note    Reason for Visit: Follow-up on a patient with p16 + Oropharyngeal Squamous Cell Carcinoma    PCP:  Stacey Figueroa DO    History of Present Illness:  52-year-old male who presented to the ENT team for persistent left-sided neck fullness without associated difficulty swallowing    CT soft tissue neck 02/23/2022: Mass located in the left aspect of floor of the mouth extending into the left oropharyngeal soft tissue concerning for squamous cell carcinoma  Multiple enlarged necrotic left anterior cervical chain lymph nodes    Panendoscopy on 3/17/2022:  Findings: L lingual tonsil hypertrophy, biopsy negative, left level II neck node FNA performed   FNA left neck mass level 2: Inconclusive for malignant cells. Few atypical squamous cells with degenerative change  A. Tongue, left base, biopsy:   Squamous epithelial-lined lymphoid tissue with reactive germinal centers, compatible with lingual tonsil. Negative for dysplasia; Negative for malignancy. B.  Tongue, left base, biopsy:   Squamous epithelial-lined lymphoid tissue with reactive germinal centers, compatible with lingual tonsil. Focal lobules of benign mucinous glands. Negative for dysplasia; Negative for malignancy    On 07/21/2022: Panendoscopy excision left level 2 lymph node  Findings:  left base of tongue mass, left cervical lymphadenopathy  A. 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 See comment. B. 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 C.  Left base of tongue, biopsy: Squamous mucosa, negative for neoplasm. D.  Right base of tongue, biopsy: Squamous mucosa, negative for neoplasm. E.  Midline tongue, biopsy: Squamous mucosa, negative for neoplasm.    Comment:   The squamous carcinoma is diffusely and strongly positive for p16 immunostain, supporting the above interpretation    PET/CT scan 08/01/2022: There is a large hypermetabolic mass centered in the left oropharynx involving the left lateral oropharynx, left tonsillar and left base of tongue regions extending superiorly to the left soft palate and inferiorly to the left floor of mouth and left vallecula. Maximum SUV of this mass measures 17.4. The mass causes narrowing of the oropharyngeal airway. There are multiple hypermetabolic enlarged, necrotic and someone confluent left level 2 and left level 3 lymph nodes. Confluent left level 2 adenopathy demonstrates a maximum SUV of 15.8. Additional hypermetabolic left level 5/3 junction lymph node is noted with maximum SUV 5.0  Mild focal hypermetabolic activity is noted in the left parapharyngeal/retropharyngeal region (maximum SUV 3.8) which could represent an underlying small lymph node. There is a hypermetabolic right level 2 lymph node with maximum SUV 6.5    Recommended concurrent chemoradiation therapy with weekly Cisplatin. Side effects reviewed. He agreed to proceed. Authorization obtained. Cycle # 1 weekly Cisplatin was on 09/06/2022  Cycle # 2 weekly Cisplatin was on 09/13/2022. Today 09/20/2022; No fever, chills. Decline in appetite and energy level. Sore throat unable to eat and drink. Intermittent N/V    Review of Systems;  CONSTITUTIONAL: No fever, chills. Decline in appetite and energy level. ENMT: Eyes: No diplopia; Nose: No epistaxis. Mouth: + sore throat. RESPIRATORY: No hemoptysis, shortness of breath, cough. CARDIOVASCULAR: No chest pain, palpitations. GASTROINTESTINAL: Intermittent nausea/vomiting  GENITOURINARY: No dysuria, urinary frequency, hematuria. NEURO: No syncope, presyncope, headache.   Remainder: ROS NEGATIVE    Past Medical History:      Diagnosis Date    Arthritis     Cancer ([address] 2022    oropharynx    Diabetes mellitus ([address]     GERD (gastroesophageal reflux disease)     Hyperlipidemia     Hypertension     Lyme disease     Thrombocytopenia (HCC)      Medications:  Reviewed and reconciled. Allergies:  No Known Allergies    Physical Exam:  /83   Pulse 98   Temp 97.6 °F (36.4 °C)   Ht 5' 7\" (1.702 m)   Wt 176 lb 3.2 oz (79.9 kg)   SpO2 99%   BMI 27.60 kg/m²   GENERAL: Alert, oriented x 3, tired  HEENT: PERRLA; EOMI. Oropharynx dry. Mucositis    NECK: Supple. Left Cervical LN decrease in size  LUNGS: Good air entry bilaterally. No wheezing, crackles or ronchi. CARDIOVASCULAR: Regular rate. No murmurs, rubs or gallops. ABDOMEN: Soft. Non-tender, non-distended. EXTREMITIES: Without clubbing, cyanosis, or edema. NEUROLOGIC: No focal deficits. ECOG PS 1-2    Lab Results   Component Value Date    WBC 10.0 09/20/2022    HGB 14.3 09/20/2022    HCT 41.5 09/20/2022    MCV 85.9 09/20/2022     09/20/2022     Lab Results   Component Value Date     (L) 09/20/2022    K 3.7 09/20/2022    CL 91 (L) 09/20/2022    CO2 26 09/20/2022    BUN 29 (H) 09/20/2022    CREATININE 1.0 09/20/2022    GLUCOSE 119 (H) 09/20/2022    CALCIUM 10.0 09/20/2022    PROT 7.2 09/20/2022    LABALBU 4.0 09/20/2022    BILITOT 0.6 09/20/2022    ALKPHOS 52 09/20/2022    AST 21 09/20/2022    ALT 31 09/20/2022    LABGLOM >60 09/20/2022    GFRAA >60 09/20/2022     Lab Results   Component Value Date/Time    MG 1.9 09/20/2022 10:29 AM     Impression/Plan:  23-year-old male with history of p16+ left Oropharyngeal Squamous Cell Carcinoma    CT soft tissue neck 02/23/2022: Mass located in the left aspect of floor of the mouth extending into the left oropharyngeal soft tissue concerning for squamous cell carcinoma  Multiple enlarged necrotic left anterior cervical chain lymph nodes    Panendoscopy on 3/17/2022:  Findings: L lingual tonsil hypertrophy, biopsy negative, left level II neck node FNA performed   FNA left neck mass level 2: Inconclusive for malignant cells. Few atypical squamous cells with degenerative change  A.   Tongue, left base, biopsy:   Squamous epithelial-lined lymphoid tissue with reactive germinal centers, compatible with lingual tonsil. Negative for dysplasia; Negative for malignancy. B.  Tongue, left base, biopsy:   Squamous epithelial-lined lymphoid tissue with reactive germinal centers, compatible with lingual tonsil. Focal lobules of benign mucinous glands. Negative for dysplasia; Negative for malignancy    On 07/21/2022: Panendoscopy excision left level 2 lymph node  Findings:  left base of tongue mass, left cervical lymphadenopathy  A. 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 See comment. B. 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 C.  Left base of tongue, biopsy: Squamous mucosa, negative for neoplasm. D.  Right base of tongue, biopsy: Squamous mucosa, negative for neoplasm. E.  Midline tongue, biopsy: Squamous mucosa, negative for neoplasm. Comment:   The squamous carcinoma is diffusely and strongly positive for p16 immunostain, supporting the above interpretation    PET/CT scan 08/01/2022: There is a large hypermetabolic mass centered in the left oropharynx involving the left lateral oropharynx, left tonsillar and left base of tongue regions extending superiorly to the left soft palate and inferiorly to the left floor of mouth and left vallecula. Maximum SUV of this mass measures 17.4. The mass causes narrowing of the oropharyngeal airway. There are multiple hypermetabolic enlarged, necrotic and someone confluent left level 2 and left level 3 lymph nodes. Confluent left level 2 adenopathy demonstrates a maximum SUV of 15.8. Additional hypermetabolic left level 5/3 junction lymph node is noted with maximum SUV 5.0  Mild focal hypermetabolic activity is noted in the left parapharyngeal/retropharyngeal region (maximum SUV 3.8) which could represent an underlying small lymph node.     There is a hypermetabolic right level 2 lymph node with maximum SUV 6.5    Recommended concurrent chemoradiation therapy with weekly Cisplatin. Side effects reviewed. He agreed to proceed. Authorization obtained. Cycle # 1 weekly Cisplatin was on 09/06/2022  Cycle # 2 weekly Cisplatin was on 09/13/2022. Cycle # 3 weekly Cisplatin is given today 09/20/2022. Labs reviewed ok to proceed. 1L NS0.9% over 1 hour today 09/20/2022. Dietary consult appreciated. Recommended PEG Tube placement. General surgery consulted. Rad Onc input appreciated.   RTC next week for Cycle # 4 weekly Cisplatin      Ananda Montemayor MD   9/20/2022

## 2022-09-21 ENCOUNTER — TELEPHONE (OUTPATIENT)
Dept: SURGERY | Age: 72
End: 2022-09-21

## 2022-09-21 ENCOUNTER — HOSPITAL ENCOUNTER (OUTPATIENT)
Dept: RADIATION ONCOLOGY | Age: 72
Discharge: HOME | End: 2022-09-21
Payer: MEDICARE

## 2022-09-21 PROCEDURE — 77386 HC NTSTY MODUL RAD TX DLVR CPLX: CPT | Performed by: SPECIALIST

## 2022-09-21 PROCEDURE — 77014 PR CT GUIDANCE PLACEMENT RAD THERAPY FIELDS: CPT | Performed by: SPECIALIST

## 2022-09-21 NOTE — TELEPHONE ENCOUNTER
Per the order of Dr. Tamara Baeza, patient has been scheduled for peg tube insertion on 22. Patient instructed to please contact our office with any questions. Surgery form faxed to [facility] and fax confirmation received. Dr. Estefany Farrell to enter orders. Prior Authorization Form:      DEMOGRAPHICS:                     Patient Name:  Oda Lesch  Patient :  1950            Insurance:  Payor: David  / Plan: [facility] ESSENTIAL/PLUS / Product Type: *No Product type* /   Insurance ID Number:    Payer/Plan Subscr  Sex Relation Sub. Ins. ID Effective Group Num   1.  [facility] 1950 [facility] Self [id number] [facility] [address] [facility] [address]                                    [id number]         DIAGNOSIS & PROCEDURE:                       Procedure/Operation: PEG TUBE INSERTION           CPT Code: 26484    Diagnosis:  Malnutrition    ICD10 Code: R13.10    Location:  [facility]    Surgeon:  Duane Baltazar INFORMATION:                          Date: 22    Time: TBD              Anesthesia:  MAC/TIVA                                                       Status:  Outpatient        Special Comments:  N/A       Electronically signed by Hay Mckee LPN on 3/93/1828 at 2:87 PM

## 2022-09-22 ENCOUNTER — PREP FOR PROCEDURE (OUTPATIENT)
Dept: SURGERY | Age: 72
End: 2022-09-22

## 2022-09-22 ENCOUNTER — HOSPITAL ENCOUNTER (OUTPATIENT)
Dept: RADIATION ONCOLOGY | Age: 72
Discharge: HOME | End: 2022-09-22
Payer: MEDICARE

## 2022-09-22 PROCEDURE — 77014 PR CT GUIDANCE PLACEMENT RAD THERAPY FIELDS: CPT | Performed by: SPECIALIST

## 2022-09-22 PROCEDURE — 77386 HC NTSTY MODUL RAD TX DLVR CPLX: CPT | Performed by: SPECIALIST

## 2022-09-22 RX ORDER — SODIUM CHLORIDE 9 MG/ML
25 INJECTION, SOLUTION INTRAVENOUS PRN
Status: CANCELLED | OUTPATIENT
Start: 2022-09-22

## 2022-09-22 RX ORDER — SODIUM CHLORIDE 0.9 % (FLUSH) 0.9 %
5-40 SYRINGE (ML) INJECTION PRN
Status: CANCELLED | OUTPATIENT
Start: 2022-09-22

## 2022-09-22 RX ORDER — SODIUM CHLORIDE 0.9 % (FLUSH) 0.9 %
5-40 SYRINGE (ML) INJECTION EVERY 12 HOURS SCHEDULED
Status: CANCELLED | OUTPATIENT
Start: 2022-09-22

## 2022-09-23 ENCOUNTER — HOSPITAL ENCOUNTER (OUTPATIENT)
Dept: INFUSION THERAPY | Age: 72
Discharge: HOME | End: 2022-09-23
Payer: MEDICARE

## 2022-09-23 ENCOUNTER — HOSPITAL ENCOUNTER (OUTPATIENT)
Dept: RADIATION ONCOLOGY | Age: 72
Discharge: HOME | End: 2022-09-23
Payer: MEDICARE

## 2022-09-23 VITALS
TEMPERATURE: 98 F | HEART RATE: 78 BPM | DIASTOLIC BLOOD PRESSURE: 68 MMHG | SYSTOLIC BLOOD PRESSURE: 114 MMHG | OXYGEN SATURATION: 100 % | RESPIRATION RATE: 16 BRPM

## 2022-09-23 DIAGNOSIS — R11.0 NAUSEA: Primary | ICD-10-CM

## 2022-09-23 DIAGNOSIS — C10.9 SQUAMOUS CELL CARCINOMA OF OROPHARYNX (HCC): ICD-10-CM

## 2022-09-23 PROCEDURE — 77386 HC NTSTY MODUL RAD TX DLVR CPLX: CPT | Performed by: SPECIALIST

## 2022-09-23 PROCEDURE — 77014 PR CT GUIDANCE PLACEMENT RAD THERAPY FIELDS: CPT | Performed by: SPECIALIST

## 2022-09-23 PROCEDURE — 96360 HYDRATION IV INFUSION INIT: CPT

## 2022-09-23 PROCEDURE — 2580000003 HC RX 258

## 2022-09-23 RX ORDER — SODIUM CHLORIDE 0.9 % (FLUSH) 0.9 %
5-40 SYRINGE (ML) INJECTION PRN
Status: CANCELLED | OUTPATIENT
Start: 2022-09-30

## 2022-09-23 RX ORDER — 0.9 % SODIUM CHLORIDE 0.9 %
1000 INTRAVENOUS SOLUTION INTRAVENOUS ONCE
Status: CANCELLED | OUTPATIENT
Start: 2022-09-30 | End: 2022-09-30

## 2022-09-23 RX ORDER — SODIUM CHLORIDE 9 MG/ML
INJECTION, SOLUTION INTRAVENOUS
Status: COMPLETED
Start: 2022-09-23 | End: 2022-09-23

## 2022-09-23 RX ORDER — 0.9 % SODIUM CHLORIDE 0.9 %
1000 INTRAVENOUS SOLUTION INTRAVENOUS ONCE
Status: CANCELLED | OUTPATIENT
Start: 2022-09-23 | End: 2022-09-23

## 2022-09-23 RX ORDER — 0.9 % SODIUM CHLORIDE 0.9 %
1000 INTRAVENOUS SOLUTION INTRAVENOUS ONCE
Status: COMPLETED | OUTPATIENT
Start: 2022-09-23 | End: 2022-09-23

## 2022-09-23 RX ORDER — SODIUM CHLORIDE 9 MG/ML
5-250 INJECTION, SOLUTION INTRAVENOUS PRN
Status: CANCELLED | OUTPATIENT
Start: 2022-09-30

## 2022-09-23 RX ORDER — HEPARIN SODIUM (PORCINE) LOCK FLUSH IV SOLN 100 UNIT/ML 100 UNIT/ML
500 SOLUTION INTRAVENOUS PRN
Status: CANCELLED | OUTPATIENT
Start: 2022-09-30

## 2022-09-23 RX ADMIN — Medication 1000 ML: at 11:26

## 2022-09-23 RX ADMIN — SODIUM CHLORIDE 1000 ML: 9 INJECTION, SOLUTION INTRAVENOUS at 11:26

## 2022-09-26 ENCOUNTER — HOSPITAL ENCOUNTER (OUTPATIENT)
Dept: INFUSION THERAPY | Age: 72
Discharge: HOME | End: 2022-09-26

## 2022-09-26 ENCOUNTER — APPOINTMENT (OUTPATIENT)
Dept: GENERAL RADIOLOGY | Age: 72
End: 2022-09-26
Payer: MEDICARE

## 2022-09-26 ENCOUNTER — HOSPITAL ENCOUNTER (OUTPATIENT)
Dept: RADIATION ONCOLOGY | Age: 72
Discharge: HOME | End: 2022-09-26
Payer: MEDICARE

## 2022-09-26 ENCOUNTER — HOSPITAL ENCOUNTER (EMERGENCY)
Age: 72
Discharge: HOME | End: 2022-09-26
Attending: EMERGENCY MEDICINE
Payer: MEDICARE

## 2022-09-26 ENCOUNTER — APPOINTMENT (OUTPATIENT)
Dept: CT IMAGING | Age: 72
End: 2022-09-26
Payer: MEDICARE

## 2022-09-26 VITALS
SYSTOLIC BLOOD PRESSURE: 108 MMHG | BODY MASS INDEX: 25.43 KG/M2 | OXYGEN SATURATION: 99 % | DIASTOLIC BLOOD PRESSURE: 77 MMHG | TEMPERATURE: 97.4 F | RESPIRATION RATE: 18 BRPM | WEIGHT: 162 LBS | HEART RATE: 84 BPM | HEIGHT: 67 IN

## 2022-09-26 DIAGNOSIS — R13.10 DYSPHAGIA, UNSPECIFIED TYPE: ICD-10-CM

## 2022-09-26 DIAGNOSIS — E86.0 DEHYDRATION: Primary | ICD-10-CM

## 2022-09-26 DIAGNOSIS — K57.32 DIVERTICULITIS OF COLON: ICD-10-CM

## 2022-09-26 LAB
ALBUMIN SERPL-MCNC: 3.4 G/DL (ref 3.5–5.2)
ALP BLD-CCNC: 53 U/L (ref 40–129)
ALT SERPL-CCNC: 41 U/L (ref 0–40)
ANION GAP SERPL CALCULATED.3IONS-SCNC: 10 MMOL/L (ref 7–16)
AST SERPL-CCNC: 16 U/L (ref 0–39)
BACTERIA: ABNORMAL /HPF
BASOPHILS ABSOLUTE: 0.05 E9/L (ref 0–0.2)
BASOPHILS RELATIVE PERCENT: 0.9 % (ref 0–2)
BILIRUB SERPL-MCNC: 0.7 MG/DL (ref 0–1.2)
BILIRUBIN URINE: NEGATIVE
BLOOD, URINE: NEGATIVE
BUN BLDV-MCNC: 43 MG/DL (ref 6–23)
CALCIUM SERPL-MCNC: 9.8 MG/DL (ref 8.6–10.2)
CHLORIDE BLD-SCNC: 92 MMOL/L (ref 98–107)
CLARITY: CLEAR
CO2: 28 MMOL/L (ref 22–29)
COLOR: YELLOW
CREAT SERPL-MCNC: 1 MG/DL (ref 0.7–1.2)
EOSINOPHILS ABSOLUTE: 0 E9/L (ref 0.05–0.5)
EOSINOPHILS RELATIVE PERCENT: 0.6 % (ref 0–6)
EPITHELIAL CELLS, UA: ABNORMAL /HPF
GFR AFRICAN AMERICAN: >60
GFR NON-AFRICAN AMERICAN: >60 ML/MIN/1.73
GLUCOSE BLD-MCNC: 128 MG/DL (ref 74–99)
GLUCOSE URINE: NEGATIVE MG/DL
HCT VFR BLD CALC: 39.1 % (ref 37–54)
HEMOGLOBIN: 13.5 G/DL (ref 12.5–16.5)
KETONES, URINE: NEGATIVE MG/DL
LACTIC ACID: 1.5 MMOL/L (ref 0.5–2.2)
LEUKOCYTE ESTERASE, URINE: NEGATIVE
LIPASE: 28 U/L (ref 13–60)
LYMPHOCYTES ABSOLUTE: 0.11 E9/L (ref 1.5–4)
LYMPHOCYTES RELATIVE PERCENT: 1.7 % (ref 20–42)
MCH RBC QN AUTO: 29.3 PG (ref 26–35)
MCHC RBC AUTO-ENTMCNC: 34.5 % (ref 32–34.5)
MCV RBC AUTO: 85 FL (ref 80–99.9)
MONOCYTES ABSOLUTE: 0.58 E9/L (ref 0.1–0.95)
MONOCYTES RELATIVE PERCENT: 11.3 % (ref 2–12)
NEUTROPHILS ABSOLUTE: 4.56 E9/L (ref 1.8–7.3)
NEUTROPHILS RELATIVE PERCENT: 86.1 % (ref 43–80)
NITRITE, URINE: NEGATIVE
PDW BLD-RTO: 13.2 FL (ref 11.5–15)
PH UA: 6 (ref 5–9)
PLATELET # BLD: 218 E9/L (ref 130–450)
PMV BLD AUTO: 9.6 FL (ref 7–12)
POLYCHROMASIA: ABNORMAL
POTASSIUM REFLEX MAGNESIUM: 3.8 MMOL/L (ref 3.5–5)
PROTEIN UA: 30 MG/DL
RBC # BLD: 4.6 E12/L (ref 3.8–5.8)
RBC UA: ABNORMAL /HPF (ref 0–2)
SODIUM BLD-SCNC: 130 MMOL/L (ref 132–146)
SPECIFIC GRAVITY UA: 1.02 (ref 1–1.03)
TOTAL PROTEIN: 6.9 G/DL (ref 6.4–8.3)
TROPONIN, HIGH SENSITIVITY: 23 NG/L (ref 0–11)
TROPONIN, HIGH SENSITIVITY: 8 NG/L (ref 0–11)
UROBILINOGEN, URINE: 1 E.U./DL
WBC # BLD: 5.3 E9/L (ref 4.5–11.5)
WBC UA: ABNORMAL /HPF (ref 0–5)

## 2022-09-26 PROCEDURE — 80053 COMPREHEN METABOLIC PANEL: CPT

## 2022-09-26 PROCEDURE — 83690 ASSAY OF LIPASE: CPT

## 2022-09-26 PROCEDURE — 2580000003 HC RX 258: Performed by: STUDENT IN AN ORGANIZED HEALTH CARE EDUCATION/TRAINING PROGRAM

## 2022-09-26 PROCEDURE — 74177 CT ABD & PELVIS W/CONTRAST: CPT

## 2022-09-26 PROCEDURE — 71045 X-RAY EXAM CHEST 1 VIEW: CPT

## 2022-09-26 PROCEDURE — 85025 COMPLETE CBC W/AUTO DIFF WBC: CPT

## 2022-09-26 PROCEDURE — 83605 ASSAY OF LACTIC ACID: CPT

## 2022-09-26 PROCEDURE — 93005 ELECTROCARDIOGRAM TRACING: CPT | Performed by: STUDENT IN AN ORGANIZED HEALTH CARE EDUCATION/TRAINING PROGRAM

## 2022-09-26 PROCEDURE — 84484 ASSAY OF TROPONIN QUANT: CPT

## 2022-09-26 PROCEDURE — 71275 CT ANGIOGRAPHY CHEST: CPT

## 2022-09-26 PROCEDURE — 6360000004 HC RX CONTRAST MEDICATION: Performed by: RADIOLOGY

## 2022-09-26 PROCEDURE — 81001 URINALYSIS AUTO W/SCOPE: CPT

## 2022-09-26 PROCEDURE — 99285 EMERGENCY DEPT VISIT HI MDM: CPT

## 2022-09-26 RX ORDER — 0.9 % SODIUM CHLORIDE 0.9 %
1000 INTRAVENOUS SOLUTION INTRAVENOUS ONCE
Status: COMPLETED | OUTPATIENT
Start: 2022-09-26 | End: 2022-09-26

## 2022-09-26 RX ORDER — SUCRALFATE ORAL 1 G/10ML
1 SUSPENSION ORAL 4 TIMES DAILY
Qty: 1200 ML | Refills: 3 | Status: SHIPPED | OUTPATIENT
Start: 2022-09-26

## 2022-09-26 RX ADMIN — IOPAMIDOL 75 ML: 755 INJECTION, SOLUTION INTRAVENOUS at 13:09

## 2022-09-26 RX ADMIN — SODIUM CHLORIDE 1000 ML: 9 INJECTION, SOLUTION INTRAVENOUS at 11:41

## 2022-09-26 ASSESSMENT — ENCOUNTER SYMPTOMS
WHEEZING: 0
NAUSEA: 0
VOMITING: 0
SHORTNESS OF BREATH: 0
SINUS PRESSURE: 0
COUGH: 0
DIARRHEA: 0
EYE PAIN: 0
EYE REDNESS: 0
SORE THROAT: 0
ABDOMINAL PAIN: 1
BACK PAIN: 0
EYE DISCHARGE: 0

## 2022-09-26 ASSESSMENT — PAIN - FUNCTIONAL ASSESSMENT: PAIN_FUNCTIONAL_ASSESSMENT: 0-10

## 2022-09-26 ASSESSMENT — PAIN SCALES - GENERAL: PAINLEVEL_OUTOF10: 10

## 2022-09-26 ASSESSMENT — PAIN DESCRIPTION - LOCATION: LOCATION: MOUTH;THROAT

## 2022-09-26 NOTE — ED PROVIDER NOTES
Department of Emergency Medicine   ED  Provider Note  Admit Date/RoomTime: 9/26/2022 10:13 AM  ED Room: 13/13          History of Present Illness:  9/26/22, Time: 10:39 AM EDT  Chief Complaint   Patient presents with    Fatigue     Dysphgia-difficulty swallowing from radiation treatments for heada nd neck ca-decreased po intake weight loss            Darinel Ritchie is a 71 y.o. male presenting to the ED for fatigue and malaise, beginning approximately 1 week ago. The complaint has been constant, severe in severity, and worsened by his radiation. Patient states that he was recently diagnosed with throat cancer and just received radiation. Since then he has felt poor and states he has not had any appetite and has not been eating. He states he is scheduled for a PEG on Friday. He otherwise endorses some pain in his chest and abdomen. He also mentions his stool has recently turned black. He denies any fevers, shortness of breath, nausea, vomiting, or dysuria. Review of Systems   Constitutional:  Positive for appetite change and fatigue. Negative for chills and fever. HENT:  Negative for ear pain, sinus pressure and sore throat. Eyes:  Negative for pain, discharge and redness. Respiratory:  Negative for cough, shortness of breath and wheezing. Cardiovascular:  Positive for chest pain. Gastrointestinal:  Positive for abdominal pain. Negative for diarrhea, nausea and vomiting. Genitourinary:  Negative for dysuria and frequency. Musculoskeletal:  Negative for arthralgias and back pain. Skin:  Negative for rash and wound. Neurological:  Positive for weakness (Generalized). Negative for headaches. Hematological:  Negative for adenopathy.    All other systems reviewed and are negative.       --------------------------------------------- PAST HISTORY ---------------------------------------------  Past Medical History:  has a past medical history of Arthritis, Cancer ([facility] Diabetes mellitus ([facility] GERD (gastroesophageal reflux disease), Hyperlipidemia, Hypertension, Lyme disease, and Thrombocytopenia ([facility] Past Surgical History:  has a past surgical history that includes Cardiac catheterization (2017); bronchoscopy (N/A, 10/28/2019); Vasectomy; Colonoscopy (03/2019); Cardiac catheterization (03/2017); Tooth Extraction (Left, 12/28/2021); laryngoscopy (Left, 3/17/2022); Neck surgery (Left, 7/21/2022); and laryngoscopy (Left, 7/21/2022). Social History:  reports that he quit smoking about 5 years ago. His smoking use included cigarettes. He started smoking about 32 years ago. He has a 27.00 pack-year smoking history. He has never used smokeless tobacco. He reports that he does not currently use alcohol. He reports that he does not currently use drugs. Family History: family history includes Abdominal aortic aneurysm in his father; Cancer (age of onset: 80) in his father; High Blood Pressure in his father and mother. . Unless otherwise noted, family history is non contributory    The patients home medications have been reviewed. Allergies: Patient has no known allergies. ---------------------------------------------------PHYSICAL EXAM--------------------------------------    Physical Exam  Vitals and nursing note reviewed. Constitutional:       Appearance: He is well-developed. HENT:      Head: Normocephalic and atraumatic. Mouth/Throat:      Mouth: Mucous membranes are moist.      Comments: Oral ulcerations  Eyes:      Conjunctiva/sclera: Conjunctivae normal.   Cardiovascular:      Rate and Rhythm: Normal rate and regular rhythm. Heart sounds: Normal heart sounds. No murmur heard. Pulmonary:      Effort: Pulmonary effort is normal. No respiratory distress. Breath sounds: Normal breath sounds. No wheezing or rales. Abdominal:      General: Bowel sounds are normal.      Palpations: Abdomen is soft. Tenderness: no abdominal tenderness There is no guarding or rebound. Musculoskeletal:         General: No tenderness or deformity. Cervical back: Normal range of motion and neck supple. Skin:     General: Skin is warm and dry. Neurological:      Mental Status: He is alert and oriented to person, place, and time. Cranial Nerves: No cranial nerve deficit. Coordination: Coordination normal.          -------------------------------------------------- RESULTS -------------------------------------------------  I have personally reviewed all laboratory and imaging results for this patient. Results are listed below.      LABS: (Lab results interpreted by me)  Results for orders placed or performed during the hospital encounter of 09/26/22   CBC with Auto Differential   Result Value Ref Range    WBC 5.3 4.5 - 11.5 E9/L    RBC 4.60 3.80 - 5.80 E12/L    Hemoglobin 13.5 12.5 - 16.5 g/dL    Hematocrit 39.1 37.0 - 54.0 %    MCV 85.0 80.0 - 99.9 fL    MCH 29.3 26.0 - 35.0 pg    MCHC 34.5 32.0 - 34.5 %    RDW 13.2 11.5 - 15.0 fL    Platelets 857 542 - 513 E9/L    MPV 9.6 7.0 - 12.0 fL    Neutrophils % 86.1 (H) 43.0 - 80.0 %    Lymphocytes % 1.7 (L) 20.0 - 42.0 %    Monocytes % 11.3 2.0 - 12.0 %    Eosinophils % 0.6 0.0 - 6.0 %    Basophils % 0.9 0.0 - 2.0 %    Neutrophils Absolute 4.56 1.80 - 7.30 E9/L    Lymphocytes Absolute 0.11 (L) 1.50 - 4.00 E9/L    Monocytes Absolute 0.58 0.10 - 0.95 E9/L    Eosinophils Absolute 0.00 (L) 0.05 - 0.50 E9/L    Basophils Absolute 0.05 0.00 - 0.20 E9/L    Polychromasia 1+    Comprehensive Metabolic Panel w/ Reflex to MG   Result Value Ref Range    Sodium 130 (L) 132 - 146 mmol/L    Potassium reflex Magnesium 3.8 3.5 - 5.0 mmol/L    Chloride 92 (L) 98 - 107 mmol/L    CO2 28 22 - 29 mmol/L    Anion Gap 10 7 - 16 mmol/L    Glucose 128 (H) 74 - 99 mg/dL    BUN 43 (H) 6 - 23 mg/dL    Creatinine 1.0 0.7 - 1.2 mg/dL    GFR Non-African American >60 >=60 mL/min/1.73    GFR African American >60     Calcium 9.8 8.6 - 10.2 mg/dL    Total Protein 6.9 6.4 - 8.3 g/dL    Albumin 3.4 (L) 3.5 - 5.2 g/dL    Total Bilirubin 0.7 0.0 - 1.2 mg/dL    Alkaline Phosphatase 53 40 - 129 U/L    ALT 41 (H) 0 - 40 U/L    AST 16 0 - 39 U/L   Troponin   Result Value Ref Range    Troponin, High Sensitivity 23 (H) 0 - 11 ng/L   Lipase   Result Value Ref Range    Lipase 28 13 - 60 U/L   Lactic Acid   Result Value Ref Range    Lactic Acid 1.5 0.5 - 2.2 mmol/L   Urinalysis with Microscopic   Result Value Ref Range    Color, UA Yellow Straw/Yellow    Clarity, UA Clear Clear    Glucose, Ur Negative Negative mg/dL    Bilirubin Urine Negative Negative    Ketones, Urine Negative Negative mg/dL    Specific Gravity, UA 1.020 1.005 - 1.030    Blood, Urine Negative Negative    pH, UA 6.0 5.0 - 9.0    Protein, UA 30 (A) Negative mg/dL    Urobilinogen, Urine 1.0 <2.0 E.U./dL    Nitrite, Urine Negative Negative    Leukocyte Esterase, Urine Negative Negative    WBC, UA 0-1 0 - 5 /HPF    RBC, UA NONE 0 - 2 /HPF    Epithelial Cells, UA RARE /HPF    Bacteria, UA NONE SEEN None Seen /HPF   Troponin   Result Value Ref Range    Troponin, High Sensitivity 8 0 - 11 ng/L   EKG 12 Lead   Result Value Ref Range    Ventricular Rate 99 BPM    Atrial Rate 99 BPM    P-R Interval 178 ms    QRS Duration 100 ms    Q-T Interval 340 ms    QTc Calculation (Bazett) 436 ms    P Axis 28 degrees    R Axis 1 degrees    T Axis 26 degrees   ,       RADIOLOGY:  Interpreted by Radiologist unless otherwise specified  CTA PULMONARY W CONTRAST   Final Result   No evidence of pulmonary embolism or acute pulmonary abnormality. CT ABDOMEN PELVIS W IV CONTRAST Additional Contrast? None   Final Result   Diffuse colonic diverticulosis with mild stranding of the fat in the left   lower quadrant, concerning for early diverticulitis. No evidence of obstructive uropathy or acute appendicitis. Mild fatty liver. XR CHEST PORTABLE   Final Result   No acute process.                           ------------------------- NURSING NOTES AND VITALS REVIEWED ---------------------------   The nursing notes within the ED encounter and vital signs as below have been reviewed by myself  /77   Pulse 84   Temp 97.4 °F (36.3 °C)   Resp 18   Ht 5' 7\" (1.702 m)   Wt 162 lb (73.5 kg)   SpO2 99%   BMI 25.37 kg/m²     Oxygen Saturation Interpretation: Normal    The patients available past medical records and past encounters were reviewed. ------------------------------ ED COURSE/MEDICAL DECISION MAKING----------------------  Medications   0.9 % sodium chloride bolus (0 mLs IntraVENous Stopped 9/26/22 1249)   iopamidol (ISOVUE-370) 76 % injection 75 mL (75 mLs IntraVENous Given 9/26/22 1309)            Medical Decision Making:     Patient presents with fatigue and poor appetite. Labwork was largely unremarkable. EKG did not meet STEMI criteria. Imaging did not show any acute cardiopulmonary pathology however there was possible early diverticulitis. Given the questionable mild uncomplicated findings on imaging and stable vitals, patient will not treated with antibiotics. Patient will be discharged with a prescription for liquid carafate to help with his swallowing. Patient advised to follow with his oncologist for further evaluation and care. Patient discharged home. ED Course as of 09/26/22 2309   Mon Sep 26, 2022   1128   ATTENDING PROVIDER ATTESTATION:     I have personally performed and/or participated in the history, exam, medical decision making, and procedures and agree with all pertinent clinical information unless otherwise noted. I have also reviewed and agree with the past medical, family and social history unless otherwise noted. I have discussed this patient in detail with the resident and provided the instruction and education regarding the evidence-based evaluation and treatment of difficulty swallowing and sores in the mouth. He is currently undergoing radiation for CA.   He is scheduled for a PEG tube this Friday. Any EKG that may have been performed has been personally reviewed by me and I agree with the documentation as noted by the resident. He feels weak and fatigued. He admits to abdominal pain. My findings: Manoj Cobos is a 71 y.o. male whom is in no distress. Physical exam reveals dry mucous membranes with thick yellow secretions in the oral cavity. Ulcerations of the buccal mucosa and tongue appreciated. Heart rate is elevated and regular and lungs CTA. No peripheral edema noted. My plan: Symptomatic and supportive care. Will provide hydration and further evaluation. Electronically signed by Marilou Reed DO on 9/26/22 at 11:28 AM EDT       [JS]   1141 EKG shows normal sinus rhythm with a ventricular rate of 99 bpm.  Normal axis. Does not meet STEMI criteria. Comparable to previous EKG on 3/3/2022. As interpreted by myself the ED physician. [BB]      ED Course User Index  [BB] Carroll Oliver DO  [JS] Marilou Reed DO        Re-Evaluations:  Patient was reevaluted and was improved. This patient's ED course included: a personal history and physicial examination and re-evaluation prior to disposition    This patient has remained hemodynamically stable during their ED course. Consultations:  None      Critical Care: None       Counseling: The emergency provider has spoken with the patient and spouse/SO and discussed todays results, in addition to providing specific details for the plan of care and counseling regarding the diagnosis and prognosis. Questions are answered at this time and they are agreeable with the plan.       --------------------------------- IMPRESSION AND DISPOSITION ---------------------------------    IMPRESSION  1. Dehydration    2. Dysphagia, unspecified type    3.  Diverticulitis of colon        DISPOSITION  Disposition: Discharge to home  Patient condition is stable    Patient was seen and evaluated by both myself and Memo Baxter, .      NOTE: This report was transcribed using voice recognition software.  Every effort was made to ensure accuracy; however, inadvertent computerized transcription errors may be present           Mariaelena Ash DO  Resident  09/26/22 735 [facility] Hospital[facility]  Resident  09/26/22 2060

## 2022-09-26 NOTE — ED NOTES
Pt verbalizes understanding of discharge instructions, medications and follow up.  Pt ambulatory out of ED, sloane, A&O Bina Cannon RN  09/26/22 [id number]

## 2022-09-27 ENCOUNTER — APPOINTMENT (OUTPATIENT)
Dept: RADIATION ONCOLOGY | Age: 72
End: 2022-09-27
Payer: MEDICARE

## 2022-09-27 ENCOUNTER — OFFICE VISIT (OUTPATIENT)
Dept: ONCOLOGY | Age: 72
End: 2022-09-27
Payer: MEDICARE

## 2022-09-27 ENCOUNTER — HOSPITAL ENCOUNTER (OUTPATIENT)
Dept: RADIATION ONCOLOGY | Age: 72
Discharge: HOME | End: 2022-09-27
Payer: MEDICARE

## 2022-09-27 ENCOUNTER — TELEPHONE (OUTPATIENT)
Dept: CASE MANAGEMENT | Age: 72
End: 2022-09-27

## 2022-09-27 ENCOUNTER — HOSPITAL ENCOUNTER (OUTPATIENT)
Dept: INFUSION THERAPY | Age: 72
Discharge: HOME | End: 2022-09-27
Payer: MEDICARE

## 2022-09-27 ENCOUNTER — TELEPHONE (OUTPATIENT)
Dept: PALLATIVE CARE | Age: 72
End: 2022-09-27

## 2022-09-27 ENCOUNTER — TELEPHONE (OUTPATIENT)
Dept: INFUSION THERAPY | Age: 72
End: 2022-09-27

## 2022-09-27 VITALS
HEART RATE: 86 BPM | TEMPERATURE: 97.5 F | DIASTOLIC BLOOD PRESSURE: 65 MMHG | SYSTOLIC BLOOD PRESSURE: 139 MMHG | RESPIRATION RATE: 16 BRPM | OXYGEN SATURATION: 95 %

## 2022-09-27 VITALS
HEIGHT: 67 IN | DIASTOLIC BLOOD PRESSURE: 76 MMHG | OXYGEN SATURATION: 95 % | HEART RATE: 98 BPM | SYSTOLIC BLOOD PRESSURE: 128 MMHG | BODY MASS INDEX: 26.24 KG/M2 | TEMPERATURE: 97.9 F | WEIGHT: 167.2 LBS

## 2022-09-27 DIAGNOSIS — R11.0 NAUSEA: Primary | ICD-10-CM

## 2022-09-27 DIAGNOSIS — C10.9 SQUAMOUS CELL CARCINOMA OF OROPHARYNX (HCC): ICD-10-CM

## 2022-09-27 DIAGNOSIS — C10.9 SQUAMOUS CELL CARCINOMA OF OROPHARYNX (HCC): Primary | ICD-10-CM

## 2022-09-27 LAB
EKG ATRIAL RATE: 99 BPM
EKG P AXIS: 28 DEGREES
EKG P-R INTERVAL: 178 MS
EKG Q-T INTERVAL: 340 MS
EKG QRS DURATION: 100 MS
EKG QTC CALCULATION (BAZETT): 436 MS
EKG R AXIS: 1 DEGREES
EKG T AXIS: 26 DEGREES
EKG VENTRICULAR RATE: 99 BPM

## 2022-09-27 PROCEDURE — 2580000003 HC RX 258

## 2022-09-27 PROCEDURE — 99215 OFFICE O/P EST HI 40 MIN: CPT | Performed by: INTERNAL MEDICINE

## 2022-09-27 PROCEDURE — 1123F ACP DISCUSS/DSCN MKR DOCD: CPT | Performed by: INTERNAL MEDICINE

## 2022-09-27 PROCEDURE — 96360 HYDRATION IV INFUSION INIT: CPT

## 2022-09-27 RX ORDER — SODIUM CHLORIDE 0.9 % (FLUSH) 0.9 %
5-40 SYRINGE (ML) INJECTION PRN
Status: DISCONTINUED | OUTPATIENT
Start: 2022-09-27 | End: 2022-09-28 | Stop reason: HOSPADM

## 2022-09-27 RX ORDER — 0.9 % SODIUM CHLORIDE 0.9 %
1000 INTRAVENOUS SOLUTION INTRAVENOUS ONCE
Status: CANCELLED | OUTPATIENT
Start: 2022-09-28 | End: 2022-09-28

## 2022-09-27 RX ORDER — HEPARIN SODIUM (PORCINE) LOCK FLUSH IV SOLN 100 UNIT/ML 100 UNIT/ML
500 SOLUTION INTRAVENOUS PRN
Status: CANCELLED | OUTPATIENT
Start: 2022-09-28

## 2022-09-27 RX ORDER — SODIUM CHLORIDE 9 MG/ML
INJECTION, SOLUTION INTRAVENOUS
Status: COMPLETED
Start: 2022-09-27 | End: 2022-09-27

## 2022-09-27 RX ORDER — 0.9 % SODIUM CHLORIDE 0.9 %
1000 INTRAVENOUS SOLUTION INTRAVENOUS ONCE
Status: COMPLETED | OUTPATIENT
Start: 2022-09-27 | End: 2022-09-27

## 2022-09-27 RX ORDER — HEPARIN SODIUM (PORCINE) LOCK FLUSH IV SOLN 100 UNIT/ML 100 UNIT/ML
500 SOLUTION INTRAVENOUS PRN
Status: CANCELLED | OUTPATIENT
Start: 2022-09-27

## 2022-09-27 RX ORDER — SODIUM CHLORIDE 0.9 % (FLUSH) 0.9 %
5-40 SYRINGE (ML) INJECTION PRN
Status: CANCELLED | OUTPATIENT
Start: 2022-09-27

## 2022-09-27 RX ORDER — 0.9 % SODIUM CHLORIDE 0.9 %
1000 INTRAVENOUS SOLUTION INTRAVENOUS ONCE
Status: CANCELLED | OUTPATIENT
Start: 2022-09-27 | End: 2022-09-27

## 2022-09-27 RX ORDER — SODIUM CHLORIDE 9 MG/ML
5-250 INJECTION, SOLUTION INTRAVENOUS PRN
Status: CANCELLED | OUTPATIENT
Start: 2022-09-28

## 2022-09-27 RX ORDER — SODIUM CHLORIDE 9 MG/ML
5-250 INJECTION, SOLUTION INTRAVENOUS PRN
Status: CANCELLED | OUTPATIENT
Start: 2022-09-27

## 2022-09-27 RX ORDER — SODIUM CHLORIDE 0.9 % (FLUSH) 0.9 %
5-40 SYRINGE (ML) INJECTION PRN
Status: CANCELLED | OUTPATIENT
Start: 2022-09-28

## 2022-09-27 RX ADMIN — SODIUM CHLORIDE 1000 ML: 9 INJECTION, SOLUTION INTRAVENOUS at 14:15

## 2022-09-27 RX ADMIN — Medication 1000 ML: at 14:15

## 2022-09-27 NOTE — TELEPHONE ENCOUNTER
Patient did not show for his chemotherapy appointment with Dr. Amparo Strong this morning. Called patient, he stated he went to the ER yesterday due to feeling weak, hasn't eaten for 9 days and has developed increased oral mucus. He stated he wants to wait until his PEG tube is placed on 09/30/2022 and he has something in his stomach so he's feeling better before resuming his chemotherapy and radiation treatments. Informed him he needs to see his oncologists today to discuss his plan of care and strongly encouraged him to come to the clinic now to see Dr. Amparo Strong and the radiation oncologist, he verbalized understanding. Updated Dr. Layla Dooley, medical oncology RN, Jenny Romero, radiation RN, radiation techs that patient wants seen to discuss plan of care only. Antonieta Dawson  RN, ADN, BSE, OCN  Patient Nurse Navigator

## 2022-09-27 NOTE — PROGRESS NOTES
Raimundo Barraza  9/27/2022  Wt Readings from Last 3 Encounters:   09/27/22 167 lb 3.2 oz (75.8 kg)   09/26/22 162 lb (73.5 kg)   09/20/22 176 lb 3.2 oz (79.9 kg)     There is no height or weight on file to calculate BMI. Treatment Area:PTV 5000Lt BOT BLHN    Patient was seen today for weekly visit. Comfort Alteration  KPS:100%  Fatigue: Severe    Mucous Membrane Alteration  Mucositis Due to Radiation: Yes, thick mucous  Thrush: Yes  Voice Changes: Yes    Nutritional Alteration  Anorexia: Yes   Nausea: Yes   Vomiting: No     Skin Alteration   Sensation:slighty red    Radiation Dermatitis:  no    Ventilation Alteration  Cough:Yes    Emotional  Coping: effective    Injury, potential bleeding or infection: no    Radioprotectant Tolerance  na            Lab Results   Component Value Date    WBC 5.3 09/26/2022     09/26/2022         There were no vitals taken for this visit. BP within normal range? Vitals taken at [facility] prior to this visit       Assessment/Plan:14/37fx;2800/7400cGy completed. Patient rates pain in his mouth, teeth and throat to be a 10/10. States he has not eaten in approximately 9 days. States he is having his PEG tibe put in this Friday on 09/30/22. Patient did not receivehis chemotherapy today and states he does not want to continue with radiation treatments until he has his PEG tube placed and can get some nutrition and strength back. Per Dr. Michel Schmitt, [facility] to place patient on a break from his RT until after his PEG tube placement on 09/30/22.  Emailed Valente To MA, requesting if they could possibly see patient tomorrow while here in [facility] Onc for Fluid Hydration, per Dr. Marcia Peralta's request.    Daniel Hickman RN

## 2022-09-27 NOTE — PROGRESS NOTES
Department of [facility] Oncology  Attending Clinic Note    Reason for Visit: Follow-up on a patient with p16 + Oropharyngeal Squamous Cell Carcinoma    PCP:  Rupa Denis DO    History of Present Illness:  73-year-old male who presented to the ENT team for persistent left-sided neck fullness without associated difficulty swallowing    CT soft tissue neck 02/23/2022: Mass located in the left aspect of floor of the mouth extending into the left oropharyngeal soft tissue concerning for squamous cell carcinoma  Multiple enlarged necrotic left anterior cervical chain lymph nodes    Panendoscopy on 3/17/2022:  Findings: L lingual tonsil hypertrophy, biopsy negative, left level II neck node FNA performed   FNA left neck mass level 2: Inconclusive for malignant cells. Few atypical squamous cells with degenerative change  A. Tongue, left base, biopsy:   Squamous epithelial-lined lymphoid tissue with reactive germinal centers, compatible with lingual tonsil. Negative for dysplasia; Negative for malignancy. B.  Tongue, left base, biopsy:   Squamous epithelial-lined lymphoid tissue with reactive germinal centers, compatible with lingual tonsil. Focal lobules of benign mucinous glands. Negative for dysplasia; Negative for malignancy    On 07/21/2022: Panendoscopy excision left level 2 lymph node  Findings:  left base of tongue mass, left cervical lymphadenopathy  A. 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 See comment. B. 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 C.  Left base of tongue, biopsy: Squamous mucosa, negative for neoplasm. D.  Right base of tongue, biopsy: Squamous mucosa, negative for neoplasm. E.  Midline tongue, biopsy: Squamous mucosa, negative for neoplasm.    Comment:   The squamous carcinoma is diffusely and strongly positive for p16 immunostain, supporting the above interpretation    PET/CT scan 08/01/2022: There is a large hypermetabolic mass centered in the left oropharynx involving the left lateral oropharynx, left tonsillar and left base of tongue regions extending superiorly to the left soft palate and inferiorly to the left floor of mouth and left vallecula. Maximum SUV of this mass measures 17.4. The mass causes narrowing of the oropharyngeal airway. There are multiple hypermetabolic enlarged, necrotic and someone confluent left level 2 and left level 3 lymph nodes. Confluent left level 2 adenopathy demonstrates a maximum SUV of 15.8. Additional hypermetabolic left level 5/3 junction lymph node is noted with maximum SUV 5.0  Mild focal hypermetabolic activity is noted in the left parapharyngeal/retropharyngeal region (maximum SUV 3.8) which could represent an underlying small lymph node. There is a hypermetabolic right level 2 lymph node with maximum SUV 6.5    Recommended concurrent chemoradiation therapy with weekly Cisplatin. Side effects reviewed. He agreed to proceed. Authorization obtained. Cycle # 1 weekly Cisplatin was on 09/06/2022  Cycle # 2 weekly Cisplatin was on 09/13/2022. Cycle # 3 weekly Cisplatin was on 09/20/2022. Today 09/27/2022; ER visit yesterday for fatigue dysphagia dehydration. Imaging did not show any acute cardiopulmonary pathology however there was possible early diverticulitis. Given uncomplicated findings on imaging and stable vitals he was not started on antibiotics. He was discharged with prescription for liquid Carafate to help him with his swallowing. Review of Systems;  CONSTITUTIONAL: No fever, chills. Decline in appetite and energy level. ENMT: Eyes: No diplopia; Nose: No epistaxis. Mouth: + sore throat. RESPIRATORY: No hemoptysis, shortness of breath, cough. CARDIOVASCULAR: No chest pain, palpitations.   GASTROINTESTINAL: No N/V. +Dysphagia  GENITOURINARY: No dysuria, urinary frequency, hematuria. NEURO: No syncope, presyncope, headache. Remainder: ROS NEGATIVE    Past Medical History:      Diagnosis Date    Arthritis     Cancer ([facility] [address] 2022    oropharynx    Diabetes mellitus ([facility]     GERD (gastroesophageal reflux disease)     Hyperlipidemia     Hypertension     Lyme disease     Thrombocytopenia (HCC)      Medications:  Reviewed and reconciled. Allergies:  No Known Allergies    Physical Exam:  /76   Pulse 98   Temp 97.9 °F (36.6 °C)   Ht 5' 7\" (1.702 m)   Wt 167 lb 3.2 oz (75.8 kg)   SpO2 95%   BMI 26.19 kg/m²   GENERAL: Alert, oriented x 3, tired  LUNGS: Good air entry bilaterally. No wheezing, crackles or ronchi. CARDIOVASCULAR: Regular rate. No murmurs, rubs or gallops. EXTREMITIES: Without clubbing, cyanosis, or edema. NEUROLOGIC: No focal deficits. ECOG PS 2    Lab Results   Component Value Date    WBC 5.3 09/26/2022    HGB 13.5 09/26/2022    HCT 39.1 09/26/2022    MCV 85.0 09/26/2022     09/26/2022     Lab Results   Component Value Date     (L) 09/26/2022    K 3.8 09/26/2022    CL 92 (L) 09/26/2022    CO2 28 09/26/2022    BUN 43 (H) 09/26/2022    CREATININE 1.0 09/26/2022    GLUCOSE 128 (H) 09/26/2022    CALCIUM 9.8 09/26/2022    PROT 6.9 09/26/2022    LABALBU 3.4 (L) 09/26/2022    BILITOT 0.7 09/26/2022    ALKPHOS 53 09/26/2022    AST 16 09/26/2022    ALT 41 (H) 09/26/2022    LABGLOM >60 09/26/2022    GFRAA >60 09/26/2022     Lab Results   Component Value Date/Time    MG 1.9 09/20/2022 10:29 AM     Impression/Plan:  23-year-old male with history of p16+ left Oropharyngeal Squamous Cell Carcinoma    CT soft tissue neck 02/23/2022:  Mass located in the left aspect of floor of the mouth extending into the left oropharyngeal soft tissue concerning for squamous cell carcinoma  Multiple enlarged necrotic left anterior cervical chain lymph nodes    Panendoscopy on 3/17/2022:  Findings: L lingual tonsil hypertrophy, biopsy negative, left level II neck node FNA performed   FNA left neck mass level 2: Inconclusive for malignant cells. Few atypical squamous cells with degenerative change  A. Tongue, left base, biopsy:   Squamous epithelial-lined lymphoid tissue with reactive germinal centers, compatible with lingual tonsil. Negative for dysplasia; Negative for malignancy. B.  Tongue, left base, biopsy:   Squamous epithelial-lined lymphoid tissue with reactive germinal centers, compatible with lingual tonsil. Focal lobules of benign mucinous glands. Negative for dysplasia; Negative for malignancy    On 07/21/2022: Panendoscopy excision left level 2 lymph node  Findings:  left base of tongue mass, left cervical lymphadenopathy  A. 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 See comment. B. 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 C.  Left base of tongue, biopsy: Squamous mucosa, negative for neoplasm. D.  Right base of tongue, biopsy: Squamous mucosa, negative for neoplasm. E.  Midline tongue, biopsy: Squamous mucosa, negative for neoplasm. Comment:   The squamous carcinoma is diffusely and strongly positive for p16 immunostain, supporting the above interpretation    PET/CT scan 08/01/2022: There is a large hypermetabolic mass centered in the left oropharynx involving the left lateral oropharynx, left tonsillar and left base of tongue regions extending superiorly to the left soft palate and inferiorly to the left floor of mouth and left vallecula. Maximum SUV of this mass measures 17.4. The mass causes narrowing of the oropharyngeal airway. There are multiple hypermetabolic enlarged, necrotic and someone confluent left level 2 and left level 3 lymph nodes. Confluent left level 2 adenopathy demonstrates a maximum SUV of 15.8.   Additional hypermetabolic left level 5/3 junction lymph node is noted with maximum SUV 5.0  Mild focal hypermetabolic activity is noted in the left parapharyngeal/retropharyngeal region (maximum SUV 3.8) which could represent an underlying small lymph node. There is a hypermetabolic right level 2 lymph node with maximum SUV 6.5    Recommended concurrent chemoradiation therapy with weekly Cisplatin. Side effects reviewed. He agreed to proceed. Authorization obtained. Cycle # 1 weekly Cisplatin was on 09/06/2022  Cycle # 2 weekly Cisplatin was on 09/13/2022. Cycle # 3 weekly Cisplatin was on 09/20/2022. ER visit 09/26/2022 fatigue dysphagia dehydration; CTA chest 9/26/2022 no evidence of PE or acute pulmonary abnormality. CT abdomen/pelvis 09/26/2022 diffuse colonic diverticulosis with mild stranding of the fat in the left lower quadrant concerning for early diverticulitis. No evidence of obstructive uropathy or acute appendicitis. Imaging reviewed. ER visit reviewed. Given IV fluid with improvement. Hold cycle # 4 weekly Cisplatin today 09/27/2022. Labs reviewed. Recommended IVF daily for 3 days starting today 09/27/2022. PEG tube insertion on Friday 09/30/2022. RTC next week for re-evaluation and possible Cycle # 4 weekly Cisplatin.   Palliative care team consulted for symptom management    Soheila Garces MD   9/27/2022

## 2022-09-27 NOTE — TELEPHONE ENCOUNTER
We have a patient roosevelt Seaman, :1950, seen in office today by Dr. George Sales. The patient was unable to receive his chemo or radiation today, stating he was too weak, increased mucous, unable to swallow and pain of 10/10 in his mouth, teeth, and throat. Patient's wife stating that Palliative told him they could not prescribe Oxycodone for him. Asking for a stronger prescription for his sore throat. Dr. George Sales asked me to reach out to you guys and see if you could possibly see him tomorrow. Patient is coming in for hydration with Med Onc tomorrow, but unkown what time as of yet. Gave them my phone number and informed them to call me when they know what time. Patient also has an appointment with his ENT, Dr. Ellis Gil tomorrow at 11:30 am. Let me know if you have any questions concerning this. And Thank you in advance for anything you can do to get Mr. Johan Corey seen by Palliative.     Bossman Art, RN, CNOR

## 2022-09-27 NOTE — TELEPHONE ENCOUNTER
Adrian Borne  9/27/2022  Wt Readings from Last 10 Encounters:   09/27/22 167 lb 3.2 oz (75.8 kg)   09/26/22 162 lb (73.5 kg)   09/20/22 176 lb 3.2 oz (79.9 kg)   09/20/22 176 lb 12.8 oz (80.2 kg)   09/14/22 187 lb (84.8 kg)   09/13/22 184 lb 14.4 oz (83.9 kg)   09/13/22 184 lb 14.4 oz (83.9 kg)   09/06/22 189 lb 3.2 oz (85.8 kg)   09/06/22 189 lb 11.2 oz (86 kg)   08/24/22 186 lb 6.4 oz (84.6 kg)     Ht Readings from Last 1 Encounters:   09/27/22 5' 7\" (1.702 m)     BMI=26.19    Assessment: Visited with Alvina Xavier while in receiving IV hydration. He has received IV hydration numerous days over past week and is planned for IV hydration til PEG tube placed on 9/30/22. He complains of severe odynophagia and not eating. During hydration I provided him with Ensure Clear but he did not like that either. Suggested Gatorade Protein for more nutrition than Propel. Also increase to more than 2 bottles/day,  to try to improve to 4 bottles per day. Phone call placed to [facility] and spoke with Iggy Salguero. Iggy Salguero reports that they will be doing tube feeding teaching and be providing tube feeding formula and supplies. [facility] aware that PEG is being placed on 9/30/22 and said they will find out when medically cleared to begin feedings to arrange first appointment on that date. [facility] aware that he currently is taking in very little nutrition/hydration orally d/t odynophagia. Will continue to follow. Weight change: 5.43% significant weight loss x 1 week, 11.53% significant weight loss x 1 month  Appetite: Poor appetite, drinking Propel 2 bottles per day. Has not drank any ONS since Saturday (had Boost VHC 1 carton)  Nutritional Side Effects: odynophagia, dysgeusia, anorexia  Malnutrition Status: Severe  Nutrition Diagnosis: Severe malnutrition r/t oropharyngeal cancer AEB significant weight loss, consuming <500kcals per day for >5 days, significant muscle wasting and fat loss.     Recommendations: PEG tube placement and beginning tube feeding as soon as medically cleared.     Queen Ana RD

## 2022-09-28 ENCOUNTER — OFFICE VISIT (OUTPATIENT)
Dept: PALLATIVE CARE | Age: 72
End: 2022-09-28
Payer: MEDICARE

## 2022-09-28 ENCOUNTER — OFFICE VISIT (OUTPATIENT)
Dept: ENT CLINIC | Age: 72
End: 2022-09-28
Payer: MEDICARE

## 2022-09-28 ENCOUNTER — HOSPITAL ENCOUNTER (OUTPATIENT)
Dept: INFUSION THERAPY | Age: 72
Discharge: HOME | End: 2022-09-28
Payer: MEDICARE

## 2022-09-28 ENCOUNTER — APPOINTMENT (OUTPATIENT)
Dept: RADIATION ONCOLOGY | Age: 72
End: 2022-09-28
Payer: MEDICARE

## 2022-09-28 VITALS
HEART RATE: 90 BPM | BODY MASS INDEX: 26.63 KG/M2 | SYSTOLIC BLOOD PRESSURE: 94 MMHG | TEMPERATURE: 97.3 F | WEIGHT: 170 LBS | DIASTOLIC BLOOD PRESSURE: 60 MMHG

## 2022-09-28 VITALS
WEIGHT: 169.3 LBS | DIASTOLIC BLOOD PRESSURE: 71 MMHG | HEIGHT: 67 IN | HEART RATE: 103 BPM | SYSTOLIC BLOOD PRESSURE: 106 MMHG | BODY MASS INDEX: 26.57 KG/M2

## 2022-09-28 VITALS
TEMPERATURE: 97.4 F | OXYGEN SATURATION: 99 % | HEART RATE: 87 BPM | SYSTOLIC BLOOD PRESSURE: 108 MMHG | DIASTOLIC BLOOD PRESSURE: 56 MMHG | RESPIRATION RATE: 16 BRPM

## 2022-09-28 DIAGNOSIS — Z51.5 PALLIATIVE CARE BY SPECIALIST: Primary | ICD-10-CM

## 2022-09-28 DIAGNOSIS — C10.9 OROPHARYNGEAL CANCER (HCC): ICD-10-CM

## 2022-09-28 DIAGNOSIS — G89.3 PAIN DUE TO NEOPLASM: ICD-10-CM

## 2022-09-28 DIAGNOSIS — C10.2 MALIGNANT NEOPLASM OF LATERAL WALL OF OROPHARYNX (HCC): ICD-10-CM

## 2022-09-28 DIAGNOSIS — C10.9 SQUAMOUS CELL CARCINOMA OF OROPHARYNX (HCC): ICD-10-CM

## 2022-09-28 DIAGNOSIS — C10.9 SQUAMOUS CELL CARCINOMA OF OROPHARYNX (HCC): Primary | ICD-10-CM

## 2022-09-28 DIAGNOSIS — R59.0 CERVICAL ADENOPATHY: ICD-10-CM

## 2022-09-28 DIAGNOSIS — R11.0 NAUSEA: Primary | ICD-10-CM

## 2022-09-28 PROCEDURE — 1123F ACP DISCUSS/DSCN MKR DOCD: CPT | Performed by: OTOLARYNGOLOGY

## 2022-09-28 PROCEDURE — 99213 OFFICE O/P EST LOW 20 MIN: CPT | Performed by: OTOLARYNGOLOGY

## 2022-09-28 PROCEDURE — 99213 OFFICE O/P EST LOW 20 MIN: CPT | Performed by: NURSE PRACTITIONER

## 2022-09-28 PROCEDURE — 1123F ACP DISCUSS/DSCN MKR DOCD: CPT | Performed by: NURSE PRACTITIONER

## 2022-09-28 PROCEDURE — 2580000003 HC RX 258: Performed by: INTERNAL MEDICINE

## 2022-09-28 PROCEDURE — 96360 HYDRATION IV INFUSION INIT: CPT

## 2022-09-28 RX ORDER — LIDOCAINE HYDROCHLORIDE 20 MG/ML
15 SOLUTION OROPHARYNGEAL
Qty: 600 ML | Refills: 1 | Status: SHIPPED | OUTPATIENT
Start: 2022-09-28 | End: 2022-10-13

## 2022-09-28 RX ORDER — OXYCODONE HYDROCHLORIDE 10 MG/1
10 TABLET ORAL EVERY 4 HOURS PRN
Qty: 84 TABLET | Refills: 0 | Status: SHIPPED
Start: 2022-09-28 | End: 2022-10-12 | Stop reason: SDUPTHER

## 2022-09-28 RX ORDER — SODIUM CHLORIDE 9 MG/ML
5-250 INJECTION, SOLUTION INTRAVENOUS PRN
Status: CANCELLED | OUTPATIENT
Start: 2022-09-29

## 2022-09-28 RX ORDER — 0.9 % SODIUM CHLORIDE 0.9 %
1000 INTRAVENOUS SOLUTION INTRAVENOUS ONCE
Status: COMPLETED | OUTPATIENT
Start: 2022-09-28 | End: 2022-09-28

## 2022-09-28 RX ORDER — HEPARIN SODIUM (PORCINE) LOCK FLUSH IV SOLN 100 UNIT/ML 100 UNIT/ML
500 SOLUTION INTRAVENOUS PRN
Status: CANCELLED | OUTPATIENT
Start: 2022-09-29

## 2022-09-28 RX ORDER — 0.9 % SODIUM CHLORIDE 0.9 %
1000 INTRAVENOUS SOLUTION INTRAVENOUS ONCE
Status: CANCELLED | OUTPATIENT
Start: 2022-09-29 | End: 2022-09-29

## 2022-09-28 RX ORDER — FLUCONAZOLE 100 MG/1
100 TABLET ORAL DAILY
Qty: 14 TABLET | Refills: 0 | Status: SHIPPED | OUTPATIENT
Start: 2022-09-28 | End: 2022-10-12

## 2022-09-28 RX ORDER — SODIUM CHLORIDE 0.9 % (FLUSH) 0.9 %
5-40 SYRINGE (ML) INJECTION PRN
Status: CANCELLED | OUTPATIENT
Start: 2022-09-29

## 2022-09-28 RX ADMIN — SODIUM CHLORIDE 1000 ML: 9 INJECTION, SOLUTION INTRAVENOUS at 09:07

## 2022-09-28 ASSESSMENT — PAIN DESCRIPTION - DESCRIPTORS: DESCRIPTORS: BURNING

## 2022-09-28 ASSESSMENT — PAIN DESCRIPTION - LOCATION: LOCATION: THROAT

## 2022-09-28 ASSESSMENT — PAIN SCALES - GENERAL: PAINLEVEL_OUTOF10: 10

## 2022-09-28 ASSESSMENT — PAIN DESCRIPTION - FREQUENCY: FREQUENCY: CONTINUOUS

## 2022-09-28 NOTE — PROGRESS NOTES
[facility] Otolaryngology  Dr. Kirstin Moreno. Sulma Ibarra. Ms.Ed        Patient Name:  Amber Herron  :  1950     CHIEF C/O:    Chief Complaint   Patient presents with    Post-Op Check     Mouth burnt and unnable to eart for 20 ndays . Getting a peg tube Friday        HISTORY OBTAINED FROM:  patient    HISTORY OF PRESENT ILLNESS:       Willie Bates is a 71y.o. year old male, here today for follow up of here for undergoing current chemo and radiation for hypopharyngeal carcinoma with associated metastatic lymphadenopathy. Patient is in the acute phase of radiation and chemotherapy, with increased difficulty in swallowing and oral intake, he is scheduled for a PEG tube placement in the next 48 hours. He is tolerating mild liquid diet and clear liquids at this time. Denies any new changes in difficulty breathing or shortness of breath. No complaints of nausea vomit this time. He has had weight loss at this point, up to 12 pounds. No other complaints today or recent fever or chills. Patient has been able to currently meet all of his radiation goals and chemotherapy appointments.       Past Medical History:   Diagnosis Date    Arthritis     Cancer ([address]     oropharynx    Diabetes mellitus ([address]     GERD (gastroesophageal reflux disease)     Hyperlipidemia     Hypertension     Lyme disease     Thrombocytopenia ([address]      Past Surgical History:   Procedure Laterality Date    BRONCHOSCOPY N/A 10/28/2019    BRONCHOSCOPY performed by Mario Dockery MD at [address] [facility] [facility]  2017    dr Simin Myrick  2017    COLONOSCOPY  2019    GASTROSTOMY TUBE PLACEMENT N/A 2022    PEG TUBE PLACEMENT performed by London Vega MD at [facility] Left 3/17/2022    PANENDOSCOPY WITH BIOPSY performed by Ramu Arceo DO at [facility] Left 2022    PANENDOSCOPY performed by Ramu Arceo DO at [address] [place] [address] Left 2022 EXCISION LEFT LEVEL II LYMPH NODE, PANENDOSCOPY performed by Mohit Bazan DO at [address] EXTRACTION Left 12/28/2021    VASECTOMY         Current Outpatient Medications:     sucralfate (CARAFATE) 1 GM/10ML suspension, Take 10 mLs by mouth 4 times daily, Disp: 1200 mL, Rfl: 3    traZODone (DESYREL) 50 MG tablet, Take 1 tablet by mouth nightly, Disp: 30 tablet, Rfl: 0    melatonin 3 MG TABS tablet, Take 3 mg by mouth nightly as needed, Disp: , Rfl:     prochlorperazine (COMPAZINE) 10 MG tablet, Take 1 tablet by mouth every 6 hours as needed (nausea/vomiting), Disp: 40 tablet, Rfl: 3    ibuprofen (ADVIL;MOTRIN) 600 MG tablet, Take 1 tablet by mouth every 6 hours as needed for Pain, Disp: 360 tablet, Rfl: 1    fenofibrate (TRICOR) 145 MG tablet, Take 1 tablet by mouth daily (Patient taking differently: Take 145 mg by mouth daily Not taking), Disp: 90 tablet, Rfl: 1    lisinopril-hydroCHLOROthiazide (PRINZIDE;ZESTORETIC) 10-12.5 MG per tablet, Take 1 tablet by mouth daily (Patient taking differently: Take 1 tablet by mouth daily Not taking bp lo), Disp: 90 tablet, Rfl: 1    metFORMIN (GLUCOPHAGE-XR) 500 MG extended release tablet, Take 1 tablet by mouth 2 times daily (Patient taking differently: Take 500 mg by mouth 2 times daily Not taking sugar low), Disp: 180 tablet, Rfl: 1    omeprazole (PRILOSEC) 40 MG delayed release capsule, Take 1 capsule by mouth daily, Disp: 90 capsule, Rfl: 1    blood glucose monitor strips, Test 3 times a day & as needed for symptoms of irregular blood glucose. Dispense sufficient amount for indicated testing frequency plus additional to accommodate PRN testing needs. , Disp: 50 strip, Rfl: 0    psyllium (KONSYL) 28.3 % PACK, Take 1 packet by mouth nightly, Disp: , Rfl:     silver sulfADIAZINE (SILVADENE) 1 % cream, Apply topically to affected areas 2-3 times per day as needed. , Disp: 50 g, Rfl: 3    oxyCODONE HCl (OXY-IR) 10 MG immediate release tablet, Take 1 tablet by mouth every 4 hours as needed for Pain for up to 14 days. Intended supply: 30 days, Disp: 84 tablet, Rfl: 0    nystatin (MYCOSTATIN) 268559 UNIT/ML suspension, Take 5 mLs by mouth 4 times daily for 10 days Retain in mouth as long as possible, Disp: 200 mL, Rfl: 0    Magic Mouthwash (MIRACLE MOUTHWASH), Swish and spit 5 mLs 4 times daily as needed for Irritation or Pain Benadryl 12.5 mg/ml Maalox 10 ml/5ml  lidocaine 2%/5ml, Disp: 480 mL, Rfl: 3    sennosides-docusate sodium (SENOKOT-S) 8.6-50 MG tablet, Take 2 tablets by mouth 2 times daily as needed for Constipation, Disp: 120 tablet, Rfl: 0    polyethylene glycol (GLYCOLAX) 17 GM/SCOOP powder, Take 17 g by mouth daily, Disp: 510 g, Rfl: 5  Patient has no known allergies. Social History     Tobacco Use    Smoking status: Former     Packs/day: 1.00     Years: 27.00     Pack years: 27.00     Types: Cigarettes     Start date:      Quit date:      Years since quittin.8    Smokeless tobacco: Never   Vaping Use    Vaping Use: Never used   Substance Use Topics    Alcohol use: Not Currently    Drug use: Not Currently     Comment: marijuana in past...not for years     Family History   Problem Relation Age of Onset    High Blood Pressure Mother     High Blood Pressure Father     Cancer Father 80        kidney    Abdominal aortic aneurysm Father        Review of Systems   Constitutional:  Negative for chills and fever. HENT:  Positive for sore throat and trouble swallowing. Negative for ear discharge, hearing loss, sinus pressure, sneezing and tinnitus. Respiratory:  Negative for cough and shortness of breath. Cardiovascular:  Negative for chest pain and palpitations. Gastrointestinal:  Negative for vomiting. Skin:  Negative for rash. Allergic/Immunologic: Negative for environmental allergies. Neurological:  Negative for dizziness and headaches. Hematological:  Does not bruise/bleed easily. All other systems reviewed and are negative.     /71 (Site: Left Upper Arm, Position: Sitting, Cuff Size: Medium Adult)   Pulse (!) 103   Ht 5' 7\" (1.702 m)   Wt 169 lb 4.8 oz (76.8 kg)   BMI 26.52 kg/m²   Physical Exam  Vitals and nursing note reviewed. Constitutional:       Appearance: He is well-developed. HENT:      Head: Normocephalic and atraumatic. Right Ear: Tympanic membrane and ear canal normal.      Left Ear: Tympanic membrane and ear canal normal.      Nose: No congestion or rhinorrhea. Mouth/Throat:     Eyes:      Pupils: Pupils are equal, round, and reactive to light. Neck:      Thyroid: No thyromegaly. Trachea: No tracheal deviation. Cardiovascular:      Rate and Rhythm: Normal rate. Pulmonary:      Effort: Pulmonary effort is normal. No respiratory distress. Musculoskeletal:         General: Normal range of motion. Cervical back: Normal range of motion. Lymphadenopathy:      Cervical: No cervical adenopathy. Skin:     General: Skin is warm. Findings: No erythema. Neurological:      Mental Status: He is alert. Cranial Nerves: No cranial nerve deficit. IMPRESSION/PLAN:  Patient seen and examined with history of hypopharyngeal carcinoma squamous cell undergoing active chemo and radiation therapy which we will follow-up for assessment of completion of therapy in approximately 2 to 3 months. He is undergoing PEG tube placement for dehydration and poor oral intake, it was again reiterated that oral intake is of maximal importance at this time to continue even swallowing just plain liquids such as water as he tolerates. Dr. Lexus Cerrato.  Otolaryngology Facial Plastic Surgery  :  [facility] Otolaryngology/Facial Plastic Surgery Residency  Associate Clinical Professor:  [facility]

## 2022-09-28 NOTE — PROGRESS NOTES
Department of Palliative Medicine  Ambulatory Note  Provider: MARY Paul CNP      Location of service: [place] [address]  Chief Complaint: Marla Urena is a 71 y.o. male with chief complaint of anxiety    HPI: Marla Urena is a 71 y.o. 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 Plan is for concurrent chemo and radiation. Assessment/Plan      Squamous cell carcinoma of the left base of tongue and tonsil  -Follows with Dr. Jannette Molina for oncology    Pain due to neoplasm:  -Oxycodone 10 mg Q 4 PRN  -Lidocaine viscous  -Carafate solution QID  -Tylenol 650 mg as needed  -ibuprofen 600 mg every 6 hours as needed  -Magic mouthwash QID PRN  -Tetracaine lollipops  -Plan for PEG placement    Insomnia  -melatonin   -Has not needed Trazodone    Constipation  -Colace  -Continue Metamucil    Mucositis/Thrush:   -Nystatin and Diflucan    Follow Up:  4 weeks. Encouraged to call with any questions, concerns, needs, or changes in symptoms. Subjective:     Met with Valentin Trent at the outpatient clinic. He reports he reports that he now has severe pain in his mouth, which impairs his ability to be able to eat, unfortunately due to this his radiation therapy has been held, and he is to have a PEG tube placed by end of the week. He does have Magic mouthwash, as well as Carafate, but his pain continues to be severe. The pain is worsened by eating and drinking, especially swallowing. He is noted to have significant mucositis, as well as what appears to be slight thrush. Discussed options at length, will provide oxycodone 10 mg which he may take every 4 hours for severe pain. We also will provide Diflucan and nystatin, instructions were given regarding use these medications.   Additionally will be provided expectoration. CARDIOVASCULAR:  Chest pain/pressure, palpitation, syncope, irregular beats  GASTROINTESTINAL:  abdominal or rectal pain, diarrhea, constipation, . GENITOURINARY:  Burning, frequency, urgency, incontinence, discharge  INTEGUMENTARY: rash, wound, pruritis  HEMATOLOGIC/LYMPHATIC:  Swelling, sores, gum bleeding, easy bruising, pica. MUSCULOSKELETAL:  pain, edema, joint swelling or redness  NEUROLOGICAL:  light headed, dizziness, loss of consciousness, weakness, change in memory, seizures, tremors    Objective:     Physical Exam  Wt Readings from Last 3 Encounters:   09/28/22 170 lb (77.1 kg)   09/27/22 167 lb 3.2 oz (75.8 kg)   09/26/22 162 lb (73.5 kg)     BP 94/60   Pulse 90   Temp 97.3 °F (36.3 °C)   Wt 170 lb (77.1 kg)   BMI 26.63 kg/m²     Gen:  Alert, appears stated age, well nourished, in no acute distress  HEENT:  Normocephalic, no drainage,  Neck:  Supple  Lungs:  non labored breathing  Heart[de-identified]  Regular rate  Abd:  Soft,   M/S/Ext:  moving all extremities   Skin:  no visible lesions  Neuro:  PERRL, Alert, oriented x 3; following commands    [place] Symptom Assessment Score   [place] Score 9/28/2022 9/14/2022 8/24/2022   Pain Score Worst possible pain 5 6   Tiredness Score 6 4 2   Nausea Score 4 4 Not nauseated   Depression Score 5 7 3   Anxiety Score 7 8 8   Drowsiness Score Not drowsy 4 3   Appetite Score 1 8 3   Wellbeing Score 4 5 3   Dyspnea Score No shortness of breath 3 No shortness of breath   Other Problem Score 1 8 Best possible response   Total Assessment Score(calculated) 38 56 28     Assessed by: patient and provider. Current Medications:  Medications reviewed: yes    Controlled Substances Monitoring: [place] reviewed 9/28/22. RX Monitoring 9/28/2022   Periodic Controlled Substance Monitoring Possible medication side effects, risk of tolerance/dependence & alternative treatments discussed. ;No signs of potential drug abuse or diversion identified.;Obtaining appropriate

## 2022-09-29 ENCOUNTER — APPOINTMENT (OUTPATIENT)
Dept: RADIATION ONCOLOGY | Age: 72
End: 2022-09-29
Payer: MEDICARE

## 2022-09-29 ENCOUNTER — HOSPITAL ENCOUNTER (OUTPATIENT)
Dept: INFUSION THERAPY | Age: 72
Discharge: HOME | End: 2022-09-29
Payer: MEDICARE

## 2022-09-29 ENCOUNTER — ANESTHESIA EVENT (OUTPATIENT)
Dept: ENDOSCOPY | Age: 72
End: 2022-09-29
Payer: MEDICARE

## 2022-09-29 VITALS
DIASTOLIC BLOOD PRESSURE: 64 MMHG | HEART RATE: 84 BPM | OXYGEN SATURATION: 98 % | SYSTOLIC BLOOD PRESSURE: 110 MMHG | TEMPERATURE: 98.3 F

## 2022-09-29 DIAGNOSIS — R11.0 NAUSEA: Primary | ICD-10-CM

## 2022-09-29 DIAGNOSIS — C10.9 SQUAMOUS CELL CARCINOMA OF OROPHARYNX (HCC): ICD-10-CM

## 2022-09-29 LAB
6-MAM, QUANTITATIVE, URINE: <10
6-MONOACETYLMORPHINE, URINE: NOT DETECTED
7-AMINOCLONAZEPAM, QUANTITATIVE, URINE: <50
ALCOHOL URINE: NOT DETECTED
ALPHA-HYDROXYALPRAZOLAM, QUANTITATIVE, URINE: <50
ALPHA-HYDROXYMIDAZOLAM, QUANTITATIVE, URINE: <50
ALPHA-HYDROXYTRIAZOLAM, QUANTITATIVE, URINE: <50
ALPRAZOLAM URINE QUANT: <50
AMPHETAMINE SCREEN, URINE: NOT DETECTED
BARBITURATE SCREEN URINE: NOT DETECTED
BENZODIAZEPINE SCREEN, URINE: NOT DETECTED
BUPRENORPHINE URINE: NOT DETECTED
CANNABINOID SCREEN URINE: NOT DETECTED
CHLORDIAZEPOXIDE, QUANTITATIVE, URINE: <50
CLONAZEPAM, QUANTITATIVE, URINE: <50
COCAINE METABOLITE SCREEN URINE: NOT DETECTED
CODEINE, QUANTITATIVE, URINE: <50
COMMENT: NORMAL
DIAZEPAM URINE QUANT: <50
FENTANYL SCREEN, URINE: NOT DETECTED
FLUNITRAZEPAM, QUANTITATIVE, URINE: <50
FLURAZEPAM, QUANTITATIVE, URINE: <50
HYDROCODONE, QUANTITATIVE, URINE: <50
HYDROMORPHONE, QUANTITATIVE, URINE: <50
INTEGRITY CHECK, CREATININE, URINE: 118.4
INTEGRITY CHECK, OXIDANT, URINE: <40
INTEGRITY CHECK, PH, URINE: 6.2 (ref 4.5–9)
INTEGRITY CHECK, SPECIFIC GRAVITY, URINE: 1.02 (ref 1–1.03)
INTEGRITY CHECK, SPECIMEN INTEGRITY, URINE: ABNORMAL
LORAZEPAM URINE QUANT: <50
Lab: ABNORMAL
METHADONE SCREEN, URINE: NOT DETECTED
MIDAZOLAM URINE QUANT: <50
MORPHINE, QUANTITATIVE, URINE: <50
NORDIAZEPAM URINE QUANT: <50
NORHYDROCODONE, QUANTITATIVE, URINE: <50
NOROXYCODONE, QUANTITATIVE, URINE: >1000
OPIATE SCREEN URINE: NOT DETECTED
OXAZEPAM URINE QUANT: <50
OXYCODONE URINE, QUANTITATIVE: >1000
OXYCODONE URINE: POSITIVE
OXYMORPHONE, QUANTITATIVE, URINE: >1000
PHENCYCLIDINE SCREEN URINE: NOT DETECTED
TEMAZEPAM, QUANTITATIVE, URINE: <50
TRAMADOL SCREEN URINE: NOT DETECTED

## 2022-09-29 PROCEDURE — 96360 HYDRATION IV INFUSION INIT: CPT

## 2022-09-29 PROCEDURE — 2580000003 HC RX 258: Performed by: INTERNAL MEDICINE

## 2022-09-29 RX ORDER — HEPARIN SODIUM (PORCINE) LOCK FLUSH IV SOLN 100 UNIT/ML 100 UNIT/ML
500 SOLUTION INTRAVENOUS PRN
Status: CANCELLED | OUTPATIENT
Start: 2022-09-30

## 2022-09-29 RX ORDER — SODIUM CHLORIDE 9 MG/ML
5-250 INJECTION, SOLUTION INTRAVENOUS PRN
Status: CANCELLED | OUTPATIENT
Start: 2022-09-30

## 2022-09-29 RX ORDER — SODIUM CHLORIDE 0.9 % (FLUSH) 0.9 %
5-40 SYRINGE (ML) INJECTION PRN
Status: CANCELLED | OUTPATIENT
Start: 2022-09-30

## 2022-09-29 RX ORDER — 0.9 % SODIUM CHLORIDE 0.9 %
1000 INTRAVENOUS SOLUTION INTRAVENOUS ONCE
Status: COMPLETED | OUTPATIENT
Start: 2022-09-29 | End: 2022-09-29

## 2022-09-29 RX ORDER — SODIUM CHLORIDE 0.9 % (FLUSH) 0.9 %
5-40 SYRINGE (ML) INJECTION PRN
Status: DISCONTINUED | OUTPATIENT
Start: 2022-09-29 | End: 2022-09-30 | Stop reason: HOSPADM

## 2022-09-29 RX ORDER — 0.9 % SODIUM CHLORIDE 0.9 %
1000 INTRAVENOUS SOLUTION INTRAVENOUS ONCE
Status: CANCELLED | OUTPATIENT
Start: 2022-09-30 | End: 2022-09-30

## 2022-09-29 RX ADMIN — SODIUM CHLORIDE 1000 ML: 9 INJECTION, SOLUTION INTRAVENOUS at 11:15

## 2022-09-29 NOTE — PROGRESS NOTES
[facility]                                                                                                                    PRE OP INSTRUCTIONS FOR  Emma Dooley        Date: 9/29/2022    Date of surgery: 9/30/2022    Arrival Time: Hospital will call you between 5pm and 7pm with your final arrival time for surgery    Nothing by mouth (NPO) as instructed. __midnight __________________________________________________________________    Take the following medications with a small sip of water on the morning of Surgery:      Diabetics may take evening dose of insulin but none after midnight. If you feel symptomatic or low blood sugar morning of surgery drink 1-2 ounces of apple juice only. Aspirin, Ibuprofen, Advil, Naproxen, Vitamin E and other Anti-inflammatory products should be stopped  before surgery  as directed by your physician. Take Tylenol only unless instructed otherwise by your surgeon. Check with your Doctor regarding stopping Plavix, Coumadin, Lovenox, Eliquis, Effient, or other blood thinners. Do not smoke,use illicit drugs and do not drink any alcoholic beverages 24 hours prior to surgery. You may brush your teeth the morning of surgery. DO NOT SWALLOW WATER    You MUST make arrangements for a responsible adult to take you home after your surgery. You will not be allowed to leave alone or drive yourself home. It is strongly suggested someone stay with you the first 24 hrs. Your surgery will be cancelled if you do not have a ride home. PEDIATRIC PATIENTS ONLY:  A parent/legal guardian must accompany a child scheduled for surgery and plan to stay at the hospital until the child is discharged. Please do not bring other children with you. Please wear simple, loose fitting clothing to the hospital.  Arthea Hippo not bring valuables (money, credit cards, checkbooks, etc.) Do not wear any makeup (including no eye makeup) or nail polish on your fingers or toes.     DO NOT wear any jewelry or piercings on day of surgery. All body piercing jewelry must be removed. Shower the night before surgery with _x__Antibacterial soap /TETE WIPES________    TOTAL JOINT REPLACEMENT/HYSTERECTOMY PATIENTS ONLY---Remember to bring Blood Bank bracelet to the hospital on the day of surgery. If you have a Living Will and Durable Power of  for Healthcare, please bring in a copy. If appropriate bring crutches, inspirex, WALKER, CANE etc... Notify your Surgeon if you develop any illness between now and surgery time, cough, cold, fever, sore throat, nausea, vomiting, etc.  Please notify your surgeon if you experience dizziness, shortness of breath or blurred vision between now & the time of your surgery. If you have ___dentures, they will be removed before going to the OR; we will provide you a container. If you wear ___contact lenses or ___glasses, they will be removed; please bring a case for them. To provide excellent care visitors will be limited to 2 in the room at any given time. Please bring picture ID and insurance card. During flu season no children under the age of 15 are permitted in the hospital for the safety of all patients. Other                  Please call AMBULATORY CARE if you have any further questions.    [address]     [address]

## 2022-09-30 ENCOUNTER — HOSPITAL ENCOUNTER (OUTPATIENT)
Age: 72
Setting detail: OUTPATIENT SURGERY
Discharge: HOME | End: 2022-09-30
Attending: SURGERY | Admitting: SURGERY
Payer: MEDICARE

## 2022-09-30 ENCOUNTER — APPOINTMENT (OUTPATIENT)
Dept: RADIATION ONCOLOGY | Age: 72
End: 2022-09-30
Payer: MEDICARE

## 2022-09-30 ENCOUNTER — ANESTHESIA (OUTPATIENT)
Dept: ENDOSCOPY | Age: 72
End: 2022-09-30
Payer: MEDICARE

## 2022-09-30 ENCOUNTER — APPOINTMENT (OUTPATIENT)
Dept: INFUSION THERAPY | Age: 72
End: 2022-09-30
Payer: MEDICARE

## 2022-09-30 VITALS
TEMPERATURE: 97.1 F | HEART RATE: 83 BPM | DIASTOLIC BLOOD PRESSURE: 67 MMHG | BODY MASS INDEX: 26.06 KG/M2 | SYSTOLIC BLOOD PRESSURE: 125 MMHG | HEIGHT: 67 IN | OXYGEN SATURATION: 95 % | WEIGHT: 166 LBS | RESPIRATION RATE: 18 BRPM

## 2022-09-30 PROBLEM — R13.14 PHARYNGOESOPHAGEAL DYSPHAGIA: Status: ACTIVE | Noted: 2022-09-30

## 2022-09-30 LAB
METER GLUCOSE: 150 MG/DL (ref 74–99)
METER GLUCOSE: 53 MG/DL (ref 74–99)

## 2022-09-30 PROCEDURE — 3609013300 HC EGD TUBE PLACEMENT: Performed by: SURGERY

## 2022-09-30 PROCEDURE — 3700000000 HC ANESTHESIA ATTENDED CARE: Performed by: SURGERY

## 2022-09-30 PROCEDURE — 99024 POSTOP FOLLOW-UP VISIT: CPT | Performed by: SURGERY

## 2022-09-30 PROCEDURE — 2580000003 HC RX 258: Performed by: ANESTHESIOLOGY

## 2022-09-30 PROCEDURE — 2709999900 HC NON-CHARGEABLE SUPPLY: Performed by: SURGERY

## 2022-09-30 PROCEDURE — 6360000002 HC RX W HCPCS: Performed by: SURGERY

## 2022-09-30 PROCEDURE — 7100000011 HC PHASE II RECOVERY - ADDTL 15 MIN: Performed by: SURGERY

## 2022-09-30 PROCEDURE — 2500000003 HC RX 250 WO HCPCS: Performed by: NURSE ANESTHETIST, CERTIFIED REGISTERED

## 2022-09-30 PROCEDURE — 7100000010 HC PHASE II RECOVERY - FIRST 15 MIN: Performed by: SURGERY

## 2022-09-30 PROCEDURE — 2580000003 HC RX 258: Performed by: NURSE ANESTHETIST, CERTIFIED REGISTERED

## 2022-09-30 PROCEDURE — 6360000002 HC RX W HCPCS: Performed by: NURSE ANESTHETIST, CERTIFIED REGISTERED

## 2022-09-30 PROCEDURE — 2500000003 HC RX 250 WO HCPCS: Performed by: ANESTHESIOLOGY

## 2022-09-30 PROCEDURE — 2580000003 HC RX 258: Performed by: SURGERY

## 2022-09-30 PROCEDURE — 82962 GLUCOSE BLOOD TEST: CPT

## 2022-09-30 PROCEDURE — 43246 EGD PLACE GASTROSTOMY TUBE: CPT | Performed by: SURGERY

## 2022-09-30 RX ORDER — SODIUM CHLORIDE 0.9 % (FLUSH) 0.9 %
5-40 SYRINGE (ML) INJECTION EVERY 12 HOURS SCHEDULED
Status: DISCONTINUED | OUTPATIENT
Start: 2022-09-30 | End: 2022-09-30 | Stop reason: HOSPADM

## 2022-09-30 RX ORDER — DEXTROSE MONOHYDRATE 25 G/50ML
25 INJECTION, SOLUTION INTRAVENOUS PRN
Status: CANCELLED | OUTPATIENT
Start: 2022-09-30

## 2022-09-30 RX ORDER — MEPERIDINE HYDROCHLORIDE 25 MG/ML
12.5 INJECTION INTRAMUSCULAR; INTRAVENOUS; SUBCUTANEOUS EVERY 5 MIN PRN
Status: CANCELLED | OUTPATIENT
Start: 2022-09-30

## 2022-09-30 RX ORDER — SODIUM CHLORIDE 9 MG/ML
INJECTION, SOLUTION INTRAVENOUS CONTINUOUS
Status: DISCONTINUED | OUTPATIENT
Start: 2022-09-30 | End: 2022-09-30 | Stop reason: HOSPADM

## 2022-09-30 RX ORDER — LIDOCAINE HYDROCHLORIDE 20 MG/ML
INJECTION, SOLUTION EPIDURAL; INFILTRATION; INTRACAUDAL; PERINEURAL PRN
Status: DISCONTINUED | OUTPATIENT
Start: 2022-09-30 | End: 2022-09-30 | Stop reason: SDUPTHER

## 2022-09-30 RX ORDER — SODIUM CHLORIDE 9 MG/ML
INJECTION, SOLUTION INTRAVENOUS PRN
Status: CANCELLED | OUTPATIENT
Start: 2022-09-30

## 2022-09-30 RX ORDER — SODIUM CHLORIDE 0.9 % (FLUSH) 0.9 %
5-40 SYRINGE (ML) INJECTION PRN
Status: CANCELLED | OUTPATIENT
Start: 2022-09-30

## 2022-09-30 RX ORDER — SODIUM CHLORIDE 0.9 % (FLUSH) 0.9 %
5-40 SYRINGE (ML) INJECTION EVERY 12 HOURS SCHEDULED
Status: CANCELLED | OUTPATIENT
Start: 2022-09-30

## 2022-09-30 RX ORDER — DEXTROSE MONOHYDRATE 25 G/50ML
25 INJECTION, SOLUTION INTRAVENOUS PRN
Status: DISCONTINUED | OUTPATIENT
Start: 2022-09-30 | End: 2022-09-30 | Stop reason: HOSPADM

## 2022-09-30 RX ORDER — ONDANSETRON 2 MG/ML
4 INJECTION INTRAMUSCULAR; INTRAVENOUS
Status: CANCELLED | OUTPATIENT
Start: 2022-09-30 | End: 2022-10-01

## 2022-09-30 RX ORDER — FENTANYL CITRATE 50 UG/ML
50 INJECTION, SOLUTION INTRAMUSCULAR; INTRAVENOUS EVERY 5 MIN PRN
Status: CANCELLED | OUTPATIENT
Start: 2022-09-30

## 2022-09-30 RX ORDER — SODIUM CHLORIDE 0.9 % (FLUSH) 0.9 %
5-40 SYRINGE (ML) INJECTION PRN
Status: DISCONTINUED | OUTPATIENT
Start: 2022-09-30 | End: 2022-09-30 | Stop reason: HOSPADM

## 2022-09-30 RX ORDER — PROPOFOL 10 MG/ML
INJECTION, EMULSION INTRAVENOUS PRN
Status: DISCONTINUED | OUTPATIENT
Start: 2022-09-30 | End: 2022-09-30 | Stop reason: SDUPTHER

## 2022-09-30 RX ORDER — FENTANYL CITRATE 50 UG/ML
25 INJECTION, SOLUTION INTRAMUSCULAR; INTRAVENOUS EVERY 5 MIN PRN
Status: CANCELLED | OUTPATIENT
Start: 2022-09-30

## 2022-09-30 RX ORDER — SODIUM CHLORIDE 9 MG/ML
25 INJECTION, SOLUTION INTRAVENOUS PRN
Status: DISCONTINUED | OUTPATIENT
Start: 2022-09-30 | End: 2022-09-30 | Stop reason: HOSPADM

## 2022-09-30 RX ORDER — SODIUM CHLORIDE 9 MG/ML
INJECTION, SOLUTION INTRAVENOUS CONTINUOUS PRN
Status: DISCONTINUED | OUTPATIENT
Start: 2022-09-30 | End: 2022-09-30 | Stop reason: SDUPTHER

## 2022-09-30 RX ADMIN — LIDOCAINE HYDROCHLORIDE 100 MG: 20 INJECTION, SOLUTION EPIDURAL; INFILTRATION; INTRACAUDAL; PERINEURAL at 07:44

## 2022-09-30 RX ADMIN — SODIUM CHLORIDE: 9 INJECTION, SOLUTION INTRAVENOUS at 07:10

## 2022-09-30 RX ADMIN — PROPOFOL 120 MG: 10 INJECTION, EMULSION INTRAVENOUS at 07:44

## 2022-09-30 RX ADMIN — CEFAZOLIN 2000 MG: 2 INJECTION, POWDER, FOR SOLUTION INTRAMUSCULAR; INTRAVENOUS at 07:42

## 2022-09-30 RX ADMIN — SODIUM CHLORIDE, PRESERVATIVE FREE 10 ML: 5 INJECTION INTRAVENOUS at 07:00

## 2022-09-30 RX ADMIN — SODIUM CHLORIDE: 900 INJECTION, SOLUTION INTRAVENOUS at 07:40

## 2022-09-30 RX ADMIN — DEXTROSE MONOHYDRATE 25 G: 25 INJECTION, SOLUTION INTRAVENOUS at 07:06

## 2022-09-30 ASSESSMENT — PAIN DESCRIPTION - LOCATION
LOCATION: ABDOMEN
LOCATION: ABDOMEN

## 2022-09-30 ASSESSMENT — PAIN - FUNCTIONAL ASSESSMENT: PAIN_FUNCTIONAL_ASSESSMENT: 0-10

## 2022-09-30 ASSESSMENT — PAIN DESCRIPTION - DESCRIPTORS
DESCRIPTORS: ACHING
DESCRIPTORS: NAGGING;SORE
DESCRIPTORS: ACHING

## 2022-09-30 ASSESSMENT — PAIN SCALES - GENERAL
PAINLEVEL_OUTOF10: 1
PAINLEVEL_OUTOF10: 1

## 2022-09-30 NOTE — H&P
General Surgery H and P    Patient's Name/Date of Birth: Caitlin Fatima / 1950    Date: September 30, 2022     Consulting Surgeon: Manuela Dubin M.D.    PCP: Yon Diaz DO     Chief Complaint: failure to take adequate po, dysphagia    HPI:   Caitlin Fatima is a 71 y.o. male who presents for  evaluation of dysphagia and failure to take adequate po.        Past Medical History:   Diagnosis Date    Arthritis     Cancer ([facility] 2022    oropharynx    Diabetes mellitus ([facility]     GERD (gastroesophageal reflux disease)     Hyperlipidemia     Hypertension     Lyme disease     Thrombocytopenia ([facility]        Past Surgical History:   Procedure Laterality Date    BRONCHOSCOPY N/A 10/28/2019    BRONCHOSCOPY performed by Rafy Bryant MD at [facility]  2017    dr Billy Cohen  03/2017    COLONOSCOPY  03/2019    LARYNGOSCOPY Left 3/17/2022    PANENDOSCOPY WITH BIOPSY performed by Cinthia Lira DO at [facility] Left 7/21/2022    PANENDOSCOPY performed by Cinthia Lira DO at [address] [facility] [address] Left 7/21/2022    EXCISION LEFT LEVEL II LYMPH NODE, PANENDOSCOPY performed by Cinthia Lira DO at [address] EXTRACTION Left 12/28/2021    VASECTOMY         Current Facility-Administered Medications   Medication Dose Route Frequency Provider Last Rate Last Admin    0.9 % sodium chloride infusion   IntraVENous Continuous Taurus Ashford MD        sodium chloride flush 0.9 % injection 5-40 mL  5-40 mL IntraVENous 2 times per day Lori Tristan MD        sodium chloride flush 0.9 % injection 5-40 mL  5-40 mL IntraVENous PRN Lori Tristan MD        0.9 % sodium chloride infusion  25 mL IntraVENous PRN Lori Tristan MD        ceFAZolin (ANCEF) 2,000 mg in sterile water 20 mL IV syringe  2,000 mg IntraVENous On Call to Margi Ritchie MD           No Known Allergies    The patient has a family history that is negative for severe cardiovascular or respiratory issues, negative for reaction to anesthesia.     Social History     Socioeconomic History    Marital status:      Spouse name: Not on file    Number of children: 1    Years of education: Not on file    Highest education level: Not on file   Occupational History    Not on file   Tobacco Use    Smoking status: Former     Packs/day: 1.00     Years: 27.00     Pack years: 27.00     Types: Cigarettes     Start date:      Quit date:      Years since quittin.7    Smokeless tobacco: Never   Vaping Use    Vaping Use: Never used   Substance and Sexual Activity    Alcohol use: Not Currently    Drug use: Not Currently     Comment: marijuana in past...not for years    Sexual activity: Not on file   Other Topics Concern    Not on file   Social History Narrative    ** Merged History Encounter **          Social Determinants of Health     Financial Resource Strain: Low Risk     Difficulty of Paying Living Expenses: Not hard at all   Food Insecurity: No Food Insecurity    Worried About Running Out of Food in the Last Year: Never true    Ran Out of Food in the Last Year: Never true   Transportation Needs: Not on file   Physical Activity: Not on file   Stress: Not on file   Social Connections: Not on file   Intimate Partner Violence: Not on file   Housing Stability: Not on file           Review of Systems  General ROS: negative for - chills, fatigue or fever  ENT ROS: negative for - headaches, hearing change or nasal congestion  Endocrine ROS: negative for - breast changes or galactorrhea, no polyuria  Breast ROS: negative for - new or changing breast lumps   Respiratory ROS: negative for - hemoptysis, orthopnea or pleuritic pain  Cardiovascular ROS: negative for - dyspnea on exertion, edema or loss of consciousness  Gastrointestinal ROS: negative for - blood in stools, heartburn, hematemesis or melena  Musculoskeletal ROS: negative for - gait disturbance  Dermatological ROS: negative for - acne, dry skin or eczema  Neuro ROS: no recent memory loss or mood swings. Physical exam:   /70   Pulse 89   Temp 97.4 °F (36.3 °C) (Temporal)   Resp 16   Ht 5' 7\" (1.702 m)   Wt 166 lb (75.3 kg)   SpO2 97%   BMI 26.00 kg/m²   General appearance:  NAD  Head: NCAT, PERRLA, EOMI, red conjunctiva  Neck: supple, no masses  Lungs: CTAB, equal chest rise bilateral  Heart: Reg rate  Abdomen: soft, nontender, nondistended,  Skin; no lesions  Gu: no cva tenderness  Extremities: extremities normal, atraumatic, no cyanosis or edema    Assessment:  71 y.o. male with dysphagia, failure to take adequate po    Plan:  Cbc, cmp, INR  For PEG  Discussed the risk, benefits and alternatives of surgery including wound infections, bleeding, scar  and the risks of  anesthetic including MI, CVA, sudden death or reactions to anesthetic medications. The patient understands the risks and alternatives. All questions were answered to the patient's satisfaction and they freely signed the consent.         London Vega MD  9/30/2022  6:41 AM

## 2022-09-30 NOTE — ANESTHESIA POSTPROCEDURE EVALUATION
Department of Anesthesiology  Postprocedure Note    Patient: Adrain Jalloh  MRN: [id number]  YOB: 1950  Date of evaluation: 9/30/2022      Procedure Summary     Date: 09/30/22 Room / Location: [address] [place] [address] / [address]    Anesthesia Start: Mark Sotelo Anesthesia Stop: 6003    Procedure: PEG TUBE PLACEMENT Diagnosis:       Protein-calorie malnutrition, unspecified severity ([address]      (Protein-calorie malnutrition, unspecified severity ([address] Cuco Singletary)    Surgeons: Naveen Houston MD Responsible Provider: Pippa Goncalves MD    Anesthesia Type: MAC ASA Status: 2          Anesthesia Type: No value filed.     Eneida Phase I: Eneida Score: 10    Eneida Phase II: Eneida Score: 10      Anesthesia Post Evaluation    Patient location during evaluation: PACU  Patient participation: complete - patient participated  Level of consciousness: awake  Pain score: 3  Airway patency: patent  Nausea & Vomiting: no nausea  Cardiovascular status: blood pressure returned to baseline  Respiratory status: acceptable  Hydration status: euvolemic

## 2022-09-30 NOTE — OP NOTE
DATE OF PROCEDURE: 9/30/2022     PREOPERATIVE DIAGNOSIS: Failure to take adequate PO    POSTOPERATIVE DIAGNOSIS/FINDINGS: same    SURGEON: Gaby Nava M.D.    ASSISTANT: none    OPERATION: 1. Esophagogastroduodenoscopy 2. Percutaneous Endoscopic Gastrostomy (PEG)  placement    ANESTHESIA: Local monitored anesthesia. BLOOD LOSS: 0ML    COMPLICATIONS: None. PRIOR TO EXAM: Gen: comfortable, no distress, awake and alert; Lungs: Clear;  Heart:regular rate and rhythm, normal S1S2     BRIEF HISTORY:  This is a 71 y.o. male who presents with the complaint of failure to take adequate PO. My recommendation is to proceed with esophagogastroduodenoscopy/ PEG tube placement. The patient was advised of the risks, benefits, complications and options including the risk of bleeding and perforation. The patient understood and agreed to proceed. Under [facility] anesthesia, the patient was positioned in the left side down decubitus position. A bite block was inserted. Under direct visualization the scope was passed through the oral cavity, into the esophagus and then into the stomach. The scope was then passed through a normal appearing pylorus into the duodenum. The proximal duodenum and bulb were unremarkable. The scope was pulled back into the stomach and retroflexed. Visualization on the gastroesophageal junction and fundus was unremarkable. The scope was then straightened and the distal stomach was inspected. This showed no evidence of gastritis or ulcer. No biopsies were performed. A good one to one was obtained with the scope in the distal stomach    The area of the abdomen was prepped with iodine. 10 cc1% lidocaine was used for local anesthetic for the skin and subcutaneous tissue. The 11 blade was used to make a skin incision. The 18 gauge angio cath was used to stick the skin into the stomach. This was performed under direct visualization. Next the wire was placed through the needle using the seldinger technique. The snare was used to grasp the wire. The  EGDscope with wire was pulled out through the patient's mouth. The peg tube was then attached to the wire and pulled back into the stomach under direct visualization. The endocscope confirmed good placement of the peg tube. The peg tube was placed at 3 cm at the skin. The air was sucked out of the stomach. An abdominal binder was ordered and the peg tube was placed to gravity drainage. THE PATIENT TOLERATED THE PROCEDURE.       PEG tube at 3cm at the skin      Malick Ennis MD  9/30/2022  7:50 AM

## 2022-09-30 NOTE — ANESTHESIA PRE PROCEDURE
Department of Anesthesiology  Preprocedure Note       Name:  Gabrielle Callahan   Age:  71 y.o.  :  1950                                          MRN:  [id number]         Date:  2022      Surgeon: Ramana Query):  Venita Jones MD    Procedure: Procedure(s):  PEG TUBE PLACEMENT    Medications prior to admission:   Prior to Admission medications    Medication Sig Start Date End Date Taking? Authorizing Provider   oxyCODONE HCl (OXY-IR) 10 MG immediate release tablet Take 1 tablet by mouth every 4 hours as needed for Pain for up to 14 days.  Intended supply: 30 days 9/28/22 10/12/22  MARY Mchugh CNP   lidocaine viscous hcl (XYLOCAINE) 2 % SOLN solution Take 15 mLs by mouth every 3 hours as needed for Irritation 9/28/22 10/13/22  MARY Mchugh CNP   nystatin (MYCOSTATIN) 763923 UNIT/ML suspension Take 5 mLs by mouth 4 times daily for 10 days Retain in mouth as long as possible 9/28/22 10/8/22  MARY Mchugh CNP   fluconazole (DIFLUCAN) 100 MG tablet Take 1 tablet by mouth daily for 14 days 9/28/22 10/12/22  MARY Mchugh CNP   sucralfate (CARAFATE) 1 GM/10ML suspension Take 10 mLs by mouth 4 times daily 22   Hamilton Cintron DO   traZODone (DESYREL) 50 MG tablet Take 1 tablet by mouth nightly 9/14/22 10/14/22  MARY Bethea CNP   guaiFENesin (MUCINEX) 600 MG extended release tablet Take 1 tablet by mouth 2 times daily  Patient taking differently: Take 600 mg by mouth 2 times daily Not taking 9/14/22 10/14/22  MARY Bethea CNP   docusate sodium (COLACE) 100 MG capsule Take 1 capsule by mouth 2 times daily as needed for Constipation  Patient taking differently: Take 100 mg by mouth 2 times daily as needed for Constipation Not taking 9/14/22 10/14/22  MARY Bethea CNP   melatonin 3 MG TABS tablet Take 3 mg by mouth nightly as needed    Historical Provider, MD   prochlorperazine (COMPAZINE) 10 MG tablet Take 1 tablet by mouth every 6 hours as needed (nausea/vomiting) 8/31/22   Monica Lozoya MD   Magic Mouthwash (MIRACLE MOUTHWASH) Swish and spit 5 mLs 4 times daily as needed for Irritation or Pain Benadryl 12.5 mg/ml Maalox 10 ml/5ml  lidocaine 2%/5ml 8/24/22   MARY Britt CNP   ibuprofen (ADVIL;MOTRIN) 600 MG tablet Take 1 tablet by mouth every 6 hours as needed for Pain 8/24/22   MARY Britt CNP   fenofibrate (TRICOR) 145 MG tablet Take 1 tablet by mouth daily  Patient taking differently: Take 145 mg by mouth daily Not taking 8/18/22   Lowell Farmer, DO   lisinopril-hydroCHLOROthiazide (PRINZIDE;ZESTORETIC) 10-12.5 MG per tablet Take 1 tablet by mouth daily  Patient taking differently: Take 1 tablet by mouth daily Not taking bp lo 8/18/22   Lowell Farmer, DO   metFORMIN (GLUCOPHAGE-XR) 500 MG extended release tablet Take 1 tablet by mouth 2 times daily  Patient taking differently: Take 500 mg by mouth 2 times daily Not taking sugar low 8/18/22   Jason Foy, DO   omeprazole (PRILOSEC) 40 MG delayed release capsule Take 1 capsule by mouth daily 8/18/22   Lowell Farmer, DO   blood glucose monitor strips Test 3 times a day & as needed for symptoms of irregular blood glucose. Dispense sufficient amount for indicated testing frequency plus additional to accommodate PRN testing needs.  7/29/22   Kris Hernandez, DO   psyllium (KONSYL) 28.3 % PACK Take 1 packet by mouth nightly    Historical Provider, MD       Current medications:    Current Facility-Administered Medications   Medication Dose Route Frequency Provider Last Rate Last Admin    0.9 % sodium chloride infusion   IntraVENous Continuous Amandeep Kennedy  mL/hr at 09/30/22 0710 New Bag at 09/30/22 0710    sodium chloride flush 0.9 % injection 5-40 mL  5-40 mL IntraVENous 2 times per day Milo Mcmanus MD   10 mL at 09/30/22 0700    sodium chloride flush 0.9 % injection 5-40 mL  5-40 mL IntraVENous PRN Milo Mcmanus MD        0.9 % sodium chloride infusion  25 mL IntraVENous PRN Susanna Michaels MD        ceFAZolin (ANCEF) 2,000 mg in sterile water 20 mL IV syringe  2,000 mg IntraVENous On Call to [address] [place] [address] MD Harmeet        dextrose 50 % IV solution  25 g IntraVENous PRN Maureen Lemons MD   25 g at 09/30/22 0706       Allergies:  No Known Allergies    Problem List:    Patient Active Problem List   Diagnosis Code    Essential hypertension I10    Type 2 diabetes mellitus without complication, without long-term current use of insulin (HCC) E11.9    Peptic ulcer K27.9    Thrombocytopenia ([address] D69.6    Dyslipidemia E78.5    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    Cervical adenopathy R59.0    Gastroesophageal reflux disease without esophagitis K21.9    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    Nausea R11.0       Past Medical History:        Diagnosis Date    Arthritis     Cancer ([facility] 2022    oropharynx    Diabetes mellitus ([facility]     GERD (gastroesophageal reflux disease)     Hyperlipidemia     Hypertension     Lyme disease     Thrombocytopenia ([address]        Past Surgical History:        Procedure Laterality Date    BRONCHOSCOPY N/A 10/28/2019    BRONCHOSCOPY performed by Froy Toribio MD at [address] [facility]  2017    dr Laura Smith  03/2017    COLONOSCOPY  03/2019    LARYNGOSCOPY Left 3/17/2022    PANENDOSCOPY WITH BIOPSY performed by Otoniel Rooney DO at [facility] Left 7/21/2022    PANENDOSCOPY performed by Otoniel Rooney DO at [address] [place] [address] Left 7/21/2022    EXCISION LEFT LEVEL II LYMPH NODE, PANENDOSCOPY performed by Otoniel Rooney DO at [facility] 41 EXTRACTION Left 12/28/2021    VASECTOMY         Social History:    Social History     Tobacco Use    Smoking status: Former     Packs/day: 1.00     Years: 27.00     Pack years: 27.00     Types: Cigarettes     Start date: 1990     Quit date: 2014 Years since quittin.7    Smokeless tobacco: Never   Substance Use Topics    Alcohol use: Not Currently                                Counseling given: Not Answered      Vital Signs (Current):   Vitals:    22 1346 22 0628   BP:  113/70   Pulse:  89   Resp:  16   Temp:  97.4 °F (36.3 °C)   TempSrc:  Temporal   SpO2:  97%   Weight: 169 lb (76.7 kg) 166 lb (75.3 kg)   Height: 5' 7\" (1.702 m) 5' 7\" (1.702 m)                                              BP Readings from Last 3 Encounters:   22 113/70   22 110/64   22 (!) 108/56       NPO Status: Time of last liquid consumption:                         Time of last solid consumption:                         Date of last liquid consumption: 22                        Date of last solid food consumption: 22    BMI:   Wt Readings from Last 3 Encounters:   22 166 lb (75.3 kg)   22 169 lb 4.8 oz (76.8 kg)   22 170 lb (77.1 kg)     Body mass index is 26 kg/m².     CBC:   Lab Results   Component Value Date/Time    WBC 5.3 2022 11:34 AM    RBC 4.60 2022 11:34 AM    RBC 5.38 2022 08:16 AM    HGB 13.5 2022 11:34 AM    HCT 39.1 2022 11:34 AM    MCV 85.0 2022 11:34 AM    RDW 13.2 2022 11:34 AM     2022 11:34 AM       CMP:   Lab Results   Component Value Date/Time     2022 11:34 AM    K 3.8 2022 11:34 AM    CL 92 2022 11:34 AM    CO2 28 2022 11:34 AM    BUN 43 2022 11:34 AM    CREATININE 1.0 2022 11:34 AM    CREATININE 1.1 2020 12:00 AM    GFRAA >60 2022 11:34 AM    LABGLOM >60 2022 11:34 AM    GLUCOSE 128 2022 11:34 AM    GLUCOSE 114 2022 08:16 AM    PROT 6.9 2022 11:34 AM    CALCIUM 9.8 2022 11:34 AM    BILITOT 0.7 2022 11:34 AM    ALKPHOS 53 2022 11:34 AM    AST 16 2022 11:34 AM    ALT 41 2022 11:34 AM       POC Tests: No results for input(s): POCGLU, Marli Leyden, POCCL, POCBUN, POCHEMO, POCHCT in the last 72 hours. Coags:   Lab Results   Component Value Date/Time    PROTIME 12.0 10/24/2019 10:00 AM    INR 1.1 10/24/2019 10:00 AM    APTT 37.5 10/24/2019 10:00 AM       HCG (If Applicable): No results found for: PREGTESTUR, PREGSERUM, HCG, HCGQUANT     ABGs: No results found for: PHART, PO2ART, ZZI0YDP, ENG7ARR, BEART, M1TLNTEU     Type & Screen (If Applicable):  No results found for: LABABO, LABRH    Drug/Infectious Status (If Applicable):  Lab Results   Component Value Date/Time    HEPCAB NON-REACTIVE 07/06/2022 08:16 AM       COVID-19 Screening (If Applicable): No results found for: COVID19        Anesthesia Evaluation  Patient summary reviewed  Airway: Mallampati: II  TM distance: >3 FB   Neck ROM: full  Mouth opening: > = 3 FB   Dental: normal exam         Pulmonary:Negative Pulmonary ROS breath sounds clear to auscultation                             Cardiovascular:    (+) hypertension:,         Rhythm: regular  Rate: normal           Beta Blocker:  Not on Beta Blocker         Neuro/Psych:   Negative Neuro/Psych ROS              GI/Hepatic/Renal:   (+) GERD:, PUD,           Endo/Other:    (+) DiabetesType II DM, , malignancy/cancer. Abdominal:       Abdomen: soft. Vascular: Other Findings:           Anesthesia Plan      MAC     ASA 2       Induction: intravenous. Anesthetic plan and risks discussed with patient. Plan discussed with CRNA.                     Taz Enriquez MD   9/30/2022

## 2022-09-30 NOTE — DISCHARGE INSTRUCTIONS
Percutaneous Endoscopic Gastrostomy: What to Expect at [facility]  PEG is a procedure to make an opening between the skin of your belly and your stomach. The doctor put a thin tube called a gastrostomy tube (also called G-tube, PEG tube, or feeding tube) into your stomach through the opening. The tube can put liquid nutrition, fluid, and medicines directly into your stomach. The tube also may be used to drain liquid or air from the stomach. Your belly may feel sore, like you pulled a muscle, for several days. Your doctor will give you pain medicine for this. It will take about a week for the skin around your feeding tube to heal. You may have some yellowish mucus where the feeding tube comes out of your belly. This is normal. It's not a sign of infection. You will need to learn how to use and care for your feeding tube. Your doctor may recommend that you have a nurse or dietitian visit you at home to help you get started with your feeding tube. At first you may need a friend or family member to help you with your tube feedings. But with practice, you may be able to do it yourself. A feeding tube can break down over time. If this happens, the tube will be removed and replaced. Sometimes a tube is removed if you have an infection that is getting worse. Sometimes a tube will come out by itself. Your doctor will give you instructions about what to do if this happens. This care sheet gives you a general idea about how long it will take for you to recover. But each person recovers at a different pace. Follow the steps below to feel better as quickly as possible. How can you care for yourself at home? Activity    Rest when you feel tired. Getting enough sleep will help you recover. Try to walk each day. Start by walking a little more than you did the day before. Bit by bit, increase the amount you walk. Walking boosts blood flow and helps prevent pneumonia and constipation.      Ask your doctor when you can drive again. Until your doctor says it is okay, avoid lifting anything that would make you strain. This may include heavy grocery bags and milk containers, a heavy briefcase or backpack, cat litter or dog food bags, a vacuum , or a child. You can shower as usual. Pat dry the skin around your feeding tube. Do not take a bath unless your doctor tells you it is okay. Diet    Follow your doctor's instructions about eating and drinking. Follow your doctor's instructions about what nutrition and fluids to feed through your tube. Medicines    Your doctor will tell you if and when you can restart your medicines. You will also be given instructions about taking any new medicines. If you stopped taking aspirin or some other blood thinner, your doctor will tell you when to start taking it again. If you take medicine through your feeding tube: Follow exactly your doctor's instructions about how to do this. Do not try to put whole pills in your feeding tube. They may get stuck. Ask your doctor if liquid medicine is available. Do not mix your medicine with tube-feeding formula. This can cause a clog in the feeding tube. Do not put more than one medicine down your feeding tube at a time. Flush the tube with water before and after you put each medicine down your tube. Be safe with medicines. Take pain medicines exactly as directed. If the doctor gave you a prescription medicine for pain, take it as prescribed. If you are not taking a prescription pain medicine, ask your doctor if you can take an over-the-counter medicine. Do not take two or more pain medicines at the same time unless the doctor told you to. Many pain medicines have acetaminophen, which is Tylenol. Too much acetaminophen (Tylenol) can be harmful. If you think your pain medicine is making you sick to your stomach: Take your pain medicine after meals (unless your doctor has told you not to).   Ask your doctor for a different pain medicine. If your doctor prescribed antibiotics, take them as directed. Do not stop taking them just because you feel better. You need to take the full course of antibiotics. Incision care    Your doctor or nurse will show you how to care for the skin around the tube. Be sure to follow the instructions on keeping the area clean. Wash the skin around your feeding tube daily with warm, soapy water, and pat it dry. Other cleaning products, such as hydrogen peroxide, can make the wound heal more slowly. You may cover the area with a gauze bandage if it weeps or rubs against clothing. Change the bandage every day. Keep the area clean and dry. Using your feeding tube    Follow your doctor's instructions about using the feeding tube. Your doctor or nurse will:  Tell you what to put through the tube. Teach you how to watch for a leaking tube, infection at the tube site, or a clog in the tube. Tell you what activities you can do. Keep your feeding tube clamped unless you are using it. Keep the tube taped to your skin at all times, and leave some slack in the tube. This helps prevent pain and keeps the tube from coming out. Wash your hands before you handle the tube and tube-feeding formula. Wash the top of the can of formula before you open it. Keep the formula in the refrigerator after you open it. Follow your doctor's instructions about how long formula can sit out at room temperature. Sit up or keep your head up during the feeding and for 30 to 60 minutes after. If you feel sick to your stomach or have stomach cramps during the tube feeding, slow the rate that the formula comes through the tube. Then gradually increase the rate as you can tolerate it. Follow-up care is a key part of your treatment and safety. Be sure to make and go to all appointments, and call your doctor if you are having problems.  It's also a good idea to know your test results and keep a list of the medicines you take. When should you call for help? Call 911 anytime you think you may need emergency care. For example, call if:    You pass out (lose consciousness). You have severe trouble breathing. You have sudden chest pain and shortness of breath, or you cough up blood. You have severe belly pain. Call your doctor now or seek immediate medical care if:    You have pain that does not get better after you take pain medicine. You have a fever over 100°F.     You have signs of infection, such as: Increased pain, swelling, warmth, or redness. Red streaks leading from the area. Pus draining from the area. A fever. The stitches that hold the feeding tube in place are loose or come out. Your feeding tube comes out. Your feeding tube is clogged, or liquid will not go down the tube. You cough a lot or have other trouble during tube feedings. You have nausea, vomiting, or diarrhea. Watch closely for any changes in your health, and be sure to contact your doctor if:    You have any problems with your feeding tube. Where can you learn more? Go to https://[employer]obango. org and sign in to your View[employer] account. Enter X003 in the [facility] box to learn more about \"Percutaneous Endoscopic Gastrostomy: What to Expect at Home. \"     If you do not have an account, please click on the \"Sign Up Now\" link. Current as of: June 6, 2022               Content Version: 13.4  © 2006-2022 [employer]. Care instructions adapted under license by [facility] ([facility] [place]). If you have questions about a medical condition or this instruction, always ask your healthcare professional. [place] [address] any warranty or liability for your use of this information.     Dressing changes with gauze provided as needed

## 2022-10-03 ENCOUNTER — TELEPHONE (OUTPATIENT)
Dept: RADIATION ONCOLOGY | Age: 72
End: 2022-10-03

## 2022-10-03 ENCOUNTER — HOSPITAL ENCOUNTER (OUTPATIENT)
Dept: RADIATION ONCOLOGY | Age: 72
Discharge: HOME | End: 2022-10-03
Payer: MEDICARE

## 2022-10-03 ENCOUNTER — HOSPITAL ENCOUNTER (OUTPATIENT)
Dept: INFUSION THERAPY | Age: 72
Discharge: HOME | End: 2022-10-03
Payer: MEDICARE

## 2022-10-03 DIAGNOSIS — C10.9 OROPHARYNGEAL CANCER (HCC): ICD-10-CM

## 2022-10-03 LAB
ALBUMIN SERPL-MCNC: 3 G/DL (ref 3.5–5.2)
ALP BLD-CCNC: 71 U/L (ref 40–129)
ALT SERPL-CCNC: 43 U/L (ref 0–40)
ANION GAP SERPL CALCULATED.3IONS-SCNC: 9 MMOL/L (ref 7–16)
AST SERPL-CCNC: 19 U/L (ref 0–39)
BASOPHILS ABSOLUTE: 0.03 E9/L (ref 0–0.2)
BASOPHILS RELATIVE PERCENT: 0.9 % (ref 0–2)
BILIRUB SERPL-MCNC: 0.4 MG/DL (ref 0–1.2)
BUN BLDV-MCNC: 19 MG/DL (ref 6–23)
CALCIUM SERPL-MCNC: 9 MG/DL (ref 8.6–10.2)
CHLORIDE BLD-SCNC: 100 MMOL/L (ref 98–107)
CO2: 29 MMOL/L (ref 22–29)
CREAT SERPL-MCNC: 0.8 MG/DL (ref 0.7–1.2)
EOSINOPHILS ABSOLUTE: 0.06 E9/L (ref 0.05–0.5)
EOSINOPHILS RELATIVE PERCENT: 1.8 % (ref 0–6)
GFR AFRICAN AMERICAN: >60
GFR NON-AFRICAN AMERICAN: >60 ML/MIN/1.73
GLUCOSE BLD-MCNC: 156 MG/DL (ref 74–99)
HCT VFR BLD CALC: 34.5 % (ref 37–54)
HEMOGLOBIN: 11.9 G/DL (ref 12.5–16.5)
LYMPHOCYTES ABSOLUTE: 0.21 E9/L (ref 1.5–4)
LYMPHOCYTES RELATIVE PERCENT: 6.2 % (ref 20–42)
MAGNESIUM: 1.8 MG/DL (ref 1.6–2.6)
MCH RBC QN AUTO: 29.5 PG (ref 26–35)
MCHC RBC AUTO-ENTMCNC: 34.5 % (ref 32–34.5)
MCV RBC AUTO: 85.6 FL (ref 80–99.9)
MONOCYTES ABSOLUTE: 0.18 E9/L (ref 0.1–0.95)
MONOCYTES RELATIVE PERCENT: 5.3 % (ref 2–12)
NEUTROPHILS ABSOLUTE: 3.01 E9/L (ref 1.8–7.3)
NEUTROPHILS RELATIVE PERCENT: 85.8 % (ref 43–80)
PDW BLD-RTO: 14.1 FL (ref 11.5–15)
PLATELET # BLD: 183 E9/L (ref 130–450)
PMV BLD AUTO: 9.8 FL (ref 7–12)
POTASSIUM SERPL-SCNC: 3.4 MMOL/L (ref 3.5–5)
RBC # BLD: 4.03 E12/L (ref 3.8–5.8)
SODIUM BLD-SCNC: 138 MMOL/L (ref 132–146)
TOTAL PROTEIN: 6.3 G/DL (ref 6.4–8.3)
WBC # BLD: 3.5 E9/L (ref 4.5–11.5)

## 2022-10-03 PROCEDURE — 83735 ASSAY OF MAGNESIUM: CPT

## 2022-10-03 PROCEDURE — 77014 PR CT GUIDANCE PLACEMENT RAD THERAPY FIELDS: CPT | Performed by: SPECIALIST

## 2022-10-03 PROCEDURE — 85025 COMPLETE CBC W/AUTO DIFF WBC: CPT

## 2022-10-03 PROCEDURE — 77427 RADIATION TX MANAGEMENT X5: CPT | Performed by: RADIOLOGY

## 2022-10-03 PROCEDURE — 77386 HC NTSTY MODUL RAD TX DLVR CPLX: CPT | Performed by: SPECIALIST

## 2022-10-03 PROCEDURE — 36415 COLL VENOUS BLD VENIPUNCTURE: CPT

## 2022-10-03 PROCEDURE — 80053 COMPREHEN METABOLIC PANEL: CPT

## 2022-10-03 PROCEDURE — 77336 RADIATION PHYSICS CONSULT: CPT | Performed by: SPECIALIST

## 2022-10-03 NOTE — TELEPHONE ENCOUNTER
Spoke with Yuly Swift briefly while in for radiation therapy. He had PEG tube placed on 9/30/22. He said he feels better now that he is getting some nutrition. He reports the Glucerna 1.5 was delivered but he has used the [facility] that he previously purchased d/t it is higher in calories. He said he administered 3 cartons of that yesterday and is tolerating without difficulty. This does not meet his calorie needs. Encouraged him to still eat orally to maintain swallowing muscles. He denied difficulty with self administration and cleaning. He has an OTV with Dr. Alondra Wright tomorrow. Will f/u with him regarding tolerance and administration of more TF formula to meet his needs as tolerated.

## 2022-10-04 ENCOUNTER — TELEPHONE (OUTPATIENT)
Dept: INFUSION THERAPY | Age: 72
End: 2022-10-04

## 2022-10-04 ENCOUNTER — HOSPITAL ENCOUNTER (OUTPATIENT)
Dept: RADIATION ONCOLOGY | Age: 72
Discharge: HOME | End: 2022-10-04
Payer: MEDICARE

## 2022-10-04 ENCOUNTER — OFFICE VISIT (OUTPATIENT)
Dept: ONCOLOGY | Age: 72
End: 2022-10-04
Payer: MEDICARE

## 2022-10-04 ENCOUNTER — HOSPITAL ENCOUNTER (OUTPATIENT)
Dept: INFUSION THERAPY | Age: 72
Discharge: HOME | End: 2022-10-04
Payer: MEDICARE

## 2022-10-04 ENCOUNTER — TELEPHONE (OUTPATIENT)
Dept: CASE MANAGEMENT | Age: 72
End: 2022-10-04

## 2022-10-04 VITALS
HEART RATE: 86 BPM | OXYGEN SATURATION: 96 % | WEIGHT: 169.2 LBS | BODY MASS INDEX: 26.5 KG/M2 | TEMPERATURE: 97.5 F | DIASTOLIC BLOOD PRESSURE: 57 MMHG | RESPIRATION RATE: 18 BRPM | SYSTOLIC BLOOD PRESSURE: 92 MMHG

## 2022-10-04 VITALS
BODY MASS INDEX: 26.26 KG/M2 | HEIGHT: 67 IN | SYSTOLIC BLOOD PRESSURE: 100 MMHG | OXYGEN SATURATION: 97 % | WEIGHT: 167.3 LBS | TEMPERATURE: 98.1 F | DIASTOLIC BLOOD PRESSURE: 63 MMHG | HEART RATE: 88 BPM

## 2022-10-04 VITALS — DIASTOLIC BLOOD PRESSURE: 54 MMHG | SYSTOLIC BLOOD PRESSURE: 93 MMHG | RESPIRATION RATE: 16 BRPM | HEART RATE: 78 BPM

## 2022-10-04 DIAGNOSIS — C10.9 OROPHARYNGEAL CANCER (HCC): Primary | ICD-10-CM

## 2022-10-04 DIAGNOSIS — C76.0 HEAD AND NECK CANCER (HCC): Primary | ICD-10-CM

## 2022-10-04 DIAGNOSIS — C10.9 SQUAMOUS CELL CARCINOMA OF OROPHARYNX (HCC): Primary | ICD-10-CM

## 2022-10-04 PROCEDURE — 96415 CHEMO IV INFUSION ADDL HR: CPT

## 2022-10-04 PROCEDURE — 6360000002 HC RX W HCPCS

## 2022-10-04 PROCEDURE — 2580000003 HC RX 258: Performed by: INTERNAL MEDICINE

## 2022-10-04 PROCEDURE — 6370000000 HC RX 637 (ALT 250 FOR IP): Performed by: INTERNAL MEDICINE

## 2022-10-04 PROCEDURE — 77014 PR CT GUIDANCE PLACEMENT RAD THERAPY FIELDS: CPT | Performed by: SPECIALIST

## 2022-10-04 PROCEDURE — 96375 TX/PRO/DX INJ NEW DRUG ADDON: CPT

## 2022-10-04 PROCEDURE — 96367 TX/PROPH/DG ADDL SEQ IV INF: CPT

## 2022-10-04 PROCEDURE — 96413 CHEMO IV INFUSION 1 HR: CPT

## 2022-10-04 PROCEDURE — 99214 OFFICE O/P EST MOD 30 MIN: CPT | Performed by: INTERNAL MEDICINE

## 2022-10-04 PROCEDURE — 77386 HC NTSTY MODUL RAD TX DLVR CPLX: CPT | Performed by: SPECIALIST

## 2022-10-04 PROCEDURE — 1123F ACP DISCUSS/DSCN MKR DOCD: CPT | Performed by: INTERNAL MEDICINE

## 2022-10-04 PROCEDURE — 6360000002 HC RX W HCPCS: Performed by: INTERNAL MEDICINE

## 2022-10-04 RX ORDER — DEXAMETHASONE SODIUM PHOSPHATE 10 MG/ML
10 INJECTION INTRAMUSCULAR; INTRAVENOUS ONCE
Status: COMPLETED | OUTPATIENT
Start: 2022-10-04 | End: 2022-10-04

## 2022-10-04 RX ORDER — POTASSIUM CHLORIDE 20 MEQ/1
20 TABLET, EXTENDED RELEASE ORAL ONCE
Status: COMPLETED | OUTPATIENT
Start: 2022-10-04 | End: 2022-10-04

## 2022-10-04 RX ORDER — PALONOSETRON HYDROCHLORIDE 0.05 MG/ML
INJECTION, SOLUTION INTRAVENOUS
Status: COMPLETED
Start: 2022-10-04 | End: 2022-10-04

## 2022-10-04 RX ORDER — PALONOSETRON HYDROCHLORIDE 0.05 MG/ML
0.25 INJECTION, SOLUTION INTRAVENOUS ONCE
Status: COMPLETED | OUTPATIENT
Start: 2022-10-04 | End: 2022-10-04

## 2022-10-04 RX ORDER — SODIUM CHLORIDE 9 MG/ML
5-250 INJECTION, SOLUTION INTRAVENOUS PRN
Status: DISCONTINUED | OUTPATIENT
Start: 2022-10-04 | End: 2022-10-05 | Stop reason: HOSPADM

## 2022-10-04 RX ORDER — CEPHALEXIN 500 MG/1
500 CAPSULE ORAL 4 TIMES DAILY
Qty: 28 CAPSULE | Refills: 0 | Status: SHIPPED | OUTPATIENT
Start: 2022-10-04 | End: 2022-10-11

## 2022-10-04 RX ORDER — SODIUM CHLORIDE 0.9 % (FLUSH) 0.9 %
5-40 SYRINGE (ML) INJECTION PRN
Status: DISCONTINUED | OUTPATIENT
Start: 2022-10-04 | End: 2022-10-05 | Stop reason: HOSPADM

## 2022-10-04 RX ORDER — DEXAMETHASONE SODIUM PHOSPHATE 10 MG/ML
INJECTION INTRAMUSCULAR; INTRAVENOUS
Status: COMPLETED
Start: 2022-10-04 | End: 2022-10-04

## 2022-10-04 RX ORDER — HEPARIN SODIUM (PORCINE) LOCK FLUSH IV SOLN 100 UNIT/ML 100 UNIT/ML
500 SOLUTION INTRAVENOUS PRN
Status: DISCONTINUED | OUTPATIENT
Start: 2022-10-04 | End: 2022-10-05 | Stop reason: HOSPADM

## 2022-10-04 RX ADMIN — DEXAMETHASONE SODIUM PHOSPHATE 10 MG: 10 INJECTION INTRAMUSCULAR; INTRAVENOUS at 12:54

## 2022-10-04 RX ADMIN — SODIUM CHLORIDE 30 ML/HR: 9 INJECTION, SOLUTION INTRAVENOUS at 12:48

## 2022-10-04 RX ADMIN — SODIUM CHLORIDE 150 MG: 9 INJECTION, SOLUTION INTRAVENOUS at 13:18

## 2022-10-04 RX ADMIN — PALONOSETRON 0.25 MG: 0.25 INJECTION, SOLUTION INTRAVENOUS at 12:51

## 2022-10-04 RX ADMIN — POTASSIUM CHLORIDE: 2 INJECTION, SOLUTION, CONCENTRATE INTRAVENOUS at 13:53

## 2022-10-04 RX ADMIN — POTASSIUM CHLORIDE 20 MEQ: 1500 TABLET, EXTENDED RELEASE ORAL at 12:50

## 2022-10-04 RX ADMIN — PALONOSETRON HYDROCHLORIDE 0.25 MG: 0.05 INJECTION, SOLUTION INTRAVENOUS at 12:51

## 2022-10-04 RX ADMIN — SODIUM CHLORIDE, PRESERVATIVE FREE 10 ML: 5 INJECTION INTRAVENOUS at 12:56

## 2022-10-04 ASSESSMENT — PAIN DESCRIPTION - PAIN TYPE: TYPE: CHRONIC PAIN

## 2022-10-04 ASSESSMENT — PAIN DESCRIPTION - DESCRIPTORS: DESCRIPTORS: SORE

## 2022-10-04 ASSESSMENT — PAIN SCALES - GENERAL: PAINLEVEL_OUTOF10: 6

## 2022-10-04 ASSESSMENT — PAIN DESCRIPTION - FREQUENCY: FREQUENCY: CONTINUOUS

## 2022-10-04 ASSESSMENT — PAIN DESCRIPTION - LOCATION: LOCATION: THROAT

## 2022-10-04 NOTE — PROGRESS NOTES
Department of [facility] Oncology  Attending Clinic Note    Reason for Visit: Follow-up on a patient with p16 + Oropharyngeal Squamous Cell Carcinoma    PCP:  Reilly Hightower DO    History of Present Illness:  80-year-old male who presented to the ENT team for persistent left-sided neck fullness without associated difficulty swallowing    CT soft tissue neck 02/23/2022: Mass located in the left aspect of floor of the mouth extending into the left oropharyngeal soft tissue concerning for squamous cell carcinoma  Multiple enlarged necrotic left anterior cervical chain lymph nodes    Panendoscopy on 3/17/2022:  Findings: L lingual tonsil hypertrophy, biopsy negative, left level II neck node FNA performed   FNA left neck mass level 2: Inconclusive for malignant cells. Few atypical squamous cells with degenerative change  A. Tongue, left base, biopsy:   Squamous epithelial-lined lymphoid tissue with reactive germinal centers, compatible with lingual tonsil. Negative for dysplasia; Negative for malignancy. B.  Tongue, left base, biopsy:   Squamous epithelial-lined lymphoid tissue with reactive germinal centers, compatible with lingual tonsil. Focal lobules of benign mucinous glands. Negative for dysplasia; Negative for malignancy    On 07/21/2022: Panendoscopy excision left level 2 lymph node  Findings:  left base of tongue mass, left cervical lymphadenopathy  A. 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 See comment. B. 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 C.  Left base of tongue, biopsy: Squamous mucosa, negative for neoplasm. D.  Right base of tongue, biopsy: Squamous mucosa, negative for neoplasm. E.  Midline tongue, biopsy: Squamous mucosa, negative for neoplasm.    Comment:   The squamous carcinoma is diffusely and strongly positive for p16 immunostain, supporting the above interpretation    PET/CT scan 08/01/2022: There is a large hypermetabolic mass centered in the left oropharynx involving the left lateral oropharynx, left tonsillar and left base of tongue regions extending superiorly to the left soft palate and inferiorly to the left floor of mouth and left vallecula. Maximum SUV of this mass measures 17.4. The mass causes narrowing of the oropharyngeal airway. There are multiple hypermetabolic enlarged, necrotic and someone confluent left level 2 and left level 3 lymph nodes. Confluent left level 2 adenopathy demonstrates a maximum SUV of 15.8. Additional hypermetabolic left level 5/3 junction lymph node is noted with maximum SUV 5.0  Mild focal hypermetabolic activity is noted in the left parapharyngeal/retropharyngeal region (maximum SUV 3.8) which could represent an underlying small lymph node. There is a hypermetabolic right level 2 lymph node with maximum SUV 6.5    Recommended concurrent chemoradiation therapy with weekly Cisplatin. Side effects reviewed. He agreed to proceed. Authorization obtained. Cycle # 1 weekly Cisplatin was on 09/06/2022  Cycle # 2 weekly Cisplatin was on 09/13/2022. Cycle # 3 weekly Cisplatin was on 09/20/2022. ER visit 09/26/2022 fatigue dysphagia dehydration; CTA chest 9/26/2022 no evidence of PE or acute pulmonary abnormality. CT abdomen/pelvis 09/26/2022 diffuse colonic diverticulosis with mild stranding of the fat in the left lower quadrant concerning for early diverticulitis. No evidence of obstructive uropathy or acute appendicitis. Given IV fluid with improvement. IVF daily for 3 days started 09/27/2022. PEG tube inserted on 09/30/2022. Today 10/04/2022; No fever chills. Fair energy level. No nausea/vomiting. He is doing ok with tube feeds    Review of Systems;  CONSTITUTIONAL: No fever, chills. Fair energy level. ENMT: Eyes: No diplopia; Nose: No epistaxis. Mouth: Sore throat.    RESPIRATORY: No hemoptysis, shortness of breath, cough. CARDIOVASCULAR: No chest pain, palpitations. GASTROINTESTINAL: No N/V. Dysphagia  GENITOURINARY: No dysuria, urinary frequency, hematuria. NEURO: No syncope, presyncope, headache. Remainder: ROS NEGATIVE    Past Medical History:      Diagnosis Date    Arthritis     Cancer ([facility] [address] 2022    oropharynx    Diabetes mellitus ([facility]     GERD (gastroesophageal reflux disease)     Hyperlipidemia     Hypertension     Lyme disease     Thrombocytopenia (HCC)      Medications:  Reviewed and reconciled. Allergies:  No Known Allergies    Physical Exam:  /63   Pulse 88   Temp 98.1 °F (36.7 °C)   Ht 5' 7\" (1.702 m)   Wt 167 lb 4.8 oz (75.9 kg)   SpO2 97%   BMI 26.20 kg/m²   GENERAL: Alert, oriented x 3, tired  LUNGS: Good air entry bilaterally. No wheezing, crackles or ronchi. CARDIOVASCULAR: Regular rate. No murmurs, rubs or gallops. EXTREMITIES: Without clubbing, cyanosis, or edema. Right finger cellulitis  NEUROLOGIC: No focal deficits. ECOG PS 1-2    Lab Results   Component Value Date    WBC 3.5 (L) 10/03/2022    HGB 11.9 (L) 10/03/2022    HCT 34.5 (L) 10/03/2022    MCV 85.6 10/03/2022     10/03/2022     Lab Results   Component Value Date     10/03/2022    K 3.4 (L) 10/03/2022     10/03/2022    CO2 29 10/03/2022    BUN 19 10/03/2022    CREATININE 0.8 10/03/2022    GLUCOSE 156 (H) 10/03/2022    CALCIUM 9.0 10/03/2022    PROT 6.3 (L) 10/03/2022    LABALBU 3.0 (L) 10/03/2022    BILITOT 0.4 10/03/2022    ALKPHOS 71 10/03/2022    AST 19 10/03/2022    ALT 43 (H) 10/03/2022    LABGLOM >60 10/03/2022    GFRAA >60 10/03/2022     Lab Results   Component Value Date/Time    MG 1.8 10/03/2022 10:50 AM     Impression/Plan:  28-year-old male with history of p16+ left Oropharyngeal Squamous Cell Carcinoma    CT soft tissue neck 02/23/2022:  Mass located in the left aspect of floor of the mouth extending into the left oropharyngeal soft tissue concerning for squamous cell carcinoma  Multiple enlarged necrotic left anterior cervical chain lymph nodes    Panendoscopy on 3/17/2022:  Findings: L lingual tonsil hypertrophy, biopsy negative, left level II neck node FNA performed   FNA left neck mass level 2: Inconclusive for malignant cells. Few atypical squamous cells with degenerative change  A. Tongue, left base, biopsy:   Squamous epithelial-lined lymphoid tissue with reactive germinal centers, compatible with lingual tonsil. Negative for dysplasia; Negative for malignancy. B.  Tongue, left base, biopsy:   Squamous epithelial-lined lymphoid tissue with reactive germinal centers, compatible with lingual tonsil. Focal lobules of benign mucinous glands. Negative for dysplasia; Negative for malignancy    On 07/21/2022: Panendoscopy excision left level 2 lymph node  Findings:  left base of tongue mass, left cervical lymphadenopathy  A. 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 See comment. B. 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 C.  Left base of tongue, biopsy: Squamous mucosa, negative for neoplasm. D.  Right base of tongue, biopsy: Squamous mucosa, negative for neoplasm. E.  Midline tongue, biopsy: Squamous mucosa, negative for neoplasm. Comment:   The squamous carcinoma is diffusely and strongly positive for p16 immunostain, supporting the above interpretation    PET/CT scan 08/01/2022: There is a large hypermetabolic mass centered in the left oropharynx involving the left lateral oropharynx, left tonsillar and left base of tongue regions extending superiorly to the left soft palate and inferiorly to the left floor of mouth and left vallecula. Maximum SUV of this mass measures 17.4. The mass causes narrowing of the oropharyngeal airway.    There are multiple hypermetabolic enlarged, necrotic and someone confluent left level 2 and left level 3 lymph nodes. Confluent left level 2 adenopathy demonstrates a maximum SUV of 15.8. Additional hypermetabolic left level 5/3 junction lymph node is noted with maximum SUV 5.0  Mild focal hypermetabolic activity is noted in the left parapharyngeal/retropharyngeal region (maximum SUV 3.8) which could represent an underlying small lymph node. There is a hypermetabolic right level 2 lymph node with maximum SUV 6.5    Recommended concurrent chemoradiation therapy with weekly Cisplatin. Side effects reviewed. He agreed to proceed. Authorization obtained. Cycle # 1 weekly Cisplatin was on 09/06/2022  Cycle # 2 weekly Cisplatin was on 09/13/2022. Cycle # 3 weekly Cisplatin was on 09/20/2022. ER visit 09/26/2022 fatigue dysphagia dehydration; CTA chest 9/26/2022 no evidence of PE or acute pulmonary abnormality. CT abdomen/pelvis 09/26/2022 diffuse colonic diverticulosis with mild stranding of the fat in the left lower quadrant concerning for early diverticulitis. No evidence of obstructive uropathy or acute appendicitis. Given IV fluid with improvement. IVF daily for 3 days started 09/27/2022. PEG tube inserted on 09/30/2022. Procedure note reviewed. Dietary team consulted. Cycle # 4 weekly Cisplatin is today 10/04/2022. Labs reviewed ok to proceed. HypoK+ to be replaced. Finger cellulitis prescribed antibiotics Keflex.   Tolerating tube feeds  RTC next week for Cycle # 5 weekly Cisplatin    Aleksander Guo MD   10/4/2022

## 2022-10-04 NOTE — PROGRESS NOTES
Jyoti Mosher  10/4/2022  Wt Readings from Last 3 Encounters:   10/04/22 169 lb 3.2 oz (76.7 kg)   22 166 lb (75.3 kg)   22 169 lb 4.8 oz (76.8 kg)     Body mass index is 26.5 kg/m². Treatment Area:H & N    Patient was seen today for weekly visit. Comfort Alteration  KPS:100%  Fatigue: Moderate    Mucous Membrane Alteration  Mucositis Due to Radiation: Yes  Thrush: Yes  Voice Changes: Yes    Nutritional Alteration  Anorexia: Yes   Nausea: Yes   Vomiting: No     Skin Alteration   Sensation:none    Radiation Dermatitis:  no    Ventilation Alteration  Cough:Yes    Emotional  Coping: effective    Injury, potential bleeding or infection: no    Radioprotectant Tolerance  No            Lab Results   Component Value Date    WBC 3.5 (L) 10/03/2022     10/03/2022         BP (!) 92/57   Pulse 86   Temp 97.5 °F (36.4 °C) (Temporal)   Resp 18   Wt 169 lb 3.2 oz (76.7 kg)   SpO2 96%   BMI 26.50 kg/m²   BP within normal range? no   -if no, manually recheck in 5-10 min  NEW BP readin/62  BP within normal range? no   -if no, notify attending provider for further instruction.  notified. Assessment/Plan:fx:3200/7400cGy completed and tolerating RT well. Patient had PEG tube placed on 22 and states he is feeling better, still not eating any food po, but states he is drinking lots of water po and using his PEG tube. Patient states he is going to try and eat some tomato soup today. Patient's BP is low, going over for chemo after today's appointment. Encouraged to drink plenty of fluids and if patient starts to feel dizzy or lightheaded at all to go to an Urgent Care or ED. Patient denies any dizziness or lightheadedness at this time.     Marilynn Joe RN

## 2022-10-04 NOTE — TELEPHONE ENCOUNTER
Met with patient prior to cycle 4 Cisplatin and after completing today's radiation therapy. He denied issues with changes in hearing or urination and stated he feels better since being able to use his PEG tube. He's following with the clinic dietitian for nutritional support. Patient denied significant issues with swallowing, stated the Mycostatin and Diflucan are helping, but he doesn't like using the lidocaine. Encouraged him to continue doing salt water and baking soda rinses every 2-3 hours and for dry mouth/thick saliva try Biotene or other saliva substitute, avoid oral products with alcohol and use a cool mist humidifier. Reminded his Keflex prescription was sent to his [facility]. Reminded patient of his palliative care appointment on 10/12/2022 and follow up with Dr. Courtney Tristan on 12/30/2022. He stated he's working with clinic staff regarding his medical bills and will be bringing his paperwork in this week. Patient is aware he'll return next week for cycle 5 Cisplatin and the clinic  stated she'll provide him with an updated calendar when it's available. Patient denied needs today, encouraged to call clinic with questions, he verbalized understanding.  Sommer Arias  RN, ADN, BSE, OCN  Patient Nurse Navigator

## 2022-10-04 NOTE — TELEPHONE ENCOUNTER
Kylie Mackey  10/4/2022  Wt Readings from Last 10 Encounters:   10/04/22 167 lb 4.8 oz (75.9 kg)   10/04/22 169 lb 3.2 oz (76.7 kg)   09/30/22 166 lb (75.3 kg)   09/28/22 169 lb 4.8 oz (76.8 kg)   09/28/22 170 lb (77.1 kg)   09/27/22 167 lb 3.2 oz (75.8 kg)   09/26/22 162 lb (73.5 kg)   09/20/22 176 lb 3.2 oz (79.9 kg)   09/20/22 176 lb 12.8 oz (80.2 kg)   09/14/22 187 lb (84.8 kg)     Ht Readings from Last 1 Encounters:   10/04/22 5' 7\" (1.702 m)     BMI=26.2    Assessment: Visited with Jose G Wang and his wife while in for OTV with Dr. Rome Madison. Lisa Sandoval is utilizing his PEG tube for all his nutrition. He has been using the Boost [facility] he had left over from when he was taking it orally. He said he used 4 cartons yesterday. Discussed the formula sent by [facility] is Glucerna 1.5 and he will need to use 7 cartons per day to maintain his weight. And that flushes previously recommended were based on Glucerna 1.5. He voiced that he doesn't want to have to give himself feeding so often. I explained since he is tolerating them without difficulty that he could increase amount at each feeding to 1 1/2 cartons at each feeding, therefore reducing the number of feeding times per day. He voiced understanding. Wife said she found feeding bags on [address] and was thinking of getting them, so they could give it via gravity from bag while he watches TV. Discussed that bag will need discarded after each feeding d/t bacteria that can grown and feeding bag. They voiced understanding. They said they would only use it when he is too lazy to use the syringe. Information provided for syringe feedings as a reinforcement. No questions at this time.      Weight change: 1.47% weight loss x 1 week, 11.7% significant weight loss x 1 month, 11.38% significant weight loss x 3 months  Appetite: Poor intake d/t odynophagia  Nutritional Side Effects: odynophagia  Calculated Needs: 32-35kcal/kg/TXT=3092-5943aynil, 1.3-1.5gm/kg/EIR=773-724gd protein, 1ml/kcal=2400-2700ml fluids  Malnutrition Status: Severe  Nutrition Diagnosis: Severe malnutrition r/t oropharyngeal cancer with concurrent chemo/rad tx AEB significant weight loss, calorie intake orally <50% x 2 weeks, muscle wasting noted to clavicle and temple region. Recommendations: Utilize PEG tube with Glucerna 1.5 7 cartons per day with 60ml water before feeding and 90ml water after feeding. Continue to eat soft foods as tolerated to maintain swallowing muscles.      Fabricio Krause RD

## 2022-10-05 ENCOUNTER — HOSPITAL ENCOUNTER (OUTPATIENT)
Dept: RADIATION ONCOLOGY | Age: 72
Discharge: HOME | End: 2022-10-05
Payer: MEDICARE

## 2022-10-05 PROCEDURE — 77386 HC NTSTY MODUL RAD TX DLVR CPLX: CPT | Performed by: SPECIALIST

## 2022-10-05 PROCEDURE — 77014 PR CT GUIDANCE PLACEMENT RAD THERAPY FIELDS: CPT | Performed by: SPECIALIST

## 2022-10-06 ENCOUNTER — HOSPITAL ENCOUNTER (OUTPATIENT)
Dept: RADIATION ONCOLOGY | Age: 72
Discharge: HOME | End: 2022-10-06
Payer: MEDICARE

## 2022-10-06 ENCOUNTER — TELEPHONE (OUTPATIENT)
Dept: INFUSION THERAPY | Age: 72
End: 2022-10-06

## 2022-10-06 PROCEDURE — 77014 PR CT GUIDANCE PLACEMENT RAD THERAPY FIELDS: CPT | Performed by: SPECIALIST

## 2022-10-06 PROCEDURE — 77386 HC NTSTY MODUL RAD TX DLVR CPLX: CPT | Performed by: SPECIALIST

## 2022-10-07 ENCOUNTER — HOSPITAL ENCOUNTER (OUTPATIENT)
Dept: RADIATION ONCOLOGY | Age: 72
Discharge: HOME | End: 2022-10-07
Payer: MEDICARE

## 2022-10-07 ENCOUNTER — HOSPITAL ENCOUNTER (OUTPATIENT)
Dept: INFUSION THERAPY | Age: 72
Discharge: HOME | End: 2022-10-07
Payer: MEDICARE

## 2022-10-07 DIAGNOSIS — C10.9 OROPHARYNGEAL CANCER (HCC): ICD-10-CM

## 2022-10-07 LAB
ALBUMIN SERPL-MCNC: 3.5 G/DL (ref 3.5–5.2)
ALP BLD-CCNC: 64 U/L (ref 40–129)
ALT SERPL-CCNC: 25 U/L (ref 0–40)
ANION GAP SERPL CALCULATED.3IONS-SCNC: 7 MMOL/L (ref 7–16)
AST SERPL-CCNC: 15 U/L (ref 0–39)
BASOPHILS ABSOLUTE: 0 E9/L (ref 0–0.2)
BASOPHILS RELATIVE PERCENT: 0.7 % (ref 0–2)
BILIRUB SERPL-MCNC: 0.4 MG/DL (ref 0–1.2)
BUN BLDV-MCNC: 24 MG/DL (ref 6–23)
CALCIUM SERPL-MCNC: 9.1 MG/DL (ref 8.6–10.2)
CHLORIDE BLD-SCNC: 100 MMOL/L (ref 98–107)
CO2: 30 MMOL/L (ref 22–29)
CREAT SERPL-MCNC: 0.9 MG/DL (ref 0.7–1.2)
EOSINOPHILS ABSOLUTE: 0.03 E9/L (ref 0.05–0.5)
EOSINOPHILS RELATIVE PERCENT: 0.9 % (ref 0–6)
GFR AFRICAN AMERICAN: >60
GFR NON-AFRICAN AMERICAN: >60 ML/MIN/1.73
GLUCOSE BLD-MCNC: 97 MG/DL (ref 74–99)
HCT VFR BLD CALC: 35 % (ref 37–54)
HEMOGLOBIN: 11.6 G/DL (ref 12.5–16.5)
LYMPHOCYTES ABSOLUTE: 0.35 E9/L (ref 1.5–4)
LYMPHOCYTES RELATIVE PERCENT: 12.2 % (ref 20–42)
MAGNESIUM: 2.1 MG/DL (ref 1.6–2.6)
MCH RBC QN AUTO: 29.6 PG (ref 26–35)
MCHC RBC AUTO-ENTMCNC: 33.1 % (ref 32–34.5)
MCV RBC AUTO: 89.3 FL (ref 80–99.9)
MONOCYTES ABSOLUTE: 0.32 E9/L (ref 0.1–0.95)
MONOCYTES RELATIVE PERCENT: 11.3 % (ref 2–12)
NEUTROPHILS ABSOLUTE: 2.2 E9/L (ref 1.8–7.3)
NEUTROPHILS RELATIVE PERCENT: 75.7 % (ref 43–80)
PDW BLD-RTO: 14.5 FL (ref 11.5–15)
PLATELET # BLD: 213 E9/L (ref 130–450)
PMV BLD AUTO: 10 FL (ref 7–12)
POTASSIUM SERPL-SCNC: 4.8 MMOL/L (ref 3.5–5)
RBC # BLD: 3.92 E12/L (ref 3.8–5.8)
SODIUM BLD-SCNC: 137 MMOL/L (ref 132–146)
TOTAL PROTEIN: 6.7 G/DL (ref 6.4–8.3)
WBC # BLD: 2.9 E9/L (ref 4.5–11.5)

## 2022-10-07 PROCEDURE — 77014 PR CT GUIDANCE PLACEMENT RAD THERAPY FIELDS: CPT | Performed by: SPECIALIST

## 2022-10-07 PROCEDURE — 77386 HC NTSTY MODUL RAD TX DLVR CPLX: CPT | Performed by: SPECIALIST

## 2022-10-07 PROCEDURE — 36415 COLL VENOUS BLD VENIPUNCTURE: CPT

## 2022-10-07 PROCEDURE — 85025 COMPLETE CBC W/AUTO DIFF WBC: CPT

## 2022-10-07 PROCEDURE — 80053 COMPREHEN METABOLIC PANEL: CPT

## 2022-10-07 PROCEDURE — 83735 ASSAY OF MAGNESIUM: CPT

## 2022-10-09 NOTE — PROGRESS NOTES
tablet Take 1 tablet by mouth every 6 hours as needed for Pain 360 tablet 1    fenofibrate (TRICOR) 145 MG tablet Take 1 tablet by mouth daily (Patient taking differently: Take 145 mg by mouth daily Not taking) 90 tablet 1    lisinopril-hydroCHLOROthiazide (PRINZIDE;ZESTORETIC) 10-12.5 MG per tablet Take 1 tablet by mouth daily (Patient taking differently: Take 1 tablet by mouth daily Not taking bp lo) 90 tablet 1    metFORMIN (GLUCOPHAGE-XR) 500 MG extended release tablet Take 1 tablet by mouth 2 times daily (Patient taking differently: Take 500 mg by mouth 2 times daily Not taking sugar low) 180 tablet 1    omeprazole (PRILOSEC) 40 MG delayed release capsule Take 1 capsule by mouth daily 90 capsule 1    blood glucose monitor strips Test 3 times a day & as needed for symptoms of irregular blood glucose. Dispense sufficient amount for indicated testing frequency plus additional to accommodate PRN testing needs. 50 strip 0    psyllium (KONSYL) 28.3 % PACK Take 1 packet by mouth nightly       No current facility-administered medications for this encounter. OBJECTIVE:  Alert and fully ambulatory. Pleasant and conversant. Physical Examination: General appearance - alert, well appearing, and in no distress. Wt Readings from Last 3 Encounters:   10/04/22 169 lb 3.2 oz (76.7 kg)   10/04/22 167 lb 4.8 oz (75.9 kg)   09/30/22 166 lb (75.3 kg)         ASSESSMENT/PLAN:     Patient is tolerating treatments well with expected toxicities. RBA were reviewed prior to first fraction and PRN. Current and planned dose reviewed. Goals of treatment and potential side effects were reviewed with the patient PRN. Treatment imaging has been personally reviewed for accuracy and precision. Questions answered to apparent satisfaction. Treatments will continue as planned. Gwen Dee.  Bay Alexander MD MS INÉSR  Radiation Oncologist        PHYSICIANS [facility] ([facility]): [phone] /// FAX: [phone]  [address] [facility]): [phone] /// FAX: [phone]  [address] [place] [address] [facility]): [phone] /// FAX: [phone]

## 2022-10-09 NOTE — PATIENT INSTRUCTIONS
Continue daily fractionated radiation therapy as scheduled. Please see weekly OTV note and intial consultation letter in [facility] for clinical details. Raad Massiel. Miko Anderson MD MS [facility]:  [phone]   FAX: [phone]  [address] [facility]:  [phone]   FAX:    [phone] [address]:  [phone]   FAX:  [phone]  Email: [email or web address]

## 2022-10-10 ENCOUNTER — HOSPITAL ENCOUNTER (OUTPATIENT)
Dept: INFUSION THERAPY | Age: 72
Discharge: HOME | End: 2022-10-10

## 2022-10-10 ENCOUNTER — HOSPITAL ENCOUNTER (OUTPATIENT)
Dept: RADIATION ONCOLOGY | Age: 72
Discharge: HOME | End: 2022-10-10
Payer: MEDICARE

## 2022-10-10 PROCEDURE — 77386 HC NTSTY MODUL RAD TX DLVR CPLX: CPT | Performed by: SPECIALIST

## 2022-10-10 PROCEDURE — 77336 RADIATION PHYSICS CONSULT: CPT | Performed by: SPECIALIST

## 2022-10-10 PROCEDURE — 77014 PR CT GUIDANCE PLACEMENT RAD THERAPY FIELDS: CPT | Performed by: SPECIALIST

## 2022-10-10 PROCEDURE — 77427 RADIATION TX MANAGEMENT X5: CPT | Performed by: RADIOLOGY

## 2022-10-11 ENCOUNTER — TELEPHONE (OUTPATIENT)
Dept: INFUSION THERAPY | Age: 72
End: 2022-10-11

## 2022-10-11 ENCOUNTER — HOSPITAL ENCOUNTER (OUTPATIENT)
Dept: RADIATION ONCOLOGY | Age: 72
Discharge: HOME | End: 2022-10-11
Payer: MEDICARE

## 2022-10-11 ENCOUNTER — OFFICE VISIT (OUTPATIENT)
Dept: ONCOLOGY | Age: 72
End: 2022-10-11
Payer: MEDICARE

## 2022-10-11 ENCOUNTER — HOSPITAL ENCOUNTER (OUTPATIENT)
Dept: INFUSION THERAPY | Age: 72
Discharge: HOME | End: 2022-10-11
Payer: MEDICARE

## 2022-10-11 VITALS
OXYGEN SATURATION: 98 % | TEMPERATURE: 97.5 F | BODY MASS INDEX: 25.87 KG/M2 | DIASTOLIC BLOOD PRESSURE: 75 MMHG | HEART RATE: 84 BPM | RESPIRATION RATE: 18 BRPM | SYSTOLIC BLOOD PRESSURE: 108 MMHG | WEIGHT: 165.2 LBS

## 2022-10-11 VITALS — SYSTOLIC BLOOD PRESSURE: 106 MMHG | DIASTOLIC BLOOD PRESSURE: 62 MMHG | HEART RATE: 71 BPM | RESPIRATION RATE: 12 BRPM

## 2022-10-11 VITALS
OXYGEN SATURATION: 99 % | DIASTOLIC BLOOD PRESSURE: 66 MMHG | HEART RATE: 87 BPM | WEIGHT: 165.4 LBS | SYSTOLIC BLOOD PRESSURE: 106 MMHG | BODY MASS INDEX: 25.96 KG/M2 | TEMPERATURE: 97.6 F | HEIGHT: 67 IN

## 2022-10-11 DIAGNOSIS — C10.9 OROPHARYNGEAL CANCER (HCC): Primary | ICD-10-CM

## 2022-10-11 DIAGNOSIS — R11.0 NAUSEA: ICD-10-CM

## 2022-10-11 DIAGNOSIS — C76.0 HEAD AND NECK CANCER (HCC): Primary | ICD-10-CM

## 2022-10-11 DIAGNOSIS — C10.9 SQUAMOUS CELL CARCINOMA OF OROPHARYNX (HCC): Primary | ICD-10-CM

## 2022-10-11 PROCEDURE — 1123F ACP DISCUSS/DSCN MKR DOCD: CPT | Performed by: INTERNAL MEDICINE

## 2022-10-11 PROCEDURE — 2580000003 HC RX 258: Performed by: INTERNAL MEDICINE

## 2022-10-11 PROCEDURE — 6360000002 HC RX W HCPCS: Performed by: INTERNAL MEDICINE

## 2022-10-11 PROCEDURE — 96367 TX/PROPH/DG ADDL SEQ IV INF: CPT

## 2022-10-11 PROCEDURE — 77386 HC NTSTY MODUL RAD TX DLVR CPLX: CPT | Performed by: SPECIALIST

## 2022-10-11 PROCEDURE — 96415 CHEMO IV INFUSION ADDL HR: CPT

## 2022-10-11 PROCEDURE — 96375 TX/PRO/DX INJ NEW DRUG ADDON: CPT

## 2022-10-11 PROCEDURE — 2580000003 HC RX 258

## 2022-10-11 PROCEDURE — 99999 PR OFFICE/OUTPT VISIT,PROCEDURE ONLY: CPT | Performed by: RADIOLOGY

## 2022-10-11 PROCEDURE — 96413 CHEMO IV INFUSION 1 HR: CPT

## 2022-10-11 PROCEDURE — 99214 OFFICE O/P EST MOD 30 MIN: CPT | Performed by: INTERNAL MEDICINE

## 2022-10-11 PROCEDURE — 77014 PR CT GUIDANCE PLACEMENT RAD THERAPY FIELDS: CPT | Performed by: SPECIALIST

## 2022-10-11 RX ORDER — SODIUM CHLORIDE 9 MG/ML
5-250 INJECTION, SOLUTION INTRAVENOUS PRN
Status: CANCELLED | OUTPATIENT
Start: 2022-10-12

## 2022-10-11 RX ORDER — SODIUM CHLORIDE 9 MG/ML
5-250 INJECTION, SOLUTION INTRAVENOUS PRN
Status: DISCONTINUED | OUTPATIENT
Start: 2022-10-11 | End: 2022-10-12 | Stop reason: HOSPADM

## 2022-10-11 RX ORDER — DEXAMETHASONE SODIUM PHOSPHATE 10 MG/ML
10 INJECTION INTRAMUSCULAR; INTRAVENOUS ONCE
Status: COMPLETED | OUTPATIENT
Start: 2022-10-11 | End: 2022-10-11

## 2022-10-11 RX ORDER — SODIUM CHLORIDE 0.9 % (FLUSH) 0.9 %
5-40 SYRINGE (ML) INJECTION PRN
Status: CANCELLED | OUTPATIENT
Start: 2022-10-12

## 2022-10-11 RX ORDER — 0.9 % SODIUM CHLORIDE 0.9 %
1000 INTRAVENOUS SOLUTION INTRAVENOUS ONCE
Status: CANCELLED | OUTPATIENT
Start: 2022-10-11 | End: 2022-10-11

## 2022-10-11 RX ORDER — SODIUM CHLORIDE 9 MG/ML
INJECTION, SOLUTION INTRAVENOUS
Status: COMPLETED
Start: 2022-10-11 | End: 2022-10-11

## 2022-10-11 RX ORDER — HEPARIN SODIUM (PORCINE) LOCK FLUSH IV SOLN 100 UNIT/ML 100 UNIT/ML
500 SOLUTION INTRAVENOUS PRN
Status: CANCELLED | OUTPATIENT
Start: 2022-10-12

## 2022-10-11 RX ORDER — SODIUM CHLORIDE 0.9 % (FLUSH) 0.9 %
5-40 SYRINGE (ML) INJECTION PRN
Status: DISCONTINUED | OUTPATIENT
Start: 2022-10-11 | End: 2022-10-12 | Stop reason: HOSPADM

## 2022-10-11 RX ORDER — 0.9 % SODIUM CHLORIDE 0.9 %
1000 INTRAVENOUS SOLUTION INTRAVENOUS ONCE
Status: CANCELLED | OUTPATIENT
Start: 2022-10-12 | End: 2022-10-12

## 2022-10-11 RX ORDER — 0.9 % SODIUM CHLORIDE 0.9 %
1000 INTRAVENOUS SOLUTION INTRAVENOUS ONCE
Status: COMPLETED | OUTPATIENT
Start: 2022-10-11 | End: 2022-10-11

## 2022-10-11 RX ORDER — PALONOSETRON HYDROCHLORIDE 0.05 MG/ML
0.25 INJECTION, SOLUTION INTRAVENOUS ONCE
Status: COMPLETED | OUTPATIENT
Start: 2022-10-11 | End: 2022-10-11

## 2022-10-11 RX ADMIN — DEXAMETHASONE SODIUM PHOSPHATE 10 MG: 10 INJECTION INTRAMUSCULAR; INTRAVENOUS at 12:22

## 2022-10-11 RX ADMIN — PALONOSETRON 0.25 MG: 0.25 INJECTION, SOLUTION INTRAVENOUS at 12:19

## 2022-10-11 RX ADMIN — POTASSIUM CHLORIDE: 2 INJECTION, SOLUTION, CONCENTRATE INTRAVENOUS at 13:18

## 2022-10-11 RX ADMIN — SODIUM CHLORIDE 20 ML/HR: 9 INJECTION, SOLUTION INTRAVENOUS at 12:11

## 2022-10-11 RX ADMIN — SODIUM CHLORIDE 150 MG: 9 INJECTION, SOLUTION INTRAVENOUS at 12:37

## 2022-10-11 RX ADMIN — Medication 1000 ML: at 12:17

## 2022-10-11 RX ADMIN — SODIUM CHLORIDE 1000 ML: 9 INJECTION, SOLUTION INTRAVENOUS at 12:17

## 2022-10-11 RX ADMIN — SODIUM CHLORIDE, PRESERVATIVE FREE 10 ML: 5 INJECTION INTRAVENOUS at 12:28

## 2022-10-11 ASSESSMENT — PAIN DESCRIPTION - FREQUENCY: FREQUENCY: CONTINUOUS

## 2022-10-11 ASSESSMENT — PAIN DESCRIPTION - PAIN TYPE: TYPE: CHRONIC PAIN

## 2022-10-11 ASSESSMENT — PAIN DESCRIPTION - DESCRIPTORS: DESCRIPTORS: BURNING

## 2022-10-11 ASSESSMENT — PAIN SCALES - GENERAL: PAINLEVEL_OUTOF10: 10

## 2022-10-11 ASSESSMENT — PAIN DESCRIPTION - LOCATION: LOCATION: THROAT

## 2022-10-11 NOTE — PROGRESS NOTES
Margo Bruno  10/11/2022  Wt Readings from Last 3 Encounters:   10/11/22 165 lb 3.2 oz (74.9 kg)   10/11/22 165 lb 6.4 oz (75 kg)   10/04/22 169 lb 3.2 oz (76.7 kg)     Body mass index is 25.87 kg/m². Treatment Area:PTV 5000Left BOT [facility]    Patient was seen today for weekly visit. Comfort Alteration  KPS:90%  Fatigue: Mild    Mucous Membrane Alteration  Mucositis Due to Radiation: Yes  Thrush: Yes  Voice Changes: Yes    Nutritional Alteration  Anorexia: Yes   Nausea: Yes , taking anti nausea meds  Vomiting: No     Skin Alteration   Sensation:red     Radiation Dermatitis:  no    Ventilation Alteration  Cough:Yes    Emotional  Coping: effective    Injury, potential bleeding or infection: no    Radioprotectant Tolerance  na            Lab Results   Component Value Date    WBC 2.9 (L) 10/07/2022     10/07/2022         /75   Pulse 84   Temp 97.5 °F (36.4 °C) (Temporal)   Resp 18   Wt 165 lb 3.2 oz (74.9 kg)   SpO2 98%   BMI 25.87 kg/m²   BP within normal range? yes       Assessment/Plan:21/37fx;4200/7400cGy and tolerating.     Dillon Ventura RN

## 2022-10-11 NOTE — PATIENT INSTRUCTIONS
Continue daily fractionated radiation therapy as scheduled. Please see weekly OTV note and intial consultation letter in [facility] for clinical details. Adonay Mullins. Elda Kim MD MS [facility] [place]:  [phone]   FAX: [phone]  [facility]:  [phone]   FAX:    [phone]  [address] [place] [address] Road:  [phone]   FAX:  [phone]  Email: [email or web address]

## 2022-10-11 NOTE — PROGRESS NOTES
Department of [facility] Oncology  Attending Clinic Note    Reason for Visit: Follow-up on a patient with p16 + Oropharyngeal Squamous Cell Carcinoma    PCP:  Mariza Lea DO    History of Present Illness:  41-year-old male who presented to the ENT team for persistent left-sided neck fullness without associated difficulty swallowing    CT soft tissue neck 02/23/2022: Mass located in the left aspect of floor of the mouth extending into the left oropharyngeal soft tissue concerning for squamous cell carcinoma  Multiple enlarged necrotic left anterior cervical chain lymph nodes    Panendoscopy on 3/17/2022:  Findings: L lingual tonsil hypertrophy, biopsy negative, left level II neck node FNA performed   FNA left neck mass level 2: Inconclusive for malignant cells. Few atypical squamous cells with degenerative change  A. Tongue, left base, biopsy:   Squamous epithelial-lined lymphoid tissue with reactive germinal centers, compatible with lingual tonsil. Negative for dysplasia; Negative for malignancy. B.  Tongue, left base, biopsy:   Squamous epithelial-lined lymphoid tissue with reactive germinal centers, compatible with lingual tonsil. Focal lobules of benign mucinous glands. Negative for dysplasia; Negative for malignancy    On 07/21/2022: Panendoscopy excision left level 2 lymph node  Findings:  left base of tongue mass, left cervical lymphadenopathy  A. 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 See comment. B. 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 C.  Left base of tongue, biopsy: Squamous mucosa, negative for neoplasm. D.  Right base of tongue, biopsy: Squamous mucosa, negative for neoplasm. E.  Midline tongue, biopsy: Squamous mucosa, negative for neoplasm.    Comment:   The squamous carcinoma is diffusely and strongly positive for p16 immunostain, supporting the above interpretation    PET/CT scan 08/01/2022: There is a large hypermetabolic mass centered in the left oropharynx involving the left lateral oropharynx, left tonsillar and left base of tongue regions extending superiorly to the left soft palate and inferiorly to the left floor of mouth and left vallecula. Maximum SUV of this mass measures 17.4. The mass causes narrowing of the oropharyngeal airway. There are multiple hypermetabolic enlarged, necrotic and someone confluent left level 2 and left level 3 lymph nodes. Confluent left level 2 adenopathy demonstrates a maximum SUV of 15.8. Additional hypermetabolic left level 5/3 junction lymph node is noted with maximum SUV 5.0  Mild focal hypermetabolic activity is noted in the left parapharyngeal/retropharyngeal region (maximum SUV 3.8) which could represent an underlying small lymph node. There is a hypermetabolic right level 2 lymph node with maximum SUV 6.5    Recommended concurrent chemoradiation therapy with weekly Cisplatin. Side effects reviewed. He agreed to proceed. Authorization obtained. Cycle # 1 weekly Cisplatin was on 09/06/2022  Cycle # 2 weekly Cisplatin was on 09/13/2022. Cycle # 3 weekly Cisplatin was on 09/20/2022. ER visit 09/26/2022 fatigue dysphagia dehydration; CTA chest 9/26/2022 no evidence of PE or acute pulmonary abnormality. CT abdomen/pelvis 09/26/2022 diffuse colonic diverticulosis with mild stranding of the fat in the left lower quadrant concerning for early diverticulitis. No evidence of obstructive uropathy or acute appendicitis. Given IV fluid with improvement. IVF daily for 3 days started 09/27/2022. PEG tube inserted on 09/30/2022. Cycle # 4 weekly Cisplatin was on 10/04/2022. Today 10/11/2022: No fever, chills. Fair energy level. No N/V. Tolerating tube feeds. + Constipation    Review of Systems;  CONSTITUTIONAL: No fever, chills. Fair energy level. ENMT: Eyes: No diplopia; Nose: No epistaxis.  Mouth: Sore throat. RESPIRATORY: No hemoptysis, shortness of breath, cough. CARDIOVASCULAR: No chest pain, palpitations. GASTROINTESTINAL: No N/V. Tolerating tube feeds. + constipation. GENITOURINARY: No dysuria, urinary frequency, hematuria. NEURO: No syncope, presyncope, headache. Remainder: ROS NEGATIVE    Past Medical History:      Diagnosis Date    Arthritis     Cancer ([facility] 2022    oropharynx    Diabetes mellitus ([facility]     GERD (gastroesophageal reflux disease)     Hyperlipidemia     Hypertension     Lyme disease     Thrombocytopenia (HCC)      Medications:  Reviewed and reconciled. Allergies:  No Known Allergies    Physical Exam:  /66   Pulse 87   Temp 97.6 °F (36.4 °C)   Ht 5' 7\" (1.702 m)   Wt 165 lb 6.4 oz (75 kg)   SpO2 99%   BMI 25.91 kg/m²   GENERAL: Alert, oriented x 3, not in distress  LUNGS: Good air entry bilaterally. No wheezing, crackles or ronchi. CARDIOVASCULAR: Regular rate. No murmurs, rubs or gallops. EXTREMITIES: Without clubbing, cyanosis, or edema. NEUROLOGIC: No focal deficits. ECOG PS 1    Lab Results   Component Value Date    WBC 2.9 (L) 10/07/2022    HGB 11.6 (L) 10/07/2022    HCT 35.0 (L) 10/07/2022    MCV 89.3 10/07/2022     10/07/2022     Lab Results   Component Value Date     10/07/2022    K 4.8 10/07/2022     10/07/2022    CO2 30 (H) 10/07/2022    BUN 24 (H) 10/07/2022    CREATININE 0.9 10/07/2022    GLUCOSE 97 10/07/2022    CALCIUM 9.1 10/07/2022    PROT 6.7 10/07/2022    LABALBU 3.5 10/07/2022    BILITOT 0.4 10/07/2022    ALKPHOS 64 10/07/2022    AST 15 10/07/2022    ALT 25 10/07/2022    LABGLOM >60 10/07/2022    GFRAA >60 10/07/2022     Lab Results   Component Value Date/Time    MG 2.1 10/07/2022 11:52 AM     Impression/Plan:  76-year-old male with history of p16+ left Oropharyngeal Squamous Cell Carcinoma    CT soft tissue neck 02/23/2022:  Mass located in the left aspect of floor of the mouth extending into the left oropharyngeal soft tissue concerning for squamous cell carcinoma  Multiple enlarged necrotic left anterior cervical chain lymph nodes    Panendoscopy on 3/17/2022:  Findings: L lingual tonsil hypertrophy, biopsy negative, left level II neck node FNA performed   FNA left neck mass level 2: Inconclusive for malignant cells. Few atypical squamous cells with degenerative change  A. Tongue, left base, biopsy:   Squamous epithelial-lined lymphoid tissue with reactive germinal centers, compatible with lingual tonsil. Negative for dysplasia; Negative for malignancy. B.  Tongue, left base, biopsy:   Squamous epithelial-lined lymphoid tissue with reactive germinal centers, compatible with lingual tonsil. Focal lobules of benign mucinous glands. Negative for dysplasia; Negative for malignancy    On 07/21/2022: Panendoscopy excision left level 2 lymph node  Findings:  left base of tongue mass, left cervical lymphadenopathy  A. 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 See comment. B. 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 C.  Left base of tongue, biopsy: Squamous mucosa, negative for neoplasm. D.  Right base of tongue, biopsy: Squamous mucosa, negative for neoplasm. E.  Midline tongue, biopsy: Squamous mucosa, negative for neoplasm. Comment:   The squamous carcinoma is diffusely and strongly positive for p16 immunostain, supporting the above interpretation    PET/CT scan 08/01/2022: There is a large hypermetabolic mass centered in the left oropharynx involving the left lateral oropharynx, left tonsillar and left base of tongue regions extending superiorly to the left soft palate and inferiorly to the left floor of mouth and left vallecula. Maximum SUV of this mass measures 17.4. The mass causes narrowing of the oropharyngeal airway.    There are multiple hypermetabolic enlarged, necrotic and someone

## 2022-10-11 NOTE — PROGRESS NOTES
DEPARTMENT OF RADIATION ONCOLOGY ON TREATMENT VISIT         10/11/2022      NAME:  Kvng Yoon Edda OF BIRTH:  1950    Diagnosis: HN    SUBJECTIVE:   Rosio Butcher has now received fractionated external beam radiation therapy - ongoing. Past medical, surgical, social and family histories reviewed and updated as indicated. Pain: controlled    ALLERGIES:  Patient has no known allergies. Current Outpatient Medications   Medication Sig Dispense Refill    cephALEXin (KEFLEX) 500 MG capsule Take 1 capsule by mouth 4 times daily for 7 days 28 capsule 0    oxyCODONE HCl (OXY-IR) 10 MG immediate release tablet Take 1 tablet by mouth every 4 hours as needed for Pain for up to 14 days.  Intended supply: 30 days 84 tablet 0    lidocaine viscous hcl (XYLOCAINE) 2 % SOLN solution Take 15 mLs by mouth every 3 hours as needed for Irritation 600 mL 1    fluconazole (DIFLUCAN) 100 MG tablet Take 1 tablet by mouth daily for 14 days 14 tablet 0    sucralfate (CARAFATE) 1 GM/10ML suspension Take 10 mLs by mouth 4 times daily 1200 mL 3    traZODone (DESYREL) 50 MG tablet Take 1 tablet by mouth nightly 30 tablet 0    guaiFENesin (MUCINEX) 600 MG extended release tablet Take 1 tablet by mouth 2 times daily (Patient taking differently: Take 600 mg by mouth 2 times daily Not taking) 60 tablet 0    docusate sodium (COLACE) 100 MG capsule Take 1 capsule by mouth 2 times daily as needed for Constipation (Patient taking differently: Take 100 mg by mouth 2 times daily as needed for Constipation Not taking) 60 capsule 0    melatonin 3 MG TABS tablet Take 3 mg by mouth nightly as needed      prochlorperazine (COMPAZINE) 10 MG tablet Take 1 tablet by mouth every 6 hours as needed (nausea/vomiting) 40 tablet 3    Magic Mouthwash (MIRACLE MOUTHWASH) Swish and spit 5 mLs 4 times daily as needed for Irritation or Pain Benadryl 12.5 mg/ml Maalox 10 ml/5ml  lidocaine 2%/5ml 480 mL 0    ibuprofen (ADVIL;MOTRIN) 600 MG tablet Take 1 tablet by mouth every 6 hours as needed for Pain 360 tablet 1    fenofibrate (TRICOR) 145 MG tablet Take 1 tablet by mouth daily (Patient taking differently: Take 145 mg by mouth daily Not taking) 90 tablet 1    lisinopril-hydroCHLOROthiazide (PRINZIDE;ZESTORETIC) 10-12.5 MG per tablet Take 1 tablet by mouth daily (Patient taking differently: Take 1 tablet by mouth daily Not taking bp lo) 90 tablet 1    metFORMIN (GLUCOPHAGE-XR) 500 MG extended release tablet Take 1 tablet by mouth 2 times daily (Patient taking differently: Take 500 mg by mouth 2 times daily Not taking sugar low) 180 tablet 1    omeprazole (PRILOSEC) 40 MG delayed release capsule Take 1 capsule by mouth daily 90 capsule 1    blood glucose monitor strips Test 3 times a day & as needed for symptoms of irregular blood glucose. Dispense sufficient amount for indicated testing frequency plus additional to accommodate PRN testing needs. 50 strip 0    psyllium (KONSYL) 28.3 % PACK Take 1 packet by mouth nightly       No current facility-administered medications for this encounter. Facility-Administered Medications Ordered in Other Encounters   Medication Dose Route Frequency Provider Last Rate Last Admin    0.9 % sodium chloride infusion  5-250 mL/hr IntraVENous PRN Ruth Wise MD 20 mL/hr at 10/11/22 1211 20 mL/hr at 10/11/22 1211    CISplatin (PLATINOL) 80 mg, potassium chloride 20 mEq, magnesium sulfate 2,000 mg in sodium chloride 0.9 % 1,000 mL IVPB   IntraVENous Once Ruth Wise  mL/hr at 10/11/22 1318 New Bag at 10/11/22 1318    sodium chloride flush 0.9 % injection 5-40 mL  5-40 mL IntraVENous PRN Ruth Wise MD   10 mL at 10/11/22 1228           OBJECTIVE:  Alert and fully ambulatory. Pleasant and conversant. Physical Examination: General appearance - alert, well appearing, and in no distress.           Wt Readings from Last 3 Encounters:   10/11/22 165 lb 3.2 oz (74.9 kg)   10/11/22 165 lb 6.4 oz (75 kg) 10/04/22 169 lb 3.2 oz (76.7 kg)         ASSESSMENT/PLAN:     Patient is tolerating treatments well with expected toxicities. RBA were reviewed prior to first fraction and PRN. Current and planned dose reviewed. Goals of treatment and potential side effects were reviewed with the patient PRN. Treatment imaging has been personally reviewed for accuracy and precision. Questions answered to apparent satisfaction. Treatments will continue as planned. Mary Anne Chandra.  Leticia Cervantes MD MS DABR  Radiation Oncologist        [facility]): [phone] /// FAX: [phone]  [facility]): [phone] /// FAX: [phone]  [facility] Marylene Soja): [phone] /// FAX: [phone]

## 2022-10-12 ENCOUNTER — OFFICE VISIT (OUTPATIENT)
Dept: PALLATIVE CARE | Age: 72
End: 2022-10-12
Payer: MEDICARE

## 2022-10-12 ENCOUNTER — HOSPITAL ENCOUNTER (OUTPATIENT)
Dept: RADIATION ONCOLOGY | Age: 72
Discharge: HOME | End: 2022-10-12
Payer: MEDICARE

## 2022-10-12 VITALS
BODY MASS INDEX: 26.47 KG/M2 | DIASTOLIC BLOOD PRESSURE: 69 MMHG | SYSTOLIC BLOOD PRESSURE: 126 MMHG | HEART RATE: 79 BPM | WEIGHT: 169 LBS

## 2022-10-12 DIAGNOSIS — C10.9 OROPHARYNGEAL CANCER (HCC): ICD-10-CM

## 2022-10-12 DIAGNOSIS — Z51.5 PALLIATIVE CARE BY SPECIALIST: ICD-10-CM

## 2022-10-12 DIAGNOSIS — G89.3 PAIN DUE TO NEOPLASM: ICD-10-CM

## 2022-10-12 PROCEDURE — 99213 OFFICE O/P EST LOW 20 MIN: CPT | Performed by: NURSE PRACTITIONER

## 2022-10-12 PROCEDURE — 99211 OFF/OP EST MAY X REQ PHY/QHP: CPT

## 2022-10-12 PROCEDURE — 1123F ACP DISCUSS/DSCN MKR DOCD: CPT | Performed by: NURSE PRACTITIONER

## 2022-10-12 PROCEDURE — 77014 PR CT GUIDANCE PLACEMENT RAD THERAPY FIELDS: CPT | Performed by: SPECIALIST

## 2022-10-12 PROCEDURE — 77386 HC NTSTY MODUL RAD TX DLVR CPLX: CPT | Performed by: SPECIALIST

## 2022-10-12 RX ORDER — OXYCODONE HYDROCHLORIDE 10 MG/1
10 TABLET ORAL EVERY 4 HOURS PRN
Qty: 84 TABLET | Refills: 0 | Status: SHIPPED | OUTPATIENT
Start: 2022-10-12 | End: 2022-10-26

## 2022-10-12 RX ORDER — POLYETHYLENE GLYCOL 3350 17 G/17G
17 POWDER, FOR SOLUTION ORAL DAILY
Qty: 510 G | Refills: 5 | Status: SHIPPED | OUTPATIENT
Start: 2022-10-12 | End: 2022-11-11

## 2022-10-12 RX ORDER — SENNA AND DOCUSATE SODIUM 50; 8.6 MG/1; MG/1
2 TABLET, FILM COATED ORAL 2 TIMES DAILY PRN
Qty: 120 TABLET | Refills: 0 | Status: SHIPPED
Start: 2022-10-12 | End: 2022-10-26 | Stop reason: SDUPTHER

## 2022-10-12 NOTE — PROGRESS NOTES
Department of Palliative Medicine  Ambulatory Note  Provider: MARY Klein CNP      Location of service: [place] [address]  Chief Complaint: Nichelle Body is a 71 y.o. male with chief complaint of anxiety    HPI: Nichelle Body is a 71 y.o. male with significant past medical history of squamous cell carcinoma of the left base of the tongue and tonsil, diabetes mellitus, Lyme disease, hypertension, arthritis who was referred to [address] [place] [address] for symptom management. Plan is for concurrent chemo and radiation. Assessment/Plan      Squamous cell carcinoma of the left base of tongue and tonsil  -Follows with Dr. Pepper Lundborg for oncology    Pain due to neoplasm:  -Oxycodone 10 mg Q 4 PRN  -Lidocaine viscous  -Carafate solution QID  -Tylenol 650 mg as needed  -ibuprofen 600 mg every 6 hours as needed  -Magic mouthwash QID PRN  -Tetracaine lollipops  -Plan for PEG placement    Insomnia  -melatonin   -Has not needed Trazodone    Constipation  -stop Colace  -Continue Metamucil  -Start senna S  -Start GlycoLax    Mucositis/Thrush:  -Nystatin     Follow Up:  2 weeks. Encouraged to call with any questions, concerns, needs, or changes in symptoms. Subjective:     Met with Amparo Corado at the outpatient clinic. He explains that the adjustments in pain medications have helped. He states that he is just using his oxycodone once or twice daily. He states that it helps relieve the pain almost completely. He is worried about taking it too frequently. We discussed that he could also cut the pill in half and use it more frequently. He is using his Magic mouthwash, Carafate, tetracaine lollipops 3-4 times a day. He states that he is sleeping well. He has had some increased indigestion from the PEG tube feedings. We discussed taking over-the-counter Maalox or Pepcid. He states that he is working with the dietitian. He also complains of constipation. He is currently taking Colace.   Explained to take senna S2 times daily and start GlycoLax. He states that he has nausea after chemo but that the Zofran works well. He denies any shortness of breath. Pain Assessment   Rating:  10  Description: pressure, sharp, and stabbing  Duration: months  Frequency: daily  Location: left neck  Alleviating Factors: pain medication and ice  Exacerbating Factors: unable to associate with any factor  Effect: change in function, interference with activities, sleep, and mood    Past Medical History:   Diagnosis Date    Arthritis     Cancer ([facility] 2022    oropharynx    Diabetes mellitus ([facility]     GERD (gastroesophageal reflux disease)     Hyperlipidemia     Hypertension     Lyme disease     Thrombocytopenia ([facility]        Past Surgical History:   Procedure Laterality Date    BRONCHOSCOPY N/A 10/28/2019    BRONCHOSCOPY performed by Anil Saravia MD at [address] [place] [address]  2017    dr Savannah Oliva  03/2017    COLONOSCOPY  03/2019    GASTROSTOMY TUBE PLACEMENT N/A 9/30/2022    PEG TUBE PLACEMENT performed by Bill South MD at [facility] Left 3/17/2022    PANENDOSCOPY WITH BIOPSY performed by Kat Ovalle DO at [facility] Left 7/21/2022    PANENDOSCOPY performed by Kat Ovalle DO at [address] [facility] Left 7/21/2022    EXCISION LEFT LEVEL II LYMPH NODE, PANENDOSCOPY performed by Kat Ovalle DO at [address] EXTRACTION Left 12/28/2021    VASECTOMY         Family History   Problem Relation Age of Onset    High Blood Pressure Mother     High Blood Pressure Father     Cancer Father 80        kidney    Abdominal aortic aneurysm Father        ROS: UNLESS STATED ABOVE PATIENT DENIES:  CONSTITUTIONAL:  fever, chill, rigors, nausea, vomiting, fatigue. HEENT: blurry vision, double vision, hearing problem, tinnitus, hoarseness, dysphagia, odynophagia  RESPIRATORY: cough, shortness of breath, sputum expectoration.   CARDIOVASCULAR: Chest pain/pressure, palpitation, syncope, irregular beats  GASTROINTESTINAL:  abdominal or rectal pain, diarrhea, constipation, . GENITOURINARY:  Burning, frequency, urgency, incontinence, discharge  INTEGUMENTARY: rash, wound, pruritis  HEMATOLOGIC/LYMPHATIC:  Swelling, sores, gum bleeding, easy bruising, pica. MUSCULOSKELETAL:  pain, edema, joint swelling or redness  NEUROLOGICAL:  light headed, dizziness, loss of consciousness, weakness, change in memory, seizures, tremors    Objective:     Physical Exam  Wt Readings from Last 3 Encounters:   10/12/22 169 lb (76.7 kg)   10/11/22 165 lb 3.2 oz (74.9 kg)   10/11/22 165 lb 6.4 oz (75 kg)     /69   Pulse 79   Wt 169 lb (76.7 kg)   BMI 26.47 kg/m²     Gen:  Alert, appears stated age, well nourished, in no acute distress  HEENT:  Normocephalic, no drainage,  Neck:  Supple  Lungs:  non labored breathing  Heart[de-identified]  Regular rate  Abd:  Soft,   M/S/Ext:  moving all extremities   Skin:  no visible lesions  Neuro:  PERRL, Alert, oriented x 3; following commands    Proctor Symptom Assessment Score   Proctor Score 10/12/2022 9/28/2022 9/14/2022 8/24/2022   Pain Score Worst possible pain Worst possible pain 5 6   Tiredness Score 6 6 4 2   Nausea Score 3 4 4 Not nauseated   Depression Score 2 5 7 3   Anxiety Score 4 7 8 8   Drowsiness Score 2 Not drowsy 4 3   Appetite Score 6 1 8 3   Wellbeing Score Best feeling of wellbeing 4 5 3   Dyspnea Score No shortness of breath No shortness of breath 3 No shortness of breath   Other Problem Score 7 1 8 Best possible response   Total Assessment Score(calculated) 40 38 56 28     Assessed by: patient and provider. Current Medications:  Medications reviewed: yes    Controlled Substances Monitoring: [place] reviewed 10/12/22. RX Monitoring 9/28/2022   Periodic Controlled Substance Monitoring Possible medication side effects, risk of tolerance/dependence & alternative treatments discussed. ;No signs of potential drug abuse or diversion identified.;Obtaining appropriate analgesic effect of treatment. ;Assessed functional status. MARY Fabian CNP   Palliative Care Department     Time/Communication  Greater than 50% of time spent, total 25 minutes in face-to-face counseling and coordination of care regarding goals of care and symptom management. Note: This report was completed using [employer] voiced recognition software. Every effort has been made to ensure accuracy; however, inadvertent computerized transcription errors may be present.

## 2022-10-13 ENCOUNTER — HOSPITAL ENCOUNTER (OUTPATIENT)
Dept: RADIATION ONCOLOGY | Age: 72
Discharge: HOME | End: 2022-10-13
Payer: MEDICARE

## 2022-10-13 PROCEDURE — 77386 HC NTSTY MODUL RAD TX DLVR CPLX: CPT | Performed by: SPECIALIST

## 2022-10-13 PROCEDURE — 77014 PR CT GUIDANCE PLACEMENT RAD THERAPY FIELDS: CPT | Performed by: SPECIALIST

## 2022-10-14 ENCOUNTER — HOSPITAL ENCOUNTER (OUTPATIENT)
Dept: RADIATION ONCOLOGY | Age: 72
Discharge: HOME | End: 2022-10-14
Payer: MEDICARE

## 2022-10-14 ENCOUNTER — HOSPITAL ENCOUNTER (OUTPATIENT)
Dept: INFUSION THERAPY | Age: 72
Discharge: HOME | End: 2022-10-14
Payer: MEDICARE

## 2022-10-14 VITALS
SYSTOLIC BLOOD PRESSURE: 100 MMHG | RESPIRATION RATE: 12 BRPM | OXYGEN SATURATION: 100 % | HEART RATE: 72 BPM | DIASTOLIC BLOOD PRESSURE: 67 MMHG | TEMPERATURE: 97.6 F

## 2022-10-14 DIAGNOSIS — R11.0 NAUSEA: Primary | ICD-10-CM

## 2022-10-14 DIAGNOSIS — C10.9 SQUAMOUS CELL CARCINOMA OF OROPHARYNX (HCC): ICD-10-CM

## 2022-10-14 PROCEDURE — 77386 HC NTSTY MODUL RAD TX DLVR CPLX: CPT | Performed by: SPECIALIST

## 2022-10-14 PROCEDURE — 2580000003 HC RX 258

## 2022-10-14 PROCEDURE — 77014 PR CT GUIDANCE PLACEMENT RAD THERAPY FIELDS: CPT | Performed by: SPECIALIST

## 2022-10-14 PROCEDURE — 96360 HYDRATION IV INFUSION INIT: CPT

## 2022-10-14 RX ORDER — HEPARIN SODIUM (PORCINE) LOCK FLUSH IV SOLN 100 UNIT/ML 100 UNIT/ML
500 SOLUTION INTRAVENOUS PRN
Status: CANCELLED | OUTPATIENT
Start: 2022-10-15

## 2022-10-14 RX ORDER — 0.9 % SODIUM CHLORIDE 0.9 %
1000 INTRAVENOUS SOLUTION INTRAVENOUS ONCE
Status: CANCELLED | OUTPATIENT
Start: 2022-10-15 | End: 2022-10-15

## 2022-10-14 RX ORDER — SODIUM CHLORIDE 0.9 % (FLUSH) 0.9 %
5-40 SYRINGE (ML) INJECTION PRN
Status: CANCELLED | OUTPATIENT
Start: 2022-10-15

## 2022-10-14 RX ORDER — SODIUM CHLORIDE 9 MG/ML
5-250 INJECTION, SOLUTION INTRAVENOUS PRN
Status: CANCELLED | OUTPATIENT
Start: 2022-10-15

## 2022-10-14 RX ORDER — SODIUM CHLORIDE 9 MG/ML
INJECTION, SOLUTION INTRAVENOUS
Status: COMPLETED
Start: 2022-10-14 | End: 2022-10-14

## 2022-10-14 RX ORDER — 0.9 % SODIUM CHLORIDE 0.9 %
1000 INTRAVENOUS SOLUTION INTRAVENOUS ONCE
Status: COMPLETED | OUTPATIENT
Start: 2022-10-14 | End: 2022-10-14

## 2022-10-14 RX ADMIN — SODIUM CHLORIDE 1000 ML: 9 INJECTION, SOLUTION INTRAVENOUS at 10:53

## 2022-10-14 RX ADMIN — Medication 1000 ML: at 10:53

## 2022-10-14 ASSESSMENT — PAIN DESCRIPTION - LOCATION: LOCATION: THROAT

## 2022-10-14 ASSESSMENT — PAIN SCALES - GENERAL: PAINLEVEL_OUTOF10: 10

## 2022-10-17 ENCOUNTER — HOSPITAL ENCOUNTER (OUTPATIENT)
Dept: INFUSION THERAPY | Age: 72
Discharge: HOME | End: 2022-10-17
Payer: MEDICARE

## 2022-10-17 ENCOUNTER — HOSPITAL ENCOUNTER (OUTPATIENT)
Dept: RADIATION ONCOLOGY | Age: 72
Discharge: HOME | End: 2022-10-17
Payer: MEDICARE

## 2022-10-17 DIAGNOSIS — C76.0 HEAD AND NECK CANCER (HCC): Primary | ICD-10-CM

## 2022-10-17 DIAGNOSIS — C10.9 OROPHARYNGEAL CANCER (HCC): ICD-10-CM

## 2022-10-17 DIAGNOSIS — T66.XXXA RADIATION THERAPY COMPLICATION, INITIAL ENCOUNTER: ICD-10-CM

## 2022-10-17 LAB
ALBUMIN SERPL-MCNC: 3.9 G/DL (ref 3.5–5.2)
ALP BLD-CCNC: 77 U/L (ref 40–129)
ALT SERPL-CCNC: 15 U/L (ref 0–40)
ANION GAP SERPL CALCULATED.3IONS-SCNC: 8 MMOL/L (ref 7–16)
AST SERPL-CCNC: 11 U/L (ref 0–39)
BASOPHILS ABSOLUTE: 0.03 E9/L (ref 0–0.2)
BASOPHILS RELATIVE PERCENT: 0.9 % (ref 0–2)
BILIRUB SERPL-MCNC: 0.4 MG/DL (ref 0–1.2)
BUN BLDV-MCNC: 20 MG/DL (ref 6–23)
CALCIUM SERPL-MCNC: 9.6 MG/DL (ref 8.6–10.2)
CHLORIDE BLD-SCNC: 99 MMOL/L (ref 98–107)
CO2: 28 MMOL/L (ref 22–29)
CREAT SERPL-MCNC: 0.8 MG/DL (ref 0.7–1.2)
EOSINOPHILS ABSOLUTE: 0.03 E9/L (ref 0.05–0.5)
EOSINOPHILS RELATIVE PERCENT: 0.9 % (ref 0–6)
GFR AFRICAN AMERICAN: >60
GFR NON-AFRICAN AMERICAN: >60 ML/MIN/1.73
GLUCOSE BLD-MCNC: 112 MG/DL (ref 74–99)
HCT VFR BLD CALC: 34.5 % (ref 37–54)
HEMOGLOBIN: 11.7 G/DL (ref 12.5–16.5)
LYMPHOCYTES ABSOLUTE: 0.25 E9/L (ref 1.5–4)
LYMPHOCYTES RELATIVE PERCENT: 7.9 % (ref 20–42)
MAGNESIUM: 2.1 MG/DL (ref 1.6–2.6)
MCH RBC QN AUTO: 30.5 PG (ref 26–35)
MCHC RBC AUTO-ENTMCNC: 33.9 % (ref 32–34.5)
MCV RBC AUTO: 89.8 FL (ref 80–99.9)
MONOCYTES ABSOLUTE: 0.19 E9/L (ref 0.1–0.95)
MONOCYTES RELATIVE PERCENT: 6.1 % (ref 2–12)
NEUTROPHILS ABSOLUTE: 2.6 E9/L (ref 1.8–7.3)
NEUTROPHILS RELATIVE PERCENT: 84.2 % (ref 43–80)
PDW BLD-RTO: 15.1 FL (ref 11.5–15)
PLATELET # BLD: 232 E9/L (ref 130–450)
PMV BLD AUTO: 9.2 FL (ref 7–12)
POTASSIUM SERPL-SCNC: 4.9 MMOL/L (ref 3.5–5)
RBC # BLD: 3.84 E12/L (ref 3.8–5.8)
SODIUM BLD-SCNC: 135 MMOL/L (ref 132–146)
TOTAL PROTEIN: 7.1 G/DL (ref 6.4–8.3)
WBC # BLD: 3.1 E9/L (ref 4.5–11.5)

## 2022-10-17 PROCEDURE — 77014 PR CT GUIDANCE PLACEMENT RAD THERAPY FIELDS: CPT | Performed by: SPECIALIST

## 2022-10-17 PROCEDURE — 77336 RADIATION PHYSICS CONSULT: CPT | Performed by: SPECIALIST

## 2022-10-17 PROCEDURE — 85025 COMPLETE CBC W/AUTO DIFF WBC: CPT

## 2022-10-17 PROCEDURE — 77386 HC NTSTY MODUL RAD TX DLVR CPLX: CPT | Performed by: SPECIALIST

## 2022-10-17 PROCEDURE — 80053 COMPREHEN METABOLIC PANEL: CPT

## 2022-10-17 PROCEDURE — 36415 COLL VENOUS BLD VENIPUNCTURE: CPT

## 2022-10-17 PROCEDURE — 83735 ASSAY OF MAGNESIUM: CPT

## 2022-10-17 PROCEDURE — 77427 RADIATION TX MANAGEMENT X5: CPT | Performed by: RADIOLOGY

## 2022-10-17 NOTE — PROGRESS NOTES
DEPARTMENT OF RADIATION ONCOLOGY        ON TREATMENT VISIT                        10/17/2022      NAME:  Willie Salgado OF BIRTH:  1950    Diagnosis: H+N CA    SUBJECTIVE:   Amber Herron has now received 5000 cGy in 25 fractions directed to the L BOT + bilateral reymundo-necks. The patient seen today for complaints of skin reaction to bilateral necks. Along with ongoing complaints of dysphagia, odynophagia, dysgeusia and mucositis. The patient is not taking by mouth food or fluids. Using PEG tube to instill 4-5 cans/cartons of TF. The patient reports skin irritation including erythema, pain and sensation of tightness/swelling to neck. The patient reports symptom controlled taking Oxy-IR which is prescribed every 4 hours as needed per a different provider. Unfortunately patient's pain control is short lived because he is only taking Oxy-IR 1 time per day. Patient follows with Dr. Neha Terrazas for medical oncology. Past medical, surgical, social and family histories reviewed and updated as indicated. Pain: skin reaction    ALLERGIES:  Patient has no known allergies. Current Outpatient Medications   Medication Sig Dispense Refill    oxyCODONE HCl (OXY-IR) 10 MG immediate release tablet Take 1 tablet by mouth every 4 hours as needed for Pain for up to 14 days.  Intended supply: 30 days 84 tablet 0    nystatin (MYCOSTATIN) 166328 UNIT/ML suspension Take 5 mLs by mouth 4 times daily for 10 days Retain in mouth as long as possible 200 mL 0    Magic Mouthwash (MIRACLE MOUTHWASH) Swish and spit 5 mLs 4 times daily as needed for Irritation or Pain Benadryl 12.5 mg/ml Maalox 10 ml/5ml  lidocaine 2%/5ml 480 mL 3    sennosides-docusate sodium (SENOKOT-S) 8.6-50 MG tablet Take 2 tablets by mouth 2 times daily as needed for Constipation 120 tablet 0    polyethylene glycol (GLYCOLAX) 17 GM/SCOOP powder Take 17 g by mouth daily 510 g 5    sucralfate (CARAFATE) 1 GM/10ML suspension Take 10 mLs by mouth 4 times daily 1200 mL 3    melatonin 3 MG TABS tablet Take 3 mg by mouth nightly as needed      prochlorperazine (COMPAZINE) 10 MG tablet Take 1 tablet by mouth every 6 hours as needed (nausea/vomiting) 40 tablet 3    ibuprofen (ADVIL;MOTRIN) 600 MG tablet Take 1 tablet by mouth every 6 hours as needed for Pain 360 tablet 1    fenofibrate (TRICOR) 145 MG tablet Take 1 tablet by mouth daily (Patient taking differently: Take 145 mg by mouth daily Not taking) 90 tablet 1    lisinopril-hydroCHLOROthiazide (PRINZIDE;ZESTORETIC) 10-12.5 MG per tablet Take 1 tablet by mouth daily (Patient taking differently: Take 1 tablet by mouth daily Not taking bp lo) 90 tablet 1    metFORMIN (GLUCOPHAGE-XR) 500 MG extended release tablet Take 1 tablet by mouth 2 times daily (Patient taking differently: Take 500 mg by mouth 2 times daily Not taking sugar low) 180 tablet 1    omeprazole (PRILOSEC) 40 MG delayed release capsule Take 1 capsule by mouth daily 90 capsule 1    blood glucose monitor strips Test 3 times a day & as needed for symptoms of irregular blood glucose. Dispense sufficient amount for indicated testing frequency plus additional to accommodate PRN testing needs. 50 strip 0    psyllium (KONSYL) 28.3 % PACK Take 1 packet by mouth nightly      silver sulfADIAZINE (SILVADENE) 1 % cream Apply topically to affected areas 2-3 times per day as needed. 50 g 3    traZODone (DESYREL) 50 MG tablet Take 1 tablet by mouth nightly 30 tablet 0     No current facility-administered medications for this encounter. OBJECTIVE:     Wt Readings from Last 3 Encounters:   10/12/22 169 lb (76.7 kg)   10/11/22 165 lb 3.2 oz (74.9 kg)   10/11/22 165 lb 6.4 oz (75 kg)       Alert and fully ambulatory. Pleasant and conversant. Irradiated skin shows bright erythema RTOG Gr 2 reaction. .    ASSESSMENT/PLAN:     Skin reaction. Start Silvadene topical ointment 2-3 times per day to affected area.  Patient instructed on topical ointment application, also advised not to apply within 4 hours of scheduled XRT treatment. E-scribed to patient's verified choice pharmacy. Increase Oxy IR dosage as ordered as needed every 4  hours per a different provider to improve pain control (patient only taking 1 tablet/ 1 time per day). Patient worried about constipation with increased dosing. Take Senokot-S as prescribed. Continue TF with free H2O flushes via PEG tube as instructed per Oncology Registered Dietitian. Oral hygiene measures for comfort including baking soda and saltwater mouth rinses, dry mouth care products and frequent small sips of H2O per Free arvizu protocol. Patient is tolerating treatments well with expected toxicities. Current and planned dose reviewed. Goals of treatment and potential side effects were reviewed with the patient. Questions answered to apparent satisfaction. Treatments will continue as planned.     Alisson Pickard, MSN, APRN-CNP  Certified Nurse Practitioner for [address] [place] [address] Knee: [phone]/ F: [phone]   [facility] Knee: [phone] / F: [phone]

## 2022-10-18 ENCOUNTER — HOSPITAL ENCOUNTER (OUTPATIENT)
Dept: INFUSION THERAPY | Age: 72
Discharge: HOME | End: 2022-10-18
Payer: MEDICARE

## 2022-10-18 ENCOUNTER — OFFICE VISIT (OUTPATIENT)
Dept: ONCOLOGY | Age: 72
End: 2022-10-18
Payer: MEDICARE

## 2022-10-18 ENCOUNTER — HOSPITAL ENCOUNTER (OUTPATIENT)
Dept: RADIATION ONCOLOGY | Age: 72
Discharge: HOME | End: 2022-10-18
Payer: MEDICARE

## 2022-10-18 VITALS
HEART RATE: 93 BPM | SYSTOLIC BLOOD PRESSURE: 130 MMHG | TEMPERATURE: 97.3 F | WEIGHT: 162.2 LBS | RESPIRATION RATE: 18 BRPM | BODY MASS INDEX: 25.4 KG/M2 | DIASTOLIC BLOOD PRESSURE: 73 MMHG

## 2022-10-18 VITALS
WEIGHT: 164 LBS | BODY MASS INDEX: 25.74 KG/M2 | HEART RATE: 84 BPM | SYSTOLIC BLOOD PRESSURE: 110 MMHG | DIASTOLIC BLOOD PRESSURE: 69 MMHG | TEMPERATURE: 97.6 F | OXYGEN SATURATION: 96 % | HEIGHT: 67 IN

## 2022-10-18 VITALS
RESPIRATION RATE: 18 BRPM | SYSTOLIC BLOOD PRESSURE: 121 MMHG | DIASTOLIC BLOOD PRESSURE: 63 MMHG | HEART RATE: 75 BPM | OXYGEN SATURATION: 95 % | TEMPERATURE: 96.7 F

## 2022-10-18 DIAGNOSIS — C10.9 SQUAMOUS CELL CARCINOMA OF OROPHARYNX (HCC): ICD-10-CM

## 2022-10-18 DIAGNOSIS — C76.0 HEAD AND NECK CANCER (HCC): Primary | ICD-10-CM

## 2022-10-18 DIAGNOSIS — C10.9 OROPHARYNGEAL CANCER (HCC): Primary | ICD-10-CM

## 2022-10-18 DIAGNOSIS — C10.9 SQUAMOUS CELL CARCINOMA OF OROPHARYNX (HCC): Primary | ICD-10-CM

## 2022-10-18 PROCEDURE — 96375 TX/PRO/DX INJ NEW DRUG ADDON: CPT

## 2022-10-18 PROCEDURE — 1123F ACP DISCUSS/DSCN MKR DOCD: CPT | Performed by: INTERNAL MEDICINE

## 2022-10-18 PROCEDURE — 96413 CHEMO IV INFUSION 1 HR: CPT

## 2022-10-18 PROCEDURE — 77014 PR CT GUIDANCE PLACEMENT RAD THERAPY FIELDS: CPT | Performed by: SPECIALIST

## 2022-10-18 PROCEDURE — 96366 THER/PROPH/DIAG IV INF ADDON: CPT

## 2022-10-18 PROCEDURE — 96415 CHEMO IV INFUSION ADDL HR: CPT

## 2022-10-18 PROCEDURE — 6360000002 HC RX W HCPCS: Performed by: INTERNAL MEDICINE

## 2022-10-18 PROCEDURE — 77386 HC NTSTY MODUL RAD TX DLVR CPLX: CPT | Performed by: SPECIALIST

## 2022-10-18 PROCEDURE — 2580000003 HC RX 258: Performed by: INTERNAL MEDICINE

## 2022-10-18 PROCEDURE — 99214 OFFICE O/P EST MOD 30 MIN: CPT | Performed by: INTERNAL MEDICINE

## 2022-10-18 PROCEDURE — 96367 TX/PROPH/DG ADDL SEQ IV INF: CPT

## 2022-10-18 RX ORDER — ALBUTEROL SULFATE 90 UG/1
4 AEROSOL, METERED RESPIRATORY (INHALATION) PRN
Status: CANCELLED | OUTPATIENT
Start: 2022-10-18

## 2022-10-18 RX ORDER — EPINEPHRINE 1 MG/ML
0.3 INJECTION, SOLUTION, CONCENTRATE INTRAVENOUS PRN
Status: CANCELLED | OUTPATIENT
Start: 2022-10-18

## 2022-10-18 RX ORDER — HEPARIN SODIUM (PORCINE) LOCK FLUSH IV SOLN 100 UNIT/ML 100 UNIT/ML
500 SOLUTION INTRAVENOUS PRN
Status: CANCELLED | OUTPATIENT
Start: 2022-10-18

## 2022-10-18 RX ORDER — SODIUM CHLORIDE 9 MG/ML
5-250 INJECTION, SOLUTION INTRAVENOUS PRN
Status: CANCELLED | OUTPATIENT
Start: 2022-10-18

## 2022-10-18 RX ORDER — SODIUM CHLORIDE 9 MG/ML
INJECTION, SOLUTION INTRAVENOUS CONTINUOUS
Status: CANCELLED | OUTPATIENT
Start: 2022-10-18

## 2022-10-18 RX ORDER — MEPERIDINE HYDROCHLORIDE 25 MG/ML
12.5 INJECTION INTRAMUSCULAR; INTRAVENOUS; SUBCUTANEOUS PRN
Status: CANCELLED | OUTPATIENT
Start: 2022-10-18

## 2022-10-18 RX ORDER — ACETAMINOPHEN 325 MG/1
650 TABLET ORAL
Status: CANCELLED | OUTPATIENT
Start: 2022-10-18

## 2022-10-18 RX ORDER — DIPHENHYDRAMINE HYDROCHLORIDE 50 MG/ML
50 INJECTION INTRAMUSCULAR; INTRAVENOUS
Status: CANCELLED | OUTPATIENT
Start: 2022-10-18

## 2022-10-18 RX ORDER — DEXAMETHASONE SODIUM PHOSPHATE 10 MG/ML
10 INJECTION INTRAMUSCULAR; INTRAVENOUS ONCE
Status: COMPLETED | OUTPATIENT
Start: 2022-10-18 | End: 2022-10-18

## 2022-10-18 RX ORDER — DEXAMETHASONE SODIUM PHOSPHATE 10 MG/ML
10 INJECTION, SOLUTION INTRAMUSCULAR; INTRAVENOUS ONCE
Status: CANCELLED | OUTPATIENT
Start: 2022-10-18 | End: 2022-10-18

## 2022-10-18 RX ORDER — PALONOSETRON HYDROCHLORIDE 0.05 MG/ML
0.25 INJECTION, SOLUTION INTRAVENOUS ONCE
Status: CANCELLED | OUTPATIENT
Start: 2022-10-18 | End: 2022-10-18

## 2022-10-18 RX ORDER — SODIUM CHLORIDE 0.9 % (FLUSH) 0.9 %
5-40 SYRINGE (ML) INJECTION PRN
Status: CANCELLED | OUTPATIENT
Start: 2022-10-18

## 2022-10-18 RX ORDER — SODIUM CHLORIDE 9 MG/ML
5-250 INJECTION, SOLUTION INTRAVENOUS PRN
Status: DISCONTINUED | OUTPATIENT
Start: 2022-10-18 | End: 2022-10-19 | Stop reason: HOSPADM

## 2022-10-18 RX ORDER — PALONOSETRON HYDROCHLORIDE 0.05 MG/ML
0.25 INJECTION, SOLUTION INTRAVENOUS ONCE
Status: COMPLETED | OUTPATIENT
Start: 2022-10-18 | End: 2022-10-18

## 2022-10-18 RX ORDER — SODIUM CHLORIDE 9 MG/ML
5-40 INJECTION INTRAVENOUS PRN
Status: CANCELLED | OUTPATIENT
Start: 2022-10-18

## 2022-10-18 RX ORDER — ONDANSETRON 2 MG/ML
8 INJECTION INTRAMUSCULAR; INTRAVENOUS
Status: CANCELLED | OUTPATIENT
Start: 2022-10-18

## 2022-10-18 RX ADMIN — PALONOSETRON 0.25 MG: 0.25 INJECTION, SOLUTION INTRAVENOUS at 11:51

## 2022-10-18 RX ADMIN — DEXAMETHASONE SODIUM PHOSPHATE 10 MG: 10 INJECTION INTRAMUSCULAR; INTRAVENOUS at 11:52

## 2022-10-18 RX ADMIN — POTASSIUM CHLORIDE: 2 INJECTION, SOLUTION, CONCENTRATE INTRAVENOUS at 12:57

## 2022-10-18 RX ADMIN — SODIUM CHLORIDE 20 ML/HR: 9 INJECTION, SOLUTION INTRAVENOUS at 11:51

## 2022-10-18 RX ADMIN — SODIUM CHLORIDE 150 MG: 9 INJECTION, SOLUTION INTRAVENOUS at 12:15

## 2022-10-18 ASSESSMENT — ENCOUNTER SYMPTOMS
VOMITING: 0
COUGH: 0
SHORTNESS OF BREATH: 0
SINUS PRESSURE: 0
TROUBLE SWALLOWING: 1
SORE THROAT: 1

## 2022-10-18 NOTE — PROGRESS NOTES
Margolinh Carr  10/18/2022  Wt Readings from Last 3 Encounters:   10/18/22 162 lb 3.2 oz (73.6 kg)   10/18/22 164 lb (74.4 kg)   10/12/22 169 lb (76.7 kg)     Body mass index is 25.4 kg/m². Treatment Area:head and neck    Patient was seen today for weekly visit. Comfort Alteration  KPS:90%  Fatigue: Moderate    Mucous Membrane Alteration  Mucositis Due to Radiation: No  Thrush: No  Voice Changes: No    Nutritional Alteration  Anorexia: Yes , using tube feeding  Nausea: No   Vomiting: No     Skin Alteration   Sensation:very red and has silvadene    Radiation Dermatitis:  na    Ventilation Alteration  Cough:No    Emotional  Coping: effective    Injury, potential bleeding or infection: na    Radioprotectant Tolerance  na            Lab Results   Component Value Date    WBC 3.1 (L) 10/17/2022     10/17/2022         /73   Pulse 93   Temp 97.3 °F (36.3 °C) (Skin)   Resp 18   Wt 162 lb 3.2 oz (73.6 kg)   BMI 25.40 kg/m²   BP within normal range? yes           Assessment/Plan:26/37fx  5200/7400cGY and tolerated well.     Ron Harden RN

## 2022-10-18 NOTE — PROGRESS NOTES
Department of [facility] Oncology  Attending Clinic Note    Reason for Visit: Follow-up on a patient with p16 + Oropharyngeal Squamous Cell Carcinoma    PCP:  Grace Talbot DO    History of Present Illness:  72-year-old male who presented to the ENT team for persistent left-sided neck fullness without associated difficulty swallowing    CT soft tissue neck 02/23/2022: Mass located in the left aspect of floor of the mouth extending into the left oropharyngeal soft tissue concerning for squamous cell carcinoma  Multiple enlarged necrotic left anterior cervical chain lymph nodes    Panendoscopy on 3/17/2022:  Findings: L lingual tonsil hypertrophy, biopsy negative, left level II neck node FNA performed   FNA left neck mass level 2: Inconclusive for malignant cells. Few atypical squamous cells with degenerative change  A. Tongue, left base, biopsy:   Squamous epithelial-lined lymphoid tissue with reactive germinal centers, compatible with lingual tonsil. Negative for dysplasia; Negative for malignancy. B.  Tongue, left base, biopsy:   Squamous epithelial-lined lymphoid tissue with reactive germinal centers, compatible with lingual tonsil. Focal lobules of benign mucinous glands. Negative for dysplasia; Negative for malignancy    On 07/21/2022: Panendoscopy excision left level 2 lymph node  Findings:  left base of tongue mass, left cervical lymphadenopathy  A. 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 See comment. B. 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 C.  Left base of tongue, biopsy: Squamous mucosa, negative for neoplasm. D.  Right base of tongue, biopsy: Squamous mucosa, negative for neoplasm. E.  Midline tongue, biopsy: Squamous mucosa, negative for neoplasm.    Comment:   The squamous carcinoma is diffusely and strongly positive for p16 immunostain, supporting the above interpretation    PET/CT scan 08/01/2022: There is a large hypermetabolic mass centered in the left oropharynx involving the left lateral oropharynx, left tonsillar and left base of tongue regions extending superiorly to the left soft palate and inferiorly to the left floor of mouth and left vallecula. Maximum SUV of this mass measures 17.4. The mass causes narrowing of the oropharyngeal airway. There are multiple hypermetabolic enlarged, necrotic and someone confluent left level 2 and left level 3 lymph nodes. Confluent left level 2 adenopathy demonstrates a maximum SUV of 15.8. Additional hypermetabolic left level 5/3 junction lymph node is noted with maximum SUV 5.0  Mild focal hypermetabolic activity is noted in the left parapharyngeal/retropharyngeal region (maximum SUV 3.8) which could represent an underlying small lymph node. There is a hypermetabolic right level 2 lymph node with maximum SUV 6.5    Recommended concurrent chemoradiation therapy with weekly Cisplatin. Side effects reviewed. He agreed to proceed. Authorization obtained. Cycle # 1 weekly Cisplatin was on 09/06/2022  Cycle # 2 weekly Cisplatin was on 09/13/2022. Cycle # 3 weekly Cisplatin was on 09/20/2022. ER visit 09/26/2022 fatigue dysphagia dehydration; CTA chest 9/26/2022 no evidence of PE or acute pulmonary abnormality. CT abdomen/pelvis 09/26/2022 diffuse colonic diverticulosis with mild stranding of the fat in the left lower quadrant concerning for early diverticulitis. No evidence of obstructive uropathy or acute appendicitis. Given IV fluid with improvement. IVF daily for 3 days started 09/27/2022. PEG tube inserted on 09/30/2022. Cycle # 4 weekly Cisplatin was on 10/04/2022. Cycle # 5 weekly Cisplatin was on 10/11/2022. Today 10/18/2022. No fever, chills. Fair energy level. No N.V. Tolerating tube feeds. Review of Systems;  CONSTITUTIONAL: No fever, chills. Fair energy level.    ENMT: Eyes: No diplopia; Nose: No epistaxis. Mouth: Sore throat. RESPIRATORY: No hemoptysis, shortness of breath, cough. CARDIOVASCULAR: No chest pain, palpitations. GASTROINTESTINAL: No N/V. Tolerating tube feeds. GENITOURINARY: No dysuria, urinary frequency, hematuria. NEURO: No syncope, presyncope, headache. Remainder: ROS NEGATIVE    Past Medical History:      Diagnosis Date    Arthritis     Cancer ([facility] [address] 2022    oropharynx    Diabetes mellitus ([facility]     GERD (gastroesophageal reflux disease)     Hyperlipidemia     Hypertension     Lyme disease     Thrombocytopenia (HCC)      Medications:  Reviewed and reconciled. Allergies:  No Known Allergies    Physical Exam:  /69   Pulse 84   Temp 97.6 °F (36.4 °C)   Ht 5' 7\" (1.702 m)   Wt 164 lb (74.4 kg)   SpO2 96%   BMI 25.69 kg/m²   GENERAL: Alert, oriented x 3, not in distress  Neck: Radiation dermatitis  LUNGS: Good air entry bilaterally. No wheezing, crackles or ronchi. CARDIOVASCULAR: Regular rate. No murmurs, rubs or gallops. EXTREMITIES: Without clubbing, cyanosis, or edema. NEUROLOGIC: No focal deficits. ECOG PS 1    Lab Results   Component Value Date    WBC 3.1 (L) 10/17/2022    HGB 11.7 (L) 10/17/2022    HCT 34.5 (L) 10/17/2022    MCV 89.8 10/17/2022     10/17/2022     Lab Results   Component Value Date     10/17/2022    K 4.9 10/17/2022    CL 99 10/17/2022    CO2 28 10/17/2022    BUN 20 10/17/2022    CREATININE 0.8 10/17/2022    GLUCOSE 112 (H) 10/17/2022    CALCIUM 9.6 10/17/2022    PROT 7.1 10/17/2022    LABALBU 3.9 10/17/2022    BILITOT 0.4 10/17/2022    ALKPHOS 77 10/17/2022    AST 11 10/17/2022    ALT 15 10/17/2022    LABGLOM >60 10/17/2022    GFRAA >60 10/17/2022     Lab Results   Component Value Date/Time    MG 2.1 10/17/2022 11:10 AM     Impression/Plan:  71-year-old male with history of p16+ left Oropharyngeal Squamous Cell Carcinoma    CT soft tissue neck 02/23/2022:  Mass located in the left aspect of floor of the mouth extending into the left oropharyngeal soft tissue concerning for squamous cell carcinoma  Multiple enlarged necrotic left anterior cervical chain lymph nodes    Panendoscopy on 3/17/2022:  Findings: L lingual tonsil hypertrophy, biopsy negative, left level II neck node FNA performed   FNA left neck mass level 2: Inconclusive for malignant cells. Few atypical squamous cells with degenerative change  A. Tongue, left base, biopsy:   Squamous epithelial-lined lymphoid tissue with reactive germinal centers, compatible with lingual tonsil. Negative for dysplasia; Negative for malignancy. B.  Tongue, left base, biopsy:   Squamous epithelial-lined lymphoid tissue with reactive germinal centers, compatible with lingual tonsil. Focal lobules of benign mucinous glands. Negative for dysplasia; Negative for malignancy    On 07/21/2022: Panendoscopy excision left level 2 lymph node  Findings:  left base of tongue mass, left cervical lymphadenopathy  A. 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 See comment. B. 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 C.  Left base of tongue, biopsy: Squamous mucosa, negative for neoplasm. D.  Right base of tongue, biopsy: Squamous mucosa, negative for neoplasm. E.  Midline tongue, biopsy: Squamous mucosa, negative for neoplasm. Comment:   The squamous carcinoma is diffusely and strongly positive for p16 immunostain, supporting the above interpretation    PET/CT scan 08/01/2022: There is a large hypermetabolic mass centered in the left oropharynx involving the left lateral oropharynx, left tonsillar and left base of tongue regions extending superiorly to the left soft palate and inferiorly to the left floor of mouth and left vallecula. Maximum SUV of this mass measures 17.4. The mass causes narrowing of the oropharyngeal airway.    There are multiple hypermetabolic enlarged, necrotic and someone confluent left level 2 and left level 3 lymph nodes. Confluent left level 2 adenopathy demonstrates a maximum SUV of 15.8. Additional hypermetabolic left level 5/3 junction lymph node is noted with maximum SUV 5.0  Mild focal hypermetabolic activity is noted in the left parapharyngeal/retropharyngeal region (maximum SUV 3.8) which could represent an underlying small lymph node. There is a hypermetabolic right level 2 lymph node with maximum SUV 6.5    Recommended concurrent chemoradiation therapy with weekly Cisplatin. Side effects reviewed. He agreed to proceed. Authorization obtained. Cycle # 1 weekly Cisplatin was on 09/06/2022  Cycle # 2 weekly Cisplatin was on 09/13/2022. Cycle # 3 weekly Cisplatin was on 09/20/2022. ER visit 09/26/2022 fatigue dysphagia dehydration; CTA chest 9/26/2022 no evidence of PE or acute pulmonary abnormality. CT abdomen/pelvis 09/26/2022 diffuse colonic diverticulosis with mild stranding of the fat in the left lower quadrant concerning for early diverticulitis. No evidence of obstructive uropathy or acute appendicitis. Given IV fluid with improvement. IVF daily for 3 days started 09/27/2022. PEG tube inserted on 09/30/2022  Cycle # 4 weekly Cisplatin was on 10/04/2022. Cycle # 5 weekly Cisplatin was on 10/11/2022. Cycle # 6 weekly Cisplatin is today 10/18/2022. Labs reviewed ok to proceed.     RTC next week for Cycle # 7 weekly Cisplatin    Jie Palumbo MD   10/18/2022

## 2022-10-19 ENCOUNTER — TELEPHONE (OUTPATIENT)
Dept: INFUSION THERAPY | Age: 72
End: 2022-10-19

## 2022-10-19 ENCOUNTER — HOSPITAL ENCOUNTER (OUTPATIENT)
Dept: RADIATION ONCOLOGY | Age: 72
Discharge: HOME | End: 2022-10-19
Payer: MEDICARE

## 2022-10-19 PROCEDURE — 77386 HC NTSTY MODUL RAD TX DLVR CPLX: CPT | Performed by: SPECIALIST

## 2022-10-19 PROCEDURE — 77014 PR CT GUIDANCE PLACEMENT RAD THERAPY FIELDS: CPT | Performed by: SPECIALIST

## 2022-10-19 NOTE — PROGRESS NOTES
DEPARTMENT OF RADIATION ONCOLOGY ON TREATMENT VISIT         10/19/2022      NAME:  Inés Jacinto OF BIRTH:  1950    Diagnosis: HN CA    SUBJECTIVE:   Aletha Gottron has now received fractionated external beam radiation therapy - ongoing. Past medical, surgical, social and family histories reviewed and updated as indicated. Pain: controlled    ALLERGIES:  Patient has no known allergies. Current Outpatient Medications   Medication Sig Dispense Refill    silver sulfADIAZINE (SILVADENE) 1 % cream Apply topically to affected areas 2-3 times per day as needed. 50 g 3    oxyCODONE HCl (OXY-IR) 10 MG immediate release tablet Take 1 tablet by mouth every 4 hours as needed for Pain for up to 14 days.  Intended supply: 30 days 84 tablet 0    nystatin (MYCOSTATIN) 761040 UNIT/ML suspension Take 5 mLs by mouth 4 times daily for 10 days Retain in mouth as long as possible 200 mL 0    Magic Mouthwash (MIRACLE MOUTHWASH) Swish and spit 5 mLs 4 times daily as needed for Irritation or Pain Benadryl 12.5 mg/ml Maalox 10 ml/5ml  lidocaine 2%/5ml 480 mL 3    sennosides-docusate sodium (SENOKOT-S) 8.6-50 MG tablet Take 2 tablets by mouth 2 times daily as needed for Constipation 120 tablet 0    polyethylene glycol (GLYCOLAX) 17 GM/SCOOP powder Take 17 g by mouth daily 510 g 5    sucralfate (CARAFATE) 1 GM/10ML suspension Take 10 mLs by mouth 4 times daily 1200 mL 3    traZODone (DESYREL) 50 MG tablet Take 1 tablet by mouth nightly 30 tablet 0    melatonin 3 MG TABS tablet Take 3 mg by mouth nightly as needed      prochlorperazine (COMPAZINE) 10 MG tablet Take 1 tablet by mouth every 6 hours as needed (nausea/vomiting) 40 tablet 3    ibuprofen (ADVIL;MOTRIN) 600 MG tablet Take 1 tablet by mouth every 6 hours as needed for Pain 360 tablet 1    fenofibrate (TRICOR) 145 MG tablet Take 1 tablet by mouth daily (Patient taking differently: Take 145 mg by mouth daily Not taking) 90 tablet 1 lisinopril-hydroCHLOROthiazide (PRINZIDE;ZESTORETIC) 10-12.5 MG per tablet Take 1 tablet by mouth daily (Patient taking differently: Take 1 tablet by mouth daily Not taking bp lo) 90 tablet 1    metFORMIN (GLUCOPHAGE-XR) 500 MG extended release tablet Take 1 tablet by mouth 2 times daily (Patient taking differently: Take 500 mg by mouth 2 times daily Not taking sugar low) 180 tablet 1    omeprazole (PRILOSEC) 40 MG delayed release capsule Take 1 capsule by mouth daily 90 capsule 1    blood glucose monitor strips Test 3 times a day & as needed for symptoms of irregular blood glucose. Dispense sufficient amount for indicated testing frequency plus additional to accommodate PRN testing needs. 50 strip 0    psyllium (KONSYL) 28.3 % PACK Take 1 packet by mouth nightly       No current facility-administered medications for this encounter. OBJECTIVE:  Alert and fully ambulatory. Pleasant and conversant. Physical Examination: General appearance - alert, well appearing, and in no distress. Wt Readings from Last 3 Encounters:   10/18/22 162 lb 3.2 oz (73.6 kg)   10/18/22 164 lb (74.4 kg)   10/12/22 169 lb (76.7 kg)         ASSESSMENT/PLAN:     Patient is tolerating treatments well with expected toxicities. RBA were reviewed prior to first fraction and PRN. Current and planned dose reviewed. Goals of treatment and potential side effects were reviewed with the patient PRN. Treatment imaging has been personally reviewed for accuracy and precision. Questions answered to apparent satisfaction. Treatments will continue as planned. [place].  Isai Colon MD MS INÉSR  Radiation Oncologist        [facility] ([address] [place] [address]): [phone] /// FAX: [phone]  [facility] [phone] /// FAX: [phone]  [facility] [place]): [phone] /// FAX: [phone]

## 2022-10-19 NOTE — PATIENT INSTRUCTIONS
Continue daily fractionated radiation therapy as scheduled. Please see weekly OTV note and intial consultation letter in [facility] for clinical details. Mary Anne Chandra. Leticia Cervantes MD MS Gallardo Kody:  [phone]   FAX: [phone]  [address] [place] [address] Street:  [phone]   FAX:    [phone]  [address] [place] [address] Road:  [phone]   FAX:  [phone]  Email: [email or web address]

## 2022-10-19 NOTE — TELEPHONE ENCOUNTER
Joshua Goldman  10/19/2022  Wt Readings from Last 10 Encounters:   10/18/22 162 lb 3.2 oz (73.6 kg)   10/18/22 164 lb (74.4 kg)   10/12/22 169 lb (76.7 kg)   10/11/22 165 lb 3.2 oz (74.9 kg)   10/11/22 165 lb 6.4 oz (75 kg)   10/04/22 169 lb 3.2 oz (76.7 kg)   10/04/22 167 lb 4.8 oz (75.9 kg)   09/30/22 166 lb (75.3 kg)   09/28/22 169 lb 4.8 oz (76.8 kg)   09/28/22 170 lb (77.1 kg)     Ht Readings from Last 1 Encounters:   10/18/22 5' 7\" (1.702 m)     BMI=25.4    Assessment: Late entry from 10/18/22. Visited with Enrrique Moreland while in office for OTV with Dr. Rosie Pelaez. Enrrique Moreland reports he has been using a combination of Glucerna 1.5 and Boost VHC. The Glucerna 1.5 is ordered through [facility] and the [facility] his wife orders on line. Enrrique Moreland reports the [facility] is tolerated better than the Glucerna 1.5 with less burping, gas and abdomin distention. He reports he will not be able to give himself 7 cartons per day of Glucerna 1.5, that it is just too many different feedings or too much volume if more than 1 carton at a time. Medina Chandler would like formula switched to Boost VHC. He would still need 5 cartons per day of Boost VHC and his water flushes would be increased to 240 ml with each feeding, plus another 240ml once per day. Medina Chandler feels he would be more apt to meet his nutritional needs with tube feeding change. Will seek order and submit to [facility]. Weight change: 4.02% significant weight loss x 1 week, 8.26% significant weight loss x 1 month, 13.26% significant weight loss x 3 months, 15.96% significant weight loss x 6 months  Appetite: EN dependent.    Nutritional Side Effects: odynophagia, anorexia, constipation (he reports meds are effective but he doesn't always utilize them), mucositis, dysgeusia  Calculated Needs: 32-35kcal/kg/MVG=0072-0892cfbfc, 1.3-1.5gm/kg/CBW=95-110gm protein, 1ml/kcal=2350-2600ml fluids  Malnutrition Status: Severe  Nutrition Diagnosis: Severe malnutrition r/t H&N cancer with active chemo/rad tx AEB significant weight loss of 8.26% x 1 month and 15.96% significant weight loss x 6 months, muscle wasting noted to clavicle and temple region. Recommendations: Continue to eat soft foods and liquids orally as tolerated to maintain swallowing muscles. Utilize PEG tube with Glucerna 1.5 7 cartons per day with 60ml water before and 90ml water after feeding. Or utilize PEG tube with Boost VHC 5 cartons per day with 120ml water before and 120ml water after each carton, and 240ml water once more per day.      Sam Cutler RD

## 2022-10-20 ENCOUNTER — HOSPITAL ENCOUNTER (OUTPATIENT)
Dept: RADIATION ONCOLOGY | Age: 72
Discharge: HOME | End: 2022-10-20
Payer: MEDICARE

## 2022-10-20 PROCEDURE — 77386 HC NTSTY MODUL RAD TX DLVR CPLX: CPT | Performed by: SPECIALIST

## 2022-10-20 PROCEDURE — 77014 PR CT GUIDANCE PLACEMENT RAD THERAPY FIELDS: CPT | Performed by: SPECIALIST

## 2022-10-21 ENCOUNTER — HOSPITAL ENCOUNTER (OUTPATIENT)
Dept: INFUSION THERAPY | Age: 72
Discharge: HOME | End: 2022-10-21
Payer: MEDICARE

## 2022-10-21 ENCOUNTER — HOSPITAL ENCOUNTER (OUTPATIENT)
Dept: RADIATION ONCOLOGY | Age: 72
Discharge: HOME | End: 2022-10-21
Payer: MEDICARE

## 2022-10-21 DIAGNOSIS — C10.9 OROPHARYNGEAL CANCER (HCC): ICD-10-CM

## 2022-10-21 LAB
ALBUMIN SERPL-MCNC: 3.7 G/DL (ref 3.5–5.2)
ALP BLD-CCNC: 73 U/L (ref 40–129)
ALT SERPL-CCNC: 16 U/L (ref 0–40)
ANION GAP SERPL CALCULATED.3IONS-SCNC: 8 MMOL/L (ref 7–16)
ANISOCYTOSIS: ABNORMAL
AST SERPL-CCNC: 10 U/L (ref 0–39)
BASOPHILS ABSOLUTE: 0 E9/L (ref 0–0.2)
BASOPHILS RELATIVE PERCENT: 0.4 % (ref 0–2)
BILIRUB SERPL-MCNC: 0.6 MG/DL (ref 0–1.2)
BUN BLDV-MCNC: 22 MG/DL (ref 6–23)
CALCIUM SERPL-MCNC: 9.2 MG/DL (ref 8.6–10.2)
CHLORIDE BLD-SCNC: 100 MMOL/L (ref 98–107)
CO2: 29 MMOL/L (ref 22–29)
CREAT SERPL-MCNC: 0.8 MG/DL (ref 0.7–1.2)
EOSINOPHILS ABSOLUTE: 0 E9/L (ref 0.05–0.5)
EOSINOPHILS RELATIVE PERCENT: 0.4 % (ref 0–6)
GFR SERPL CREATININE-BSD FRML MDRD: >60 ML/MIN/1.73
GLUCOSE BLD-MCNC: 186 MG/DL (ref 74–99)
HCT VFR BLD CALC: 34 % (ref 37–54)
HEMOGLOBIN: 11.2 G/DL (ref 12.5–16.5)
LYMPHOCYTES ABSOLUTE: 0.14 E9/L (ref 1.5–4)
LYMPHOCYTES RELATIVE PERCENT: 5.2 % (ref 20–42)
MAGNESIUM: 2 MG/DL (ref 1.6–2.6)
MCH RBC QN AUTO: 29.9 PG (ref 26–35)
MCHC RBC AUTO-ENTMCNC: 32.9 % (ref 32–34.5)
MCV RBC AUTO: 90.7 FL (ref 80–99.9)
MONOCYTES ABSOLUTE: 0.14 E9/L (ref 0.1–0.95)
MONOCYTES RELATIVE PERCENT: 5.2 % (ref 2–12)
NEUTROPHILS ABSOLUTE: 2.43 E9/L (ref 1.8–7.3)
NEUTROPHILS RELATIVE PERCENT: 89.7 % (ref 43–80)
OVALOCYTES: ABNORMAL
PDW BLD-RTO: 16.3 FL (ref 11.5–15)
PLATELET # BLD: 182 E9/L (ref 130–450)
PMV BLD AUTO: 9.3 FL (ref 7–12)
POIKILOCYTES: ABNORMAL
POLYCHROMASIA: ABNORMAL
POTASSIUM SERPL-SCNC: 4.3 MMOL/L (ref 3.5–5)
RBC # BLD: 3.75 E12/L (ref 3.8–5.8)
SODIUM BLD-SCNC: 137 MMOL/L (ref 132–146)
TOTAL PROTEIN: 6.8 G/DL (ref 6.4–8.3)
WBC # BLD: 2.7 E9/L (ref 4.5–11.5)

## 2022-10-21 PROCEDURE — 99999 PR OFFICE/OUTPT VISIT,PROCEDURE ONLY: CPT | Performed by: SPECIALIST

## 2022-10-21 PROCEDURE — 80053 COMPREHEN METABOLIC PANEL: CPT

## 2022-10-21 PROCEDURE — 83735 ASSAY OF MAGNESIUM: CPT

## 2022-10-21 PROCEDURE — 36415 COLL VENOUS BLD VENIPUNCTURE: CPT

## 2022-10-21 PROCEDURE — 85025 COMPLETE CBC W/AUTO DIFF WBC: CPT

## 2022-10-21 PROCEDURE — 77386 HC NTSTY MODUL RAD TX DLVR CPLX: CPT | Performed by: SPECIALIST

## 2022-10-24 ENCOUNTER — HOSPITAL ENCOUNTER (OUTPATIENT)
Dept: RADIATION ONCOLOGY | Age: 72
End: 2022-10-24
Payer: MEDICARE

## 2022-10-25 ENCOUNTER — HOSPITAL ENCOUNTER (OUTPATIENT)
Dept: RADIATION ONCOLOGY | Age: 72
Discharge: HOME | End: 2022-10-25
Payer: MEDICARE

## 2022-10-25 ENCOUNTER — OFFICE VISIT (OUTPATIENT)
Dept: ONCOLOGY | Age: 72
End: 2022-10-25
Payer: MEDICARE

## 2022-10-25 ENCOUNTER — TELEPHONE (OUTPATIENT)
Dept: INFUSION THERAPY | Age: 72
End: 2022-10-25

## 2022-10-25 ENCOUNTER — HOSPITAL ENCOUNTER (OUTPATIENT)
Dept: INFUSION THERAPY | Age: 72
Discharge: HOME | End: 2022-10-25
Payer: MEDICARE

## 2022-10-25 VITALS
HEART RATE: 112 BPM | SYSTOLIC BLOOD PRESSURE: 127 MMHG | BODY MASS INDEX: 24.15 KG/M2 | WEIGHT: 154.2 LBS | DIASTOLIC BLOOD PRESSURE: 78 MMHG | TEMPERATURE: 97.5 F | OXYGEN SATURATION: 98 % | RESPIRATION RATE: 18 BRPM

## 2022-10-25 VITALS
HEIGHT: 67 IN | HEART RATE: 113 BPM | OXYGEN SATURATION: 96 % | DIASTOLIC BLOOD PRESSURE: 79 MMHG | WEIGHT: 154.4 LBS | BODY MASS INDEX: 24.23 KG/M2 | SYSTOLIC BLOOD PRESSURE: 126 MMHG | TEMPERATURE: 97.1 F

## 2022-10-25 VITALS
DIASTOLIC BLOOD PRESSURE: 58 MMHG | SYSTOLIC BLOOD PRESSURE: 111 MMHG | HEART RATE: 85 BPM | TEMPERATURE: 97.7 F | RESPIRATION RATE: 18 BRPM | OXYGEN SATURATION: 100 %

## 2022-10-25 DIAGNOSIS — C10.9 SQUAMOUS CELL CARCINOMA OF OROPHARYNX (HCC): Primary | ICD-10-CM

## 2022-10-25 DIAGNOSIS — C76.0 HEAD AND NECK CANCER (HCC): Primary | ICD-10-CM

## 2022-10-25 PROCEDURE — 96375 TX/PRO/DX INJ NEW DRUG ADDON: CPT

## 2022-10-25 PROCEDURE — 96366 THER/PROPH/DIAG IV INF ADDON: CPT

## 2022-10-25 PROCEDURE — 99999 PR OFFICE/OUTPT VISIT,PROCEDURE ONLY: CPT | Performed by: RADIOLOGY

## 2022-10-25 PROCEDURE — 6360000002 HC RX W HCPCS: Performed by: INTERNAL MEDICINE

## 2022-10-25 PROCEDURE — 96415 CHEMO IV INFUSION ADDL HR: CPT

## 2022-10-25 PROCEDURE — 96413 CHEMO IV INFUSION 1 HR: CPT

## 2022-10-25 PROCEDURE — 77386 HC NTSTY MODUL RAD TX DLVR CPLX: CPT | Performed by: SPECIALIST

## 2022-10-25 PROCEDURE — 1123F ACP DISCUSS/DSCN MKR DOCD: CPT | Performed by: INTERNAL MEDICINE

## 2022-10-25 PROCEDURE — 2580000003 HC RX 258: Performed by: INTERNAL MEDICINE

## 2022-10-25 PROCEDURE — 77336 RADIATION PHYSICS CONSULT: CPT | Performed by: SPECIALIST

## 2022-10-25 PROCEDURE — 99214 OFFICE O/P EST MOD 30 MIN: CPT | Performed by: INTERNAL MEDICINE

## 2022-10-25 PROCEDURE — 77427 RADIATION TX MANAGEMENT X5: CPT | Performed by: SPECIALIST

## 2022-10-25 PROCEDURE — 96367 TX/PROPH/DG ADDL SEQ IV INF: CPT

## 2022-10-25 PROCEDURE — 77014 PR CT GUIDANCE PLACEMENT RAD THERAPY FIELDS: CPT | Performed by: SPECIALIST

## 2022-10-25 RX ORDER — SODIUM CHLORIDE 9 MG/ML
5-250 INJECTION, SOLUTION INTRAVENOUS PRN
Status: CANCELLED | OUTPATIENT
Start: 2022-10-25

## 2022-10-25 RX ORDER — SODIUM CHLORIDE 9 MG/ML
5-250 INJECTION, SOLUTION INTRAVENOUS PRN
Status: DISCONTINUED | OUTPATIENT
Start: 2022-10-25 | End: 2022-10-26 | Stop reason: HOSPADM

## 2022-10-25 RX ORDER — DEXAMETHASONE SODIUM PHOSPHATE 10 MG/ML
10 INJECTION, SOLUTION INTRAMUSCULAR; INTRAVENOUS ONCE
Status: CANCELLED | OUTPATIENT
Start: 2022-10-25 | End: 2022-10-25

## 2022-10-25 RX ORDER — PALONOSETRON HYDROCHLORIDE 0.05 MG/ML
0.25 INJECTION, SOLUTION INTRAVENOUS ONCE
Status: CANCELLED | OUTPATIENT
Start: 2022-10-25 | End: 2022-10-25

## 2022-10-25 RX ORDER — HEPARIN SODIUM (PORCINE) LOCK FLUSH IV SOLN 100 UNIT/ML 100 UNIT/ML
500 SOLUTION INTRAVENOUS PRN
Status: CANCELLED | OUTPATIENT
Start: 2022-10-25

## 2022-10-25 RX ORDER — ACETAMINOPHEN 325 MG/1
650 TABLET ORAL
Status: CANCELLED | OUTPATIENT
Start: 2022-10-25

## 2022-10-25 RX ORDER — EPINEPHRINE 1 MG/ML
0.3 INJECTION, SOLUTION, CONCENTRATE INTRAVENOUS PRN
Status: CANCELLED | OUTPATIENT
Start: 2022-10-25

## 2022-10-25 RX ORDER — PALONOSETRON HYDROCHLORIDE 0.05 MG/ML
0.25 INJECTION, SOLUTION INTRAVENOUS ONCE
Status: COMPLETED | OUTPATIENT
Start: 2022-10-25 | End: 2022-10-25

## 2022-10-25 RX ORDER — ONDANSETRON 2 MG/ML
8 INJECTION INTRAMUSCULAR; INTRAVENOUS
Status: CANCELLED | OUTPATIENT
Start: 2022-10-25

## 2022-10-25 RX ORDER — ALBUTEROL SULFATE 90 UG/1
4 AEROSOL, METERED RESPIRATORY (INHALATION) PRN
Status: CANCELLED | OUTPATIENT
Start: 2022-10-25

## 2022-10-25 RX ORDER — DIPHENHYDRAMINE HYDROCHLORIDE 50 MG/ML
50 INJECTION INTRAMUSCULAR; INTRAVENOUS
Status: CANCELLED | OUTPATIENT
Start: 2022-10-25

## 2022-10-25 RX ORDER — SODIUM CHLORIDE 0.9 % (FLUSH) 0.9 %
5-40 SYRINGE (ML) INJECTION PRN
Status: CANCELLED | OUTPATIENT
Start: 2022-10-25

## 2022-10-25 RX ORDER — DEXAMETHASONE SODIUM PHOSPHATE 10 MG/ML
10 INJECTION INTRAMUSCULAR; INTRAVENOUS ONCE
Status: COMPLETED | OUTPATIENT
Start: 2022-10-25 | End: 2022-10-25

## 2022-10-25 RX ORDER — MEPERIDINE HYDROCHLORIDE 25 MG/ML
12.5 INJECTION INTRAMUSCULAR; INTRAVENOUS; SUBCUTANEOUS PRN
Status: CANCELLED | OUTPATIENT
Start: 2022-10-25

## 2022-10-25 RX ORDER — SODIUM CHLORIDE 9 MG/ML
INJECTION, SOLUTION INTRAVENOUS CONTINUOUS
Status: CANCELLED | OUTPATIENT
Start: 2022-10-25

## 2022-10-25 RX ORDER — SODIUM CHLORIDE 9 MG/ML
5-40 INJECTION INTRAVENOUS PRN
Status: CANCELLED | OUTPATIENT
Start: 2022-10-25

## 2022-10-25 RX ADMIN — POTASSIUM CHLORIDE: 2 INJECTION, SOLUTION, CONCENTRATE INTRAVENOUS at 13:10

## 2022-10-25 RX ADMIN — DEXAMETHASONE SODIUM PHOSPHATE 10 MG: 10 INJECTION INTRAMUSCULAR; INTRAVENOUS at 12:10

## 2022-10-25 RX ADMIN — FOSAPREPITANT 150 MG: 150 INJECTION, POWDER, LYOPHILIZED, FOR SOLUTION INTRAVENOUS at 12:34

## 2022-10-25 RX ADMIN — PALONOSETRON 0.25 MG: 0.25 INJECTION, SOLUTION INTRAVENOUS at 12:08

## 2022-10-25 ASSESSMENT — PAIN DESCRIPTION - LOCATION: LOCATION: THROAT;NECK

## 2022-10-25 ASSESSMENT — PAIN DESCRIPTION - FREQUENCY: FREQUENCY: CONTINUOUS

## 2022-10-25 ASSESSMENT — PAIN SCALES - GENERAL: PAINLEVEL_OUTOF10: 10

## 2022-10-25 ASSESSMENT — PAIN DESCRIPTION - PAIN TYPE: TYPE: CHRONIC PAIN

## 2022-10-25 ASSESSMENT — PAIN DESCRIPTION - DESCRIPTORS: DESCRIPTORS: BURNING

## 2022-10-25 NOTE — PROGRESS NOTES
Baldemar Flores  10/25/2022  Wt Readings from Last 3 Encounters:   10/25/22 154 lb 3.2 oz (69.9 kg)   10/25/22 154 lb 6.4 oz (70 kg)   10/18/22 162 lb 3.2 oz (73.6 kg)     Body mass index is 24.15 kg/m². Treatment Area:H&N    Patient was seen today for weekly visit. Comfort Alteration  KPS:100%  Fatigue: Moderate    Mucous Membrane Alteration  Mucositis Due to Radiation: Yes  Thrush: Yes  Voice Changes: Yes    Nutritional Alteration  Anorexia: Yes   Nausea: Yes   Vomiting: Yes     Skin Alteration   Sensation:reddened and sore    Radiation Dermatitis:  yes    Ventilation Alteration  Cough:Yes    Emotional  Coping: effective    Injury, potential bleeding or infection: no    Radioprotectant Tolerance  na            Lab Results   Component Value Date    WBC 2.7 (L) 10/21/2022     10/21/2022         /78   Pulse (!) 112   Temp 97.5 °F (36.4 °C) (Temporal)   Resp 18   Wt 154 lb 3.2 oz (69.9 kg)   SpO2 98%   BMI 24.15 kg/m²   BP within normal range? yes       Assessment/Plan:30/37fx;6000/7400cGy completed. Patient skin very sore, using Silvadene and Aquaphor. Last chemo treatment today. Encouraged use of baking soda and salt water rinses for dry throat/mouth.     Lance Seymour RN

## 2022-10-25 NOTE — PROGRESS NOTES
DEPARTMENT OF RADIATION ONCOLOGY ON TREATMENT VISIT         10/25/2022      NAME:  Ji Fitzpatrick OF BIRTH:  1950    Diagnosis: HN CA    SUBJECTIVE:   Aurelia Novoa has now received fractionated external beam radiation therapy - ongoing. Past medical, surgical, social and family histories reviewed and updated as indicated. Pain: controlled    ALLERGIES:  Patient has no known allergies. Current Outpatient Medications   Medication Sig Dispense Refill    silver sulfADIAZINE (SILVADENE) 1 % cream Apply topically to affected areas 2-3 times per day as needed. 50 g 3    oxyCODONE HCl (OXY-IR) 10 MG immediate release tablet Take 1 tablet by mouth every 4 hours as needed for Pain for up to 14 days.  Intended supply: 30 days 84 tablet 0    Magic Mouthwash (MIRACLE MOUTHWASH) Swish and spit 5 mLs 4 times daily as needed for Irritation or Pain Benadryl 12.5 mg/ml Maalox 10 ml/5ml  lidocaine 2%/5ml 480 mL 3    sennosides-docusate sodium (SENOKOT-S) 8.6-50 MG tablet Take 2 tablets by mouth 2 times daily as needed for Constipation 120 tablet 0    polyethylene glycol (GLYCOLAX) 17 GM/SCOOP powder Take 17 g by mouth daily 510 g 5    sucralfate (CARAFATE) 1 GM/10ML suspension Take 10 mLs by mouth 4 times daily 1200 mL 3    traZODone (DESYREL) 50 MG tablet Take 1 tablet by mouth nightly 30 tablet 0    melatonin 3 MG TABS tablet Take 3 mg by mouth nightly as needed      prochlorperazine (COMPAZINE) 10 MG tablet Take 1 tablet by mouth every 6 hours as needed (nausea/vomiting) 40 tablet 3    ibuprofen (ADVIL;MOTRIN) 600 MG tablet Take 1 tablet by mouth every 6 hours as needed for Pain 360 tablet 1    fenofibrate (TRICOR) 145 MG tablet Take 1 tablet by mouth daily (Patient taking differently: Take 145 mg by mouth daily Not taking) 90 tablet 1    lisinopril-hydroCHLOROthiazide (PRINZIDE;ZESTORETIC) 10-12.5 MG per tablet Take 1 tablet by mouth daily (Patient taking differently: Take 1 tablet by mouth daily Not taking bp lo) 90 tablet 1    metFORMIN (GLUCOPHAGE-XR) 500 MG extended release tablet Take 1 tablet by mouth 2 times daily (Patient taking differently: Take 500 mg by mouth 2 times daily Not taking sugar low) 180 tablet 1    omeprazole (PRILOSEC) 40 MG delayed release capsule Take 1 capsule by mouth daily 90 capsule 1    blood glucose monitor strips Test 3 times a day & as needed for symptoms of irregular blood glucose. Dispense sufficient amount for indicated testing frequency plus additional to accommodate PRN testing needs. 50 strip 0    psyllium (KONSYL) 28.3 % PACK Take 1 packet by mouth nightly       No current facility-administered medications for this encounter. Facility-Administered Medications Ordered in Other Encounters   Medication Dose Route Frequency Provider Last Rate Last Admin    0.9 % sodium chloride infusion  5-250 mL/hr IntraVENous PRN Muna Chou MD               OBJECTIVE:  Alert and fully ambulatory. Pleasant and conversant. -WL    Physical Examination: General appearance - alert, well appearing, and in no distress.  -skin gr 2        Wt Readings from Last 3 Encounters:   10/25/22 154 lb 3.2 oz (69.9 kg)   10/25/22 154 lb 6.4 oz (70 kg)   10/18/22 162 lb 3.2 oz (73.6 kg)         ASSESSMENT/PLAN:     Patient is tolerating treatments well with expected toxicities. RBA were reviewed prior to first fraction and PRN. Current and planned dose reviewed. Goals of treatment and potential side effects were reviewed with the patient PRN. Treatment imaging has been personally reviewed for accuracy and precision. Questions answered to apparent satisfaction. Treatments will continue as planned. Keli Alarcon.  Jamie Fernandez MD MS DABR  Radiation Oncologist        [facility] ([address] [place] [address]): [phone] /// FAX: [phone]  [facility]): [phone] /// FAX: [phone]  [address] [place] [address] [address]): [phone] /// FAX: [phone]

## 2022-10-25 NOTE — TELEPHONE ENCOUNTER
Radha Spann  10/25/2022  Wt Readings from Last 10 Encounters:   10/25/22 154 lb 3.2 oz (69.9 kg)   10/25/22 154 lb 6.4 oz (70 kg)   10/18/22 162 lb 3.2 oz (73.6 kg)   10/18/22 164 lb (74.4 kg)   10/12/22 169 lb (76.7 kg)   10/11/22 165 lb 3.2 oz (74.9 kg)   10/11/22 165 lb 6.4 oz (75 kg)   10/04/22 169 lb 3.2 oz (76.7 kg)   10/04/22 167 lb 4.8 oz (75.9 kg)   09/30/22 166 lb (75.3 kg)     Ht Readings from Last 1 Encounters:   10/25/22 5' 7\" (1.702 m)     BMI=24.15    Assessment: Visited with Heath Sainz and his wife Erin while in for OTV with Dr. Aniket Spann and Dr. Alton Sanderson. He continue to receive concurrent chemo/rad tx. Heath Sainz said he has been using Boost VHC but only 4 cartons per day with 120ml before and 120ml after each carton. He complains of heartburn. Discussed previous recommendations from palliative care of Maalox and Pepcid. He reports he took Mylanta once but vomited, but also admits he had not taken his Zofran. He also had Carafate and Omeprazole ordered but admits to not always using. Reviewed importance of taking meds as prescribed and to administer tube feeding slowly and stay upright for 1 hour after feeding. Also discussed utilizing PEG tube as needed for med administration but to confirm with pharmacy which meds can be crushed for PEG tube use. Written instructions were provided. Heath Sainz was also instructed to use 5 Boost VHC cartons and he verbalized back how much he is to use daily. Heath Sainz has reduced his oral fluid intake d/t odynophagia, encouraged him to continue to take food and fluids by mouth to maintain swallowing muscles. [facility] was to deliver [facility] today per Antoinette Tuckahoe (Ke's wife). Encouraged them to call with questions or concerns.      Weight change: 5.28% significant weight loss x 1 week, 7.78% significant weight loss x 1 month, 20.1% significant weight loss x 6 months  Appetite: Only taking water by mouth, EN dependent  Nutritional Side Effects: anorexia, heartburn, intermittent nausea/vomiting, odynophagia, mucositis  Calculated Needs: 32-35kcal/kg/WUN=4454-4482brwzi, 1.3-1.5gm/kg/CBW=90-105gm protein, 1ml/kcal=2240-2450ml fluids  Malnutrition Status: Severe  Nutrition Diagnosis: Severe malnutrition r/t oropharynx cancer AEB significant weight loss x 1 and 6 months, muscle wasting noted to clavicle and temple region, fat loss noted to buccal fat pads. Pre-Hab Eligible?: Yes    Recommendations: Boost VHC 5 cartons with 120ml before and after each carton of Boost VHC. An addition 240ml water 1x per day.     Buffy Rico RD

## 2022-10-25 NOTE — PROGRESS NOTES
Department of [facility] Oncology   Attending Clinic Note    Reason for Visit: Follow-up on a patient with p16 + Oropharyngeal Squamous Cell Carcinoma    PCP:  Lauren Ceballos DO    History of Present Illness:  28-year-old male who presented to the ENT team for persistent left-sided neck fullness without associated difficulty swallowing    CT soft tissue neck 02/23/2022: Mass located in the left aspect of floor of the mouth extending into the left oropharyngeal soft tissue concerning for squamous cell carcinoma  Multiple enlarged necrotic left anterior cervical chain lymph nodes    Panendoscopy on 3/17/2022:  Findings: L lingual tonsil hypertrophy, biopsy negative, left level II neck node FNA performed   FNA left neck mass level 2: Inconclusive for malignant cells. Few atypical squamous cells with degenerative change  A. Tongue, left base, biopsy:   Squamous epithelial-lined lymphoid tissue with reactive germinal centers, compatible with lingual tonsil. Negative for dysplasia; Negative for malignancy. B.  Tongue, left base, biopsy:   Squamous epithelial-lined lymphoid tissue with reactive germinal centers, compatible with lingual tonsil. Focal lobules of benign mucinous glands. Negative for dysplasia; Negative for malignancy    On 07/21/2022: Panendoscopy excision left level 2 lymph node  Findings:  left base of tongue mass, left cervical lymphadenopathy  A. 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 See comment. B. 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 C.  Left base of tongue, biopsy: Squamous mucosa, negative for neoplasm. D.  Right base of tongue, biopsy: Squamous mucosa, negative for neoplasm. E.  Midline tongue, biopsy: Squamous mucosa, negative for neoplasm.    Comment:   The squamous carcinoma is diffusely and strongly positive for p16 immunostain, supporting the above interpretation    PET/CT scan 08/01/2022: There is a large hypermetabolic mass centered in the left oropharynx involving the left lateral oropharynx, left tonsillar and left base of tongue regions extending superiorly to the left soft palate and inferiorly to the left floor of mouth and left vallecula. Maximum SUV of this mass measures 17.4. The mass causes narrowing of the oropharyngeal airway. There are multiple hypermetabolic enlarged, necrotic and someone confluent left level 2 and left level 3 lymph nodes. Confluent left level 2 adenopathy demonstrates a maximum SUV of 15.8. Additional hypermetabolic left level 5/3 junction lymph node is noted with maximum SUV 5.0  Mild focal hypermetabolic activity is noted in the left parapharyngeal/retropharyngeal region (maximum SUV 3.8) which could represent an underlying small lymph node. There is a hypermetabolic right level 2 lymph node with maximum SUV 6.5    Recommended concurrent chemoradiation therapy with weekly Cisplatin. Side effects reviewed. He agreed to proceed. Authorization obtained. Cycle # 1 weekly Cisplatin was on 09/06/2022  Cycle # 2 weekly Cisplatin was on 09/13/2022. Cycle # 3 weekly Cisplatin was on 09/20/2022. ER visit 09/26/2022 fatigue dysphagia dehydration; CTA chest 9/26/2022 no evidence of PE or acute pulmonary abnormality. CT abdomen/pelvis 09/26/2022 diffuse colonic diverticulosis with mild stranding of the fat in the left lower quadrant concerning for early diverticulitis. No evidence of obstructive uropathy or acute appendicitis. Given IV fluid with improvement. IVF daily for 3 days started 09/27/2022. PEG tube inserted on 09/30/2022. Cycle # 4 weekly Cisplatin was on 10/04/2022. Cycle # 5 weekly Cisplatin was on 10/11/2022. Cycle # 6 weekly Cisplatin was on 10/18/2022  Today 10/25/2022: No fever, chills. Fair energy level. Tolerating tube feeds. Intermittent nausea.      Review of Systems;  CONSTITUTIONAL: No fever, chills. Fair energy level. ENMT: Eyes: No diplopia; Nose: No epistaxis. Mouth: Sore throat. RESPIRATORY: No hemoptysis, shortness of breath, cough. CARDIOVASCULAR: No chest pain, palpitations. GASTROINTESTINAL: Tolerating tube feeds. Intermittent nausea. GENITOURINARY: No dysuria, urinary frequency, hematuria. NEURO: No syncope, presyncope, headache. Remainder: ROS NEGATIVE    Past Medical History:      Diagnosis Date    Arthritis     Cancer ([facility] [address] 2022    oropharynx    Diabetes mellitus ([facility]     GERD (gastroesophageal reflux disease)     Hyperlipidemia     Hypertension     Lyme disease     Thrombocytopenia (HCC)      Medications:  Reviewed and reconciled. Allergies:  No Known Allergies    Physical Exam:  /79   Pulse (!) 113   Temp 97.1 °F (36.2 °C)   Ht 5' 7\" (1.702 m)   Wt 154 lb 6.4 oz (70 kg)   SpO2 96%   BMI 24.18 kg/m²   GENERAL: Alert, oriented x 3, not in distress  Neck: Radiation dermatitis  LUNGS: Good air entry bilaterally. No wheezing, crackles or ronchi. CARDIOVASCULAR: Regular rate. No murmurs, rubs or gallops. EXTREMITIES: Without clubbing, cyanosis, or edema. NEUROLOGIC: No focal deficits.    ECOG PS 1    Lab Results   Component Value Date    WBC 2.7 (L) 10/21/2022    HGB 11.2 (L) 10/21/2022    HCT 34.0 (L) 10/21/2022    MCV 90.7 10/21/2022     10/21/2022     Lab Results   Component Value Date     10/21/2022    K 4.3 10/21/2022     10/21/2022    CO2 29 10/21/2022    BUN 22 10/21/2022    CREATININE 0.8 10/21/2022    GLUCOSE 186 (H) 10/21/2022    CALCIUM 9.2 10/21/2022    PROT 6.8 10/21/2022    LABALBU 3.7 10/21/2022    BILITOT 0.6 10/21/2022    ALKPHOS 73 10/21/2022    AST 10 10/21/2022    ALT 16 10/21/2022    LABGLOM >60 10/21/2022    GFRAA >60 10/17/2022     Lab Results   Component Value Date/Time    MG 2.0 10/21/2022 11:21 AM     Impression/Plan:  70-year-old male with history of p16+ left Oropharyngeal Squamous Cell Carcinoma    CT soft tissue neck 02/23/2022: Mass located in the left aspect of floor of the mouth extending into the left oropharyngeal soft tissue concerning for squamous cell carcinoma  Multiple enlarged necrotic left anterior cervical chain lymph nodes    Panendoscopy on 3/17/2022:  Findings: L lingual tonsil hypertrophy, biopsy negative, left level II neck node FNA performed   FNA left neck mass level 2: Inconclusive for malignant cells. Few atypical squamous cells with degenerative change  A. Tongue, left base, biopsy:   Squamous epithelial-lined lymphoid tissue with reactive germinal centers, compatible with lingual tonsil. Negative for dysplasia; Negative for malignancy. B.  Tongue, left base, biopsy:   Squamous epithelial-lined lymphoid tissue with reactive germinal centers, compatible with lingual tonsil. Focal lobules of benign mucinous glands. Negative for dysplasia; Negative for malignancy    On 07/21/2022: Panendoscopy excision left level 2 lymph node  Findings:  left base of tongue mass, left cervical lymphadenopathy  A. 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 See comment. B. 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 C.  Left base of tongue, biopsy: Squamous mucosa, negative for neoplasm. D.  Right base of tongue, biopsy: Squamous mucosa, negative for neoplasm. E.  Midline tongue, biopsy: Squamous mucosa, negative for neoplasm. Comment:   The squamous carcinoma is diffusely and strongly positive for p16 immunostain, supporting the above interpretation    PET/CT scan 08/01/2022: There is a large hypermetabolic mass centered in the left oropharynx involving the left lateral oropharynx, left tonsillar and left base of tongue regions extending superiorly to the left soft palate and inferiorly to the left floor of mouth and left vallecula. Maximum SUV of this mass measures 17.4.   The mass causes narrowing of the oropharyngeal airway. There are multiple hypermetabolic enlarged, necrotic and someone confluent left level 2 and left level 3 lymph nodes. Confluent left level 2 adenopathy demonstrates a maximum SUV of 15.8. Additional hypermetabolic left level 5/3 junction lymph node is noted with maximum SUV 5.0  Mild focal hypermetabolic activity is noted in the left parapharyngeal/retropharyngeal region (maximum SUV 3.8) which could represent an underlying small lymph node. There is a hypermetabolic right level 2 lymph node with maximum SUV 6.5    Recommended concurrent chemoradiation therapy with weekly Cisplatin. Side effects reviewed. He agreed to proceed. Authorization obtained. Cycle # 1 weekly Cisplatin was on 09/06/2022  Cycle # 2 weekly Cisplatin was on 09/13/2022. Cycle # 3 weekly Cisplatin was on 09/20/2022. ER visit 09/26/2022 fatigue dysphagia dehydration; CTA chest 9/26/2022 no evidence of PE or acute pulmonary abnormality. CT abdomen/pelvis 09/26/2022 diffuse colonic diverticulosis with mild stranding of the fat in the left lower quadrant concerning for early diverticulitis. No evidence of obstructive uropathy or acute appendicitis. Given IV fluid with improvement. IVF daily for 3 days started 09/27/2022. PEG tube inserted on 09/30/2022  Cycle # 4 weekly Cisplatin was on 10/04/2022. Cycle # 5 weekly Cisplatin was on 10/11/2022. Cycle # 6 weekly Cisplatin was on 10/18/2022  Cycle # 7 weekly Cisplatin is today 10/25/2022. Labs reviewed ok to proceed. Recommended compazine for nausea. Dietary input appreciated.   Aquaphor Silvadene for radiation dermatitis    RTC next week with labs     Alisa Cason MD   10/25/2022

## 2022-10-25 NOTE — PATIENT INSTRUCTIONS
Continue daily fractionated radiation therapy as scheduled. Please see weekly OTV note and intial consultation letter in [facility] for clinical details. Gwen Dee. Bay Alexander MD MS [facility]:  [phone]   FAX: [phone]  [facility]:  [phone]   FAX:    [phone]  [address] [place] [address] Road:  [phone]   FAX:  [phone]  Email: [email or web address]

## 2022-10-26 ENCOUNTER — HOSPITAL ENCOUNTER (OUTPATIENT)
Dept: RADIATION ONCOLOGY | Age: 72
Discharge: HOME | End: 2022-10-26
Payer: MEDICARE

## 2022-10-26 ENCOUNTER — OFFICE VISIT (OUTPATIENT)
Dept: PALLATIVE CARE | Age: 72
End: 2022-10-26
Payer: MEDICARE

## 2022-10-26 VITALS
HEIGHT: 67 IN | SYSTOLIC BLOOD PRESSURE: 138 MMHG | BODY MASS INDEX: 25.11 KG/M2 | WEIGHT: 160 LBS | OXYGEN SATURATION: 97 % | HEART RATE: 90 BPM | DIASTOLIC BLOOD PRESSURE: 84 MMHG

## 2022-10-26 DIAGNOSIS — Z51.5 ENCOUNTER FOR PALLIATIVE CARE: Primary | ICD-10-CM

## 2022-10-26 PROCEDURE — 1123F ACP DISCUSS/DSCN MKR DOCD: CPT | Performed by: NURSE PRACTITIONER

## 2022-10-26 PROCEDURE — 77386 HC NTSTY MODUL RAD TX DLVR CPLX: CPT | Performed by: SPECIALIST

## 2022-10-26 PROCEDURE — 77014 PR CT GUIDANCE PLACEMENT RAD THERAPY FIELDS: CPT | Performed by: SPECIALIST

## 2022-10-26 PROCEDURE — 99212 OFFICE O/P EST SF 10 MIN: CPT | Performed by: NURSE PRACTITIONER

## 2022-10-26 PROCEDURE — 3074F SYST BP LT 130 MM HG: CPT | Performed by: NURSE PRACTITIONER

## 2022-10-26 PROCEDURE — 3078F DIAST BP <80 MM HG: CPT | Performed by: NURSE PRACTITIONER

## 2022-10-26 RX ORDER — SENNA AND DOCUSATE SODIUM 50; 8.6 MG/1; MG/1
2 TABLET, FILM COATED ORAL 2 TIMES DAILY PRN
Qty: 120 TABLET | Refills: 0 | Status: SHIPPED | OUTPATIENT
Start: 2022-10-26 | End: 2022-11-25

## 2022-10-26 NOTE — PROGRESS NOTES
Department of Palliative Medicine  Ambulatory Note  Provider: MARY Rain CNP      Location of service: [place] [address]  Chief Complaint: Amber Herron is a 71 y.o. male with chief complaint of anxiety    HPI: Amber Herron is a 71 y.o. male with significant past medical history of squamous cell carcinoma of the left base of the tongue and tonsil, diabetes mellitus, Lyme disease, hypertension, arthritis who was referred to [address] [place] [address] for symptom management. Plan is for concurrent chemo and radiation. Assessment/Plan      Squamous cell carcinoma of the left base of tongue and tonsil  -Follows with Dr. Neha Terrazas for oncology    Pain due to neoplasm:  -Oxycodone 10 mg Q 4 PRN  -Lidocaine viscous  -Carafate solution QID  -Tylenol 650 mg as needed  -ibuprofen 600 mg every 6 hours as needed  -Magic mouthwash QID PRN  -Tetracaine lollipops  -PEG tube    Insomnia  -melatonin   -Has not needed Trazodone    Constipation  -stop Colace  -Continue Metamucil  -Start senna S  -Start GlycoLax    Mucositis/Thrush:  -Nystatin     Follow Up:  2 weeks. Encouraged to call with any questions, concerns, needs, or changes in symptoms. Subjective:     Met with Willie Bates at the outpatient clinic. He has 7 more radiation sessions left. He continues to complain of a burning pain in his throat. He is using his magic mouthwash throughout the day. He is only using his oxycodone 2 times daily. He states that it makes him tired, we discussed putting the pills in half and using a more frequently. He is still able to take his pills by mouth. Otherwise he is doing tube feeding, and has gained 6 pounds. He complains of constipation has not had a bowel movement for several days. Instructed to take 2 tablets of senna S2 times a day and start GlycoLax. He believes he has only been taking Colace once a day. He denies any nausea, anxiety, loss of appetite, or shortness of breath.   Pain Assessment   Rating: 10  Description: pressure, sharp, and stabbing  Duration: months  Frequency: daily  Location: left neck  Alleviating Factors: pain medication and ice  Exacerbating Factors: unable to associate with any factor  Effect: change in function, interference with activities, sleep, and mood    Past Medical History:   Diagnosis Date    Arthritis     Cancer ([facility] 2022    oropharynx    Diabetes mellitus ([facility]     GERD (gastroesophageal reflux disease)     Hyperlipidemia     Hypertension     Lyme disease     Thrombocytopenia ([facility]        Past Surgical History:   Procedure Laterality Date    BRONCHOSCOPY N/A 10/28/2019    BRONCHOSCOPY performed by Marissa Morales MD at [facility]  2017    dr Chantell Whitaker  03/2017    COLONOSCOPY  03/2019    GASTROSTOMY TUBE PLACEMENT N/A 9/30/2022    PEG TUBE PLACEMENT performed by Adriane South MD at [place] [address] Left 3/17/2022    PANENDOSCOPY WITH BIOPSY performed by Adalberto Lange DO at [address] Left 7/21/2022    PANENDOSCOPY performed by Adalberto Lange DO at [address] Left 7/21/2022    EXCISION LEFT LEVEL II LYMPH NODE, PANENDOSCOPY performed by Adalberto Lange DO at [address] EXTRACTION Left 12/28/2021    VASECTOMY         Family History   Problem Relation Age of Onset    High Blood Pressure Mother     High Blood Pressure Father     Cancer Father 80        kidney    Abdominal aortic aneurysm Father        ROS: UNLESS STATED ABOVE PATIENT DENIES:  CONSTITUTIONAL:  fever, chill, rigors, nausea, vomiting, fatigue. HEENT: blurry vision, double vision, hearing problem, tinnitus, hoarseness, dysphagia, odynophagia  RESPIRATORY: cough, shortness of breath, sputum expectoration. CARDIOVASCULAR:  Chest pain/pressure, palpitation, syncope, irregular beats  GASTROINTESTINAL:  abdominal or rectal pain, diarrhea, constipation, .   GENITOURINARY:  Burning, frequency, urgency, incontinence, discharge  INTEGUMENTARY: rash, wound, pruritis  HEMATOLOGIC/LYMPHATIC:  Swelling, sores, gum bleeding, easy bruising, pica. MUSCULOSKELETAL:  pain, edema, joint swelling or redness  NEUROLOGICAL:  light headed, dizziness, loss of consciousness, weakness, change in memory, seizures, tremors    Objective:     Physical Exam  Wt Readings from Last 3 Encounters:   10/26/22 160 lb (72.6 kg)   10/25/22 154 lb 3.2 oz (69.9 kg)   10/25/22 154 lb 6.4 oz (70 kg)     /84   Pulse 90   Ht 5' 7\" (1.702 m)   Wt 160 lb (72.6 kg)   SpO2 97%   BMI 25.06 kg/m²     Gen:  Alert, appears stated age, well nourished, in no acute distress  HEENT:  Normocephalic, no drainage,  Neck:  Supple  Lungs:  non labored breathing  Heart[de-identified]  Regular rate  Abd:  Soft,   M/S/Ext:  moving all extremities   Skin:  no visible lesions  Neuro:  PERRL, Alert, oriented x 3; following commands    [place] Symptom Assessment Score   [place] Score 10/26/2022 10/12/2022 9/28/2022 9/14/2022 8/24/2022   Pain Score Worst possible pain Worst possible pain Worst possible pain 5 6   Tiredness Score 7 6 6 4 2   Nausea Score Not nauseated 3 4 4 Not nauseated   Depression Score 4 2 5 7 3   Anxiety Score Not anxious 4 7 8 8   Drowsiness Score 5 2 Not drowsy 4 3   Appetite Score Best appetite 6 1 8 3   Wellbeing Score 8 Best feeling of wellbeing 4 5 3   Dyspnea Score No shortness of breath No shortness of breath No shortness of breath 3 No shortness of breath   Other Problem Score Worst possible response 7 1 8 Best possible response   Total Assessment Score(calculated) 44 40 38 56 28     Assessed by: patient and provider. Current Medications:  Medications reviewed: yes    Controlled Substances Monitoring: [place] reviewed 10/26/22. RX Monitoring 9/28/2022   Periodic Controlled Substance Monitoring Possible medication side effects, risk of tolerance/dependence & alternative treatments discussed. ;No signs of potential drug abuse or diversion identified.;Obtaining appropriate analgesic effect of treatment. ;Assessed functional status. MARY Bennett CNP   Palliative Care Department     Time/Communication  Greater than 50% of time spent, total 15 minutes in face-to-face counseling and coordination of care regarding goals of care and symptom management. Note: This report was completed using [employer] voiced recognition software. Every effort has been made to ensure accuracy; however, inadvertent computerized transcription errors may be present.

## 2022-10-27 ENCOUNTER — HOSPITAL ENCOUNTER (OUTPATIENT)
Dept: RADIATION ONCOLOGY | Age: 72
Discharge: HOME | End: 2022-10-27
Payer: MEDICARE

## 2022-10-27 PROCEDURE — 77386 HC NTSTY MODUL RAD TX DLVR CPLX: CPT | Performed by: SPECIALIST

## 2022-10-27 PROCEDURE — 77014 PR CT GUIDANCE PLACEMENT RAD THERAPY FIELDS: CPT | Performed by: SPECIALIST

## 2022-10-28 ENCOUNTER — HOSPITAL ENCOUNTER (OUTPATIENT)
Dept: INFUSION THERAPY | Age: 72
Discharge: HOME | End: 2022-10-28
Payer: MEDICARE

## 2022-10-28 ENCOUNTER — HOSPITAL ENCOUNTER (OUTPATIENT)
Dept: RADIATION ONCOLOGY | Age: 72
Discharge: HOME | End: 2022-10-28
Payer: MEDICARE

## 2022-10-28 VITALS
DIASTOLIC BLOOD PRESSURE: 62 MMHG | HEART RATE: 89 BPM | SYSTOLIC BLOOD PRESSURE: 109 MMHG | TEMPERATURE: 98.1 F | OXYGEN SATURATION: 100 % | RESPIRATION RATE: 16 BRPM

## 2022-10-28 DIAGNOSIS — R11.0 NAUSEA: Primary | ICD-10-CM

## 2022-10-28 DIAGNOSIS — C10.9 SQUAMOUS CELL CARCINOMA OF OROPHARYNX (HCC): ICD-10-CM

## 2022-10-28 PROCEDURE — 96360 HYDRATION IV INFUSION INIT: CPT

## 2022-10-28 PROCEDURE — 77386 HC NTSTY MODUL RAD TX DLVR CPLX: CPT | Performed by: SPECIALIST

## 2022-10-28 PROCEDURE — 77014 PR CT GUIDANCE PLACEMENT RAD THERAPY FIELDS: CPT | Performed by: SPECIALIST

## 2022-10-28 PROCEDURE — 2580000003 HC RX 258

## 2022-10-28 RX ORDER — 0.9 % SODIUM CHLORIDE 0.9 %
1000 INTRAVENOUS SOLUTION INTRAVENOUS ONCE
Status: COMPLETED | OUTPATIENT
Start: 2022-10-28 | End: 2022-10-28

## 2022-10-28 RX ORDER — SODIUM CHLORIDE 9 MG/ML
INJECTION, SOLUTION INTRAVENOUS
Status: COMPLETED
Start: 2022-10-28 | End: 2022-10-28

## 2022-10-28 RX ORDER — 0.9 % SODIUM CHLORIDE 0.9 %
1000 INTRAVENOUS SOLUTION INTRAVENOUS ONCE
OUTPATIENT
Start: 2022-10-29 | End: 2022-10-29

## 2022-10-28 RX ORDER — HEPARIN SODIUM (PORCINE) LOCK FLUSH IV SOLN 100 UNIT/ML 100 UNIT/ML
500 SOLUTION INTRAVENOUS PRN
OUTPATIENT
Start: 2022-10-29

## 2022-10-28 RX ORDER — SODIUM CHLORIDE 9 MG/ML
5-250 INJECTION, SOLUTION INTRAVENOUS PRN
OUTPATIENT
Start: 2022-10-29

## 2022-10-28 RX ORDER — SODIUM CHLORIDE 0.9 % (FLUSH) 0.9 %
5-40 SYRINGE (ML) INJECTION PRN
OUTPATIENT
Start: 2022-10-29

## 2022-10-28 RX ADMIN — SODIUM CHLORIDE 1000 ML: 9 INJECTION, SOLUTION INTRAVENOUS at 11:44

## 2022-10-28 RX ADMIN — Medication 1000 ML: at 11:44

## 2022-10-31 ENCOUNTER — HOSPITAL ENCOUNTER (OUTPATIENT)
Dept: RADIATION ONCOLOGY | Age: 72
Discharge: HOME | End: 2022-10-31
Payer: MEDICARE

## 2022-10-31 PROCEDURE — 77014 PR CT GUIDANCE PLACEMENT RAD THERAPY FIELDS: CPT | Performed by: SPECIALIST

## 2022-10-31 PROCEDURE — 77386 HC NTSTY MODUL RAD TX DLVR CPLX: CPT | Performed by: SPECIALIST

## 2022-11-01 ENCOUNTER — TELEPHONE (OUTPATIENT)
Dept: INFUSION THERAPY | Age: 72
End: 2022-11-01

## 2022-11-01 ENCOUNTER — OFFICE VISIT (OUTPATIENT)
Dept: ONCOLOGY | Age: 72
End: 2022-11-01
Payer: MEDICARE

## 2022-11-01 ENCOUNTER — HOSPITAL ENCOUNTER (OUTPATIENT)
Dept: RADIATION ONCOLOGY | Age: 72
Discharge: HOME | End: 2022-11-01
Payer: MEDICARE

## 2022-11-01 ENCOUNTER — HOSPITAL ENCOUNTER (OUTPATIENT)
Dept: INFUSION THERAPY | Age: 72
Discharge: HOME | End: 2022-11-01
Payer: MEDICARE

## 2022-11-01 VITALS
WEIGHT: 153.4 LBS | HEART RATE: 96 BPM | BODY MASS INDEX: 24.03 KG/M2 | RESPIRATION RATE: 18 BRPM | TEMPERATURE: 97.7 F | OXYGEN SATURATION: 96 % | SYSTOLIC BLOOD PRESSURE: 117 MMHG | DIASTOLIC BLOOD PRESSURE: 78 MMHG

## 2022-11-01 VITALS
SYSTOLIC BLOOD PRESSURE: 119 MMHG | OXYGEN SATURATION: 98 % | HEART RATE: 100 BPM | TEMPERATURE: 97.2 F | DIASTOLIC BLOOD PRESSURE: 80 MMHG | WEIGHT: 153.4 LBS | BODY MASS INDEX: 24.08 KG/M2 | HEIGHT: 67 IN

## 2022-11-01 DIAGNOSIS — C10.9 OROPHARYNGEAL CANCER (HCC): ICD-10-CM

## 2022-11-01 DIAGNOSIS — C10.9 SQUAMOUS CELL CARCINOMA OF OROPHARYNX (HCC): Primary | ICD-10-CM

## 2022-11-01 DIAGNOSIS — C76.0 HEAD AND NECK CANCER (HCC): Primary | ICD-10-CM

## 2022-11-01 DIAGNOSIS — C10.2 MALIGNANT NEOPLASM OF LATERAL WALL OF OROPHARYNX (HCC): ICD-10-CM

## 2022-11-01 LAB
ALBUMIN SERPL-MCNC: 3.4 G/DL (ref 3.5–5.2)
ALP BLD-CCNC: 71 U/L (ref 40–129)
ALT SERPL-CCNC: 31 U/L (ref 0–40)
ANION GAP SERPL CALCULATED.3IONS-SCNC: 10 MMOL/L (ref 7–16)
ANISOCYTOSIS: ABNORMAL
AST SERPL-CCNC: 16 U/L (ref 0–39)
BASOPHILS ABSOLUTE: 0 E9/L (ref 0–0.2)
BASOPHILS RELATIVE PERCENT: 0 % (ref 0–2)
BILIRUB SERPL-MCNC: 0.6 MG/DL (ref 0–1.2)
BUN BLDV-MCNC: 28 MG/DL (ref 6–23)
CALCIUM SERPL-MCNC: 9.5 MG/DL (ref 8.6–10.2)
CHLORIDE BLD-SCNC: 100 MMOL/L (ref 98–107)
CO2: 29 MMOL/L (ref 22–29)
CREAT SERPL-MCNC: 0.7 MG/DL (ref 0.7–1.2)
EOSINOPHILS ABSOLUTE: 0.02 E9/L (ref 0.05–0.5)
EOSINOPHILS RELATIVE PERCENT: 1 % (ref 0–6)
GFR SERPL CREATININE-BSD FRML MDRD: >60 ML/MIN/1.73
GLUCOSE BLD-MCNC: 120 MG/DL (ref 74–99)
HCT VFR BLD CALC: 29.3 % (ref 37–54)
HEMOGLOBIN: 9.9 G/DL (ref 12.5–16.5)
LYMPHOCYTES ABSOLUTE: 0.24 E9/L (ref 1.5–4)
LYMPHOCYTES RELATIVE PERCENT: 16 % (ref 20–42)
MAGNESIUM: 2 MG/DL (ref 1.6–2.6)
MCH RBC QN AUTO: 30.5 PG (ref 26–35)
MCHC RBC AUTO-ENTMCNC: 33.8 % (ref 32–34.5)
MCV RBC AUTO: 90.2 FL (ref 80–99.9)
MONOCYTES ABSOLUTE: 0.15 E9/L (ref 0.1–0.95)
MONOCYTES RELATIVE PERCENT: 10 % (ref 2–12)
NEUTROPHILS ABSOLUTE: 1.1 E9/L (ref 1.8–7.3)
NEUTROPHILS RELATIVE PERCENT: 73 % (ref 43–80)
OVALOCYTES: ABNORMAL
PDW BLD-RTO: 16.3 FL (ref 11.5–15)
PLATELET # BLD: 84 E9/L (ref 130–450)
PLATELET CONFIRMATION: NORMAL
PMV BLD AUTO: 10 FL (ref 7–12)
POIKILOCYTES: ABNORMAL
POLYCHROMASIA: ABNORMAL
POTASSIUM SERPL-SCNC: 3.4 MMOL/L (ref 3.5–5)
RBC # BLD: 3.25 E12/L (ref 3.8–5.8)
SODIUM BLD-SCNC: 139 MMOL/L (ref 132–146)
TOTAL PROTEIN: 6.5 G/DL (ref 6.4–8.3)
WBC # BLD: 1.5 E9/L (ref 4.5–11.5)

## 2022-11-01 PROCEDURE — 77014 PR CT GUIDANCE PLACEMENT RAD THERAPY FIELDS: CPT | Performed by: SPECIALIST

## 2022-11-01 PROCEDURE — 3078F DIAST BP <80 MM HG: CPT | Performed by: INTERNAL MEDICINE

## 2022-11-01 PROCEDURE — 99214 OFFICE O/P EST MOD 30 MIN: CPT | Performed by: INTERNAL MEDICINE

## 2022-11-01 PROCEDURE — 83735 ASSAY OF MAGNESIUM: CPT

## 2022-11-01 PROCEDURE — 80053 COMPREHEN METABOLIC PANEL: CPT

## 2022-11-01 PROCEDURE — 85025 COMPLETE CBC W/AUTO DIFF WBC: CPT

## 2022-11-01 PROCEDURE — 3074F SYST BP LT 130 MM HG: CPT | Performed by: INTERNAL MEDICINE

## 2022-11-01 PROCEDURE — 77386 HC NTSTY MODUL RAD TX DLVR CPLX: CPT | Performed by: SPECIALIST

## 2022-11-01 PROCEDURE — 99213 OFFICE O/P EST LOW 20 MIN: CPT

## 2022-11-01 PROCEDURE — 77336 RADIATION PHYSICS CONSULT: CPT | Performed by: SPECIALIST

## 2022-11-01 PROCEDURE — 77427 RADIATION TX MANAGEMENT X5: CPT | Performed by: RADIOLOGY

## 2022-11-01 PROCEDURE — 36415 COLL VENOUS BLD VENIPUNCTURE: CPT

## 2022-11-01 PROCEDURE — 1123F ACP DISCUSS/DSCN MKR DOCD: CPT | Performed by: INTERNAL MEDICINE

## 2022-11-01 RX ORDER — FAMOTIDINE 20 MG/1
20 TABLET, FILM COATED ORAL 2 TIMES DAILY
Qty: 14 TABLET | Refills: 0 | Status: SHIPPED | OUTPATIENT
Start: 2022-11-01 | End: 2022-11-08

## 2022-11-01 ASSESSMENT — PAIN DESCRIPTION - DESCRIPTORS: DESCRIPTORS: BURNING

## 2022-11-01 ASSESSMENT — PAIN DESCRIPTION - FREQUENCY: FREQUENCY: CONTINUOUS

## 2022-11-01 ASSESSMENT — PAIN DESCRIPTION - PAIN TYPE: TYPE: CHRONIC PAIN

## 2022-11-01 ASSESSMENT — PAIN SCALES - GENERAL: PAINLEVEL_OUTOF10: 10

## 2022-11-01 ASSESSMENT — PAIN DESCRIPTION - LOCATION: LOCATION: THROAT;NECK

## 2022-11-01 NOTE — PROGRESS NOTES
Kushal Hopkins  11/1/2022  Wt Readings from Last 3 Encounters:   11/01/22 153 lb 6.4 oz (69.6 kg)   10/26/22 160 lb (72.6 kg)   10/25/22 154 lb 3.2 oz (69.9 kg)     Body mass index is 24.03 kg/m². Treatment Area:H & N    Patient was seen today for weekly visit. Comfort Alteration  KPS:100%  Fatigue: Moderate    Mucous Membrane Alteration  Mucositis Due to Radiation: Yes  Thrush: Yes  Voice Changes: Yes    Nutritional Alteration  Anorexia: Yes   Nausea: No   Vomiting: Yes    Skin Alteration   Sensation:reddened, open wounds bilateral neck, sing silvadene and vaseline gauze dressings    Radiation Dermatitis:  yes    Ventilation Alteration  Cough:Yes    Emotional  Coping: effective    Injury, potential bleeding or infection: yes    Radioprotectant Tolerance  na            Lab Results   Component Value Date    WBC 2.7 (L) 10/21/2022     10/21/2022         /78   Pulse 96   Temp 97.7 °F (36.5 °C) (Temporal)   Resp 18   Wt 153 lb 6.4 oz (69.6 kg)   SpO2 96%   BMI 24.03 kg/m²   BP within normal range? yes       Assessment/Plan:35/37fx;7000/7400cGy completed and tolerating RT. Patient using silvadene and vaseline gauze dressings to bilateral neck.      Mario Fajardo RN

## 2022-11-01 NOTE — TELEPHONE ENCOUNTER
Xenia Gaspar  11/1/2022  Wt Readings from Last 10 Encounters:   11/01/22 153 lb 6.4 oz (69.6 kg)   11/01/22 153 lb 6.4 oz (69.6 kg)   10/26/22 160 lb (72.6 kg)   10/25/22 154 lb 3.2 oz (69.9 kg)   10/25/22 154 lb 6.4 oz (70 kg)   10/18/22 162 lb 3.2 oz (73.6 kg)   10/18/22 164 lb (74.4 kg)   10/12/22 169 lb (76.7 kg)   10/11/22 165 lb 3.2 oz (74.9 kg)   10/11/22 165 lb 6.4 oz (75 kg)     Ht Readings from Last 1 Encounters:   11/01/22 5' 7\" (1.702 m)     BMI=24.03    Assessment: Wt 153.4#, 9.34% significant weight loss x 1 month, 17.97% significant weight loss x 3 months, 20.52% significant weight loss x 6 months. Visited with Alisha Walker while in for office visit with Dr. Simona Cage. Alisha Walker reports he is only using 2-3 cartons of Boost VHC per day. He complains of heartburn, reflux, odynophagia and mucositis. He has occasional emesis. He isn't using the omeprazole anymore and said he uses the Carafate maybe twice per day. Orally he drinks 40-60oz of water per day but isn't taking any food by mouth. Discussed with the current nutrition intake via mouth and PEG tube he will continue to lose weight. Explained importance of weight and muscle maintenance. Dr. Simona Cage was notified of his complaints and recommended Carafate be used 4x/day, prescribed Pepcid and mycostatin. Also phoned Alisha Walker and left message and encouraged him to let Boost VHC run by gravity from syringe and not to use the piston to push formula or water into PEG tube. Encouraged him to call with questions.      Jeffrey Acevedo RD

## 2022-11-01 NOTE — PROGRESS NOTES
Department of [facility] Oncology   Attending Clinic Note    Reason for Visit: Follow-up on a patient with p16 + Oropharyngeal Squamous Cell Carcinoma    PCP:  Reilly Hightower DO    History of Present Illness:  71-year-old male who presented to the ENT team for persistent left-sided neck fullness without associated difficulty swallowing    CT soft tissue neck 02/23/2022: Mass located in the left aspect of floor of the mouth extending into the left oropharyngeal soft tissue concerning for squamous cell carcinoma  Multiple enlarged necrotic left anterior cervical chain lymph nodes    Panendoscopy on 3/17/2022:  Findings: L lingual tonsil hypertrophy, biopsy negative, left level II neck node FNA performed   FNA left neck mass level 2: Inconclusive for malignant cells. Few atypical squamous cells with degenerative change  A. Tongue, left base, biopsy:   Squamous epithelial-lined lymphoid tissue with reactive germinal centers, compatible with lingual tonsil. Negative for dysplasia; Negative for malignancy. B.  Tongue, left base, biopsy:   Squamous epithelial-lined lymphoid tissue with reactive germinal centers, compatible with lingual tonsil. Focal lobules of benign mucinous glands. Negative for dysplasia; Negative for malignancy    On 07/21/2022: Panendoscopy excision left level 2 lymph node  Findings:  left base of tongue mass, left cervical lymphadenopathy  A. 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 See comment. B. 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 C.  Left base of tongue, biopsy: Squamous mucosa, negative for neoplasm. D.  Right base of tongue, biopsy: Squamous mucosa, negative for neoplasm. E.  Midline tongue, biopsy: Squamous mucosa, negative for neoplasm.    Comment:   The squamous carcinoma is diffusely and strongly positive for p16 immunostain, supporting the above interpretation    PET/CT scan 08/01/2022: There is a large hypermetabolic mass centered in the left oropharynx involving the left lateral oropharynx, left tonsillar and left base of tongue regions extending superiorly to the left soft palate and inferiorly to the left floor of mouth and left vallecula. Maximum SUV of this mass measures 17.4. The mass causes narrowing of the oropharyngeal airway. There are multiple hypermetabolic enlarged, necrotic and someone confluent left level 2 and left level 3 lymph nodes. Confluent left level 2 adenopathy demonstrates a maximum SUV of 15.8. Additional hypermetabolic left level 5/3 junction lymph node is noted with maximum SUV 5.0  Mild focal hypermetabolic activity is noted in the left parapharyngeal/retropharyngeal region (maximum SUV 3.8) which could represent an underlying small lymph node. There is a hypermetabolic right level 2 lymph node with maximum SUV 6.5    Recommended concurrent chemoradiation therapy with weekly Cisplatin. Side effects reviewed. He agreed to proceed. Authorization obtained. Cycle # 1 weekly Cisplatin was on 09/06/2022  Cycle # 2 weekly Cisplatin was on 09/13/2022. Cycle # 3 weekly Cisplatin was on 09/20/2022. ER visit 09/26/2022 fatigue dysphagia dehydration; CTA chest 9/26/2022 no evidence of PE or acute pulmonary abnormality. CT abdomen/pelvis 09/26/2022 diffuse colonic diverticulosis with mild stranding of the fat in the left lower quadrant concerning for early diverticulitis. No evidence of obstructive uropathy or acute appendicitis. Given IV fluid with improvement. IVF daily for 3 days started 09/27/2022. PEG tube inserted on 09/30/2022. Cycle # 4 weekly Cisplatin was on 10/04/2022. Cycle # 5 weekly Cisplatin was on 10/11/2022. Cycle # 6 weekly Cisplatin was on 10/18/2022  Cycle # 7 weekly Cisplatin was on 10/25/2022. Today 11/01/2022; No fever, chills. Fair energy level. Stomach indigestion with tube feeds. RT will be completed on 11/03/2022. Sore throat. Review of Systems;  CONSTITUTIONAL: No fever, chills. Fair energy level. ENMT: Eyes: No diplopia; Nose: No epistaxis. Mouth: Sore throat. RESPIRATORY: No hemoptysis, shortness of breath, cough. CARDIOVASCULAR: No chest pain, palpitations. GASTROINTESTINAL: Stomach indigestion with tube feeds. GENITOURINARY: No dysuria, urinary frequency, hematuria. NEURO: No syncope, presyncope, headache. Remainder: ROS NEGATIVE    Past Medical History:      Diagnosis Date    Arthritis     Cancer ([address] 2022    oropharynx    Diabetes mellitus ([address]     GERD (gastroesophageal reflux disease)     Hyperlipidemia     Hypertension     Lyme disease     Thrombocytopenia (HCC)      Medications:  Reviewed and reconciled. Allergies:  No Known Allergies    Physical Exam:  /80   Pulse 100   Temp 97.2 °F (36.2 °C)   Ht 5' 7\" (1.702 m)   Wt 153 lb 6.4 oz (69.6 kg)   SpO2 98%   BMI 24.03 kg/m²   GENERAL: Alert, oriented x 3, not in distress  HEENT: Oral thrush. Mucositis  Neck: Radiation dermatitis  LUNGS: Good air entry bilaterally. No wheezing, crackles or ronchi. CARDIOVASCULAR: Regular rate. No murmurs, rubs or gallops. EXTREMITIES: Without clubbing, cyanosis, or edema. NEUROLOGIC: No focal deficits. ECOG PS 1    Impression/Plan:  69-year-old male with history of p16+ left Oropharyngeal Squamous Cell Carcinoma    CT soft tissue neck 02/23/2022: Mass located in the left aspect of floor of the mouth extending into the left oropharyngeal soft tissue concerning for squamous cell carcinoma  Multiple enlarged necrotic left anterior cervical chain lymph nodes    Panendoscopy on 3/17/2022:  Findings: L lingual tonsil hypertrophy, biopsy negative, left level II neck node FNA performed   FNA left neck mass level 2: Inconclusive for malignant cells. Few atypical squamous cells with degenerative change  A.   Tongue, left base, biopsy:   Squamous epithelial-lined lymphoid tissue with reactive germinal centers, compatible with lingual tonsil. Negative for dysplasia; Negative for malignancy. B.  Tongue, left base, biopsy:   Squamous epithelial-lined lymphoid tissue with reactive germinal centers, compatible with lingual tonsil. Focal lobules of benign mucinous glands. Negative for dysplasia; Negative for malignancy    On 07/21/2022: Panendoscopy excision left level 2 lymph node  Findings:  left base of tongue mass, left cervical lymphadenopathy  A. 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 See comment. B. 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 C.  Left base of tongue, biopsy: Squamous mucosa, negative for neoplasm. D.  Right base of tongue, biopsy: Squamous mucosa, negative for neoplasm. E.  Midline tongue, biopsy: Squamous mucosa, negative for neoplasm. Comment:   The squamous carcinoma is diffusely and strongly positive for p16 immunostain, supporting the above interpretation    PET/CT scan 08/01/2022: There is a large hypermetabolic mass centered in the left oropharynx involving the left lateral oropharynx, left tonsillar and left base of tongue regions extending superiorly to the left soft palate and inferiorly to the left floor of mouth and left vallecula. Maximum SUV of this mass measures 17.4. The mass causes narrowing of the oropharyngeal airway. There are multiple hypermetabolic enlarged, necrotic and someone confluent left level 2 and left level 3 lymph nodes. Confluent left level 2 adenopathy demonstrates a maximum SUV of 15.8. Additional hypermetabolic left level 5/3 junction lymph node is noted with maximum SUV 5.0  Mild focal hypermetabolic activity is noted in the left parapharyngeal/retropharyngeal region (maximum SUV 3.8) which could represent an underlying small lymph node.     There is a hypermetabolic right level 2 lymph node with maximum SUV 6.5    Recommended concurrent chemoradiation therapy with weekly Cisplatin. Side effects reviewed. He agreed to proceed. Authorization obtained. Cycle # 1 weekly Cisplatin was on 09/06/2022  Cycle # 2 weekly Cisplatin was on 09/13/2022. Cycle # 3 weekly Cisplatin was on 09/20/2022. ER visit 09/26/2022 fatigue dysphagia dehydration; CTA chest 9/26/2022 no evidence of PE or acute pulmonary abnormality. CT abdomen/pelvis 09/26/2022 diffuse colonic diverticulosis with mild stranding of the fat in the left lower quadrant concerning for early diverticulitis. No evidence of obstructive uropathy or acute appendicitis. Given IV fluid with improvement. IVF daily for 3 days started 09/27/2022. PEG tube inserted on 09/30/2022  Cycle # 4 weekly Cisplatin was on 10/04/2022. Cycle # 5 weekly Cisplatin was on 10/11/2022. Cycle # 6 weekly Cisplatin was on 10/18/2022  Cycle # 7 weekly Cisplatin was on 10/25/2022. Labs reviewed. RT will be completed on 11/03/2022. Recommended Carafate 4x/day for stomach indigestion; Prescribed Pepcid  Dietary input appreciated. Sore throat; oral thrush: prescribed Nystatin; recommended magic mouthwash, salt water and baking soda    RTC Feb 2023 with prior PET/CT.  Recommended to continue f/u with ENT    Enrrique Chavez MD   11/1/2022

## 2022-11-02 ENCOUNTER — HOSPITAL ENCOUNTER (OUTPATIENT)
Dept: RADIATION ONCOLOGY | Age: 72
Discharge: HOME | End: 2022-11-02
Payer: MEDICARE

## 2022-11-02 PROCEDURE — 77014 PR CT GUIDANCE PLACEMENT RAD THERAPY FIELDS: CPT | Performed by: SPECIALIST

## 2022-11-02 PROCEDURE — 77386 HC NTSTY MODUL RAD TX DLVR CPLX: CPT | Performed by: SPECIALIST

## 2022-11-02 NOTE — PROGRESS NOTES
DEPARTMENT OF RADIATION ONCOLOGY ON TREATMENT VISIT         11/2/2022      NAME:  Yuly Paige OF BIRTH:  1950    Diagnosis: HN CA    SUBJECTIVE:   Jake Arana has now received fractionated external beam radiation therapy - ongoing. Past medical, surgical, social and family histories reviewed and updated as indicated. Pain: controlled    ALLERGIES:  Patient has no known allergies. Current Outpatient Medications   Medication Sig Dispense Refill    nystatin (MYCOSTATIN) 538446 UNIT/ML suspension Take 5 mLs by mouth 4 times daily 1 each 0    famotidine (PEPCID) 20 MG tablet Take 1 tablet by mouth 2 times daily for 7 days 14 tablet 0    sennosides-docusate sodium (SENOKOT-S) 8.6-50 MG tablet Take 2 tablets by mouth 2 times daily as needed for Constipation 120 tablet 0    silver sulfADIAZINE (SILVADENE) 1 % cream Apply topically to affected areas 2-3 times per day as needed.  50 g 3    Magic Mouthwash (MIRACLE MOUTHWASH) Swish and spit 5 mLs 4 times daily as needed for Irritation or Pain Benadryl 12.5 mg/ml Maalox 10 ml/5ml  lidocaine 2%/5ml 480 mL 3    polyethylene glycol (GLYCOLAX) 17 GM/SCOOP powder Take 17 g by mouth daily 510 g 5    sucralfate (CARAFATE) 1 GM/10ML suspension Take 10 mLs by mouth 4 times daily 1200 mL 3    traZODone (DESYREL) 50 MG tablet Take 1 tablet by mouth nightly 30 tablet 0    melatonin 3 MG TABS tablet Take 3 mg by mouth nightly as needed      prochlorperazine (COMPAZINE) 10 MG tablet Take 1 tablet by mouth every 6 hours as needed (nausea/vomiting) 40 tablet 3    ibuprofen (ADVIL;MOTRIN) 600 MG tablet Take 1 tablet by mouth every 6 hours as needed for Pain 360 tablet 1    fenofibrate (TRICOR) 145 MG tablet Take 1 tablet by mouth daily 90 tablet 1    lisinopril-hydroCHLOROthiazide (PRINZIDE;ZESTORETIC) 10-12.5 MG per tablet Take 1 tablet by mouth daily 90 tablet 1    metFORMIN (GLUCOPHAGE-XR) 500 MG extended release tablet Take 1 tablet by mouth 2 times daily 180 tablet 1    omeprazole (PRILOSEC) 40 MG delayed release capsule Take 1 capsule by mouth daily 90 capsule 1    blood glucose monitor strips Test 3 times a day & as needed for symptoms of irregular blood glucose. Dispense sufficient amount for indicated testing frequency plus additional to accommodate PRN testing needs. 50 strip 0    psyllium (KONSYL) 28.3 % PACK Take 1 packet by mouth nightly       No current facility-administered medications for this encounter. OBJECTIVE:  Alert and fully ambulatory. Pleasant and conversant. Physical Examination: General appearance - alert, well appearing, and in no distress.  -skin Gr 2  -mucositis gr 2-3  -PEG intact          Wt Readings from Last 3 Encounters:   11/01/22 153 lb 6.4 oz (69.6 kg)   11/01/22 153 lb 6.4 oz (69.6 kg)   10/26/22 160 lb (72.6 kg)         ASSESSMENT/PLAN:     Patient is tolerating treatments well with expected toxicities. RBA were reviewed prior to first fraction and PRN. Current and planned dose reviewed. Goals of treatment and potential side effects were reviewed with the patient PRN. Treatment imaging has been personally reviewed for accuracy and precision. Questions answered to apparent satisfaction. Treatments will continue as planned.      -Jayesh Granados [address]  -[facility] ENT    Olga Ahuja.  Ashley Hernandez MD MS ODALIS  Radiation Oncologist        [facility]): [phone] /// FAX: [phone]  [facility]): [phone] /// FAX: [phone]  [address] [place] [address]): [phone] /// FAX: [phone]

## 2022-11-03 ENCOUNTER — HOSPITAL ENCOUNTER (OUTPATIENT)
Dept: RADIATION ONCOLOGY | Age: 72
Discharge: HOME | End: 2022-11-03
Payer: MEDICARE

## 2022-11-03 PROCEDURE — 77014 PR CT GUIDANCE PLACEMENT RAD THERAPY FIELDS: CPT | Performed by: SPECIALIST

## 2022-11-03 NOTE — PROGRESS NOTES
Mary Vallechantale  11/3/2022  8:07 AM          Current Outpatient Medications   Medication Sig Dispense Refill    nystatin (MYCOSTATIN) 571902 UNIT/ML suspension Take 5 mLs by mouth 4 times daily 1 each 0    famotidine (PEPCID) 20 MG tablet Take 1 tablet by mouth 2 times daily for 7 days 14 tablet 0    sennosides-docusate sodium (SENOKOT-S) 8.6-50 MG tablet Take 2 tablets by mouth 2 times daily as needed for Constipation 120 tablet 0    silver sulfADIAZINE (SILVADENE) 1 % cream Apply topically to affected areas 2-3 times per day as needed. 50 g 3    Magic Mouthwash (MIRACLE MOUTHWASH) Swish and spit 5 mLs 4 times daily as needed for Irritation or Pain Benadryl 12.5 mg/ml Maalox 10 ml/5ml  lidocaine 2%/5ml 480 mL 3    polyethylene glycol (GLYCOLAX) 17 GM/SCOOP powder Take 17 g by mouth daily 510 g 5    sucralfate (CARAFATE) 1 GM/10ML suspension Take 10 mLs by mouth 4 times daily 1200 mL 3    traZODone (DESYREL) 50 MG tablet Take 1 tablet by mouth nightly 30 tablet 0    melatonin 3 MG TABS tablet Take 3 mg by mouth nightly as needed      prochlorperazine (COMPAZINE) 10 MG tablet Take 1 tablet by mouth every 6 hours as needed (nausea/vomiting) 40 tablet 3    ibuprofen (ADVIL;MOTRIN) 600 MG tablet Take 1 tablet by mouth every 6 hours as needed for Pain 360 tablet 1    fenofibrate (TRICOR) 145 MG tablet Take 1 tablet by mouth daily 90 tablet 1    lisinopril-hydroCHLOROthiazide (PRINZIDE;ZESTORETIC) 10-12.5 MG per tablet Take 1 tablet by mouth daily 90 tablet 1    metFORMIN (GLUCOPHAGE-XR) 500 MG extended release tablet Take 1 tablet by mouth 2 times daily 180 tablet 1    omeprazole (PRILOSEC) 40 MG delayed release capsule Take 1 capsule by mouth daily 90 capsule 1    blood glucose monitor strips Test 3 times a day & as needed for symptoms of irregular blood glucose. Dispense sufficient amount for indicated testing frequency plus additional to accommodate PRN testing needs.  50 strip 0    psyllium (KONSYL) 28.3 % PACK Take 1 packet by mouth nightly       No current facility-administered medications for this encounter. This is an up-to-date medication list.    Please take this list to your next care provider, and discard any previous medication lists.

## 2022-11-06 NOTE — PATIENT INSTRUCTIONS
Continue daily fractionated radiation therapy as scheduled. Please see weekly OTV note and intial consultation letter in [facility] for clinical details. Lauro Mojica. Imelda Lora MD MS [facility]:  [phone]   FAX: [phone]  [facility]:  [phone]   FAX:    [phone]  [address] [place] [address] Road:  [phone]   FAX:  [phone]  Email: [email or web address]

## 2022-11-07 ENCOUNTER — CLINICAL DOCUMENTATION (OUTPATIENT)
Dept: RADIATION ONCOLOGY | Age: 72
End: 2022-11-07

## 2022-11-07 NOTE — PROGRESS NOTES
Radiation Treatment Summary    Patient Name:  Connor Escobar,  1950,  71 y.o., male       Referring Physician: No referring provider defined for this encounter. PCP: Jonathan Veloz DO       Diagnosis: [id number] p16-positive squamous cell carcinoma, Left Base of Tongue/Tonsil       Recent History: pain and sore throat    Site Start TX Last [place] ED Fractions Dose (cGy) Fx Dose (cGy) Technique   Left Base of Tongue, bilateral reymundo-neck 9/6/2022 10/17/2022 41 25 5000 200 VMAT   Left Base of Tongue, bilateral reymundo-neck [place] 10/18/2022 10/25/2022 7 5 1000 200 VMAT   Left Base of Tongue, + lymph node 10/26/2022 11/3/2022 8 7 1400 200 VMAT   TOTAL 9/6/2022 11/3/2022 58 37 7400         Response/Tolerance: Ignacio tolerated treatment well with the expected treatment-related side effects including grade 1 dysphagia, dysgeusia, mucositis. He experienced modest weight loss and skin erythema. Follow-up:  30-day in the office with Radiation Oncology      Thank you for the opportunity to participate in multidisciplinary management of this remarkable and pleasant patient. Chris Casillas MD, [facility], Diana Triplett, [address] [place] [address]    Department of Radiation Oncology  [facility] [address] [place] [address]: [phone] (YKN: [phone])  [facility]: [phone] (XJQ: [phone])  [facility]:  [phone] (RB:  [phone])    NOTE: This report was transcribed using voice recognition software. Every effort was made to ensure accuracy; however, inadvertent computerized transcription errors may be present.

## 2022-12-05 ENCOUNTER — HOSPITAL ENCOUNTER (OUTPATIENT)
Dept: RADIATION ONCOLOGY | Age: 72
Discharge: HOME | End: 2022-12-05

## 2022-12-05 ENCOUNTER — TELEPHONE (OUTPATIENT)
Dept: RADIATION ONCOLOGY | Age: 72
End: 2022-12-05

## 2022-12-05 VITALS
SYSTOLIC BLOOD PRESSURE: 115 MMHG | DIASTOLIC BLOOD PRESSURE: 65 MMHG | RESPIRATION RATE: 18 BRPM | TEMPERATURE: 97.1 F | WEIGHT: 158.8 LBS | HEART RATE: 90 BPM | BODY MASS INDEX: 24.87 KG/M2 | OXYGEN SATURATION: 98 %

## 2022-12-05 PROCEDURE — 99999 PR OFFICE/OUTPT VISIT,PROCEDURE ONLY: CPT | Performed by: SPECIALIST

## 2022-12-05 RX ORDER — OXYCODONE AND ACETAMINOPHEN 10; 325 MG/1; MG/1
1 TABLET ORAL EVERY 4 HOURS PRN
COMMUNITY

## 2022-12-05 ASSESSMENT — PAIN DESCRIPTION - DESCRIPTORS: DESCRIPTORS: SORE

## 2022-12-05 ASSESSMENT — PAIN SCALES - GENERAL: PAINLEVEL_OUTOF10: 6

## 2022-12-05 ASSESSMENT — PAIN DESCRIPTION - PAIN TYPE: TYPE: CHRONIC PAIN

## 2022-12-05 ASSESSMENT — PAIN DESCRIPTION - LOCATION: LOCATION: THROAT

## 2022-12-05 ASSESSMENT — PAIN DESCRIPTION - FREQUENCY: FREQUENCY: CONTINUOUS

## 2022-12-05 NOTE — TELEPHONE ENCOUNTER
Olivier Noss  12/5/2022  Wt Readings from Last 10 Encounters:   12/05/22 158 lb 12.8 oz (72 kg)   11/01/22 153 lb 6.4 oz (69.6 kg)   11/01/22 153 lb 6.4 oz (69.6 kg)   10/26/22 160 lb (72.6 kg)   10/25/22 154 lb 3.2 oz (69.9 kg)   10/25/22 154 lb 6.4 oz (70 kg)   10/18/22 162 lb 3.2 oz (73.6 kg)   10/18/22 164 lb (74.4 kg)   10/12/22 169 lb (76.7 kg)   10/11/22 165 lb 3.2 oz (74.9 kg)     Ht Readings from Last 1 Encounters:   11/01/22 5' 7\" (1.702 m)     BMI=24.87    Assessment: Visited with Lashell Wilkerson and his wife while in for follow up visit with Dr. Latanya Miranda. Lashell Wilkerson said he feels great but still not eating much orally. He said he tried a small donut and due to xerostomia he had to drink a lot of liquids. Offered suggestions on other foods to try that are moist that would be easier and better tolerated. Suggested starting with smoothies, puddings, jello. And then progressing to hot cereal, soft scrambled eggs, pancakes with syrup, cream soups, mac and cheese. He is still utilizing his PEG tube with Boost VHC 2 cartons, 2 times per day. Occasionally he said he uses a 5th carton. He also reports putting a smoothie through his PEG tube and his wife ordered Real Food Blends to try also through his PEG. Discussed it is important he start eating more via his mouth and just use the Boost VHC via PEG tube. Concern that smoothie could clog PEG tube. Explained once he is eating more orally, the amount of Boost VHC via his PEG tube can be reduced and if weight maintained for 1 month, then we could discuss removal. They have a scale at home and will monitor his weight. Encouraged them to call with questions or concerns.      Weight change: 3.52% weight gain x 1 month, 16.29% significant weight loss x 3 months, 17/03% significant weight loss x 6 months  Appetite: EN dependent  Nutritional Side Effects: xerostomia, dysgeusia, odynophagia (decreased)  Calculated Needs: 30-32kcal/kg/EKM=4011-1825gejpe, 1.3-1.5gm/kg/CBW=95-110gm protein, 1ml/kcal=2200-2300ml fluids  Malnutrition Status: Moderate  Nutrition Diagnosis: Moderate malnutrition r/t oropharynx cancer AEB significant weight loss x 3 and 6 months. Pre-Hab Eligible?: Yes    Recommendations: Increase oral intake, utilizing soft most foods. Continue to use PEG tube for nutrition until oral intake has increased.      Konstantin Robles, LEIDA

## 2022-12-05 NOTE — PROGRESS NOTES
Radha Spann  12/5/2022  9:49 AM      Vitals:    12/05/22 0939   BP: 115/65   Pulse: 90   Resp: 18   Temp: 97.1 °F (36.2 °C)   SpO2: 98%    : Wt Readings from Last 3 Encounters:   12/05/22 158 lb 12.8 oz (72 kg)   11/01/22 153 lb 6.4 oz (69.6 kg)   11/01/22 153 lb 6.4 oz (69.6 kg)                Current Outpatient Medications:     nystatin (MYCOSTATIN) 288841 UNIT/ML suspension, Take 5 mLs by mouth 4 times daily, Disp: 1 each, Rfl: 0    famotidine (PEPCID) 20 MG tablet, Take 1 tablet by mouth 2 times daily for 7 days, Disp: 14 tablet, Rfl: 0    Magic Mouthwash (MIRACLE MOUTHWASH), Swish and spit 5 mLs 4 times daily as needed for Irritation or Pain Benadryl 12.5 mg/ml Maalox 10 ml/5ml  lidocaine 2%/5ml, Disp: 480 mL, Rfl: 3    sucralfate (CARAFATE) 1 GM/10ML suspension, Take 10 mLs by mouth 4 times daily, Disp: 1200 mL, Rfl: 3    traZODone (DESYREL) 50 MG tablet, Take 1 tablet by mouth nightly, Disp: 30 tablet, Rfl: 0    melatonin 3 MG TABS tablet, Take 3 mg by mouth nightly as needed, Disp: , Rfl:     prochlorperazine (COMPAZINE) 10 MG tablet, Take 1 tablet by mouth every 6 hours as needed (nausea/vomiting), Disp: 40 tablet, Rfl: 3    ibuprofen (ADVIL;MOTRIN) 600 MG tablet, Take 1 tablet by mouth every 6 hours as needed for Pain, Disp: 360 tablet, Rfl: 1    fenofibrate (TRICOR) 145 MG tablet, Take 1 tablet by mouth daily, Disp: 90 tablet, Rfl: 1    lisinopril-hydroCHLOROthiazide (PRINZIDE;ZESTORETIC) 10-12.5 MG per tablet, Take 1 tablet by mouth daily, Disp: 90 tablet, Rfl: 1    metFORMIN (GLUCOPHAGE-XR) 500 MG extended release tablet, Take 1 tablet by mouth 2 times daily, Disp: 180 tablet, Rfl: 1    omeprazole (PRILOSEC) 40 MG delayed release capsule, Take 1 capsule by mouth daily, Disp: 90 capsule, Rfl: 1    blood glucose monitor strips, Test 3 times a day & as needed for symptoms of irregular blood glucose.  Dispense sufficient amount for indicated testing frequency plus additional to accommodate PRN testing needs., Disp: 50 strip, Rfl: 0    psyllium (KONSYL) 28.3 % PACK, Take 1 packet by mouth nightly, Disp: , Rfl:       Patient is seen today in follow up for RT for his p16-positive squamous cell carcinoma, Left Base of Tongue/Tonsil. Patient received RT from 09/06/22-11/03/22, PTV 7400 Left BOT BLHN + LN completing 37fx;7400cGy. Patient states his skin has helaed nicely and states he is no longer continuing to use aqua phor or silvadene to his neck. Patient continues to have dry mouth and mucous. Patient is drinking po, but still using feeding tube for nutritional needs. Patient states he ate a masters yesterday, but mouth was so dry, it was hard to tolerate. Patient rates his pain of his throat on swallowing as a 6/10. Patient also states has been having diarrhea, but taking senokot now and tolerable. Patient is following with Dr. Junito Ramsey for Med Onc, last seen on 11/01/22 and will see again on 02/14/22. FALLS RISK SCREENING ASSESSMENT    Instructions:  Assess the patient and enter the appropriate indicators that are present for fall risk identification. Total the numbers entered and assign a fall risk score from Table 2.  Reassess patient at a minimum every 12 weeks or with status change. Assessment   Date  12/5/2022     1. Mental Ability: confusion/cognitively impaired No - 0       2. Elimination Issues: incontinence, frequency No - 0       3. Ambulatory: use of assistive devices (walker, cane, off-loading devices), attached to equipment (IV pole, oxygen) No - 0     4. Sensory Limitations: dizziness, vertigo, impaired vision No - 0       5. Age 72 years or greater - 1       10. Medication: diuretics, strong analgesics, hypnotics, sedatives, antihypertensive agents   Yes - 3   7. Falls:  recent history of falls within the last 3 months (not to include slipping or tripping)   No - 0   TOTAL 4    If score of 4 or greater was education given?  Yes       TABLE 2   Risk Score Risk Level Plan of Care   0-3 Little or  No Risk 1. Provide assistance as indicated for ambulation activities  2. Reorient confused/cognitively impaired patient  3. Call-light/bell within patient's reach  4. Chair/bed in low position, stretcher/bed with siderails up except when performing patient care activities  5. Educate patient/family/caregiver on falls prevention  6.  Reassess in 12 weeks or with any noted change in patient condition which places them at a risk for a fall   4-6 Moderate Risk 1. Provide assistance as indicated for ambulation activities  2. Reorient confused/cognitively impaired patient  3. Call-light/bell within patient's reach  4. Chair/bed in low position, stretcher/bed with siderails up except when performing patient care activities  5. Educate patient/family/caregiver on falls prevention  6. Falls risk precaution (Yellow sticker Level II) placed on patient chart   7 or   Higher High Risk 1. Place patient in easily observable treatment room  2. Patient attended at all times by family member or staff  3. Provide assistance as indicated for ambulation activities  4. Reorient confused/cognitively impaired patient  5. Call-light/bell within patient's reach  6. Chair/bed in low position, stretcher/bed with siderails up except when performing patient care activities  7. Educate patient/family/caregiver on falls prevention  8. Falls risk precaution (Yellow sticker Level III) placed on patient chart           MALNUTRITION RISK SCREENING ASSESSMENT    12/5/2022   Patient:  Yudi Cash  Sex:  male    Instructions:  Assess the patient and enter the appropriate indicators that are present for nutrition risk identification. Total the numbers entered and assign a risk score. Follow the appropriate action for total score listed below. Assessment   Date  12/5/2022     Have you lost weight without trying? 0- No     Have you been eating poorly because of a decreased appetite? 1- Yes   3.  Do you have a diagnosis of head and neck cancer? 2- Yes                                                                                    TOTAL 3          Score of 0-1: No action  Score 2 or greater:   For Non-Diabetic Patient: Recommend adding Ensure Complete 2 x daily and provide patient with Ensure wellness bag with coupons  For Diabetic Patient: Recommend adding Glucerna Shake 2 x daily and provide patient with Glucerna Wellness bag with coupons  Route to the dietitian via Rudy Lopez RN

## 2022-12-05 NOTE — PROGRESS NOTES
DEPARTMENT OF RADIATION ONCOLOGY   Follow up visit        2022    NAME:  Xenia Gaspar    :  1950 71 y.o. male     PCP: David Olivas DO    REFERRING PROVIDER: Tristan    DIAGNOSIS:  [id number] p16-positive squamous cell carcinoma, Left Base of Tongue/Tonsil    STAGING: Cancer Staging  [id number]    RECENT HISTORY: Xenia Gaspar returns for a post radiation treatment follow up visit. Treatments were completed on 11/3/2022. He received 74 Bell in 37 fractions over 57 elapsed days. Since we last saw him he continues to recover nicely although he still has a significant dry mouth and difficulty with swallowing. He continues to use his PEG tube for nutrition. Past medical, surgical, social and family histories reviewed and updated as indicated. ALLERGIES:  Patient has no known allergies.        MEDICATIONS:   Current Outpatient Medications:     nystatin (MYCOSTATIN) 682581 UNIT/ML suspension, Take 5 mLs by mouth 4 times daily, Disp: 1 each, Rfl: 0    famotidine (PEPCID) 20 MG tablet, Take 1 tablet by mouth 2 times daily for 7 days, Disp: 14 tablet, Rfl: 0    Magic Mouthwash (MIRACLE MOUTHWASH), Swish and spit 5 mLs 4 times daily as needed for Irritation or Pain Benadryl 12.5 mg/ml Maalox 10 ml/5ml  lidocaine 2%/5ml, Disp: 480 mL, Rfl: 3    sucralfate (CARAFATE) 1 GM/10ML suspension, Take 10 mLs by mouth 4 times daily, Disp: 1200 mL, Rfl: 3    traZODone (DESYREL) 50 MG tablet, Take 1 tablet by mouth nightly, Disp: 30 tablet, Rfl: 0    melatonin 3 MG TABS tablet, Take 3 mg by mouth nightly as needed, Disp: , Rfl:     prochlorperazine (COMPAZINE) 10 MG tablet, Take 1 tablet by mouth every 6 hours as needed (nausea/vomiting), Disp: 40 tablet, Rfl: 3    ibuprofen (ADVIL;MOTRIN) 600 MG tablet, Take 1 tablet by mouth every 6 hours as needed for Pain, Disp: 360 tablet, Rfl: 1    fenofibrate (TRICOR) 145 MG tablet, Take 1 tablet by mouth daily, Disp: 90 tablet, Rfl: 1 lisinopril-hydroCHLOROthiazide (PRINZIDE;ZESTORETIC) 10-12.5 MG per tablet, Take 1 tablet by mouth daily, Disp: 90 tablet, Rfl: 1    metFORMIN (GLUCOPHAGE-XR) 500 MG extended release tablet, Take 1 tablet by mouth 2 times daily, Disp: 180 tablet, Rfl: 1    omeprazole (PRILOSEC) 40 MG delayed release capsule, Take 1 capsule by mouth daily, Disp: 90 capsule, Rfl: 1    blood glucose monitor strips, Test 3 times a day & as needed for symptoms of irregular blood glucose. Dispense sufficient amount for indicated testing frequency plus additional to accommodate PRN testing needs. , Disp: 50 strip, Rfl: 0    psyllium (KONSYL) 28.3 % PACK, Take 1 packet by mouth nightly, Disp: , Rfl:     REVIEW OF SYSTEMS: Obtained from the patient, chart review and nursing assessment. General ROS: positive for  - fatigue  Psychological ROS: positive for - anxiety  Ophthalmic ROS: negative  ENT ROS: positive for - dry mouth, difficulty swallowing  negative for - headaches, hearing change, nasal congestion, or nasal polyps  Allergy and Immunology ROS: negative  Hematological and Lymphatic ROS: negative  Endocrine ROS: negative  Breast ROS: negative  Respiratory ROS: no cough, shortness of breath, or wheezing  Cardiovascular ROS: no chest pain or dyspnea on exertion  Gastrointestinal ROS: no abdominal pain, change in bowel habits, or black or bloody stools  Genito-Urinary ROS: no dysuria, trouble voiding, or hematuria  Musculoskeletal ROS: negative  Neurological ROS: no TIA or stroke symptoms  Dermatological ROS: negative    PHYSICAL EXAMINATION:    There were no vitals filed for this visit. Wt Readings from Last 3 Encounters:   11/01/22 153 lb 6.4 oz (69.6 kg)   11/01/22 153 lb 6.4 oz (69.6 kg)   10/26/22 160 lb (72.6 kg)       [facility]:      Constitutional: A well developed, well nourished 71 y.o. male who is alert, oriented, cooperative and in no apparent distress. EENT: EOMI LUIS M. MMM. Grade 2 xerostomia is noted. Bimanual palpation of the gingival buccal surfaces floor of mouth base of tongue and tonsillar fossa is unremarkable. Neck: Supple without thyromegaly. A curvilinear incision is noted in the left hemineck appears to be healed. There is no cervical adenopathy to report. Respiratory: Chest was symmetrical without dullness to percussion. Breath sounds bilaterally were clear to auscultation. No wheezes, rhonchi or rales. Cardiovascular: Regular without murmur. Abdomen: Obese, rounded and soft without organomegaly. No rebound, guarding or  rigidity. Lymphatic: No lymphadenopathy. Musculoskeletal: Ambulates without assistance. No gross muscle weakness. Muscle size, tone and strength are normal. No involuntary movements. Skin:  Warm and dry. Examination of color, turgor and pigmentation was relatively normal. No bruises or skin rashes. Neurological/Psychiatric: General behavior, level of consciousness, thought content is normal. The patient's emotional status is normal.  Cranial nerves II-XII are grossly intact. TUMOR MARKERS:   Lab Results   Component Value Date/Time    PSA 3.27 08/12/2015 11:03 AM       ASSESSMENT/PLAN:    Amparo Corado is now 30 days post treatment and doing quite well. We we will schedule imaging with the head neck team and will see him again in follow-up in approximately 3 months. He is scheduled to see Dr. Ag Tadeo at the end of December and Dr. Pepper Lundborg in mid February. I have recommended his posttreatment assessment PET/CT to be performed in January 2023. We will next see him in April 2023 to continue a staggered multidisciplinary follow-up schedule. He has been encouraged to swallow more solid foods to minimize the risk of stricture as he is, currently PEG tube dependent for most of his nutrition.   I am pleased to report that clinically I find no evidence of disease on today's exam.    I asked Nichelle Body  to contact us at any time for any questions or concerns. Thank you for the opportunity to participate in multidisciplinary management of this remarkable and pleasant patient. Cady Murphy MD, [address], Yonatan Howell, [address] [place] [address]    Department of Radiation Oncology  [facility]: [phone] ([place]: [phone])  [address] [place] [address]: [phone] ([place]: [phone])  [facility]:  [phone] (LGQ:  [phone])    NOTE: This report was transcribed using voice recognition software. Every effort was made to ensure accuracy; however, inadvertent computerized transcription errors may be present.

## 2022-12-21 ENCOUNTER — OFFICE VISIT (OUTPATIENT)
Dept: PALLATIVE CARE | Age: 72
End: 2022-12-21
Payer: MEDICARE

## 2022-12-21 VITALS
OXYGEN SATURATION: 97 % | WEIGHT: 158 LBS | HEART RATE: 77 BPM | SYSTOLIC BLOOD PRESSURE: 117 MMHG | BODY MASS INDEX: 24.75 KG/M2 | DIASTOLIC BLOOD PRESSURE: 66 MMHG | TEMPERATURE: 97.4 F

## 2022-12-21 DIAGNOSIS — G89.3 PAIN DUE TO NEOPLASM: Primary | ICD-10-CM

## 2022-12-21 PROCEDURE — 99212 OFFICE O/P EST SF 10 MIN: CPT | Performed by: NURSE PRACTITIONER

## 2022-12-21 PROCEDURE — 1123F ACP DISCUSS/DSCN MKR DOCD: CPT | Performed by: NURSE PRACTITIONER

## 2022-12-21 PROCEDURE — 3078F DIAST BP <80 MM HG: CPT | Performed by: NURSE PRACTITIONER

## 2022-12-21 PROCEDURE — 3074F SYST BP LT 130 MM HG: CPT | Performed by: NURSE PRACTITIONER

## 2022-12-21 PROCEDURE — 99213 OFFICE O/P EST LOW 20 MIN: CPT | Performed by: NURSE PRACTITIONER

## 2022-12-21 RX ORDER — OXYCODONE HYDROCHLORIDE AND ACETAMINOPHEN 5; 325 MG/1; MG/1
1 TABLET ORAL EVERY 8 HOURS PRN
Qty: 42 TABLET | Refills: 0 | Status: SHIPPED | OUTPATIENT
Start: 2022-12-21 | End: 2023-01-04

## 2022-12-21 RX ORDER — PILOCARPINE HYDROCHLORIDE 5 MG/1
5 TABLET, FILM COATED ORAL 3 TIMES DAILY
Qty: 90 TABLET | Refills: 2 | Status: SHIPPED | OUTPATIENT
Start: 2022-12-21 | End: 2023-01-20

## 2022-12-21 RX ORDER — LACTULOSE 10 G/15ML
10 SOLUTION ORAL EVERY 12 HOURS PRN
Qty: 473 ML | Refills: 1 | Status: SHIPPED | OUTPATIENT
Start: 2022-12-21 | End: 2023-02-19

## 2022-12-21 NOTE — PROGRESS NOTES
Department of Palliative Medicine  Ambulatory Note  Provider: MARY Tesfaye CNP      Location of service: [place] [address]  Chief Complaint: No Larsen is a 71 y.o. male with chief complaint of pain    HPI: No Larsen is a 71 y.o. male with significant past medical history of squamous cell carcinoma of the left base of the tongue and tonsil, diabetes mellitus, Lyme disease, hypertension, arthritis who was referred to [address] [place] [address] for symptom management. Plan is for concurrent chemo and radiation. Assessment/Plan      Squamous cell carcinoma of the left base of tongue and tonsil  -Follows with Dr. Jayro Lutz for oncology    Pain due to neoplasm:  -Decrease oxycodone 5 mg Q 8 hours  -Lidocaine viscous  -Carafate solution QID  -Tylenol 650 mg as needed  -ibuprofen 600 mg every 6 hours as needed  -Magic mouthwash QID PRN  -Tetracaine lollipops  -PEG tube    Insomnia  -melatonin   -Has not needed Trazodone    Constipation  -stop Colace  -Continue Metamucil  -Start senna S  -Start lactulose    Mucositis/Thrush:  -Nystatin     Xerostomia/loss of taste  -Start Salagen 5 mg every 8 hours as needed  -Start zinc 50 mg twice daily    Follow Up:  2 weeks. Encouraged to call with any questions, concerns, needs, or changes in symptoms. Subjective:     Met with Darlene March at the outpatient clinic. He complains of a constant sore and dry throat. He rates his pain a 9 on a scale of 1-10. He states that his Magic mouthwash helps during the day and he also uses ibuprofen. He states at nighttime he has a hard time falling asleep and he would only use his Percocet at bedtime. He has tried over-the-counter sprays and loss injures to help with the dry mouth. We also discussed humidifiers, good mouth hygiene, and gum and lozenges. He states that he still cannot eat because of how bad everything tastes. He is able to swallow pills, but most of all of his nutrition is through his PEG tube. Explained to start an over-the-counter zinc supplement. He states that he continues to have constipation and is using senna and Colace 2 times a day. Pain Assessment   Ratin  Description: pressure, sharp, and stabbing  Duration: months  Frequency: daily  Location: left neck  Alleviating Factors: pain medication and ice  Exacerbating Factors: unable to associate with any factor  Effect: change in function, interference with activities, sleep, and mood    Past Medical History:   Diagnosis Date    Arthritis     Cancer ([facility]     oropharynx    Diabetes mellitus ([facility]     GERD (gastroesophageal reflux disease)     Hyperlipidemia     Hypertension     Lyme disease     [facility]        Past Surgical History:   Procedure Laterality Date    BRONCHOSCOPY N/A 10/28/2019    BRONCHOSCOPY performed by Yu Cary MD at [address] [facility] [facility]  2017    dr Chuy Bolton  2017    COLONOSCOPY  2019    GASTROSTOMY TUBE PLACEMENT N/A 2022    PEG TUBE PLACEMENT performed by Alejandro Lombardo MD at [facility] [address] Left 3/17/2022    PANENDOSCOPY WITH BIOPSY performed by Ganesh Menon DO at [address] [facility] [facility] [address] Left 2022    PANENDOSCOPY performed by Ganesh Menon DO at [address] [facility] Left 2022    EXCISION LEFT LEVEL II LYMPH NODE, PANENDOSCOPY performed by Ganesh Menon DO at [address] EXTRACTION Left 2021    VASECTOMY         Family History   Problem Relation Age of Onset    High Blood Pressure Mother     High Blood Pressure Father     Cancer Father 80        kidney    Abdominal aortic aneurysm Father        ROS: UNLESS STATED ABOVE PATIENT DENIES:  CONSTITUTIONAL:  fever, chill, rigors, nausea, vomiting, fatigue. HEENT: blurry vision, double vision, hearing problem, tinnitus, hoarseness, dysphagia, odynophagia  RESPIRATORY: cough, shortness of breath, sputum expectoration.   CARDIOVASCULAR:  Chest pain/pressure, palpitation, syncope, irregular beats  GASTROINTESTINAL:  abdominal or rectal pain, diarrhea, constipation, . GENITOURINARY:  Burning, frequency, urgency, incontinence, discharge  INTEGUMENTARY: rash, wound, pruritis  HEMATOLOGIC/LYMPHATIC:  Swelling, sores, gum bleeding, easy bruising, pica. MUSCULOSKELETAL:  pain, edema, joint swelling or redness  NEUROLOGICAL:  light headed, dizziness, loss of consciousness, weakness, change in memory, seizures, tremors    Objective:     Physical Exam  Wt Readings from Last 3 Encounters:   12/21/22 158 lb (71.7 kg)   12/08/22 156 lb (70.8 kg)   12/05/22 158 lb 12.8 oz (72 kg)     /66   Pulse 77   Temp 97.4 °F (36.3 °C)   Wt 158 lb (71.7 kg)   SpO2 97% Comment: RA  BMI 24.75 kg/m²     Gen:  Alert, appears stated age, well nourished, in no acute distress  HEENT:  Normocephalic, no drainage,  Neck:  Supple  Lungs:  non labored breathing  Heart[de-identified]  Regular rate  Abd:  Soft,   M/S/Ext:  moving all extremities   Skin:  no visible lesions  Neuro:  PERRL, Alert, oriented x 3; following commands    Bethel Symptom Assessment Score   Bethel Score 12/21/2022 10/26/2022 10/12/2022 9/28/2022 9/14/2022   Pain Score 9 Worst possible pain Worst possible pain Worst possible pain 5   Tiredness Score 4 7 6 6 4   Nausea Score Not nauseated Not nauseated 3 4 4   Depression Score Not depressed 4 2 5 7   Anxiety Score Not anxious Not anxious 4 7 8   Drowsiness Score 1 5 2 Not drowsy 4   Appetite Score 1 Best appetite 6 1 8   Wellbeing Score 1 8 Best feeling of wellbeing 4 5   Dyspnea Score No shortness of breath No shortness of breath No shortness of breath No shortness of breath 3   Other Problem Score 7 Worst possible response 7 1 8   Total Assessment Score(calculated) 23 44 40 38 56     Assessed by: patient and provider. Current Medications:  Medications reviewed: yes    Controlled Substances Monitoring: [place] reviewed 12/21/22.   RX Monitoring 9/28/2022   Periodic Controlled Substance Monitoring Possible medication side effects, risk of tolerance/dependence & alternative treatments discussed. ;No signs of potential drug abuse or diversion identified.;Obtaining appropriate analgesic effect of treatment. ;Assessed functional status. MARY Julian - CNP   Palliative Care Department     Time/Communication  Greater than 50% of time spent, total 20 minutes in face-to-face counseling and coordination of care regarding goals of care and symptom management. Note: This report was completed using computerSqurl voiced recognition software. Every effort has been made to ensure accuracy; however, inadvertent computerized transcription errors may be present.

## 2022-12-30 ENCOUNTER — OFFICE VISIT (OUTPATIENT)
Dept: ENT CLINIC | Age: 72
End: 2022-12-30
Payer: MEDICARE

## 2022-12-30 VITALS — BODY MASS INDEX: 24.33 KG/M2 | WEIGHT: 155 LBS | HEIGHT: 67 IN

## 2022-12-30 DIAGNOSIS — C10.2 MALIGNANT NEOPLASM OF LATERAL WALL OF OROPHARYNX (HCC): ICD-10-CM

## 2022-12-30 DIAGNOSIS — R59.0 CERVICAL ADENOPATHY: ICD-10-CM

## 2022-12-30 DIAGNOSIS — C10.9 SQUAMOUS CELL CARCINOMA OF OROPHARYNX (HCC): Primary | ICD-10-CM

## 2022-12-30 PROCEDURE — 1123F ACP DISCUSS/DSCN MKR DOCD: CPT | Performed by: OTOLARYNGOLOGY

## 2022-12-30 PROCEDURE — 99213 OFFICE O/P EST LOW 20 MIN: CPT | Performed by: OTOLARYNGOLOGY

## 2022-12-30 PROCEDURE — 31575 DIAGNOSTIC LARYNGOSCOPY: CPT | Performed by: OTOLARYNGOLOGY

## 2022-12-30 ASSESSMENT — ENCOUNTER SYMPTOMS
TROUBLE SWALLOWING: 1
SHORTNESS OF BREATH: 0
SORE THROAT: 1
VOICE CHANGE: 0
COUGH: 0
VOMITING: 0
RHINORRHEA: 0

## 2022-12-30 NOTE — PROGRESS NOTES
[facility] Otolaryngology  Dr. Rosanna Miles. Dagmar Carrera. Ms.Ed        Patient Name:  Alfonso Acevedo  :  1950     CHIEF C/O:    Chief Complaint   Patient presents with    Follow-up     3 month follow up, 3 month panendo with scope       HISTORY OBTAINED FROM:  patient    HISTORY OF PRESENT ILLNESS:       Jack Alvarado is a 71y.o. year old male, here today for follow up of hypopharyngeal carcinoma s/p chemo & radiation which is now finished. currently using peg and tolerating some po diet. no noticeable lymphadenopathy or voice changes       Past Medical History:   Diagnosis Date    Arthritis     Cancer ([facility]     oropharynx    Diabetes mellitus ([facility]     GERD (gastroesophageal reflux disease)     Hyperlipidemia     Hypertension     Lyme disease     Thrombocytopenia ([facility]      Past Surgical History:   Procedure Laterality Date    BRONCHOSCOPY N/A 10/28/2019    BRONCHOSCOPY performed by Karli Porter MD at [address] [place] [address]  2017    dr Sadiq Garcia  2017    COLONOSCOPY  2019    GASTROSTOMY TUBE PLACEMENT N/A 2022    PEG TUBE PLACEMENT performed by Rose Aviles MD at [facility] 405 Left 3/17/2022    PANENDOSCOPY WITH BIOPSY performed by Alex Casarez DO at [facility] 15 Left 2022    PANENDOSCOPY performed by Alex Casarez DO at [address] [facility] Left 2022    EXCISION LEFT LEVEL II LYMPH NODE, PANENDOSCOPY performed by Alex Casarez DO at [address] EXTRACTION Left 2021    VASECTOMY         Current Outpatient Medications:     pilocarpine (SALAGEN) 5 MG tablet, Take 1 tablet by mouth 3 times daily, Disp: 90 tablet, Rfl: 2    oxyCODONE-acetaminophen (PERCOCET) 5-325 MG per tablet, Take 1 tablet by mouth every 8 hours as needed for Pain for up to 14 days. Intended supply: 14 days.  Take lowest dose possible to manage pain Max Daily Amount: 3 tablets, Disp: 42 tablet, Rfl: 0    nystatin (MYCOSTATIN) 058405 UNIT/ML suspension, Take 5 mLs by mouth 4 times daily, Disp: 1 each, Rfl: 0    Magic Mouthwash (MIRACLE MOUTHWASH), Swish and spit 5 mLs 4 times daily as needed for Irritation or Pain Benadryl 12.5 mg/ml Maalox 10 ml/5ml  lidocaine 2%/5ml, Disp: 480 mL, Rfl: 3    sucralfate (CARAFATE) 1 GM/10ML suspension, Take 10 mLs by mouth 4 times daily, Disp: 1200 mL, Rfl: 3    prochlorperazine (COMPAZINE) 10 MG tablet, Take 1 tablet by mouth every 6 hours as needed (nausea/vomiting), Disp: 40 tablet, Rfl: 3    ibuprofen (ADVIL;MOTRIN) 600 MG tablet, Take 1 tablet by mouth every 6 hours as needed for Pain, Disp: 360 tablet, Rfl: 1    fenofibrate (TRICOR) 145 MG tablet, Take 1 tablet by mouth daily, Disp: 90 tablet, Rfl: 1    blood glucose monitor strips, Test 3 times a day & as needed for symptoms of irregular blood glucose. Dispense sufficient amount for indicated testing frequency plus additional to accommodate PRN testing needs. , Disp: 50 strip, Rfl: 0    famotidine (PEPCID) 20 MG tablet, Take 1 tablet by mouth 2 times daily for 7 days, Disp: 14 tablet, Rfl: 0    traZODone (DESYREL) 50 MG tablet, Take 1 tablet by mouth nightly (Patient not taking: Reported on 2022), Disp: 30 tablet, Rfl: 0  Patient has no known allergies. Social History     Tobacco Use    Smoking status: Former     Packs/day: 1.00     Years: 27.00     Pack years: 27.00     Types: Cigarettes     Start date:      Quit date:      Years since quittin.0    Smokeless tobacco: Never   Vaping Use    Vaping Use: Never used   Substance Use Topics    Alcohol use: Not Currently    Drug use: Not Currently     Comment: marijuana in past...not for years     Family History   Problem Relation Age of Onset    High Blood Pressure Mother     High Blood Pressure Father     Cancer Father 80        kidney    Abdominal aortic aneurysm Father        Review of Systems   Constitutional:  Negative for chills and fever.    HENT:  Positive for sore throat and trouble swallowing. Negative for congestion, ear discharge, hearing loss, postnasal drip, rhinorrhea and voice change. Respiratory:  Negative for cough and shortness of breath. Cardiovascular:  Negative for chest pain and palpitations. Gastrointestinal:  Negative for vomiting. Skin:  Negative for rash. Allergic/Immunologic: Negative for environmental allergies. Neurological:  Negative for dizziness and headaches. Hematological:  Does not bruise/bleed easily. All other systems reviewed and are negative. Ht 5' 7\" (1.702 m)   Wt 155 lb (70.3 kg)   BMI 24.28 kg/m²   Physical Exam  Vitals and nursing note reviewed. Constitutional:       Appearance: He is well-developed. HENT:      Head: Normocephalic and atraumatic. No contusion, masses or laceration. Jaw: No tenderness or swelling. Right Ear: Tympanic membrane and ear canal normal. There is no impacted cerumen. Left Ear: Tympanic membrane and ear canal normal. There is no impacted cerumen. Nose: No congestion or rhinorrhea. Right Nostril: No epistaxis. Left Nostril: No epistaxis. Mouth/Throat:      Mouth: Mucous membranes are dry. Pharynx: No oropharyngeal exudate or posterior oropharyngeal erythema. Eyes:      Pupils: Pupils are equal, round, and reactive to light. Neck:      Thyroid: No thyromegaly. Trachea: No tracheal deviation. Cardiovascular:      Rate and Rhythm: Normal rate. Pulmonary:      Effort: Pulmonary effort is normal. No respiratory distress. Musculoskeletal:         General: Normal range of motion. Cervical back: Normal range of motion. Lymphadenopathy:      Cervical: No cervical adenopathy. Skin:     General: Skin is warm. Findings: No erythema. Neurological:      Mental Status: He is alert. Cranial Nerves: No cranial nerve deficit.        Procedure Note    Pre-operative Diagnosis: hypopharyngeal carcinoma    Post-operative Diagnosis: same    Anesthesia: Lidocaine 2% and Ry-Synephrine 1/2%    Endoscopy Type:  Flexible Laryngoscopy    Procedure Details: The patient was placed in the sitting position. After topical anesthesia and decongestion, the 4 mm laryngoscope was passed. The nasal cavities, nasopharynx, oropharynx, hypopharynx, and larynx were all examined. Vocal cords were examined during respiration and phonation. The following findings were noted:    Findings: radiation changes    Condition:  Stable. Patient tolerated procedure well. Complications:  None    IMPRESSION/PLAN:  Radiatio changes  PET scan as scheduled  Follow up 3 months with scope or sooner if changes    Dr. Allie Bass.  Otolaryngology Facial Plastic Surgery  :  [facility] Otolaryngology/Facial Plastic Surgery Residency  Associate Clinical Professor:  Lonnie Ann [facility]

## 2023-01-03 ENCOUNTER — TELEPHONE (OUTPATIENT)
Dept: CASE MANAGEMENT | Age: 73
End: 2023-01-03

## 2023-01-03 NOTE — TELEPHONE ENCOUNTER
Attempted contacting patient to discuss scheduling his PET scan, spoke with his wife, Afshan Thomas, as listed on his JASSON. Informed Dr. Vallarie Buerger would like the PET scan done at the end of January 2023, 12 weeks post radiation. Offered to have her schedule it, she was agreeable with having this nurse navigator schedule it for them and call back with the appointment. Returned call to Afshan Thomas, informed patient's PET scan is scheduled for 01/27/2023 at [facility],[address], entrance lobby B arrival 1500, test 1530. Reviewed the PET prep instructions, informed patient needs to start his high protein, low carb diet on 01/26/2023 at 1500, NPO 6 hours prior to the scan and may drink plain clear water up to the time of the test. Reviewed diabetic instructions. Informed patient is to take his photo ID, insurance cards and medication list with him. Offered to have patient  a written copy of the PET instructions prior to the scan, she stated she can stop in to get them and verbalized understanding of all instructions. Informed her patient's office appointment with Dr. Vallarie Buerger will be moved from 02/14/2023 to 01/31/2023 at [phone] for labs and office visit to follow. Eda Moon  RN, ADN, BSE, OCN  Patient Nurse Navigator

## 2023-01-17 ENCOUNTER — TELEPHONE (OUTPATIENT)
Dept: CASE MANAGEMENT | Age: 73
End: 2023-01-17

## 2023-01-17 NOTE — TELEPHONE ENCOUNTER
Patient's wife stopped in clinic and requested PET scan written instructions. Reviewed and provided her with them. She stated patient is to see Dr. Radha Chatman on 01/31/2023 to discuss his results and plan of care. She denied other needs at this time. James Esteves  RN, ADN, BSE, OCN  Patient Nurse Navigator

## 2023-01-25 ENCOUNTER — TELEPHONE (OUTPATIENT)
Dept: INFUSION THERAPY | Age: 73
End: 2023-01-25

## 2023-01-25 NOTE — TELEPHONE ENCOUNTER
Spoke with Marek Payton via phone to discuss his oral intake, appetite and tube feeding regimen. He reports he has been using the Glucerna 1.5 via PEG tube (had previously had it supplied but switched to Boost VHC due to he complained the Glucerna 1.5 was giving him heartburn). Reports he has been using 4 cartons per day (provides 1424kcals, 78gm protein, 720ml free water from formula) and no problems with heartburn or reflux. Marek Payton said he has been trying to eat more but still affected by xerostomia and dysgeusia. He has been using Biotine Spray and rinsing mouth with baking soda/water rinse. He denies sores in mouth. Has cold sensitivity. Eating solids without pain, ate a breakfast burrito, eats eggs/toast/briscoe without complaints. Discussed need to continue to introduce new foods, increase oral intake and reduce amount of tube feeding. Explained PEG tube will not be removed until he has maintained his weight for at least 2 weeks without use of enteral nutrition formula. Marek Payton reports most recent weight taken at home was 155#. His HgbA1c was 4.5 on 12/5/22 and that his PCP has discontinued his oral diabetic meds. He is scheduled for appointment with Dr. Dank Moody next week. Encouraged him to call with questions or concerns.

## 2023-01-26 ENCOUNTER — TELEPHONE (OUTPATIENT)
Dept: INFUSION THERAPY | Age: 73
End: 2023-01-26

## 2023-01-26 NOTE — TELEPHONE ENCOUNTER
Received communication from cancer center staff regarding pt's request for EN order change. Saint Davies, [facility] pharmacist, contacted cancer center for updated order. Of note, pt spoke w/ Gene Polanco, following dietitian, on 1/25 to discuss EN concerns. Stephanie Rivera for clarification as ordering physician is out of office. Spoke w/ following dietitian who confirms plans to meet w/ pt at med onc visit on 1/31. Pt has enough Glucerna 1.5 at home to last several days. No immediate concerns at this time. Cayla Mcqueen, at [facility] to situation. Will defer EN management to dietitian regularly following w/ patient.   Electronically signed by Miriam Molina MS, RD, LD on 1/27/2023 at 8:40 AM

## 2023-01-31 ENCOUNTER — OFFICE VISIT (OUTPATIENT)
Dept: ONCOLOGY | Age: 73
End: 2023-01-31
Payer: MEDICARE

## 2023-01-31 ENCOUNTER — TELEPHONE (OUTPATIENT)
Dept: INFUSION THERAPY | Age: 73
End: 2023-01-31

## 2023-01-31 ENCOUNTER — HOSPITAL ENCOUNTER (OUTPATIENT)
Dept: INFUSION THERAPY | Age: 73
Discharge: HOME | End: 2023-01-31
Payer: MEDICARE

## 2023-01-31 VITALS
HEART RATE: 70 BPM | TEMPERATURE: 97 F | WEIGHT: 157.8 LBS | SYSTOLIC BLOOD PRESSURE: 131 MMHG | HEIGHT: 67 IN | DIASTOLIC BLOOD PRESSURE: 81 MMHG | BODY MASS INDEX: 24.77 KG/M2 | OXYGEN SATURATION: 99 %

## 2023-01-31 DIAGNOSIS — C10.9 OROPHARYNGEAL CANCER (HCC): ICD-10-CM

## 2023-01-31 DIAGNOSIS — C10.9 OROPHARYNGEAL CANCER (HCC): Primary | ICD-10-CM

## 2023-01-31 LAB
ALBUMIN SERPL-MCNC: 4.4 G/DL (ref 3.5–5.2)
ALP BLD-CCNC: 58 U/L (ref 40–129)
ALT SERPL-CCNC: 19 U/L (ref 0–40)
ANION GAP SERPL CALCULATED.3IONS-SCNC: 8 MMOL/L (ref 7–16)
AST SERPL-CCNC: 18 U/L (ref 0–39)
BASOPHILS ABSOLUTE: 0.02 E9/L (ref 0–0.2)
BASOPHILS RELATIVE PERCENT: 0.6 % (ref 0–2)
BILIRUB SERPL-MCNC: 0.3 MG/DL (ref 0–1.2)
BUN BLDV-MCNC: 24 MG/DL (ref 6–23)
CALCIUM SERPL-MCNC: 9.8 MG/DL (ref 8.6–10.2)
CHLORIDE BLD-SCNC: 104 MMOL/L (ref 98–107)
CO2: 29 MMOL/L (ref 22–29)
CREAT SERPL-MCNC: 1 MG/DL (ref 0.7–1.2)
EOSINOPHILS ABSOLUTE: 0.08 E9/L (ref 0.05–0.5)
EOSINOPHILS RELATIVE PERCENT: 2.5 % (ref 0–6)
GFR SERPL CREATININE-BSD FRML MDRD: >60 ML/MIN/1.73
GLUCOSE BLD-MCNC: 99 MG/DL (ref 74–99)
HCT VFR BLD CALC: 38.7 % (ref 37–54)
HEMOGLOBIN: 12.5 G/DL (ref 12.5–16.5)
IMMATURE GRANULOCYTES #: 0 E9/L
IMMATURE GRANULOCYTES %: 0 % (ref 0–5)
LYMPHOCYTES ABSOLUTE: 0.3 E9/L (ref 1.5–4)
LYMPHOCYTES RELATIVE PERCENT: 9.3 % (ref 20–42)
MAGNESIUM: 2.1 MG/DL (ref 1.6–2.6)
MCH RBC QN AUTO: 30.4 PG (ref 26–35)
MCHC RBC AUTO-ENTMCNC: 32.3 % (ref 32–34.5)
MCV RBC AUTO: 94.2 FL (ref 80–99.9)
MONOCYTES ABSOLUTE: 0.33 E9/L (ref 0.1–0.95)
MONOCYTES RELATIVE PERCENT: 10.3 % (ref 2–12)
NEUTROPHILS ABSOLUTE: 2.48 E9/L (ref 1.8–7.3)
NEUTROPHILS RELATIVE PERCENT: 77.3 % (ref 43–80)
PDW BLD-RTO: 12.8 FL (ref 11.5–15)
PLATELET # BLD: 166 E9/L (ref 130–450)
PMV BLD AUTO: 10.2 FL (ref 7–12)
POTASSIUM SERPL-SCNC: 4.3 MMOL/L (ref 3.5–5)
RBC # BLD: 4.11 E12/L (ref 3.8–5.8)
RBC # BLD: NORMAL 10*6/UL
SODIUM BLD-SCNC: 141 MMOL/L (ref 132–146)
TOTAL PROTEIN: 7.2 G/DL (ref 6.4–8.3)
WBC # BLD: 3.2 E9/L (ref 4.5–11.5)

## 2023-01-31 PROCEDURE — 36415 COLL VENOUS BLD VENIPUNCTURE: CPT

## 2023-01-31 PROCEDURE — 80053 COMPREHEN METABOLIC PANEL: CPT

## 2023-01-31 PROCEDURE — 3075F SYST BP GE 130 - 139MM HG: CPT | Performed by: INTERNAL MEDICINE

## 2023-01-31 PROCEDURE — 3079F DIAST BP 80-89 MM HG: CPT | Performed by: INTERNAL MEDICINE

## 2023-01-31 PROCEDURE — 99213 OFFICE O/P EST LOW 20 MIN: CPT | Performed by: INTERNAL MEDICINE

## 2023-01-31 PROCEDURE — 85025 COMPLETE CBC W/AUTO DIFF WBC: CPT

## 2023-01-31 PROCEDURE — 1123F ACP DISCUSS/DSCN MKR DOCD: CPT | Performed by: INTERNAL MEDICINE

## 2023-01-31 PROCEDURE — 99212 OFFICE O/P EST SF 10 MIN: CPT

## 2023-01-31 PROCEDURE — 83735 ASSAY OF MAGNESIUM: CPT

## 2023-01-31 RX ORDER — PILOCARPINE HYDROCHLORIDE 5 MG/1
TABLET, FILM COATED ORAL
COMMUNITY
Start: 2023-01-26 | End: 2023-02-01

## 2023-01-31 NOTE — PROGRESS NOTES
Department of [facility] Oncology   Attending Clinic Note    Reason for Visit: Follow-up on a patient with p16 + Oropharyngeal Squamous Cell Carcinoma    PCP:  Elaine Reese DO    History of Present Illness:  81-year-old male who presented to the ENT team for persistent left-sided neck fullness without associated difficulty swallowing    CT soft tissue neck 02/23/2022: Mass located in the left aspect of floor of the mouth extending into the left oropharyngeal soft tissue concerning for squamous cell carcinoma  Multiple enlarged necrotic left anterior cervical chain lymph nodes    Panendoscopy on 3/17/2022:  Findings: L lingual tonsil hypertrophy, biopsy negative, left level II neck node FNA performed   FNA left neck mass level 2: Inconclusive for malignant cells. Few atypical squamous cells with degenerative change  A. Tongue, left base, biopsy:   Squamous epithelial-lined lymphoid tissue with reactive germinal centers, compatible with lingual tonsil. Negative for dysplasia; Negative for malignancy. B.  Tongue, left base, biopsy:   Squamous epithelial-lined lymphoid tissue with reactive germinal centers, compatible with lingual tonsil. Focal lobules of benign mucinous glands. Negative for dysplasia; Negative for malignancy    On 07/21/2022: Panendoscopy excision left level 2 lymph node  Findings:  left base of tongue mass, left cervical lymphadenopathy  A. 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 See comment. B. 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 C.  Left base of tongue, biopsy: Squamous mucosa, negative for neoplasm. D.  Right base of tongue, biopsy: Squamous mucosa, negative for neoplasm. E.  Midline tongue, biopsy: Squamous mucosa, negative for neoplasm.    Comment:   The squamous carcinoma is diffusely and strongly positive for p16 immunostain, supporting the above interpretation    PET/CT scan 08/01/2022: There is a large hypermetabolic mass centered in the left oropharynx involving the left lateral oropharynx, left tonsillar and left base of tongue regions extending superiorly to the left soft palate and inferiorly to the left floor of mouth and left vallecula. Maximum SUV of this mass measures 17.4. The mass causes narrowing of the oropharyngeal airway. There are multiple hypermetabolic enlarged, necrotic and someone confluent left level 2 and left level 3 lymph nodes. Confluent left level 2 adenopathy demonstrates a maximum SUV of 15.8. Additional hypermetabolic left level 5/3 junction lymph node is noted with maximum SUV 5.0  Mild focal hypermetabolic activity is noted in the left parapharyngeal/retropharyngeal region (maximum SUV 3.8) which could represent an underlying small lymph node. There is a hypermetabolic right level 2 lymph node with maximum SUV 6.5    Recommended concurrent chemoradiation therapy with weekly Cisplatin. Side effects reviewed. He agreed to proceed. Authorization obtained. Cycle # 1 weekly Cisplatin was on 09/06/2022  Cycle # 2 weekly Cisplatin was on 09/13/2022. Cycle # 3 weekly Cisplatin was on 09/20/2022. ER visit 09/26/2022 fatigue dysphagia dehydration; CTA chest 9/26/2022 no evidence of PE or acute pulmonary abnormality. CT abdomen/pelvis 09/26/2022 diffuse colonic diverticulosis with mild stranding of the fat in the left lower quadrant concerning for early diverticulitis. No evidence of obstructive uropathy or acute appendicitis. Given IV fluid with improvement. IVF daily for 3 days started 09/27/2022. PEG tube inserted on 09/30/2022. Cycle # 4 weekly Cisplatin was on 10/04/2022. Cycle # 5 weekly Cisplatin was on 10/11/2022. Cycle # 6 weekly Cisplatin was on 10/18/2022  Cycle # 7 weekly Cisplatin was on 10/25/2022. RT was completed on 11/03/2022. Today 01/31/2023; No fever chills.  Fair appetite and energy level. Dry mouth. No N.V. No diarrhea. Review of Systems;  CONSTITUTIONAL: No fever, chills. Fair appetite and energy level. ENMT: Eyes: No diplopia; Nose: No epistaxis. Mouth: Dry mouth  RESPIRATORY: No hemoptysis, shortness of breath, cough. CARDIOVASCULAR: No chest pain, palpitations. GASTROINTESTINAL: No N.V, abdominal pain  GENITOURINARY: No dysuria, urinary frequency, hematuria. NEURO: No syncope, presyncope, headache. Remainder: ROS NEGATIVE    Past Medical History:      Diagnosis Date    Arthritis     Cancer ([facility] 2022    oropharynx    Diabetes mellitus ([facility]     GERD (gastroesophageal reflux disease)     Hyperlipidemia     Hypertension     Lyme disease     Thrombocytopenia ([facility])      Medications:  Reviewed and reconciled. Allergies:  No Known Allergies    Physical Exam:  /81   Pulse 70   Temp 97 °F (36.1 °C)   Ht 5' 7\" (1.702 m)   Wt 157 lb 12.8 oz (71.6 kg)   SpO2 99%   BMI 24.71 kg/m²   GENERAL: Alert, oriented x 3, not in distress  HEENT: Dry mouth  Neck: Supple. Radiation changes. No palpable LN. EXTREMITIES: Without clubbing, cyanosis, or edema. NEUROLOGIC: No focal deficits. ECOG PS 1    Lab Results   Component Value Date    WBC 3.2 (L) 01/31/2023    HGB 12.5 01/31/2023    HCT 38.7 01/31/2023    MCV 94.2 01/31/2023     01/31/2023     Lab Results   Component Value Date     01/31/2023    K 4.3 01/31/2023     01/31/2023    CO2 29 01/31/2023    BUN 24 (H) 01/31/2023    CREATININE 1.0 01/31/2023    GLUCOSE 99 01/31/2023    CALCIUM 9.8 01/31/2023    PROT 7.2 01/31/2023    LABALBU 4.4 01/31/2023    BILITOT 0.3 01/31/2023    ALKPHOS 58 01/31/2023    AST 18 01/31/2023    ALT 19 01/31/2023    LABGLOM >60 01/31/2023    GFRAA >60 10/17/2022     Lab Results   Component Value Date/Time    MG 2.1 01/31/2023 02:20 PM     Impression/Plan:  61-year-old male with history of p16+ left Oropharyngeal Squamous Cell Carcinoma    CT soft tissue neck 02/23/2022:  Mass located in the left aspect of floor of the mouth extending into the left oropharyngeal soft tissue concerning for squamous cell carcinoma  Multiple enlarged necrotic left anterior cervical chain lymph nodes    Panendoscopy on 3/17/2022:  Findings: L lingual tonsil hypertrophy, biopsy negative, left level II neck node FNA performed   FNA left neck mass level 2: Inconclusive for malignant cells. Few atypical squamous cells with degenerative change  A. Tongue, left base, biopsy:   Squamous epithelial-lined lymphoid tissue with reactive germinal centers, compatible with lingual tonsil. Negative for dysplasia; Negative for malignancy. B.  Tongue, left base, biopsy:   Squamous epithelial-lined lymphoid tissue with reactive germinal centers, compatible with lingual tonsil. Focal lobules of benign mucinous glands. Negative for dysplasia; Negative for malignancy    On 07/21/2022: Panendoscopy excision left level 2 lymph node  Findings:  left base of tongue mass, left cervical lymphadenopathy  A. 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 See comment. B. 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 C.  Left base of tongue, biopsy: Squamous mucosa, negative for neoplasm. D.  Right base of tongue, biopsy: Squamous mucosa, negative for neoplasm. E.  Midline tongue, biopsy: Squamous mucosa, negative for neoplasm. Comment:   The squamous carcinoma is diffusely and strongly positive for p16 immunostain, supporting the above interpretation    PET/CT scan 08/01/2022: There is a large hypermetabolic mass centered in the left oropharynx involving the left lateral oropharynx, left tonsillar and left base of tongue regions extending superiorly to the left soft palate and inferiorly to the left floor of mouth and left vallecula. Maximum SUV of this mass measures 17.4.   The mass causes narrowing of the oropharyngeal airway. There are multiple hypermetabolic enlarged, necrotic and someone confluent left level 2 and left level 3 lymph nodes. Confluent left level 2 adenopathy demonstrates a maximum SUV of 15.8. Additional hypermetabolic left level 5/3 junction lymph node is noted with maximum SUV 5.0  Mild focal hypermetabolic activity is noted in the left parapharyngeal/retropharyngeal region (maximum SUV 3.8) which could represent an underlying small lymph node. There is a hypermetabolic right level 2 lymph node with maximum SUV 6.5    Recommended concurrent chemoradiation therapy with weekly Cisplatin. Side effects reviewed. He agreed to proceed. Authorization obtained. Cycle # 1 weekly Cisplatin was on 09/06/2022  Cycle # 2 weekly Cisplatin was on 09/13/2022. Cycle # 3 weekly Cisplatin was on 09/20/2022. ER visit 09/26/2022 fatigue dysphagia dehydration; CTA chest 9/26/2022 no evidence of PE or acute pulmonary abnormality. CT abdomen/pelvis 09/26/2022 diffuse colonic diverticulosis with mild stranding of the fat in the left lower quadrant concerning for early diverticulitis. No evidence of obstructive uropathy or acute appendicitis. Given IV fluid with improvement. IVF daily for 3 days started 09/27/2022. PEG tube inserted on 09/30/2022  Cycle # 4 weekly Cisplatin was on 10/04/2022. Cycle # 5 weekly Cisplatin was on 10/11/2022. Cycle # 6 weekly Cisplatin was on 10/18/2022  Cycle # 7 weekly Cisplatin was on 10/25/2022. RT was completed on 11/03/2022. Labs reviewed. Dry mouth; recommended to increase po fluid intake. Also recommended Biotene  PET/CT scan ordered and scheduled on 02/10/2023. RTC 1 month.  Continue to f/u with ENT team    Ani Connor MD   1/31/2023

## 2023-01-31 NOTE — TELEPHONE ENCOUNTER
Hanna Mcelroy  1/31/2023  Wt Readings from Last 10 Encounters:   01/31/23 157 lb 12.8 oz (71.6 kg)   12/30/22 155 lb (70.3 kg)   12/21/22 158 lb (71.7 kg)   12/08/22 156 lb (70.8 kg)   12/05/22 158 lb 12.8 oz (72 kg)   11/01/22 153 lb 6.4 oz (69.6 kg)   11/01/22 153 lb 6.4 oz (69.6 kg)   10/26/22 160 lb (72.6 kg)   10/25/22 154 lb 3.2 oz (69.9 kg)   10/25/22 154 lb 6.4 oz (70 kg)     Ht Readings from Last 1 Encounters:   01/31/23 5' 7\" (1.702 m)     BMI=24.71    Assessment: Met with Andree Scott while in for OV with Dr. Kellen Baker. Andree Scott has been eating more, ate 2 meals yesterday. He is currently using Glucerna 1.5 3-4 cartons per day with water flush. He reports he still has  2 1/2 cases at home and also has about 4 cases of Boost VHC. He was encouraged to increase his oral intake to reduce dependence on tube feeding. Suggested reducing by 1 carton per week and monitor weight. Andree Scott reports he will continue to utilize Glucerna 1.5 until his supply is out and then will use his Boost VHC. He is aware that if he still needs EN when his current supply is running low to notify me and will obtain a new order for [facility] for appropriate formula at the time. [facility] also notified of above and will not be sending any formula at this time. Written information provided to Andreeshanelle Scott to regarding transitioning off enteral nutrition. He was encouraged to call with questions or concerns. Weight change: 1.81% weight gain x 1 month, 2.87% weight gain x 3 months, 16.82% significant weight loss x 6 months  Appetite: Fair appetite, usually eating 2 times per day. Still utilizing PEG tube 3-4 cartons per day (currently Glucerna 1.5)   Nutritional Side Effects: dysgeusia, mouth pain, xerostomia  Calculated Needs: 30-32kcal/kg/ZPE=9599-5902smxok, 1.3-1.5gm/kg/CBW=95-110gm protein, 1ml/kcal=2200-2300ml fluids  Malnutrition Status: At risk  Nutrition Diagnosis:  At risk r/t oropharynx cancer AEB significant weight loss, still utilizing PEG tube.    Recommendations: Continue to increase oral intake. Decrease tube feeding to 2-3 cartons per day. Continue to decrease tube feeding as oral intake increases. Weight self weekly and record.      Konstantin Robles RD

## 2023-02-01 ENCOUNTER — OFFICE VISIT (OUTPATIENT)
Dept: PALLATIVE CARE | Age: 73
End: 2023-02-01
Payer: MEDICARE

## 2023-02-01 VITALS
BODY MASS INDEX: 25.36 KG/M2 | HEIGHT: 67 IN | DIASTOLIC BLOOD PRESSURE: 83 MMHG | WEIGHT: 161.6 LBS | OXYGEN SATURATION: 98 % | SYSTOLIC BLOOD PRESSURE: 142 MMHG | HEART RATE: 75 BPM

## 2023-02-01 DIAGNOSIS — G89.3 PAIN DUE TO NEOPLASM: Primary | ICD-10-CM

## 2023-02-01 PROCEDURE — 3077F SYST BP >= 140 MM HG: CPT | Performed by: NURSE PRACTITIONER

## 2023-02-01 PROCEDURE — 99213 OFFICE O/P EST LOW 20 MIN: CPT | Performed by: NURSE PRACTITIONER

## 2023-02-01 PROCEDURE — 1123F ACP DISCUSS/DSCN MKR DOCD: CPT | Performed by: NURSE PRACTITIONER

## 2023-02-01 PROCEDURE — 3079F DIAST BP 80-89 MM HG: CPT | Performed by: NURSE PRACTITIONER

## 2023-02-01 PROCEDURE — 99212 OFFICE O/P EST SF 10 MIN: CPT | Performed by: NURSE PRACTITIONER

## 2023-02-01 RX ORDER — PILOCARPINE HYDROCHLORIDE 5 MG/1
7.5 TABLET, FILM COATED ORAL 3 TIMES DAILY
Qty: 135 TABLET | Refills: 0 | Status: SHIPPED | OUTPATIENT
Start: 2023-02-01 | End: 2023-03-03

## 2023-02-01 RX ORDER — OXYCODONE HYDROCHLORIDE 5 MG/1
5 TABLET ORAL EVERY 8 HOURS PRN
Qty: 42 TABLET | Refills: 0 | Status: SHIPPED | OUTPATIENT
Start: 2023-02-01 | End: 2023-02-15

## 2023-02-01 NOTE — PROGRESS NOTES
Department of Palliative Medicine  Ambulatory Note  Provider: MARY Vail CNP      Location of service: [place] [address]  Chief Complaint: Margo Carr is a 71 y.o. male with chief complaint of pain    HPI: Margo Carr is a 71 y.o. 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 Plan is for concurrent chemo and radiation. Assessment/Plan      Squamous cell carcinoma of the left base of tongue and tonsil  -Follows with Dr. Юлия Harden for oncology    Pain due to neoplasm:  -Continue oxycodone 5 mg Q 8 hours  -Lidocaine viscous  -Carafate solution QID  -Tylenol 650 mg as needed  -ibuprofen 600 mg every 6 hours as needed  -Restart Magic mouthwash QID PRN  -Tetracaine lollipops - not using  -PEG tube  -nystatin for 10 days    Insomnia  -melatonin   -Has not needed Trazodone    Constipation  -stop Colace  -Continue Metamucil  -Continue senna S  - lactulose- has not needed    Mucositis/Thrush:  -Nystatin     Xerostomia/loss of taste  -Increase Salagen 7.5 mg every 8 hours as needed  -Continue zinc 50 mg twice daily    Follow Up:  6 weeks. Encouraged to call with any questions, concerns, needs, or changes in symptoms. Subjective:     Met with Darin Bustillo at the outpatient clinic. He is doing well, but states that he continues to have dry mouth and throat, no pain worsens at bedtime. He continues to use his oxycodone just at bedtime, due to the sore throat. He states that he notices a difference from the Percocet to oxycodone and would like to switch back to oxycodone. He has a humidifier in his room, he and his mouth multiple times a day, and doing rinses. He has an over-the-counter dry mouth spray that he uses. He is also having some burning on his tongue.   He does have multiple white patches on the tongue.  Explained to restart his Magic mouthwash and that I will also add nystatin.  He does continue to use his Carafate.  He has been using his Salagen, but he explains he has not noticed much of a difference.  Explained to increase to a tablet and a half 3 times a day.  He states that he continues to have a loss of taste, but he has had some improvement with some foods.  He continues to use his PEG tube, but is eating more by mouth.  He denies any tiredness, nausea, depression, anxiety, drowsiness, loss of appetite, shortness of breath, or constipation.    Pain Assessment   Ratin  Description: burning  Duration: months  Frequency: daily  Location: left neck  Alleviating Factors: pain medication and ice  Exacerbating Factors: unable to associate with any factor  Effect: change in function, interference with activities, sleep, and mood    Past Medical History:   Diagnosis Date    Arthritis     Cancer (HCC)     oropharynx    Diabetes mellitus (HCC)     GERD (gastroesophageal reflux disease)     Hyperlipidemia     Hypertension     Lyme disease     Thrombocytopenia (HCC)        Past Surgical History:   Procedure Laterality Date    BRONCHOSCOPY N/A 10/28/2019    BRONCHOSCOPY performed by Laurent Bhakta MD at [facility] ENDOSCOPY    CARDIAC CATHETERIZATION      dr harris    CARDIAC CATHETERIZATION  2017    COLONOSCOPY  2019    GASTROSTOMY TUBE PLACEMENT N/A 2022    PEG TUBE PLACEMENT performed by Roberto Thomas MD at [facility] ENDOSCOPY    LARYNGOSCOPY Left 3/17/2022    PANENDOSCOPY WITH BIOPSY [place] Kris Hernandez DO [place] [facility]    LARYNGOSCOPY Left 2022    PANENDOSCOPY performed by Kris Hernandez DO [place] [facility]    NECK SURGERY Left 2022    EXCISION LEFT LEVEL II LYMPH NODE, PANENDOSCOPY performed by Kris [place] DO [place] [facility]    TOOTH EXTRACTION Left 2021    VASECTOMY         Family History   Problem Relation Age of Onset    High Blood Pressure Mother     High Blood  Pressure Father     Cancer Father 80        kidney    Abdominal aortic aneurysm Father        ROS: UNLESS STATED ABOVE PATIENT DENIES:  CONSTITUTIONAL:  fever, chill, rigors, nausea, vomiting, fatigue. HEENT: blurry vision, double vision, hearing problem, tinnitus, hoarseness, dysphagia, odynophagia  RESPIRATORY: cough, shortness of breath, sputum expectoration. CARDIOVASCULAR:  Chest pain/pressure, palpitation, syncope, irregular beats  GASTROINTESTINAL:  abdominal or rectal pain, diarrhea, constipation, . GENITOURINARY:  Burning, frequency, urgency, incontinence, discharge  INTEGUMENTARY: rash, wound, pruritis  HEMATOLOGIC/LYMPHATIC:  Swelling, sores, gum bleeding, easy bruising, pica.   MUSCULOSKELETAL:  pain, edema, joint swelling or redness  NEUROLOGICAL:  light headed, dizziness, loss of consciousness, weakness, change in memory, seizures, tremors    Objective:     Physical Exam  Wt Readings from Last 3 Encounters:   02/01/23 161 lb 9.6 oz (73.3 kg)   01/31/23 157 lb 12.8 oz (71.6 kg)   12/30/22 155 lb (70.3 kg)     BP (!) 142/83   Pulse 75   Ht 5' 7\" (1.702 m)   Wt 161 lb 9.6 oz (73.3 kg)   SpO2 98%   BMI 25.31 kg/m²     Gen:  Alert, appears stated age, well nourished, in no acute distress  HEENT:  Normocephalic, no drainage, white patches of the tongue and buccal mucosa  Neck:  Supple  Lungs:  non labored breathing  Heart[de-identified]  Regular rate  Abd:  Soft,   M/S/Ext:  moving all extremities   Skin:  no visible lesions  Neuro:  PERRL, Alert, oriented x 3; following commands    [place] Symptom Assessment Score   [place] Score 2/1/2023 12/21/2022 10/26/2022 10/12/2022 9/28/2022   Pain Score 8 9 Worst possible pain Worst possible pain Worst possible pain   Tiredness Score Not tired 4 7 6 6   Nausea Score Not nauseated Not nauseated Not nauseated 3 4   Depression Score Not depressed Not depressed 4 2 5   Anxiety Score Not anxious Not anxious Not anxious 4 7   Drowsiness Score Not drowsy 1 5 2 Not drowsy Appetite Score Best appetite 1 Best appetite 6 1   Wellbeing Score Best feeling of wellbeing 1 8 Best feeling of wellbeing 4   Dyspnea Score No shortness of breath No shortness of breath No shortness of breath No shortness of breath No shortness of breath   Other Problem Score Best possible response 7 Worst possible response 7 1   Total Assessment Score(calculated) 8 23 44 40 38     Assessed by: patient and provider. Current Medications:  Medications reviewed: yes    Controlled Substances Monitoring: [place] reviewed 2/1/23. RX Monitoring 9/28/2022   Periodic Controlled Substance Monitoring Possible medication side effects, risk of tolerance/dependence & alternative treatments discussed. ;No signs of potential drug abuse or diversion identified.;Obtaining appropriate analgesic effect of treatment. ;Assessed functional status. MARY Martinez CNP   Palliative Care Department     Time/Communication  Greater than 50% of time spent, total 20 minutes in face-to-face counseling and coordination of care regarding goals of care and symptom management. Note: This report was completed using computerize voiced recognition software. Every effort has been made to ensure accuracy; however, inadvertent computerized transcription errors may be present.

## 2023-02-10 ENCOUNTER — HOSPITAL ENCOUNTER (OUTPATIENT)
Dept: PET IMAGING | Age: 73
End: 2023-02-10
Payer: MEDICARE

## 2023-02-10 DIAGNOSIS — C10.2 MALIGNANT NEOPLASM OF LATERAL WALL OF OROPHARYNX (HCC): ICD-10-CM

## 2023-02-10 DIAGNOSIS — C10.9 SQUAMOUS CELL CARCINOMA OF OROPHARYNX (HCC): ICD-10-CM

## 2023-02-10 LAB — METER GLUCOSE: 93 MG/DL (ref 74–99)

## 2023-02-10 PROCEDURE — 78815 PET IMAGE W/CT SKULL-THIGH: CPT

## 2023-02-10 PROCEDURE — 3430000000 HC RX DIAGNOSTIC RADIOPHARMACEUTICAL: Performed by: RADIOLOGY

## 2023-02-10 PROCEDURE — A9552 F18 FDG: HCPCS | Performed by: RADIOLOGY

## 2023-02-10 PROCEDURE — 82962 GLUCOSE BLOOD TEST: CPT

## 2023-02-10 RX ORDER — FLUDEOXYGLUCOSE F 18 200 MCI/ML
15 INJECTION, SOLUTION INTRAVENOUS
Status: COMPLETED | OUTPATIENT
Start: 2023-02-10 | End: 2023-02-10

## 2023-02-10 RX ADMIN — FLUDEOXYGLUCOSE F 18 15 MILLICURIE: 200 INJECTION, SOLUTION INTRAVENOUS at 12:50

## 2023-02-28 ENCOUNTER — OFFICE VISIT (OUTPATIENT)
Dept: ONCOLOGY | Age: 73
End: 2023-02-28
Payer: MEDICARE

## 2023-02-28 ENCOUNTER — TELEPHONE (OUTPATIENT)
Dept: INFUSION THERAPY | Age: 73
End: 2023-02-28

## 2023-02-28 ENCOUNTER — HOSPITAL ENCOUNTER (OUTPATIENT)
Dept: INFUSION THERAPY | Age: 73
Discharge: HOME | End: 2023-02-28
Payer: MEDICARE

## 2023-02-28 VITALS
HEIGHT: 67 IN | WEIGHT: 160.1 LBS | BODY MASS INDEX: 25.13 KG/M2 | TEMPERATURE: 97 F | HEART RATE: 83 BPM | SYSTOLIC BLOOD PRESSURE: 148 MMHG | OXYGEN SATURATION: 98 % | DIASTOLIC BLOOD PRESSURE: 81 MMHG

## 2023-02-28 DIAGNOSIS — C10.9 SQUAMOUS CELL CARCINOMA OF OROPHARYNX (HCC): Primary | ICD-10-CM

## 2023-02-28 DIAGNOSIS — C10.9 OROPHARYNGEAL CANCER (HCC): ICD-10-CM

## 2023-02-28 LAB
ALBUMIN SERPL-MCNC: 4.4 G/DL (ref 3.5–5.2)
ALP BLD-CCNC: 59 U/L (ref 40–129)
ALT SERPL-CCNC: 15 U/L (ref 0–40)
ANION GAP SERPL CALCULATED.3IONS-SCNC: 9 MMOL/L (ref 7–16)
AST SERPL-CCNC: 19 U/L (ref 0–39)
BASOPHILS ABSOLUTE: 0.03 E9/L (ref 0–0.2)
BASOPHILS RELATIVE PERCENT: 0.7 % (ref 0–2)
BILIRUB SERPL-MCNC: 0.5 MG/DL (ref 0–1.2)
BUN BLDV-MCNC: 21 MG/DL (ref 6–23)
CALCIUM SERPL-MCNC: 10.1 MG/DL (ref 8.6–10.2)
CHLORIDE BLD-SCNC: 101 MMOL/L (ref 98–107)
CO2: 29 MMOL/L (ref 22–29)
CREAT SERPL-MCNC: 1.1 MG/DL (ref 0.7–1.2)
EOSINOPHILS ABSOLUTE: 0.09 E9/L (ref 0.05–0.5)
EOSINOPHILS RELATIVE PERCENT: 2.1 % (ref 0–6)
GFR SERPL CREATININE-BSD FRML MDRD: >60 ML/MIN/1.73
GLUCOSE BLD-MCNC: 108 MG/DL (ref 74–99)
HCT VFR BLD CALC: 38.8 % (ref 37–54)
HEMOGLOBIN: 13.2 G/DL (ref 12.5–16.5)
IMMATURE GRANULOCYTES #: 0.01 E9/L
IMMATURE GRANULOCYTES %: 0.2 % (ref 0–5)
LYMPHOCYTES ABSOLUTE: 0.28 E9/L (ref 1.5–4)
LYMPHOCYTES RELATIVE PERCENT: 6.5 % (ref 20–42)
MAGNESIUM: 2 MG/DL (ref 1.6–2.6)
MCH RBC QN AUTO: 30.6 PG (ref 26–35)
MCHC RBC AUTO-ENTMCNC: 34 % (ref 32–34.5)
MCV RBC AUTO: 90 FL (ref 80–99.9)
MONOCYTES ABSOLUTE: 0.38 E9/L (ref 0.1–0.95)
MONOCYTES RELATIVE PERCENT: 8.9 % (ref 2–12)
NEUTROPHILS ABSOLUTE: 3.49 E9/L (ref 1.8–7.3)
NEUTROPHILS RELATIVE PERCENT: 81.6 % (ref 43–80)
PDW BLD-RTO: 13.2 FL (ref 11.5–15)
PLATELET # BLD: 198 E9/L (ref 130–450)
PMV BLD AUTO: 9.7 FL (ref 7–12)
POTASSIUM SERPL-SCNC: 3.7 MMOL/L (ref 3.5–5)
RBC # BLD: 4.31 E12/L (ref 3.8–5.8)
SODIUM BLD-SCNC: 139 MMOL/L (ref 132–146)
TOTAL PROTEIN: 7.2 G/DL (ref 6.4–8.3)
WBC # BLD: 4.3 E9/L (ref 4.5–11.5)

## 2023-02-28 PROCEDURE — 80053 COMPREHEN METABOLIC PANEL: CPT

## 2023-02-28 PROCEDURE — 3077F SYST BP >= 140 MM HG: CPT | Performed by: INTERNAL MEDICINE

## 2023-02-28 PROCEDURE — 99212 OFFICE O/P EST SF 10 MIN: CPT

## 2023-02-28 PROCEDURE — 36415 COLL VENOUS BLD VENIPUNCTURE: CPT

## 2023-02-28 PROCEDURE — 83735 ASSAY OF MAGNESIUM: CPT

## 2023-02-28 PROCEDURE — 99214 OFFICE O/P EST MOD 30 MIN: CPT | Performed by: INTERNAL MEDICINE

## 2023-02-28 PROCEDURE — 85025 COMPLETE CBC W/AUTO DIFF WBC: CPT

## 2023-02-28 PROCEDURE — 1123F ACP DISCUSS/DSCN MKR DOCD: CPT | Performed by: INTERNAL MEDICINE

## 2023-02-28 PROCEDURE — 3079F DIAST BP 80-89 MM HG: CPT | Performed by: INTERNAL MEDICINE

## 2023-02-28 NOTE — PROGRESS NOTES
Department of [facility] Oncology   Attending Clinic Note    Reason for Visit: Follow-up on a patient with p16 + Oropharyngeal Squamous Cell Carcinoma    PCP:  Lisa Brewer DO    History of Present Illness:  71-year-old male who presented to the ENT team for persistent left-sided neck fullness without associated difficulty swallowing    CT soft tissue neck 02/23/2022: Mass located in the left aspect of floor of the mouth extending into the left oropharyngeal soft tissue concerning for squamous cell carcinoma  Multiple enlarged necrotic left anterior cervical chain lymph nodes    Panendoscopy on 3/17/2022:  Findings: L lingual tonsil hypertrophy, biopsy negative, left level II neck node FNA performed   FNA left neck mass level 2: Inconclusive for malignant cells. Few atypical squamous cells with degenerative change  A. Tongue, left base, biopsy:   Squamous epithelial-lined lymphoid tissue with reactive germinal centers, compatible with lingual tonsil. Negative for dysplasia; Negative for malignancy. B.  Tongue, left base, biopsy:   Squamous epithelial-lined lymphoid tissue with reactive germinal centers, compatible with lingual tonsil. Focal lobules of benign mucinous glands. Negative for dysplasia; Negative for malignancy    On 07/21/2022: Panendoscopy excision left level 2 lymph node  Findings:  left base of tongue mass, left cervical lymphadenopathy  A. 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 See comment. B. 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 C.  Left base of tongue, biopsy: Squamous mucosa, negative for neoplasm. D.  Right base of tongue, biopsy: Squamous mucosa, negative for neoplasm. E.  Midline tongue, biopsy: Squamous mucosa, negative for neoplasm.    Comment:   The squamous carcinoma is diffusely and strongly positive for p16 immunostain, supporting the above interpretation    PET/CT scan 08/01/2022: There is a large hypermetabolic mass centered in the left oropharynx involving the left lateral oropharynx, left tonsillar and left base of tongue regions extending superiorly to the left soft palate and inferiorly to the left floor of mouth and left vallecula. Maximum SUV of this mass measures 17.4. The mass causes narrowing of the oropharyngeal airway. There are multiple hypermetabolic enlarged, necrotic and someone confluent left level 2 and left level 3 lymph nodes. Confluent left level 2 adenopathy demonstrates a maximum SUV of 15.8. Additional hypermetabolic left level 5/3 junction lymph node is noted with maximum SUV 5.0  Mild focal hypermetabolic activity is noted in the left parapharyngeal/retropharyngeal region (maximum SUV 3.8) which could represent an underlying small lymph node. There is a hypermetabolic right level 2 lymph node with maximum SUV 6.5    Recommended concurrent chemoradiation therapy with weekly Cisplatin. Side effects reviewed. He agreed to proceed. Authorization obtained. Cycle # 1 weekly Cisplatin was on 09/06/2022  Cycle # 2 weekly Cisplatin was on 09/13/2022. Cycle # 3 weekly Cisplatin was on 09/20/2022. ER visit 09/26/2022 fatigue dysphagia dehydration; CTA chest 9/26/2022 no evidence of PE or acute pulmonary abnormality. CT abdomen/pelvis 09/26/2022 diffuse colonic diverticulosis with mild stranding of the fat in the left lower quadrant concerning for early diverticulitis. No evidence of obstructive uropathy or acute appendicitis. Given IV fluid with improvement. IVF daily for 3 days started 09/27/2022. PEG tube inserted on 09/30/2022. Cycle # 4 weekly Cisplatin was on 10/04/2022. Cycle # 5 weekly Cisplatin was on 10/11/2022. Cycle # 6 weekly Cisplatin was on 10/18/2022  Cycle # 7 weekly Cisplatin was on 10/25/2022. RT was completed on 11/03/2022. Today 02/28/2023; No fever, chills.  Fair appetite and energy level. Dry mouth. No N.V. No diarrhea. Review of Systems;  CONSTITUTIONAL: No fever, chills. Fair appetite and energy level. ENMT: Eyes: No diplopia; Nose: No epistaxis. Mouth: Dry mouth  RESPIRATORY: No hemoptysis, shortness of breath, cough. CARDIOVASCULAR: No chest pain, palpitations. GASTROINTESTINAL: No N.V, abdominal pain  GENITOURINARY: No dysuria, urinary frequency, hematuria. NEURO: No syncope, presyncope, headache. Remainder: ROS NEGATIVE    Past Medical History:      Diagnosis Date    Arthritis     Cancer ([facility] [address] 2022    oropharynx    [facility]     GERD (gastroesophageal reflux disease)     Hyperlipidemia     Hypertension     Lyme disease     Thrombocytopenia (HCC)      Medications:  Reviewed and reconciled. Allergies:  No Known Allergies    Physical Exam:  BP (!) 148/81   Pulse 83   Temp 97 °F (36.1 °C)   Ht 5' 7\" (1.702 m)   Wt 160 lb 1.6 oz (72.6 kg)   SpO2 98%   BMI 25.08 kg/m²   GENERAL: Alert, oriented x 3, not in distress  EXTREMITIES: Without clubbing, cyanosis, or edema. NEUROLOGIC: No focal deficits. ECOG PS 1    Lab Results   Component Value Date    WBC 4.3 (L) 02/28/2023    HGB 13.2 02/28/2023    HCT 38.8 02/28/2023    MCV 90.0 02/28/2023     02/28/2023     Lab Results   Component Value Date     02/28/2023    K 3.7 02/28/2023     02/28/2023    CO2 29 02/28/2023    BUN 21 02/28/2023    CREATININE 1.1 02/28/2023    GLUCOSE 108 (H) 02/28/2023    CALCIUM 10.1 02/28/2023    PROT 7.2 02/28/2023    LABALBU 4.4 02/28/2023    BILITOT 0.5 02/28/2023    ALKPHOS 59 02/28/2023    AST 19 02/28/2023    ALT 15 02/28/2023    LABGLOM >60 02/28/2023    GFRAA >60 10/17/2022     Lab Results   Component Value Date/Time    MG 2.0 02/28/2023 01:38 PM     Impression/Plan:  78-year-old male with history of p16+ left Oropharyngeal Squamous Cell Carcinoma    CT soft tissue neck 02/23/2022:  Mass located in the left aspect of floor of the mouth extending into the left oropharyngeal soft tissue concerning for squamous cell carcinoma  Multiple enlarged necrotic left anterior cervical chain lymph nodes    Panendoscopy on 3/17/2022:  Findings: L lingual tonsil hypertrophy, biopsy negative, left level II neck node FNA performed   FNA left neck mass level 2: Inconclusive for malignant cells. Few atypical squamous cells with degenerative change  A. Tongue, left base, biopsy:   Squamous epithelial-lined lymphoid tissue with reactive germinal centers, compatible with lingual tonsil. Negative for dysplasia; Negative for malignancy. B.  Tongue, left base, biopsy:   Squamous epithelial-lined lymphoid tissue with reactive germinal centers, compatible with lingual tonsil. Focal lobules of benign mucinous glands. Negative for dysplasia; Negative for malignancy    On 07/21/2022: Panendoscopy excision left level 2 lymph node  Findings:  left base of tongue mass, left cervical lymphadenopathy  A. 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 See comment. B. 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 C.  Left base of tongue, biopsy: Squamous mucosa, negative for neoplasm. D.  Right base of tongue, biopsy: Squamous mucosa, negative for neoplasm. E.  Midline tongue, biopsy: Squamous mucosa, negative for neoplasm. Comment:   The squamous carcinoma is diffusely and strongly positive for p16 immunostain, supporting the above interpretation    PET/CT scan 08/01/2022: There is a large hypermetabolic mass centered in the left oropharynx involving the left lateral oropharynx, left tonsillar and left base of tongue regions extending superiorly to the left soft palate and inferiorly to the left floor of mouth and left vallecula. Maximum SUV of this mass measures 17.4. The mass causes narrowing of the oropharyngeal airway.    There are multiple hypermetabolic enlarged, necrotic and someone confluent left level 2 and left level 3 lymph nodes. Confluent left level 2 adenopathy demonstrates a maximum SUV of 15.8. Additional hypermetabolic left level 5/3 junction lymph node is noted with maximum SUV 5.0  Mild focal hypermetabolic activity is noted in the left parapharyngeal/retropharyngeal region (maximum SUV 3.8) which could represent an underlying small lymph node. There is a hypermetabolic right level 2 lymph node with maximum SUV 6.5    Recommended concurrent chemoradiation therapy with weekly Cisplatin. Cycle # 1 weekly Cisplatin was on 09/06/2022  Cycle # 2 weekly Cisplatin was on 09/13/2022. Cycle # 3 weekly Cisplatin was on 09/20/2022. ER visit 09/26/2022 fatigue dysphagia dehydration; CTA chest 9/26/2022 no evidence of PE or acute pulmonary abnormality. CT abdomen/pelvis 09/26/2022 diffuse colonic diverticulosis with mild stranding of the fat in the left lower quadrant concerning for early diverticulitis. No evidence of obstructive uropathy or acute appendicitis. Given IV fluid with improvement. IVF daily for 3 days started 09/27/2022. PEG tube inserted on 09/30/2022  Cycle # 4 weekly Cisplatin was on 10/04/2022. Cycle # 5 weekly Cisplatin was on 10/11/2022. Cycle # 6 weekly Cisplatin was on 10/18/2022  Cycle # 7 weekly Cisplatin was on 10/25/2022. RT was completed on 11/03/2022. PET/CT scan 47/63/3137:   Hypermetabolic adenopathy within the neck has resolved. There is also improved activity at the floor of the mouth; residual intermediate level activity at the anterior left floor of mouth can reflect post treatment related inflammation or residual local disease. Interval development of osseous metastatic disease of the sacrum, T12, and possibly anterior right 3rd rib, compared to PET/CT scan dated 08/01/2022. Interval development of multiple hepatic metastases    Imaging reviewed. Labs reviewed. Overall disease progression.    Recommended and ordered CT guided core needle biopsy liver mass   Recommended and ordered PD-L1 on tissue   If CPS ?20 - we suggest single-agent Keytruda rather than Keytruda plus chemotherapy. Side effects Keytruda reviewed.      Mehreen Ocampo MD   2/28/2023

## 2023-02-28 NOTE — TELEPHONE ENCOUNTER
Abdifatah Estimable  2/28/2023  Wt Readings from Last 10 Encounters:   02/28/23 160 lb 1.6 oz (72.6 kg)   02/01/23 161 lb 9.6 oz (73.3 kg)   01/31/23 157 lb 12.8 oz (71.6 kg)   12/30/22 155 lb (70.3 kg)   12/21/22 158 lb (71.7 kg)   12/08/22 156 lb (70.8 kg)   12/05/22 158 lb 12.8 oz (72 kg)   11/01/22 153 lb 6.4 oz (69.6 kg)   11/01/22 153 lb 6.4 oz (69.6 kg)   10/26/22 160 lb (72.6 kg)     Ht Readings from Last 1 Encounters:   02/28/23 5' 7\" (1.702 m)     BMI=25.08    Assessment: Met with Bautista Bain and his wife Donta Armstrong while in for f/u visit with Dr. Ruslan Davila. Disease progression is noted. Active listening and comfort was provided. Bautista Bain trying to increase his oral intake, reporting that dysgeusia is improving. He still is using his PEG tube and currently using Boost VHC 3 cartons per day, and tolerating without difficulty. He reports he still has 2 cases at home. Due to disease progression PEG removal not recommended at this time. Joes Perez prefers to keep PEG tube. PEG tube removal not recommended at this time due to he isn't eating enough to maintain weight with current elevated calorie/protein needs due to cancer. Encouraged Jose Perez or Erin to call with questions or concerns. Weight change: 1.46% weight gain x 1 month, 1.2% weight gain x 3 months, 15.45% significant weight loss x 6 months  Appetite: Usually eating 1-2 meals per day. Still utilizing PEG tube for EN of Boost VHC 3 per day. Nutritional Side Effects: dysgeusia, constipation, xerostomia  Calculated Needs: 30-32kcal/kg/RHX=3367-6108fotcl, 1.3-1.5gm/kg/CBW=95-110gm protein, 1ml/kcal=2200-2400ml fluids  Malnutrition Status: At risk    Recommendations: Increase oral intake to 2-3 meals per day.  Continue Boost VHC at this time for weight maintenance 3 cartons per day with     Alisson Whitney RD

## 2023-03-02 ENCOUNTER — TELEPHONE (OUTPATIENT)
Dept: INFUSION THERAPY | Age: 73
End: 2023-03-02

## 2023-03-02 DIAGNOSIS — R16.0 LIVER MASSES: Primary | ICD-10-CM

## 2023-03-02 NOTE — TELEPHONE ENCOUNTER
Spoke with Maggie Bae at [facility] and Ella Carlosmustpaha still has an active order for [facility] which is what he has been using. Maggie Bae was going to contact Maria G [facility]os to see if he was ready for another shipment.

## 2023-03-15 ENCOUNTER — OFFICE VISIT (OUTPATIENT)
Dept: PALLATIVE CARE | Age: 73
End: 2023-03-15
Payer: MEDICARE

## 2023-03-15 ENCOUNTER — TELEPHONE (OUTPATIENT)
Dept: INFUSION THERAPY | Age: 73
End: 2023-03-15

## 2023-03-15 VITALS
DIASTOLIC BLOOD PRESSURE: 82 MMHG | WEIGHT: 156.6 LBS | BODY MASS INDEX: 24.53 KG/M2 | OXYGEN SATURATION: 98 % | TEMPERATURE: 97.3 F | HEART RATE: 78 BPM | SYSTOLIC BLOOD PRESSURE: 140 MMHG

## 2023-03-15 DIAGNOSIS — G89.3 PAIN DUE TO NEOPLASM: ICD-10-CM

## 2023-03-15 PROCEDURE — 99213 OFFICE O/P EST LOW 20 MIN: CPT | Performed by: NURSE PRACTITIONER

## 2023-03-15 PROCEDURE — 99211 OFF/OP EST MAY X REQ PHY/QHP: CPT | Performed by: NURSE PRACTITIONER

## 2023-03-15 PROCEDURE — 3077F SYST BP >= 140 MM HG: CPT | Performed by: NURSE PRACTITIONER

## 2023-03-15 PROCEDURE — 3079F DIAST BP 80-89 MM HG: CPT | Performed by: NURSE PRACTITIONER

## 2023-03-15 PROCEDURE — 1123F ACP DISCUSS/DSCN MKR DOCD: CPT | Performed by: NURSE PRACTITIONER

## 2023-03-15 RX ORDER — PILOCARPINE HYDROCHLORIDE 5 MG/1
5 TABLET, FILM COATED ORAL 3 TIMES DAILY
Qty: 90 TABLET | Refills: 0 | Status: SHIPPED | OUTPATIENT
Start: 2023-03-15 | End: 2023-04-14

## 2023-03-15 RX ORDER — OXYCODONE HYDROCHLORIDE 5 MG/1
5 TABLET ORAL EVERY 8 HOURS PRN
Qty: 42 TABLET | Refills: 0 | Status: SHIPPED | OUTPATIENT
Start: 2023-03-15 | End: 2023-03-29

## 2023-03-15 RX ORDER — HYDROXYZINE PAMOATE 25 MG/1
25 CAPSULE ORAL 3 TIMES DAILY PRN
Qty: 42 CAPSULE | Refills: 0 | Status: SHIPPED | OUTPATIENT
Start: 2023-03-15 | End: 2023-03-29

## 2023-03-15 NOTE — PROGRESS NOTES
Department of Palliative Medicine  Ambulatory Note  Provider: MARY Hernández CNP      Location of service: [place] [address]  Chief Complaint: Xenia Gaspar is a 71 y.o. male with chief complaint of pain    HPI: Xenia Gaspar is a 71 y.o. 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 He completed chemo and radiation. He was found to have development of multiple hepatic metastasis. PET scan in February 2023 showed resolution of at adenopathy of the neck and improved activity at the floor of the mouth, it showed development of osseous metastatic disease to the sacrum, T12, and right third rib. He was started on Keytruda. Assessment/Plan      Squamous cell carcinoma of the left base of tongue and tonsil  -Follows with Dr. Simona Cage for oncology    Pain due to neoplasm:  -Continue oxycodone 5 mg Q 8 hours  -Carafate solution QID  -Tylenol 650 mg as needed  -ibuprofen 600 mg every 6 hours as needed  -Restart Magic mouthwash QID PRN  -PEG tube  -nystatin for 10 days    Insomnia/ Anxiety  -melatonin   -Has not needed Trazodone  -Start Vistaril 25 mg every 8 hours as needed    Constipation  -stop Colace  -Continue Metamucil  -Continue senna S  - lactulose- has not needed    Mucositis/Thrush:  -Nystatin     Xerostomia/loss of taste  -Continue Salagen 5 mg every 8 hours as needed  -Continue zinc 50 mg twice daily    Follow Up:  6 weeks. Encouraged to call with any questions, concerns, needs, or changes in symptoms. Subjective:     Met with Rocky Marinelli at the outpatient clinic. He explains that they did find disease progression. He states when he first found out that the news he had an anxiety attack, but he feels like he is coping better now.   He states that he does not feel like his depression is overwhelming or inhibiting his daily life. He states that there are some nights where his mind is going and he is unable to fall asleep. He states that he continues to get white ulcerated patches on the side of his tongue. He states that they happen frequently. He states that it causes him a lot of pain. He does continue use his Carafate and Magic mouthwash. He uses his oxycodone daily and occasionally twice a day. Pain is mostly in his mouth and tongue. He denies any abdominal pain pain in his back or rib cage. He states that taste is improving but still has a hard time with sweet foods. He feels like his appetite has been good. He does continue to do a tube feeding daily. We discussed protein supplements. And also have dietitian touch base with him. He denies any nausea or shortness of breath.     Pain Assessment   Ratin  Description: burning  Duration: months  Frequency: daily  Location: left neck  Alleviating Factors: pain medication and ice  Exacerbating Factors: unable to associate with any factor  Effect: change in function, interference with activities, sleep, and mood    Past Medical History:   Diagnosis Date    Arthritis     Cancer ([facility]     oropharynx    Diabetes mellitus ([facility]     GERD (gastroesophageal reflux disease)     Hyperlipidemia     Hypertension     Lyme disease     Thrombocytopenia ([facility]        Past Surgical History:   Procedure Laterality Date    BRONCHOSCOPY N/A 10/28/2019    BRONCHOSCOPY performed by Gaby Patterson MD at [address] [place] [address]      dr Niki Pastrana  2017    COLONOSCOPY  2019    GASTROSTOMY TUBE PLACEMENT N/A 2022    PEG TUBE PLACEMENT performed by Jagdish Linares MD at [facility] 405 Left 3/17/2022    PANENDOSCOPY WITH BIOPSY performed by Bonnie Fitzpatrick DO at [facility] 15 Left 2022    PANENDOSCOPY performed by Bonnie Fitzpatrick DO at [address] [place] [address] Left 2022    EXCISION LEFT LEVEL II LYMPH NODE, PANENDOSCOPY performed by Kris Hernandez DO [place] [facility]    TOOTH EXTRACTION Left 12/28/2021    VASECTOMY         Family History   Problem Relation Age of Onset    High Blood Pressure Mother     High Blood Pressure Father     Cancer Father 82        kidney    Abdominal aortic aneurysm Father        ROS: UNLESS STATED ABOVE PATIENT DENIES:  CONSTITUTIONAL:  fever, chill, rigors, nausea, vomiting, fatigue.  HEENT: blurry vision, double vision, hearing problem, tinnitus, hoarseness, dysphagia, odynophagia  RESPIRATORY: cough, shortness of breath, sputum expectoration.  CARDIOVASCULAR:  Chest pain/pressure, palpitation, syncope, irregular beats  GASTROINTESTINAL:  abdominal or rectal pain, diarrhea, constipation, .  GENITOURINARY:  Burning, frequency, urgency, incontinence, discharge  INTEGUMENTARY: rash, wound, pruritis  HEMATOLOGIC/LYMPHATIC:  Swelling, sores, gum bleeding, easy bruising, pica.  MUSCULOSKELETAL:  pain, edema, joint swelling or redness  NEUROLOGICAL:  light headed, dizziness, loss of consciousness, weakness, change in memory, seizures, tremors    Objective:     Physical Exam  Wt Readings from Last 3 Encounters:   03/15/23 156 lb 9.6 oz (71 kg)   03/08/23 156 lb (70.8 kg)   02/28/23 160 lb 1.6 oz (72.6 kg)     BP (!) 140/82   Pulse 78   Temp 97.3 °F (36.3 °C)   Wt 156 lb 9.6 oz (71 kg)   SpO2 98% Comment: RA  BMI 24.53 kg/m²     Gen:  Alert, appears stated age, well nourished, in no acute distress  HEENT:  Normocephalic, no drainage, white patches of the tongue and buccal mucosa  Neck:  Supple  Lungs:  non labored breathing  Heart::  Regular rate  Abd:  Soft,   M/S/Ext:  moving all extremities   Skin:  no visible lesions  Neuro:  PERRL, Alert, oriented x 3; following commands    [place] Symptom Assessment Score   [place] Score 3/15/2023 2/1/2023 12/21/2022 10/26/2022 10/12/2022   Pain Score 9 8 9 Worst possible pain Worst possible pain   Tiredness Score 6  Not tired 4 7 6   Nausea Score Not nauseated Not nauseated Not nauseated Not nauseated 3   Depression Score 5 Not depressed Not depressed 4 2   Anxiety Score 7 Not anxious Not anxious Not anxious 4   Drowsiness Score 1 Not drowsy 1 5 2   Appetite Score 5 Best appetite 1 Best appetite 6   Wellbeing Score 5 Best feeling of wellbeing 1 8 Best feeling of wellbeing   Dyspnea Score No shortness of breath No shortness of breath No shortness of breath No shortness of breath No shortness of breath   Other Problem Score Worst possible response Best possible response 7 Worst possible response 7   Total Assessment Score(calculated) 48 8 23 44 40     Assessed by: patient and provider. Current Medications:  Medications reviewed: yes    Controlled Substances Monitoring: [place] reviewed 3/15/23. RX Monitoring 9/28/2022   Periodic Controlled Substance Monitoring Possible medication side effects, risk of tolerance/dependence & alternative treatments discussed. ;No signs of potential drug abuse or diversion identified.;Obtaining appropriate analgesic effect of treatment. ;Assessed functional status. MARY Hernandez - CNP   Palliative Care Department     Time/Communication  Greater than 50% of time spent, total 20 minutes in face-to-face counseling and coordination of care regarding goals of care and symptom management. Note: This report was completed using [employer] voiced recognition software. Every effort has been made to ensure accuracy; however, inadvertent computerized transcription errors may be present.

## 2023-03-15 NOTE — TELEPHONE ENCOUNTER
Margo Carr  3/15/2023  Wt Readings from Last 10 Encounters:   03/15/23 156 lb 9.6 oz (71 kg)   03/08/23 156 lb (70.8 kg)   02/28/23 160 lb 1.6 oz (72.6 kg)   02/01/23 161 lb 9.6 oz (73.3 kg)   01/31/23 157 lb 12.8 oz (71.6 kg)   12/30/22 155 lb (70.3 kg)   12/21/22 158 lb (71.7 kg)   12/08/22 156 lb (70.8 kg)   12/05/22 158 lb 12.8 oz (72 kg)   11/01/22 153 lb 6.4 oz (69.6 kg)     Ht Readings from Last 1 Encounters:   03/08/23 5' 7\" (1.702 m)     BMI=24.53    Assessment: Met with Darin Bustillo and his wife Nisa Novoa while in for palliative care appointment. Darin Bustillo reports he usually eats 2 meals per day and drinks 1 ONS per day (currently Muscle Milk). Reduced Boost VHC to 1 carton per day. Discussed with weight loss and his high calorie needs, that Boost VHC via PEG tube should be increased again to at least 2 per day. May need 3 per day to maintain weight. Stressed importance of maintaining weight. Darin Bustillo verbalized understanding. Encouraged them to call with questions or concerns. Weight change: 2.19% weight loss x 2 weeks, 1% weight loss x 3 months, 11.43% significant weight loss x 6 months  Appetite: Darin Bustillo reports his appetite is good, eating 2 meals per day. Drinking 1 Muscle Milk. EN of Boost VHC he reduced to 1 carton per day. Nutritional Side Effects: dysgeusia, xerostomia, occasional mouth sores  Calculated Needs: 30-32kcal/kg/XXE=4973-3034zhbmy, 1.3-1.5gm/kg/CBW=95-110gm protein, 1ml/kcal=2100-2300ml fluids  Malnutrition Status: At risk    Recommendations: Increase oral intake to 3 meals per day. Increase Boost VHC to 2-3 per day to maintain weight. Drink ONS as desired.     Joaquina Gallegos, LEIDA

## 2023-03-21 ENCOUNTER — HOSPITAL ENCOUNTER (OUTPATIENT)
Dept: PREADMISSION TESTING | Age: 73
Discharge: HOME | End: 2023-03-21

## 2023-03-21 ENCOUNTER — OFFICE VISIT (OUTPATIENT)
Dept: ONCOLOGY | Age: 73
End: 2023-03-21
Payer: MEDICARE

## 2023-03-21 ENCOUNTER — HOSPITAL ENCOUNTER (OUTPATIENT)
Dept: INFUSION THERAPY | Age: 73
Discharge: HOME | End: 2023-03-21

## 2023-03-21 VITALS
TEMPERATURE: 97.8 F | SYSTOLIC BLOOD PRESSURE: 119 MMHG | HEART RATE: 81 BPM | OXYGEN SATURATION: 99 % | HEIGHT: 67 IN | DIASTOLIC BLOOD PRESSURE: 75 MMHG | BODY MASS INDEX: 24.56 KG/M2 | WEIGHT: 156.5 LBS

## 2023-03-21 DIAGNOSIS — C10.9 SQUAMOUS CELL CARCINOMA OF OROPHARYNX (HCC): Primary | ICD-10-CM

## 2023-03-21 DIAGNOSIS — C10.9 OROPHARYNGEAL CANCER (HCC): ICD-10-CM

## 2023-03-21 PROCEDURE — 99213 OFFICE O/P EST LOW 20 MIN: CPT | Performed by: INTERNAL MEDICINE

## 2023-03-21 PROCEDURE — 3074F SYST BP LT 130 MM HG: CPT | Performed by: INTERNAL MEDICINE

## 2023-03-21 PROCEDURE — 3078F DIAST BP <80 MM HG: CPT | Performed by: INTERNAL MEDICINE

## 2023-03-21 PROCEDURE — 99212 OFFICE O/P EST SF 10 MIN: CPT

## 2023-03-21 PROCEDURE — 1123F ACP DISCUSS/DSCN MKR DOCD: CPT | Performed by: INTERNAL MEDICINE

## 2023-03-21 RX ORDER — LIDOCAINE HYDROCHLORIDE 20 MG/ML
SOLUTION OROPHARYNGEAL
COMMUNITY
Start: 2023-03-15

## 2023-03-21 NOTE — PROGRESS NOTES
nail polish on your fingers or toes. DO NOT wear any jewelry or piercings on day of surgery. All body piercing jewelry must be removed. Shower the night before surgery with _x__Antibacterial soap /TETE WIPES________    TOTAL JOINT REPLACEMENT/HYSTERECTOMY PATIENTS ONLY---Remember to bring Blood Bank bracelet to the hospital on the day of surgery. If you have a Living Will and Durable Power of  for Healthcare, please bring in a copy. If appropriate bring crutches, inspirex, WALKER, CANE etc... Notify your Surgeon if you develop any illness between now and surgery time, cough, cold, fever, sore throat, nausea, vomiting, etc.  Please notify your surgeon if you experience dizziness, shortness of breath or blurred vision between now & the time of your surgery. If you have ___dentures, they will be removed before going to the OR; we will provide you a container. If you wear ___contact lenses or _x__glasses, they will be removed; please bring a case for them. To provide excellent care visitors will be limited to 2 in the room at any given time. Please bring picture ID and insurance card. Sleep apnea patients need to bring CPAP AND SETTINGS to hospital on day of surgery. During flu season no children under the age of 15 are permitted in the hospital for the safety of all patients. Other                   Please call AMBULATORY CARE if you have any further questions.    [address]     [address]

## 2023-03-21 NOTE — PROGRESS NOTES
the neck has resolved. There is also improved activity at the floor of the mouth; residual intermediate level activity at the anterior left floor of mouth can reflect post treatment related inflammation or residual local disease. Interval development of osseous metastatic disease of the sacrum, T12, and possibly anterior right 3rd rib, compared to PET/CT scan dated 08/01/2022. Interval development of multiple hepatic metastases  Overall disease progression. Today 03/21/2023; No fever chills. Fair appetite and energy level. Dry mouth. No N.V. No diarrhea. Review of Systems;  CONSTITUTIONAL: No fever, chills. Fair appetite and energy level. ENMT: Eyes: No diplopia; Nose: No epistaxis. Mouth: Dry mouth  RESPIRATORY: No hemoptysis, shortness of breath, cough. CARDIOVASCULAR: No chest pain, palpitations. GASTROINTESTINAL: No N.V, abdominal pain  GENITOURINARY: No dysuria, urinary frequency, hematuria. NEURO: No syncope, presyncope, headache. Remainder: ROS NEGATIVE    Past Medical History:      Diagnosis Date    Arthritis     Cancer ([facility] 2022    oropharynx    Diabetes mellitus ([facility] [address]     GERD (gastroesophageal reflux disease)     Hyperlipidemia     Hypertension     Lyme disease     Thrombocytopenia ([facility])      Medications:  Reviewed and reconciled. Allergies:  No Known Allergies    Physical Exam:  /75   Pulse 81   Temp 97.8 °F (36.6 °C)   Ht 5' 7\" (1.702 m)   Wt 156 lb 8 oz (71 kg)   SpO2 99%   BMI 24.51 kg/m²   GENERAL: Alert, oriented x 3, not in distress  EXTREMITIES: Without clubbing, cyanosis, or edema. NEUROLOGIC: No focal deficits.    ECOG PS 1    Lab Results   Component Value Date    WBC 4.3 03/06/2023    HGB 13.2 (L) 03/06/2023    HCT 39.0 (L) 03/06/2023    MCV 88.4 03/06/2023     03/06/2023     Lab Results   Component Value Date     03/06/2023    K 4.5 03/06/2023     03/06/2023    CO2 30 03/06/2023    BUN 20 03/06/2023    CREATININE 1.12 03/06/2023    GLUCOSE 96

## 2023-03-22 ENCOUNTER — HOSPITAL ENCOUNTER (OUTPATIENT)
Dept: CT IMAGING | Age: 73
Discharge: HOME | End: 2023-03-22
Payer: MEDICARE

## 2023-03-22 VITALS
DIASTOLIC BLOOD PRESSURE: 80 MMHG | RESPIRATION RATE: 18 BRPM | BODY MASS INDEX: 23.71 KG/M2 | SYSTOLIC BLOOD PRESSURE: 158 MMHG | TEMPERATURE: 97 F | HEART RATE: 76 BPM | OXYGEN SATURATION: 98 % | WEIGHT: 151.04 LBS | HEIGHT: 67 IN

## 2023-03-22 DIAGNOSIS — R16.0 LIVER MASSES: ICD-10-CM

## 2023-03-22 LAB
APTT BLD: 35 SEC (ref 24.5–35.1)
ERYTHROCYTE [DISTWIDTH] IN BLOOD BY AUTOMATED COUNT: 13.5 FL (ref 11.5–15)
HCT VFR BLD AUTO: 39.8 % (ref 37–54)
HGB BLD-MCNC: 13 G/DL (ref 12.5–16.5)
INR BLD: 1.1
MCH RBC QN AUTO: 29.8 PG (ref 26–35)
MCHC RBC AUTO-ENTMCNC: 32.7 % (ref 32–34.5)
MCV RBC AUTO: 91.3 FL (ref 80–99.9)
METER GLUCOSE: 89 MG/DL (ref 74–99)
PLATELET # BLD AUTO: 177 E9/L (ref 130–450)
PMV BLD AUTO: 10.1 FL (ref 7–12)
PROTHROMBIN TIME: 12.8 SEC (ref 9.3–12.4)
RBC # BLD AUTO: 4.36 E12/L (ref 3.8–5.8)
WBC # BLD: 3.7 E9/L (ref 4.5–11.5)

## 2023-03-22 PROCEDURE — 47000 NEEDLE BIOPSY OF LIVER PERQ: CPT

## 2023-03-22 PROCEDURE — 77012 CT SCAN FOR NEEDLE BIOPSY: CPT

## 2023-03-22 PROCEDURE — 7100000011 HC PHASE II RECOVERY - ADDTL 15 MIN

## 2023-03-22 PROCEDURE — 7100000010 HC PHASE II RECOVERY - FIRST 15 MIN

## 2023-03-22 PROCEDURE — 36415 COLL VENOUS BLD VENIPUNCTURE: CPT

## 2023-03-22 PROCEDURE — 6360000002 HC RX W HCPCS: Performed by: RADIOLOGY

## 2023-03-22 PROCEDURE — 85730 THROMBOPLASTIN TIME PARTIAL: CPT

## 2023-03-22 PROCEDURE — 85610 PROTHROMBIN TIME: CPT

## 2023-03-22 PROCEDURE — 85027 COMPLETE CBC AUTOMATED: CPT

## 2023-03-22 PROCEDURE — 82962 GLUCOSE BLOOD TEST: CPT

## 2023-03-22 RX ORDER — MIDAZOLAM HYDROCHLORIDE 1 MG/ML
INJECTION INTRAMUSCULAR; INTRAVENOUS
Status: COMPLETED | OUTPATIENT
Start: 2023-03-22 | End: 2023-03-22

## 2023-03-22 RX ORDER — FENTANYL CITRATE 0.05 MG/ML
INJECTION, SOLUTION INTRAMUSCULAR; INTRAVENOUS
Status: COMPLETED | OUTPATIENT
Start: 2023-03-22 | End: 2023-03-22

## 2023-03-22 RX ADMIN — MIDAZOLAM 1 MG: 1 INJECTION INTRAMUSCULAR; INTRAVENOUS at 11:13

## 2023-03-22 RX ADMIN — MIDAZOLAM 1 MG: 1 INJECTION INTRAMUSCULAR; INTRAVENOUS at 11:08

## 2023-03-22 RX ADMIN — FENTANYL CITRATE 25 MCG: 50 INJECTION INTRAMUSCULAR; INTRAVENOUS at 11:13

## 2023-03-22 RX ADMIN — FENTANYL CITRATE 50 MCG: 50 INJECTION INTRAMUSCULAR; INTRAVENOUS at 11:08

## 2023-03-22 ASSESSMENT — PAIN - FUNCTIONAL ASSESSMENT: PAIN_FUNCTIONAL_ASSESSMENT: NONE - DENIES PAIN

## 2023-03-22 NOTE — DISCHARGE INSTRUCTIONS
Percutaneous Liver Biopsy: What to Expect at [address]  A needle biopsy of the liver is a procedure to take a tiny sample (biopsy) of your liver tissue. The tissue sample is looked at under a microscope. Your doctor can look for infection or other liver problems. You may have some pain where the biopsy needle entered your skin (the puncture site). You may also have pain in your shoulder. This is called referred pain. It is caused by pain traveling along a nerve near the biopsy site. The referred pain usually lasts less than 12 hours. You may have a small amount of bleeding from the puncture site. You can probably go home if you have no problems after the test. You will need to take it easy at home for 1 or 2 days after the procedure. You will probably be able to return to work and most of your usual activities after that. This care sheet gives you a general idea about how long it will take for you to recover. But each person recovers at a different pace. Follow the steps below to get better as quickly as possible. How can you care for yourself at home? Activity    Rest when you feel tired. Getting enough sleep will help you recover. Try to walk each day. Start by walking a little more than you did the day before. Bit by bit, increase the amount you walk. Walking boosts blood flow and helps prevent pneumonia and constipation. Avoid exercises that use your belly muscles and strenuous activities, such as bicycle riding, jogging, weight lifting, or aerobic exercise, for 1 week or until your doctor says it is okay. Ask your doctor when you can drive again. You will probably need to take 1 or 2 days off from work. It depends on the type of work you do and how you feel. You will probably be able to shower the same day as the test, if your doctor says it is okay. Pat the puncture site dry.  Do not take a bath for at least 2 days after the test, or until your doctor tells you it is

## 2023-03-22 NOTE — PROGRESS NOTES
Patient came down to special procedures for liver biopsy. Patient was educated about the amount of radiation used with today's procedure. Patient taken to Cat Scan. Patient positioned supine , secured on Cat Scan table with O2 and monitoring devices attached. Patient scanned and images reviewed by Dr Yarely Hall     Patient prepped secured and draped. Emotional support given. 1108 - sedation medication given    1113 - Procedure start /59 64 10 98% on 2 liters nasal canula    With the guidance of Cat Scan, needle inserted and 3 core biopsies taken by Dr. Yarely Hall     Patient re-scanned and images reviewed by     Puncture site cleansed and dry sterile dressing of folded 4 x 4 and tegaderm applied to RUQ.     1121 - Procedure completed /74 72 16 100% on 2 liters nasal canula     Patient tolerated procedure    Biopsy sample taken to laboratory. 1136 - 15 minutes post procedure /66 61 16 97% on room air, no complaints of pain at puncture site.  Dressing CDI    Total amount of sedation medication given during procedure: 2 mg of IV Versed and 75 mcg of IV Fentanyl    Nurse to nurse called, spoke with Campos Melgoza, notified nurse of above information    Patient transported back to [facility]

## 2023-03-28 ENCOUNTER — TELEPHONE (OUTPATIENT)
Dept: INFUSION THERAPY | Age: 73
End: 2023-03-28

## 2023-03-28 ENCOUNTER — HOSPITAL ENCOUNTER (OUTPATIENT)
Dept: INFUSION THERAPY | Age: 73
Discharge: HOME | End: 2023-03-28
Payer: MEDICARE

## 2023-03-28 ENCOUNTER — OFFICE VISIT (OUTPATIENT)
Dept: ONCOLOGY | Age: 73
End: 2023-03-28
Payer: MEDICARE

## 2023-03-28 VITALS
OXYGEN SATURATION: 99 % | WEIGHT: 157.6 LBS | DIASTOLIC BLOOD PRESSURE: 84 MMHG | TEMPERATURE: 98.8 F | BODY MASS INDEX: 24.74 KG/M2 | HEIGHT: 67 IN | SYSTOLIC BLOOD PRESSURE: 131 MMHG | HEART RATE: 74 BPM

## 2023-03-28 VITALS
RESPIRATION RATE: 18 BRPM | DIASTOLIC BLOOD PRESSURE: 66 MMHG | OXYGEN SATURATION: 99 % | HEART RATE: 63 BPM | SYSTOLIC BLOOD PRESSURE: 128 MMHG | TEMPERATURE: 98.1 F

## 2023-03-28 DIAGNOSIS — C10.9 SQUAMOUS CELL CARCINOMA OF OROPHARYNX (HCC): Primary | ICD-10-CM

## 2023-03-28 PROCEDURE — 6360000002 HC RX W HCPCS: Performed by: INTERNAL MEDICINE

## 2023-03-28 PROCEDURE — 2580000003 HC RX 258: Performed by: INTERNAL MEDICINE

## 2023-03-28 PROCEDURE — 3079F DIAST BP 80-89 MM HG: CPT | Performed by: INTERNAL MEDICINE

## 2023-03-28 PROCEDURE — 99214 OFFICE O/P EST MOD 30 MIN: CPT | Performed by: INTERNAL MEDICINE

## 2023-03-28 PROCEDURE — 1123F ACP DISCUSS/DSCN MKR DOCD: CPT | Performed by: INTERNAL MEDICINE

## 2023-03-28 PROCEDURE — 3075F SYST BP GE 130 - 139MM HG: CPT | Performed by: INTERNAL MEDICINE

## 2023-03-28 PROCEDURE — 96375 TX/PRO/DX INJ NEW DRUG ADDON: CPT

## 2023-03-28 PROCEDURE — 96413 CHEMO IV INFUSION 1 HR: CPT

## 2023-03-28 RX ORDER — ALBUTEROL SULFATE 90 UG/1
4 AEROSOL, METERED RESPIRATORY (INHALATION) PRN
Status: CANCELLED | OUTPATIENT
Start: 2023-03-28

## 2023-03-28 RX ORDER — MEPERIDINE HYDROCHLORIDE 25 MG/ML
12.5 INJECTION INTRAMUSCULAR; INTRAVENOUS; SUBCUTANEOUS PRN
Status: CANCELLED | OUTPATIENT
Start: 2023-03-28

## 2023-03-28 RX ORDER — DIPHENHYDRAMINE HYDROCHLORIDE 50 MG/ML
50 INJECTION INTRAMUSCULAR; INTRAVENOUS
Status: CANCELLED | OUTPATIENT
Start: 2023-03-28

## 2023-03-28 RX ORDER — EPINEPHRINE 1 MG/ML
0.3 INJECTION, SOLUTION, CONCENTRATE INTRAVENOUS PRN
Status: CANCELLED | OUTPATIENT
Start: 2023-03-28

## 2023-03-28 RX ORDER — HEPARIN SODIUM (PORCINE) LOCK FLUSH IV SOLN 100 UNIT/ML 100 UNIT/ML
500 SOLUTION INTRAVENOUS PRN
Status: CANCELLED | OUTPATIENT
Start: 2023-03-28

## 2023-03-28 RX ORDER — ONDANSETRON 2 MG/ML
8 INJECTION INTRAMUSCULAR; INTRAVENOUS
Status: CANCELLED | OUTPATIENT
Start: 2023-03-28

## 2023-03-28 RX ORDER — ONDANSETRON 2 MG/ML
8 INJECTION INTRAMUSCULAR; INTRAVENOUS
Status: COMPLETED | OUTPATIENT
Start: 2023-03-28 | End: 2023-03-28

## 2023-03-28 RX ORDER — SODIUM CHLORIDE 9 MG/ML
5-250 INJECTION, SOLUTION INTRAVENOUS PRN
Status: CANCELLED | OUTPATIENT
Start: 2023-03-28

## 2023-03-28 RX ORDER — SODIUM CHLORIDE 9 MG/ML
INJECTION, SOLUTION INTRAVENOUS CONTINUOUS
Status: CANCELLED | OUTPATIENT
Start: 2023-03-28

## 2023-03-28 RX ORDER — ACETAMINOPHEN 325 MG/1
650 TABLET ORAL
Status: CANCELLED | OUTPATIENT
Start: 2023-03-28

## 2023-03-28 RX ORDER — SODIUM CHLORIDE 9 MG/ML
5-40 INJECTION INTRAVENOUS PRN
Status: CANCELLED | OUTPATIENT
Start: 2023-03-28

## 2023-03-28 RX ORDER — SODIUM CHLORIDE 9 MG/ML
5-250 INJECTION, SOLUTION INTRAVENOUS PRN
Status: DISCONTINUED | OUTPATIENT
Start: 2023-03-28 | End: 2023-03-29 | Stop reason: HOSPADM

## 2023-03-28 RX ADMIN — ONDANSETRON 8 MG: 2 INJECTION INTRAMUSCULAR; INTRAVENOUS at 11:46

## 2023-03-28 RX ADMIN — SODIUM CHLORIDE 20 ML/HR: 9 INJECTION, SOLUTION INTRAVENOUS at 11:46

## 2023-03-28 RX ADMIN — SODIUM CHLORIDE 200 MG: 9 INJECTION, SOLUTION INTRAVENOUS at 12:11

## 2023-03-28 NOTE — TELEPHONE ENCOUNTER
Janes Rico  3/28/2023  Wt Readings from Last 10 Encounters:   03/28/23 157 lb 9.6 oz (71.5 kg)   03/22/23 151 lb 0.7 oz (68.5 kg)   03/21/23 156 lb 8 oz (71 kg)   03/15/23 156 lb 9.6 oz (71 kg)   03/08/23 156 lb (70.8 kg)   02/28/23 160 lb 1.6 oz (72.6 kg)   02/01/23 161 lb 9.6 oz (73.3 kg)   01/31/23 157 lb 12.8 oz (71.6 kg)   12/30/22 155 lb (70.3 kg)   12/21/22 158 lb (71.7 kg)     Ht Readings from Last 1 Encounters:   03/28/23 5' 7\" (1.702 m)     BMI=24.68    Assessment: Met with Catia Bryan while in for OTV with Dr. Milton Pacheco. He is scheduled to begin [facility] today. Carley Hartley reports he is using 2-4 cartons of Boost VHC per day based on his oral intake. He denied problems with dysphagia. Denied problems tolerating tube feeding, procurement or administration. Weight change: 1.56% weight loss x 1 month, 1.68% weight gain x 3 months, 7.29% weight loss x 6 months  Appetite: Fair appetite, eating at least 2 meals per day and drinking 1 ONS per day. Nutritional Side Effects: dysgeusia, xerostomia  Calculated Needs: 30-32kcal/kg/GOB=1466-2229aobja, 1.3-1.5gm/kg/CBW=95-110gm protein, 1ml/kcal=2150-2300ml fluids  Malnutrition Status: At risk    Recommendations: Increase oral intake to 3 meals per day. Boost VHC 2-4 per day via PEG tube to maintain weight. Drink ONS as desired.      Rosa Cowden, RD

## 2023-03-28 NOTE — PROGRESS NOTES
Per dr Jazmin Murray, ok to use labds from 3/6/23 for treatmetn today, also stated do not need hep panel today

## 2023-03-28 NOTE — PROGRESS NOTES
08/01/2022. Interval development of multiple hepatic metastases  Overall disease progression. PD-L1 on tissue: CPS 50    CT guided core needle biopsy liver mass 03/22/2023. Metastatic HPV-related squamous cell carcinoma   Comment: The squamous cell carcinoma is immunoreactive with pankeratin, p40, and p16 (surrogate marker for high risk HPV). The TTF-1, chromogranin, and synaptophysin immunostains are negative in tumor cells. Intradepartmental consultation is obtained    Pathology reviewed. Metastatic Squamous Cell Carcinoma  Diagnosis prognosis reviewed. Since CPS 50>20; recommend single agent Keytruda. Side effects reviewed. He agreed to proceed. Cycle # 1 Erick Renteria is today 03/28/2023. Labs reviewed ok to proceed. Continue Nystatin for oral thrush. Continue Biotene for dry mouth.     RTC 3 weeks for Cycle # 2 Sukumar Keys MD   3/28/2023  I spent a total of 30 minutes on the date of the service which included preparing to see the patient, face-to-face patient care, completing clinical documentation, counseling and educating the family/family members/caregiver, ordering medications, tests, or procedures complicating results with the patient/family/caregiver

## 2023-03-31 ENCOUNTER — OFFICE VISIT (OUTPATIENT)
Dept: ENT CLINIC | Age: 73
End: 2023-03-31
Payer: MEDICARE

## 2023-03-31 VITALS
WEIGHT: 159 LBS | HEART RATE: 79 BPM | SYSTOLIC BLOOD PRESSURE: 135 MMHG | BODY MASS INDEX: 24.96 KG/M2 | HEIGHT: 67 IN | DIASTOLIC BLOOD PRESSURE: 85 MMHG

## 2023-03-31 DIAGNOSIS — C10.9 SQUAMOUS CELL CARCINOMA OF OROPHARYNX (HCC): Primary | ICD-10-CM

## 2023-03-31 DIAGNOSIS — C10.2 MALIGNANT NEOPLASM OF LATERAL WALL OF OROPHARYNX (HCC): ICD-10-CM

## 2023-03-31 DIAGNOSIS — R22.1 MASS OF LEFT SIDE OF NECK: ICD-10-CM

## 2023-03-31 DIAGNOSIS — R59.0 CERVICAL ADENOPATHY: ICD-10-CM

## 2023-03-31 PROCEDURE — 3078F DIAST BP <80 MM HG: CPT | Performed by: OTOLARYNGOLOGY

## 2023-03-31 PROCEDURE — 1123F ACP DISCUSS/DSCN MKR DOCD: CPT | Performed by: OTOLARYNGOLOGY

## 2023-03-31 PROCEDURE — 99213 OFFICE O/P EST LOW 20 MIN: CPT | Performed by: OTOLARYNGOLOGY

## 2023-03-31 PROCEDURE — 31575 DIAGNOSTIC LARYNGOSCOPY: CPT | Performed by: OTOLARYNGOLOGY

## 2023-03-31 PROCEDURE — 3074F SYST BP LT 130 MM HG: CPT | Performed by: OTOLARYNGOLOGY

## 2023-03-31 ASSESSMENT — ENCOUNTER SYMPTOMS
RHINORRHEA: 0
SHORTNESS OF BREATH: 0
COUGH: 0
TROUBLE SWALLOWING: 1
VOMITING: 0
VOICE CHANGE: 0
SORE THROAT: 1

## 2023-04-06 DIAGNOSIS — G89.3 PAIN DUE TO NEOPLASM: ICD-10-CM

## 2023-04-06 RX ORDER — OXYCODONE HYDROCHLORIDE 5 MG/1
5 TABLET ORAL EVERY 8 HOURS PRN
Qty: 42 TABLET | Refills: 0 | Status: CANCELLED | OUTPATIENT
Start: 2023-04-06 | End: 2023-04-20

## 2023-04-07 DIAGNOSIS — G89.3 PAIN DUE TO NEOPLASM: ICD-10-CM

## 2023-04-07 RX ORDER — OXYCODONE HYDROCHLORIDE 5 MG/1
5 TABLET ORAL EVERY 8 HOURS PRN
Qty: 42 TABLET | Refills: 0 | Status: SHIPPED | OUTPATIENT
Start: 2023-04-07 | End: 2023-04-21

## 2023-04-07 RX ORDER — HYDROXYZINE PAMOATE 25 MG/1
25 CAPSULE ORAL 3 TIMES DAILY PRN
Qty: 42 CAPSULE | Refills: 0 | Status: SHIPPED | OUTPATIENT
Start: 2023-04-07 | End: 2023-04-21

## 2023-04-07 NOTE — TELEPHONE ENCOUNTER
Call from [place] , [address] wife, requesting refill for Oxy IR and Vistaril. Pharmacy is [address] Next richy 5/10/23.

## 2023-04-18 ENCOUNTER — TELEPHONE (OUTPATIENT)
Dept: INFUSION THERAPY | Age: 73
End: 2023-04-18

## 2023-04-18 ENCOUNTER — OFFICE VISIT (OUTPATIENT)
Dept: ONCOLOGY | Age: 73
End: 2023-04-18
Payer: MEDICARE

## 2023-04-18 ENCOUNTER — HOSPITAL ENCOUNTER (OUTPATIENT)
Dept: INFUSION THERAPY | Age: 73
Discharge: HOME | End: 2023-04-18
Payer: MEDICARE

## 2023-04-18 VITALS
TEMPERATURE: 98.1 F | DIASTOLIC BLOOD PRESSURE: 66 MMHG | HEIGHT: 67 IN | WEIGHT: 159.2 LBS | HEART RATE: 81 BPM | OXYGEN SATURATION: 97 % | BODY MASS INDEX: 24.99 KG/M2 | SYSTOLIC BLOOD PRESSURE: 112 MMHG

## 2023-04-18 VITALS
HEART RATE: 79 BPM | OXYGEN SATURATION: 97 % | SYSTOLIC BLOOD PRESSURE: 117 MMHG | RESPIRATION RATE: 18 BRPM | DIASTOLIC BLOOD PRESSURE: 65 MMHG | TEMPERATURE: 98.2 F

## 2023-04-18 DIAGNOSIS — C10.9 SQUAMOUS CELL CARCINOMA OF OROPHARYNX (HCC): Primary | ICD-10-CM

## 2023-04-18 LAB
ALBUMIN SERPL-MCNC: 4.2 G/DL (ref 3.5–5.2)
ALP SERPL-CCNC: 65 U/L (ref 40–129)
ALT SERPL-CCNC: 15 U/L (ref 0–40)
ANION GAP SERPL CALCULATED.3IONS-SCNC: 7 MMOL/L (ref 7–16)
AST SERPL-CCNC: 19 U/L (ref 0–39)
BASOPHILS # BLD: 0.03 E9/L (ref 0–0.2)
BASOPHILS NFR BLD: 0.7 % (ref 0–2)
BILIRUB SERPL-MCNC: 0.3 MG/DL (ref 0–1.2)
BUN SERPL-MCNC: 25 MG/DL (ref 6–23)
CALCIUM SERPL-MCNC: 9.8 MG/DL (ref 8.6–10.2)
CHLORIDE SERPL-SCNC: 107 MMOL/L (ref 98–107)
CO2 SERPL-SCNC: 30 MMOL/L (ref 22–29)
CREAT SERPL-MCNC: 1.1 MG/DL (ref 0.7–1.2)
EOSINOPHIL # BLD: 0.18 E9/L (ref 0.05–0.5)
EOSINOPHIL NFR BLD: 4.5 % (ref 0–6)
ERYTHROCYTE [DISTWIDTH] IN BLOOD BY AUTOMATED COUNT: 13.4 FL (ref 11.5–15)
GLUCOSE SERPL-MCNC: 109 MG/DL (ref 74–99)
HCT VFR BLD AUTO: 39.3 % (ref 37–54)
HGB BLD-MCNC: 13.1 G/DL (ref 12.5–16.5)
IMM GRANULOCYTES # BLD: 0.01 E9/L
IMM GRANULOCYTES NFR BLD: 0.2 % (ref 0–5)
LYMPHOCYTES # BLD: 0.29 E9/L (ref 1.5–4)
LYMPHOCYTES NFR BLD: 7.2 % (ref 20–42)
MCH RBC QN AUTO: 30.3 PG (ref 26–35)
MCHC RBC AUTO-ENTMCNC: 33.3 % (ref 32–34.5)
MCV RBC AUTO: 90.8 FL (ref 80–99.9)
MONOCYTES # BLD: 0.36 E9/L (ref 0.1–0.95)
MONOCYTES NFR BLD: 8.9 % (ref 2–12)
NEUTROPHILS # BLD: 3.17 E9/L (ref 1.8–7.3)
NEUTS SEG NFR BLD: 78.5 % (ref 43–80)
PLATELET # BLD AUTO: 202 E9/L (ref 130–450)
PMV BLD AUTO: 9.8 FL (ref 7–12)
POTASSIUM SERPL-SCNC: 4 MMOL/L (ref 3.5–5)
PROT SERPL-MCNC: 7 G/DL (ref 6.4–8.3)
RBC # BLD AUTO: 4.33 E12/L (ref 3.8–5.8)
SODIUM SERPL-SCNC: 144 MMOL/L (ref 132–146)
TSH SERPL-MCNC: 3.95 UIU/ML (ref 0.27–4.2)
WBC # BLD: 4 E9/L (ref 4.5–11.5)

## 2023-04-18 PROCEDURE — 99212 OFFICE O/P EST SF 10 MIN: CPT

## 2023-04-18 PROCEDURE — 36415 COLL VENOUS BLD VENIPUNCTURE: CPT

## 2023-04-18 PROCEDURE — 96375 TX/PRO/DX INJ NEW DRUG ADDON: CPT

## 2023-04-18 PROCEDURE — 1123F ACP DISCUSS/DSCN MKR DOCD: CPT | Performed by: INTERNAL MEDICINE

## 2023-04-18 PROCEDURE — 96413 CHEMO IV INFUSION 1 HR: CPT

## 2023-04-18 PROCEDURE — 6360000002 HC RX W HCPCS: Performed by: INTERNAL MEDICINE

## 2023-04-18 PROCEDURE — 85025 COMPLETE CBC W/AUTO DIFF WBC: CPT

## 2023-04-18 PROCEDURE — 3078F DIAST BP <80 MM HG: CPT | Performed by: INTERNAL MEDICINE

## 2023-04-18 PROCEDURE — 84443 ASSAY THYROID STIM HORMONE: CPT

## 2023-04-18 PROCEDURE — 80053 COMPREHEN METABOLIC PANEL: CPT

## 2023-04-18 PROCEDURE — 3074F SYST BP LT 130 MM HG: CPT | Performed by: INTERNAL MEDICINE

## 2023-04-18 PROCEDURE — 2580000003 HC RX 258: Performed by: INTERNAL MEDICINE

## 2023-04-18 PROCEDURE — 99214 OFFICE O/P EST MOD 30 MIN: CPT | Performed by: INTERNAL MEDICINE

## 2023-04-18 RX ORDER — MEPERIDINE HYDROCHLORIDE 25 MG/ML
12.5 INJECTION INTRAMUSCULAR; INTRAVENOUS; SUBCUTANEOUS PRN
Status: CANCELLED | OUTPATIENT
Start: 2023-04-18

## 2023-04-18 RX ORDER — ONDANSETRON 2 MG/ML
8 INJECTION INTRAMUSCULAR; INTRAVENOUS
Status: COMPLETED | OUTPATIENT
Start: 2023-04-18 | End: 2023-04-18

## 2023-04-18 RX ORDER — SODIUM CHLORIDE 9 MG/ML
5-250 INJECTION, SOLUTION INTRAVENOUS PRN
Status: DISCONTINUED | OUTPATIENT
Start: 2023-04-18 | End: 2023-04-19 | Stop reason: HOSPADM

## 2023-04-18 RX ORDER — ALBUTEROL SULFATE 90 UG/1
4 AEROSOL, METERED RESPIRATORY (INHALATION) PRN
Status: CANCELLED | OUTPATIENT
Start: 2023-04-18

## 2023-04-18 RX ORDER — SODIUM CHLORIDE 9 MG/ML
5-250 INJECTION, SOLUTION INTRAVENOUS PRN
Status: CANCELLED | OUTPATIENT
Start: 2023-04-18

## 2023-04-18 RX ORDER — ACETAMINOPHEN 325 MG/1
650 TABLET ORAL
Status: CANCELLED | OUTPATIENT
Start: 2023-04-18

## 2023-04-18 RX ORDER — ONDANSETRON 2 MG/ML
8 INJECTION INTRAMUSCULAR; INTRAVENOUS
Status: CANCELLED | OUTPATIENT
Start: 2023-04-18

## 2023-04-18 RX ORDER — SODIUM CHLORIDE 0.9 % (FLUSH) 0.9 %
5-40 SYRINGE (ML) INJECTION PRN
Status: DISCONTINUED | OUTPATIENT
Start: 2023-04-18 | End: 2023-04-19 | Stop reason: HOSPADM

## 2023-04-18 RX ORDER — HEPARIN SODIUM (PORCINE) LOCK FLUSH IV SOLN 100 UNIT/ML 100 UNIT/ML
500 SOLUTION INTRAVENOUS PRN
Status: CANCELLED | OUTPATIENT
Start: 2023-04-18

## 2023-04-18 RX ORDER — DIPHENHYDRAMINE HYDROCHLORIDE 50 MG/ML
50 INJECTION INTRAMUSCULAR; INTRAVENOUS
Status: CANCELLED | OUTPATIENT
Start: 2023-04-18

## 2023-04-18 RX ORDER — AZITHROMYCIN 250 MG/1
250 TABLET, FILM COATED ORAL SEE ADMIN INSTRUCTIONS
Qty: 6 TABLET | Refills: 0 | Status: SHIPPED | OUTPATIENT
Start: 2023-04-18 | End: 2023-04-23

## 2023-04-18 RX ORDER — SODIUM CHLORIDE 9 MG/ML
INJECTION, SOLUTION INTRAVENOUS CONTINUOUS
Status: CANCELLED | OUTPATIENT
Start: 2023-04-18

## 2023-04-18 RX ORDER — SODIUM CHLORIDE 0.9 % (FLUSH) 0.9 %
5-40 SYRINGE (ML) INJECTION PRN
Status: CANCELLED | OUTPATIENT
Start: 2023-04-18

## 2023-04-18 RX ORDER — EPINEPHRINE 1 MG/ML
0.3 INJECTION, SOLUTION, CONCENTRATE INTRAVENOUS PRN
Status: CANCELLED | OUTPATIENT
Start: 2023-04-18

## 2023-04-18 RX ADMIN — SODIUM CHLORIDE, PRESERVATIVE FREE 10 ML: 5 INJECTION INTRAVENOUS at 14:30

## 2023-04-18 RX ADMIN — SODIUM CHLORIDE 200 MG: 9 INJECTION, SOLUTION INTRAVENOUS at 14:41

## 2023-04-18 RX ADMIN — SODIUM CHLORIDE 200 ML/HR: 9 INJECTION, SOLUTION INTRAVENOUS at 14:30

## 2023-04-18 RX ADMIN — ONDANSETRON 8 MG: 2 INJECTION INTRAMUSCULAR; INTRAVENOUS at 14:30

## 2023-04-18 NOTE — PROGRESS NOTES
Cisplatin was on 10/04/2022. Cycle # 5 weekly Cisplatin was on 10/11/2022. Cycle # 6 weekly Cisplatin was on 10/18/2022  Cycle # 7 weekly Cisplatin was on 10/25/2022. RT was completed on 11/03/2022. PET/CT scan 83/63/1882:   Hypermetabolic adenopathy within the neck has resolved. There is also improved activity at the floor of the mouth; residual intermediate level activity at the anterior left floor of mouth can reflect post treatment related inflammation or residual local disease. Interval development of osseous metastatic disease of the sacrum, T12, and possibly anterior right 3rd rib, compared to PET/CT scan dated 08/01/2022. Interval development of multiple hepatic metastases  Overall disease progression. PD-L1 on tissue: CPS 50    CT guided core needle biopsy liver mass 03/22/2023. Metastatic HPV-related squamous cell carcinoma   Comment: The squamous cell carcinoma is immunoreactive with pankeratin, p40, and p16 (surrogate marker for high risk HPV). The TTF-1, chromogranin, and synaptophysin immunostains are negative in tumor cells. Intradepartmental consultation is obtained    Metastatic Squamous Cell Carcinoma  Since CPS 50>20; recommend single agent Keytruda. Cycle # 1 Juan Jose Cocks was on 03/28/2023. Cycle # 2 Juan Jose Cocks is today 04/18/2023. Labs reviewed ok to proceed.    SOB and productive coughing (greenish phlegm); prescribed antibiotics Z-Anthony  Dry mouth secondary to treatment; continue dry mouth  RTC 3 weeks for Cycle # 3 Corbin Hopkins MD   4/18/2023

## 2023-04-18 NOTE — TELEPHONE ENCOUNTER
Demar [facility]  4/18/2023  Wt Readings from Last 10 Encounters:   04/18/23 159 lb 3.2 oz (72.2 kg)   03/31/23 159 lb (72.1 kg)   03/28/23 157 lb 9.6 oz (71.5 kg)   03/22/23 151 lb 0.7 oz (68.5 kg)   03/21/23 156 lb 8 oz (71 kg)   03/15/23 156 lb 9.6 oz (71 kg)   03/08/23 156 lb (70.8 kg)   02/28/23 160 lb 1.6 oz (72.6 kg)   02/01/23 161 lb 9.6 oz (73.3 kg)   01/31/23 157 lb 12.8 oz (71.6 kg)     Ht Readings from Last 1 Encounters:   04/18/23 5' 7\" (1.702 m)     BMI=24.93    Assessment: Met with Vicki Kaiser and Erin while in for OTV with Dr. Michela Nolen. Today Vicki Kaiser reports that he uses 2-4 cartons of Boost VHC and usually only eats 1 meal per day. Stressed importance of increasing oral intake to 3 meals per day. He has maintained his weight and reports he has good energy, golfing and cutting fallen trees. Discussed constipation, he has been using Metamucil through his PEG tube. Discouraged use of it via PEG due to high risk of clogging. Also discussed adequate fluids needed if using Metamucil. Nutrition questions answered to apparent satisfaction. Encouraged them to call with questions or concerns. Weight change: No significant weight change. 2.93% weight loss x 6 months  Appetite: Fair appetite  Nutritional Side Effects: xerostomia, dysgeusia, constipation  Calculated Needs: 30-32kcal/kg/XUF=4436-5299wcdmi, 1.3-1.5gm/kg/CBW=95-110gm protein, 1ml/kcal=2200-2300ml fluids  Malnutrition Status: At risk    Recommendations: Increase oral intake to 3 meals per day. Utilize Boost VHC for EN via PEG of 3 cartons per day.     Radha Crump RD

## 2023-04-25 ENCOUNTER — TELEPHONE (OUTPATIENT)
Dept: ENT CLINIC | Age: 73
End: 2023-04-25

## 2023-04-25 NOTE — TELEPHONE ENCOUNTER
Patients wife calling to see if Dr Ellen Beverly will see patient asap. He has a severe sore throat as he is currently getting Keytruda for his throat cancer. She states he can barley swallow due to pain. Please contact wife to schedule.

## 2023-04-26 DIAGNOSIS — G89.3 PAIN DUE TO NEOPLASM: ICD-10-CM

## 2023-04-26 RX ORDER — OXYCODONE HYDROCHLORIDE 5 MG/1
5 TABLET ORAL EVERY 8 HOURS PRN
Qty: 42 TABLET | Refills: 0 | Status: SHIPPED | OUTPATIENT
Start: 2023-04-26 | End: 2023-05-10

## 2023-04-26 NOTE — TELEPHONE ENCOUNTER
Spoke with patient's wife, Erin Hamilton, and informed her to contact Dr that prescribed [facility] since this started to occur after patient started new medication. Informed wife to call office back if oncologist feels that patient needs a scope.   Patient's wife stated that she understands

## 2023-04-26 NOTE — TELEPHONE ENCOUNTER
Call from [facility] requesting refill for Oxy 5 mg for her  Cedric Miner. Pharmacy is [address] Next richy 5/10/23.

## 2023-05-09 ENCOUNTER — HOSPITAL ENCOUNTER (OUTPATIENT)
Dept: INFUSION THERAPY | Age: 73
Discharge: HOME | End: 2023-05-09
Payer: MEDICARE

## 2023-05-09 ENCOUNTER — OFFICE VISIT (OUTPATIENT)
Dept: ONCOLOGY | Age: 73
End: 2023-05-09
Payer: MEDICARE

## 2023-05-09 VITALS
HEART RATE: 84 BPM | OXYGEN SATURATION: 98 % | HEIGHT: 67 IN | DIASTOLIC BLOOD PRESSURE: 77 MMHG | SYSTOLIC BLOOD PRESSURE: 128 MMHG | BODY MASS INDEX: 24.5 KG/M2 | TEMPERATURE: 98 F | WEIGHT: 156.1 LBS

## 2023-05-09 VITALS
TEMPERATURE: 97.8 F | HEART RATE: 84 BPM | OXYGEN SATURATION: 96 % | SYSTOLIC BLOOD PRESSURE: 107 MMHG | RESPIRATION RATE: 16 BRPM | DIASTOLIC BLOOD PRESSURE: 50 MMHG

## 2023-05-09 DIAGNOSIS — E78.5 HYPERLIPIDEMIA, UNSPECIFIED HYPERLIPIDEMIA TYPE: Primary | ICD-10-CM

## 2023-05-09 DIAGNOSIS — C10.9 SQUAMOUS CELL CARCINOMA OF OROPHARYNX (HCC): ICD-10-CM

## 2023-05-09 DIAGNOSIS — C10.9 OROPHARYNGEAL CANCER (HCC): ICD-10-CM

## 2023-05-09 DIAGNOSIS — C10.9 SQUAMOUS CELL CARCINOMA OF OROPHARYNX (HCC): Primary | ICD-10-CM

## 2023-05-09 LAB
ALBUMIN SERPL-MCNC: 4 G/DL (ref 3.5–5.2)
ALP SERPL-CCNC: 58 U/L (ref 40–129)
ALT SERPL-CCNC: 22 U/L (ref 0–40)
ANION GAP SERPL CALCULATED.3IONS-SCNC: 9 MMOL/L (ref 7–16)
AST SERPL-CCNC: 23 U/L (ref 0–39)
BASOPHILS # BLD: 0.03 E9/L (ref 0–0.2)
BASOPHILS NFR BLD: 0.9 % (ref 0–2)
BILIRUB SERPL-MCNC: 0.3 MG/DL (ref 0–1.2)
BUN SERPL-MCNC: 29 MG/DL (ref 6–23)
CALCIUM SERPL-MCNC: 9.5 MG/DL (ref 8.6–10.2)
CHLORIDE SERPL-SCNC: 103 MMOL/L (ref 98–107)
CO2 SERPL-SCNC: 28 MMOL/L (ref 22–29)
CREAT SERPL-MCNC: 1.3 MG/DL (ref 0.7–1.2)
EOSINOPHIL # BLD: 0.09 E9/L (ref 0.05–0.5)
EOSINOPHIL NFR BLD: 2.6 % (ref 0–6)
ERYTHROCYTE [DISTWIDTH] IN BLOOD BY AUTOMATED COUNT: 13.6 FL (ref 11.5–15)
GLUCOSE SERPL-MCNC: 132 MG/DL (ref 74–99)
HCT VFR BLD AUTO: 38.4 % (ref 37–54)
HGB BLD-MCNC: 12.5 G/DL (ref 12.5–16.5)
LYMPHOCYTES # BLD: 0.14 E9/L (ref 1.5–4)
LYMPHOCYTES NFR BLD: 4.4 % (ref 20–42)
MAGNESIUM SERPL-MCNC: 2 MG/DL (ref 1.6–2.6)
MCH RBC QN AUTO: 30.1 PG (ref 26–35)
MCHC RBC AUTO-ENTMCNC: 32.6 % (ref 32–34.5)
MCV RBC AUTO: 92.5 FL (ref 80–99.9)
MONOCYTES # BLD: 0.24 E9/L (ref 0.1–0.95)
MONOCYTES NFR BLD: 7 % (ref 2–12)
NEUTROPHILS # BLD: 2.98 E9/L (ref 1.8–7.3)
NEUTS SEG NFR BLD: 85.1 % (ref 43–80)
PLATELET # BLD AUTO: 182 E9/L (ref 130–450)
PMV BLD AUTO: 11 FL (ref 7–12)
POTASSIUM SERPL-SCNC: 4.2 MMOL/L (ref 3.5–5)
PROT SERPL-MCNC: 6.7 G/DL (ref 6.4–8.3)
RBC # BLD AUTO: 4.15 E12/L (ref 3.8–5.8)
SODIUM SERPL-SCNC: 140 MMOL/L (ref 132–146)
T4 SERPL-MCNC: 9.9 MCG/DL (ref 4.5–11.7)
TSH SERPL-MCNC: 1.13 UIU/ML (ref 0.27–4.2)
WBC # BLD: 3.5 E9/L (ref 4.5–11.5)

## 2023-05-09 PROCEDURE — 2580000003 HC RX 258: Performed by: INTERNAL MEDICINE

## 2023-05-09 PROCEDURE — 84443 ASSAY THYROID STIM HORMONE: CPT

## 2023-05-09 PROCEDURE — 6360000002 HC RX W HCPCS: Performed by: INTERNAL MEDICINE

## 2023-05-09 PROCEDURE — 83735 ASSAY OF MAGNESIUM: CPT

## 2023-05-09 PROCEDURE — 3074F SYST BP LT 130 MM HG: CPT | Performed by: INTERNAL MEDICINE

## 2023-05-09 PROCEDURE — 96413 CHEMO IV INFUSION 1 HR: CPT

## 2023-05-09 PROCEDURE — 84436 ASSAY OF TOTAL THYROXINE: CPT

## 2023-05-09 PROCEDURE — 1123F ACP DISCUSS/DSCN MKR DOCD: CPT | Performed by: INTERNAL MEDICINE

## 2023-05-09 PROCEDURE — 85025 COMPLETE CBC W/AUTO DIFF WBC: CPT

## 2023-05-09 PROCEDURE — 36415 COLL VENOUS BLD VENIPUNCTURE: CPT

## 2023-05-09 PROCEDURE — 99214 OFFICE O/P EST MOD 30 MIN: CPT | Performed by: INTERNAL MEDICINE

## 2023-05-09 PROCEDURE — 3078F DIAST BP <80 MM HG: CPT | Performed by: INTERNAL MEDICINE

## 2023-05-09 PROCEDURE — 80053 COMPREHEN METABOLIC PANEL: CPT

## 2023-05-09 RX ORDER — MEPERIDINE HYDROCHLORIDE 25 MG/ML
12.5 INJECTION INTRAMUSCULAR; INTRAVENOUS; SUBCUTANEOUS PRN
Status: CANCELLED | OUTPATIENT
Start: 2023-05-09

## 2023-05-09 RX ORDER — FENOFIBRATE 145 MG/1
145 TABLET, COATED ORAL DAILY
Qty: 90 TABLET | Refills: 1 | Status: CANCELLED | OUTPATIENT
Start: 2023-05-09

## 2023-05-09 RX ORDER — SODIUM CHLORIDE 9 MG/ML
5-250 INJECTION, SOLUTION INTRAVENOUS PRN
Status: CANCELLED | OUTPATIENT
Start: 2023-05-09

## 2023-05-09 RX ORDER — SODIUM CHLORIDE 0.9 % (FLUSH) 0.9 %
5-40 SYRINGE (ML) INJECTION PRN
Status: CANCELLED | OUTPATIENT
Start: 2023-05-09

## 2023-05-09 RX ORDER — SODIUM CHLORIDE 9 MG/ML
5-250 INJECTION, SOLUTION INTRAVENOUS PRN
Status: DISCONTINUED | OUTPATIENT
Start: 2023-05-09 | End: 2023-05-10 | Stop reason: HOSPADM

## 2023-05-09 RX ORDER — ONDANSETRON 2 MG/ML
8 INJECTION INTRAMUSCULAR; INTRAVENOUS
Status: CANCELLED | OUTPATIENT
Start: 2023-05-09

## 2023-05-09 RX ORDER — SODIUM CHLORIDE 9 MG/ML
INJECTION, SOLUTION INTRAVENOUS CONTINUOUS
Status: CANCELLED | OUTPATIENT
Start: 2023-05-09

## 2023-05-09 RX ORDER — ALBUTEROL SULFATE 90 UG/1
4 AEROSOL, METERED RESPIRATORY (INHALATION) PRN
Status: CANCELLED | OUTPATIENT
Start: 2023-05-09

## 2023-05-09 RX ORDER — SODIUM CHLORIDE 0.9 % (FLUSH) 0.9 %
5-40 SYRINGE (ML) INJECTION PRN
Status: DISCONTINUED | OUTPATIENT
Start: 2023-05-09 | End: 2023-05-10 | Stop reason: HOSPADM

## 2023-05-09 RX ORDER — DIPHENHYDRAMINE HYDROCHLORIDE 50 MG/ML
50 INJECTION INTRAMUSCULAR; INTRAVENOUS
Status: CANCELLED | OUTPATIENT
Start: 2023-05-09

## 2023-05-09 RX ORDER — EPINEPHRINE 1 MG/ML
0.3 INJECTION, SOLUTION, CONCENTRATE INTRAVENOUS PRN
Status: CANCELLED | OUTPATIENT
Start: 2023-05-09

## 2023-05-09 RX ORDER — ACETAMINOPHEN 325 MG/1
650 TABLET ORAL
Status: CANCELLED | OUTPATIENT
Start: 2023-05-09

## 2023-05-09 RX ORDER — HEPARIN SODIUM (PORCINE) LOCK FLUSH IV SOLN 100 UNIT/ML 100 UNIT/ML
500 SOLUTION INTRAVENOUS PRN
Status: CANCELLED | OUTPATIENT
Start: 2023-05-09

## 2023-05-09 RX ADMIN — SODIUM CHLORIDE 200 MG: 9 INJECTION, SOLUTION INTRAVENOUS at 15:11

## 2023-05-09 RX ADMIN — SODIUM CHLORIDE 100 ML/HR: 9 INJECTION, SOLUTION INTRAVENOUS at 15:02

## 2023-05-09 NOTE — PROGRESS NOTES
Cisplatin was on 10/11/2022. Cycle # 6 weekly Cisplatin was on 10/18/2022  Cycle # 7 weekly Cisplatin was on 10/25/2022. RT was completed on 11/03/2022. PET/CT scan 25/63/8191:   Hypermetabolic adenopathy within the neck has resolved. There is also improved activity at the floor of the mouth; residual intermediate level activity at the anterior left floor of mouth can reflect post treatment related inflammation or residual local disease. Interval development of osseous metastatic disease of the sacrum, T12, and possibly anterior right 3rd rib, compared to PET/CT scan dated 08/01/2022. Interval development of multiple hepatic metastases  Overall disease progression. PD-L1 on tissue: CPS 50    CT guided core needle biopsy liver mass 03/22/2023. Metastatic HPV-related squamous cell carcinoma   Comment: The squamous cell carcinoma is immunoreactive with pankeratin, p40, and p16 (surrogate marker for high risk HPV). The TTF-1, chromogranin, and synaptophysin immunostains are negative in tumor cells. Intradepartmental consultation is obtained    Metastatic Squamous Cell Carcinoma  Since CPS 50>20; recommend single agent Keytruda. Cycle # 1 Claudia Woodward was on 03/28/2023. Cycle # 2 Keytruda was on 04/18/2023. Cycle # 3 Claudia Woodward is ordered and given today 05/09/2023. Labs reviewed ok to proceed. Oral thrush secondary to treatment; recommended and ordered Nystatin   Dry mouth secondary to treatment; continue Biotene and increase po fluid intake. RTC 3 weeks for Cycle # 4 Keytruda.  Scans after next visit    Roddy Moyer MD   5/9/2023

## 2023-05-09 NOTE — DISCHARGE INSTR - COC
Continuity of Care Form    Patient Name: Kailash Sandra   :  1950  MRN:  [id number]    Admit date:  2023  Discharge date:  ***    Code Status Order: Prior   Advance Directives:     Admitting Physician:  No admitting provider for patient encounter. PCP: Vanessa Vaughn DO    Discharging Nurse: [facility] Unit/Room#: No information available for this encounter. Discharging Unit Phone Number: ***    Emergency Contact:   Extended Emergency Contact Information  Primary Emergency Contact: [facility]  Address: [address] [place] [address] [place] of [address] [place] [address] Phone: [phone]  Mobile Phone: [phone]  Relation: Spouse  Preferred language: English   needed? No  Secondary Emergency Contact: [address] Phone: [phone]  Mobile Phone: [phone]  Relation: Child   needed?  No    Past Surgical History:  Past Surgical History:   Procedure Laterality Date    BRONCHOSCOPY N/A 10/28/2019    BRONCHOSCOPY performed by Bertha Mackenzie MD at [address] [place] [address]      dr Nancy Myers  2017    COLONOSCOPY  2019    GASTROSTOMY TUBE PLACEMENT N/A 2022    PEG TUBE PLACEMENT performed by Lisbet Avendaño MD at [place] [address] BIOPSY LIVER PERCUTANEOUS  3/22/2023    IR BIOPSY LIVER PERCUTANEOUS 3/22/2023 [facility] CT    LARYNGOSCOPY Left 3/17/2022    PANENDOSCOPY WITH BIOPSY performed by Jose Juan Mercer DO at [place] [address] Left 2022    PANENDOSCOPY performed by Jose Juan Mercer DO at [address] [facility] Left 2022    EXCISION LEFT LEVEL II LYMPH NODE, PANENDOSCOPY performed by Jose Juan Mercer DO at [address] EXTRACTION Left 2021    VASECTOMY         Immunization History:   Immunization History   Administered Date(s) Administered    COVID-19, MODERNA BLUE border, Primary or Immunocompromised, (age 12y+), IM, 100 mcg/0.5mL 2021,

## 2023-05-10 ENCOUNTER — OFFICE VISIT (OUTPATIENT)
Dept: PALLATIVE CARE | Age: 73
End: 2023-05-10
Payer: MEDICARE

## 2023-05-10 VITALS
TEMPERATURE: 97.3 F | DIASTOLIC BLOOD PRESSURE: 81 MMHG | BODY MASS INDEX: 24.59 KG/M2 | SYSTOLIC BLOOD PRESSURE: 126 MMHG | OXYGEN SATURATION: 99 % | HEART RATE: 84 BPM | WEIGHT: 157 LBS

## 2023-05-10 DIAGNOSIS — G89.3 PAIN DUE TO NEOPLASM: ICD-10-CM

## 2023-05-10 DIAGNOSIS — Z51.5 PALLIATIVE CARE BY SPECIALIST: Primary | ICD-10-CM

## 2023-05-10 PROCEDURE — 99211 OFF/OP EST MAY X REQ PHY/QHP: CPT | Performed by: NURSE PRACTITIONER

## 2023-05-10 RX ORDER — SUCRALFATE ORAL 1 G/10ML
1 SUSPENSION ORAL 4 TIMES DAILY
Qty: 1200 ML | Refills: 3 | Status: SHIPPED | OUTPATIENT
Start: 2023-05-10

## 2023-05-10 RX ORDER — OXYCODONE HYDROCHLORIDE 5 MG/1
5 TABLET ORAL EVERY 8 HOURS PRN
Qty: 42 TABLET | Refills: 0 | Status: CANCELLED | OUTPATIENT
Start: 2023-05-10 | End: 2023-05-24

## 2023-05-10 NOTE — PROGRESS NOTES
Department of Palliative Medicine  Ambulatory Note  Provider: MARY Cosme CNP      Location of service: [place] [address]  Chief Complaint: Kisha Kaiser is a 71 y.o. male with chief complaint of pain    HPI: Kisha Kaiser is a 71 y.o. 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 He completed chemo and radiation. He was found to have development of multiple hepatic metastasis. PET scan in February 2023 showed resolution of at adenopathy of the neck and improved activity at the floor of the mouth, it showed development of osseous metastatic disease to the sacrum, T12, and right third rib. He was started on Keytruda. Assessment/Plan      Squamous cell carcinoma of the left base of tongue and tonsil  -Follows with Dr. Rand Zavala for oncology    Pain due to neoplasm:  -Continue oxycodone 5 mg Q 8 hours  -Carafate solution QID  -Tylenol 650 mg as needed  -ibuprofen 600 mg every 6 hours as needed  -Restart Magic mouthwash QID PRN  -PEG tube  -nystatin for 10 days    Insomnia/ Anxiety  -melatonin   -Has not needed Trazodone  -Vistaril 25 mg every 8 hours as needed    Constipation  -stop Colace  -Continue Metamucil  -Continue senna S  - lactulose- has not needed    Mucositis/Thrush:  -Nystatin     Xerostomia/loss of taste  -Continue Salagen 5 mg every 8 hours as needed  -Continue zinc 50 mg twice daily    Follow Up:  6 weeks. Encouraged to call with any questions, concerns, needs, or changes in symptoms. Subjective:     Met with Krystle Kelly at the outpatient clinic. He explains that he has been doing well and able to do all the things he wants like fishing. His main complaint is throat pain and dryness. He was at the oncologist office yesterday and they started nystatin for thrush.   He is using his

## 2023-05-11 DIAGNOSIS — G89.3 PAIN DUE TO NEOPLASM: ICD-10-CM

## 2023-05-11 RX ORDER — OXYCODONE HYDROCHLORIDE 5 MG/1
5 TABLET ORAL EVERY 8 HOURS PRN
Qty: 42 TABLET | Refills: 0 | Status: SHIPPED | OUTPATIENT
Start: 2023-05-11 | End: 2023-05-25

## 2023-05-11 NOTE — TELEPHONE ENCOUNTER
Call from [facility] requesting refill for Oxy IR 5 mg. Pharmacy is [address] 7/5/23
awaiting rehab placement

## 2023-05-30 ENCOUNTER — HOSPITAL ENCOUNTER (OUTPATIENT)
Dept: INFUSION THERAPY | Age: 73
Discharge: HOME | End: 2023-05-30
Payer: MEDICARE

## 2023-05-30 ENCOUNTER — OFFICE VISIT (OUTPATIENT)
Dept: ONCOLOGY | Age: 73
End: 2023-05-30
Payer: MEDICARE

## 2023-05-30 VITALS
TEMPERATURE: 98.3 F | RESPIRATION RATE: 16 BRPM | DIASTOLIC BLOOD PRESSURE: 91 MMHG | OXYGEN SATURATION: 98 % | HEART RATE: 76 BPM | SYSTOLIC BLOOD PRESSURE: 131 MMHG

## 2023-05-30 VITALS
HEART RATE: 72 BPM | TEMPERATURE: 98.1 F | BODY MASS INDEX: 24.58 KG/M2 | WEIGHT: 156.6 LBS | HEIGHT: 67 IN | OXYGEN SATURATION: 100 %

## 2023-05-30 DIAGNOSIS — C10.9 SQUAMOUS CELL CARCINOMA OF OROPHARYNX (HCC): ICD-10-CM

## 2023-05-30 DIAGNOSIS — K59.00 CONSTIPATION, UNSPECIFIED CONSTIPATION TYPE: ICD-10-CM

## 2023-05-30 DIAGNOSIS — G89.3 PAIN DUE TO NEOPLASM: ICD-10-CM

## 2023-05-30 DIAGNOSIS — B37.0 ORAL THRUSH: ICD-10-CM

## 2023-05-30 DIAGNOSIS — C10.2 MALIGNANT NEOPLASM OF LATERAL WALL OF OROPHARYNX (HCC): ICD-10-CM

## 2023-05-30 DIAGNOSIS — C10.9 SQUAMOUS CELL CARCINOMA OF OROPHARYNX (HCC): Primary | ICD-10-CM

## 2023-05-30 DIAGNOSIS — R68.2 DRY MOUTH: ICD-10-CM

## 2023-05-30 DIAGNOSIS — C10.9 OROPHARYNGEAL CANCER (HCC): ICD-10-CM

## 2023-05-30 LAB
ALBUMIN SERPL-MCNC: 3.9 G/DL (ref 3.5–5.2)
ALP SERPL-CCNC: 54 U/L (ref 40–129)
ALT SERPL-CCNC: 16 U/L (ref 0–40)
ANION GAP SERPL CALCULATED.3IONS-SCNC: 8 MMOL/L (ref 7–16)
AST SERPL-CCNC: 17 U/L (ref 0–39)
BASOPHILS # BLD: 0.04 E9/L (ref 0–0.2)
BASOPHILS NFR BLD: 0.9 % (ref 0–2)
BILIRUB SERPL-MCNC: 0.3 MG/DL (ref 0–1.2)
BUN SERPL-MCNC: 28 MG/DL (ref 6–23)
CALCIUM SERPL-MCNC: 9.9 MG/DL (ref 8.6–10.2)
CHLORIDE SERPL-SCNC: 103 MMOL/L (ref 98–107)
CO2 SERPL-SCNC: 29 MMOL/L (ref 22–29)
CREAT SERPL-MCNC: 1.2 MG/DL (ref 0.7–1.2)
EOSINOPHIL # BLD: 0.11 E9/L (ref 0.05–0.5)
EOSINOPHIL NFR BLD: 2.6 % (ref 0–6)
ERYTHROCYTE [DISTWIDTH] IN BLOOD BY AUTOMATED COUNT: 13.3 FL (ref 11.5–15)
GLUCOSE SERPL-MCNC: 103 MG/DL (ref 74–99)
HCT VFR BLD AUTO: 38.8 % (ref 37–54)
HGB BLD-MCNC: 12.7 G/DL (ref 12.5–16.5)
LYMPHOCYTES # BLD: 0.38 E9/L (ref 1.5–4)
LYMPHOCYTES NFR BLD: 7.8 % (ref 20–42)
MCH RBC QN AUTO: 30 PG (ref 26–35)
MCHC RBC AUTO-ENTMCNC: 32.7 % (ref 32–34.5)
MCV RBC AUTO: 91.5 FL (ref 80–99.9)
METAMYELOCYTES NFR BLD MANUAL: 0.9 % (ref 0–1)
MONOCYTES # BLD: 0.25 E9/L (ref 0.1–0.95)
MONOCYTES NFR BLD: 6.1 % (ref 2–12)
NEUTROPHILS # BLD: 3.44 E9/L (ref 1.8–7.3)
NEUTS SEG NFR BLD: 80.8 % (ref 43–80)
PLATELET # BLD AUTO: 214 E9/L (ref 130–450)
PMV BLD AUTO: 10 FL (ref 7–12)
POTASSIUM SERPL-SCNC: 4.2 MMOL/L (ref 3.5–5)
PROT SERPL-MCNC: 6.8 G/DL (ref 6.4–8.3)
RBC # BLD AUTO: 4.24 E12/L (ref 3.8–5.8)
SODIUM SERPL-SCNC: 140 MMOL/L (ref 132–146)
TSH SERPL-MCNC: 0.27 UIU/ML (ref 0.27–4.2)
VARIANT LYMPHS NFR BLD: 0.9 % (ref 0–4)
WBC # BLD: 4.2 E9/L (ref 4.5–11.5)

## 2023-05-30 PROCEDURE — 36415 COLL VENOUS BLD VENIPUNCTURE: CPT

## 2023-05-30 PROCEDURE — 80053 COMPREHEN METABOLIC PANEL: CPT

## 2023-05-30 PROCEDURE — 1123F ACP DISCUSS/DSCN MKR DOCD: CPT | Performed by: INTERNAL MEDICINE

## 2023-05-30 PROCEDURE — 99214 OFFICE O/P EST MOD 30 MIN: CPT | Performed by: INTERNAL MEDICINE

## 2023-05-30 PROCEDURE — 96413 CHEMO IV INFUSION 1 HR: CPT

## 2023-05-30 PROCEDURE — 6360000002 HC RX W HCPCS: Performed by: INTERNAL MEDICINE

## 2023-05-30 PROCEDURE — 84443 ASSAY THYROID STIM HORMONE: CPT

## 2023-05-30 PROCEDURE — 2580000003 HC RX 258: Performed by: INTERNAL MEDICINE

## 2023-05-30 PROCEDURE — 85025 COMPLETE CBC W/AUTO DIFF WBC: CPT

## 2023-05-30 RX ORDER — SODIUM CHLORIDE 9 MG/ML
5-250 INJECTION, SOLUTION INTRAVENOUS PRN
Status: DISCONTINUED | OUTPATIENT
Start: 2023-05-30 | End: 2023-05-31 | Stop reason: HOSPADM

## 2023-05-30 RX ORDER — ONDANSETRON 2 MG/ML
8 INJECTION INTRAMUSCULAR; INTRAVENOUS
Status: CANCELLED | OUTPATIENT
Start: 2023-05-30

## 2023-05-30 RX ORDER — EPINEPHRINE 1 MG/ML
0.3 INJECTION, SOLUTION, CONCENTRATE INTRAVENOUS PRN
Status: CANCELLED | OUTPATIENT
Start: 2023-05-30

## 2023-05-30 RX ORDER — SODIUM CHLORIDE 9 MG/ML
5-250 INJECTION, SOLUTION INTRAVENOUS PRN
Status: CANCELLED | OUTPATIENT
Start: 2023-05-30

## 2023-05-30 RX ORDER — ALBUTEROL SULFATE 90 UG/1
4 AEROSOL, METERED RESPIRATORY (INHALATION) PRN
Status: CANCELLED | OUTPATIENT
Start: 2023-05-30

## 2023-05-30 RX ORDER — SODIUM CHLORIDE 0.9 % (FLUSH) 0.9 %
5-40 SYRINGE (ML) INJECTION PRN
Status: CANCELLED | OUTPATIENT
Start: 2023-05-30

## 2023-05-30 RX ORDER — MEPERIDINE HYDROCHLORIDE 25 MG/ML
12.5 INJECTION INTRAMUSCULAR; INTRAVENOUS; SUBCUTANEOUS PRN
Status: CANCELLED | OUTPATIENT
Start: 2023-05-30

## 2023-05-30 RX ORDER — SODIUM CHLORIDE 0.9 % (FLUSH) 0.9 %
5-40 SYRINGE (ML) INJECTION PRN
Status: DISCONTINUED | OUTPATIENT
Start: 2023-05-30 | End: 2023-05-31 | Stop reason: HOSPADM

## 2023-05-30 RX ORDER — SODIUM CHLORIDE 9 MG/ML
INJECTION, SOLUTION INTRAVENOUS CONTINUOUS
Status: CANCELLED | OUTPATIENT
Start: 2023-05-30

## 2023-05-30 RX ORDER — DIPHENHYDRAMINE HYDROCHLORIDE 50 MG/ML
50 INJECTION INTRAMUSCULAR; INTRAVENOUS
Status: CANCELLED | OUTPATIENT
Start: 2023-05-30

## 2023-05-30 RX ORDER — HEPARIN SODIUM (PORCINE) LOCK FLUSH IV SOLN 100 UNIT/ML 100 UNIT/ML
500 SOLUTION INTRAVENOUS PRN
Status: CANCELLED | OUTPATIENT
Start: 2023-05-30

## 2023-05-30 RX ORDER — ACETAMINOPHEN 325 MG/1
650 TABLET ORAL
Status: CANCELLED | OUTPATIENT
Start: 2023-05-30

## 2023-05-30 RX ADMIN — SODIUM CHLORIDE, PRESERVATIVE FREE 10 ML: 5 INJECTION INTRAVENOUS at 14:35

## 2023-05-30 RX ADMIN — SODIUM CHLORIDE 200 MG: 9 INJECTION, SOLUTION INTRAVENOUS at 14:35

## 2023-05-30 RX ADMIN — SODIUM CHLORIDE 20 ML/HR: 9 INJECTION, SOLUTION INTRAVENOUS at 14:17

## 2023-05-30 ASSESSMENT — PAIN DESCRIPTION - DESCRIPTORS: DESCRIPTORS: SORE

## 2023-05-30 ASSESSMENT — PAIN DESCRIPTION - LOCATION: LOCATION: THROAT

## 2023-05-30 ASSESSMENT — PAIN SCALES - GENERAL: PAINLEVEL_OUTOF10: 8

## 2023-05-30 NOTE — PROGRESS NOTES
[place] [address]   Attending Clinic Note    Reason for Visit: Follow-up on a patient with p16 + Oropharyngeal Squamous Cell Carcinoma    PCP:  Bandar Villegas, DO    History of Present Illness:  28-year-old male who presented to the ENT team for persistent left-sided neck fullness without associated difficulty swallowing    CT soft tissue neck 02/23/2022: Mass located in the left aspect of floor of the mouth extending into the left oropharyngeal soft tissue concerning for squamous cell carcinoma  Multiple enlarged necrotic left anterior cervical chain lymph nodes    Panendoscopy on 3/17/2022:  Findings: L lingual tonsil hypertrophy, biopsy negative, left level II neck node FNA performed   FNA left neck mass level 2: Inconclusive for malignant cells. Few atypical squamous cells with degenerative change  A. Tongue, left base, biopsy:   Squamous epithelial-lined lymphoid tissue with reactive germinal centers, compatible with lingual tonsil. Negative for dysplasia; Negative for malignancy. B.  Tongue, left base, biopsy:   Squamous epithelial-lined lymphoid tissue with reactive germinal centers, compatible with lingual tonsil. Focal lobules of benign mucinous glands. Negative for dysplasia; Negative for malignancy    On 07/21/2022: Panendoscopy excision left level 2 lymph node  Findings:  left base of tongue mass, left cervical lymphadenopathy  A. 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 See comment. B. 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 C.  Left base of tongue, biopsy: Squamous mucosa, negative for neoplasm. D.  Right base of tongue, biopsy: Squamous mucosa, negative for neoplasm. E.  Midline tongue, biopsy: Squamous mucosa, negative for neoplasm.    Comment:   The squamous carcinoma is diffusely and strongly positive for p16 immunostain, supporting the above

## 2023-05-31 RX ORDER — OXYCODONE HYDROCHLORIDE 5 MG/1
5 TABLET ORAL EVERY 8 HOURS PRN
Qty: 42 TABLET | Refills: 0 | Status: SHIPPED | OUTPATIENT
Start: 2023-05-31 | End: 2023-06-14

## 2023-06-06 ENCOUNTER — TELEPHONE (OUTPATIENT)
Dept: INFUSION THERAPY | Age: 73
End: 2023-06-06

## 2023-06-06 NOTE — TELEPHONE ENCOUNTER
Beba Oneil  6/6/2023  Ht Readings from Last 1 Encounters:   05/30/23 5' 7\" (1.702 m)     Wt Readings from Last 10 Encounters:   05/30/23 156 lb 9.6 oz (71 kg)   05/10/23 157 lb (71.2 kg)   05/09/23 156 lb 1.6 oz (70.8 kg)   04/18/23 159 lb 3.2 oz (72.2 kg)   03/31/23 159 lb (72.1 kg)   03/28/23 157 lb 9.6 oz (71.5 kg)   03/22/23 151 lb 0.7 oz (68.5 kg)   03/21/23 156 lb 8 oz (71 kg)   03/15/23 156 lb 9.6 oz (71 kg)   03/08/23 156 lb (70.8 kg)     BMI=24.53    Assessment: Returned phone call to Ke's wife Erin to discuss current enteral nutrition. Ginny Cerda continues to use Boost VHC 2-4 cartons per day via PEG. He also has been using [employer]" via PEG tube, purchased online by patient. Real Food Blends are appropriate via PEG tube but water flush of 6-8oz after administration needed to maintain tube patency. Ginny Cerda continues to not eat enough orally to maintain weight without use of enteral nutrition. No further questions or concerns at this time.      Rachel Preston, RD

## 2023-06-19 DIAGNOSIS — G89.3 PAIN DUE TO NEOPLASM: ICD-10-CM

## 2023-06-19 RX ORDER — OXYCODONE HYDROCHLORIDE 5 MG/1
5 TABLET ORAL EVERY 8 HOURS PRN
Qty: 42 TABLET | Refills: 0 | Status: SHIPPED | OUTPATIENT
Start: 2023-06-19 | End: 2023-07-03

## 2023-06-20 ENCOUNTER — HOSPITAL ENCOUNTER (OUTPATIENT)
Dept: INFUSION THERAPY | Age: 73
Discharge: HOME | End: 2023-06-20
Payer: MEDICARE

## 2023-06-20 ENCOUNTER — OFFICE VISIT (OUTPATIENT)
Dept: ONCOLOGY | Age: 73
End: 2023-06-20
Payer: MEDICARE

## 2023-06-20 VITALS
DIASTOLIC BLOOD PRESSURE: 72 MMHG | TEMPERATURE: 97.6 F | SYSTOLIC BLOOD PRESSURE: 120 MMHG | RESPIRATION RATE: 16 BRPM | HEART RATE: 73 BPM | OXYGEN SATURATION: 98 %

## 2023-06-20 VITALS
DIASTOLIC BLOOD PRESSURE: 66 MMHG | OXYGEN SATURATION: 98 % | TEMPERATURE: 97.4 F | HEART RATE: 74 BPM | HEIGHT: 67 IN | BODY MASS INDEX: 24.92 KG/M2 | WEIGHT: 158.8 LBS | SYSTOLIC BLOOD PRESSURE: 119 MMHG

## 2023-06-20 DIAGNOSIS — C10.9 SQUAMOUS CELL CARCINOMA OF OROPHARYNX (HCC): Primary | ICD-10-CM

## 2023-06-20 DIAGNOSIS — C10.9 SQUAMOUS CELL CARCINOMA OF OROPHARYNX (HCC): ICD-10-CM

## 2023-06-20 DIAGNOSIS — C10.9 OROPHARYNGEAL CANCER (HCC): ICD-10-CM

## 2023-06-20 DIAGNOSIS — R68.2 DRY MOUTH: ICD-10-CM

## 2023-06-20 LAB
ALBUMIN SERPL-MCNC: 4.1 G/DL (ref 3.5–5.2)
ALP SERPL-CCNC: 67 U/L (ref 40–129)
ALT SERPL-CCNC: 14 U/L (ref 0–40)
ANION GAP SERPL CALCULATED.3IONS-SCNC: 9 MMOL/L (ref 7–16)
AST SERPL-CCNC: 17 U/L (ref 0–39)
BASOPHILS # BLD: 0.03 E9/L (ref 0–0.2)
BASOPHILS NFR BLD: 0.7 % (ref 0–2)
BILIRUB SERPL-MCNC: 0.3 MG/DL (ref 0–1.2)
BUN SERPL-MCNC: 20 MG/DL (ref 6–23)
CALCIUM SERPL-MCNC: 9.8 MG/DL (ref 8.6–10.2)
CHLORIDE SERPL-SCNC: 104 MMOL/L (ref 98–107)
CO2 SERPL-SCNC: 29 MMOL/L (ref 22–29)
CREAT SERPL-MCNC: 1.2 MG/DL (ref 0.7–1.2)
EOSINOPHIL # BLD: 0.12 E9/L (ref 0.05–0.5)
EOSINOPHIL NFR BLD: 2.8 % (ref 0–6)
ERYTHROCYTE [DISTWIDTH] IN BLOOD BY AUTOMATED COUNT: 14 FL (ref 11.5–15)
GLUCOSE SERPL-MCNC: 93 MG/DL (ref 74–99)
HCT VFR BLD AUTO: 40.9 % (ref 37–54)
HGB BLD-MCNC: 13.4 G/DL (ref 12.5–16.5)
IMM GRANULOCYTES # BLD: 0.01 E9/L
IMM GRANULOCYTES NFR BLD: 0.2 % (ref 0–5)
LYMPHOCYTES # BLD: 0.24 E9/L (ref 1.5–4)
LYMPHOCYTES NFR BLD: 5.7 % (ref 20–42)
MAGNESIUM SERPL-MCNC: 1.9 MG/DL (ref 1.6–2.6)
MCH RBC QN AUTO: 29.5 PG (ref 26–35)
MCHC RBC AUTO-ENTMCNC: 32.8 % (ref 32–34.5)
MCV RBC AUTO: 90.1 FL (ref 80–99.9)
MONOCYTES # BLD: 0.37 E9/L (ref 0.1–0.95)
MONOCYTES NFR BLD: 8.7 % (ref 2–12)
NEUTROPHILS # BLD: 3.46 E9/L (ref 1.8–7.3)
NEUTS SEG NFR BLD: 81.9 % (ref 43–80)
PLATELET # BLD AUTO: 183 E9/L (ref 130–450)
PMV BLD AUTO: 10.4 FL (ref 7–12)
POTASSIUM SERPL-SCNC: 4.1 MMOL/L (ref 3.5–5)
PROT SERPL-MCNC: 6.7 G/DL (ref 6.4–8.3)
RBC # BLD AUTO: 4.54 E12/L (ref 3.8–5.8)
SODIUM SERPL-SCNC: 142 MMOL/L (ref 132–146)
WBC # BLD: 4.2 E9/L (ref 4.5–11.5)

## 2023-06-20 PROCEDURE — 3074F SYST BP LT 130 MM HG: CPT | Performed by: INTERNAL MEDICINE

## 2023-06-20 PROCEDURE — 96413 CHEMO IV INFUSION 1 HR: CPT

## 2023-06-20 PROCEDURE — 1123F ACP DISCUSS/DSCN MKR DOCD: CPT | Performed by: INTERNAL MEDICINE

## 2023-06-20 PROCEDURE — 36415 COLL VENOUS BLD VENIPUNCTURE: CPT

## 2023-06-20 PROCEDURE — 83735 ASSAY OF MAGNESIUM: CPT

## 2023-06-20 PROCEDURE — 2580000003 HC RX 258: Performed by: INTERNAL MEDICINE

## 2023-06-20 PROCEDURE — 80053 COMPREHEN METABOLIC PANEL: CPT

## 2023-06-20 PROCEDURE — 6360000002 HC RX W HCPCS: Performed by: INTERNAL MEDICINE

## 2023-06-20 PROCEDURE — 99214 OFFICE O/P EST MOD 30 MIN: CPT | Performed by: INTERNAL MEDICINE

## 2023-06-20 PROCEDURE — 3078F DIAST BP <80 MM HG: CPT | Performed by: INTERNAL MEDICINE

## 2023-06-20 PROCEDURE — 85025 COMPLETE CBC W/AUTO DIFF WBC: CPT

## 2023-06-20 RX ORDER — DIPHENHYDRAMINE HYDROCHLORIDE 50 MG/ML
50 INJECTION INTRAMUSCULAR; INTRAVENOUS
Status: CANCELLED | OUTPATIENT
Start: 2023-06-20

## 2023-06-20 RX ORDER — ONDANSETRON 2 MG/ML
8 INJECTION INTRAMUSCULAR; INTRAVENOUS
Status: CANCELLED | OUTPATIENT
Start: 2023-06-20

## 2023-06-20 RX ORDER — SODIUM CHLORIDE 9 MG/ML
5-250 INJECTION, SOLUTION INTRAVENOUS PRN
Status: DISCONTINUED | OUTPATIENT
Start: 2023-06-20 | End: 2023-06-21 | Stop reason: HOSPADM

## 2023-06-20 RX ORDER — EPINEPHRINE 1 MG/ML
0.3 INJECTION, SOLUTION, CONCENTRATE INTRAVENOUS PRN
Status: CANCELLED | OUTPATIENT
Start: 2023-06-20

## 2023-06-20 RX ORDER — SODIUM CHLORIDE 9 MG/ML
INJECTION, SOLUTION INTRAVENOUS CONTINUOUS
Status: CANCELLED | OUTPATIENT
Start: 2023-06-20

## 2023-06-20 RX ORDER — HEPARIN SODIUM 100 [USP'U]/ML
500 INJECTION, SOLUTION INTRAVENOUS PRN
Status: DISCONTINUED | OUTPATIENT
Start: 2023-06-20 | End: 2023-06-21 | Stop reason: HOSPADM

## 2023-06-20 RX ORDER — SODIUM CHLORIDE 0.9 % (FLUSH) 0.9 %
5-40 SYRINGE (ML) INJECTION PRN
Status: CANCELLED | OUTPATIENT
Start: 2023-06-20

## 2023-06-20 RX ORDER — MEPERIDINE HYDROCHLORIDE 25 MG/ML
12.5 INJECTION INTRAMUSCULAR; INTRAVENOUS; SUBCUTANEOUS PRN
Status: CANCELLED | OUTPATIENT
Start: 2023-06-20

## 2023-06-20 RX ORDER — SODIUM CHLORIDE 9 MG/ML
5-250 INJECTION, SOLUTION INTRAVENOUS PRN
Status: CANCELLED | OUTPATIENT
Start: 2023-06-20

## 2023-06-20 RX ORDER — ACETAMINOPHEN 325 MG/1
650 TABLET ORAL
Status: CANCELLED | OUTPATIENT
Start: 2023-06-20

## 2023-06-20 RX ORDER — HEPARIN SODIUM 100 [USP'U]/ML
500 INJECTION, SOLUTION INTRAVENOUS PRN
Status: CANCELLED | OUTPATIENT
Start: 2023-06-20

## 2023-06-20 RX ORDER — ALBUTEROL SULFATE 90 UG/1
4 AEROSOL, METERED RESPIRATORY (INHALATION) PRN
Status: CANCELLED | OUTPATIENT
Start: 2023-06-20

## 2023-06-20 RX ADMIN — SODIUM CHLORIDE 50 ML/HR: 9 INJECTION, SOLUTION INTRAVENOUS at 12:15

## 2023-06-20 RX ADMIN — SODIUM CHLORIDE 200 MG: 9 INJECTION, SOLUTION INTRAVENOUS at 12:43

## 2023-06-20 NOTE — PROGRESS NOTES
or acute appendicitis. Given IV fluid with improvement. IVF daily for 3 days started 09/27/2022. PEG tube inserted on 09/30/2022  Cycle # 4 weekly Cisplatin was on 10/04/2022. Cycle # 5 weekly Cisplatin was on 10/11/2022. Cycle # 6 weekly Cisplatin was on 10/18/2022  Cycle # 7 weekly Cisplatin was on 10/25/2022. RT was completed on 11/03/2022. PET/CT scan 62/44/3916:   Hypermetabolic adenopathy within the neck has resolved. There is also improved activity at the floor of the mouth; residual intermediate level activity at the anterior left floor of mouth can reflect post treatment related inflammation or residual local disease. Interval development of osseous metastatic disease of the sacrum, T12, and possibly anterior right 3rd rib, compared to PET/CT scan dated 08/01/2022. Interval development of multiple hepatic metastases  Overall disease progression. PD-L1 on tissue: CPS 50    CT guided core needle biopsy liver mass 03/22/2023. Metastatic HPV-related squamous cell carcinoma   Comment: The squamous cell carcinoma is immunoreactive with pankeratin, p40, and p16 (surrogate marker for high risk HPV). The TTF-1, chromogranin, and synaptophysin immunostains are negative in tumor cells. Intradepartmental consultation is obtained    Metastatic Squamous Cell Carcinoma  Since CPS 50>20; recommend single agent Keytruda. Cycle # 1 Anastasia Ray was on 03/28/2023. Cycle # 2 Keytruda was on 04/18/2023. Cycle # 3 Keytruda was on 05/09/2023. Cycle # 4 Keytruda was on 05/30/2023. PET/CT scan 06/19/2023:   Findings are compatible with mixed treatment response as compared to prior exam dated 2/10/2023. Previously demonstrated osseous hypermetabolism has resolved and there has been decreased activity within hepatic metastasis (with residual activity in the dominant lesion in the inferior right hepatic lobe). Barry activity at the phylicia hepatis has increased as compared to prior.   Increased activity seen at

## 2023-06-30 ENCOUNTER — OFFICE VISIT (OUTPATIENT)
Dept: ENT CLINIC | Age: 73
End: 2023-06-30

## 2023-06-30 VITALS
SYSTOLIC BLOOD PRESSURE: 134 MMHG | WEIGHT: 161 LBS | DIASTOLIC BLOOD PRESSURE: 83 MMHG | HEIGHT: 67 IN | BODY MASS INDEX: 25.27 KG/M2 | HEART RATE: 75 BPM

## 2023-06-30 DIAGNOSIS — C13.9 CARCINOMA OF HYPOPHARYNX (HCC): Primary | ICD-10-CM

## 2023-06-30 RX ORDER — PEMBROLIZUMAB 25 MG/ML
INJECTION, SOLUTION INTRAVENOUS
COMMUNITY

## 2023-06-30 ASSESSMENT — ENCOUNTER SYMPTOMS
VOMITING: 0
COUGH: 0
SHORTNESS OF BREATH: 0
TROUBLE SWALLOWING: 1
VOICE CHANGE: 0
RHINORRHEA: 0
SORE THROAT: 1

## 2023-07-05 ENCOUNTER — OFFICE VISIT (OUTPATIENT)
Dept: PALLATIVE CARE | Age: 73
End: 2023-07-05
Payer: MEDICARE

## 2023-07-05 VITALS
WEIGHT: 164 LBS | OXYGEN SATURATION: 97 % | HEART RATE: 81 BPM | BODY MASS INDEX: 25.74 KG/M2 | HEIGHT: 67 IN | DIASTOLIC BLOOD PRESSURE: 68 MMHG | SYSTOLIC BLOOD PRESSURE: 126 MMHG

## 2023-07-05 DIAGNOSIS — G89.3 PAIN DUE TO NEOPLASM: ICD-10-CM

## 2023-07-05 PROCEDURE — 99211 OFF/OP EST MAY X REQ PHY/QHP: CPT | Performed by: NURSE PRACTITIONER

## 2023-07-05 PROCEDURE — 99213 OFFICE O/P EST LOW 20 MIN: CPT | Performed by: NURSE PRACTITIONER

## 2023-07-05 PROCEDURE — 3078F DIAST BP <80 MM HG: CPT | Performed by: NURSE PRACTITIONER

## 2023-07-05 PROCEDURE — 1123F ACP DISCUSS/DSCN MKR DOCD: CPT | Performed by: NURSE PRACTITIONER

## 2023-07-05 PROCEDURE — 3074F SYST BP LT 130 MM HG: CPT | Performed by: NURSE PRACTITIONER

## 2023-07-05 RX ORDER — PILOCARPINE HYDROCHLORIDE 5 MG/1
TABLET, FILM COATED ORAL
COMMUNITY
Start: 2023-06-30

## 2023-07-05 RX ORDER — OXYCODONE HYDROCHLORIDE 5 MG/1
5 TABLET ORAL EVERY 8 HOURS PRN
Qty: 42 TABLET | Refills: 0 | Status: SHIPPED | OUTPATIENT
Start: 2023-07-05 | End: 2023-07-19

## 2023-07-11 ENCOUNTER — HOSPITAL ENCOUNTER (OUTPATIENT)
Dept: INFUSION THERAPY | Age: 73
Discharge: HOME | End: 2023-07-11
Payer: MEDICARE

## 2023-07-11 ENCOUNTER — OFFICE VISIT (OUTPATIENT)
Dept: ONCOLOGY | Age: 73
End: 2023-07-11
Payer: MEDICARE

## 2023-07-11 ENCOUNTER — TELEPHONE (OUTPATIENT)
Dept: INFUSION THERAPY | Age: 73
End: 2023-07-11

## 2023-07-11 VITALS
SYSTOLIC BLOOD PRESSURE: 130 MMHG | HEART RATE: 79 BPM | DIASTOLIC BLOOD PRESSURE: 76 MMHG | HEIGHT: 67 IN | WEIGHT: 163.8 LBS | OXYGEN SATURATION: 98 % | TEMPERATURE: 98.2 F | BODY MASS INDEX: 25.71 KG/M2

## 2023-07-11 VITALS
DIASTOLIC BLOOD PRESSURE: 67 MMHG | TEMPERATURE: 98.5 F | RESPIRATION RATE: 18 BRPM | SYSTOLIC BLOOD PRESSURE: 117 MMHG | OXYGEN SATURATION: 95 % | HEART RATE: 76 BPM

## 2023-07-11 DIAGNOSIS — E03.9 HYPOTHYROIDISM, UNSPECIFIED TYPE: ICD-10-CM

## 2023-07-11 DIAGNOSIS — C10.9 SQUAMOUS CELL CARCINOMA OF OROPHARYNX (HCC): Primary | ICD-10-CM

## 2023-07-11 DIAGNOSIS — R68.2 DRY MOUTH: ICD-10-CM

## 2023-07-11 DIAGNOSIS — C10.9 SQUAMOUS CELL CARCINOMA OF OROPHARYNX (HCC): ICD-10-CM

## 2023-07-11 LAB
ALBUMIN SERPL-MCNC: 4.3 G/DL (ref 3.5–5.2)
ALP SERPL-CCNC: 54 U/L (ref 40–129)
ALT SERPL-CCNC: 15 U/L (ref 0–40)
ANION GAP SERPL CALCULATED.3IONS-SCNC: 8 MMOL/L (ref 7–16)
AST SERPL-CCNC: 23 U/L (ref 0–39)
BASOPHILS # BLD: 0.12 E9/L (ref 0–0.2)
BASOPHILS NFR BLD: 2.9 % (ref 0–2)
BILIRUB SERPL-MCNC: 0.3 MG/DL (ref 0–1.2)
BUN SERPL-MCNC: 24 MG/DL (ref 6–23)
CALCIUM SERPL-MCNC: 9.7 MG/DL (ref 8.6–10.2)
CHLORIDE SERPL-SCNC: 105 MMOL/L (ref 98–107)
CO2 SERPL-SCNC: 28 MMOL/L (ref 22–29)
CREAT SERPL-MCNC: 1.3 MG/DL (ref 0.7–1.2)
EOSINOPHIL # BLD: 0.12 E9/L (ref 0.05–0.5)
EOSINOPHIL NFR BLD: 3 % (ref 0–6)
ERYTHROCYTE [DISTWIDTH] IN BLOOD BY AUTOMATED COUNT: 14.4 FL (ref 11.5–15)
GLUCOSE SERPL-MCNC: 134 MG/DL (ref 74–99)
HCT VFR BLD AUTO: 38 % (ref 37–54)
HGB BLD-MCNC: 12.5 G/DL (ref 12.5–16.5)
LYMPHOCYTES # BLD: 0.12 E9/L (ref 1.5–4)
LYMPHOCYTES NFR BLD: 2.9 % (ref 20–42)
MCH RBC QN AUTO: 29.9 PG (ref 26–35)
MCHC RBC AUTO-ENTMCNC: 32.9 % (ref 32–34.5)
MCV RBC AUTO: 90.9 FL (ref 80–99.9)
MONOCYTES # BLD: 0.16 E9/L (ref 0.1–0.95)
MONOCYTES NFR BLD: 3.9 % (ref 2–12)
NEUTROPHILS # BLD: 3.57 E9/L (ref 1.8–7.3)
NEUTS SEG NFR BLD: 87.3 % (ref 43–80)
PLATELET # BLD AUTO: 185 E9/L (ref 130–450)
PMV BLD AUTO: 10.3 FL (ref 7–12)
POIKILOCYTES: ABNORMAL
POTASSIUM SERPL-SCNC: 4.1 MMOL/L (ref 3.5–5)
PROT SERPL-MCNC: 6.9 G/DL (ref 6.4–8.3)
RBC # BLD AUTO: 4.18 E12/L (ref 3.8–5.8)
SODIUM SERPL-SCNC: 141 MMOL/L (ref 132–146)
TEAR DROP CELLS: ABNORMAL
TSH SERPL-MCNC: 19.44 UIU/ML (ref 0.27–4.2)
WBC # BLD: 4.1 E9/L (ref 4.5–11.5)

## 2023-07-11 PROCEDURE — 1123F ACP DISCUSS/DSCN MKR DOCD: CPT | Performed by: INTERNAL MEDICINE

## 2023-07-11 PROCEDURE — 2580000003 HC RX 258: Performed by: INTERNAL MEDICINE

## 2023-07-11 PROCEDURE — 6360000002 HC RX W HCPCS: Performed by: INTERNAL MEDICINE

## 2023-07-11 PROCEDURE — 96367 TX/PROPH/DG ADDL SEQ IV INF: CPT

## 2023-07-11 PROCEDURE — 80053 COMPREHEN METABOLIC PANEL: CPT

## 2023-07-11 PROCEDURE — 96413 CHEMO IV INFUSION 1 HR: CPT

## 2023-07-11 PROCEDURE — 99214 OFFICE O/P EST MOD 30 MIN: CPT | Performed by: INTERNAL MEDICINE

## 2023-07-11 PROCEDURE — 84443 ASSAY THYROID STIM HORMONE: CPT

## 2023-07-11 PROCEDURE — 36415 COLL VENOUS BLD VENIPUNCTURE: CPT

## 2023-07-11 PROCEDURE — 3074F SYST BP LT 130 MM HG: CPT | Performed by: INTERNAL MEDICINE

## 2023-07-11 PROCEDURE — 3078F DIAST BP <80 MM HG: CPT | Performed by: INTERNAL MEDICINE

## 2023-07-11 PROCEDURE — 85025 COMPLETE CBC W/AUTO DIFF WBC: CPT

## 2023-07-11 RX ORDER — ONDANSETRON 2 MG/ML
8 INJECTION INTRAMUSCULAR; INTRAVENOUS
Status: CANCELLED | OUTPATIENT
Start: 2023-07-11

## 2023-07-11 RX ORDER — SODIUM CHLORIDE 0.9 % (FLUSH) 0.9 %
5-40 SYRINGE (ML) INJECTION PRN
Status: CANCELLED | OUTPATIENT
Start: 2023-07-11

## 2023-07-11 RX ORDER — ALBUTEROL SULFATE 90 UG/1
4 AEROSOL, METERED RESPIRATORY (INHALATION) PRN
Status: CANCELLED | OUTPATIENT
Start: 2023-07-11

## 2023-07-11 RX ORDER — EPINEPHRINE 1 MG/ML
0.3 INJECTION, SOLUTION, CONCENTRATE INTRAVENOUS PRN
Status: CANCELLED | OUTPATIENT
Start: 2023-07-11

## 2023-07-11 RX ORDER — SODIUM CHLORIDE 9 MG/ML
5-250 INJECTION, SOLUTION INTRAVENOUS PRN
Status: DISCONTINUED | OUTPATIENT
Start: 2023-07-11 | End: 2023-07-12 | Stop reason: HOSPADM

## 2023-07-11 RX ORDER — SODIUM CHLORIDE 9 MG/ML
5-250 INJECTION, SOLUTION INTRAVENOUS PRN
Status: CANCELLED | OUTPATIENT
Start: 2023-07-11

## 2023-07-11 RX ORDER — DIPHENHYDRAMINE HYDROCHLORIDE 50 MG/ML
50 INJECTION INTRAMUSCULAR; INTRAVENOUS
Status: CANCELLED | OUTPATIENT
Start: 2023-07-11

## 2023-07-11 RX ORDER — LEVOTHYROXINE SODIUM 50 MCG
50 TABLET ORAL DAILY
Qty: 30 TABLET | Refills: 3
Start: 2023-07-11

## 2023-07-11 RX ORDER — SODIUM CHLORIDE 9 MG/ML
INJECTION, SOLUTION INTRAVENOUS CONTINUOUS
Status: CANCELLED | OUTPATIENT
Start: 2023-07-11

## 2023-07-11 RX ORDER — MEPERIDINE HYDROCHLORIDE 25 MG/ML
12.5 INJECTION INTRAMUSCULAR; INTRAVENOUS; SUBCUTANEOUS PRN
Status: CANCELLED | OUTPATIENT
Start: 2023-07-11

## 2023-07-11 RX ORDER — SODIUM CHLORIDE 0.9 % (FLUSH) 0.9 %
5-40 SYRINGE (ML) INJECTION PRN
Status: DISCONTINUED | OUTPATIENT
Start: 2023-07-11 | End: 2023-07-12 | Stop reason: HOSPADM

## 2023-07-11 RX ORDER — HEPARIN SODIUM 100 [USP'U]/ML
500 INJECTION, SOLUTION INTRAVENOUS PRN
Status: CANCELLED | OUTPATIENT
Start: 2023-07-11

## 2023-07-11 RX ORDER — ACETAMINOPHEN 325 MG/1
650 TABLET ORAL
Status: CANCELLED | OUTPATIENT
Start: 2023-07-11

## 2023-07-11 RX ADMIN — SODIUM CHLORIDE 20 ML/HR: 9 INJECTION, SOLUTION INTRAVENOUS at 14:13

## 2023-07-11 RX ADMIN — SODIUM CHLORIDE 200 MG: 9 INJECTION, SOLUTION INTRAVENOUS at 14:27

## 2023-07-11 ASSESSMENT — PAIN DESCRIPTION - DESCRIPTORS
DESCRIPTORS: SORE
DESCRIPTORS: SORE

## 2023-07-11 ASSESSMENT — PAIN DESCRIPTION - ORIENTATION
ORIENTATION: MID
ORIENTATION: MID

## 2023-07-11 ASSESSMENT — PAIN DESCRIPTION - LOCATION
LOCATION: THROAT
LOCATION: THROAT

## 2023-07-11 ASSESSMENT — PAIN SCALES - GENERAL
PAINLEVEL_OUTOF10: 8
PAINLEVEL_OUTOF10: 8

## 2023-07-11 NOTE — PROGRESS NOTES
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    PCP:  Calvin Goldman DO    History of Present Illness:  69-year-old male who presented to the ENT team for persistent left-sided neck fullness without associated difficulty swallowing    CT soft tissue neck 02/23/2022: Mass located in the left aspect of floor of the mouth extending into the left oropharyngeal soft tissue concerning for squamous cell carcinoma  Multiple enlarged necrotic left anterior cervical chain lymph nodes    Panendoscopy on 3/17/2022:  Findings: L lingual tonsil hypertrophy, biopsy negative, left level II neck node FNA performed   FNA left neck mass level 2: Inconclusive for malignant cells. Few atypical squamous cells with degenerative change  A. Tongue, left base, biopsy:   Squamous epithelial-lined lymphoid tissue with reactive germinal centers, compatible with lingual tonsil. Negative for dysplasia; Negative for malignancy. B.  Tongue, left base, biopsy:   Squamous epithelial-lined lymphoid tissue with reactive germinal centers, compatible with lingual tonsil. Focal lobules of benign mucinous glands. Negative for dysplasia; Negative for malignancy    On 07/21/2022: Panendoscopy excision left level 2 lymph node  Findings:  left base of tongue mass, left cervical lymphadenopathy  A. 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 See comment. B. 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 C.  Left base of tongue, biopsy: Squamous mucosa, negative for neoplasm. D.  Right base of tongue, biopsy: Squamous mucosa, negative for neoplasm. E.  Midline tongue, biopsy: Squamous mucosa, negative for neoplasm.    Comment:   The squamous carcinoma is diffusely and strongly positive for p16 immunostain, supporting the above

## 2023-07-11 NOTE — PROGRESS NOTES
Patient tolerated treatment well without complications or complaints. Alert and oriented x3. Patient aware of potential side effects and has no questions regarding treatment. Vitals stable throughout treatment. Pain assessed, patient denies any new or worsening pain.

## 2023-07-11 NOTE — TELEPHONE ENCOUNTER
April Pulling  7/11/2023  Ht Readings from Last 1 Encounters:   07/11/23 5' 7\" (1.702 m)     Wt Readings from Last 10 Encounters:   07/11/23 163 lb 12.8 oz (74.3 kg)   07/05/23 164 lb (74.4 kg)   06/30/23 161 lb (73 kg)   06/20/23 158 lb 12.8 oz (72 kg)   05/30/23 156 lb 9.6 oz (71 kg)   05/10/23 157 lb (71.2 kg)   05/09/23 156 lb 1.6 oz (70.8 kg)   04/18/23 159 lb 3.2 oz (72.2 kg)   03/31/23 159 lb (72.1 kg)   03/28/23 157 lb 9.6 oz (71.5 kg)     BMI=25.65    Assessment: Met with Willy Driscoll while in for OTV with Dr. Kylah Lorenzo for oropharyngeal squamous cell cancer. Today he is receiving Cycle 6 Day 1 Keytruda. He continues to be reliant on PEG tube for the majority of his nutritional needs. Utilizing Boost VHC 3-4 cartons per day, also using \"Real Food Blends\" daily through his PEG tube. No difficulty tolerating tube feeding. Denies N/V/D/C. Orally he continues to try but still complains of dysgeusia, xerostomia and some odynophagia. Stressed importance of continuing to try new items, varying consistency, textures and flavors. He has started using Biotene mouth moisturizer. Also suggested trying sugar free lemon drops for xerostomia and dysgeusia. He had no further questions or concerns at this time.      Erin Shoemaker, RD

## 2023-07-24 ENCOUNTER — TELEPHONE (OUTPATIENT)
Dept: ONCOLOGY | Age: 73
End: 2023-07-24

## 2023-07-24 DIAGNOSIS — G89.3 PAIN DUE TO NEOPLASM: ICD-10-CM

## 2023-07-24 RX ORDER — LEVOTHYROXINE SODIUM 50 MCG
50 TABLET ORAL DAILY
Qty: 30 TABLET | Refills: 3 | Status: SHIPPED
Start: 2023-07-24 | End: 2023-08-08

## 2023-07-24 RX ORDER — OXYCODONE HYDROCHLORIDE 5 MG/1
5 TABLET ORAL EVERY 8 HOURS PRN
Qty: 42 TABLET | Refills: 0 | Status: SHIPPED | OUTPATIENT
Start: 2023-07-24 | End: 2023-08-07

## 2023-07-24 NOTE — TELEPHONE ENCOUNTER
Patient's wife contacted office stating pharmacy never received the Synthroid 50 mcg that was going to be ordered at Ke's last appt. Please resend for patient.

## 2023-07-26 PROBLEM — E03.2 HYPOTHYROIDISM DUE TO MEDICATION: Status: ACTIVE | Noted: 2023-07-26

## 2023-07-26 PROBLEM — I10 ESSENTIAL HYPERTENSION: Status: RESOLVED | Noted: 2020-11-10 | Resolved: 2023-07-26

## 2023-07-26 PROBLEM — E78.5 DYSLIPIDEMIA: Chronic | Status: ACTIVE | Noted: 2021-05-11

## 2023-07-26 PROBLEM — E46 PROTEIN-CALORIE MALNUTRITION, UNSPECIFIED SEVERITY (HCC): Status: RESOLVED | Noted: 2023-07-26 | Resolved: 2023-07-26

## 2023-07-26 PROBLEM — E46 PROTEIN-CALORIE MALNUTRITION, UNSPECIFIED SEVERITY (HCC): Status: ACTIVE | Noted: 2023-07-26

## 2023-07-26 PROBLEM — D69.6 THROMBOCYTOPENIA (HCC): Status: RESOLVED | Noted: 2021-05-11 | Resolved: 2023-07-26

## 2023-08-01 ENCOUNTER — TELEPHONE (OUTPATIENT)
Dept: INFUSION THERAPY | Age: 73
End: 2023-08-01

## 2023-08-01 ENCOUNTER — HOSPITAL ENCOUNTER (OUTPATIENT)
Dept: INFUSION THERAPY | Age: 73
Discharge: HOME | End: 2023-08-01
Payer: MEDICARE

## 2023-08-01 ENCOUNTER — OFFICE VISIT (OUTPATIENT)
Dept: ONCOLOGY | Age: 73
End: 2023-08-01
Payer: MEDICARE

## 2023-08-01 VITALS
DIASTOLIC BLOOD PRESSURE: 70 MMHG | TEMPERATURE: 98 F | HEIGHT: 67 IN | HEART RATE: 71 BPM | OXYGEN SATURATION: 98 % | WEIGHT: 162.8 LBS | SYSTOLIC BLOOD PRESSURE: 122 MMHG | BODY MASS INDEX: 25.55 KG/M2

## 2023-08-01 VITALS — SYSTOLIC BLOOD PRESSURE: 117 MMHG | HEART RATE: 67 BPM | TEMPERATURE: 98.2 F | DIASTOLIC BLOOD PRESSURE: 71 MMHG

## 2023-08-01 DIAGNOSIS — C10.9 SQUAMOUS CELL CARCINOMA OF OROPHARYNX (HCC): Primary | ICD-10-CM

## 2023-08-01 DIAGNOSIS — J06.9 UPPER RESPIRATORY TRACT INFECTION, UNSPECIFIED TYPE: ICD-10-CM

## 2023-08-01 DIAGNOSIS — E03.9 HYPOTHYROIDISM, UNSPECIFIED TYPE: ICD-10-CM

## 2023-08-01 DIAGNOSIS — C10.9 OROPHARYNGEAL CANCER (HCC): ICD-10-CM

## 2023-08-01 DIAGNOSIS — R68.2 DRY MOUTH: ICD-10-CM

## 2023-08-01 LAB
ALBUMIN SERPL-MCNC: 4.3 G/DL (ref 3.5–5.2)
ALP SERPL-CCNC: 58 U/L (ref 40–129)
ALT SERPL-CCNC: 18 U/L (ref 0–40)
ANION GAP SERPL CALCULATED.3IONS-SCNC: 8 MMOL/L (ref 7–16)
AST SERPL-CCNC: 23 U/L (ref 0–39)
BASOPHILS # BLD: 0.04 K/UL (ref 0–0.2)
BASOPHILS NFR BLD: 1 % (ref 0–2)
BILIRUB SERPL-MCNC: 0.4 MG/DL (ref 0–1.2)
BUN SERPL-MCNC: 29 MG/DL (ref 6–23)
CALCIUM SERPL-MCNC: 10.2 MG/DL (ref 8.6–10.2)
CHLORIDE SERPL-SCNC: 103 MMOL/L (ref 98–107)
CO2 SERPL-SCNC: 29 MMOL/L (ref 22–29)
CREAT SERPL-MCNC: 1.3 MG/DL (ref 0.7–1.2)
EOSINOPHIL # BLD: 0.17 K/UL (ref 0.05–0.5)
EOSINOPHILS RELATIVE PERCENT: 4 % (ref 0–6)
ERYTHROCYTE [DISTWIDTH] IN BLOOD BY AUTOMATED COUNT: 14.2 % (ref 11.5–15)
GFR SERPL CREATININE-BSD FRML MDRD: 56 ML/MIN/1.73M2
GLUCOSE SERPL-MCNC: 126 MG/DL (ref 74–99)
HCT VFR BLD AUTO: 39.4 % (ref 37–54)
HGB BLD-MCNC: 13.2 G/DL (ref 12.5–16.5)
IMM GRANULOCYTES # BLD AUTO: <0.03 K/UL (ref 0–0.58)
IMM GRANULOCYTES NFR BLD: 1 % (ref 0–5)
LYMPHOCYTES NFR BLD: 0.3 K/UL (ref 1.5–4)
LYMPHOCYTES RELATIVE PERCENT: 7 % (ref 20–42)
MAGNESIUM SERPL-MCNC: 2.2 MG/DL (ref 1.6–2.6)
MCH RBC QN AUTO: 30.6 PG (ref 26–35)
MCHC RBC AUTO-ENTMCNC: 33.5 G/DL (ref 32–34.5)
MCV RBC AUTO: 91.2 FL (ref 80–99.9)
MONOCYTES NFR BLD: 0.38 K/UL (ref 0.1–0.95)
MONOCYTES NFR BLD: 9 % (ref 2–12)
NEUTROPHILS NFR BLD: 79 % (ref 43–80)
NEUTS SEG NFR BLD: 3.52 K/UL (ref 1.8–7.3)
PLATELET # BLD AUTO: 199 K/UL (ref 130–450)
PMV BLD AUTO: 9.7 FL (ref 7–12)
POTASSIUM SERPL-SCNC: 4.6 MMOL/L (ref 3.5–5)
PROT SERPL-MCNC: 7.3 G/DL (ref 6.4–8.3)
RBC # BLD AUTO: 4.32 M/UL (ref 3.8–5.8)
RBC # BLD: ABNORMAL 10*6/UL
RBC # BLD: ABNORMAL 10*6/UL
SODIUM SERPL-SCNC: 140 MMOL/L (ref 132–146)
WBC OTHER # BLD: 4.4 K/UL (ref 4.5–11.5)

## 2023-08-01 PROCEDURE — 1123F ACP DISCUSS/DSCN MKR DOCD: CPT | Performed by: INTERNAL MEDICINE

## 2023-08-01 PROCEDURE — 6360000002 HC RX W HCPCS: Performed by: INTERNAL MEDICINE

## 2023-08-01 PROCEDURE — 36415 COLL VENOUS BLD VENIPUNCTURE: CPT

## 2023-08-01 PROCEDURE — 99214 OFFICE O/P EST MOD 30 MIN: CPT | Performed by: INTERNAL MEDICINE

## 2023-08-01 PROCEDURE — 83735 ASSAY OF MAGNESIUM: CPT

## 2023-08-01 PROCEDURE — 96413 CHEMO IV INFUSION 1 HR: CPT

## 2023-08-01 PROCEDURE — 96375 TX/PRO/DX INJ NEW DRUG ADDON: CPT

## 2023-08-01 PROCEDURE — 85025 COMPLETE CBC W/AUTO DIFF WBC: CPT

## 2023-08-01 PROCEDURE — 80053 COMPREHEN METABOLIC PANEL: CPT

## 2023-08-01 PROCEDURE — 2580000003 HC RX 258: Performed by: INTERNAL MEDICINE

## 2023-08-01 RX ORDER — DIPHENHYDRAMINE HYDROCHLORIDE 50 MG/ML
50 INJECTION INTRAMUSCULAR; INTRAVENOUS
Status: CANCELLED | OUTPATIENT
Start: 2023-08-01

## 2023-08-01 RX ORDER — EPINEPHRINE 1 MG/ML
0.3 INJECTION, SOLUTION, CONCENTRATE INTRAVENOUS PRN
Status: CANCELLED | OUTPATIENT
Start: 2023-08-01

## 2023-08-01 RX ORDER — SODIUM CHLORIDE 9 MG/ML
5-250 INJECTION, SOLUTION INTRAVENOUS PRN
Status: CANCELLED | OUTPATIENT
Start: 2023-08-01

## 2023-08-01 RX ORDER — SODIUM CHLORIDE 9 MG/ML
5-250 INJECTION, SOLUTION INTRAVENOUS PRN
Status: DISCONTINUED | OUTPATIENT
Start: 2023-08-01 | End: 2023-08-02 | Stop reason: HOSPADM

## 2023-08-01 RX ORDER — SODIUM CHLORIDE 9 MG/ML
INJECTION, SOLUTION INTRAVENOUS CONTINUOUS
Status: CANCELLED | OUTPATIENT
Start: 2023-08-01

## 2023-08-01 RX ORDER — SODIUM CHLORIDE 0.9 % (FLUSH) 0.9 %
5-40 SYRINGE (ML) INJECTION PRN
Status: CANCELLED | OUTPATIENT
Start: 2023-08-01

## 2023-08-01 RX ORDER — ONDANSETRON 2 MG/ML
8 INJECTION INTRAMUSCULAR; INTRAVENOUS
Status: CANCELLED | OUTPATIENT
Start: 2023-08-01

## 2023-08-01 RX ORDER — AZITHROMYCIN 250 MG/1
250 TABLET, FILM COATED ORAL SEE ADMIN INSTRUCTIONS
Qty: 6 TABLET | Refills: 0 | Status: SHIPPED | OUTPATIENT
Start: 2023-08-01 | End: 2023-08-06

## 2023-08-01 RX ORDER — ALBUTEROL SULFATE 90 UG/1
4 AEROSOL, METERED RESPIRATORY (INHALATION) PRN
Status: CANCELLED | OUTPATIENT
Start: 2023-08-01

## 2023-08-01 RX ORDER — MEPERIDINE HYDROCHLORIDE 25 MG/ML
12.5 INJECTION INTRAMUSCULAR; INTRAVENOUS; SUBCUTANEOUS PRN
Status: CANCELLED | OUTPATIENT
Start: 2023-08-01

## 2023-08-01 RX ORDER — ACETAMINOPHEN 325 MG/1
650 TABLET ORAL
Status: CANCELLED | OUTPATIENT
Start: 2023-08-01

## 2023-08-01 RX ORDER — ONDANSETRON 2 MG/ML
8 INJECTION INTRAMUSCULAR; INTRAVENOUS
Status: COMPLETED | OUTPATIENT
Start: 2023-08-01 | End: 2023-08-01

## 2023-08-01 RX ORDER — HEPARIN 100 UNIT/ML
500 SYRINGE INTRAVENOUS PRN
Status: CANCELLED | OUTPATIENT
Start: 2023-08-01

## 2023-08-01 RX ORDER — SODIUM CHLORIDE 0.9 % (FLUSH) 0.9 %
5-40 SYRINGE (ML) INJECTION PRN
Status: DISCONTINUED | OUTPATIENT
Start: 2023-08-01 | End: 2023-08-02 | Stop reason: HOSPADM

## 2023-08-01 RX ADMIN — SODIUM CHLORIDE 200 MG: 9 INJECTION, SOLUTION INTRAVENOUS at 14:30

## 2023-08-01 RX ADMIN — ONDANSETRON 8 MG: 2 INJECTION INTRAMUSCULAR; INTRAVENOUS at 14:12

## 2023-08-01 RX ADMIN — SODIUM CHLORIDE 150 ML/HR: 9 INJECTION, SOLUTION INTRAVENOUS at 14:02

## 2023-08-01 RX ADMIN — SODIUM CHLORIDE, PRESERVATIVE FREE 10 ML: 5 INJECTION INTRAVENOUS at 14:01

## 2023-08-01 NOTE — PROGRESS NOTES
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    PCP:  Joaquina Kumari DO    History of Present Illness:  67-year-old male who presented to the ENT team for persistent left-sided neck fullness without associated difficulty swallowing    CT soft tissue neck 02/23/2022: Mass located in the left aspect of floor of the mouth extending into the left oropharyngeal soft tissue concerning for squamous cell carcinoma  Multiple enlarged necrotic left anterior cervical chain lymph nodes    Panendoscopy on 3/17/2022:  Findings: L lingual tonsil hypertrophy, biopsy negative, left level II neck node FNA performed   FNA left neck mass level 2: Inconclusive for malignant cells. Few atypical squamous cells with degenerative change  A. Tongue, left base, biopsy:   Squamous epithelial-lined lymphoid tissue with reactive germinal centers, compatible with lingual tonsil. Negative for dysplasia; Negative for malignancy. B.  Tongue, left base, biopsy:   Squamous epithelial-lined lymphoid tissue with reactive germinal centers, compatible with lingual tonsil. Focal lobules of benign mucinous glands. Negative for dysplasia; Negative for malignancy    On 07/21/2022: Panendoscopy excision left level 2 lymph node  Findings:  left base of tongue mass, left cervical lymphadenopathy  A. 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 See comment. B. 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 C.  Left base of tongue, biopsy: Squamous mucosa, negative for neoplasm. D.  Right base of tongue, biopsy: Squamous mucosa, negative for neoplasm. E.  Midline tongue, biopsy: Squamous mucosa, negative for neoplasm.    Comment:   The squamous carcinoma is diffusely and strongly positive for p16 immunostain, supporting the above

## 2023-08-01 NOTE — TELEPHONE ENCOUNTER
Floyd Del Valle  8/1/2023  Ht Readings from Last 1 Encounters:   08/01/23 5' 7\" (1.702 m)     Wt Readings from Last 10 Encounters:   08/01/23 162 lb 12.8 oz (73.8 kg)   07/26/23 153 lb (69.4 kg)   07/11/23 163 lb 12.8 oz (74.3 kg)   07/05/23 164 lb (74.4 kg)   06/30/23 161 lb (73 kg)   06/20/23 158 lb 12.8 oz (72 kg)   05/30/23 156 lb 9.6 oz (71 kg)   05/10/23 157 lb (71.2 kg)   05/09/23 156 lb 1.6 oz (70.8 kg)   04/18/23 159 lb 3.2 oz (72.2 kg)     BMI=25.5    Assessment: Met with [place] while he was receiving Day 1 Cycle 7 Keytruda. He reports he has a poor appetite and mainly relates it to dysgeusia. Encouraged him to continue to try new foods 1 per day with different flavor profiles. Also discussed trying lemon drops. He is using Boost VHC 4 cartons per day and also using \"Real Food Blends\". Denied difficulty tolerating or administration of enteral nutrition. Encouraged him to continue to increase his oral intake so that PEG can be removed. He voiced understanding. Weight change: 4.29% weight gain x 3 months.  No significant weight change x 1,3 or 6 months  Appetite: Poor appetite  Nutritional Side Effects: dysgeusia, odynophagia, xerostomia    Miranda Torrez RD

## 2023-08-04 DIAGNOSIS — C10.9 OROPHARYNGEAL CANCER (HCC): Primary | ICD-10-CM

## 2023-08-04 RX ORDER — OXYCODONE HYDROCHLORIDE 5 MG/1
5 TABLET ORAL EVERY 8 HOURS PRN
Qty: 12 TABLET | Refills: 0 | Status: SHIPPED | OUTPATIENT
Start: 2023-08-04 | End: 2023-08-18

## 2023-08-04 NOTE — TELEPHONE ENCOUNTER
Call received from Emerson Parrish with request of refill of his oxycodone 5 mg every 8 hours as needed to [facility].

## 2023-08-08 ENCOUNTER — TELEPHONE (OUTPATIENT)
Dept: ONCOLOGY | Age: 73
End: 2023-08-08

## 2023-08-08 RX ORDER — LEVOTHYROXINE SODIUM 100 MCG
100 TABLET ORAL DAILY
Qty: 30 TABLET | Refills: 3
Start: 2023-08-08 | End: 2023-08-22 | Stop reason: SDUPTHER

## 2023-08-08 NOTE — TELEPHONE ENCOUNTER
Patient's spouse contacted office asking for refill of Synthroid 100 mcg, stated this was not called in from visit on 8/1 with Dr. Kylah Lorenzo. She would like this to go to [facility].

## 2023-08-17 DIAGNOSIS — C10.9 OROPHARYNGEAL CANCER (HCC): ICD-10-CM

## 2023-08-17 RX ORDER — OXYCODONE HYDROCHLORIDE 5 MG/1
5 TABLET ORAL EVERY 8 HOURS PRN
Qty: 12 TABLET | Refills: 0 | Status: SHIPPED
Start: 2023-08-17 | End: 2023-08-18 | Stop reason: SDUPTHER

## 2023-08-17 NOTE — TELEPHONE ENCOUNTER
Incoming call requesting 30 day refill of his oxycodone. Also c/o his throat feeling like a \"\" is in it. Asking if there is something else that can be tried. Confirmed that they have already used the lidocaine viscous and magic mouth wash with no relief.   Next appt 9/27/23

## 2023-08-18 DIAGNOSIS — C10.9 OROPHARYNGEAL CANCER (HCC): ICD-10-CM

## 2023-08-18 RX ORDER — OXYCODONE HYDROCHLORIDE 5 MG/1
5 TABLET ORAL EVERY 8 HOURS PRN
Qty: 42 TABLET | Refills: 0 | Status: SHIPPED | OUTPATIENT
Start: 2023-08-18 | End: 2023-09-05 | Stop reason: SDUPTHER

## 2023-08-18 NOTE — TELEPHONE ENCOUNTER
Spoke to [employer], CNP who advised staff to have Tri Mendez continue to use his swishes and have him also gargle warm baking soda. Wife Joann Trotter confirmed understanding.

## 2023-08-22 ENCOUNTER — HOSPITAL ENCOUNTER (OUTPATIENT)
Dept: INFUSION THERAPY | Age: 73
Discharge: HOME | End: 2023-08-22
Payer: MEDICARE

## 2023-08-22 ENCOUNTER — OFFICE VISIT (OUTPATIENT)
Dept: ONCOLOGY | Age: 73
End: 2023-08-22
Payer: MEDICARE

## 2023-08-22 VITALS
HEART RATE: 75 BPM | BODY MASS INDEX: 25.52 KG/M2 | HEIGHT: 67 IN | OXYGEN SATURATION: 100 % | WEIGHT: 162.6 LBS | DIASTOLIC BLOOD PRESSURE: 63 MMHG | SYSTOLIC BLOOD PRESSURE: 129 MMHG | TEMPERATURE: 97.2 F

## 2023-08-22 VITALS
OXYGEN SATURATION: 96 % | HEART RATE: 70 BPM | TEMPERATURE: 98.2 F | DIASTOLIC BLOOD PRESSURE: 68 MMHG | SYSTOLIC BLOOD PRESSURE: 111 MMHG

## 2023-08-22 DIAGNOSIS — C10.9 SQUAMOUS CELL CARCINOMA OF OROPHARYNX (HCC): Primary | ICD-10-CM

## 2023-08-22 DIAGNOSIS — C10.9 SQUAMOUS CELL CARCINOMA OF OROPHARYNX (HCC): ICD-10-CM

## 2023-08-22 DIAGNOSIS — C01 MALIGNANT NEOPLASM OF BASE OF TONGUE (HCC): ICD-10-CM

## 2023-08-22 PROBLEM — N18.30 CHRONIC RENAL DISEASE, STAGE III (HCC): Status: ACTIVE | Noted: 2023-08-22

## 2023-08-22 LAB
ALBUMIN SERPL-MCNC: 4.7 G/DL (ref 3.5–5.2)
ALP SERPL-CCNC: 60 U/L (ref 40–129)
ALT SERPL-CCNC: 28 U/L (ref 0–40)
ANION GAP SERPL CALCULATED.3IONS-SCNC: 7 MMOL/L (ref 7–16)
AST SERPL-CCNC: 27 U/L (ref 0–39)
BASOPHILS # BLD: 0.09 K/UL (ref 0–0.2)
BASOPHILS NFR BLD: 2 % (ref 0–2)
BILIRUB SERPL-MCNC: 0.5 MG/DL (ref 0–1.2)
BUN SERPL-MCNC: 29 MG/DL (ref 6–23)
CALCIUM SERPL-MCNC: 10.5 MG/DL (ref 8.6–10.2)
CHLORIDE SERPL-SCNC: 103 MMOL/L (ref 98–107)
CO2 SERPL-SCNC: 29 MMOL/L (ref 22–29)
CREAT SERPL-MCNC: 1.5 MG/DL (ref 0.7–1.2)
EOSINOPHIL # BLD: 0.05 K/UL (ref 0.05–0.5)
EOSINOPHILS RELATIVE PERCENT: 1 % (ref 0–6)
ERYTHROCYTE [DISTWIDTH] IN BLOOD BY AUTOMATED COUNT: 13.8 % (ref 11.5–15)
GFR SERPL CREATININE-BSD FRML MDRD: 51 ML/MIN/1.73M2
GLUCOSE SERPL-MCNC: 132 MG/DL (ref 74–99)
HCT VFR BLD AUTO: 40.3 % (ref 37–54)
HGB BLD-MCNC: 13.3 G/DL (ref 12.5–16.5)
LYMPHOCYTES NFR BLD: 0.5 K/UL (ref 1.5–4)
LYMPHOCYTES RELATIVE PERCENT: 10 % (ref 20–42)
MCH RBC QN AUTO: 30.2 PG (ref 26–35)
MCHC RBC AUTO-ENTMCNC: 33 G/DL (ref 32–34.5)
MCV RBC AUTO: 91.4 FL (ref 80–99.9)
MONOCYTES NFR BLD: 0.18 K/UL (ref 0.1–0.95)
MONOCYTES NFR BLD: 4 % (ref 2–12)
NEUTROPHILS NFR BLD: 84 % (ref 43–80)
NEUTS SEG NFR BLD: 4.17 K/UL (ref 1.8–7.3)
PLATELET # BLD AUTO: 201 K/UL (ref 130–450)
PMV BLD AUTO: 10.1 FL (ref 7–12)
POTASSIUM SERPL-SCNC: 4.3 MMOL/L (ref 3.5–5)
PROT SERPL-MCNC: 7.7 G/DL (ref 6.4–8.3)
RBC # BLD AUTO: 4.41 M/UL (ref 3.8–5.8)
RBC # BLD: ABNORMAL 10*6/UL
SODIUM SERPL-SCNC: 139 MMOL/L (ref 132–146)
TSH SERPL DL<=0.05 MIU/L-ACNC: 2.51 UIU/ML (ref 0.27–4.2)
WBC OTHER # BLD: 5 K/UL (ref 4.5–11.5)

## 2023-08-22 PROCEDURE — 6360000002 HC RX W HCPCS: Performed by: CLINICAL NURSE SPECIALIST

## 2023-08-22 PROCEDURE — 1123F ACP DISCUSS/DSCN MKR DOCD: CPT | Performed by: CLINICAL NURSE SPECIALIST

## 2023-08-22 PROCEDURE — 96413 CHEMO IV INFUSION 1 HR: CPT

## 2023-08-22 PROCEDURE — 85025 COMPLETE CBC W/AUTO DIFF WBC: CPT

## 2023-08-22 PROCEDURE — 99213 OFFICE O/P EST LOW 20 MIN: CPT | Performed by: CLINICAL NURSE SPECIALIST

## 2023-08-22 PROCEDURE — 80053 COMPREHEN METABOLIC PANEL: CPT

## 2023-08-22 PROCEDURE — 2580000003 HC RX 258: Performed by: CLINICAL NURSE SPECIALIST

## 2023-08-22 PROCEDURE — 84443 ASSAY THYROID STIM HORMONE: CPT

## 2023-08-22 PROCEDURE — 36415 COLL VENOUS BLD VENIPUNCTURE: CPT

## 2023-08-22 RX ORDER — SODIUM CHLORIDE 0.9 % (FLUSH) 0.9 %
5-40 SYRINGE (ML) INJECTION PRN
Status: DISCONTINUED | OUTPATIENT
Start: 2023-08-22 | End: 2023-08-23 | Stop reason: HOSPADM

## 2023-08-22 RX ORDER — HEPARIN 100 UNIT/ML
500 SYRINGE INTRAVENOUS PRN
Status: CANCELLED | OUTPATIENT
Start: 2023-08-22

## 2023-08-22 RX ORDER — SODIUM CHLORIDE 9 MG/ML
5-250 INJECTION, SOLUTION INTRAVENOUS PRN
Status: CANCELLED | OUTPATIENT
Start: 2023-08-22

## 2023-08-22 RX ORDER — ONDANSETRON 2 MG/ML
8 INJECTION INTRAMUSCULAR; INTRAVENOUS
Status: CANCELLED | OUTPATIENT
Start: 2023-08-22

## 2023-08-22 RX ORDER — ONDANSETRON 2 MG/ML
8 INJECTION INTRAMUSCULAR; INTRAVENOUS
Status: DISCONTINUED | OUTPATIENT
Start: 2023-08-22 | End: 2023-08-23 | Stop reason: HOSPADM

## 2023-08-22 RX ORDER — ALBUTEROL SULFATE 90 UG/1
4 AEROSOL, METERED RESPIRATORY (INHALATION) PRN
Status: CANCELLED | OUTPATIENT
Start: 2023-08-22

## 2023-08-22 RX ORDER — SODIUM CHLORIDE 9 MG/ML
5-250 INJECTION, SOLUTION INTRAVENOUS PRN
Status: DISCONTINUED | OUTPATIENT
Start: 2023-08-22 | End: 2023-08-23 | Stop reason: HOSPADM

## 2023-08-22 RX ORDER — ACETAMINOPHEN 325 MG/1
650 TABLET ORAL
Status: CANCELLED | OUTPATIENT
Start: 2023-08-22

## 2023-08-22 RX ORDER — LEVOTHYROXINE SODIUM 100 MCG
100 TABLET ORAL DAILY
Qty: 30 TABLET | Refills: 3 | Status: SHIPPED | OUTPATIENT
Start: 2023-08-22

## 2023-08-22 RX ORDER — SODIUM CHLORIDE 9 MG/ML
INJECTION, SOLUTION INTRAVENOUS CONTINUOUS
Status: CANCELLED | OUTPATIENT
Start: 2023-08-22

## 2023-08-22 RX ORDER — EPINEPHRINE 1 MG/ML
0.3 INJECTION, SOLUTION, CONCENTRATE INTRAVENOUS PRN
Status: CANCELLED | OUTPATIENT
Start: 2023-08-22

## 2023-08-22 RX ORDER — MEPERIDINE HYDROCHLORIDE 25 MG/ML
12.5 INJECTION INTRAMUSCULAR; INTRAVENOUS; SUBCUTANEOUS PRN
Status: CANCELLED | OUTPATIENT
Start: 2023-08-22

## 2023-08-22 RX ORDER — SODIUM CHLORIDE 0.9 % (FLUSH) 0.9 %
5-40 SYRINGE (ML) INJECTION PRN
Status: CANCELLED | OUTPATIENT
Start: 2023-08-22

## 2023-08-22 RX ORDER — DIPHENHYDRAMINE HYDROCHLORIDE 50 MG/ML
50 INJECTION INTRAMUSCULAR; INTRAVENOUS
Status: CANCELLED | OUTPATIENT
Start: 2023-08-22

## 2023-08-22 RX ADMIN — SODIUM CHLORIDE, PRESERVATIVE FREE 10 ML: 5 INJECTION INTRAVENOUS at 13:06

## 2023-08-22 RX ADMIN — SODIUM CHLORIDE 20 ML/HR: 9 INJECTION, SOLUTION INTRAVENOUS at 13:06

## 2023-08-22 RX ADMIN — SODIUM CHLORIDE 200 MG: 9 INJECTION, SOLUTION INTRAVENOUS at 13:22

## 2023-08-22 ASSESSMENT — PAIN DESCRIPTION - FREQUENCY: FREQUENCY: CONTINUOUS

## 2023-08-22 ASSESSMENT — PAIN DESCRIPTION - PAIN TYPE: TYPE: CHRONIC PAIN

## 2023-08-22 ASSESSMENT — PAIN DESCRIPTION - LOCATION: LOCATION: THROAT

## 2023-08-22 ASSESSMENT — PAIN DESCRIPTION - ONSET: ONSET: ON-GOING

## 2023-08-22 ASSESSMENT — PAIN SCALES - GENERAL: PAINLEVEL_OUTOF10: 8

## 2023-08-22 ASSESSMENT — PAIN DESCRIPTION - DESCRIPTORS: DESCRIPTORS: DISCOMFORT;BURNING

## 2023-08-22 NOTE — PROGRESS NOTES
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    PCP:  Lindie Frankel, DO    History of Present Illness:  71-year-old male who presented to the ENT team for persistent left-sided neck fullness without associated difficulty swallowing    CT soft tissue neck 02/23/2022: Mass located in the left aspect of floor of the mouth extending into the left oropharyngeal soft tissue concerning for squamous cell carcinoma  Multiple enlarged necrotic left anterior cervical chain lymph nodes    Panendoscopy on 3/17/2022:  Findings: L lingual tonsil hypertrophy, biopsy negative, left level II neck node FNA performed   FNA left neck mass level 2: Inconclusive for malignant cells. Few atypical squamous cells with degenerative change  A. Tongue, left base, biopsy:   Squamous epithelial-lined lymphoid tissue with reactive germinal centers, compatible with lingual tonsil. Negative for dysplasia; Negative for malignancy. B.  Tongue, left base, biopsy:   Squamous epithelial-lined lymphoid tissue with reactive germinal centers, compatible with lingual tonsil. Focal lobules of benign mucinous glands. Negative for dysplasia; Negative for malignancy    On 07/21/2022: Panendoscopy excision left level 2 lymph node  Findings:  left base of tongue mass, left cervical lymphadenopathy  A. 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 See comment. B. 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 C.  Left base of tongue, biopsy: Squamous mucosa, negative for neoplasm. D.  Right base of tongue, biopsy: Squamous mucosa, negative for neoplasm. E.  Midline tongue, biopsy: Squamous mucosa, negative for neoplasm.    Comment:   The squamous carcinoma is diffusely and strongly positive for p16 immunostain, supporting the above

## 2023-08-23 NOTE — PROGRESS NOTES
[facility]                                      Progress Note    Patient Name: Iker Edge  Date:8/23/2023      Service Type: Individual      Session Start Time: 9:02  Session End Time:9:56     Session Length: 54    Session #:11    Attendees: Client    Service Modality:  Video Visit:      Provider verified identity through the following two step process.  Patient provided:  Patient is known previously to provider    Telemedicine Visit: The patient's condition can be safely assessed and treated via synchronous audio and visual telemedicine encounter.      Reason for Telemedicine Visit: Services only offered telehealth    Originating Site (Patient Location): Patient's home    Distant Site (Provider Location): Provider Remote Setting    Consent:  The patient/guardian has verbally consented to: the potential risks and benefits of telemedicine (video visit) versus in person care; bill my insurance or make self-payment for services provided; and responsibility for payment of non-covered services.     Patient would like the video invitation sent by:  My Chart    Mode of Communication:  Video Conference via [employer]    Distant Location (Provider):  Off-site    As the provider I attest to compliance with applicable laws and regulations related to telemedicine.    DATA  Interactive Complexity: Yes, visit entailed Interactive Complexity evidenced by:  Does not think she deserved to be in school because of her age. Difficulties that needed to be discussed to empower patient. so time to so was needed.      Crisis: No      Progress Since Last Session (Related to Symptoms / Goals / Homework):   Symptoms: Worsening anxiety related to school    Homework: Partially completed      Episode of Care Goals: Satisfactory progress - ACTION (Actively working towards change); Intervened by reinforcing change plan / affirming steps taken    Current / Ongoing Stressors and Concerns:Patient shared she started school last  Meli Betancourt  9/27/2022  2:05 PM      No chief complaint on file. Wt Readings from Last 3 Encounters:   09/27/22 167 lb 3.2 oz (75.8 kg)   09/26/22 162 lb (73.5 kg)   09/20/22 176 lb 3.2 oz (79.9 kg)       Comments: Patient stopped by to see me today. He has been complaining of severe sore throat. He is unable to eat or drink fluids. Examination of the oral cavity reveals severe mucositis. Patient also complains of pain in the throat. He was seen in the ER yesterday. He did not come for treatment since last Friday. Patient was evaluated by medical oncology today and he is scheduled for IV fluids. Patient would like to stop his treatment till he has his PEG tube this coming Friday. Patient also would like to be seen by palliative care for pain management and we will schedule him to see palliative care for tomorrow      Plan:  We will hold radiation treatment till Monday, October 3      Electronically signed by Teressa Juares MD on 9/27/22 at 2:05 PM EDT week., has already has in mind that she does not fit there because she found out she is the oldest in each classroom.  Feels she is with kids from HS who do not know much which makes her feel she might not be liked.  Patient and writer processed thoughts and feelings around this. Patient will use the tips discussed to feel she deserves to be in school. Her next visit is in 2 weeks.      Treatment Objective(s) Addressed in This Session:   Identify negative self-talk and behaviors: challenge core beliefs, myths, and actions      Intervention: Reviewed her TP today. Making progress using the 3 CS.     Situation      Automatic Thoughts  Cognitive Distortions    Feelings      Behavior      Questioning Thoughts        MI Intervention:     Change Talk Expressed by the Patient: Taking steps    Provider Response to Change Talk: E - Evoked more info from patient about behavior change, A - Affirmed patient's thoughts, decisions, or attempts at behavior change, R - Reflected patient's change talk, and S - Summarized patient's change talk statements    Assessments completed prior to visit: 12/07/2022    The following assessments were completed by patient for this visit:  PHQ2:       1/24/2023     8:21 AM 8/30/2019     7:43 AM 4/25/2018    11:45 AM 6/16/2016    10:08 AM 6/9/2014     4:15 PM   PHQ-2 ( 1999 [employer])   Q1: Little interest or pleasure in doing things 1 3 0 0 0   Q2: Feeling down, depressed or hopeless 2 2 0 0 0   PHQ-2 Score 3 5 0 0 0   PHQ-2 Total Score (12-17 Years)- Positive if 3 or more points; Administer PHQ-A if positive  5      Q1: Little interest or pleasure in doing things Several days       Q2: Feeling down, depressed or hopeless More than half the days       PHQ-2 Score 3         PHQ9:       5/3/2023     2:49 PM 5/22/2023     3:55 PM 6/27/2023     8:10 PM 7/10/2023    12:49 PM 7/24/2023    11:47 AM 8/7/2023    11:33 AM 8/23/2023     8:48 AM   PHQ-9 SCORE   PHQ-9 Total Score MyChart 2 (Minimal depression)  2  (Minimal depression) 3 (Minimal depression) 3 (Minimal depression) 2 (Minimal depression) 2 (Minimal depression)   PHQ-9 Total Score 2 3 2 3 3 2 2     GAD2:       4/20/2023    10:23 AM 5/3/2023     2:50 PM 6/27/2023     8:10 PM 7/10/2023    12:50 PM 7/24/2023    11:48 AM 8/7/2023    11:33 AM 8/23/2023     8:49 AM   LISA-2   Feeling nervous, anxious, or on edge 0 1 0 0 1 1 2   Not being able to stop or control worrying 0 0 0 0 0 0 1   LISA-2 Total Score 0 1 0 0 1 1 3     GAD7:       5/16/2022     4:27 PM 11/28/2022     2:10 PM 1/25/2023    10:09 AM 4/4/2023     2:03 PM 5/22/2023     3:55 PM 7/24/2023     1:50 PM 8/23/2023     8:49 AM   LISA-7 SCORE   Total Score   5 (mild anxiety)    6 (mild anxiety)   Total Score 5 7 5 5 1 7 6     CAGE-AID:       11/29/2022    10:28 AM 4/20/2023     2:06 PM 5/4/2023    12:08 PM 8/23/2023     9:56 AM   CAGE-AID Total Score   Total Score 2 0 0 0   Total Score MyChart 2 (A total score of 2 or greater is considered clinically significant)        [place] Suicide Severity Rating Scale (Lifetime/Recent)      10/31/2019     9:03 AM   [place] Suicide Severity Rating (Lifetime/Recent)   Q1 Wish to be Dead (Lifetime) Yes   Q2 Non-Specific Active Suicidal Thoughts (Lifetime) Yes   RETIRED: 1. Wish to be Dead (Recent) Yes   RETIRED: 2. Non-Specific Active Suicidal Thoughts (Recent) Yes   3. Active Suicidal Ideation with any Methods (Not Plan) Without Intent to Act (Lifetime) No   RETIRED: 3. Active Suicidal Ideation with any Methods (Not Plan) Without Intent to Act (Recent) No   RETIRE: 4. Active Suicidal Ideation with Some Intent to Act, Without Specific Plan (Lifetime) No   4. Active Suicidal Ideation with Some Intent to Act, Without Specific Plan (Recent) No   RETIRE: 5. Active Suicidal Ideation with Specific Plan and Intent (Lifetime) No   RETIRED: 5. Active Suicidal Ideation with Specific Plan and Intent (Recent) No   Actual Attempt (Lifetime) No   Actual Attempt (Past 3 Months) No   Has  subject engaged in non-suicidal self-injurious behavior? (Lifetime) No   Has subject engaged in non-suicidal self-injurious behavior? (Past 3 Months) No   Interrupted Attempts (Lifetime) No   Interrupted Attempts (Past 3 Months) No   Aborted or Self-Interrupted Attempt (Lifetime) No   Aborted or Self-Interrupted Attempt (Past 3 Months) No   Preparatory Acts or Behavior (Lifetime) No   Preparatory Acts or Behavior (Past 3 Months) No       ASSESSMENT: Current Emotional / Mental Status (status of significant symptoms):   Risk status (Self / Other harm or suicidal ideation)   Patient denies current fears or concerns for personal safety.   Patient denies current or recent suicidal ideation or behaviors.   Patient denies current or recent homicidal ideation or behaviors.   Patient denies current or recent self injurious behavior or ideation.   Patient denies other safety concerns.   Patient reports there has been no change in risk factors since their last session.     Patient reports there has been no change in protective factors since their last session.     Recommended that patient call 911 or go to the local ED should there be a change in any of these risk factors.     Appearance:   Appropriate    Eye Contact:   Good    Psychomotor Behavior: Normal    Attitude:   Cooperative    Orientation:   Person Place Time Situation   Speech    Rate / Production: Normal     Volume:  Normal    Mood:    Normal   Affect:    Appropriate    Thought Content:  Clear    Thought Form:  Coherent  Logical    Insight:    Good      Medication Review:   No changes to current psychiatric medication(s)     Medication Compliance:   Yes     Changes in Health Issues:   None reported     Chemical Use Review:   Substance Use: Chemical use reviewed, no active concerns identified      Tobacco Use: No current tobacco use.      Diagnosis:  1. Generalized anxiety disorder      Collateral Reports Completed:   Routed note to PCP    PLAN: (Patient Tasks /  Therapist Tasks / Other): Keep these goals  Patient will use her safety plan as needed.   Patient will review the CBT skills sent via [employer]  Patient will reflect on today's discussion around how to approach stressful situation  Patient will keep up using tips discussed to increase confidence.   Patient will use the tips discussed to be able to control her anxiety  Patient next visit is on 9/06/2023    Francisco Smith, LICSW                                                ________________________________________________________________    Individual Treatment Plan    Patient's Name: Iker Edge  YOB: 1997    Date of Creation: 1/03/2023    Date Treatment Plan Last Reviewed/Revised:8/23/2023    DSM5 Diagnoses: 296.32 (F33.1) Major Depressive Disorder, Recurrent Episode, Moderate _ and With anxious distress or 300.00 (F41.9) Unspecified Anxiety Disorder     Psychosocial / Contextual Factors: Long standing history of depression since age 12-13. Can not pin point what was happening around that age.ongoing SI.     PROMIS (reviewed every 90 days):    Referral / Collaboration:  Referral to another professional/service is not indicated at this time..    Anticipated number of session for this episode of care: 9-12 sessions  Anticipation frequency of session: Biweekly  Anticipated Duration of each session: 53 or more minutes  Treatment plan will be reviewed in 90 days or when goals have been changed.     MeasurableTreatment Goal(s) related to diagnosis / functional impairment(s)  Goal 1: Patient will keep her depression under control in order to be productive      I will know I've met my goal when the level of depression of 3/4 is reduced to 2/4 or better by the next review.       Objective #A (Patient Action)                           Status: Continued - Date: 8/23/2023   Patient will Improve concentration, focus, and mindfulness in daily activities     Intervention(s)  Therapist will assign homework :  practice CBT skills to challenge any distortions and incease confidence.   teach Healthy life styles: sleep, balance diet, exercise and personal hygiene.      Objective #B  Patient will use at least 3 coping skills for depression management in the next 12 weeks   Status:update - Date: 8/23/2023  Intervention(s)  Therapist will assign homework : make a list of triggers and signs and discuss in the session for input.     Goal 2: Patient will develop healthy cognitive patterns and beliefs about self and the world that lead to alleviation and help prevent the relapse of depression symptoms.     I will know I've met my goal when my level of my depression is reduced from 3/4 to 2/4 or better by the next review.       Objective #A (Patient Action)                          Patient will identify at least 4 stressors which contribute to feelings of depression.  Status: update - Date: 8/23/2023     Intervention(s)  Therapist will teach distraction skills. including seff soothing with the 5 senses.     Objective #B  Patient will Identify negative self-talk and behaviors: challenge core beliefs, myths, and actions.  Status: update - Date:8/23/2023  Intervention(s)  Therapist will Introduce CBT skills.     Objective #C  Patient will Increase interest, engagement, and pleasure in doing things.  Status:update - Date: 8/23/223  Intervention(s)  Therapist will Encourage patient to share identify and share thoughts and feelings to increase self confidence .     Goal 3: Client will will stabilize anxiety level while increasing ability to function on a daily basis.     I will know I've met my goal when my anxiety is reduced from 4/4 to 3/4 or better by the next review       Objective #A (Client Action)                Client will Increase interest, engagement, and pleasure in doing things.  Status: Update - Date: 8/23/2023     Intervention(s)  Therapist will provide educational materials on distraction activities.     Objective  "#B  Client will identify at least 4 fears / thoughts that contribute to feeling anxious.  Status: update - Date: 8/23/2023     Intervention(s)  Therapist will teach how to challenge negative thoughts using CBT skills including the 3 Cs.     Objective #C  Client will use thought-stopping strategy daily to reduce intrusive thoughts.  Status:update - Date: 8/23/2023     Intervention(s)              Therapist will Teach how to use CBT: 3 Cs to challenge the NTs as they   present.   Objective #D (Patient Action)                          Status: update - Date: 8/23/2023     Intervention(s)  Therapist will teach emotional recognition/identification. related to behavior change.     Objective #E  Patient will use thought-stopping strategy daily to reduce intrusive thoughts.  Status::  update - Date: 8/23/2023  Intervention(s)  Therapist will assist the patient Identify any distortions and emotions that affect patient's productivity.     Patient has reviewed and agreed to the above plan.    Francisco Smith, [facility]  5/04/2023         [facility]                                       Iker Edge-Now Brit Edge     SAFETY PLAN: Reviewed today 3/01/2023; 5/04/2023- Has not had SI for the last several weeks.  Step 1: Warning signs / cues (Thoughts, images, mood, situation, behavior) that a crisis may be developing:  Thoughts: \"I'm a burden\", \"I can't do this anymore\", and \"Nothing makes it better\"  Images: obsessive thoughts of death or dying: \" Suicide\", flashbacks, and visions of harm: Cutting, burning self.  Thinking Processes: intrusive thoughts (bothersome, unwanted thoughts that come out of nowhere): things done in the past; memories of poor living situation for a year about 5-6 years ago. , highly critical and negative thoughts: \" I don't deserve it, I am not doing enough, not good enough\", and depersonalization  Mood: worsening depression, hopelessness, intense anger, agitation, and mood " "swings  Behaviors: isolating/withdrawing , using alcohol, can't stop crying, aggression, not taking care of my responsibilities, and increasing frequency and duration of dissociation  Situations: bullying: from co workers( current); schoolmates( past), public shame: how she perceives self, and relationship problems   Step 2: Coping strategies - Things I can do to take my mind off of my problems without contacting another person (relaxation technique, physical activity):  Distress Tolerance Strategies:  read a book: Any and Writing ; video games  Physical Activities: go for a walk, gardening, and stretching   Focus on helpful thoughts:  \"This is temporary\", \"I will get through this\", and \"It always passes\"  Step 3: People and social settings that provide distraction:   Name: Jeremiah ( Partner) Phone:  [phone]   Name:  Emerita ( good friend) Phone:    [phone]  coffee shop and park   Step 4: Remind myself of people and things that are important to me and worth living for:  Partner  Step 5: When I am in crisis, I can ask these people to help me use my safety plan:  Name: Jeremiah ( Partner) Phone:  [phone]  Name:  Emerita ( good friend) Phone:    [phone]  Step 6: Making the environment safe: remove alcohol and be around others  Step 7: Professionals or agencies I can contact during a crisis:  Suicide Prevention Lifeline: Call or Text 552   Local Crisis Services:  [facility] crisis line 1-703.607.7342.    Call 801 or go to my nearest emergency department.   I helped develop this safety plan and agree to use it when needed.  I have been given a copy of this plan.   Client signature _________________________________________________________________  Today s date:  1/3/2023  Completed by Provider Name/ Credentials: ANGELO Schaefer January 3, 2023  Adapted from Safety Plan Template 2008 Jazlyn Carreon and Bryan Weber is reprinted with the express permission of the authors.  No portion of the " Safety Plan Template may be reproduced without the express, written permission.  You can contact the authors at bhs@[email or web address] or juan a@[email or web address].  Answers for HPI/ROS submitted by the patient on 1/2/2023  If you checked off any problems, how difficult have these problems made it for you to do your work, take care of things at home, or get along with other people?: Somewhat difficult  PHQ9 TOTAL SCORE: 12  Answers for HPI/ROS submitted by the patient on 3/1/2023  If you checked off any problems, how difficult have these problems made it for you to do your work, take care of things at home, or get along with other people?: Not difficult at all  PHQ9 TOTAL SCORE: 12    Answers for HPI/ROS submitted by the patient on 4/4/2023  If you checked off any problems, how difficult have these problems made it for you to do your work, take care of things at home, or get along with other people?: Not difficult at all  PHQ9 TOTAL SCORE: 6    Answers for HPI/ROS submitted by the patient on 4/20/2023  If you checked off any problems, how difficult have these problems made it for you to do your work, take care of things at home, or get along with other people?: Not difficult at all  PHQ9 TOTAL SCORE: 2    Answers for HPI/ROS submitted by the patient on 5/3/2023  If you checked off any problems, how difficult have these problems made it for you to do your work, take care of things at home, or get along with other people?: Not difficult at all  PHQ9 TOTAL SCORE: 2  Answers for HPI/ROS submitted by the patient on 6/27/2023  If you checked off any problems, how difficult have these problems made it for you to do your work, take care of things at home, or get along with other people?: Not difficult at all  PHQ9 TOTAL SCORE: 2    Answers for HPI/ROS submitted by the patient on 7/10/2023  If you checked off any problems, how difficult have these problems made it for you to do your work, take care of things at home, or get  along with other people?: Not difficult at all  PHQ9 TOTAL SCORE: 3    Answers submitted by the patient for this visit:  Patient Health Questionnaire (Submitted on 7/24/2023)  If you checked off any problems, how difficult have these problems made it for you to do your work, take care of things at home, or get along with other people?: Somewhat difficult  PHQ9 TOTAL SCORE: 3  Answers submitted by the patient for this visit:  Patient Health Questionnaire (Submitted on 8/7/2023)  If you checked off any problems, how difficult have these problems made it for you to do your work, take care of things at home, or get along with other people?: Not difficult at all  PHQ9 TOTAL SCORE: 2  Answers submitted by the patient for this visit:  Patient Health Questionnaire (Submitted on 8/23/2023)  If you checked off any problems, how difficult have these problems made it for you to do your work, take care of things at home, or get along with other people?: Not difficult at all  PHQ9 TOTAL SCORE: 2  LISA-7 (Submitted on 8/23/2023)  LISA 7 TOTAL SCORE: 6

## 2023-09-05 ENCOUNTER — TELEPHONE (OUTPATIENT)
Dept: CASE MANAGEMENT | Age: 73
End: 2023-09-05

## 2023-09-05 DIAGNOSIS — C10.9 OROPHARYNGEAL CANCER (HCC): ICD-10-CM

## 2023-09-05 RX ORDER — OXYCODONE HYDROCHLORIDE 5 MG/1
5 TABLET ORAL EVERY 8 HOURS PRN
Qty: 42 TABLET | Refills: 0 | Status: SHIPPED | OUTPATIENT
Start: 2023-09-05 | End: 2023-09-19

## 2023-09-05 NOTE — TELEPHONE ENCOUNTER
Patient's wife called asking for the [facility] scheduling number to make the PET scan appointment. She stated it needed done prior to the 09/12/2023 clinic appointment. Discussed with Marjan Moreno CNP, she stated it's to be done 3 months since the last PET scan on 06/19/2023. Informed patient's wife the PET scan needs scheduled between 09/19-09/29/2023 so the results are available for the 10/03/2023 clinic appointment and provided her the scheduling number, she verbalized understanding. Encouraged to call the clinic if they need further assistance.  Ken Stringer  RN, ADN, BSE, OCN  Patient Nurse Navigator

## 2023-09-08 NOTE — PROGRESS NOTES
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    PCP:  Marton Spatz, DO    History of Present Illness:  67-year-old male who presented to the ENT team for persistent left-sided neck fullness without associated difficulty swallowing    CT soft tissue neck 02/23/2022: Mass located in the left aspect of floor of the mouth extending into the left oropharyngeal soft tissue concerning for squamous cell carcinoma  Multiple enlarged necrotic left anterior cervical chain lymph nodes    Panendoscopy on 3/17/2022:  Findings: L lingual tonsil hypertrophy, biopsy negative, left level II neck node FNA performed   FNA left neck mass level 2: Inconclusive for malignant cells. Few atypical squamous cells with degenerative change  A. Tongue, left base, biopsy:   Squamous epithelial-lined lymphoid tissue with reactive germinal centers, compatible with lingual tonsil. Negative for dysplasia; Negative for malignancy. B.  Tongue, left base, biopsy:   Squamous epithelial-lined lymphoid tissue with reactive germinal centers, compatible with lingual tonsil. Focal lobules of benign mucinous glands. Negative for dysplasia; Negative for malignancy    On 07/21/2022: Panendoscopy excision left level 2 lymph node  Findings:  left base of tongue mass, left cervical lymphadenopathy  A. 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 See comment. B. 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 C.  Left base of tongue, biopsy: Squamous mucosa, negative for neoplasm. D.  Right base of tongue, biopsy: Squamous mucosa, negative for neoplasm. E.  Midline tongue, biopsy: Squamous mucosa, negative for neoplasm.    Comment:   The squamous carcinoma is diffusely and strongly positive for p16 immunostain, supporting the above

## 2023-09-12 ENCOUNTER — HOSPITAL ENCOUNTER (OUTPATIENT)
Dept: INFUSION THERAPY | Age: 73
Discharge: HOME | End: 2023-09-12
Payer: MEDICARE

## 2023-09-12 ENCOUNTER — OFFICE VISIT (OUTPATIENT)
Dept: ONCOLOGY | Age: 73
End: 2023-09-12
Payer: MEDICARE

## 2023-09-12 VITALS
BODY MASS INDEX: 25.44 KG/M2 | HEIGHT: 67 IN | HEART RATE: 50 BPM | WEIGHT: 162.1 LBS | SYSTOLIC BLOOD PRESSURE: 133 MMHG | TEMPERATURE: 98.1 F | OXYGEN SATURATION: 100 % | DIASTOLIC BLOOD PRESSURE: 78 MMHG

## 2023-09-12 VITALS
SYSTOLIC BLOOD PRESSURE: 104 MMHG | DIASTOLIC BLOOD PRESSURE: 62 MMHG | OXYGEN SATURATION: 98 % | HEART RATE: 74 BPM | TEMPERATURE: 98.2 F

## 2023-09-12 DIAGNOSIS — R53.83 OTHER FATIGUE: ICD-10-CM

## 2023-09-12 DIAGNOSIS — C10.9 SQUAMOUS CELL CARCINOMA OF OROPHARYNX (HCC): ICD-10-CM

## 2023-09-12 DIAGNOSIS — R53.83 OTHER FATIGUE: Primary | ICD-10-CM

## 2023-09-12 DIAGNOSIS — C10.9 SQUAMOUS CELL CARCINOMA OF OROPHARYNX (HCC): Primary | ICD-10-CM

## 2023-09-12 LAB
ALBUMIN SERPL-MCNC: 4.4 G/DL (ref 3.5–5.2)
ALP SERPL-CCNC: 57 U/L (ref 40–129)
ALT SERPL-CCNC: 19 U/L (ref 0–40)
ANION GAP SERPL CALCULATED.3IONS-SCNC: 10 MMOL/L (ref 7–16)
AST SERPL-CCNC: 23 U/L (ref 0–39)
BASOPHILS # BLD: 0.04 K/UL (ref 0–0.2)
BASOPHILS NFR BLD: 1 % (ref 0–2)
BILIRUB SERPL-MCNC: 0.4 MG/DL (ref 0–1.2)
BUN SERPL-MCNC: 29 MG/DL (ref 6–23)
CALCIUM SERPL-MCNC: 10 MG/DL (ref 8.6–10.2)
CHLORIDE SERPL-SCNC: 105 MMOL/L (ref 98–107)
CO2 SERPL-SCNC: 25 MMOL/L (ref 22–29)
CREAT SERPL-MCNC: 1.4 MG/DL (ref 0.7–1.2)
EOSINOPHIL # BLD: 0.08 K/UL (ref 0.05–0.5)
EOSINOPHILS RELATIVE PERCENT: 2 % (ref 0–6)
ERYTHROCYTE [DISTWIDTH] IN BLOOD BY AUTOMATED COUNT: 13.6 % (ref 11.5–15)
GFR SERPL CREATININE-BSD FRML MDRD: 52 ML/MIN/1.73M2
GLUCOSE SERPL-MCNC: 153 MG/DL (ref 74–99)
HCT VFR BLD AUTO: 38.9 % (ref 37–54)
HGB BLD-MCNC: 12.7 G/DL (ref 12.5–16.5)
LYMPHOCYTES NFR BLD: 0.2 K/UL (ref 1.5–4)
LYMPHOCYTES RELATIVE PERCENT: 4 % (ref 20–42)
MCH RBC QN AUTO: 29.8 PG (ref 26–35)
MCHC RBC AUTO-ENTMCNC: 32.6 G/DL (ref 32–34.5)
MCV RBC AUTO: 91.3 FL (ref 80–99.9)
MONOCYTES NFR BLD: 0.16 K/UL (ref 0.1–0.95)
MONOCYTES NFR BLD: 3 % (ref 2–12)
NEUTROPHILS NFR BLD: 90 % (ref 43–80)
NEUTS SEG NFR BLD: 4.12 K/UL (ref 1.8–7.3)
PLATELET # BLD AUTO: 192 K/UL (ref 130–450)
PMV BLD AUTO: 10.4 FL (ref 7–12)
POTASSIUM SERPL-SCNC: 4.2 MMOL/L (ref 3.5–5)
PROT SERPL-MCNC: 7.6 G/DL (ref 6.4–8.3)
RBC # BLD AUTO: 4.26 M/UL (ref 3.8–5.8)
RBC # BLD: NORMAL 10*6/UL
SODIUM SERPL-SCNC: 140 MMOL/L (ref 132–146)
WBC OTHER # BLD: 4.6 K/UL (ref 4.5–11.5)

## 2023-09-12 PROCEDURE — 96375 TX/PRO/DX INJ NEW DRUG ADDON: CPT

## 2023-09-12 PROCEDURE — 99213 OFFICE O/P EST LOW 20 MIN: CPT | Performed by: CLINICAL NURSE SPECIALIST

## 2023-09-12 PROCEDURE — 6360000002 HC RX W HCPCS: Performed by: CLINICAL NURSE SPECIALIST

## 2023-09-12 PROCEDURE — 36415 COLL VENOUS BLD VENIPUNCTURE: CPT

## 2023-09-12 PROCEDURE — 80053 COMPREHEN METABOLIC PANEL: CPT

## 2023-09-12 PROCEDURE — 96413 CHEMO IV INFUSION 1 HR: CPT

## 2023-09-12 PROCEDURE — 2580000003 HC RX 258: Performed by: CLINICAL NURSE SPECIALIST

## 2023-09-12 PROCEDURE — 85025 COMPLETE CBC W/AUTO DIFF WBC: CPT

## 2023-09-12 PROCEDURE — 1123F ACP DISCUSS/DSCN MKR DOCD: CPT | Performed by: CLINICAL NURSE SPECIALIST

## 2023-09-12 RX ORDER — MEPERIDINE HYDROCHLORIDE 25 MG/ML
12.5 INJECTION INTRAMUSCULAR; INTRAVENOUS; SUBCUTANEOUS PRN
Status: CANCELLED | OUTPATIENT
Start: 2023-09-12

## 2023-09-12 RX ORDER — ONDANSETRON 2 MG/ML
8 INJECTION INTRAMUSCULAR; INTRAVENOUS
Status: CANCELLED | OUTPATIENT
Start: 2023-09-12

## 2023-09-12 RX ORDER — EPINEPHRINE 1 MG/ML
0.3 INJECTION, SOLUTION, CONCENTRATE INTRAVENOUS PRN
Status: CANCELLED | OUTPATIENT
Start: 2023-09-12

## 2023-09-12 RX ORDER — SODIUM CHLORIDE 9 MG/ML
5-250 INJECTION, SOLUTION INTRAVENOUS PRN
Status: DISCONTINUED | OUTPATIENT
Start: 2023-09-12 | End: 2023-09-13 | Stop reason: HOSPADM

## 2023-09-12 RX ORDER — HEPARIN 100 UNIT/ML
500 SYRINGE INTRAVENOUS PRN
Status: CANCELLED | OUTPATIENT
Start: 2023-09-12

## 2023-09-12 RX ORDER — SODIUM CHLORIDE 9 MG/ML
5-250 INJECTION, SOLUTION INTRAVENOUS PRN
Status: CANCELLED | OUTPATIENT
Start: 2023-09-12

## 2023-09-12 RX ORDER — ACETAMINOPHEN 325 MG/1
650 TABLET ORAL
Status: CANCELLED | OUTPATIENT
Start: 2023-09-12

## 2023-09-12 RX ORDER — SODIUM CHLORIDE 0.9 % (FLUSH) 0.9 %
5-40 SYRINGE (ML) INJECTION PRN
Status: CANCELLED | OUTPATIENT
Start: 2023-09-12

## 2023-09-12 RX ORDER — SODIUM CHLORIDE 0.9 % (FLUSH) 0.9 %
5-40 SYRINGE (ML) INJECTION PRN
Status: DISCONTINUED | OUTPATIENT
Start: 2023-09-12 | End: 2023-09-13 | Stop reason: HOSPADM

## 2023-09-12 RX ORDER — SUCRALFATE ORAL 1 G/10ML
1 SUSPENSION ORAL 4 TIMES DAILY
Qty: 1200 ML | Refills: 3 | Status: SHIPPED | OUTPATIENT
Start: 2023-09-12

## 2023-09-12 RX ORDER — ONDANSETRON 2 MG/ML
8 INJECTION INTRAMUSCULAR; INTRAVENOUS
Status: COMPLETED | OUTPATIENT
Start: 2023-09-12 | End: 2023-09-12

## 2023-09-12 RX ORDER — SODIUM CHLORIDE 9 MG/ML
INJECTION, SOLUTION INTRAVENOUS CONTINUOUS
Status: CANCELLED | OUTPATIENT
Start: 2023-09-12

## 2023-09-12 RX ORDER — ALBUTEROL SULFATE 90 UG/1
4 AEROSOL, METERED RESPIRATORY (INHALATION) PRN
Status: CANCELLED | OUTPATIENT
Start: 2023-09-12

## 2023-09-12 RX ORDER — DIPHENHYDRAMINE HYDROCHLORIDE 50 MG/ML
50 INJECTION INTRAMUSCULAR; INTRAVENOUS
Status: CANCELLED | OUTPATIENT
Start: 2023-09-12

## 2023-09-12 RX ORDER — PILOCARPINE HYDROCHLORIDE 5 MG/1
TABLET, FILM COATED ORAL
Qty: 60 TABLET | Refills: 0 | Status: SHIPPED | OUTPATIENT
Start: 2023-09-12

## 2023-09-12 RX ADMIN — SODIUM CHLORIDE 20 ML/HR: 9 INJECTION, SOLUTION INTRAVENOUS at 13:52

## 2023-09-12 RX ADMIN — SODIUM CHLORIDE 200 MG: 9 INJECTION, SOLUTION INTRAVENOUS at 14:16

## 2023-09-12 RX ADMIN — ONDANSETRON 8 MG: 2 INJECTION INTRAMUSCULAR; INTRAVENOUS at 14:03

## 2023-09-12 RX ADMIN — SODIUM CHLORIDE, PRESERVATIVE FREE 10 ML: 5 INJECTION INTRAVENOUS at 13:48

## 2023-09-12 ASSESSMENT — PAIN DESCRIPTION - FREQUENCY: FREQUENCY: CONTINUOUS

## 2023-09-12 ASSESSMENT — PAIN DESCRIPTION - LOCATION: LOCATION: THROAT

## 2023-09-12 ASSESSMENT — PAIN DESCRIPTION - PAIN TYPE: TYPE: CHRONIC PAIN

## 2023-09-12 ASSESSMENT — PAIN DESCRIPTION - DESCRIPTORS: DESCRIPTORS: BURNING

## 2023-09-12 ASSESSMENT — PAIN SCALES - GENERAL: PAINLEVEL_OUTOF10: 8

## 2023-09-12 ASSESSMENT — PAIN DESCRIPTION - ONSET: ONSET: ON-GOING

## 2023-09-20 DIAGNOSIS — C10.9 OROPHARYNGEAL CANCER (HCC): ICD-10-CM

## 2023-09-20 RX ORDER — OXYCODONE HYDROCHLORIDE 5 MG/1
5 TABLET ORAL EVERY 8 HOURS PRN
Qty: 42 TABLET | Refills: 0 | Status: SHIPPED | OUTPATIENT
Start: 2023-09-20 | End: 2023-10-04

## 2023-09-20 NOTE — TELEPHONE ENCOUNTER
Call from Ke's wife requesting refill for Oxy IR 5 mg. Pharmacy is [address] [facility]. Next richy 9/27/23.

## 2023-09-22 ENCOUNTER — HOSPITAL ENCOUNTER (OUTPATIENT)
Dept: NUCLEAR MEDICINE | Age: 73
Discharge: HOME | End: 2023-09-22
Attending: INTERNAL MEDICINE
Payer: MEDICARE

## 2023-09-22 DIAGNOSIS — C01 MALIGNANT NEOPLASM OF BASE OF TONGUE (HCC): ICD-10-CM

## 2023-09-22 DIAGNOSIS — C10.9 SQUAMOUS CELL CARCINOMA OF OROPHARYNX (HCC): ICD-10-CM

## 2023-09-23 PROCEDURE — 3430000000 HC RX DIAGNOSTIC RADIOPHARMACEUTICAL: Performed by: RADIOLOGY

## 2023-09-23 PROCEDURE — A9552 F18 FDG: HCPCS | Performed by: RADIOLOGY

## 2023-09-23 RX ORDER — FLUDEOXYGLUCOSE F 18 200 MCI/ML
10 INJECTION, SOLUTION INTRAVENOUS
Status: COMPLETED | OUTPATIENT
Start: 2023-09-23 | End: 2023-09-23

## 2023-09-23 RX ADMIN — FLUDEOXYGLUCOSE F 18 10 MILLICURIE: 200 INJECTION, SOLUTION INTRAVENOUS at 09:38

## 2023-09-27 ENCOUNTER — OFFICE VISIT (OUTPATIENT)
Dept: PALLATIVE CARE | Age: 73
End: 2023-09-27
Payer: MEDICARE

## 2023-09-27 ENCOUNTER — TELEPHONE (OUTPATIENT)
Dept: ONCOLOGY | Age: 73
End: 2023-09-27

## 2023-09-27 VITALS
HEART RATE: 84 BPM | SYSTOLIC BLOOD PRESSURE: 125 MMHG | WEIGHT: 162 LBS | DIASTOLIC BLOOD PRESSURE: 70 MMHG | TEMPERATURE: 97.5 F | BODY MASS INDEX: 25.37 KG/M2

## 2023-09-27 DIAGNOSIS — Z51.5 PALLIATIVE CARE BY SPECIALIST: Primary | ICD-10-CM

## 2023-09-27 PROCEDURE — 99211 OFF/OP EST MAY X REQ PHY/QHP: CPT | Performed by: NURSE PRACTITIONER

## 2023-09-27 RX ORDER — AMOXICILLIN AND CLAVULANATE POTASSIUM 875; 125 MG/1; MG/1
1 TABLET, FILM COATED ORAL 2 TIMES DAILY
Qty: 20 TABLET | Refills: 0 | Status: SHIPPED | OUTPATIENT
Start: 2023-09-27 | End: 2023-10-07

## 2023-09-27 NOTE — TELEPHONE ENCOUNTER
KEL briefly met with pt re: [employer]. Pt had some information filled out on the paperwork that was part of the packet that was given out by [facility] ([facility]). Pt reported that he planned on switching providers due to having many physicians that worked through [facility] ([facility]). Pt was encouraged to reach out to someone to discuss changing his plan as he could change as a just cause transfer at this time. Pt reported that he was reaching out to his  today. SW encouraged pt to make this call during this week as he was unsure how things might change after 10/1/23.         Kenneth JOE, JOSE ANTONIOW-S  Oncology Social Worker

## 2023-09-27 NOTE — PROGRESS NOTES
and dryness. He is using his oxycodone with good relief. He is tolerating his tube feedings, and is eating by mouth more as well. He is encouraged to try lemon wedges for his dry mouth. His bowel regimen is working well. He does complain about a productive cough, with green mucus, as well as congestion. This been present for the past week to 2 weeks, and has not been improving. We discussed options, and I will provide him with a prescription for antibiotic and encouraged the use of guaifenesin. He has no new needs at this time.     Pain Assessment   Ratin  Description: burning  Duration: months  Frequency: daily  Location: left neck  Alleviating Factors: pain medication and ice  Exacerbating Factors: unable to associate with any factor  Effect: change in function, interference with activities, sleep, and mood    Past Medical History:   Diagnosis Date    Arthritis     Cancer ([address])     oropharynx    Diabetes mellitus ([address])     Essential hypertension 11/10/2020    GERD (gastroesophageal reflux disease)     Hyperlipidemia     Hypertension     Lyme disease     Thrombocytopenia ([address] )        Past Surgical History:   Procedure Laterality Date    BRONCHOSCOPY N/A 10/28/2019    BRONCHOSCOPY performed by Brigdett Yuen MD at [facility]      dr Ana Maria Chavez  2017    COLONOSCOPY  2019    GASTROSTOMY TUBE PLACEMENT N/A 2022    PEG TUBE PLACEMENT performed by Kd Morgan MD at [facility]  3/22/2023    IR BIOPSY LIVER PERCUTANEOUS 3/22/2023 [facility] CT    LARYNGOSCOPY Left 3/17/2022    PANENDOSCOPY WITH BIOPSY performed by Obie Kruse DO at [facility] Left 2022    PANENDOSCOPY performed by Obie Kruse DO at [facility] Left 2022    EXCISION LEFT LEVEL II LYMPH NODE, PANENDOSCOPY performed by Obie Kruse DO at [facility] EXTRACTION Left 2021

## 2023-09-29 ENCOUNTER — OFFICE VISIT (OUTPATIENT)
Dept: ENT CLINIC | Age: 73
End: 2023-09-29
Payer: MEDICARE

## 2023-09-29 VITALS
SYSTOLIC BLOOD PRESSURE: 124 MMHG | BODY MASS INDEX: 25.33 KG/M2 | WEIGHT: 161.4 LBS | HEIGHT: 67 IN | DIASTOLIC BLOOD PRESSURE: 80 MMHG | HEART RATE: 69 BPM

## 2023-09-29 DIAGNOSIS — C13.9 CARCINOMA OF HYPOPHARYNX (HCC): Primary | ICD-10-CM

## 2023-09-29 DIAGNOSIS — C10.9 SQUAMOUS CELL CARCINOMA OF OROPHARYNX (HCC): ICD-10-CM

## 2023-09-29 PROCEDURE — 31575 DIAGNOSTIC LARYNGOSCOPY: CPT | Performed by: OTOLARYNGOLOGY

## 2023-09-29 PROCEDURE — 99213 OFFICE O/P EST LOW 20 MIN: CPT | Performed by: OTOLARYNGOLOGY

## 2023-09-29 PROCEDURE — 1123F ACP DISCUSS/DSCN MKR DOCD: CPT | Performed by: OTOLARYNGOLOGY

## 2023-09-29 ASSESSMENT — ENCOUNTER SYMPTOMS
VOICE CHANGE: 0
SINUS PAIN: 0
SHORTNESS OF BREATH: 0
COUGH: 0
TROUBLE SWALLOWING: 1
VOMITING: 0
RHINORRHEA: 0
SORE THROAT: 0

## 2023-10-02 NOTE — PROGRESS NOTES
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    PCP:  Fly Sanches DO    History of Present Illness:  64-year-old male who presented to the ENT team for persistent left-sided neck fullness without associated difficulty swallowing    CT soft tissue neck 02/23/2022: Mass located in the left aspect of floor of the mouth extending into the left oropharyngeal soft tissue concerning for squamous cell carcinoma  Multiple enlarged necrotic left anterior cervical chain lymph nodes    Panendoscopy on 3/17/2022:  Findings: L lingual tonsil hypertrophy, biopsy negative, left level II neck node FNA performed   FNA left neck mass level 2: Inconclusive for malignant cells. Few atypical squamous cells with degenerative change  A. Tongue, left base, biopsy:   Squamous epithelial-lined lymphoid tissue with reactive germinal centers, compatible with lingual tonsil. Negative for dysplasia; Negative for malignancy. B.  Tongue, left base, biopsy:   Squamous epithelial-lined lymphoid tissue with reactive germinal centers, compatible with lingual tonsil. Focal lobules of benign mucinous glands. Negative for dysplasia; Negative for malignancy    On 07/21/2022: Panendoscopy excision left level 2 lymph node  Findings:  left base of tongue mass, left cervical lymphadenopathy  A. 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 See comment. B. 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 C.  Left base of tongue, biopsy: Squamous mucosa, negative for neoplasm. D.  Right base of tongue, biopsy: Squamous mucosa, negative for neoplasm. E.  Midline tongue, biopsy: Squamous mucosa, negative for neoplasm.    Comment:   The squamous carcinoma is diffusely and strongly positive for p16 immunostain, supporting the above

## 2023-10-03 ENCOUNTER — HOSPITAL ENCOUNTER (OUTPATIENT)
Dept: INFUSION THERAPY | Age: 73
Discharge: HOME | End: 2023-10-03
Payer: MEDICARE

## 2023-10-03 ENCOUNTER — OFFICE VISIT (OUTPATIENT)
Dept: ONCOLOGY | Age: 73
End: 2023-10-03
Payer: MEDICARE

## 2023-10-03 VITALS
HEIGHT: 67 IN | BODY MASS INDEX: 25.62 KG/M2 | OXYGEN SATURATION: 99 % | SYSTOLIC BLOOD PRESSURE: 115 MMHG | DIASTOLIC BLOOD PRESSURE: 73 MMHG | HEART RATE: 69 BPM | TEMPERATURE: 97.7 F | WEIGHT: 163.2 LBS

## 2023-10-03 VITALS
RESPIRATION RATE: 16 BRPM | HEART RATE: 67 BPM | DIASTOLIC BLOOD PRESSURE: 76 MMHG | OXYGEN SATURATION: 97 % | TEMPERATURE: 98.2 F | SYSTOLIC BLOOD PRESSURE: 133 MMHG

## 2023-10-03 DIAGNOSIS — C10.9 OROPHARYNGEAL CANCER (HCC): Primary | ICD-10-CM

## 2023-10-03 DIAGNOSIS — R53.83 OTHER FATIGUE: ICD-10-CM

## 2023-10-03 DIAGNOSIS — C10.9 SQUAMOUS CELL CARCINOMA OF OROPHARYNX (HCC): ICD-10-CM

## 2023-10-03 DIAGNOSIS — C10.9 SQUAMOUS CELL CARCINOMA OF OROPHARYNX (HCC): Primary | ICD-10-CM

## 2023-10-03 LAB
ALBUMIN SERPL-MCNC: 4.3 G/DL (ref 3.5–5.2)
ALP SERPL-CCNC: 54 U/L (ref 40–129)
ALT SERPL-CCNC: 27 U/L (ref 0–40)
ANION GAP SERPL CALCULATED.3IONS-SCNC: 10 MMOL/L (ref 7–16)
AST SERPL-CCNC: 29 U/L (ref 0–39)
BASOPHILS # BLD: 0.04 K/UL (ref 0–0.2)
BASOPHILS NFR BLD: 1 % (ref 0–2)
BILIRUB SERPL-MCNC: 0.4 MG/DL (ref 0–1.2)
BUN SERPL-MCNC: 30 MG/DL (ref 6–23)
CALCIUM SERPL-MCNC: 10 MG/DL (ref 8.6–10.2)
CHLORIDE SERPL-SCNC: 106 MMOL/L (ref 98–107)
CO2 SERPL-SCNC: 26 MMOL/L (ref 22–29)
CREAT SERPL-MCNC: 1.4 MG/DL (ref 0.7–1.2)
EOSINOPHIL # BLD: 0.13 K/UL (ref 0.05–0.5)
EOSINOPHILS RELATIVE PERCENT: 3 % (ref 0–6)
ERYTHROCYTE [DISTWIDTH] IN BLOOD BY AUTOMATED COUNT: 14.4 % (ref 11.5–15)
GFR SERPL CREATININE-BSD FRML MDRD: 53 ML/MIN/1.73M2
GLUCOSE SERPL-MCNC: 117 MG/DL (ref 74–99)
HCT VFR BLD AUTO: 38.5 % (ref 37–54)
HGB BLD-MCNC: 12.5 G/DL (ref 12.5–16.5)
LYMPHOCYTES NFR BLD: 0.17 K/UL (ref 1.5–4)
LYMPHOCYTES RELATIVE PERCENT: 4 % (ref 20–42)
MCH RBC QN AUTO: 29.6 PG (ref 26–35)
MCHC RBC AUTO-ENTMCNC: 32.5 G/DL (ref 32–34.5)
MCV RBC AUTO: 91 FL (ref 80–99.9)
MONOCYTES NFR BLD: 0.46 K/UL (ref 0.1–0.95)
MONOCYTES NFR BLD: 10 % (ref 2–12)
NEUTROPHILS NFR BLD: 84 % (ref 43–80)
NEUTS SEG NFR BLD: 4.01 K/UL (ref 1.8–7.3)
PLATELET # BLD AUTO: 209 K/UL (ref 130–450)
PMV BLD AUTO: 10.6 FL (ref 7–12)
POTASSIUM SERPL-SCNC: 3.9 MMOL/L (ref 3.5–5)
PROT SERPL-MCNC: 7.5 G/DL (ref 6.4–8.3)
RBC # BLD AUTO: 4.23 M/UL (ref 3.8–5.8)
RBC # BLD: ABNORMAL 10*6/UL
SODIUM SERPL-SCNC: 142 MMOL/L (ref 132–146)
TSH SERPL DL<=0.05 MIU/L-ACNC: 1.42 UIU/ML (ref 0.27–4.2)
WBC OTHER # BLD: 4.8 K/UL (ref 4.5–11.5)

## 2023-10-03 PROCEDURE — 80053 COMPREHEN METABOLIC PANEL: CPT

## 2023-10-03 PROCEDURE — 84153 ASSAY OF PSA TOTAL: CPT

## 2023-10-03 PROCEDURE — 84443 ASSAY THYROID STIM HORMONE: CPT

## 2023-10-03 PROCEDURE — 1123F ACP DISCUSS/DSCN MKR DOCD: CPT | Performed by: CLINICAL NURSE SPECIALIST

## 2023-10-03 PROCEDURE — 99213 OFFICE O/P EST LOW 20 MIN: CPT | Performed by: CLINICAL NURSE SPECIALIST

## 2023-10-03 PROCEDURE — 85025 COMPLETE CBC W/AUTO DIFF WBC: CPT

## 2023-10-03 PROCEDURE — 96413 CHEMO IV INFUSION 1 HR: CPT

## 2023-10-03 PROCEDURE — 6360000002 HC RX W HCPCS: Performed by: CLINICAL NURSE SPECIALIST

## 2023-10-03 PROCEDURE — 36415 COLL VENOUS BLD VENIPUNCTURE: CPT

## 2023-10-03 PROCEDURE — 2580000003 HC RX 258: Performed by: CLINICAL NURSE SPECIALIST

## 2023-10-03 RX ORDER — ONDANSETRON 2 MG/ML
8 INJECTION INTRAMUSCULAR; INTRAVENOUS
Status: CANCELLED | OUTPATIENT
Start: 2023-10-03

## 2023-10-03 RX ORDER — SODIUM CHLORIDE 9 MG/ML
INJECTION, SOLUTION INTRAVENOUS CONTINUOUS
Status: CANCELLED | OUTPATIENT
Start: 2023-10-03

## 2023-10-03 RX ORDER — ACETAMINOPHEN 325 MG/1
650 TABLET ORAL
Status: CANCELLED | OUTPATIENT
Start: 2023-10-03

## 2023-10-03 RX ORDER — SODIUM CHLORIDE 0.9 % (FLUSH) 0.9 %
5-40 SYRINGE (ML) INJECTION PRN
Status: CANCELLED | OUTPATIENT
Start: 2023-10-03

## 2023-10-03 RX ORDER — SODIUM CHLORIDE 0.9 % (FLUSH) 0.9 %
5-40 SYRINGE (ML) INJECTION PRN
Status: DISCONTINUED | OUTPATIENT
Start: 2023-10-03 | End: 2023-10-04 | Stop reason: HOSPADM

## 2023-10-03 RX ORDER — MEPERIDINE HYDROCHLORIDE 25 MG/ML
12.5 INJECTION INTRAMUSCULAR; INTRAVENOUS; SUBCUTANEOUS PRN
Status: CANCELLED | OUTPATIENT
Start: 2023-10-03

## 2023-10-03 RX ORDER — SODIUM CHLORIDE 9 MG/ML
5-250 INJECTION, SOLUTION INTRAVENOUS PRN
Status: CANCELLED | OUTPATIENT
Start: 2023-10-03

## 2023-10-03 RX ORDER — HEPARIN 100 UNIT/ML
500 SYRINGE INTRAVENOUS PRN
Status: CANCELLED | OUTPATIENT
Start: 2023-10-03

## 2023-10-03 RX ORDER — DIPHENHYDRAMINE HYDROCHLORIDE 50 MG/ML
50 INJECTION INTRAMUSCULAR; INTRAVENOUS
Status: CANCELLED | OUTPATIENT
Start: 2023-10-03

## 2023-10-03 RX ORDER — OXYCODONE HYDROCHLORIDE 5 MG/1
5 TABLET ORAL EVERY 8 HOURS PRN
Qty: 42 TABLET | Refills: 0 | Status: CANCELLED | OUTPATIENT
Start: 2023-10-03 | End: 2023-10-17

## 2023-10-03 RX ORDER — SODIUM CHLORIDE 9 MG/ML
5-250 INJECTION, SOLUTION INTRAVENOUS PRN
Status: DISCONTINUED | OUTPATIENT
Start: 2023-10-03 | End: 2023-10-04 | Stop reason: HOSPADM

## 2023-10-03 RX ORDER — EPINEPHRINE 1 MG/ML
0.3 INJECTION, SOLUTION, CONCENTRATE INTRAVENOUS PRN
Status: CANCELLED | OUTPATIENT
Start: 2023-10-03

## 2023-10-03 RX ORDER — ALBUTEROL SULFATE 90 UG/1
4 AEROSOL, METERED RESPIRATORY (INHALATION) PRN
Status: CANCELLED | OUTPATIENT
Start: 2023-10-03

## 2023-10-03 RX ADMIN — SODIUM CHLORIDE 200 MG: 9 INJECTION, SOLUTION INTRAVENOUS at 14:38

## 2023-10-03 RX ADMIN — SODIUM CHLORIDE, PRESERVATIVE FREE 10 ML: 5 INJECTION INTRAVENOUS at 14:00

## 2023-10-03 RX ADMIN — SODIUM CHLORIDE 20 ML/HR: 9 INJECTION, SOLUTION INTRAVENOUS at 14:02

## 2023-10-04 LAB — PSA SERPL-MCNC: 6.4 NG/ML (ref 0–4)

## 2023-10-05 DIAGNOSIS — C10.9 OROPHARYNGEAL CANCER (HCC): ICD-10-CM

## 2023-10-05 RX ORDER — OXYCODONE HYDROCHLORIDE 5 MG/1
5 TABLET ORAL EVERY 8 HOURS PRN
Qty: 42 TABLET | Refills: 0 | Status: SHIPPED | OUTPATIENT
Start: 2023-10-05 | End: 2023-10-19

## 2023-10-05 RX ORDER — PILOCARPINE HYDROCHLORIDE 5 MG/1
TABLET, FILM COATED ORAL
Qty: 90 TABLET | Refills: 10 | OUTPATIENT
Start: 2023-10-05

## 2023-10-05 NOTE — TELEPHONE ENCOUNTER
Incoming call from [facility] with Med Onc at [facility]. Ke's wife had called them and requested refill for Oxy IR. Pharmacy is [facility]. Next richy 12/20/23.

## 2023-10-18 DIAGNOSIS — C10.9 OROPHARYNGEAL CANCER (HCC): ICD-10-CM

## 2023-10-18 RX ORDER — OXYCODONE HYDROCHLORIDE 5 MG/1
5 TABLET ORAL EVERY 8 HOURS PRN
Qty: 42 TABLET | Refills: 0 | Status: SHIPPED | OUTPATIENT
Start: 2023-10-18 | End: 2023-11-01

## 2023-10-18 NOTE — TELEPHONE ENCOUNTER
Call from Ke's wife Erin requesting refill for Oxy IR 5 mg. Pharmacy is [address] [facility]. Next richy 12/20/23.

## 2023-10-25 ENCOUNTER — OFFICE VISIT (OUTPATIENT)
Dept: ONCOLOGY | Age: 73
End: 2023-10-25
Payer: MEDICARE

## 2023-10-25 ENCOUNTER — HOSPITAL ENCOUNTER (OUTPATIENT)
Dept: INFUSION THERAPY | Age: 73
Discharge: HOME | End: 2023-10-25
Payer: MEDICARE

## 2023-10-25 VITALS
BODY MASS INDEX: 25.87 KG/M2 | DIASTOLIC BLOOD PRESSURE: 84 MMHG | TEMPERATURE: 97.6 F | SYSTOLIC BLOOD PRESSURE: 125 MMHG | HEART RATE: 84 BPM | OXYGEN SATURATION: 98 % | WEIGHT: 164.8 LBS | HEIGHT: 67 IN

## 2023-10-25 VITALS
DIASTOLIC BLOOD PRESSURE: 58 MMHG | SYSTOLIC BLOOD PRESSURE: 113 MMHG | TEMPERATURE: 98.2 F | HEART RATE: 61 BPM | OXYGEN SATURATION: 100 % | RESPIRATION RATE: 16 BRPM

## 2023-10-25 DIAGNOSIS — R53.83 OTHER FATIGUE: ICD-10-CM

## 2023-10-25 DIAGNOSIS — C10.9 OROPHARYNGEAL CANCER (HCC): ICD-10-CM

## 2023-10-25 DIAGNOSIS — R63.0 POOR APPETITE: Primary | ICD-10-CM

## 2023-10-25 DIAGNOSIS — E03.9 HYPOTHYROIDISM, UNSPECIFIED TYPE: ICD-10-CM

## 2023-10-25 DIAGNOSIS — C10.9 SQUAMOUS CELL CARCINOMA OF OROPHARYNX (HCC): Primary | ICD-10-CM

## 2023-10-25 DIAGNOSIS — C10.9 SQUAMOUS CELL CARCINOMA OF OROPHARYNX (HCC): ICD-10-CM

## 2023-10-25 LAB
ALBUMIN SERPL-MCNC: 4.4 G/DL (ref 3.5–5.2)
ALP SERPL-CCNC: 63 U/L (ref 40–129)
ALT SERPL-CCNC: 14 U/L (ref 0–40)
ANION GAP SERPL CALCULATED.3IONS-SCNC: 8 MMOL/L (ref 7–16)
AST SERPL-CCNC: 24 U/L (ref 0–39)
BASOPHILS # BLD: 0.06 K/UL (ref 0–0.2)
BASOPHILS NFR BLD: 1 % (ref 0–2)
BILIRUB SERPL-MCNC: 0.6 MG/DL (ref 0–1.2)
BUN SERPL-MCNC: 32 MG/DL (ref 6–23)
CALCIUM SERPL-MCNC: 10.5 MG/DL (ref 8.6–10.2)
CHLORIDE SERPL-SCNC: 104 MMOL/L (ref 98–107)
CO2 SERPL-SCNC: 28 MMOL/L (ref 22–29)
CREAT SERPL-MCNC: 1.5 MG/DL (ref 0.7–1.2)
EOSINOPHIL # BLD: 0.46 K/UL (ref 0.05–0.5)
EOSINOPHILS RELATIVE PERCENT: 7 % (ref 0–6)
ERYTHROCYTE [DISTWIDTH] IN BLOOD BY AUTOMATED COUNT: 14.7 % (ref 11.5–15)
GFR SERPL CREATININE-BSD FRML MDRD: 50 ML/MIN/1.73M2
GLUCOSE SERPL-MCNC: 117 MG/DL (ref 74–99)
HCT VFR BLD AUTO: 39.1 % (ref 37–54)
HGB BLD-MCNC: 12.8 G/DL (ref 12.5–16.5)
LYMPHOCYTES NFR BLD: 0.06 K/UL (ref 1.5–4)
LYMPHOCYTES RELATIVE PERCENT: 1 % (ref 20–42)
MCH RBC QN AUTO: 30.1 PG (ref 26–35)
MCHC RBC AUTO-ENTMCNC: 32.7 G/DL (ref 32–34.5)
MCV RBC AUTO: 92 FL (ref 80–99.9)
MONOCYTES NFR BLD: 0.06 K/UL (ref 0.1–0.95)
MONOCYTES NFR BLD: 1 % (ref 2–12)
NEUTROPHILS NFR BLD: 90 % (ref 43–80)
NEUTS SEG NFR BLD: 5.96 K/UL (ref 1.8–7.3)
PLATELET # BLD AUTO: 200 K/UL (ref 130–450)
PMV BLD AUTO: 10.3 FL (ref 7–12)
POTASSIUM SERPL-SCNC: 4.1 MMOL/L (ref 3.5–5)
PROT SERPL-MCNC: 7.7 G/DL (ref 6.4–8.3)
RBC # BLD AUTO: 4.25 M/UL (ref 3.8–5.8)
RBC # BLD: NORMAL 10*6/UL
SODIUM SERPL-SCNC: 140 MMOL/L (ref 132–146)
WBC OTHER # BLD: 6.6 K/UL (ref 4.5–11.5)

## 2023-10-25 PROCEDURE — 80053 COMPREHEN METABOLIC PANEL: CPT

## 2023-10-25 PROCEDURE — 96417 CHEMO IV INFUS EACH ADDL SEQ: CPT

## 2023-10-25 PROCEDURE — 6360000002 HC RX W HCPCS: Performed by: STUDENT IN AN ORGANIZED HEALTH CARE EDUCATION/TRAINING PROGRAM

## 2023-10-25 PROCEDURE — 2580000003 HC RX 258: Performed by: STUDENT IN AN ORGANIZED HEALTH CARE EDUCATION/TRAINING PROGRAM

## 2023-10-25 PROCEDURE — 96413 CHEMO IV INFUSION 1 HR: CPT

## 2023-10-25 PROCEDURE — 99214 OFFICE O/P EST MOD 30 MIN: CPT | Performed by: STUDENT IN AN ORGANIZED HEALTH CARE EDUCATION/TRAINING PROGRAM

## 2023-10-25 PROCEDURE — 36415 COLL VENOUS BLD VENIPUNCTURE: CPT

## 2023-10-25 PROCEDURE — 85025 COMPLETE CBC W/AUTO DIFF WBC: CPT

## 2023-10-25 PROCEDURE — 1123F ACP DISCUSS/DSCN MKR DOCD: CPT | Performed by: STUDENT IN AN ORGANIZED HEALTH CARE EDUCATION/TRAINING PROGRAM

## 2023-10-25 RX ORDER — ONDANSETRON 2 MG/ML
8 INJECTION INTRAMUSCULAR; INTRAVENOUS
Status: DISCONTINUED | OUTPATIENT
Start: 2023-10-25 | End: 2023-10-26 | Stop reason: HOSPADM

## 2023-10-25 RX ORDER — DIPHENHYDRAMINE HYDROCHLORIDE 50 MG/ML
50 INJECTION INTRAMUSCULAR; INTRAVENOUS
Status: CANCELLED | OUTPATIENT
Start: 2023-10-25

## 2023-10-25 RX ORDER — ACETAMINOPHEN 325 MG/1
650 TABLET ORAL
Status: CANCELLED | OUTPATIENT
Start: 2023-10-25

## 2023-10-25 RX ORDER — SODIUM CHLORIDE 9 MG/ML
5-250 INJECTION, SOLUTION INTRAVENOUS PRN
Status: DISCONTINUED | OUTPATIENT
Start: 2023-10-25 | End: 2023-10-26 | Stop reason: HOSPADM

## 2023-10-25 RX ORDER — ONDANSETRON 2 MG/ML
8 INJECTION INTRAMUSCULAR; INTRAVENOUS
Status: CANCELLED | OUTPATIENT
Start: 2023-10-25

## 2023-10-25 RX ORDER — SODIUM CHLORIDE 9 MG/ML
5-250 INJECTION, SOLUTION INTRAVENOUS PRN
Status: CANCELLED | OUTPATIENT
Start: 2023-10-25

## 2023-10-25 RX ORDER — MIRTAZAPINE 15 MG/1
15 TABLET, FILM COATED ORAL NIGHTLY
Qty: 30 TABLET | Refills: 3 | Status: SHIPPED | OUTPATIENT
Start: 2023-10-25

## 2023-10-25 RX ORDER — HEPARIN 100 UNIT/ML
500 SYRINGE INTRAVENOUS PRN
Status: CANCELLED | OUTPATIENT
Start: 2023-10-25

## 2023-10-25 RX ORDER — MEPERIDINE HYDROCHLORIDE 25 MG/ML
12.5 INJECTION INTRAMUSCULAR; INTRAVENOUS; SUBCUTANEOUS PRN
Status: CANCELLED | OUTPATIENT
Start: 2023-10-25

## 2023-10-25 RX ORDER — ALBUTEROL SULFATE 90 UG/1
4 AEROSOL, METERED RESPIRATORY (INHALATION) PRN
Status: CANCELLED | OUTPATIENT
Start: 2023-10-25

## 2023-10-25 RX ORDER — LEVOTHYROXINE SODIUM 100 MCG
100 TABLET ORAL DAILY
Qty: 30 TABLET | Refills: 3 | Status: SHIPPED | OUTPATIENT
Start: 2023-10-25

## 2023-10-25 RX ORDER — SODIUM CHLORIDE 0.9 % (FLUSH) 0.9 %
5-40 SYRINGE (ML) INJECTION PRN
Status: CANCELLED | OUTPATIENT
Start: 2023-10-25

## 2023-10-25 RX ORDER — SODIUM CHLORIDE 9 MG/ML
INJECTION, SOLUTION INTRAVENOUS CONTINUOUS
Status: CANCELLED | OUTPATIENT
Start: 2023-10-25

## 2023-10-25 RX ORDER — EPINEPHRINE 1 MG/ML
0.3 INJECTION, SOLUTION, CONCENTRATE INTRAVENOUS PRN
Status: CANCELLED | OUTPATIENT
Start: 2023-10-25

## 2023-10-25 RX ADMIN — SODIUM CHLORIDE 200 MG: 9 INJECTION, SOLUTION INTRAVENOUS at 15:01

## 2023-10-25 RX ADMIN — SODIUM CHLORIDE 20 ML/HR: 9 INJECTION, SOLUTION INTRAVENOUS at 14:42

## 2023-10-25 NOTE — PROGRESS NOTES
T12, and possibly anterior right 3rd rib, compared to PET/CT scan dated 08/01/2022. Interval development of multiple hepatic metastases  Overall disease progression. PD-L1 on tissue: CPS 50    CT guided core needle biopsy liver mass 03/22/2023. Metastatic HPV-related squamous cell carcinoma   Comment: The squamous cell carcinoma is immunoreactive with pankeratin, p40, and p16 (surrogate marker for high risk HPV). The TTF-1, chromogranin, and synaptophysin immunostains are negative in tumor cells. Intradepartmental consultation is obtained    Metastatic Squamous Cell Carcinoma  Since CPS 50>20; recommend single agent Keytruda. Cycle # 1 Dorann Barthel was on 03/28/2023. Cycle # 2 Keytruda was on 04/18/2023. Cycle # 3 Keytruda was on 05/09/2023. Cycle # 4 Keytruda was on 05/30/2023. PET/CT scan 06/19/2023:   Findings are compatible with mixed treatment response as compared to prior exam dated 2/10/2023. Previously demonstrated osseous hypermetabolism has resolved and there has been decreased activity within hepatic metastasis (with residual activity in the dominant lesion in the inferior right hepatic lobe). Barry activity at the phylicia hepatis has increased as compared to prior. Increased activity seen at the floor of the mouth as compared to prior. Continue Keytruda and repeat scans in 3 months. Recommend to f/u with ENT for scope examination  Cycle # 5 Dorann Barthel was on 06/20/2023. Cycle # 6 Keytruda was on 07/11/2023. Cycle # 7 Dorann Barthel is ordered and given today 08/01/2023. Labs reviewed ok to proceed. Dry mouth secondary to treatment; continue Biotene and increase po fluid intake. 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 Increase Synthroid to 100 mcg po daily. Productive coughing yellowish/phlegm; recommended and ordered Z-Anthony    Cycle #8 Dorann Barthel is ordered and given today 8/22/2023 Labs reviewed okay to proceed.   Slight elevation in creatinine patient reports slight

## 2023-10-31 ENCOUNTER — HOSPITAL ENCOUNTER (OUTPATIENT)
Dept: RADIATION ONCOLOGY | Age: 73
Discharge: HOME | End: 2023-10-31
Payer: MEDICARE

## 2023-10-31 VITALS
HEART RATE: 94 BPM | RESPIRATION RATE: 18 BRPM | SYSTOLIC BLOOD PRESSURE: 132 MMHG | WEIGHT: 163.5 LBS | OXYGEN SATURATION: 96 % | DIASTOLIC BLOOD PRESSURE: 60 MMHG | TEMPERATURE: 97.9 F | BODY MASS INDEX: 25.61 KG/M2

## 2023-10-31 PROCEDURE — 99215 OFFICE O/P EST HI 40 MIN: CPT | Performed by: SPECIALIST

## 2023-10-31 PROCEDURE — 99205 OFFICE O/P NEW HI 60 MIN: CPT

## 2023-11-01 DIAGNOSIS — C10.9 OROPHARYNGEAL CANCER (HCC): ICD-10-CM

## 2023-11-01 RX ORDER — OXYCODONE HYDROCHLORIDE 5 MG/1
5 TABLET ORAL EVERY 8 HOURS PRN
Qty: 42 TABLET | Refills: 0 | Status: SHIPPED | OUTPATIENT
Start: 2023-11-01 | End: 2023-11-15

## 2023-11-01 NOTE — TELEPHONE ENCOUNTER
Call from Ke's wife Erin requesting refill for Oxy IR 5 mg. Pharmacy is [facility]. Next richy 12/20/23.

## 2023-11-02 ENCOUNTER — HOSPITAL ENCOUNTER (OUTPATIENT)
Dept: RADIATION ONCOLOGY | Age: 73
Discharge: HOME | End: 2023-11-02
Payer: MEDICARE

## 2023-11-02 PROCEDURE — 77334 RADIATION TREATMENT AID(S): CPT | Performed by: SPECIALIST

## 2023-11-02 PROCEDURE — 6360000004 HC RX CONTRAST MEDICATION: Performed by: SPECIALIST

## 2023-11-02 RX ADMIN — IOPAMIDOL 100 ML: 755 INJECTION, SOLUTION INTRAVENOUS at 09:07

## 2023-11-12 DIAGNOSIS — C10.9 OROPHARYNGEAL CANCER (HCC): ICD-10-CM

## 2023-11-13 RX ORDER — OXYCODONE HYDROCHLORIDE 5 MG/1
5 TABLET ORAL EVERY 8 HOURS PRN
Qty: 42 TABLET | Refills: 0 | OUTPATIENT
Start: 2023-11-13 | End: 2023-11-27

## 2023-11-15 ENCOUNTER — HOSPITAL ENCOUNTER (OUTPATIENT)
Dept: INFUSION THERAPY | Age: 73
Discharge: HOME | End: 2023-11-15
Payer: MEDICARE

## 2023-11-15 ENCOUNTER — OFFICE VISIT (OUTPATIENT)
Dept: ONCOLOGY | Age: 73
End: 2023-11-15
Payer: MEDICARE

## 2023-11-15 VITALS
OXYGEN SATURATION: 100 % | SYSTOLIC BLOOD PRESSURE: 120 MMHG | HEART RATE: 70 BPM | DIASTOLIC BLOOD PRESSURE: 64 MMHG | RESPIRATION RATE: 17 BRPM

## 2023-11-15 VITALS
HEART RATE: 71 BPM | TEMPERATURE: 97.2 F | WEIGHT: 163 LBS | OXYGEN SATURATION: 97 % | BODY MASS INDEX: 25.58 KG/M2 | HEIGHT: 67 IN | DIASTOLIC BLOOD PRESSURE: 73 MMHG | SYSTOLIC BLOOD PRESSURE: 138 MMHG

## 2023-11-15 DIAGNOSIS — C10.9 SQUAMOUS CELL CARCINOMA OF OROPHARYNX (HCC): ICD-10-CM

## 2023-11-15 DIAGNOSIS — C10.9 SQUAMOUS CELL CARCINOMA OF OROPHARYNX (HCC): Primary | ICD-10-CM

## 2023-11-15 DIAGNOSIS — R53.83 OTHER FATIGUE: ICD-10-CM

## 2023-11-15 LAB
ALBUMIN SERPL-MCNC: 4.1 G/DL (ref 3.5–5.2)
ALP SERPL-CCNC: 54 U/L (ref 40–129)
ALT SERPL-CCNC: 18 U/L (ref 0–40)
ANION GAP SERPL CALCULATED.3IONS-SCNC: 7 MMOL/L (ref 7–16)
AST SERPL-CCNC: 23 U/L (ref 0–39)
BASOPHILS # BLD: 0.04 K/UL (ref 0–0.2)
BASOPHILS NFR BLD: 1 % (ref 0–2)
BILIRUB SERPL-MCNC: 0.4 MG/DL (ref 0–1.2)
BUN SERPL-MCNC: 27 MG/DL (ref 6–23)
CALCIUM SERPL-MCNC: 9.7 MG/DL (ref 8.6–10.2)
CHLORIDE SERPL-SCNC: 105 MMOL/L (ref 98–107)
CO2 SERPL-SCNC: 27 MMOL/L (ref 22–29)
CREAT SERPL-MCNC: 1.4 MG/DL (ref 0.7–1.2)
EOSINOPHIL # BLD: 0.33 K/UL (ref 0.05–0.5)
EOSINOPHILS RELATIVE PERCENT: 6 % (ref 0–6)
ERYTHROCYTE [DISTWIDTH] IN BLOOD BY AUTOMATED COUNT: 14.6 % (ref 11.5–15)
GFR SERPL CREATININE-BSD FRML MDRD: 54 ML/MIN/1.73M2
GLUCOSE SERPL-MCNC: 115 MG/DL (ref 74–99)
HCT VFR BLD AUTO: 37.4 % (ref 37–54)
HGB BLD-MCNC: 12.4 G/DL (ref 12.5–16.5)
IMM GRANULOCYTES # BLD AUTO: <0.03 K/UL (ref 0–0.58)
IMM GRANULOCYTES NFR BLD: 0 % (ref 0–5)
LYMPHOCYTES NFR BLD: 0.17 K/UL (ref 1.5–4)
LYMPHOCYTES RELATIVE PERCENT: 3 % (ref 20–42)
MCH RBC QN AUTO: 30.7 PG (ref 26–35)
MCHC RBC AUTO-ENTMCNC: 33.2 G/DL (ref 32–34.5)
MCV RBC AUTO: 92.6 FL (ref 80–99.9)
MONOCYTES NFR BLD: 0.54 K/UL (ref 0.1–0.95)
MONOCYTES NFR BLD: 10 % (ref 2–12)
NEUTROPHILS NFR BLD: 79 % (ref 43–80)
NEUTS SEG NFR BLD: 4.14 K/UL (ref 1.8–7.3)
PLATELET # BLD AUTO: 221 K/UL (ref 130–450)
PMV BLD AUTO: 9.7 FL (ref 7–12)
POTASSIUM SERPL-SCNC: 4.1 MMOL/L (ref 3.5–5)
PROT SERPL-MCNC: 7.3 G/DL (ref 6.4–8.3)
RBC # BLD AUTO: 4.04 M/UL (ref 3.8–5.8)
SODIUM SERPL-SCNC: 139 MMOL/L (ref 132–146)
TSH SERPL DL<=0.05 MIU/L-ACNC: 3.5 UIU/ML (ref 0.27–4.2)
WBC OTHER # BLD: 5.2 K/UL (ref 4.5–11.5)

## 2023-11-15 PROCEDURE — 2580000003 HC RX 258: Performed by: STUDENT IN AN ORGANIZED HEALTH CARE EDUCATION/TRAINING PROGRAM

## 2023-11-15 PROCEDURE — 6360000002 HC RX W HCPCS: Performed by: STUDENT IN AN ORGANIZED HEALTH CARE EDUCATION/TRAINING PROGRAM

## 2023-11-15 PROCEDURE — 96413 CHEMO IV INFUSION 1 HR: CPT

## 2023-11-15 PROCEDURE — 99214 OFFICE O/P EST MOD 30 MIN: CPT | Performed by: STUDENT IN AN ORGANIZED HEALTH CARE EDUCATION/TRAINING PROGRAM

## 2023-11-15 PROCEDURE — 80053 COMPREHEN METABOLIC PANEL: CPT

## 2023-11-15 PROCEDURE — 36415 COLL VENOUS BLD VENIPUNCTURE: CPT

## 2023-11-15 PROCEDURE — 85025 COMPLETE CBC W/AUTO DIFF WBC: CPT

## 2023-11-15 PROCEDURE — 84443 ASSAY THYROID STIM HORMONE: CPT

## 2023-11-15 PROCEDURE — 1123F ACP DISCUSS/DSCN MKR DOCD: CPT | Performed by: STUDENT IN AN ORGANIZED HEALTH CARE EDUCATION/TRAINING PROGRAM

## 2023-11-15 RX ORDER — SODIUM CHLORIDE 0.9 % (FLUSH) 0.9 %
5-40 SYRINGE (ML) INJECTION PRN
Status: DISCONTINUED | OUTPATIENT
Start: 2023-11-15 | End: 2023-11-16 | Stop reason: HOSPADM

## 2023-11-15 RX ORDER — ONDANSETRON 2 MG/ML
8 INJECTION INTRAMUSCULAR; INTRAVENOUS
Status: CANCELLED | OUTPATIENT
Start: 2023-11-15

## 2023-11-15 RX ORDER — SODIUM CHLORIDE 9 MG/ML
5-250 INJECTION, SOLUTION INTRAVENOUS PRN
Status: DISCONTINUED | OUTPATIENT
Start: 2023-11-15 | End: 2023-11-16 | Stop reason: HOSPADM

## 2023-11-15 RX ORDER — SODIUM CHLORIDE 9 MG/ML
INJECTION, SOLUTION INTRAVENOUS CONTINUOUS
Status: CANCELLED | OUTPATIENT
Start: 2023-11-15

## 2023-11-15 RX ORDER — ACETAMINOPHEN 325 MG/1
650 TABLET ORAL
Status: CANCELLED | OUTPATIENT
Start: 2023-11-15

## 2023-11-15 RX ORDER — DIPHENHYDRAMINE HYDROCHLORIDE 50 MG/ML
50 INJECTION INTRAMUSCULAR; INTRAVENOUS
Status: CANCELLED | OUTPATIENT
Start: 2023-11-15

## 2023-11-15 RX ORDER — SODIUM CHLORIDE 9 MG/ML
5-250 INJECTION, SOLUTION INTRAVENOUS PRN
Status: CANCELLED | OUTPATIENT
Start: 2023-11-15

## 2023-11-15 RX ORDER — MEPERIDINE HYDROCHLORIDE 25 MG/ML
12.5 INJECTION INTRAMUSCULAR; INTRAVENOUS; SUBCUTANEOUS PRN
Status: CANCELLED | OUTPATIENT
Start: 2023-11-15

## 2023-11-15 RX ORDER — ALBUTEROL SULFATE 90 UG/1
4 AEROSOL, METERED RESPIRATORY (INHALATION) PRN
Status: CANCELLED | OUTPATIENT
Start: 2023-11-15

## 2023-11-15 RX ORDER — EPINEPHRINE 1 MG/ML
0.3 INJECTION, SOLUTION, CONCENTRATE INTRAVENOUS PRN
Status: CANCELLED | OUTPATIENT
Start: 2023-11-15

## 2023-11-15 RX ORDER — HEPARIN 100 UNIT/ML
500 SYRINGE INTRAVENOUS PRN
Status: CANCELLED | OUTPATIENT
Start: 2023-11-15

## 2023-11-15 RX ORDER — SODIUM CHLORIDE 0.9 % (FLUSH) 0.9 %
5-40 SYRINGE (ML) INJECTION PRN
Status: CANCELLED | OUTPATIENT
Start: 2023-11-15

## 2023-11-15 RX ADMIN — SODIUM CHLORIDE 50 ML/HR: 9 INJECTION, SOLUTION INTRAVENOUS at 14:35

## 2023-11-15 RX ADMIN — SODIUM CHLORIDE 200 MG: 9 INJECTION, SOLUTION INTRAVENOUS at 14:37

## 2023-11-15 NOTE — PROGRESS NOTES
[facility] ONCOLOGY  [address] [facility] [id number]  Dept: [address]: [phone]      Attending Clinic Note    Reason for Visit: Follow-up on a patient with p16 + Oropharyngeal Squamous Cell Carcinoma    PCP:  Brie Francois DO          Subjective:  No major events. Patient is having productive cough, but denies of any fevers or shortness of breath. Sputum is white/green. He is tolerating pembrolizumab without major issues. Was able to see radiation oncology recently. Oncology History:  68 y.o.  male who presented to the ENT team for persistent left-sided neck fullness without associated difficulty swallowing    CT soft tissue neck 02/23/2022: Mass located in the left aspect of floor of the mouth extending into the left oropharyngeal soft tissue concerning for squamous cell carcinoma  Multiple enlarged necrotic left anterior cervical chain lymph nodes    Panendoscopy on 3/17/2022:  Findings: L lingual tonsil hypertrophy, biopsy negative, left level II neck node FNA performed   FNA left neck mass level 2: Inconclusive for malignant cells. Few atypical squamous cells with degenerative change  A. Tongue, left base, biopsy:   Squamous epithelial-lined lymphoid tissue with reactive germinal centers, compatible with lingual tonsil. Negative for dysplasia; Negative for malignancy. B.  Tongue, left base, biopsy:   Squamous epithelial-lined lymphoid tissue with reactive germinal centers, compatible with lingual tonsil. Focal lobules of benign mucinous glands. Negative for dysplasia; Negative for malignancy    On 07/21/2022: Panendoscopy excision left level 2 lymph node  Findings:  left base of tongue mass, left cervical lymphadenopathy  A. 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 See comment. B.   Left neck mass, level 2: Metastatic HPV-related squamous carcinoma involving

## 2023-11-16 ENCOUNTER — HOSPITAL ENCOUNTER (OUTPATIENT)
Dept: RADIATION ONCOLOGY | Age: 73
Discharge: HOME | End: 2023-11-16
Payer: MEDICARE

## 2023-11-16 DIAGNOSIS — C10.9 OROPHARYNGEAL CANCER (HCC): ICD-10-CM

## 2023-11-16 PROCEDURE — 77300 RADIATION THERAPY DOSE PLAN: CPT | Performed by: SPECIALIST

## 2023-11-16 PROCEDURE — 77301 RADIOTHERAPY DOSE PLAN IMRT: CPT | Performed by: SPECIALIST

## 2023-11-16 PROCEDURE — 77338 DESIGN MLC DEVICE FOR IMRT: CPT | Performed by: SPECIALIST

## 2023-11-16 RX ORDER — OXYCODONE HYDROCHLORIDE 5 MG/1
5 TABLET ORAL EVERY 8 HOURS PRN
Qty: 42 TABLET | Refills: 0 | Status: SHIPPED | OUTPATIENT
Start: 2023-11-16 | End: 2023-11-30

## 2023-11-16 NOTE — TELEPHONE ENCOUNTER
Call from Deborah, [address] [place] [address] Pkwy wife, requesting a refill for Oxy IR 5 mg. Pharmacy is [address] [facility]. Next richy 12/20/23.

## 2023-12-01 DIAGNOSIS — C10.9 OROPHARYNGEAL CANCER (HCC): ICD-10-CM

## 2023-12-01 RX ORDER — HYDROXYZINE PAMOATE 25 MG/1
25 CAPSULE ORAL 3 TIMES DAILY PRN
Qty: 42 CAPSULE | Refills: 0 | Status: SHIPPED | OUTPATIENT
Start: 2023-12-01 | End: 2023-12-15

## 2023-12-01 RX ORDER — OXYCODONE HYDROCHLORIDE 5 MG/1
5 TABLET ORAL EVERY 8 HOURS PRN
Qty: 42 TABLET | Refills: 0 | Status: SHIPPED | OUTPATIENT
Start: 2023-12-01 | End: 2023-12-15

## 2023-12-01 NOTE — TELEPHONE ENCOUNTER
Call from [facility] requesting refills for Ke's Oxy IR and Vistaril. Pharmacy is [address]. Next richy 12/20/23.

## 2023-12-04 ENCOUNTER — HOSPITAL ENCOUNTER (OUTPATIENT)
Dept: RADIATION ONCOLOGY | Age: 73
Discharge: HOME | End: 2023-12-04
Payer: MEDICARE

## 2023-12-04 PROCEDURE — 77373 STRTCTC BDY RAD THER TX DLVR: CPT | Performed by: SPECIALIST

## 2023-12-04 PROCEDURE — NBSRV NON-BILLABLE SERVICE: Performed by: RADIOLOGY

## 2023-12-04 NOTE — PATIENT INSTRUCTIONS
Continue daily fractionated radiation therapy as scheduled. Please see weekly OTV note and intial consultation letter in [facility] for clinical details. Eren Rajan. Rand Eugene MD MS [address]:  [phone]   FAX: [phone] [address]:  [phone]   FAX:    [phone] [address]:  [phone]   FAX:  [phone]  Email: [email or web address]

## 2023-12-04 NOTE — PROGRESS NOTES
Radiation Oncology          Mr.Ronald AMY Frey underwent fractionated external beam radiation therapy using a SBRT / SABR technique. I was personally present for the treatment planning (+/- 4D CT, W/WO Ab compression) imaging and pt setup to ensure appropriate immobilization to meet current MELANIE standards. After a detailed review of the treatment plan and appropriate physics QA / oversight this pt was scheduled and underwent hypo fractionated treatment as noted per the D/I in [address]. Typical fractionation schemes include but are not limited to 4500 Gy in 3-5 fractions. The NCCN guidelines and pt workup including a detailed discussion of the risks, benefits and alternative were discussed previously [RTOG dose constraints met per plan as applicable- see Mosaiq]. The pt verbalized understanding for the risks of this procedure today prior to Tx. Today, after a dual identification time out (including a brief plan overview )- I personally reviewed the complex multifaceted immobilization apparatus W/WO compression +/- Synergy (PRN), patient position, and 4D imaging (if applicable) prior to the treatment delivery to ensure accuracy. The SBRT team, including myself, were all present for the set up CT scan and delivery of the fraction (the latter on a PRN basis). The patient successfully completed the procedure today in stable condition; this procedure was well tolerated today. Pierre Palacio has now received 900 cGy in 1/5 fractions directed to the hilar mass. Melvin Arroyo.  Merline Palmer, MD [facility] Oncology  Cell: [phone]    [facility] [place] [facility]:  [phone]   FAX: [phone]  [facility]:  [address] [place] [address] Avenue:    [phone]  [address] [place] [address] [address]:  [address]:  [phone]

## 2023-12-06 ENCOUNTER — HOSPITAL ENCOUNTER (OUTPATIENT)
Dept: INFUSION THERAPY | Age: 73
Discharge: HOME | End: 2023-12-06
Payer: MEDICARE

## 2023-12-06 ENCOUNTER — HOSPITAL ENCOUNTER (OUTPATIENT)
Dept: RADIATION ONCOLOGY | Age: 73
Discharge: HOME | End: 2023-12-06
Payer: MEDICARE

## 2023-12-06 ENCOUNTER — OFFICE VISIT (OUTPATIENT)
Dept: ONCOLOGY | Age: 73
End: 2023-12-06
Payer: MEDICARE

## 2023-12-06 VITALS
RESPIRATION RATE: 18 BRPM | HEART RATE: 67 BPM | TEMPERATURE: 98 F | OXYGEN SATURATION: 97 % | DIASTOLIC BLOOD PRESSURE: 63 MMHG | SYSTOLIC BLOOD PRESSURE: 129 MMHG

## 2023-12-06 VITALS
BODY MASS INDEX: 25.63 KG/M2 | HEIGHT: 67 IN | DIASTOLIC BLOOD PRESSURE: 74 MMHG | HEART RATE: 72 BPM | SYSTOLIC BLOOD PRESSURE: 132 MMHG | WEIGHT: 163.3 LBS | TEMPERATURE: 97.5 F | OXYGEN SATURATION: 97 %

## 2023-12-06 DIAGNOSIS — R53.83 OTHER FATIGUE: ICD-10-CM

## 2023-12-06 DIAGNOSIS — C10.9 SQUAMOUS CELL CARCINOMA OF OROPHARYNX (HCC): Primary | ICD-10-CM

## 2023-12-06 DIAGNOSIS — R11.0 NAUSEA: ICD-10-CM

## 2023-12-06 DIAGNOSIS — C10.9 SQUAMOUS CELL CARCINOMA OF OROPHARYNX (HCC): ICD-10-CM

## 2023-12-06 LAB
ALBUMIN SERPL-MCNC: 4.3 G/DL (ref 3.5–5.2)
ALP SERPL-CCNC: 49 U/L (ref 40–129)
ALT SERPL-CCNC: 16 U/L (ref 0–40)
ANION GAP SERPL CALCULATED.3IONS-SCNC: 13 MMOL/L (ref 7–16)
AST SERPL-CCNC: 21 U/L (ref 0–39)
BASOPHILS # BLD: 0 K/UL (ref 0–0.2)
BASOPHILS NFR BLD: 0 % (ref 0–2)
BILIRUB SERPL-MCNC: 0.4 MG/DL (ref 0–1.2)
BUN SERPL-MCNC: 24 MG/DL (ref 6–23)
CALCIUM SERPL-MCNC: 9.4 MG/DL (ref 8.6–10.2)
CHLORIDE SERPL-SCNC: 105 MMOL/L (ref 98–107)
CO2 SERPL-SCNC: 25 MMOL/L (ref 22–29)
CREAT SERPL-MCNC: 1.3 MG/DL (ref 0.7–1.2)
EOSINOPHIL # BLD: 0.22 K/UL (ref 0.05–0.5)
EOSINOPHILS RELATIVE PERCENT: 4 % (ref 0–6)
ERYTHROCYTE [DISTWIDTH] IN BLOOD BY AUTOMATED COUNT: 14.3 % (ref 11.5–15)
GFR SERPL CREATININE-BSD FRML MDRD: 59 ML/MIN/1.73M2
GLUCOSE SERPL-MCNC: 92 MG/DL (ref 74–99)
HCT VFR BLD AUTO: 37.4 % (ref 37–54)
HGB BLD-MCNC: 12.5 G/DL (ref 12.5–16.5)
LYMPHOCYTES NFR BLD: 0 K/UL (ref 1.5–4)
LYMPHOCYTES RELATIVE PERCENT: 0 % (ref 20–42)
MCH RBC QN AUTO: 30.8 PG (ref 26–35)
MCHC RBC AUTO-ENTMCNC: 33.4 G/DL (ref 32–34.5)
MCV RBC AUTO: 92.1 FL (ref 80–99.9)
MONOCYTES NFR BLD: 0.27 K/UL (ref 0.1–0.95)
MONOCYTES NFR BLD: 4 % (ref 2–12)
NEUTROPHILS NFR BLD: 92 % (ref 43–80)
NEUTS SEG NFR BLD: 5.71 K/UL (ref 1.8–7.3)
PLATELET # BLD AUTO: 189 K/UL (ref 130–450)
PMV BLD AUTO: 9.9 FL (ref 7–12)
POTASSIUM SERPL-SCNC: 4 MMOL/L (ref 3.5–5)
PROT SERPL-MCNC: 7.7 G/DL (ref 6.4–8.3)
RBC # BLD AUTO: 4.06 M/UL (ref 3.8–5.8)
SODIUM SERPL-SCNC: 143 MMOL/L (ref 132–146)
WBC OTHER # BLD: 6.2 K/UL (ref 4.5–11.5)

## 2023-12-06 PROCEDURE — 36415 COLL VENOUS BLD VENIPUNCTURE: CPT

## 2023-12-06 PROCEDURE — 85025 COMPLETE CBC W/AUTO DIFF WBC: CPT

## 2023-12-06 PROCEDURE — 96413 CHEMO IV INFUSION 1 HR: CPT

## 2023-12-06 PROCEDURE — 99214 OFFICE O/P EST MOD 30 MIN: CPT | Performed by: STUDENT IN AN ORGANIZED HEALTH CARE EDUCATION/TRAINING PROGRAM

## 2023-12-06 PROCEDURE — NBSRV NON-BILLABLE SERVICE: Performed by: RADIOLOGY

## 2023-12-06 PROCEDURE — 2580000003 HC RX 258: Performed by: STUDENT IN AN ORGANIZED HEALTH CARE EDUCATION/TRAINING PROGRAM

## 2023-12-06 PROCEDURE — 6360000002 HC RX W HCPCS: Performed by: STUDENT IN AN ORGANIZED HEALTH CARE EDUCATION/TRAINING PROGRAM

## 2023-12-06 PROCEDURE — 77373 STRTCTC BDY RAD THER TX DLVR: CPT | Performed by: SPECIALIST

## 2023-12-06 PROCEDURE — 1123F ACP DISCUSS/DSCN MKR DOCD: CPT | Performed by: STUDENT IN AN ORGANIZED HEALTH CARE EDUCATION/TRAINING PROGRAM

## 2023-12-06 PROCEDURE — 80053 COMPREHEN METABOLIC PANEL: CPT

## 2023-12-06 RX ORDER — SODIUM CHLORIDE 0.9 % (FLUSH) 0.9 %
5-40 SYRINGE (ML) INJECTION PRN
Status: CANCELLED | OUTPATIENT
Start: 2023-12-06

## 2023-12-06 RX ORDER — ACETAMINOPHEN 325 MG/1
650 TABLET ORAL
Status: CANCELLED | OUTPATIENT
Start: 2023-12-06

## 2023-12-06 RX ORDER — ALBUTEROL SULFATE 90 UG/1
4 AEROSOL, METERED RESPIRATORY (INHALATION) PRN
Status: CANCELLED | OUTPATIENT
Start: 2023-12-06

## 2023-12-06 RX ORDER — ONDANSETRON 2 MG/ML
8 INJECTION INTRAMUSCULAR; INTRAVENOUS
Status: CANCELLED | OUTPATIENT
Start: 2023-12-06

## 2023-12-06 RX ORDER — MEPERIDINE HYDROCHLORIDE 25 MG/ML
12.5 INJECTION INTRAMUSCULAR; INTRAVENOUS; SUBCUTANEOUS PRN
Status: CANCELLED | OUTPATIENT
Start: 2023-12-06

## 2023-12-06 RX ORDER — ONDANSETRON 2 MG/ML
8 INJECTION INTRAMUSCULAR; INTRAVENOUS
Status: COMPLETED | OUTPATIENT
Start: 2023-12-06 | End: 2023-12-06

## 2023-12-06 RX ORDER — SODIUM CHLORIDE 9 MG/ML
INJECTION, SOLUTION INTRAVENOUS CONTINUOUS
Status: CANCELLED | OUTPATIENT
Start: 2023-12-06

## 2023-12-06 RX ORDER — ONDANSETRON 4 MG/1
4 TABLET, FILM COATED ORAL DAILY PRN
Qty: 30 TABLET | Refills: 0 | Status: SHIPPED | OUTPATIENT
Start: 2023-12-06

## 2023-12-06 RX ORDER — SODIUM CHLORIDE 0.9 % (FLUSH) 0.9 %
5-40 SYRINGE (ML) INJECTION PRN
Status: DISCONTINUED | OUTPATIENT
Start: 2023-12-06 | End: 2023-12-07 | Stop reason: HOSPADM

## 2023-12-06 RX ORDER — SODIUM CHLORIDE 9 MG/ML
5-250 INJECTION, SOLUTION INTRAVENOUS PRN
Status: DISCONTINUED | OUTPATIENT
Start: 2023-12-06 | End: 2023-12-07 | Stop reason: HOSPADM

## 2023-12-06 RX ORDER — SODIUM CHLORIDE 9 MG/ML
5-250 INJECTION, SOLUTION INTRAVENOUS PRN
Status: CANCELLED | OUTPATIENT
Start: 2023-12-06

## 2023-12-06 RX ORDER — HEPARIN 100 UNIT/ML
500 SYRINGE INTRAVENOUS PRN
Status: CANCELLED | OUTPATIENT
Start: 2023-12-06

## 2023-12-06 RX ORDER — DIPHENHYDRAMINE HYDROCHLORIDE 50 MG/ML
50 INJECTION INTRAMUSCULAR; INTRAVENOUS
Status: CANCELLED | OUTPATIENT
Start: 2023-12-06

## 2023-12-06 RX ORDER — EPINEPHRINE 1 MG/ML
0.3 INJECTION, SOLUTION, CONCENTRATE INTRAVENOUS PRN
Status: CANCELLED | OUTPATIENT
Start: 2023-12-06

## 2023-12-06 RX ADMIN — SODIUM CHLORIDE 200 MG: 9 INJECTION, SOLUTION INTRAVENOUS at 15:31

## 2023-12-06 RX ADMIN — SODIUM CHLORIDE, PRESERVATIVE FREE 10 ML: 5 INJECTION INTRAVENOUS at 15:02

## 2023-12-06 RX ADMIN — ONDANSETRON 8 MG: 2 INJECTION INTRAMUSCULAR; INTRAVENOUS at 15:06

## 2023-12-06 RX ADMIN — SODIUM CHLORIDE 20 ML/HR: 9 INJECTION, SOLUTION INTRAVENOUS at 15:02

## 2023-12-06 ASSESSMENT — PAIN DESCRIPTION - LOCATION: LOCATION: THROAT

## 2023-12-06 ASSESSMENT — PAIN SCALES - GENERAL: PAINLEVEL_OUTOF10: 9

## 2023-12-06 ASSESSMENT — PAIN DESCRIPTION - PAIN TYPE: TYPE: CHRONIC PAIN

## 2023-12-06 NOTE — PROGRESS NOTES
[address] [facility] ONCOLOGY  [address] [place] [address]  Dept: [address]: [phone]      Attending Clinic Note    Reason for Visit: Follow-up on a patient with p16 + Oropharyngeal Squamous Cell Carcinoma    PCP:  Lindie Frankel, DO          Subjective:  No major events. Recently started radiation. Does have upper abdominal discomfort. Denies of significant pain at this time. He is tolerating pembrolizumab. Continues to have dry mouth, and coughs up dark yellow/brown phlegm. Oncology History:  68 y.o.  male who presented to the ENT team for persistent left-sided neck fullness without associated difficulty swallowing    CT soft tissue neck 02/23/2022: Mass located in the left aspect of floor of the mouth extending into the left oropharyngeal soft tissue concerning for squamous cell carcinoma  Multiple enlarged necrotic left anterior cervical chain lymph nodes    Panendoscopy on 3/17/2022:  Findings: L lingual tonsil hypertrophy, biopsy negative, left level II neck node FNA performed   FNA left neck mass level 2: Inconclusive for malignant cells. Few atypical squamous cells with degenerative change  A. Tongue, left base, biopsy:   Squamous epithelial-lined lymphoid tissue with reactive germinal centers, compatible with lingual tonsil. Negative for dysplasia; Negative for malignancy. B.  Tongue, left base, biopsy:   Squamous epithelial-lined lymphoid tissue with reactive germinal centers, compatible with lingual tonsil. Focal lobules of benign mucinous glands. Negative for dysplasia; Negative for malignancy    On 07/21/2022: Panendoscopy excision left level 2 lymph node  Findings:  left base of tongue mass, left cervical lymphadenopathy  A. 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 See comment. B.   Left neck mass, level 2: Metastatic HPV-related squamous carcinoma involving

## 2023-12-06 NOTE — PROGRESS NOTES
Radiation Oncology          Mr.Ronald AMY Terry underwent fractionated external beam radiation therapy using a SBRT / SABR technique. I was personally present for the treatment planning (+/- 4D CT, W/WO Ab compression) imaging and pt setup to ensure appropriate immobilization to meet current MELANIE standards. After a detailed review of the treatment plan and appropriate physics QA / oversight this pt was scheduled and underwent hypo fractionated treatment as noted per the D/I in [address]. Typical fractionation schemes include but are not limited to 4500 Gy in 3-5 fractions. The NCCN guidelines and pt workup including a detailed discussion of the risks, benefits and alternative were discussed previously [RTOG dose constraints met per plan as applicable- see Mosaiq]. The pt verbalized understanding for the risks of this procedure today prior to Tx. Today, after a dual identification time out (including a brief plan overview )- I personally reviewed the complex multifaceted immobilization apparatus W/WO compression +/- Synergy (PRN), patient position, and 4D imaging (if applicable) prior to the treatment delivery to ensure accuracy. The SBRT team, including myself, were all present for the set up CT scan and delivery of the fraction (the latter on a PRN basis). The patient successfully completed the procedure today in stable condition; this procedure was well tolerated today. April Mckeon has now received 1800 cGy in 2/5 fractions directed to the hilar PC LN. Lola Ny.  Lázaro Rodriguez MD [address] [facility] Oncology  Cell: [phone]    [facility] [place] [facility]:  [phone]   FAX: [phone]  [facility]:  [address] [place] [address] Avenue:    [phone]  [address] [place] [address] [address]:  [address]:  [phone]

## 2023-12-08 ENCOUNTER — HOSPITAL ENCOUNTER (OUTPATIENT)
Dept: RADIATION ONCOLOGY | Age: 73
Discharge: HOME | End: 2023-12-08
Payer: MEDICARE

## 2023-12-08 PROCEDURE — NBSRV NON-BILLABLE SERVICE: Performed by: RADIOLOGY

## 2023-12-08 PROCEDURE — 77373 STRTCTC BDY RAD THER TX DLVR: CPT | Performed by: SPECIALIST

## 2023-12-08 NOTE — PROGRESS NOTES
Radiation Oncology          Mr.Ronald AMY Resendiz underwent fractionated external beam radiation therapy using a SBRT / SABR technique. I was personally present for the treatment planning (+/- 4D CT, W/WO Ab compression) imaging and pt setup to ensure appropriate immobilization to meet current MELANIE standards. After a detailed review of the treatment plan and appropriate physics QA / oversight this pt was scheduled and underwent hypo fractionated treatment as noted per the D/I in [address]. Typical fractionation schemes include but are not limited to 4500 Gy in 3-5 fractions. The NCCN guidelines and pt workup including a detailed discussion of the risks, benefits and alternative were discussed previously [RTOG dose constraints met per plan as applicable- see Mosaiq]. The pt verbalized understanding for the risks of this procedure today prior to Tx. Today, after a dual identification time out (including a brief plan overview )- I personally reviewed the complex multifaceted immobilization apparatus W/WO compression +/- Synergy (PRN), patient position, and 4D imaging (if applicable) prior to the treatment delivery to ensure accuracy. The SBRT team, including myself, were all present for the set up CT scan and delivery of the fraction (the latter on a PRN basis). The patient successfully completed the procedure today in stable condition; this procedure was well tolerated today. Cal Tomas has now received 2700 cGy in 2/5 fractions directed to the hilar PC node. Ala Lipoma.  Nica Gilmore MD [address] [facility] Oncology  Cell: [phone]    [facility]:  [phone]   FAX: [phone] [address] [facility] [address]:  [address] [place] [address] Avenue:    [phone]  [address] [place] [address] [address]:  [address]:  [phone]

## 2023-12-08 NOTE — PATIENT INSTRUCTIONS
CONT SBRT      Memo Baljit. Snow Hogan MD [address] [facility] Oncology  Cell: [phone]    [facility]:  [phone]   FAX: [phone] [address] [facility] [address]:   [address] [place] [address] Avenue:    [phone]  [address] [place] [address] Ct:  [phone]   FAX:  [phone]    Email: [email or web address]

## 2023-12-11 ENCOUNTER — CLINICAL DOCUMENTATION (OUTPATIENT)
Dept: RADIATION ONCOLOGY | Age: 73
End: 2023-12-11

## 2023-12-11 ENCOUNTER — APPOINTMENT (OUTPATIENT)
Dept: RADIATION ONCOLOGY | Age: 73
End: 2023-12-11
Payer: MEDICARE

## 2023-12-11 RX ORDER — PILOCARPINE HYDROCHLORIDE 5 MG/1
TABLET, FILM COATED ORAL
Qty: 60 TABLET | Refills: 0 | Status: CANCELLED | OUTPATIENT
Start: 2023-12-11

## 2023-12-11 NOTE — PROGRESS NOTES
Radiation Treatment Summary    Patient Name:  Charlotte Hung,  1950,  68 y.o., male       Referring Physician: No referring provider defined for this encounter. PCP: Gisel Wolff DO       Diagnosis:Recurrent squamous cell carcinoma of the left base of tongue/tonsil involving liver and jam-hepatic nodes       Site Start TX Last [facility] ED Fractions Dose (cGy) Fx Dose (cGy) Technique   Liver & jam-pancreatic nodes 12/4/2023 12/8/2023 7 5 3500 700 SBRT   PET+ SIB 12/4/2023 12/8/2023 7 5 4500 900        Response/Tolerance: I am quite pleased to report that Telly Andre tolerated treatment quite well without acute treatment-related side effects. He will return to clinic for post-treatment check in 30 days and has been advised to contact our office earlier should the need arise. Follow-up:  30-day in the office with Radiation Oncology      Thank you for the opportunity to participate in multidisciplinary management of this remarkable and pleasant patient. Radha Choi MD, GEOVANNY, [facility], [address] [place] [address]    Department of Radiation Oncology  [facility]) [address]: [phone] (VSD: [phone])  [address] [place]) [address]: [phone] (SSV: [phone])  [facility]) [address]:  [phone] (H:  [phone])    NOTE: This report was transcribed using voice recognition software. Every effort was made to ensure accuracy; however, inadvertent computerized transcription errors may be present.

## 2023-12-12 ENCOUNTER — HOSPITAL ENCOUNTER (OUTPATIENT)
Dept: RADIATION ONCOLOGY | Age: 73
Discharge: HOME | End: 2023-12-12
Payer: MEDICARE

## 2023-12-12 DIAGNOSIS — E78.5 HYPERLIPIDEMIA, UNSPECIFIED HYPERLIPIDEMIA TYPE: ICD-10-CM

## 2023-12-12 PROCEDURE — 77373 STRTCTC BDY RAD THER TX DLVR: CPT | Performed by: SPECIALIST

## 2023-12-12 RX ORDER — FENOFIBRATE 145 MG/1
145 TABLET, COATED ORAL DAILY
Qty: 90 TABLET | Refills: 0 | Status: SHIPPED | OUTPATIENT
Start: 2023-12-12

## 2023-12-12 NOTE — PROGRESS NOTES
RADIATION ONCOLOGY PROCEDURE NOTE          Mr.Ronald AMY Brandt underwent fractionated external beam radiation therapy using a SBRT / SABR technique. I was personally present for the treatment planning (+/- 4D CT, W/WO Ab compression) imaging and pt setup to ensure appropriate immobilization to meet current MELANIE standards. After a detailed review of the treatment plan and appropriate physics QA / oversight this pt was scheduled and underwent hypo fractionated treatment as noted per the D/I in [address]. Typical fractionation schemes include but are not limited to 4500 cGy in 5 fractions. The NCCN guidelines and pt workup including a detailed discussion of the risks, benefits and alternative were discussed previously [RTOG dose constraints met per plan as applicable- see Mosaiq]. The pt verbalized understanding for the risks of this procedure today prior to Tx. Today, after a dual identification time out (including a brief plan overview )- I personally reviewed the complex multifaceted immobilization apparatus W/WO compression +/- Synergy (PRN), patient position, and 4D imaging (if applicable) prior to the treatment delivery to ensure accuracy. The SBRT team, including myself, were all present for the set up CT scan and delivery of the fraction (the latter on a PRN basis). The patient successfully completed the procedure today in stable condition; this procedure was well-tolerated today. Charlotte Hung has now received 3600 cGy in 4/5 fractions directed to the Liver & jam-pancreatic LNs. Radha Choi MD, GEOVANNY, [facility], [address] [place] [address]    Department of Radiation Oncology  [facility]) [address]: [phone] (SOP: [phone])  [address] [place]) [address]: [phone] (YZD: [phone])  [facility] [place] [place]) [address]:  [phone] ([facility]:  [phone])    NOTE: This report was transcribed using voice recognition software.  Every effort was made to ensure accuracy; however,

## 2023-12-13 ENCOUNTER — APPOINTMENT (OUTPATIENT)
Dept: RADIATION ONCOLOGY | Age: 73
End: 2023-12-13
Payer: MEDICARE

## 2023-12-13 DIAGNOSIS — C10.9 OROPHARYNGEAL CANCER (HCC): ICD-10-CM

## 2023-12-13 PROCEDURE — 77373 STRTCTC BDY RAD THER TX DLVR: CPT | Performed by: SPECIALIST

## 2023-12-13 PROCEDURE — NBSRV NON-BILLABLE SERVICE: Performed by: RADIOLOGY

## 2023-12-13 RX ORDER — OXYCODONE HYDROCHLORIDE 5 MG/1
5 TABLET ORAL EVERY 8 HOURS PRN
Qty: 42 TABLET | Refills: 0 | Status: SHIPPED | OUTPATIENT
Start: 2023-12-13 | End: 2023-12-27

## 2023-12-13 NOTE — PATIENT INSTRUCTIONS
SOL Rajan. Rand Eugene MD [address] Oncology  Cell: [phone]    [address]:  [phone]   FAX: [phone]  [facility]:   [address] [place] [address] Avenue:    [phone]  [address] [place] [address] Ct:  [phone]   FAX:  [phone]    Email: [email or web address]

## 2023-12-13 NOTE — TELEPHONE ENCOUNTER
Call from [facility] requesting refill for Ke's Oxy IR 5 mg that needs sent to [facility]. Next richy 12/20/23.

## 2023-12-13 NOTE — PROGRESS NOTES
Radiation Oncology          Mr.Ronald AMY Barth underwent fractionated external beam radiation therapy using a SBRT / SABR technique. I was personally present for the treatment planning (+/- 4D CT, W/WO Ab compression) imaging and pt setup to ensure appropriate immobilization to meet current MELANIE standards. After a detailed review of the treatment plan and appropriate physics QA / oversight this pt was scheduled and underwent hypo fractionated treatment as noted per the D/I in [address]. Typical fractionation schemes include but are not limited to 4500 Gy in 3-5 fractions. The NCCN guidelines and pt workup including a detailed discussion of the risks, benefits and alternative were discussed previously [RTOG dose constraints met per plan as applicable- see Mosaiq]. The pt verbalized understanding for the risks of this procedure today prior to Tx. Today, after a dual identification time out (including a brief plan overview )- I personally reviewed the complex multifaceted immobilization apparatus W/WO compression +/- Synergy (PRN), patient position, and 4D imaging (if applicable) prior to the treatment delivery to ensure accuracy. The SBRT team, including myself, were all present for the set up CT scan and delivery of the fraction (the latter on a PRN basis). The patient successfully completed the procedure today in stable condition; this procedure was well tolerated today. Prudence Carolyn has now received 4500 cGy in 5/5 fractions directed to the jam-hilar LN. Daquan Farley.  Franco Torres MD [address] [facility] Oncology  Cell: [phone]    [facility]:  [phone]   FAX: [phone]  [facility]:  [address] [place] [address] Avenue:    [phone]  [address] [place] [address] [address]:  [address]:  [phone]

## 2023-12-14 RX ORDER — PILOCARPINE HYDROCHLORIDE 5 MG/1
TABLET, FILM COATED ORAL
Qty: 60 TABLET | Refills: 0 | OUTPATIENT
Start: 2023-12-14

## 2023-12-20 PROBLEM — F11.20 OPIOID DEPENDENCE WITH CURRENT USE (HCC): Status: ACTIVE | Noted: 2023-12-20

## 2023-12-27 ENCOUNTER — HOSPITAL ENCOUNTER (OUTPATIENT)
Dept: INFUSION THERAPY | Age: 73
Discharge: HOME | End: 2023-12-27
Payer: MEDICARE

## 2023-12-27 ENCOUNTER — OFFICE VISIT (OUTPATIENT)
Dept: ONCOLOGY | Age: 73
End: 2023-12-27
Payer: MEDICARE

## 2023-12-27 VITALS
BODY MASS INDEX: 25.88 KG/M2 | TEMPERATURE: 97.9 F | SYSTOLIC BLOOD PRESSURE: 135 MMHG | DIASTOLIC BLOOD PRESSURE: 77 MMHG | HEIGHT: 67 IN | HEART RATE: 91 BPM | OXYGEN SATURATION: 97 % | WEIGHT: 164.9 LBS

## 2023-12-27 VITALS
HEART RATE: 76 BPM | SYSTOLIC BLOOD PRESSURE: 105 MMHG | OXYGEN SATURATION: 97 % | DIASTOLIC BLOOD PRESSURE: 68 MMHG | RESPIRATION RATE: 18 BRPM | TEMPERATURE: 97 F

## 2023-12-27 DIAGNOSIS — R53.83 OTHER FATIGUE: ICD-10-CM

## 2023-12-27 DIAGNOSIS — C10.9 SQUAMOUS CELL CARCINOMA OF OROPHARYNX (HCC): Primary | ICD-10-CM

## 2023-12-27 DIAGNOSIS — C10.9 SQUAMOUS CELL CARCINOMA OF OROPHARYNX (HCC): ICD-10-CM

## 2023-12-27 LAB
ALBUMIN SERPL-MCNC: 4.4 G/DL (ref 3.5–5.2)
ALP SERPL-CCNC: 73 U/L (ref 40–129)
ALT SERPL-CCNC: 11 U/L (ref 0–40)
ANION GAP SERPL CALCULATED.3IONS-SCNC: 10 MMOL/L (ref 7–16)
AST SERPL-CCNC: 15 U/L (ref 0–39)
BASOPHILS # BLD: 0.06 K/UL (ref 0–0.2)
BASOPHILS NFR BLD: 2 % (ref 0–2)
BILIRUB SERPL-MCNC: 0.4 MG/DL (ref 0–1.2)
BUN SERPL-MCNC: 24 MG/DL (ref 6–23)
CALCIUM SERPL-MCNC: 9.8 MG/DL (ref 8.6–10.2)
CHLORIDE SERPL-SCNC: 105 MMOL/L (ref 98–107)
CO2 SERPL-SCNC: 26 MMOL/L (ref 22–29)
CREAT SERPL-MCNC: 1.2 MG/DL (ref 0.7–1.2)
EOSINOPHIL # BLD: 0.23 K/UL (ref 0.05–0.5)
EOSINOPHILS RELATIVE PERCENT: 6 % (ref 0–6)
ERYTHROCYTE [DISTWIDTH] IN BLOOD BY AUTOMATED COUNT: 14.5 % (ref 11.5–15)
GFR SERPL CREATININE-BSD FRML MDRD: >60 ML/MIN/1.73M2
GLUCOSE SERPL-MCNC: 140 MG/DL (ref 74–99)
HCT VFR BLD AUTO: 38 % (ref 37–54)
HGB BLD-MCNC: 12.8 G/DL (ref 12.5–16.5)
LYMPHOCYTES NFR BLD: 0.06 K/UL (ref 1.5–4)
LYMPHOCYTES RELATIVE PERCENT: 2 % (ref 20–42)
MCH RBC QN AUTO: 30.7 PG (ref 26–35)
MCHC RBC AUTO-ENTMCNC: 33.7 G/DL (ref 32–34.5)
MCV RBC AUTO: 91.1 FL (ref 80–99.9)
MONOCYTES NFR BLD: 0.29 K/UL (ref 0.1–0.95)
MONOCYTES NFR BLD: 8 % (ref 2–12)
NEUTROPHILS NFR BLD: 83 % (ref 43–80)
NEUTS SEG NFR BLD: 3.06 K/UL (ref 1.8–7.3)
PLATELET # BLD AUTO: 158 K/UL (ref 130–450)
PMV BLD AUTO: 10.7 FL (ref 7–12)
POTASSIUM SERPL-SCNC: 3.5 MMOL/L (ref 3.5–5)
PROT SERPL-MCNC: 7.7 G/DL (ref 6.4–8.3)
RBC # BLD AUTO: 4.17 M/UL (ref 3.8–5.8)
RBC # BLD: NORMAL 10*6/UL
SODIUM SERPL-SCNC: 141 MMOL/L (ref 132–146)
TSH SERPL DL<=0.05 MIU/L-ACNC: 2.63 UIU/ML (ref 0.27–4.2)
WBC OTHER # BLD: 3.7 K/UL (ref 4.5–11.5)

## 2023-12-27 PROCEDURE — 2580000003 HC RX 258: Performed by: STUDENT IN AN ORGANIZED HEALTH CARE EDUCATION/TRAINING PROGRAM

## 2023-12-27 PROCEDURE — 85025 COMPLETE CBC W/AUTO DIFF WBC: CPT

## 2023-12-27 PROCEDURE — 96374 THER/PROPH/DIAG INJ IV PUSH: CPT

## 2023-12-27 PROCEDURE — 96413 CHEMO IV INFUSION 1 HR: CPT

## 2023-12-27 PROCEDURE — 36415 COLL VENOUS BLD VENIPUNCTURE: CPT

## 2023-12-27 PROCEDURE — 6360000002 HC RX W HCPCS: Performed by: STUDENT IN AN ORGANIZED HEALTH CARE EDUCATION/TRAINING PROGRAM

## 2023-12-27 PROCEDURE — 99214 OFFICE O/P EST MOD 30 MIN: CPT | Performed by: STUDENT IN AN ORGANIZED HEALTH CARE EDUCATION/TRAINING PROGRAM

## 2023-12-27 PROCEDURE — 84443 ASSAY THYROID STIM HORMONE: CPT

## 2023-12-27 PROCEDURE — 80053 COMPREHEN METABOLIC PANEL: CPT

## 2023-12-27 PROCEDURE — 1123F ACP DISCUSS/DSCN MKR DOCD: CPT | Performed by: STUDENT IN AN ORGANIZED HEALTH CARE EDUCATION/TRAINING PROGRAM

## 2023-12-27 RX ORDER — DIPHENHYDRAMINE HYDROCHLORIDE 50 MG/ML
50 INJECTION INTRAMUSCULAR; INTRAVENOUS
Status: CANCELLED | OUTPATIENT
Start: 2023-12-27

## 2023-12-27 RX ORDER — SODIUM CHLORIDE 9 MG/ML
INJECTION, SOLUTION INTRAVENOUS CONTINUOUS
Status: CANCELLED | OUTPATIENT
Start: 2023-12-27

## 2023-12-27 RX ORDER — ALBUTEROL SULFATE 90 UG/1
4 AEROSOL, METERED RESPIRATORY (INHALATION) PRN
Status: CANCELLED | OUTPATIENT
Start: 2023-12-27

## 2023-12-27 RX ORDER — ONDANSETRON 2 MG/ML
8 INJECTION INTRAMUSCULAR; INTRAVENOUS
Status: COMPLETED | OUTPATIENT
Start: 2023-12-27 | End: 2023-12-27

## 2023-12-27 RX ORDER — SODIUM CHLORIDE 9 MG/ML
5-250 INJECTION, SOLUTION INTRAVENOUS PRN
Status: DISCONTINUED | OUTPATIENT
Start: 2023-12-27 | End: 2023-12-28 | Stop reason: HOSPADM

## 2023-12-27 RX ORDER — SODIUM CHLORIDE 0.9 % (FLUSH) 0.9 %
5-40 SYRINGE (ML) INJECTION PRN
Status: CANCELLED | OUTPATIENT
Start: 2023-12-27

## 2023-12-27 RX ORDER — SODIUM CHLORIDE 0.9 % (FLUSH) 0.9 %
5-40 SYRINGE (ML) INJECTION PRN
Status: DISCONTINUED | OUTPATIENT
Start: 2023-12-27 | End: 2023-12-28 | Stop reason: HOSPADM

## 2023-12-27 RX ORDER — HEPARIN 100 UNIT/ML
500 SYRINGE INTRAVENOUS PRN
Status: CANCELLED | OUTPATIENT
Start: 2023-12-27

## 2023-12-27 RX ORDER — ONDANSETRON 2 MG/ML
8 INJECTION INTRAMUSCULAR; INTRAVENOUS
Status: CANCELLED | OUTPATIENT
Start: 2023-12-27

## 2023-12-27 RX ORDER — EPINEPHRINE 1 MG/ML
0.3 INJECTION, SOLUTION, CONCENTRATE INTRAVENOUS PRN
Status: CANCELLED | OUTPATIENT
Start: 2023-12-27

## 2023-12-27 RX ORDER — ACETAMINOPHEN 325 MG/1
650 TABLET ORAL
Status: CANCELLED | OUTPATIENT
Start: 2023-12-27

## 2023-12-27 RX ORDER — SODIUM CHLORIDE 9 MG/ML
5-250 INJECTION, SOLUTION INTRAVENOUS PRN
Status: CANCELLED | OUTPATIENT
Start: 2023-12-27

## 2023-12-27 RX ORDER — MEPERIDINE HYDROCHLORIDE 25 MG/ML
12.5 INJECTION INTRAMUSCULAR; INTRAVENOUS; SUBCUTANEOUS PRN
Status: CANCELLED | OUTPATIENT
Start: 2023-12-27

## 2023-12-27 RX ADMIN — SODIUM CHLORIDE 20 ML/HR: 9 INJECTION, SOLUTION INTRAVENOUS at 14:20

## 2023-12-27 RX ADMIN — SODIUM CHLORIDE 200 MG: 9 INJECTION, SOLUTION INTRAVENOUS at 15:00

## 2023-12-27 RX ADMIN — ONDANSETRON 8 MG: 2 INJECTION INTRAMUSCULAR; INTRAVENOUS at 14:35

## 2023-12-27 ASSESSMENT — PAIN DESCRIPTION - LOCATION: LOCATION: THROAT

## 2023-12-27 ASSESSMENT — PAIN SCALES - GENERAL: PAINLEVEL_OUTOF10: 8

## 2023-12-27 NOTE — PROGRESS NOTES
mouth can reflect post treatment related inflammation or residual local disease. Interval development of osseous metastatic disease of the sacrum, T12, and possibly anterior right 3rd rib, compared to PET/CT scan dated 08/01/2022. Interval development of multiple hepatic metastases  Overall disease progression. PD-L1 on tissue: CPS 50    CT guided core needle biopsy liver mass 03/22/2023. Metastatic HPV-related squamous cell carcinoma   Comment: The squamous cell carcinoma is immunoreactive with pankeratin, p40, and p16 (surrogate marker for high risk HPV). The TTF-1, chromogranin, and synaptophysin immunostains are negative in tumor cells. Intradepartmental consultation is obtained    Metastatic Squamous Cell Carcinoma  Since CPS 50>20; recommend single agent Keytruda. Cycle # 1 Sissy Rahman was on 03/28/2023. Cycle # 2 Keytruda was on 04/18/2023. Cycle # 3 Keytruda was on 05/09/2023. Cycle # 4 Keytruda was on 05/30/2023. PET/CT scan 06/19/2023:   Findings are compatible with mixed treatment response as compared to prior exam dated 2/10/2023. Previously demonstrated osseous hypermetabolism has resolved and there has been decreased activity within hepatic metastasis (with residual activity in the dominant lesion in the inferior right hepatic lobe). Barry activity at the phylicia hepatis has increased as compared to prior. Increased activity seen at the floor of the mouth as compared to prior. Continue Keytruda and repeat scans in 3 months. Recommend to f/u with ENT for scope examination  Cycle # 5 Sissy Rahman was on 06/20/2023. Cycle # 6 Keytruda was on 07/11/2023. Cycle # 7 Sissy Rahman is ordered and given today 08/01/2023. Labs reviewed ok to proceed. Dry mouth secondary to treatment; continue Biotene and increase po fluid intake. 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 Increase Synthroid to 100 mcg po daily.    Productive coughing yellowish/phlegm; recommended and ordered

## 2024-01-02 DIAGNOSIS — C10.9 OROPHARYNGEAL CANCER (HCC): ICD-10-CM

## 2024-01-02 RX ORDER — OXYCODONE HYDROCHLORIDE 5 MG/1
5 TABLET ORAL EVERY 8 HOURS PRN
Qty: 90 TABLET | Refills: 0 | Status: SHIPPED | OUTPATIENT
Start: 2024-01-02 | End: 2024-02-01

## 2024-01-02 NOTE — TELEPHONE ENCOUNTER
Call from Erin requesting a refill for Ke's Oxy IR 5 mg. Patient is requesting a 30 day refill. Pharmacy is [facility]. Next richy 3/13/24.

## 2024-01-10 ENCOUNTER — HOSPITAL ENCOUNTER (OUTPATIENT)
Dept: RADIATION ONCOLOGY | Age: 74
Discharge: HOME | End: 2024-01-10

## 2024-01-10 VITALS — BODY MASS INDEX: 26.78 KG/M2 | WEIGHT: 171 LBS

## 2024-01-10 DIAGNOSIS — C78.7 SECONDARY MALIGNANT NEOPLASM OF LIVER AND INTRAHEPATIC BILE DUCT (HCC): Primary | ICD-10-CM

## 2024-01-10 DIAGNOSIS — R09.A2 GLOBUS SENSATION: ICD-10-CM

## 2024-01-10 DIAGNOSIS — R05.8 COUGH WITH EXPECTORATION: Primary | ICD-10-CM

## 2024-01-10 DIAGNOSIS — C76.0 HEAD AND NECK CANCER (HCC): ICD-10-CM

## 2024-01-10 DIAGNOSIS — R13.14 PHARYNGOESOPHAGEAL DYSPHAGIA: ICD-10-CM

## 2024-01-10 PROCEDURE — NBSRV NON-BILLABLE SERVICE: Performed by: NURSE PRACTITIONER

## 2024-01-10 RX ORDER — GUAIFENESIN/DEXTROMETHORPHAN 100-10MG/5
5 SYRUP ORAL 4 TIMES DAILY PRN
Qty: 354 ML | Refills: 3 | Status: SHIPPED | OUTPATIENT
Start: 2024-01-10 | End: 2024-03-21

## 2024-01-10 NOTE — PROGRESS NOTES
RADIATION ONCOLOGY- [facility]   4 week follow up       1/10/2024      NAME:  Ignacio Bhatia    YOB: 1950    Diagnosis:  Squamous cell carcinoma of the left BOT/ tonsil, P-16  positive, oligomets involving liver and jam-hepatic nodes.     Subjective: Ignacio Bhatia completed a course of SBRT directed to liver and + LNs on 12/13/2023, total dose 4500 cGy/ 5  fractions. The patient previously completed a course of radiation therapy directed to the L BOT, BLHN + LNs on 11/03/2022, total dose 7400 cGy/ 37  fractions.     Today the patient is seen in 4 week post SBRT completion symptom management check. The patient reports some nausea, reports one episode of emesis. The patient is ordered as needed Zofran but is not using it frequently/ daily. The patient denies abdominal cramping, bloating or increased flatulence. The patient denies diarrhea, reports some episodes of constipation relived by taking Senokot. No fevers or chills. The patient reports rash/ warts to both hands. Applying OTC wart remover, which he feels in helping.     The patient reports ongoing productive cough of thick mucous secretion, reports difficulty expectorating. Tried Mucinex in past, felt it did not help stopped taking. The patient chronic dysgeusia, xerostomia and odynophagia as ongoing. He continued to use PEG tube as primary source of caloric intake and also for medications. The patient reports he is drinking water only by mouth without difficulty. The patient reports multiple attempts to try by mouth oral soft foods but reports sensation of globus with by mouth intake of soft foods.     Most recent flexible nasopharyngolaryngoscope on 12/202/2023 per Dr. Hernandez: Findings Normal nasopharynx, post radiation changes showing an omega shaped epiglottis, normal tongue base, normal pyriform, normal TVC motion and mucosa, no subglottic masses or lesions, normal vocal cord movement, no vocal cord polyps.     The patient is currently

## 2024-01-17 ENCOUNTER — HOSPITAL ENCOUNTER (OUTPATIENT)
Dept: INFUSION THERAPY | Age: 74
Discharge: HOME | End: 2024-01-17
Payer: MEDICARE

## 2024-01-17 ENCOUNTER — OFFICE VISIT (OUTPATIENT)
Dept: ONCOLOGY | Age: 74
End: 2024-01-17
Payer: MEDICARE

## 2024-01-17 VITALS
OXYGEN SATURATION: 97 % | DIASTOLIC BLOOD PRESSURE: 64 MMHG | RESPIRATION RATE: 17 BRPM | HEART RATE: 73 BPM | SYSTOLIC BLOOD PRESSURE: 118 MMHG | TEMPERATURE: 98.4 F

## 2024-01-17 VITALS
OXYGEN SATURATION: 98 % | HEART RATE: 83 BPM | BODY MASS INDEX: 26.28 KG/M2 | TEMPERATURE: 96.9 F | DIASTOLIC BLOOD PRESSURE: 77 MMHG | SYSTOLIC BLOOD PRESSURE: 122 MMHG | WEIGHT: 167.8 LBS | RESPIRATION RATE: 18 BRPM

## 2024-01-17 DIAGNOSIS — C10.9 SQUAMOUS CELL CARCINOMA OF OROPHARYNX (HCC): Primary | ICD-10-CM

## 2024-01-17 DIAGNOSIS — R53.83 OTHER FATIGUE: ICD-10-CM

## 2024-01-17 DIAGNOSIS — D37.02 NEOPLASM OF UNCERTAIN BEHAVIOR OF BASE OF TONGUE: Primary | ICD-10-CM

## 2024-01-17 DIAGNOSIS — C10.9 SQUAMOUS CELL CARCINOMA OF OROPHARYNX (HCC): ICD-10-CM

## 2024-01-17 LAB
ALBUMIN SERPL-MCNC: 4.3 G/DL (ref 3.5–5.2)
ALP SERPL-CCNC: 62 U/L (ref 40–129)
ALT SERPL-CCNC: 16 U/L (ref 0–40)
ANION GAP SERPL CALCULATED.3IONS-SCNC: 9 MMOL/L (ref 7–16)
AST SERPL-CCNC: 19 U/L (ref 0–39)
BASOPHILS # BLD: 0.03 K/UL (ref 0–0.2)
BASOPHILS NFR BLD: 1 % (ref 0–2)
BILIRUB SERPL-MCNC: 0.3 MG/DL (ref 0–1.2)
BUN SERPL-MCNC: 26 MG/DL (ref 6–23)
CALCIUM SERPL-MCNC: 9.9 MG/DL (ref 8.6–10.2)
CHLORIDE SERPL-SCNC: 107 MMOL/L (ref 98–107)
CO2 SERPL-SCNC: 27 MMOL/L (ref 22–29)
CREAT SERPL-MCNC: 1.2 MG/DL (ref 0.7–1.2)
EOSINOPHIL # BLD: 0.18 K/UL (ref 0.05–0.5)
EOSINOPHILS RELATIVE PERCENT: 4 % (ref 0–6)
ERYTHROCYTE [DISTWIDTH] IN BLOOD BY AUTOMATED COUNT: 14.7 % (ref 11.5–15)
GFR SERPL CREATININE-BSD FRML MDRD: >60 ML/MIN/1.73M2
GLUCOSE SERPL-MCNC: 156 MG/DL (ref 74–99)
HCT VFR BLD AUTO: 37.6 % (ref 37–54)
HGB BLD-MCNC: 12.6 G/DL (ref 12.5–16.5)
LYMPHOCYTES NFR BLD: 0.2 K/UL (ref 1.5–4)
LYMPHOCYTES RELATIVE PERCENT: 5 % (ref 20–42)
MCH RBC QN AUTO: 31.3 PG (ref 26–35)
MCHC RBC AUTO-ENTMCNC: 33.5 G/DL (ref 32–34.5)
MCV RBC AUTO: 93.5 FL (ref 80–99.9)
MONOCYTES NFR BLD: 0.52 K/UL (ref 0.1–0.95)
MONOCYTES NFR BLD: 12 % (ref 2–12)
NEUTROPHILS NFR BLD: 78 % (ref 43–80)
NEUTS SEG NFR BLD: 3.37 K/UL (ref 1.8–7.3)
PLATELET # BLD AUTO: 200 K/UL (ref 130–450)
PMV BLD AUTO: 10.6 FL (ref 7–12)
POTASSIUM SERPL-SCNC: 4.2 MMOL/L (ref 3.5–5)
PROT SERPL-MCNC: 7.4 G/DL (ref 6.4–8.3)
RBC # BLD AUTO: 4.02 M/UL (ref 3.8–5.8)
SODIUM SERPL-SCNC: 143 MMOL/L (ref 132–146)
TSH SERPL DL<=0.05 MIU/L-ACNC: 2.35 UIU/ML (ref 0.27–4.2)
WBC OTHER # BLD: 4.3 K/UL (ref 4.5–11.5)

## 2024-01-17 PROCEDURE — 84443 ASSAY THYROID STIM HORMONE: CPT

## 2024-01-17 PROCEDURE — 96413 CHEMO IV INFUSION 1 HR: CPT

## 2024-01-17 PROCEDURE — 36415 COLL VENOUS BLD VENIPUNCTURE: CPT

## 2024-01-17 PROCEDURE — 1123F ACP DISCUSS/DSCN MKR DOCD: CPT | Performed by: STUDENT IN AN ORGANIZED HEALTH CARE EDUCATION/TRAINING PROGRAM

## 2024-01-17 PROCEDURE — 85025 COMPLETE CBC W/AUTO DIFF WBC: CPT

## 2024-01-17 PROCEDURE — 99214 OFFICE O/P EST MOD 30 MIN: CPT | Performed by: STUDENT IN AN ORGANIZED HEALTH CARE EDUCATION/TRAINING PROGRAM

## 2024-01-17 PROCEDURE — 80053 COMPREHEN METABOLIC PANEL: CPT

## 2024-01-17 PROCEDURE — 6360000002 HC RX W HCPCS: Performed by: STUDENT IN AN ORGANIZED HEALTH CARE EDUCATION/TRAINING PROGRAM

## 2024-01-17 PROCEDURE — 2580000003 HC RX 258: Performed by: STUDENT IN AN ORGANIZED HEALTH CARE EDUCATION/TRAINING PROGRAM

## 2024-01-17 RX ORDER — ONDANSETRON 2 MG/ML
8 INJECTION INTRAMUSCULAR; INTRAVENOUS
Status: CANCELLED | OUTPATIENT
Start: 2024-01-17

## 2024-01-17 RX ORDER — SODIUM CHLORIDE 9 MG/ML
5-250 INJECTION, SOLUTION INTRAVENOUS PRN
Status: CANCELLED | OUTPATIENT
Start: 2024-01-17

## 2024-01-17 RX ORDER — SODIUM CHLORIDE 0.9 % (FLUSH) 0.9 %
5-40 SYRINGE (ML) INJECTION PRN
Status: CANCELLED | OUTPATIENT
Start: 2024-01-17

## 2024-01-17 RX ORDER — SODIUM CHLORIDE 9 MG/ML
5-250 INJECTION, SOLUTION INTRAVENOUS PRN
Status: DISCONTINUED | OUTPATIENT
Start: 2024-01-17 | End: 2024-01-18 | Stop reason: HOSPADM

## 2024-01-17 RX ORDER — SODIUM CHLORIDE 9 MG/ML
INJECTION, SOLUTION INTRAVENOUS CONTINUOUS
Status: CANCELLED | OUTPATIENT
Start: 2024-01-17

## 2024-01-17 RX ORDER — MEPERIDINE HYDROCHLORIDE 25 MG/ML
12.5 INJECTION INTRAMUSCULAR; INTRAVENOUS; SUBCUTANEOUS PRN
Status: CANCELLED | OUTPATIENT
Start: 2024-01-17

## 2024-01-17 RX ORDER — EPINEPHRINE 1 MG/ML
0.3 INJECTION, SOLUTION, CONCENTRATE INTRAVENOUS PRN
Status: CANCELLED | OUTPATIENT
Start: 2024-01-17

## 2024-01-17 RX ORDER — ACETAMINOPHEN 325 MG/1
650 TABLET ORAL
Status: CANCELLED | OUTPATIENT
Start: 2024-01-17

## 2024-01-17 RX ORDER — HEPARIN 100 UNIT/ML
500 SYRINGE INTRAVENOUS PRN
Status: CANCELLED | OUTPATIENT
Start: 2024-01-17

## 2024-01-17 RX ORDER — SODIUM CHLORIDE 0.9 % (FLUSH) 0.9 %
5-40 SYRINGE (ML) INJECTION PRN
Status: DISCONTINUED | OUTPATIENT
Start: 2024-01-17 | End: 2024-01-18 | Stop reason: HOSPADM

## 2024-01-17 RX ORDER — DIPHENHYDRAMINE HYDROCHLORIDE 50 MG/ML
50 INJECTION INTRAMUSCULAR; INTRAVENOUS
Status: CANCELLED | OUTPATIENT
Start: 2024-01-17

## 2024-01-17 RX ORDER — ALBUTEROL SULFATE 90 UG/1
4 AEROSOL, METERED RESPIRATORY (INHALATION) PRN
Status: CANCELLED | OUTPATIENT
Start: 2024-01-17

## 2024-01-17 RX ADMIN — SODIUM CHLORIDE 20 ML/HR: 9 INJECTION, SOLUTION INTRAVENOUS at 15:47

## 2024-01-17 RX ADMIN — SODIUM CHLORIDE, PRESERVATIVE FREE 10 ML: 5 INJECTION INTRAVENOUS at 15:45

## 2024-01-17 RX ADMIN — SODIUM CHLORIDE 200 MG: 9 INJECTION, SOLUTION INTRAVENOUS at 15:53

## 2024-01-17 NOTE — PROGRESS NOTES
[facility]  [address] [address]  Dept: [phone]  Loc: [phone]      Attending Clinic Note    Reason for Visit: Follow-up on a patient with p16 + Oropharyngeal Squamous Cell Carcinoma    PCP:  Jason Foy DO    Chief Complaint   Patient presents with    Follow-up         Subjective:  No major events.  The patient is complaining of little bit more discomfort and pain of anterior neck/throat.  Denies new masses, fevers and chills.      Oncology History:  73 y.o.  male who presented to the ENT team for persistent left-sided neck fullness without associated difficulty swallowing    CT soft tissue neck 02/23/2022: Mass located in the left aspect of floor of the mouth extending into the left oropharyngeal soft tissue concerning for squamous cell carcinoma  Multiple enlarged necrotic left anterior cervical chain lymph nodes    Panendoscopy on 3/17/2022:  Findings: L lingual tonsil hypertrophy, biopsy negative, left level II neck node FNA performed   FNA left neck mass level 2:  Inconclusive for malignant cells.  Few atypical squamous cells with degenerative change  A.  Tongue, left base, biopsy:   Squamous epithelial-lined lymphoid tissue with reactive germinal centers, compatible with lingual tonsil.   Negative for dysplasia; Negative for malignancy.     B.  Tongue, left base, biopsy:   Squamous epithelial-lined lymphoid tissue with reactive germinal centers, compatible with lingual tonsil.   Focal lobules of benign mucinous glands.   Negative for dysplasia; Negative for malignancy    On 07/21/2022: Panendoscopy excision left level 2 lymph node  Findings:  left base of tongue mass, left cervical lymphadenopathy  A.  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    See comment.   B.  Left neck mass, level 2: Metastatic HPV-related squamous carcinoma involving lymphoid tissue, see

## 2024-01-30 ENCOUNTER — OFFICE VISIT (OUTPATIENT)
Dept: FAMILY MEDICINE CLINIC | Age: 74
End: 2024-01-30
Payer: MEDICARE

## 2024-01-30 VITALS
HEART RATE: 85 BPM | WEIGHT: 168.1 LBS | RESPIRATION RATE: 16 BRPM | SYSTOLIC BLOOD PRESSURE: 128 MMHG | DIASTOLIC BLOOD PRESSURE: 76 MMHG | HEIGHT: 67 IN | OXYGEN SATURATION: 97 % | TEMPERATURE: 97.5 F | BODY MASS INDEX: 26.38 KG/M2

## 2024-01-30 DIAGNOSIS — E03.2 HYPOTHYROIDISM DUE TO MEDICATION: Primary | ICD-10-CM

## 2024-01-30 DIAGNOSIS — L98.9 SKIN LESION OF HAND: ICD-10-CM

## 2024-01-30 DIAGNOSIS — C78.7 SECONDARY MALIGNANT NEOPLASM OF LIVER AND INTRAHEPATIC BILE DUCT (HCC): ICD-10-CM

## 2024-01-30 DIAGNOSIS — C10.9 OROPHARYNGEAL CANCER (HCC): ICD-10-CM

## 2024-01-30 DIAGNOSIS — Z12.11 COLON CANCER SCREENING: ICD-10-CM

## 2024-01-30 DIAGNOSIS — E11.9 TYPE 2 DIABETES MELLITUS WITHOUT COMPLICATION, WITHOUT LONG-TERM CURRENT USE OF INSULIN (HCC): ICD-10-CM

## 2024-01-30 DIAGNOSIS — R97.20 ELEVATED PSA: ICD-10-CM

## 2024-01-30 DIAGNOSIS — E55.9 VITAMIN D DEFICIENCY: ICD-10-CM

## 2024-01-30 DIAGNOSIS — E78.5 DYSLIPIDEMIA: Chronic | ICD-10-CM

## 2024-01-30 DIAGNOSIS — F11.20 OPIOID DEPENDENCE WITH CURRENT USE (HCC): ICD-10-CM

## 2024-01-30 PROBLEM — N18.30 CHRONIC RENAL DISEASE, STAGE III (HCC): Status: RESOLVED | Noted: 2023-08-22 | Resolved: 2024-01-30

## 2024-01-30 PROBLEM — E11.22 TYPE 2 DIABETES MELLITUS WITH CHRONIC KIDNEY DISEASE (HCC): Status: ACTIVE | Noted: 2024-01-30

## 2024-01-30 PROBLEM — E11.22 TYPE 2 DIABETES MELLITUS WITH CHRONIC KIDNEY DISEASE (HCC): Status: RESOLVED | Noted: 2024-01-30 | Resolved: 2024-01-30

## 2024-01-30 PROCEDURE — 1123F ACP DISCUSS/DSCN MKR DOCD: CPT | Performed by: FAMILY MEDICINE

## 2024-01-30 PROCEDURE — 99214 OFFICE O/P EST MOD 30 MIN: CPT | Performed by: FAMILY MEDICINE

## 2024-01-30 RX ORDER — FENOFIBRATE 145 MG/1
145 TABLET, COATED ORAL DAILY
Qty: 90 TABLET | Refills: 1 | Status: SHIPPED | OUTPATIENT
Start: 2024-01-30

## 2024-01-30 RX ORDER — LEVOTHYROXINE SODIUM 100 MCG
100 TABLET ORAL DAILY
Qty: 30 TABLET | Refills: 3 | Status: CANCELLED | OUTPATIENT
Start: 2024-01-30

## 2024-01-30 ASSESSMENT — PATIENT HEALTH QUESTIONNAIRE - PHQ9
2. FEELING DOWN, DEPRESSED OR HOPELESS: 0
SUM OF ALL RESPONSES TO PHQ QUESTIONS 1-9: 0
SUM OF ALL RESPONSES TO PHQ9 QUESTIONS 1 & 2: 0
SUM OF ALL RESPONSES TO PHQ QUESTIONS 1-9: 0
1. LITTLE INTEREST OR PLEASURE IN DOING THINGS: 0
SUM OF ALL RESPONSES TO PHQ QUESTIONS 1-9: 0
SUM OF ALL RESPONSES TO PHQ QUESTIONS 1-9: 0

## 2024-01-30 ASSESSMENT — ENCOUNTER SYMPTOMS
BLOOD IN STOOL: 0
ABDOMINAL PAIN: 0
COUGH: 0
SHORTNESS OF BREATH: 0

## 2024-01-30 NOTE — PROGRESS NOTES
pain and blood in stool.   Genitourinary:  Negative for dysuria and hematuria.   Neurological:  Negative for weakness and numbness.   Psychiatric/Behavioral:  Negative for dysphoric mood. The patient is not nervous/anxious.        Health Maintenance Due   Topic Date Due    DTaP/Tdap/Td vaccine (1 - Tdap) Never done    Shingles vaccine (1 of 2) Never done    Respiratory Syncytial Virus (RSV) Pregnant or age 60 yrs+ (1 - 1-dose 60+ series) Never done    COVID-19 Vaccine (4 - 2023-24 season) 09/01/2023    Annual Wellness Visit (Medicare Advantage)  01/01/2024     Tdap: UTD per pt  RSV: RP  LDCT: PET CT done 6/2023  CRC: due - Dr Hinds, last around 2019 and repeat 4 years per pt - referred 7/2023 but not done, following with GI and recommended discussed next visit.     Current Outpatient Medications   Medication Sig Dispense Refill    fenofibrate (TRICOR) 145 MG tablet Take 1 tablet by mouth daily 90 tablet 1    guaiFENesin-dextromethorphan (ROBITUSSIN DM) 100-10 MG/5ML syrup 5 mLs by Per G Tube route 4 times daily as needed for Cough 354 mL 3    oxyCODONE (ROXICODONE) 5 MG immediate release tablet Take 1 tablet by mouth every 8 hours as needed for Pain for up to 30 days. Max Daily Amount: 15 mg 90 tablet 0    ondansetron (ZOFRAN) 4 MG tablet Take 1 tablet by mouth daily as needed for Nausea or Vomiting 30 tablet 0    SYNTHROID 100 MCG tablet Take 1 tablet by mouth Daily 30 tablet 3    Magic Mouthwash (MIRACLE MOUTHWASH) Swish and spit 5 mLs 4 times daily as needed for Irritation or Pain Benadryl 12.5 mg/ml Maalox 10 ml/5ml  lidocaine 2%/5ml 480 mL 3    pilocarpine (SALAGEN) 5 MG tablet TAKE 1&1/2 TABLETS BY MOUTH THREE (3) TIMES DAILY 60 tablet 0    sucralfate (CARAFATE) 1 GM/10ML suspension Take 10 mLs by mouth 4 times daily 1200 mL 3    omeprazole (PRILOSEC) 40 MG delayed release capsule Take 1 capsule by mouth daily 90 capsule 1    pembrolizumab (KEYTRUDA) 100 MG/4ML SOLN Infuse intravenously 1Q3W      lidocaine

## 2024-01-31 DIAGNOSIS — C10.9 OROPHARYNGEAL CANCER (HCC): ICD-10-CM

## 2024-01-31 RX ORDER — OXYCODONE HYDROCHLORIDE 5 MG/1
5 TABLET ORAL EVERY 8 HOURS PRN
Qty: 90 TABLET | Refills: 0 | Status: SHIPPED | OUTPATIENT
Start: 2024-01-31 | End: 2024-03-01

## 2024-01-31 NOTE — TELEPHONE ENCOUNTER
Call from Erin requesting refill for Ke's Oxy IR 5 mg to be sent to [facility]. Next richy 3/13/24.

## 2024-02-07 ENCOUNTER — TELEPHONE (OUTPATIENT)
Dept: INFUSION THERAPY | Age: 74
End: 2024-02-07

## 2024-02-07 ENCOUNTER — HOSPITAL ENCOUNTER (OUTPATIENT)
Dept: INFUSION THERAPY | Age: 74
Discharge: HOME | End: 2024-02-07
Payer: MEDICARE

## 2024-02-07 ENCOUNTER — OFFICE VISIT (OUTPATIENT)
Dept: ONCOLOGY | Age: 74
End: 2024-02-07
Payer: MEDICARE

## 2024-02-07 VITALS
HEART RATE: 87 BPM | SYSTOLIC BLOOD PRESSURE: 116 MMHG | OXYGEN SATURATION: 95 % | DIASTOLIC BLOOD PRESSURE: 72 MMHG | HEIGHT: 67 IN | TEMPERATURE: 97.3 F | WEIGHT: 168.3 LBS | BODY MASS INDEX: 26.42 KG/M2

## 2024-02-07 VITALS — SYSTOLIC BLOOD PRESSURE: 120 MMHG | RESPIRATION RATE: 16 BRPM | DIASTOLIC BLOOD PRESSURE: 71 MMHG | HEART RATE: 74 BPM

## 2024-02-07 VITALS
DIASTOLIC BLOOD PRESSURE: 65 MMHG | HEART RATE: 74 BPM | TEMPERATURE: 97.6 F | SYSTOLIC BLOOD PRESSURE: 110 MMHG | OXYGEN SATURATION: 95 % | RESPIRATION RATE: 16 BRPM

## 2024-02-07 DIAGNOSIS — R53.83 OTHER FATIGUE: ICD-10-CM

## 2024-02-07 DIAGNOSIS — C10.9 SQUAMOUS CELL CARCINOMA OF OROPHARYNX (HCC): ICD-10-CM

## 2024-02-07 DIAGNOSIS — C10.9 SQUAMOUS CELL CARCINOMA OF OROPHARYNX (HCC): Primary | ICD-10-CM

## 2024-02-07 LAB
ALBUMIN SERPL-MCNC: 4.3 G/DL (ref 3.5–5.2)
ALP SERPL-CCNC: 68 U/L (ref 40–129)
ALT SERPL-CCNC: 16 U/L (ref 0–40)
ANION GAP SERPL CALCULATED.3IONS-SCNC: 11 MMOL/L (ref 7–16)
AST SERPL-CCNC: 19 U/L (ref 0–39)
BASOPHILS # BLD: 0.03 K/UL (ref 0–0.2)
BASOPHILS NFR BLD: 1 % (ref 0–2)
BILIRUB SERPL-MCNC: 0.4 MG/DL (ref 0–1.2)
BUN SERPL-MCNC: 27 MG/DL (ref 6–23)
CALCIUM SERPL-MCNC: 10.1 MG/DL (ref 8.6–10.2)
CHLORIDE SERPL-SCNC: 104 MMOL/L (ref 98–107)
CO2 SERPL-SCNC: 27 MMOL/L (ref 22–29)
CREAT SERPL-MCNC: 1.3 MG/DL (ref 0.7–1.2)
EOSINOPHIL # BLD: 0.11 K/UL (ref 0.05–0.5)
EOSINOPHILS RELATIVE PERCENT: 2 % (ref 0–6)
ERYTHROCYTE [DISTWIDTH] IN BLOOD BY AUTOMATED COUNT: 15 % (ref 11.5–15)
GFR SERPL CREATININE-BSD FRML MDRD: 59 ML/MIN/1.73M2
GLUCOSE SERPL-MCNC: 127 MG/DL (ref 74–99)
HCT VFR BLD AUTO: 36.7 % (ref 37–54)
HGB BLD-MCNC: 12.3 G/DL (ref 12.5–16.5)
IMM GRANULOCYTES # BLD AUTO: <0.03 K/UL (ref 0–0.58)
IMM GRANULOCYTES NFR BLD: 0 % (ref 0–5)
LYMPHOCYTES NFR BLD: 0.17 K/UL (ref 1.5–4)
LYMPHOCYTES RELATIVE PERCENT: 3 % (ref 20–42)
MCH RBC QN AUTO: 30.9 PG (ref 26–35)
MCHC RBC AUTO-ENTMCNC: 33.5 G/DL (ref 32–34.5)
MCV RBC AUTO: 92.2 FL (ref 80–99.9)
MONOCYTES NFR BLD: 0.67 K/UL (ref 0.1–0.95)
MONOCYTES NFR BLD: 11 % (ref 2–12)
NEUTROPHILS NFR BLD: 84 % (ref 43–80)
NEUTS SEG NFR BLD: 5.31 K/UL (ref 1.8–7.3)
PLATELET # BLD AUTO: 212 K/UL (ref 130–450)
PMV BLD AUTO: 10.7 FL (ref 7–12)
POTASSIUM SERPL-SCNC: 3.4 MMOL/L (ref 3.5–5)
PROT SERPL-MCNC: 7.7 G/DL (ref 6.4–8.3)
RBC # BLD AUTO: 3.98 M/UL (ref 3.8–5.8)
RBC # BLD: NORMAL 10*6/UL
SODIUM SERPL-SCNC: 142 MMOL/L (ref 132–146)
TSH SERPL DL<=0.05 MIU/L-ACNC: 4.94 UIU/ML (ref 0.27–4.2)
WBC OTHER # BLD: 6.3 K/UL (ref 4.5–11.5)

## 2024-02-07 PROCEDURE — 85025 COMPLETE CBC W/AUTO DIFF WBC: CPT

## 2024-02-07 PROCEDURE — 84443 ASSAY THYROID STIM HORMONE: CPT

## 2024-02-07 PROCEDURE — 1123F ACP DISCUSS/DSCN MKR DOCD: CPT | Performed by: STUDENT IN AN ORGANIZED HEALTH CARE EDUCATION/TRAINING PROGRAM

## 2024-02-07 PROCEDURE — 99214 OFFICE O/P EST MOD 30 MIN: CPT | Performed by: STUDENT IN AN ORGANIZED HEALTH CARE EDUCATION/TRAINING PROGRAM

## 2024-02-07 PROCEDURE — 2580000003 HC RX 258: Performed by: STUDENT IN AN ORGANIZED HEALTH CARE EDUCATION/TRAINING PROGRAM

## 2024-02-07 PROCEDURE — 36415 COLL VENOUS BLD VENIPUNCTURE: CPT

## 2024-02-07 PROCEDURE — 80053 COMPREHEN METABOLIC PANEL: CPT

## 2024-02-07 PROCEDURE — 96413 CHEMO IV INFUSION 1 HR: CPT

## 2024-02-07 PROCEDURE — 6360000002 HC RX W HCPCS: Performed by: STUDENT IN AN ORGANIZED HEALTH CARE EDUCATION/TRAINING PROGRAM

## 2024-02-07 PROCEDURE — 6370000000 HC RX 637 (ALT 250 FOR IP): Performed by: STUDENT IN AN ORGANIZED HEALTH CARE EDUCATION/TRAINING PROGRAM

## 2024-02-07 RX ORDER — ACETAMINOPHEN 325 MG/1
650 TABLET ORAL
Status: CANCELLED | OUTPATIENT
Start: 2024-02-07

## 2024-02-07 RX ORDER — ONDANSETRON 2 MG/ML
8 INJECTION INTRAMUSCULAR; INTRAVENOUS
Status: CANCELLED | OUTPATIENT
Start: 2024-02-07

## 2024-02-07 RX ORDER — SODIUM CHLORIDE 9 MG/ML
5-250 INJECTION, SOLUTION INTRAVENOUS PRN
Status: CANCELLED | OUTPATIENT
Start: 2024-02-07

## 2024-02-07 RX ORDER — DIPHENHYDRAMINE HYDROCHLORIDE 50 MG/ML
50 INJECTION INTRAMUSCULAR; INTRAVENOUS
Status: CANCELLED | OUTPATIENT
Start: 2024-02-07

## 2024-02-07 RX ORDER — ONDANSETRON 2 MG/ML
8 INJECTION INTRAMUSCULAR; INTRAVENOUS
Status: DISCONTINUED | OUTPATIENT
Start: 2024-02-07 | End: 2024-02-08 | Stop reason: HOSPADM

## 2024-02-07 RX ORDER — ALBUTEROL SULFATE 90 UG/1
4 AEROSOL, METERED RESPIRATORY (INHALATION) PRN
Status: CANCELLED | OUTPATIENT
Start: 2024-02-07

## 2024-02-07 RX ORDER — SODIUM CHLORIDE 0.9 % (FLUSH) 0.9 %
5-40 SYRINGE (ML) INJECTION PRN
Status: CANCELLED | OUTPATIENT
Start: 2024-02-07

## 2024-02-07 RX ORDER — SODIUM CHLORIDE 9 MG/ML
INJECTION, SOLUTION INTRAVENOUS CONTINUOUS
Status: CANCELLED | OUTPATIENT
Start: 2024-02-07

## 2024-02-07 RX ORDER — SODIUM CHLORIDE 9 MG/ML
5-250 INJECTION, SOLUTION INTRAVENOUS PRN
Status: DISCONTINUED | OUTPATIENT
Start: 2024-02-07 | End: 2024-02-08 | Stop reason: HOSPADM

## 2024-02-07 RX ORDER — HEPARIN 100 UNIT/ML
500 SYRINGE INTRAVENOUS PRN
Status: CANCELLED | OUTPATIENT
Start: 2024-02-07

## 2024-02-07 RX ORDER — MEPERIDINE HYDROCHLORIDE 25 MG/ML
12.5 INJECTION INTRAMUSCULAR; INTRAVENOUS; SUBCUTANEOUS PRN
Status: CANCELLED | OUTPATIENT
Start: 2024-02-07

## 2024-02-07 RX ORDER — EPINEPHRINE 1 MG/ML
0.3 INJECTION, SOLUTION, CONCENTRATE INTRAVENOUS PRN
Status: CANCELLED | OUTPATIENT
Start: 2024-02-07

## 2024-02-07 RX ADMIN — POTASSIUM BICARBONATE 40 MEQ: 782 TABLET, EFFERVESCENT ORAL at 15:53

## 2024-02-07 RX ADMIN — SODIUM CHLORIDE 200 MG: 9 INJECTION, SOLUTION INTRAVENOUS at 14:36

## 2024-02-07 ASSESSMENT — PAIN DESCRIPTION - LOCATION: LOCATION: THROAT

## 2024-02-07 ASSESSMENT — PAIN DESCRIPTION - PAIN TYPE: TYPE: CHRONIC PAIN

## 2024-02-07 ASSESSMENT — PAIN DESCRIPTION - FREQUENCY: FREQUENCY: CONTINUOUS

## 2024-02-07 ASSESSMENT — PAIN DESCRIPTION - DESCRIPTORS: DESCRIPTORS: BURNING

## 2024-02-07 ASSESSMENT — PAIN SCALES - GENERAL: PAINLEVEL_OUTOF10: 9

## 2024-02-07 NOTE — TELEPHONE ENCOUNTER
Ignacio Bhatia  2/7/2024  Ht Readings from Last 1 Encounters:   02/07/24 1.702 m (5' 7\")     Wt Readings from Last 10 Encounters:   02/07/24 76.3 kg (168 lb 4.8 oz)   01/30/24 76.2 kg (168 lb 1.6 oz)   01/17/24 76.1 kg (167 lb 12.8 oz)   01/10/24 77.6 kg (171 lb)   12/27/23 74.8 kg (164 lb 14.4 oz)   12/20/23 75.8 kg (167 lb)   12/06/23 74.1 kg (163 lb 4.8 oz)   11/15/23 73.9 kg (163 lb)   10/31/23 74.2 kg (163 lb 8 oz)   10/25/23 74.8 kg (164 lb 12.8 oz)     BMI=26.36    Assessment: Met with Ke while in for OTV with Dr. Le for squamous cell oropharynx with mets. He is receiving Day 1 Cycle 16 Keytruda. He completed SBRT to live and LN's on 12/13/23 and radiation to left BOT, BL LN's and HN on 11/3/22. Ke reports he is having esophageal dilation on 2/12/24 with Dr. Hinds. He isn't eating any solids due to feeling of globulus feeling. Reports that when he drinks liquids, if he doesn't drink slowly that it comes out of his nose. Doctor Mimi was notified and suggested modified barium swallow to assess for possible aspiration. He is dependent on enteral nutrition to meet his calorie/protein/hydration needs. Currently he is administering 2 feedings per day, each consisting of 16oz Boost VHC, 1 packet of Real Foods Blend, 16oz water and 1 jar of baby food. Discussed that is a lot of volume at 1 feeding. He did admit that he feels full and uncomfortable after each feeding. I explained it is as if you over eating at a meal, the uncomfortable feeling. We discussed a more appropriate feeding regimen consisting of 8oz Boost VHC, 1/2 packet of Real Foods Blend, 16oz water. But he will need 4 feedings per day of this regimen. Discussed spacing feedings by 4 hours. He agreed to make changes.     Weight change: 1.58% weight loss x 1 month, 2.94% weight gain x 3 months, 3.38% weight gain x 6 months  Appetite: Good appetite but only doing water orally due to difficulty swallowing  Nutritional Side Effects: feeling of dysphagia,

## 2024-02-07 NOTE — PROGRESS NOTES
Patient tolerated infusion well. Patient potassium 3.4. Dr. Le ordered for effer- k to be given through feeding tube. Effer- K given through feeding tube as ordered. Patient tolerated Patient alert and oriented x3.   No distress noted.   Vital signs stable.   Patient denies any new or worsening pain.  Patient educated on signs and symptoms of reaction to medication.   Educated patient on possible side effects and treatment of medication.   Patient verbalized understanding.   Offered patient education an/or discharge material.  Patient declined.   Patient denies any needs.  All questions answered.

## 2024-02-07 NOTE — PROGRESS NOTES
TSH 35.47 today 08/01/2023. Increase Synthroid to 100 mcg po daily.   Productive coughing yellowish/phlegm; recommended and ordered Z-Anthony    Cycle #8 Keytruda is ordered and given today 8/22/2023 Labs reviewed okay to proceed.  Slight elevation in creatinine patient reports slight decrease in his p.o. intake recommended he increase water, hypothyroidism related to immunotherapy TSH WNL 2.51 will keep Synthroid at 100 mcg per day.  Initial improvement in cough with Z-Anthony some return of drainage ,ENT f/u soon .     9/12/2023 Cycle #9 Keytruda is ordered and given today , labs revied creatinine is 2.4 continue to encourage p.o. fluid intake seems to be tolerating treatment well with no complaints, ENT on 9/29 , refill for Carafate, Magic mouthwash and salagen sent     PET scan 9/22/2023   IMPRESSION:  IMPRESSION:  Slightly decreased but persistent intense uptake along the floor of the  mouth, left greater than right.  Worsening right upper quadrant metastatic  lymphadenopathy.  Slightly decreased but persistent intense uptake of a liver  metastasis.  Dr. Hernandez note reviewed stable; no sign of recurrent disease; mucus radiation changes was omega shaped epiglottis and no focal findings of mass tumors or lesions  Reviewed labs can proceed with treatment today cycle #10 Keytruda  No signs or symptoms of illness palpation of abdomen and groin area no lymph nodes palpable patient denies diarrhea/constipation abdominal pain  RTC 3 weeks with labs       Patient had received much of his care with Dr. Mcghee, who has now moved on from our practice.  The patient transitioned oncologic care with me (Dr. Riley Le) on 10/25/23.      ASSESSMENT:  Metastatic Oropharyngeal squamous cell carcinoma, p16+  Presented with dysphagia  Left floor of mouth mass detected on CT on 2/23/22  Diagnosed on panendoscopy on 3/17/22  PET completed on 8/1/22  Concurrent chemoRT (weekly cisplatin x7 cycles) from 9/6/22 - 10/25/22  RT completed on

## 2024-02-09 ENCOUNTER — TELEPHONE (OUTPATIENT)
Dept: INFUSION THERAPY | Age: 74
End: 2024-02-09

## 2024-02-09 NOTE — TELEPHONE ENCOUNTER
Per patient's pharmacy, the office can disregard the fax regarding patient's magic mouth wash \"as it was all taken care of now and the patient has his medication.\" Voiced understanding.

## 2024-02-28 ENCOUNTER — HOSPITAL ENCOUNTER (OUTPATIENT)
Dept: INFUSION THERAPY | Age: 74
Discharge: HOME | End: 2024-02-28
Payer: MEDICARE

## 2024-02-28 ENCOUNTER — OFFICE VISIT (OUTPATIENT)
Dept: ONCOLOGY | Age: 74
End: 2024-02-28
Payer: MEDICARE

## 2024-02-28 VITALS
RESPIRATION RATE: 18 BRPM | HEART RATE: 70 BPM | TEMPERATURE: 98.4 F | OXYGEN SATURATION: 95 % | SYSTOLIC BLOOD PRESSURE: 122 MMHG | DIASTOLIC BLOOD PRESSURE: 74 MMHG

## 2024-02-28 VITALS
OXYGEN SATURATION: 100 % | HEART RATE: 76 BPM | WEIGHT: 169 LBS | BODY MASS INDEX: 26.53 KG/M2 | HEIGHT: 67 IN | SYSTOLIC BLOOD PRESSURE: 137 MMHG | DIASTOLIC BLOOD PRESSURE: 81 MMHG | TEMPERATURE: 96.9 F

## 2024-02-28 DIAGNOSIS — C10.9 SQUAMOUS CELL CARCINOMA OF OROPHARYNX (HCC): Primary | ICD-10-CM

## 2024-02-28 DIAGNOSIS — C10.9 OROPHARYNGEAL CANCER (HCC): ICD-10-CM

## 2024-02-28 DIAGNOSIS — R53.83 OTHER FATIGUE: ICD-10-CM

## 2024-02-28 DIAGNOSIS — R97.20 ELEVATED PSA: Primary | ICD-10-CM

## 2024-02-28 DIAGNOSIS — C78.7 SECONDARY MALIGNANT NEOPLASM OF LIVER AND INTRAHEPATIC BILE DUCT (HCC): ICD-10-CM

## 2024-02-28 LAB
ALBUMIN SERPL-MCNC: 4.3 G/DL (ref 3.5–5.2)
ALP SERPL-CCNC: 79 U/L (ref 40–129)
ALT SERPL-CCNC: 19 U/L (ref 0–40)
ANION GAP SERPL CALCULATED.3IONS-SCNC: 10 MMOL/L (ref 7–16)
AST SERPL-CCNC: 19 U/L (ref 0–39)
BASOPHILS # BLD: 0 K/UL (ref 0–0.2)
BASOPHILS NFR BLD: 0 % (ref 0–2)
BILIRUB SERPL-MCNC: 0.4 MG/DL (ref 0–1.2)
BUN SERPL-MCNC: 26 MG/DL (ref 6–23)
CALCIUM SERPL-MCNC: 10.8 MG/DL (ref 8.6–10.2)
CHLORIDE SERPL-SCNC: 105 MMOL/L (ref 98–107)
CO2 SERPL-SCNC: 26 MMOL/L (ref 22–29)
CREAT SERPL-MCNC: 1.3 MG/DL (ref 0.7–1.2)
EOSINOPHIL # BLD: 0.19 K/UL (ref 0.05–0.5)
EOSINOPHILS RELATIVE PERCENT: 4 % (ref 0–6)
ERYTHROCYTE [DISTWIDTH] IN BLOOD BY AUTOMATED COUNT: 14.6 % (ref 11.5–15)
GFR SERPL CREATININE-BSD FRML MDRD: 57 ML/MIN/1.73M2
GLUCOSE SERPL-MCNC: 111 MG/DL (ref 74–99)
HCT VFR BLD AUTO: 36.8 % (ref 37–54)
HGB BLD-MCNC: 12.6 G/DL (ref 12.5–16.5)
LYMPHOCYTES NFR BLD: 0.1 K/UL (ref 1.5–4)
LYMPHOCYTES RELATIVE PERCENT: 2 % (ref 20–42)
MCH RBC QN AUTO: 32 PG (ref 26–35)
MCHC RBC AUTO-ENTMCNC: 34.2 G/DL (ref 32–34.5)
MCV RBC AUTO: 93.4 FL (ref 80–99.9)
MONOCYTES NFR BLD: 0.34 K/UL (ref 0.1–0.95)
MONOCYTES NFR BLD: 7 % (ref 2–12)
NEUTROPHILS NFR BLD: 88 % (ref 43–80)
NEUTS SEG NFR BLD: 4.47 K/UL (ref 1.8–7.3)
PLATELET # BLD AUTO: 214 K/UL (ref 130–450)
PMV BLD AUTO: 10.5 FL (ref 7–12)
POTASSIUM SERPL-SCNC: 3.6 MMOL/L (ref 3.5–5)
PROT SERPL-MCNC: 7.1 G/DL (ref 6.4–8.3)
RBC # BLD AUTO: 3.94 M/UL (ref 3.8–5.8)
RBC # BLD: NORMAL 10*6/UL
SODIUM SERPL-SCNC: 141 MMOL/L (ref 132–146)
TSH SERPL DL<=0.05 MIU/L-ACNC: 5.08 UIU/ML (ref 0.27–4.2)
WBC OTHER # BLD: 5.1 K/UL (ref 4.5–11.5)

## 2024-02-28 PROCEDURE — 96413 CHEMO IV INFUSION 1 HR: CPT

## 2024-02-28 PROCEDURE — 84443 ASSAY THYROID STIM HORMONE: CPT

## 2024-02-28 PROCEDURE — 2580000003 HC RX 258: Performed by: STUDENT IN AN ORGANIZED HEALTH CARE EDUCATION/TRAINING PROGRAM

## 2024-02-28 PROCEDURE — 80053 COMPREHEN METABOLIC PANEL: CPT

## 2024-02-28 PROCEDURE — 99213 OFFICE O/P EST LOW 20 MIN: CPT | Performed by: STUDENT IN AN ORGANIZED HEALTH CARE EDUCATION/TRAINING PROGRAM

## 2024-02-28 PROCEDURE — 6360000002 HC RX W HCPCS: Performed by: STUDENT IN AN ORGANIZED HEALTH CARE EDUCATION/TRAINING PROGRAM

## 2024-02-28 PROCEDURE — 36415 COLL VENOUS BLD VENIPUNCTURE: CPT

## 2024-02-28 PROCEDURE — 85025 COMPLETE CBC W/AUTO DIFF WBC: CPT

## 2024-02-28 PROCEDURE — 1123F ACP DISCUSS/DSCN MKR DOCD: CPT | Performed by: STUDENT IN AN ORGANIZED HEALTH CARE EDUCATION/TRAINING PROGRAM

## 2024-02-28 RX ORDER — ALBUTEROL SULFATE 90 UG/1
4 AEROSOL, METERED RESPIRATORY (INHALATION) PRN
Status: CANCELLED | OUTPATIENT
Start: 2024-02-28

## 2024-02-28 RX ORDER — ONDANSETRON 2 MG/ML
8 INJECTION INTRAMUSCULAR; INTRAVENOUS
Status: CANCELLED | OUTPATIENT
Start: 2024-02-28

## 2024-02-28 RX ORDER — MEPERIDINE HYDROCHLORIDE 25 MG/ML
12.5 INJECTION INTRAMUSCULAR; INTRAVENOUS; SUBCUTANEOUS PRN
Status: CANCELLED | OUTPATIENT
Start: 2024-02-28

## 2024-02-28 RX ORDER — HEPARIN 100 UNIT/ML
500 SYRINGE INTRAVENOUS PRN
Status: CANCELLED | OUTPATIENT
Start: 2024-02-28

## 2024-02-28 RX ORDER — EPINEPHRINE 1 MG/ML
0.3 INJECTION, SOLUTION, CONCENTRATE INTRAVENOUS PRN
Status: CANCELLED | OUTPATIENT
Start: 2024-02-28

## 2024-02-28 RX ORDER — HYDROXYZINE PAMOATE 25 MG/1
25 CAPSULE ORAL 3 TIMES DAILY PRN
Qty: 42 CAPSULE | Refills: 0 | Status: SHIPPED | OUTPATIENT
Start: 2024-02-28 | End: 2024-03-13

## 2024-02-28 RX ORDER — SODIUM CHLORIDE 0.9 % (FLUSH) 0.9 %
5-40 SYRINGE (ML) INJECTION PRN
Status: CANCELLED | OUTPATIENT
Start: 2024-02-28

## 2024-02-28 RX ORDER — SODIUM CHLORIDE 9 MG/ML
5-250 INJECTION, SOLUTION INTRAVENOUS PRN
Status: DISCONTINUED | OUTPATIENT
Start: 2024-02-28 | End: 2024-02-29 | Stop reason: HOSPADM

## 2024-02-28 RX ORDER — OXYCODONE HYDROCHLORIDE 5 MG/1
5 TABLET ORAL EVERY 8 HOURS PRN
Qty: 90 TABLET | Refills: 0 | Status: SHIPPED | OUTPATIENT
Start: 2024-02-28 | End: 2024-03-29

## 2024-02-28 RX ORDER — DIPHENHYDRAMINE HYDROCHLORIDE 50 MG/ML
50 INJECTION INTRAMUSCULAR; INTRAVENOUS
Status: CANCELLED | OUTPATIENT
Start: 2024-02-28

## 2024-02-28 RX ORDER — SODIUM CHLORIDE 9 MG/ML
INJECTION, SOLUTION INTRAVENOUS CONTINUOUS
Status: CANCELLED | OUTPATIENT
Start: 2024-02-28

## 2024-02-28 RX ORDER — SODIUM CHLORIDE 9 MG/ML
5-250 INJECTION, SOLUTION INTRAVENOUS PRN
Status: CANCELLED | OUTPATIENT
Start: 2024-02-28

## 2024-02-28 RX ORDER — SODIUM CHLORIDE 0.9 % (FLUSH) 0.9 %
5-40 SYRINGE (ML) INJECTION PRN
Status: DISCONTINUED | OUTPATIENT
Start: 2024-02-28 | End: 2024-02-29 | Stop reason: HOSPADM

## 2024-02-28 RX ORDER — ACETAMINOPHEN 325 MG/1
650 TABLET ORAL
Status: CANCELLED | OUTPATIENT
Start: 2024-02-28

## 2024-02-28 RX ADMIN — SODIUM CHLORIDE 200 MG: 9 INJECTION, SOLUTION INTRAVENOUS at 12:17

## 2024-02-28 RX ADMIN — SODIUM CHLORIDE 100 ML/HR: 9 INJECTION, SOLUTION INTRAVENOUS at 12:04

## 2024-02-28 NOTE — PROGRESS NOTES
[facility] [place] [facility] MEDICAL ONCOLOGY  [address] [address]  Dept: [phone]  Loc: [phone]      Attending Clinic Note    Reason for Visit: Follow-up on a patient with p16 + Oropharyngeal Squamous Cell Carcinoma    PCP:  Jason Foy DO    Chief Complaint   Patient presents with    Cancer    Follow-up     Squamous cell carcinoma of oropharynx         Subjective:  Had recent EGD/dilation, and swallowing better.   Still has dry mouth. C/o fatigue which has been ongoing for months.       Oncology History:  73 y.o.  male who presented to the ENT team for persistent left-sided neck fullness without associated difficulty swallowing    CT soft tissue neck 02/23/2022: Mass located in the left aspect of floor of the mouth extending into the left oropharyngeal soft tissue concerning for squamous cell carcinoma  Multiple enlarged necrotic left anterior cervical chain lymph nodes    Panendoscopy on 3/17/2022:  Findings: L lingual tonsil hypertrophy, biopsy negative, left level II neck node FNA performed   FNA left neck mass level 2:  Inconclusive for malignant cells.  Few atypical squamous cells with degenerative change  A.  Tongue, left base, biopsy:   Squamous epithelial-lined lymphoid tissue with reactive germinal centers, compatible with lingual tonsil.   Negative for dysplasia; Negative for malignancy.     B.  Tongue, left base, biopsy:   Squamous epithelial-lined lymphoid tissue with reactive germinal centers, compatible with lingual tonsil.   Focal lobules of benign mucinous glands.   Negative for dysplasia; Negative for malignancy    On 07/21/2022: Panendoscopy excision left level 2 lymph node  Findings:  left base of tongue mass, left cervical lymphadenopathy  A.  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    See comment.   B.  Left neck mass, level 2: Metastatic HPV-related squamous carcinoma involving

## 2024-02-28 NOTE — TELEPHONE ENCOUNTER
Erin asked to speak with Palliative Care in Lobby as Ke is here to get treatment. Erin requests refill for Hydroxyzine and Oxy IR that Ke will be needing in a couple days. Pharmacy is [facility]. Next richy 3/13/24.

## 2024-02-29 ENCOUNTER — TELEPHONE (OUTPATIENT)
Dept: INFUSION THERAPY | Age: 74
End: 2024-02-29

## 2024-02-29 PROBLEM — Z12.11 COLON CANCER SCREENING: Status: RESOLVED | Noted: 2024-01-30 | Resolved: 2024-02-29

## 2024-02-29 NOTE — TELEPHONE ENCOUNTER
Ignacio REYES Sriram  2/29/2024 Late entry from 2/28/24  Ht Readings from Last 1 Encounters:   02/28/24 1.702 m (5' 7\")     Wt Readings from Last 10 Encounters:   02/28/24 76.7 kg (169 lb)   02/07/24 76.3 kg (168 lb 4.8 oz)   01/30/24 76.2 kg (168 lb 1.6 oz)   01/17/24 76.1 kg (167 lb 12.8 oz)   01/10/24 77.6 kg (171 lb)   12/27/23 74.8 kg (164 lb 14.4 oz)   12/20/23 75.8 kg (167 lb)   12/06/23 74.1 kg (163 lb 4.8 oz)   11/15/23 73.9 kg (163 lb)   10/31/23 74.2 kg (163 lb 8 oz)     BMI=26.47    Assessment: Met with Ke while in for Day 1 Cycle 17 Keytruda for oropharynx squamous cell cancer.  Ke reports that he had EGD and esophageal dilatation and that he is swallowing his medications better, but still not eating solids. Dr. Le had ordered a video swallow but has not been completed. Ke was provided a printed copy of order and central scheduling number to set up appointment. He denies problems with enteral nutrition. Encouraged him to call with questions or concerns.     Mouna Ascencio RD

## 2024-03-04 ENCOUNTER — HOSPITAL ENCOUNTER (OUTPATIENT)
Dept: NUCLEAR MEDICINE | Age: 74
Discharge: HOME | End: 2024-03-04
Payer: MEDICARE

## 2024-03-04 ENCOUNTER — TELEPHONE (OUTPATIENT)
Dept: PALLATIVE CARE | Age: 74
End: 2024-03-04

## 2024-03-04 DIAGNOSIS — C10.9 SQUAMOUS CELL CARCINOMA OF OROPHARYNX (HCC): ICD-10-CM

## 2024-03-04 PROCEDURE — 3430000000 HC RX DIAGNOSTIC RADIOPHARMACEUTICAL: Performed by: RADIOLOGY

## 2024-03-04 PROCEDURE — 78815 PET IMAGE W/CT SKULL-THIGH: CPT

## 2024-03-04 PROCEDURE — A9609 HC RX DIAGNOSTIC RADIOPHARMACEUTICAL: HCPCS | Performed by: RADIOLOGY

## 2024-03-04 RX ORDER — FLUDEOXYGLUCOSE F 18 200 MCI/ML
15 INJECTION, SOLUTION INTRAVENOUS
Status: COMPLETED | OUTPATIENT
Start: 2024-03-04 | End: 2024-03-04

## 2024-03-04 RX ADMIN — FLUDEOXYGLUCOSE F 18 15 MILLICURIE: 200 INJECTION, SOLUTION INTRAVENOUS at 19:19

## 2024-03-04 NOTE — TELEPHONE ENCOUNTER
Wife Erin called and requested synthroid refill.  Returned call and explained to Erin to call Dr. Le's office for refill

## 2024-03-05 DIAGNOSIS — C10.9 OROPHARYNGEAL CANCER (HCC): ICD-10-CM

## 2024-03-05 DIAGNOSIS — E03.9 HYPOTHYROIDISM, UNSPECIFIED TYPE: ICD-10-CM

## 2024-03-05 NOTE — TELEPHONE ENCOUNTER
Patient's wife contacted office requesting refill of Synthroid to be sent to [facility].

## 2024-03-06 DIAGNOSIS — C10.9 OROPHARYNGEAL CANCER (HCC): ICD-10-CM

## 2024-03-06 DIAGNOSIS — E03.9 HYPOTHYROIDISM, UNSPECIFIED TYPE: ICD-10-CM

## 2024-03-06 RX ORDER — LEVOTHYROXINE SODIUM 100 MCG
100 TABLET ORAL DAILY
Qty: 30 TABLET | Refills: 3 | OUTPATIENT
Start: 2024-03-06

## 2024-03-06 RX ORDER — LEVOTHYROXINE SODIUM 100 MCG
100 TABLET ORAL DAILY
Qty: 30 TABLET | Refills: 3 | Status: SHIPPED | OUTPATIENT
Start: 2024-03-06

## 2024-03-13 ENCOUNTER — OFFICE VISIT (OUTPATIENT)
Dept: PALLATIVE CARE | Age: 74
End: 2024-03-13
Payer: MEDICARE

## 2024-03-13 VITALS
TEMPERATURE: 97.3 F | OXYGEN SATURATION: 98 % | BODY MASS INDEX: 26.31 KG/M2 | DIASTOLIC BLOOD PRESSURE: 77 MMHG | WEIGHT: 168 LBS | HEART RATE: 82 BPM | SYSTOLIC BLOOD PRESSURE: 141 MMHG

## 2024-03-13 DIAGNOSIS — Z51.5 PALLIATIVE CARE BY SPECIALIST: ICD-10-CM

## 2024-03-13 DIAGNOSIS — G89.3 PAIN DUE TO NEOPLASM: ICD-10-CM

## 2024-03-13 DIAGNOSIS — C10.9 OROPHARYNGEAL CANCER (HCC): Primary | ICD-10-CM

## 2024-03-13 PROCEDURE — 1123F ACP DISCUSS/DSCN MKR DOCD: CPT | Performed by: NURSE PRACTITIONER

## 2024-03-13 PROCEDURE — 99211 OFF/OP EST MAY X REQ PHY/QHP: CPT | Performed by: NURSE PRACTITIONER

## 2024-03-13 PROCEDURE — 99213 OFFICE O/P EST LOW 20 MIN: CPT | Performed by: NURSE PRACTITIONER

## 2024-03-13 RX ORDER — OXYCODONE HYDROCHLORIDE 5 MG/1
5 TABLET ORAL EVERY 6 HOURS PRN
Qty: 90 TABLET | Refills: 0 | Status: SHIPPED
Start: 2024-03-13 | End: 2024-04-12

## 2024-03-13 RX ORDER — FENOFIBRATE 145 MG/1
145 TABLET, COATED ORAL DAILY
Qty: 90 TABLET | Refills: 1 | Status: SHIPPED | OUTPATIENT
Start: 2024-03-13

## 2024-03-13 NOTE — PROGRESS NOTES
pilocarpine, as well as over-the-counter dry mouth tablets    Constipation is well controlled    He still is using Carafate  He denies any immediate needs at this time, and no changes were made to his plan of care.    We will have him return in 3 months to reassess his needs.    Pain Assessment   Ratin  Description: burning  Duration: months  Frequency: daily  Location: left neck  Alleviating Factors: pain medication and ice  Exacerbating Factors: unable to associate with any factor  Effect: change in function, interference with activities, sleep, and mood    Past Medical History:   Diagnosis Date    Arthritis     Cancer (HCC)     oropharynx    Chronic renal disease, stage III (HCC) [357249] 2023    Diabetes mellitus (HCC)     Essential hypertension 11/10/2020    GERD (gastroesophageal reflux disease)     Hyperlipidemia     Hypertension     Lyme disease     Thrombocytopenia (HCC)        Past Surgical History:   Procedure Laterality Date    BRONCHOSCOPY N/A 10/28/2019    BRONCHOSCOPY performed by Laurent Bhakta MD at [facility] ENDOSCOPY    CARDIAC CATHETERIZATION      dr harris    CARDIAC CATHETERIZATION  2017    COLONOSCOPY  2019    GASTROSTOMY TUBE PLACEMENT N/A 2022    PEG TUBE PLACEMENT performed by Roberto Thomas MD at [facility] ENDOSCOPY    IR BIOPSY LIVER PERCUTANEOUS  3/22/2023    IR BIOPSY LIVER PERCUTANEOUS 3/22/2023 [facility] CT    LARYNGOSCOPY Left 3/17/2022    PANENDOSCOPY WITH BIOPSY performed by Kris Hernandez DO [place] [facility] [place]    LARYNGOSCOPY Left 2022    PANENDOSCOPY performed by Kris Hernandez DO at [facility]    NECK SURGERY Left 2022    EXCISION LEFT LEVEL II LYMPH NODE, PANENDOSCOPY performed by Kris Hernandez DO at [facility]    TOOTH EXTRACTION Left 2021    VASECTOMY         Family History   Problem Relation Age of Onset    High Blood Pressure Mother     High Blood Pressure Father     Cancer Father 82        kidney    Abdominal aortic aneurysm Father

## 2024-03-13 NOTE — TELEPHONE ENCOUNTER
Requested Prescriptions     Pending Prescriptions Disp Refills    fenofibrate (TRICOR) 145 MG tablet 90 tablet 1     Sig: Take 1 tablet by mouth daily       Next appt is 7/30/2024  Last appt was 1/30/2024

## 2024-03-20 ENCOUNTER — HOSPITAL ENCOUNTER (OUTPATIENT)
Dept: INFUSION THERAPY | Age: 74
Discharge: HOME | End: 2024-03-20
Payer: MEDICARE

## 2024-03-20 ENCOUNTER — OFFICE VISIT (OUTPATIENT)
Dept: ONCOLOGY | Age: 74
End: 2024-03-20
Payer: MEDICARE

## 2024-03-20 VITALS
SYSTOLIC BLOOD PRESSURE: 132 MMHG | HEART RATE: 75 BPM | OXYGEN SATURATION: 96 % | TEMPERATURE: 98 F | RESPIRATION RATE: 16 BRPM | DIASTOLIC BLOOD PRESSURE: 62 MMHG

## 2024-03-20 VITALS
WEIGHT: 172 LBS | OXYGEN SATURATION: 97 % | HEART RATE: 86 BPM | SYSTOLIC BLOOD PRESSURE: 134 MMHG | TEMPERATURE: 97.9 F | DIASTOLIC BLOOD PRESSURE: 78 MMHG | BODY MASS INDEX: 26.94 KG/M2

## 2024-03-20 DIAGNOSIS — C78.7 SECONDARY MALIGNANT NEOPLASM OF LIVER AND INTRAHEPATIC BILE DUCT (HCC): Primary | ICD-10-CM

## 2024-03-20 DIAGNOSIS — B37.0 THRUSH: ICD-10-CM

## 2024-03-20 DIAGNOSIS — R53.83 OTHER FATIGUE: ICD-10-CM

## 2024-03-20 DIAGNOSIS — C10.9 SQUAMOUS CELL CARCINOMA OF OROPHARYNX (HCC): Primary | ICD-10-CM

## 2024-03-20 DIAGNOSIS — C78.7 SECONDARY MALIGNANT NEOPLASM OF LIVER AND INTRAHEPATIC BILE DUCT (HCC): ICD-10-CM

## 2024-03-20 LAB
ALBUMIN SERPL-MCNC: 4.1 G/DL (ref 3.5–5.2)
ALP SERPL-CCNC: 69 U/L (ref 40–129)
ALT SERPL-CCNC: 16 U/L (ref 0–40)
ANION GAP SERPL CALCULATED.3IONS-SCNC: 12 MMOL/L (ref 7–16)
AST SERPL-CCNC: 18 U/L (ref 0–39)
BASOPHILS # BLD: 0.04 K/UL (ref 0–0.2)
BASOPHILS NFR BLD: 1 % (ref 0–2)
BILIRUB SERPL-MCNC: 0.4 MG/DL (ref 0–1.2)
BUN SERPL-MCNC: 21 MG/DL (ref 6–23)
CALCIUM SERPL-MCNC: 10.2 MG/DL (ref 8.6–10.2)
CHLORIDE SERPL-SCNC: 102 MMOL/L (ref 98–107)
CO2 SERPL-SCNC: 25 MMOL/L (ref 22–29)
CREAT SERPL-MCNC: 1.2 MG/DL (ref 0.7–1.2)
EOSINOPHIL # BLD: 0.25 K/UL (ref 0.05–0.5)
EOSINOPHILS RELATIVE PERCENT: 5 % (ref 0–6)
ERYTHROCYTE [DISTWIDTH] IN BLOOD BY AUTOMATED COUNT: 14 % (ref 11.5–15)
GFR SERPL CREATININE-BSD FRML MDRD: >60 ML/MIN/1.73M2
GLUCOSE SERPL-MCNC: 154 MG/DL (ref 74–99)
HCT VFR BLD AUTO: 40 % (ref 37–54)
HGB BLD-MCNC: 13.2 G/DL (ref 12.5–16.5)
LYMPHOCYTES NFR BLD: 0.16 K/UL (ref 1.5–4)
LYMPHOCYTES RELATIVE PERCENT: 4 % (ref 20–42)
MCH RBC QN AUTO: 31.1 PG (ref 26–35)
MCHC RBC AUTO-ENTMCNC: 33 G/DL (ref 32–34.5)
MCV RBC AUTO: 94.3 FL (ref 80–99.9)
MONOCYTES NFR BLD: 0.33 K/UL (ref 0.1–0.95)
MONOCYTES NFR BLD: 7 % (ref 2–12)
NEUTROPHILS NFR BLD: 83 % (ref 43–80)
NEUTS SEG NFR BLD: 3.82 K/UL (ref 1.8–7.3)
PLATELET # BLD AUTO: 186 K/UL (ref 130–450)
PMV BLD AUTO: 10 FL (ref 7–12)
POTASSIUM SERPL-SCNC: 4 MMOL/L (ref 3.5–5)
PROT SERPL-MCNC: 7.6 G/DL (ref 6.4–8.3)
RBC # BLD AUTO: 4.24 M/UL (ref 3.8–5.8)
RBC # BLD: NORMAL 10*6/UL
SODIUM SERPL-SCNC: 139 MMOL/L (ref 132–146)
TSH SERPL DL<=0.05 MIU/L-ACNC: 4.3 UIU/ML (ref 0.27–4.2)
WBC OTHER # BLD: 4.6 K/UL (ref 4.5–11.5)

## 2024-03-20 PROCEDURE — 1123F ACP DISCUSS/DSCN MKR DOCD: CPT | Performed by: STUDENT IN AN ORGANIZED HEALTH CARE EDUCATION/TRAINING PROGRAM

## 2024-03-20 PROCEDURE — 96413 CHEMO IV INFUSION 1 HR: CPT

## 2024-03-20 PROCEDURE — 2580000003 HC RX 258: Performed by: STUDENT IN AN ORGANIZED HEALTH CARE EDUCATION/TRAINING PROGRAM

## 2024-03-20 PROCEDURE — 84443 ASSAY THYROID STIM HORMONE: CPT

## 2024-03-20 PROCEDURE — 96375 TX/PRO/DX INJ NEW DRUG ADDON: CPT

## 2024-03-20 PROCEDURE — 80053 COMPREHEN METABOLIC PANEL: CPT

## 2024-03-20 PROCEDURE — 99214 OFFICE O/P EST MOD 30 MIN: CPT | Performed by: STUDENT IN AN ORGANIZED HEALTH CARE EDUCATION/TRAINING PROGRAM

## 2024-03-20 PROCEDURE — 85025 COMPLETE CBC W/AUTO DIFF WBC: CPT

## 2024-03-20 PROCEDURE — 36415 COLL VENOUS BLD VENIPUNCTURE: CPT

## 2024-03-20 PROCEDURE — 6360000002 HC RX W HCPCS: Performed by: STUDENT IN AN ORGANIZED HEALTH CARE EDUCATION/TRAINING PROGRAM

## 2024-03-20 RX ORDER — ALBUTEROL SULFATE 90 UG/1
4 AEROSOL, METERED RESPIRATORY (INHALATION) PRN
Status: CANCELLED | OUTPATIENT
Start: 2024-03-20

## 2024-03-20 RX ORDER — ONDANSETRON 2 MG/ML
8 INJECTION INTRAMUSCULAR; INTRAVENOUS
Status: CANCELLED | OUTPATIENT
Start: 2024-03-20

## 2024-03-20 RX ORDER — DIPHENHYDRAMINE HYDROCHLORIDE 50 MG/ML
50 INJECTION INTRAMUSCULAR; INTRAVENOUS
Status: CANCELLED | OUTPATIENT
Start: 2024-03-20

## 2024-03-20 RX ORDER — HEPARIN 100 UNIT/ML
500 SYRINGE INTRAVENOUS PRN
Status: CANCELLED | OUTPATIENT
Start: 2024-03-20

## 2024-03-20 RX ORDER — SODIUM CHLORIDE 0.9 % (FLUSH) 0.9 %
5-40 SYRINGE (ML) INJECTION PRN
Status: DISCONTINUED | OUTPATIENT
Start: 2024-03-20 | End: 2024-03-21 | Stop reason: HOSPADM

## 2024-03-20 RX ORDER — EPINEPHRINE 1 MG/ML
0.3 INJECTION, SOLUTION, CONCENTRATE INTRAVENOUS PRN
Status: CANCELLED | OUTPATIENT
Start: 2024-03-20

## 2024-03-20 RX ORDER — SODIUM CHLORIDE 0.9 % (FLUSH) 0.9 %
5-40 SYRINGE (ML) INJECTION PRN
Status: CANCELLED | OUTPATIENT
Start: 2024-03-20

## 2024-03-20 RX ORDER — ACETAMINOPHEN 325 MG/1
650 TABLET ORAL
Status: CANCELLED | OUTPATIENT
Start: 2024-03-20

## 2024-03-20 RX ORDER — ONDANSETRON 2 MG/ML
8 INJECTION INTRAMUSCULAR; INTRAVENOUS
Status: COMPLETED | OUTPATIENT
Start: 2024-03-20 | End: 2024-03-20

## 2024-03-20 RX ORDER — SODIUM CHLORIDE 9 MG/ML
5-250 INJECTION, SOLUTION INTRAVENOUS PRN
Status: CANCELLED | OUTPATIENT
Start: 2024-03-20

## 2024-03-20 RX ORDER — SODIUM CHLORIDE 9 MG/ML
5-250 INJECTION, SOLUTION INTRAVENOUS PRN
Status: DISCONTINUED | OUTPATIENT
Start: 2024-03-20 | End: 2024-03-21 | Stop reason: HOSPADM

## 2024-03-20 RX ORDER — MEPERIDINE HYDROCHLORIDE 25 MG/ML
12.5 INJECTION INTRAMUSCULAR; INTRAVENOUS; SUBCUTANEOUS PRN
Status: CANCELLED | OUTPATIENT
Start: 2024-03-20

## 2024-03-20 RX ORDER — SODIUM CHLORIDE 9 MG/ML
INJECTION, SOLUTION INTRAVENOUS CONTINUOUS
Status: CANCELLED | OUTPATIENT
Start: 2024-03-20

## 2024-03-20 RX ADMIN — SODIUM CHLORIDE 200 MG: 9 INJECTION, SOLUTION INTRAVENOUS at 13:37

## 2024-03-20 RX ADMIN — SODIUM CHLORIDE, PRESERVATIVE FREE 10 ML: 5 INJECTION INTRAVENOUS at 13:14

## 2024-03-20 RX ADMIN — ONDANSETRON 8 MG: 2 INJECTION INTRAMUSCULAR; INTRAVENOUS at 13:17

## 2024-03-20 RX ADMIN — SODIUM CHLORIDE 30 ML/HR: 9 INJECTION, SOLUTION INTRAVENOUS at 13:15

## 2024-03-20 NOTE — PROGRESS NOTES
detected on CT on 2/23/22  Diagnosed on panendoscopy on 3/17/22  PET completed on 8/1/22  Concurrent chemoRT (weekly cisplatin x7 cycles) from 9/6/22 - 10/25/22  RT completed on 11/3/22  Post treatment PET on 2/10/23 detected liver metastasis  Confirmed Metastatic squamous cell carcinoma on liver biopsy on 3/22/23  Pembrolizumab started on 3/28/23 - present  PET from 9/22/23 noted new/progressive perihepatic lymph nodes  Continued pembro but also treated LN's with SBRT, completed around 12/13/23  Elevated PSA      1/17/2024: No major events.  He is continuing pembrolizumab.  Seems to suggest more throat discomfort.  No masses palpated.  Consider he may have increased dysphagia/odynophagia.  He is mostly dependent on his PEG tube for oral intake..  Was last seen by ENT about a month ago on 12/20/2023.  Last PET scan was on 9/22/2022, prior to finding evidence of progression, in which she had received SBRT, which was completed on 12/13/2023.  Also on PET scan, there was mention of floor of mouth activity.  However patient's current symptoms do not seem to fully reflect/correlate with PET scan findings of the head and neck.  Examination of the oral cavity is largely unremarkable.    02/07/2024: Patient doing fair.  Today is cycle 16 of pembrolizumab.  Having more fatigue of late.  Complains of more dry mouth, as well as progressing dysphagia.  He is tolerating pembrolizumab well otherwise.  No new masses appreciated on examination.  No abdominal pain, given he was treated with SBRT against the perihepatic lymph nodes a few months back.    02/28/2024: Doing ok. S/p EGD and dilation with Dr. Hinds recently. He is swallowing better, and recalls being told there are no other worrisome findings. Also has saw dermatology, noting his rash are warts, noted they are better now after \"shots\" he received. Pt mentioned intermittent abdominal discomfort around left mid abdomen.     03/20/2024: No major events.  PET scan was done

## 2024-03-21 ENCOUNTER — TELEPHONE (OUTPATIENT)
Dept: INFUSION THERAPY | Age: 74
End: 2024-03-21

## 2024-03-21 NOTE — TELEPHONE ENCOUNTER
Late entry from 3/20/24. Met with Ignacio while in infusion room. He continues to be dependent on enteral nutrition, using Boost VHC 4 cartons per day and 2 pouches Real Foods Blends. Orally he is taking very little, small sips of water. He said he has yet to set up appointment for swallowing study. Ke said he still has number and paper copy of prescription to call and schedule, encouraged him to do it. He denied needing help setting up appointment.

## 2024-03-22 ENCOUNTER — OFFICE VISIT (OUTPATIENT)
Dept: ENT CLINIC | Age: 74
End: 2024-03-22
Payer: MEDICARE

## 2024-03-22 VITALS
HEART RATE: 93 BPM | SYSTOLIC BLOOD PRESSURE: 117 MMHG | WEIGHT: 171.6 LBS | HEIGHT: 67 IN | BODY MASS INDEX: 26.93 KG/M2 | DIASTOLIC BLOOD PRESSURE: 80 MMHG

## 2024-03-22 DIAGNOSIS — C10.9 OROPHARYNGEAL CANCER (HCC): ICD-10-CM

## 2024-03-22 DIAGNOSIS — C10.9 SQUAMOUS CELL CARCINOMA OF OROPHARYNX (HCC): ICD-10-CM

## 2024-03-22 DIAGNOSIS — C13.9 CARCINOMA OF HYPOPHARYNX (HCC): Primary | ICD-10-CM

## 2024-03-22 DIAGNOSIS — C10.2 MALIGNANT NEOPLASM OF LATERAL WALL OF OROPHARYNX (HCC): ICD-10-CM

## 2024-03-22 PROCEDURE — 31575 DIAGNOSTIC LARYNGOSCOPY: CPT | Performed by: OTOLARYNGOLOGY

## 2024-03-22 PROCEDURE — 99214 OFFICE O/P EST MOD 30 MIN: CPT | Performed by: OTOLARYNGOLOGY

## 2024-03-22 PROCEDURE — 1123F ACP DISCUSS/DSCN MKR DOCD: CPT | Performed by: OTOLARYNGOLOGY

## 2024-03-22 RX ORDER — OXYCODONE HYDROCHLORIDE 5 MG/1
5 TABLET ORAL EVERY 6 HOURS PRN
Qty: 90 TABLET | Refills: 0 | Status: SHIPPED | OUTPATIENT
Start: 2024-03-22 | End: 2024-04-21

## 2024-03-22 NOTE — TELEPHONE ENCOUNTER
Prescription from 3/13/24 did not send to pharmacy. Please resend to [facility]. Next richy 6/5/24.

## 2024-03-22 NOTE — PROGRESS NOTES
without mass tumor lesions tongue base is identified.  No sign of cancer hypopharynx a lot of mucus; no residual signs of cancer    Condition:  Stable.  Patient tolerated procedure well.    Complications:  None      IMPRESSION/PLAN:  1. Carcinoma of hypopharynx (HCC)  2. Squamous cell carcinoma of oropharynx (HCC)  3. 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 We also discussed which symptoms are likely temporary vs permanently altered, like taste. No other issues discussed by patient today. All questions were answered.    Follow up in 3 mo w/ olympus scope    3/22/24  Follow up in 3 months with olympus scope  Discussed negative PET   Nasal slaine     Dr. Kris Hernandez D.O. Ms. Ed.  Otolaryngology Facial Plastic Surgery  :  [facility] Otolaryngology Residency  Associate Clinical Professor:  PAUL PORTILLO [employer]  [facility]              Ignacio Bhatia  1950      I have discussed the case, including pertinent history and exam findings with the resident. I have seen and examined the patient and the key elements of the encounter have been performed by me.  I agree with the assessment, plan and orders as documented by the resident.      Patient here for follow up of medical problems.         Remainder of medical problems as per resident note.      Lady Brown MA  12/22/23

## 2024-04-08 RX ORDER — PILOCARPINE HYDROCHLORIDE 5 MG/1
TABLET, FILM COATED ORAL
Qty: 60 TABLET | Refills: 0 | Status: SHIPPED
Start: 2024-04-08 | End: 2024-04-10 | Stop reason: SDUPTHER

## 2024-04-10 ENCOUNTER — HOSPITAL ENCOUNTER (OUTPATIENT)
Dept: INFUSION THERAPY | Age: 74
Discharge: HOME | End: 2024-04-10
Payer: MEDICARE

## 2024-04-10 ENCOUNTER — OFFICE VISIT (OUTPATIENT)
Dept: ONCOLOGY | Age: 74
End: 2024-04-10

## 2024-04-10 VITALS
HEART RATE: 80 BPM | HEIGHT: 67 IN | DIASTOLIC BLOOD PRESSURE: 82 MMHG | WEIGHT: 173.3 LBS | TEMPERATURE: 97 F | OXYGEN SATURATION: 100 % | SYSTOLIC BLOOD PRESSURE: 127 MMHG | BODY MASS INDEX: 27.2 KG/M2

## 2024-04-10 VITALS
RESPIRATION RATE: 16 BRPM | OXYGEN SATURATION: 98 % | HEART RATE: 74 BPM | SYSTOLIC BLOOD PRESSURE: 123 MMHG | DIASTOLIC BLOOD PRESSURE: 66 MMHG | TEMPERATURE: 97.6 F

## 2024-04-10 DIAGNOSIS — C10.9 SQUAMOUS CELL CARCINOMA OF OROPHARYNX (HCC): ICD-10-CM

## 2024-04-10 DIAGNOSIS — R53.83 OTHER FATIGUE: ICD-10-CM

## 2024-04-10 DIAGNOSIS — C10.9 OROPHARYNGEAL CANCER (HCC): Primary | ICD-10-CM

## 2024-04-10 DIAGNOSIS — C10.9 SQUAMOUS CELL CARCINOMA OF OROPHARYNX (HCC): Primary | ICD-10-CM

## 2024-04-10 LAB
ALBUMIN SERPL-MCNC: 4 G/DL (ref 3.5–5.2)
ALP SERPL-CCNC: 68 U/L (ref 40–129)
ALT SERPL-CCNC: 14 U/L (ref 0–40)
ANION GAP SERPL CALCULATED.3IONS-SCNC: 10 MMOL/L (ref 7–16)
AST SERPL-CCNC: 16 U/L (ref 0–39)
BASOPHILS # BLD: 0.04 K/UL (ref 0–0.2)
BASOPHILS NFR BLD: 1 % (ref 0–2)
BILIRUB SERPL-MCNC: 0.3 MG/DL (ref 0–1.2)
BUN SERPL-MCNC: 25 MG/DL (ref 6–23)
CALCIUM SERPL-MCNC: 10.3 MG/DL (ref 8.6–10.2)
CHLORIDE SERPL-SCNC: 101 MMOL/L (ref 98–107)
CO2 SERPL-SCNC: 26 MMOL/L (ref 22–29)
CREAT SERPL-MCNC: 1.2 MG/DL (ref 0.7–1.2)
EOSINOPHIL # BLD: 0.15 K/UL (ref 0.05–0.5)
EOSINOPHILS RELATIVE PERCENT: 3 % (ref 0–6)
ERYTHROCYTE [DISTWIDTH] IN BLOOD BY AUTOMATED COUNT: 13.6 % (ref 11.5–15)
GFR SERPL CREATININE-BSD FRML MDRD: 65 ML/MIN/1.73M2
GLUCOSE SERPL-MCNC: 133 MG/DL (ref 74–99)
HCT VFR BLD AUTO: 36.4 % (ref 37–54)
HGB BLD-MCNC: 12.8 G/DL (ref 12.5–16.5)
IMM GRANULOCYTES # BLD AUTO: <0.03 K/UL (ref 0–0.58)
IMM GRANULOCYTES NFR BLD: 0 % (ref 0–5)
LYMPHOCYTES NFR BLD: 0.24 K/UL (ref 1.5–4)
LYMPHOCYTES RELATIVE PERCENT: 4 % (ref 20–42)
MCH RBC QN AUTO: 32.2 PG (ref 26–35)
MCHC RBC AUTO-ENTMCNC: 35.2 G/DL (ref 32–34.5)
MCV RBC AUTO: 91.7 FL (ref 80–99.9)
MONOCYTES NFR BLD: 0.54 K/UL (ref 0.1–0.95)
MONOCYTES NFR BLD: 9 % (ref 2–12)
NEUTROPHILS NFR BLD: 83 % (ref 43–80)
NEUTS SEG NFR BLD: 4.89 K/UL (ref 1.8–7.3)
PLATELET # BLD AUTO: 205 K/UL (ref 130–450)
PMV BLD AUTO: 10.6 FL (ref 7–12)
POTASSIUM SERPL-SCNC: 4.1 MMOL/L (ref 3.5–5)
PROT SERPL-MCNC: 7.5 G/DL (ref 6.4–8.3)
RBC # BLD AUTO: 3.97 M/UL (ref 3.8–5.8)
SODIUM SERPL-SCNC: 137 MMOL/L (ref 132–146)
TSH SERPL DL<=0.05 MIU/L-ACNC: 4.19 UIU/ML (ref 0.27–4.2)
WBC OTHER # BLD: 5.9 K/UL (ref 4.5–11.5)

## 2024-04-10 PROCEDURE — 80053 COMPREHEN METABOLIC PANEL: CPT

## 2024-04-10 PROCEDURE — 96361 HYDRATE IV INFUSION ADD-ON: CPT

## 2024-04-10 PROCEDURE — 36415 COLL VENOUS BLD VENIPUNCTURE: CPT

## 2024-04-10 PROCEDURE — 2580000003 HC RX 258: Performed by: STUDENT IN AN ORGANIZED HEALTH CARE EDUCATION/TRAINING PROGRAM

## 2024-04-10 PROCEDURE — 6360000002 HC RX W HCPCS: Performed by: STUDENT IN AN ORGANIZED HEALTH CARE EDUCATION/TRAINING PROGRAM

## 2024-04-10 PROCEDURE — 84443 ASSAY THYROID STIM HORMONE: CPT

## 2024-04-10 PROCEDURE — 96413 CHEMO IV INFUSION 1 HR: CPT

## 2024-04-10 PROCEDURE — 85025 COMPLETE CBC W/AUTO DIFF WBC: CPT

## 2024-04-10 RX ORDER — ONDANSETRON 2 MG/ML
8 INJECTION INTRAMUSCULAR; INTRAVENOUS
Status: CANCELLED | OUTPATIENT
Start: 2024-04-10

## 2024-04-10 RX ORDER — SODIUM CHLORIDE 9 MG/ML
5-250 INJECTION, SOLUTION INTRAVENOUS PRN
Status: DISCONTINUED | OUTPATIENT
Start: 2024-04-10 | End: 2024-04-11 | Stop reason: HOSPADM

## 2024-04-10 RX ORDER — SODIUM CHLORIDE 9 MG/ML
INJECTION, SOLUTION INTRAVENOUS CONTINUOUS
Status: CANCELLED | OUTPATIENT
Start: 2024-04-10

## 2024-04-10 RX ORDER — HEPARIN 100 UNIT/ML
500 SYRINGE INTRAVENOUS PRN
Status: CANCELLED | OUTPATIENT
Start: 2024-04-10

## 2024-04-10 RX ORDER — PILOCARPINE HYDROCHLORIDE 5 MG/1
TABLET, FILM COATED ORAL
Qty: 60 TABLET | Refills: 0 | Status: SHIPPED | OUTPATIENT
Start: 2024-04-10

## 2024-04-10 RX ORDER — SODIUM CHLORIDE 0.9 % (FLUSH) 0.9 %
5-40 SYRINGE (ML) INJECTION PRN
Status: DISCONTINUED | OUTPATIENT
Start: 2024-04-10 | End: 2024-04-11 | Stop reason: HOSPADM

## 2024-04-10 RX ORDER — SODIUM CHLORIDE 9 MG/ML
5-250 INJECTION, SOLUTION INTRAVENOUS PRN
Status: CANCELLED | OUTPATIENT
Start: 2024-04-10

## 2024-04-10 RX ORDER — DIPHENHYDRAMINE HYDROCHLORIDE 50 MG/ML
50 INJECTION INTRAMUSCULAR; INTRAVENOUS
Status: CANCELLED | OUTPATIENT
Start: 2024-04-10

## 2024-04-10 RX ORDER — MEPERIDINE HYDROCHLORIDE 25 MG/ML
12.5 INJECTION INTRAMUSCULAR; INTRAVENOUS; SUBCUTANEOUS PRN
Status: CANCELLED | OUTPATIENT
Start: 2024-04-10

## 2024-04-10 RX ORDER — ALBUTEROL SULFATE 90 UG/1
4 AEROSOL, METERED RESPIRATORY (INHALATION) PRN
Status: CANCELLED | OUTPATIENT
Start: 2024-04-10

## 2024-04-10 RX ORDER — SODIUM CHLORIDE 0.9 % (FLUSH) 0.9 %
5-40 SYRINGE (ML) INJECTION PRN
Status: CANCELLED | OUTPATIENT
Start: 2024-04-10

## 2024-04-10 RX ORDER — EPINEPHRINE 1 MG/ML
0.3 INJECTION, SOLUTION, CONCENTRATE INTRAVENOUS PRN
Status: CANCELLED | OUTPATIENT
Start: 2024-04-10

## 2024-04-10 RX ORDER — ACETAMINOPHEN 325 MG/1
650 TABLET ORAL
Status: CANCELLED | OUTPATIENT
Start: 2024-04-10

## 2024-04-10 RX ADMIN — SODIUM CHLORIDE 200 MG: 9 INJECTION, SOLUTION INTRAVENOUS at 14:53

## 2024-04-10 RX ADMIN — SODIUM CHLORIDE 20 ML/HR: 9 INJECTION, SOLUTION INTRAVENOUS at 14:22

## 2024-04-10 ASSESSMENT — PAIN DESCRIPTION - DESCRIPTORS: DESCRIPTORS: BURNING

## 2024-04-10 ASSESSMENT — PAIN SCALES - GENERAL: PAINLEVEL_OUTOF10: 10

## 2024-04-10 ASSESSMENT — PAIN DESCRIPTION - FREQUENCY: FREQUENCY: INTERMITTENT

## 2024-04-10 ASSESSMENT — PAIN DESCRIPTION - LOCATION: LOCATION: THROAT

## 2024-04-10 ASSESSMENT — PAIN DESCRIPTION - PAIN TYPE: TYPE: ACUTE PAIN

## 2024-04-10 NOTE — PROGRESS NOTES
Patient refused printed AVS, pt states they have MYCHART. All questions answered.     
lymph nodes  Continued pembro but also treated LN's with SBRT, completed around 12/13/23  Elevated PSA      1/17/2024: No major events.  He is continuing pembrolizumab.  Seems to suggest more throat discomfort.  No masses palpated.  Consider he may have increased dysphagia/odynophagia.  He is mostly dependent on his PEG tube for oral intake..  Was last seen by ENT about a month ago on 12/20/2023.  Last PET scan was on 9/22/2022, prior to finding evidence of progression, in which she had received SBRT, which was completed on 12/13/2023.  Also on PET scan, there was mention of floor of mouth activity.  However patient's current symptoms do not seem to fully reflect/correlate with PET scan findings of the head and neck.  Examination of the oral cavity is largely unremarkable.    02/07/2024: Patient doing fair.  Today is cycle 16 of pembrolizumab.  Having more fatigue of late.  Complains of more dry mouth, as well as progressing dysphagia.  He is tolerating pembrolizumab well otherwise.  No new masses appreciated on examination.  No abdominal pain, given he was treated with SBRT against the perihepatic lymph nodes a few months back.    02/28/2024: Doing ok. S/p EGD and dilation with Dr. Hinds recently. He is swallowing better, and recalls being told there are no other worrisome findings. Also has saw dermatology, noting his rash are warts, noted they are better now after \"shots\" he received. Pt mentioned intermittent abdominal discomfort around left mid abdomen.     03/20/2024: No major events.  PET scan was done on 3/4/2024, suggestive of complete response to treatment, after SBRT.  Furthermore, there is no longer significant uptake along the floor of mouth compared to last PET on 9/22/2023.  Otherwise patient is here for cycle 18 of pembrolizumab.  Patient is understanding in spite of PET results, that we still recommend continuing systemic treatment.  We also discussed consideration for treatment holidays if he

## 2024-04-14 ENCOUNTER — APPOINTMENT (OUTPATIENT)
Dept: CT IMAGING | Age: 74
End: 2024-04-14
Payer: MEDICARE

## 2024-04-14 ENCOUNTER — HOSPITAL ENCOUNTER (EMERGENCY)
Age: 74
Discharge: HOME | End: 2024-04-14
Attending: STUDENT IN AN ORGANIZED HEALTH CARE EDUCATION/TRAINING PROGRAM
Payer: MEDICARE

## 2024-04-14 VITALS
OXYGEN SATURATION: 95 % | WEIGHT: 171 LBS | BODY MASS INDEX: 26.78 KG/M2 | HEART RATE: 76 BPM | DIASTOLIC BLOOD PRESSURE: 59 MMHG | SYSTOLIC BLOOD PRESSURE: 122 MMHG | TEMPERATURE: 98.6 F | RESPIRATION RATE: 18 BRPM

## 2024-04-14 DIAGNOSIS — G51.0 BELL'S PALSY: Primary | ICD-10-CM

## 2024-04-14 PROCEDURE — 99284 EMERGENCY DEPT VISIT MOD MDM: CPT

## 2024-04-14 PROCEDURE — 70450 CT HEAD/BRAIN W/O DYE: CPT

## 2024-04-14 RX ORDER — ERYTHROMYCIN 5 MG/G
OINTMENT OPHTHALMIC
Qty: 3.5 G | Refills: 0 | Status: SHIPPED | OUTPATIENT
Start: 2024-04-14 | End: 2024-04-24

## 2024-04-14 RX ORDER — VALACYCLOVIR HYDROCHLORIDE 1 G/1
1000 TABLET, FILM COATED ORAL 3 TIMES DAILY
Qty: 21 TABLET | Refills: 0 | Status: SHIPPED | OUTPATIENT
Start: 2024-04-14 | End: 2024-04-21

## 2024-04-14 ASSESSMENT — LIFESTYLE VARIABLES: HOW OFTEN DO YOU HAVE A DRINK CONTAINING ALCOHOL: NEVER

## 2024-04-14 NOTE — ED PROVIDER NOTES
[facility] EMERGENCY DEPARTMENT  EMERGENCY DEPARTMENT ENCOUNTER    Pt Name: Ignacio Bhatia  MRN: [id number]  Birthdate 1950  Date of evaluation: 4/14/2024  Provider: Marcello Vu MD  PCP: Jason Foy DO  Note Started: 4:54 PM EDT 4/14/24    HPI     Patient is a 73 y.o. male presents with a chief complaint of   Chief Complaint   Patient presents with   • Facial Droop     Right sided facial droop since Thursday, unable to close right eye, no issues with ambulation,   .    Patient resents for right-sided facial droop.  Patient stated that starting on Thursday had diffic feel weak.  Did not fall did not hit his head.  No fevers, chills, nausea, vomiting, chest pain, shortness of breath, abdominal pain, change in urinary or bowel ulty closing his right eye.  Patient states that he has had no difficulty walking.  Patient actually mowed 3 lawns this morning.  Patient states that he    Nursing Notes were all reviewed and agreed with or any disagreements were addressed in the HPI.    History From: Patient    Review of Systems   Pertinent positives and negatives as per HPI.     Physical Exam  Vitals and nursing note reviewed.   Constitutional:       Appearance: He is well-developed.   HENT:      Head: Normocephalic and atraumatic.   Eyes:      Conjunctiva/sclera: Conjunctivae normal.   Cardiovascular:      Rate and Rhythm: Normal rate and regular rhythm.      Heart sounds: Normal heart sounds. No murmur heard.  Pulmonary:      Effort: Pulmonary effort is normal. No respiratory distress.      Breath sounds: Normal breath sounds. No wheezing or rales.   Abdominal:      General: Bowel sounds are normal.      Palpations: Abdomen is soft.      Tenderness: There is no abdominal tenderness. There is no guarding or rebound.   Musculoskeletal:         General: No tenderness or deformity.      Cervical back: Normal range of motion and neck supple.   Skin:     General: Skin is warm and

## 2024-04-14 NOTE — ED PROVIDER NOTES
[facility] EMERGENCY DEPARTMENT  EMERGENCY DEPARTMENT ENCOUNTER    Pt Name: Ignacio Bhatia  MRN: [id number]  Birthdate 1950  Date of evaluation: 4/14/2024  Provider: Marcello Vu MD  PCP: Jason Foy DO  Note Started: 6:46 PM EDT 4/14/24    HPI     Patient is a 73 y.o. male presents with a chief complaint of   Chief Complaint   Patient presents with    Facial Droop     Right sided facial droop since Thursday, unable to close right eye, no issues with ambulation,   .    Right-sided DVT.  Patient denies any fevers or chills.  Patient denies any chest pain or shortness of breath.  Denies any falls.  Patient stated he does not feel weak anywhere.  Patient stated that he was able to mow 3 lawns this morning.  Denies use of any blood thinners.  Patient states that he has a history of cancer but has had no recent cancer and was previously given a clean bill health.  Denies any tick bites.  Patient denies any chest pain, shortness breath, abdominal pain, change in urinary or bowel    Nursing Notes were all reviewed and agreed with or any disagreements were addressed in the HPI.    History From: Patient    Review of Systems   Pertinent positives and negatives as per HPI.     Physical Exam  Vitals and nursing note reviewed.   Constitutional:       Appearance: He is well-developed.   HENT:      Head: Normocephalic and atraumatic.   Eyes:      Conjunctiva/sclera: Conjunctivae normal.   Cardiovascular:      Rate and Rhythm: Normal rate and regular rhythm.      Heart sounds: Normal heart sounds. No murmur heard.  Pulmonary:      Effort: Pulmonary effort is normal. No respiratory distress.      Breath sounds: Normal breath sounds. No wheezing or rales.   Abdominal:      General: Bowel sounds are normal.      Palpations: Abdomen is soft.      Tenderness: There is no abdominal tenderness. There is no guarding or rebound.   Musculoskeletal:         General: No tenderness or deformity.

## 2024-04-19 DIAGNOSIS — C10.9 OROPHARYNGEAL CANCER (HCC): ICD-10-CM

## 2024-04-19 RX ORDER — OXYCODONE HYDROCHLORIDE 5 MG/1
5 TABLET ORAL EVERY 6 HOURS PRN
Qty: 90 TABLET | Refills: 0 | Status: SHIPPED | OUTPATIENT
Start: 2024-04-19 | End: 2024-05-19

## 2024-04-19 NOTE — TELEPHONE ENCOUNTER
Patient Ignacio's wife Erin called for refill oxycodone 5mg. Next appointment 6-5-2024. [facility]

## 2024-04-24 ENCOUNTER — OFFICE VISIT (OUTPATIENT)
Dept: FAMILY MEDICINE CLINIC | Age: 74
End: 2024-04-24
Payer: MEDICARE

## 2024-04-24 VITALS
DIASTOLIC BLOOD PRESSURE: 64 MMHG | TEMPERATURE: 98.3 F | HEIGHT: 67 IN | OXYGEN SATURATION: 99 % | RESPIRATION RATE: 14 BRPM | BODY MASS INDEX: 27.15 KG/M2 | WEIGHT: 173 LBS | HEART RATE: 78 BPM | SYSTOLIC BLOOD PRESSURE: 108 MMHG

## 2024-04-24 DIAGNOSIS — R05.9 COUGH, UNSPECIFIED TYPE: ICD-10-CM

## 2024-04-24 DIAGNOSIS — J01.90 ACUTE BACTERIAL SINUSITIS: Primary | ICD-10-CM

## 2024-04-24 DIAGNOSIS — B96.89 ACUTE BACTERIAL SINUSITIS: Primary | ICD-10-CM

## 2024-04-24 PROCEDURE — 1123F ACP DISCUSS/DSCN MKR DOCD: CPT | Performed by: FAMILY MEDICINE

## 2024-04-24 PROCEDURE — 99213 OFFICE O/P EST LOW 20 MIN: CPT | Performed by: FAMILY MEDICINE

## 2024-04-24 RX ORDER — AMOXICILLIN AND CLAVULANATE POTASSIUM 875; 125 MG/1; MG/1
1 TABLET, FILM COATED ORAL 2 TIMES DAILY
Qty: 20 TABLET | Refills: 0 | Status: SHIPPED | OUTPATIENT
Start: 2024-04-24 | End: 2024-05-04

## 2024-04-24 RX ORDER — FLUTICASONE PROPIONATE 50 MCG
2 SPRAY, SUSPENSION (ML) NASAL DAILY
Qty: 1 EACH | Refills: 0 | Status: SHIPPED | OUTPATIENT
Start: 2024-04-24 | End: 2024-05-08

## 2024-04-24 SDOH — ECONOMIC STABILITY: INCOME INSECURITY: HOW HARD IS IT FOR YOU TO PAY FOR THE VERY BASICS LIKE FOOD, HOUSING, MEDICAL CARE, AND HEATING?: NOT HARD AT ALL

## 2024-04-24 SDOH — ECONOMIC STABILITY: HOUSING INSECURITY
IN THE LAST 12 MONTHS, WAS THERE A TIME WHEN YOU DID NOT HAVE A STEADY PLACE TO SLEEP OR SLEPT IN A SHELTER (INCLUDING NOW)?: NO

## 2024-04-24 SDOH — ECONOMIC STABILITY: FOOD INSECURITY: WITHIN THE PAST 12 MONTHS, YOU WORRIED THAT YOUR FOOD WOULD RUN OUT BEFORE YOU GOT MONEY TO BUY MORE.: NEVER TRUE

## 2024-04-24 SDOH — ECONOMIC STABILITY: FOOD INSECURITY: WITHIN THE PAST 12 MONTHS, THE FOOD YOU BOUGHT JUST DIDN'T LAST AND YOU DIDN'T HAVE MONEY TO GET MORE.: NEVER TRUE

## 2024-04-24 ASSESSMENT — ENCOUNTER SYMPTOMS
SHORTNESS OF BREATH: 0
WHEEZING: 0
ABDOMINAL PAIN: 0
BLOOD IN STOOL: 0
COUGH: 1

## 2024-04-24 NOTE — PROGRESS NOTES
Ignacio Bhatia   Patient is a 73 y.o. 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     [place] palsy  Seen in ED 4/14/2024. Chart reviewed, Prescribed valtrex x 7 days. CT head done at that time. Significantly improved. Initially was unable to close the eye. Completed valtrex course.     Cough  PET CT 3/4/2024 reviewed  Onset several months ago  Gradually worsening  Productive of green sputum  Admits to green rhinorrhea  Admits to pain at the right cheek and forehead  Admits to fatigue over the past month    Review of Systems   Constitutional:  Positive for fatigue. Negative for unexpected weight change.   HENT:  Positive for congestion.    Respiratory:  Positive for cough. Negative for shortness of breath and wheezing.    Cardiovascular:  Negative for chest pain.   Gastrointestinal:  Negative for abdominal pain and blood in stool.   Musculoskeletal:  Negative for myalgias.       Health Maintenance Due   Topic Date Due    DTaP/Tdap/Td vaccine (1 - Tdap) Never done    Shingles vaccine (1 of 2) Never done    Respiratory Syncytial Virus (RSV) Pregnant or age 60 yrs+ (1 - 1-dose 60+ series) Never done    COVID-19 Vaccine (4 - 2023-24 season) 09/01/2023    Annual Wellness Visit (Medicare Advantage)  01/01/2024     Tdap: UTD per pt  RSV: RP  LDCT: PET CT done 6/2023  CRC: due - Dr Hinds, last around 2019 and repeat 4 years per pt - referred 7/2023 but not done, following with GI and recommended discussed next visit.     Current Outpatient Medications   Medication Sig Dispense Refill

## 2024-04-28 ENCOUNTER — HOSPITAL ENCOUNTER (EMERGENCY)
Age: 74
Discharge: HOME | End: 2024-04-28
Payer: MEDICARE

## 2024-04-28 VITALS
SYSTOLIC BLOOD PRESSURE: 139 MMHG | RESPIRATION RATE: 16 BRPM | DIASTOLIC BLOOD PRESSURE: 82 MMHG | TEMPERATURE: 97.6 F | HEART RATE: 71 BPM | OXYGEN SATURATION: 98 %
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
## 2024-04-28 DIAGNOSIS — L03.221 CELLULITIS OF NECK: ICD-10-CM

## 2024-04-28 PROCEDURE — 99283 EMERGENCY DEPT VISIT LOW MDM: CPT

## 2024-04-28 PROCEDURE — 2500000003 HC RX 250 WO HCPCS

## 2024-04-28 PROCEDURE — 6370000000 HC RX 637 (ALT 250 FOR IP)

## 2024-04-28 RX ORDER — DOXYCYCLINE HYCLATE 100 MG/1
100 CAPSULE ORAL ONCE
Status: COMPLETED | OUTPATIENT
Start: 2024-04-28 | End: 2024-04-28

## 2024-04-28 RX ORDER — LIDOCAINE HYDROCHLORIDE 10 MG/ML
5 INJECTION, SOLUTION INFILTRATION; PERINEURAL ONCE
Status: COMPLETED | OUTPATIENT
Start: 2024-04-28 | End: 2024-04-28

## 2024-04-28 RX ORDER — DOXYCYCLINE HYCLATE 100 MG
100 TABLET ORAL 2 TIMES DAILY
Qty: 14 TABLET | Refills: 0 | Status: SHIPPED | OUTPATIENT
Start: 2024-04-28 | End: 2024-05-05

## 2024-04-28 RX ADMIN — DOXYCYCLINE 100 MG: 100 CAPSULE ORAL at 22:21

## 2024-04-28 RX ADMIN — LIDOCAINE HYDROCHLORIDE 5 ML: 10 INJECTION, SOLUTION INFILTRATION; PERINEURAL at 22:23

## 2024-04-28 ASSESSMENT — LIFESTYLE VARIABLES
HOW OFTEN DO YOU HAVE A DRINK CONTAINING ALCOHOL: NEVER
HOW MANY STANDARD DRINKS CONTAINING ALCOHOL DO YOU HAVE ON A TYPICAL DAY: PATIENT DOES NOT DRINK

## 2024-04-29 NOTE — ED PROVIDER NOTES
software. Every effort was made to ensure accuracy; however, inadvertent computerized transcription errors may be present.  END OF ED PROVIDER NOTE       Liz Buck APRN - CNP  04/28/24 1861

## 2024-05-01 ENCOUNTER — HOSPITAL ENCOUNTER (OUTPATIENT)
Dept: INFUSION THERAPY | Age: 74
Discharge: HOME | End: 2024-05-01
Payer: MEDICARE

## 2024-05-01 ENCOUNTER — OFFICE VISIT (OUTPATIENT)
Dept: ONCOLOGY | Age: 74
End: 2024-05-01
Payer: MEDICARE

## 2024-05-01 VITALS — TEMPERATURE: 97.1 F | DIASTOLIC BLOOD PRESSURE: 64 MMHG | SYSTOLIC BLOOD PRESSURE: 114 MMHG | HEART RATE: 73 BPM

## 2024-05-01 VITALS
WEIGHT: 173.6 LBS | BODY MASS INDEX: 27.25 KG/M2 | DIASTOLIC BLOOD PRESSURE: 74 MMHG | OXYGEN SATURATION: 99 % | TEMPERATURE: 96.9 F | SYSTOLIC BLOOD PRESSURE: 109 MMHG | HEIGHT: 67 IN | HEART RATE: 75 BPM

## 2024-05-01 DIAGNOSIS — R53.83 OTHER FATIGUE: Primary | ICD-10-CM

## 2024-05-01 DIAGNOSIS — R53.83 OTHER FATIGUE: ICD-10-CM

## 2024-05-01 DIAGNOSIS — C10.9 SQUAMOUS CELL CARCINOMA OF OROPHARYNX (HCC): Primary | ICD-10-CM

## 2024-05-01 DIAGNOSIS — C78.7 SECONDARY MALIGNANT NEOPLASM OF LIVER AND INTRAHEPATIC BILE DUCT (HCC): ICD-10-CM

## 2024-05-01 LAB
ALBUMIN SERPL-MCNC: 4.2 G/DL (ref 3.5–5.2)
ALP SERPL-CCNC: 57 U/L (ref 40–129)
ALT SERPL-CCNC: 16 U/L (ref 0–40)
ANION GAP SERPL CALCULATED.3IONS-SCNC: 9 MMOL/L (ref 7–16)
AST SERPL-CCNC: 22 U/L (ref 0–39)
BASOPHILS # BLD: 0 K/UL (ref 0–0.2)
BASOPHILS NFR BLD: 0 % (ref 0–2)
BILIRUB SERPL-MCNC: 0.4 MG/DL (ref 0–1.2)
BUN SERPL-MCNC: 27 MG/DL (ref 6–23)
CALCIUM SERPL-MCNC: 10.2 MG/DL (ref 8.6–10.2)
CHLORIDE SERPL-SCNC: 104 MMOL/L (ref 98–107)
CO2 SERPL-SCNC: 27 MMOL/L (ref 22–29)
CREAT SERPL-MCNC: 1.2 MG/DL (ref 0.7–1.2)
EOSINOPHIL # BLD: 0.15 K/UL (ref 0.05–0.5)
EOSINOPHILS RELATIVE PERCENT: 3 % (ref 0–6)
ERYTHROCYTE [DISTWIDTH] IN BLOOD BY AUTOMATED COUNT: 15.2 % (ref 11.5–15)
GFR, ESTIMATED: 63 ML/MIN/1.73M2
GLUCOSE SERPL-MCNC: 105 MG/DL (ref 74–99)
HCT VFR BLD AUTO: 38.6 % (ref 37–54)
HGB BLD-MCNC: 13.2 G/DL (ref 12.5–16.5)
LYMPHOCYTES NFR BLD: 0.2 K/UL (ref 1.5–4)
LYMPHOCYTES RELATIVE PERCENT: 4 % (ref 20–42)
MCH RBC QN AUTO: 32.1 PG (ref 26–35)
MCHC RBC AUTO-ENTMCNC: 34.2 G/DL (ref 32–34.5)
MCV RBC AUTO: 93.9 FL (ref 80–99.9)
MONOCYTES NFR BLD: 0.44 K/UL (ref 0.1–0.95)
MONOCYTES NFR BLD: 8 % (ref 2–12)
NEUTROPHILS NFR BLD: 86 % (ref 43–80)
NEUTS SEG NFR BLD: 4.81 K/UL (ref 1.8–7.3)
PLATELET # BLD AUTO: 197 K/UL (ref 130–450)
PMV BLD AUTO: 10.4 FL (ref 7–12)
POTASSIUM SERPL-SCNC: 4.1 MMOL/L (ref 3.5–5)
PROT SERPL-MCNC: 7.6 G/DL (ref 6.4–8.3)
RBC # BLD AUTO: 4.11 M/UL (ref 3.8–5.8)
RBC # BLD: ABNORMAL 10*6/UL
SODIUM SERPL-SCNC: 140 MMOL/L (ref 132–146)
TSH SERPL DL<=0.05 MIU/L-ACNC: 3.47 UIU/ML (ref 0.27–4.2)
WBC OTHER # BLD: 5.6 K/UL (ref 4.5–11.5)

## 2024-05-01 PROCEDURE — 99214 OFFICE O/P EST MOD 30 MIN: CPT | Performed by: STUDENT IN AN ORGANIZED HEALTH CARE EDUCATION/TRAINING PROGRAM

## 2024-05-01 PROCEDURE — 1123F ACP DISCUSS/DSCN MKR DOCD: CPT | Performed by: STUDENT IN AN ORGANIZED HEALTH CARE EDUCATION/TRAINING PROGRAM

## 2024-05-01 PROCEDURE — 6360000002 HC RX W HCPCS: Performed by: STUDENT IN AN ORGANIZED HEALTH CARE EDUCATION/TRAINING PROGRAM

## 2024-05-01 PROCEDURE — 36415 COLL VENOUS BLD VENIPUNCTURE: CPT

## 2024-05-01 PROCEDURE — 84443 ASSAY THYROID STIM HORMONE: CPT

## 2024-05-01 PROCEDURE — 85025 COMPLETE CBC W/AUTO DIFF WBC: CPT

## 2024-05-01 PROCEDURE — 2580000003 HC RX 258: Performed by: STUDENT IN AN ORGANIZED HEALTH CARE EDUCATION/TRAINING PROGRAM

## 2024-05-01 PROCEDURE — 96413 CHEMO IV INFUSION 1 HR: CPT

## 2024-05-01 PROCEDURE — 80053 COMPREHEN METABOLIC PANEL: CPT

## 2024-05-01 RX ORDER — ONDANSETRON 2 MG/ML
8 INJECTION INTRAMUSCULAR; INTRAVENOUS
Status: CANCELLED | OUTPATIENT
Start: 2024-05-01

## 2024-05-01 RX ORDER — SODIUM CHLORIDE 0.9 % (FLUSH) 0.9 %
5-40 SYRINGE (ML) INJECTION PRN
Status: DISCONTINUED | OUTPATIENT
Start: 2024-05-01 | End: 2024-05-02 | Stop reason: HOSPADM

## 2024-05-01 RX ORDER — MEPERIDINE HYDROCHLORIDE 25 MG/ML
12.5 INJECTION INTRAMUSCULAR; INTRAVENOUS; SUBCUTANEOUS PRN
Status: CANCELLED | OUTPATIENT
Start: 2024-05-01

## 2024-05-01 RX ORDER — SODIUM CHLORIDE 0.9 % (FLUSH) 0.9 %
5-40 SYRINGE (ML) INJECTION PRN
Status: CANCELLED | OUTPATIENT
Start: 2024-05-01

## 2024-05-01 RX ORDER — ALBUTEROL SULFATE 90 UG/1
4 AEROSOL, METERED RESPIRATORY (INHALATION) PRN
Status: CANCELLED | OUTPATIENT
Start: 2024-05-01

## 2024-05-01 RX ORDER — SODIUM CHLORIDE 9 MG/ML
5-250 INJECTION, SOLUTION INTRAVENOUS PRN
Status: DISCONTINUED | OUTPATIENT
Start: 2024-05-01 | End: 2024-05-02 | Stop reason: HOSPADM

## 2024-05-01 RX ORDER — ACETAMINOPHEN 325 MG/1
650 TABLET ORAL
Status: CANCELLED | OUTPATIENT
Start: 2024-05-01

## 2024-05-01 RX ORDER — HEPARIN 100 UNIT/ML
500 SYRINGE INTRAVENOUS PRN
Status: CANCELLED | OUTPATIENT
Start: 2024-05-01

## 2024-05-01 RX ORDER — DIPHENHYDRAMINE HYDROCHLORIDE 50 MG/ML
50 INJECTION INTRAMUSCULAR; INTRAVENOUS
Status: CANCELLED | OUTPATIENT
Start: 2024-05-01

## 2024-05-01 RX ORDER — EPINEPHRINE 1 MG/ML
0.3 INJECTION, SOLUTION, CONCENTRATE INTRAVENOUS PRN
Status: CANCELLED | OUTPATIENT
Start: 2024-05-01

## 2024-05-01 RX ORDER — SODIUM CHLORIDE 9 MG/ML
5-250 INJECTION, SOLUTION INTRAVENOUS PRN
Status: CANCELLED | OUTPATIENT
Start: 2024-05-01

## 2024-05-01 RX ORDER — SODIUM CHLORIDE 9 MG/ML
INJECTION, SOLUTION INTRAVENOUS CONTINUOUS
Status: CANCELLED | OUTPATIENT
Start: 2024-05-01

## 2024-05-01 RX ADMIN — SODIUM CHLORIDE 200 MG: 9 INJECTION, SOLUTION INTRAVENOUS at 13:04

## 2024-05-01 RX ADMIN — SODIUM CHLORIDE, PRESERVATIVE FREE 10 ML: 5 INJECTION INTRAVENOUS at 12:44

## 2024-05-01 RX ADMIN — SODIUM CHLORIDE 20 ML/HR: 9 INJECTION, SOLUTION INTRAVENOUS at 12:44

## 2024-05-01 NOTE — PROGRESS NOTES
[facility] MEDICAL ONCOLOGY  [address] [address]  Dept: [phone]  Loc: [phone]      Attending Clinic Note    Reason for Visit: Follow-up on a patient with p16 + Oropharyngeal Squamous Cell Carcinoma    PCP:  Jason Foy DO    Chief Complaint   Patient presents with    Follow-up     OPHARANGEAL CA         Subjective:  Doing fairly well. Dysphagia is a little better of late.   Ongoing dry mouth.     Oncology History:  73 y.o.  male who presented to the ENT team for persistent left-sided neck fullness without associated difficulty swallowing    CT soft tissue neck 02/23/2022: Mass located in the left aspect of floor of the mouth extending into the left oropharyngeal soft tissue concerning for squamous cell carcinoma  Multiple enlarged necrotic left anterior cervical chain lymph nodes    Panendoscopy on 3/17/2022:  Findings: L lingual tonsil hypertrophy, biopsy negative, left level II neck node FNA performed   FNA left neck mass level 2:  Inconclusive for malignant cells.  Few atypical squamous cells with degenerative change  A.  Tongue, left base, biopsy:   Squamous epithelial-lined lymphoid tissue with reactive germinal centers, compatible with lingual tonsil.   Negative for dysplasia; Negative for malignancy.     B.  Tongue, left base, biopsy:   Squamous epithelial-lined lymphoid tissue with reactive germinal centers, compatible with lingual tonsil.   Focal lobules of benign mucinous glands.   Negative for dysplasia; Negative for malignancy    On 07/21/2022: Panendoscopy excision left level 2 lymph node  Findings:  left base of tongue mass, left cervical lymphadenopathy  A.  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    See comment.   B.  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   C.  Left base of tongue, biopsy: Squamous

## 2024-05-22 ENCOUNTER — HOSPITAL ENCOUNTER (OUTPATIENT)
Dept: INFUSION THERAPY | Age: 74
Discharge: HOME | End: 2024-05-22
Payer: MEDICARE

## 2024-05-22 ENCOUNTER — OFFICE VISIT (OUTPATIENT)
Dept: ONCOLOGY | Age: 74
End: 2024-05-22
Payer: MEDICARE

## 2024-05-22 VITALS
RESPIRATION RATE: 18 BRPM | OXYGEN SATURATION: 97 % | TEMPERATURE: 97.6 F | DIASTOLIC BLOOD PRESSURE: 72 MMHG | HEART RATE: 74 BPM | SYSTOLIC BLOOD PRESSURE: 116 MMHG

## 2024-05-22 VITALS
WEIGHT: 170.1 LBS | HEART RATE: 75 BPM | HEIGHT: 67 IN | TEMPERATURE: 97.5 F | SYSTOLIC BLOOD PRESSURE: 109 MMHG | DIASTOLIC BLOOD PRESSURE: 69 MMHG | OXYGEN SATURATION: 95 % | BODY MASS INDEX: 26.7 KG/M2

## 2024-05-22 DIAGNOSIS — R53.83 OTHER FATIGUE: ICD-10-CM

## 2024-05-22 DIAGNOSIS — C10.9 SQUAMOUS CELL CARCINOMA OF OROPHARYNX (HCC): Primary | ICD-10-CM

## 2024-05-22 DIAGNOSIS — C10.9 SQUAMOUS CELL CARCINOMA OF OROPHARYNX (HCC): ICD-10-CM

## 2024-05-22 DIAGNOSIS — C10.9 OROPHARYNGEAL CANCER (HCC): Primary | ICD-10-CM

## 2024-05-22 DIAGNOSIS — C10.9 OROPHARYNGEAL CANCER (HCC): ICD-10-CM

## 2024-05-22 DIAGNOSIS — C10.8 MALIGNANT NEOPLASM OF OVERLAPPING SITES OF OROPHARYNX (HCC): ICD-10-CM

## 2024-05-22 LAB
ALBUMIN SERPL-MCNC: 3.9 G/DL (ref 3.5–5.2)
ALP SERPL-CCNC: 73 U/L (ref 40–129)
ALT SERPL-CCNC: 14 U/L (ref 0–40)
ANION GAP SERPL CALCULATED.3IONS-SCNC: 10 MMOL/L (ref 7–16)
AST SERPL-CCNC: 18 U/L (ref 0–39)
BASOPHILS # BLD: 0.04 K/UL (ref 0–0.2)
BASOPHILS NFR BLD: 1 % (ref 0–2)
BILIRUB SERPL-MCNC: 0.3 MG/DL (ref 0–1.2)
BUN SERPL-MCNC: 24 MG/DL (ref 6–23)
CALCIUM SERPL-MCNC: 10.2 MG/DL (ref 8.6–10.2)
CHLORIDE SERPL-SCNC: 103 MMOL/L (ref 98–107)
CO2 SERPL-SCNC: 26 MMOL/L (ref 22–29)
CREAT SERPL-MCNC: 1.2 MG/DL (ref 0.7–1.2)
EOSINOPHIL # BLD: 0.04 K/UL (ref 0.05–0.5)
EOSINOPHILS RELATIVE PERCENT: 1 % (ref 0–6)
ERYTHROCYTE [DISTWIDTH] IN BLOOD BY AUTOMATED COUNT: 14.3 % (ref 11.5–15)
GFR, ESTIMATED: 64 ML/MIN/1.73M2
GLUCOSE SERPL-MCNC: 161 MG/DL (ref 74–99)
HCT VFR BLD AUTO: 39.1 % (ref 37–54)
HGB BLD-MCNC: 13.4 G/DL (ref 12.5–16.5)
LYMPHOCYTES NFR BLD: 0.25 K/UL (ref 1.5–4)
LYMPHOCYTES RELATIVE PERCENT: 5 % (ref 20–42)
MCH RBC QN AUTO: 32.6 PG (ref 26–35)
MCHC RBC AUTO-ENTMCNC: 34.3 G/DL (ref 32–34.5)
MCV RBC AUTO: 95.1 FL (ref 80–99.9)
MONOCYTES NFR BLD: 0.25 K/UL (ref 0.1–0.95)
MONOCYTES NFR BLD: 5 % (ref 2–12)
NEUTROPHILS NFR BLD: 88 % (ref 43–80)
NEUTS SEG NFR BLD: 4.21 K/UL (ref 1.8–7.3)
PLATELET # BLD AUTO: 194 K/UL (ref 130–450)
PMV BLD AUTO: 9.8 FL (ref 7–12)
POTASSIUM SERPL-SCNC: 4.3 MMOL/L (ref 3.5–5)
PROT SERPL-MCNC: 7.2 G/DL (ref 6.4–8.3)
RBC # BLD AUTO: 4.11 M/UL (ref 3.8–5.8)
RBC # BLD: NORMAL 10*6/UL
SODIUM SERPL-SCNC: 139 MMOL/L (ref 132–146)
TSH SERPL DL<=0.05 MIU/L-ACNC: 3.97 UIU/ML (ref 0.27–4.2)
WBC OTHER # BLD: 4.8 K/UL (ref 4.5–11.5)

## 2024-05-22 PROCEDURE — 84443 ASSAY THYROID STIM HORMONE: CPT

## 2024-05-22 PROCEDURE — 1123F ACP DISCUSS/DSCN MKR DOCD: CPT | Performed by: STUDENT IN AN ORGANIZED HEALTH CARE EDUCATION/TRAINING PROGRAM

## 2024-05-22 PROCEDURE — 99214 OFFICE O/P EST MOD 30 MIN: CPT | Performed by: STUDENT IN AN ORGANIZED HEALTH CARE EDUCATION/TRAINING PROGRAM

## 2024-05-22 PROCEDURE — 80053 COMPREHEN METABOLIC PANEL: CPT

## 2024-05-22 PROCEDURE — 6360000002 HC RX W HCPCS: Performed by: STUDENT IN AN ORGANIZED HEALTH CARE EDUCATION/TRAINING PROGRAM

## 2024-05-22 PROCEDURE — 85025 COMPLETE CBC W/AUTO DIFF WBC: CPT

## 2024-05-22 PROCEDURE — 2580000003 HC RX 258: Performed by: STUDENT IN AN ORGANIZED HEALTH CARE EDUCATION/TRAINING PROGRAM

## 2024-05-22 PROCEDURE — 96413 CHEMO IV INFUSION 1 HR: CPT

## 2024-05-22 PROCEDURE — 36415 COLL VENOUS BLD VENIPUNCTURE: CPT

## 2024-05-22 RX ORDER — SODIUM CHLORIDE 9 MG/ML
5-250 INJECTION, SOLUTION INTRAVENOUS PRN
Status: CANCELLED | OUTPATIENT
Start: 2024-05-22

## 2024-05-22 RX ORDER — OXYCODONE HYDROCHLORIDE 5 MG/1
5 TABLET ORAL EVERY 6 HOURS PRN
Qty: 90 TABLET | Refills: 0 | Status: SHIPPED | OUTPATIENT
Start: 2024-05-22 | End: 2024-06-21

## 2024-05-22 RX ORDER — ACETAMINOPHEN 325 MG/1
650 TABLET ORAL
Status: CANCELLED | OUTPATIENT
Start: 2024-05-22

## 2024-05-22 RX ORDER — ONDANSETRON 2 MG/ML
8 INJECTION INTRAMUSCULAR; INTRAVENOUS
Status: COMPLETED | OUTPATIENT
Start: 2024-05-22 | End: 2024-05-22

## 2024-05-22 RX ORDER — SODIUM CHLORIDE 0.9 % (FLUSH) 0.9 %
5-40 SYRINGE (ML) INJECTION PRN
Status: DISCONTINUED | OUTPATIENT
Start: 2024-05-22 | End: 2024-05-23 | Stop reason: HOSPADM

## 2024-05-22 RX ORDER — HEPARIN 100 UNIT/ML
500 SYRINGE INTRAVENOUS PRN
Status: CANCELLED | OUTPATIENT
Start: 2024-05-22

## 2024-05-22 RX ORDER — SODIUM CHLORIDE 9 MG/ML
5-250 INJECTION, SOLUTION INTRAVENOUS PRN
Status: DISCONTINUED | OUTPATIENT
Start: 2024-05-22 | End: 2024-05-23 | Stop reason: HOSPADM

## 2024-05-22 RX ORDER — SODIUM CHLORIDE 0.9 % (FLUSH) 0.9 %
5-40 SYRINGE (ML) INJECTION PRN
Status: CANCELLED | OUTPATIENT
Start: 2024-05-22

## 2024-05-22 RX ORDER — ONDANSETRON 2 MG/ML
8 INJECTION INTRAMUSCULAR; INTRAVENOUS
Status: CANCELLED | OUTPATIENT
Start: 2024-05-22

## 2024-05-22 RX ORDER — ALBUTEROL SULFATE 90 UG/1
4 AEROSOL, METERED RESPIRATORY (INHALATION) PRN
Status: CANCELLED | OUTPATIENT
Start: 2024-05-22

## 2024-05-22 RX ORDER — DIPHENHYDRAMINE HYDROCHLORIDE 50 MG/ML
50 INJECTION INTRAMUSCULAR; INTRAVENOUS
Status: CANCELLED | OUTPATIENT
Start: 2024-05-22

## 2024-05-22 RX ORDER — SODIUM CHLORIDE 9 MG/ML
INJECTION, SOLUTION INTRAVENOUS CONTINUOUS
Status: CANCELLED | OUTPATIENT
Start: 2024-05-22

## 2024-05-22 RX ORDER — MEPERIDINE HYDROCHLORIDE 25 MG/ML
12.5 INJECTION INTRAMUSCULAR; INTRAVENOUS; SUBCUTANEOUS PRN
Status: CANCELLED | OUTPATIENT
Start: 2024-05-22

## 2024-05-22 RX ORDER — EPINEPHRINE 1 MG/ML
0.3 INJECTION, SOLUTION, CONCENTRATE INTRAVENOUS PRN
Status: CANCELLED | OUTPATIENT
Start: 2024-05-22

## 2024-05-22 RX ADMIN — ONDANSETRON 8 MG: 2 INJECTION INTRAMUSCULAR; INTRAVENOUS at 13:29

## 2024-05-22 RX ADMIN — SODIUM CHLORIDE 200 MG: 9 INJECTION, SOLUTION INTRAVENOUS at 13:54

## 2024-05-22 ASSESSMENT — PAIN DESCRIPTION - DESCRIPTORS
DESCRIPTORS: ACHING
DESCRIPTORS: ACHING;DISCOMFORT

## 2024-05-22 ASSESSMENT — PAIN DESCRIPTION - FREQUENCY: FREQUENCY: CONTINUOUS

## 2024-05-22 ASSESSMENT — PAIN DESCRIPTION - ONSET: ONSET: ON-GOING

## 2024-05-22 ASSESSMENT — PAIN DESCRIPTION - PAIN TYPE
TYPE: CHRONIC PAIN
TYPE: CHRONIC PAIN

## 2024-05-22 ASSESSMENT — PAIN SCALES - GENERAL
PAINLEVEL_OUTOF10: 10
PAINLEVEL_OUTOF10: 10

## 2024-05-22 ASSESSMENT — PAIN DESCRIPTION - LOCATION
LOCATION: MOUTH;THROAT
LOCATION: THROAT

## 2024-05-22 NOTE — PROGRESS NOTES
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   C.  Left base of tongue, biopsy: Squamous mucosa, negative for neoplasm.   D.  Right base of tongue, biopsy: Squamous mucosa, negative for neoplasm.   E.  Midline tongue, biopsy: Squamous mucosa, negative for neoplasm.   Comment:   The squamous carcinoma is diffusely and strongly positive for p16 immunostain, supporting the above interpretation    PET/CT scan 08/01/2022:  There is a large hypermetabolic mass centered in the left oropharynx involving the left lateral oropharynx, left tonsillar and left base of tongue regions extending superiorly to the left soft palate and inferiorly to the left floor of mouth and left vallecula.  Maximum SUV of this mass measures 17.4.  The mass causes narrowing of the oropharyngeal airway.   There are multiple hypermetabolic enlarged, necrotic and someone confluent left level 2 and left level 3 lymph nodes.  Confluent left level 2 adenopathy demonstrates a maximum SUV of 15.8.  Additional hypermetabolic left level 5/3 junction lymph node is noted with maximum SUV 5.0  Mild focal hypermetabolic activity is noted in the left parapharyngeal/retropharyngeal region (maximum SUV 3.8) which could represent an underlying small lymph node.    There is a hypermetabolic right level 2 lymph node with maximum SUV 6.5    Recommended concurrent chemoradiation therapy with weekly Cisplatin.    Cycle # 1 weekly Cisplatin was on 09/06/2022  Cycle # 2 weekly Cisplatin was on 09/13/2022.   Cycle # 3 weekly Cisplatin was on 09/20/2022.   ER visit 09/26/2022 fatigue dysphagia dehydration; CTA chest 9/26/2022 no evidence of PE or acute pulmonary abnormality.  CT abdomen/pelvis 09/26/2022 diffuse colonic diverticulosis with mild stranding of the fat in the left lower quadrant concerning for early diverticulitis.  No evidence of obstructive uropathy or acute appendicitis. Given IV fluid with

## 2024-05-22 NOTE — TELEPHONE ENCOUNTER
Call from Erin Ke's wife , to request refill for Oxy IR 5 mg. Pharmacy is [facility]. Next richy 6/5/24.

## 2024-06-05 ENCOUNTER — OFFICE VISIT (OUTPATIENT)
Dept: PALLATIVE CARE | Age: 74
End: 2024-06-05
Payer: MEDICARE

## 2024-06-05 VITALS
HEART RATE: 80 BPM | DIASTOLIC BLOOD PRESSURE: 74 MMHG | WEIGHT: 170.4 LBS | TEMPERATURE: 97.3 F | BODY MASS INDEX: 26.69 KG/M2 | SYSTOLIC BLOOD PRESSURE: 114 MMHG

## 2024-06-05 DIAGNOSIS — Z51.5 PALLIATIVE CARE BY SPECIALIST: Primary | ICD-10-CM

## 2024-06-05 PROCEDURE — 99211 OFF/OP EST MAY X REQ PHY/QHP: CPT | Performed by: NURSE PRACTITIONER

## 2024-06-05 RX ORDER — PILOCARPINE HYDROCHLORIDE 5 MG/1
10 TABLET, FILM COATED ORAL 3 TIMES DAILY PRN
Qty: 180 TABLET | Refills: 2 | Status: SHIPPED | OUTPATIENT
Start: 2024-06-05 | End: 2024-09-03

## 2024-06-05 NOTE — PROGRESS NOTES
Anxiety Score 1 Not anxious 1 2 3   Drowsiness Score 8 2 2 3 2   Appetite Score Best appetite Best appetite 9 1 7   Wellbeing Score 1 1 1 2 1   Dyspnea Score 1 No shortness of breath No shortness of breath No shortness of breath No shortness of breath   Other Problem Score 1 1 6 4 4   Total Assessment Score(calculated) 29 16 31 25 33     Assessed by: patient and provider.    Current Medications:  Medications reviewed: yes    Controlled Substances Monitoring: [place] reviewed 6/5/24.  RX Monitoring Periodic Controlled Substance Monitoring   6/5/2024  10:26 AM Possible medication side effects, risk of tolerance/dependence & alternative treatments discussed.;No signs of potential drug abuse or diversion identified.;Obtaining appropriate analgesic effect of treatment.;Assessed functional status (ability to engage in work or other purposeful activities, the pain intensity and its interference with activities of daily living, quality of family life and social activities, and the physical activity)       MARY Hickman - CNP   Palliative Care Department     Time/Communication  Greater than 50% of time spent, total 25 minutes in face-to-face counseling and coordination of care regarding goals of care and symptom management.    Note: This report was completed using [employer] voiced recognition software.  Every effort has been made to ensure accuracy; however, inadvertent computerized transcription errors may be present.

## 2024-06-12 ENCOUNTER — OFFICE VISIT (OUTPATIENT)
Dept: ONCOLOGY | Age: 74
End: 2024-06-12
Payer: MEDICARE

## 2024-06-12 ENCOUNTER — HOSPITAL ENCOUNTER (OUTPATIENT)
Dept: INFUSION THERAPY | Age: 74
Discharge: HOME | End: 2024-06-12
Payer: MEDICARE

## 2024-06-12 VITALS
DIASTOLIC BLOOD PRESSURE: 76 MMHG | TEMPERATURE: 96.8 F | BODY MASS INDEX: 26.39 KG/M2 | SYSTOLIC BLOOD PRESSURE: 123 MMHG | WEIGHT: 168.5 LBS | OXYGEN SATURATION: 98 % | HEART RATE: 77 BPM

## 2024-06-12 DIAGNOSIS — C10.9 SQUAMOUS CELL CARCINOMA OF OROPHARYNX (HCC): Primary | ICD-10-CM

## 2024-06-12 DIAGNOSIS — C78.7 SECONDARY MALIGNANT NEOPLASM OF LIVER AND INTRAHEPATIC BILE DUCT (HCC): Primary | ICD-10-CM

## 2024-06-12 DIAGNOSIS — R53.83 OTHER FATIGUE: ICD-10-CM

## 2024-06-12 LAB
ALBUMIN SERPL-MCNC: 4 G/DL (ref 3.5–5.2)
ALP SERPL-CCNC: 61 U/L (ref 40–129)
ALT SERPL-CCNC: 16 U/L (ref 0–40)
ANION GAP SERPL CALCULATED.3IONS-SCNC: 10 MMOL/L (ref 7–16)
AST SERPL-CCNC: 20 U/L (ref 0–39)
BASOPHILS # BLD: 0 K/UL (ref 0–0.2)
BASOPHILS NFR BLD: 0 % (ref 0–2)
BILIRUB SERPL-MCNC: 0.4 MG/DL (ref 0–1.2)
BUN SERPL-MCNC: 22 MG/DL (ref 6–23)
CALCIUM SERPL-MCNC: 9.9 MG/DL (ref 8.6–10.2)
CHLORIDE SERPL-SCNC: 107 MMOL/L (ref 98–107)
CO2 SERPL-SCNC: 24 MMOL/L (ref 22–29)
CREAT SERPL-MCNC: 1.1 MG/DL (ref 0.7–1.2)
EOSINOPHIL # BLD: 0.12 K/UL (ref 0.05–0.5)
EOSINOPHILS RELATIVE PERCENT: 3 % (ref 0–6)
ERYTHROCYTE [DISTWIDTH] IN BLOOD BY AUTOMATED COUNT: 13.8 % (ref 11.5–15)
GFR, ESTIMATED: 68 ML/MIN/1.73M2
GLUCOSE SERPL-MCNC: 130 MG/DL (ref 74–99)
HCT VFR BLD AUTO: 38.9 % (ref 37–54)
HGB BLD-MCNC: 13 G/DL (ref 12.5–16.5)
LYMPHOCYTES NFR BLD: 0.23 K/UL (ref 1.5–4)
LYMPHOCYTES RELATIVE PERCENT: 6 % (ref 20–42)
MCH RBC QN AUTO: 31.6 PG (ref 26–35)
MCHC RBC AUTO-ENTMCNC: 33.4 G/DL (ref 32–34.5)
MCV RBC AUTO: 94.4 FL (ref 80–99.9)
MONOCYTES NFR BLD: 0.12 K/UL (ref 0.1–0.95)
MONOCYTES NFR BLD: 3 % (ref 2–12)
NEUTROPHILS NFR BLD: 89 % (ref 43–80)
NEUTS SEG NFR BLD: 3.83 K/UL (ref 1.8–7.3)
PLATELET # BLD AUTO: 222 K/UL (ref 130–450)
PMV BLD AUTO: 10.3 FL (ref 7–12)
POTASSIUM SERPL-SCNC: 3.9 MMOL/L (ref 3.5–5)
PROT SERPL-MCNC: 7.2 G/DL (ref 6.4–8.3)
RBC # BLD AUTO: 4.12 M/UL (ref 3.8–5.8)
RBC # BLD: NORMAL 10*6/UL
SODIUM SERPL-SCNC: 141 MMOL/L (ref 132–146)
TSH SERPL DL<=0.05 MIU/L-ACNC: 4.23 UIU/ML (ref 0.27–4.2)
WBC OTHER # BLD: 4.3 K/UL (ref 4.5–11.5)

## 2024-06-12 PROCEDURE — 36415 COLL VENOUS BLD VENIPUNCTURE: CPT

## 2024-06-12 PROCEDURE — 99212 OFFICE O/P EST SF 10 MIN: CPT

## 2024-06-12 PROCEDURE — 99213 OFFICE O/P EST LOW 20 MIN: CPT | Performed by: STUDENT IN AN ORGANIZED HEALTH CARE EDUCATION/TRAINING PROGRAM

## 2024-06-12 PROCEDURE — 1123F ACP DISCUSS/DSCN MKR DOCD: CPT | Performed by: STUDENT IN AN ORGANIZED HEALTH CARE EDUCATION/TRAINING PROGRAM

## 2024-06-12 PROCEDURE — 85025 COMPLETE CBC W/AUTO DIFF WBC: CPT

## 2024-06-12 PROCEDURE — 84443 ASSAY THYROID STIM HORMONE: CPT

## 2024-06-12 PROCEDURE — 80053 COMPREHEN METABOLIC PANEL: CPT

## 2024-06-12 RX ORDER — ONDANSETRON 2 MG/ML
8 INJECTION INTRAMUSCULAR; INTRAVENOUS
Status: CANCELLED | OUTPATIENT
Start: 2024-06-21

## 2024-06-12 RX ORDER — SODIUM CHLORIDE 0.9 % (FLUSH) 0.9 %
5-40 SYRINGE (ML) INJECTION PRN
Status: CANCELLED | OUTPATIENT
Start: 2024-06-21

## 2024-06-12 RX ORDER — SODIUM CHLORIDE 9 MG/ML
5-250 INJECTION, SOLUTION INTRAVENOUS PRN
Status: CANCELLED | OUTPATIENT
Start: 2024-06-21

## 2024-06-12 RX ORDER — DIPHENHYDRAMINE HYDROCHLORIDE 50 MG/ML
50 INJECTION INTRAMUSCULAR; INTRAVENOUS
Status: CANCELLED | OUTPATIENT
Start: 2024-06-21

## 2024-06-12 RX ORDER — EPINEPHRINE 1 MG/ML
0.3 INJECTION, SOLUTION, CONCENTRATE INTRAVENOUS PRN
Status: CANCELLED | OUTPATIENT
Start: 2024-06-21

## 2024-06-12 RX ORDER — ACETAMINOPHEN 325 MG/1
650 TABLET ORAL
Status: CANCELLED | OUTPATIENT
Start: 2024-06-21

## 2024-06-12 RX ORDER — HEPARIN 100 UNIT/ML
500 SYRINGE INTRAVENOUS PRN
Status: CANCELLED | OUTPATIENT
Start: 2024-06-21

## 2024-06-12 RX ORDER — MEPERIDINE HYDROCHLORIDE 25 MG/ML
12.5 INJECTION INTRAMUSCULAR; INTRAVENOUS; SUBCUTANEOUS PRN
Status: CANCELLED | OUTPATIENT
Start: 2024-06-21

## 2024-06-12 RX ORDER — ALBUTEROL SULFATE 90 UG/1
4 AEROSOL, METERED RESPIRATORY (INHALATION) PRN
Status: CANCELLED | OUTPATIENT
Start: 2024-06-21

## 2024-06-12 RX ORDER — SODIUM CHLORIDE 9 MG/ML
INJECTION, SOLUTION INTRAVENOUS CONTINUOUS
Status: CANCELLED | OUTPATIENT
Start: 2024-06-21

## 2024-06-12 NOTE — PROGRESS NOTES
[facility] MEDICAL ONCOLOGY  [address]  [place] [address]  Dept: [phone]  Loc: [phone]      Attending Clinic Note    Reason for Visit: Follow-up on a patient with p16 + Oropharyngeal Squamous Cell Carcinoma    PCP:  Jason Foy DO    Chief Complaint   Patient presents with    Follow-up     Oropharyngeal cancer         Subjective:  Still has significant dry mouth. Also starting to have dry eyes, c/o this at least since looking into the sun during the recent eclipse.    Oncology History:  73 y.o.  male who presented to the ENT team for persistent left-sided neck fullness without associated difficulty swallowing    CT soft tissue neck 02/23/2022: Mass located in the left aspect of floor of the mouth extending into the left oropharyngeal soft tissue concerning for squamous cell carcinoma  Multiple enlarged necrotic left anterior cervical chain lymph nodes    Panendoscopy on 3/17/2022:  Findings: L lingual tonsil hypertrophy, biopsy negative, left level II neck node FNA performed   FNA left neck mass level 2:  Inconclusive for malignant cells.  Few atypical squamous cells with degenerative change  A.  Tongue, left base, biopsy:   Squamous epithelial-lined lymphoid tissue with reactive germinal centers, compatible with lingual tonsil.   Negative for dysplasia; Negative for malignancy.     B.  Tongue, left base, biopsy:   Squamous epithelial-lined lymphoid tissue with reactive germinal centers, compatible with lingual tonsil.   Focal lobules of benign mucinous glands.   Negative for dysplasia; Negative for malignancy    On 07/21/2022: Panendoscopy excision left level 2 lymph node  Findings:  left base of tongue mass, left cervical lymphadenopathy  A.  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    See comment.   B.  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

## 2024-06-14 ENCOUNTER — HOSPITAL ENCOUNTER (OUTPATIENT)
Dept: NUCLEAR MEDICINE | Age: 74
Discharge: HOME | End: 2024-06-14

## 2024-06-14 DIAGNOSIS — C10.9 OROPHARYNGEAL CANCER (HCC): ICD-10-CM

## 2024-06-14 DIAGNOSIS — C10.8 MALIGNANT NEOPLASM OF OVERLAPPING SITES OF OROPHARYNX (HCC): ICD-10-CM

## 2024-06-14 RX ORDER — FLUDEOXYGLUCOSE F 18 200 MCI/ML
10 INJECTION, SOLUTION INTRAVENOUS
Status: DISCONTINUED | OUTPATIENT
Start: 2024-06-14 | End: 2024-06-15 | Stop reason: HOSPADM

## 2024-06-15 RX ADMIN — FLUDEOXYGLUCOSE F 18 10 MILLICURIE: 200 INJECTION, SOLUTION INTRAVENOUS at 08:55

## 2024-06-20 DIAGNOSIS — C10.9 SQUAMOUS CELL CARCINOMA OF OROPHARYNX (HCC): ICD-10-CM

## 2024-06-20 DIAGNOSIS — R53.83 OTHER FATIGUE: Primary | ICD-10-CM

## 2024-06-21 ENCOUNTER — OFFICE VISIT (OUTPATIENT)
Dept: ONCOLOGY | Age: 74
End: 2024-06-21
Payer: MEDICARE

## 2024-06-21 ENCOUNTER — HOSPITAL ENCOUNTER (OUTPATIENT)
Dept: INFUSION THERAPY | Age: 74
Discharge: HOME | End: 2024-06-21
Payer: MEDICARE

## 2024-06-21 VITALS
SYSTOLIC BLOOD PRESSURE: 107 MMHG | DIASTOLIC BLOOD PRESSURE: 67 MMHG | RESPIRATION RATE: 18 BRPM | TEMPERATURE: 97.6 F | OXYGEN SATURATION: 97 % | HEART RATE: 73 BPM

## 2024-06-21 VITALS
OXYGEN SATURATION: 98 % | WEIGHT: 167.9 LBS | TEMPERATURE: 98.3 F | SYSTOLIC BLOOD PRESSURE: 112 MMHG | DIASTOLIC BLOOD PRESSURE: 82 MMHG | BODY MASS INDEX: 26.35 KG/M2 | HEART RATE: 79 BPM | HEIGHT: 67 IN

## 2024-06-21 DIAGNOSIS — C10.9 SQUAMOUS CELL CARCINOMA OF OROPHARYNX (HCC): Primary | ICD-10-CM

## 2024-06-21 DIAGNOSIS — C10.9 SQUAMOUS CELL CARCINOMA OF OROPHARYNX (HCC): ICD-10-CM

## 2024-06-21 DIAGNOSIS — R53.83 OTHER FATIGUE: ICD-10-CM

## 2024-06-21 LAB
ALBUMIN SERPL-MCNC: 4 G/DL (ref 3.5–5.2)
ALP SERPL-CCNC: 58 U/L (ref 40–129)
ALT SERPL-CCNC: 13 U/L (ref 0–40)
ANION GAP SERPL CALCULATED.3IONS-SCNC: 9 MMOL/L (ref 7–16)
AST SERPL-CCNC: 20 U/L (ref 0–39)
BASOPHILS # BLD: 0.04 K/UL (ref 0–0.2)
BASOPHILS NFR BLD: 1 % (ref 0–2)
BILIRUB SERPL-MCNC: 0.4 MG/DL (ref 0–1.2)
BUN SERPL-MCNC: 24 MG/DL (ref 6–23)
CALCIUM SERPL-MCNC: 9.6 MG/DL (ref 8.6–10.2)
CHLORIDE SERPL-SCNC: 103 MMOL/L (ref 98–107)
CO2 SERPL-SCNC: 24 MMOL/L (ref 22–29)
CREAT SERPL-MCNC: 1.2 MG/DL (ref 0.7–1.2)
EOSINOPHIL # BLD: 0.18 K/UL (ref 0.05–0.5)
EOSINOPHILS RELATIVE PERCENT: 4 % (ref 0–6)
ERYTHROCYTE [DISTWIDTH] IN BLOOD BY AUTOMATED COUNT: 13.8 % (ref 11.5–15)
GFR, ESTIMATED: 65 ML/MIN/1.73M2
GLUCOSE SERPL-MCNC: 147 MG/DL (ref 74–99)
HCT VFR BLD AUTO: 37.5 % (ref 37–54)
HGB BLD-MCNC: 12.6 G/DL (ref 12.5–16.5)
LYMPHOCYTES NFR BLD: 0.48 K/UL (ref 1.5–4)
LYMPHOCYTES RELATIVE PERCENT: 11 % (ref 20–42)
MCH RBC QN AUTO: 31.3 PG (ref 26–35)
MCHC RBC AUTO-ENTMCNC: 33.6 G/DL (ref 32–34.5)
MCV RBC AUTO: 93.1 FL (ref 80–99.9)
MONOCYTES NFR BLD: 0.31 K/UL (ref 0.1–0.95)
MONOCYTES NFR BLD: 7 % (ref 2–12)
NEUTROPHILS NFR BLD: 77 % (ref 43–80)
NEUTS SEG NFR BLD: 3.39 K/UL (ref 1.8–7.3)
PLATELET # BLD AUTO: 210 K/UL (ref 130–450)
PMV BLD AUTO: 10.2 FL (ref 7–12)
POTASSIUM SERPL-SCNC: 3.9 MMOL/L (ref 3.5–5)
PROT SERPL-MCNC: 7.1 G/DL (ref 6.4–8.3)
RBC # BLD AUTO: 4.03 M/UL (ref 3.8–5.8)
SODIUM SERPL-SCNC: 136 MMOL/L (ref 132–146)
TSH SERPL DL<=0.05 MIU/L-ACNC: 1.83 UIU/ML (ref 0.27–4.2)
WBC OTHER # BLD: 4.4 K/UL (ref 4.5–11.5)

## 2024-06-21 PROCEDURE — 1123F ACP DISCUSS/DSCN MKR DOCD: CPT | Performed by: STUDENT IN AN ORGANIZED HEALTH CARE EDUCATION/TRAINING PROGRAM

## 2024-06-21 PROCEDURE — 2580000003 HC RX 258: Performed by: STUDENT IN AN ORGANIZED HEALTH CARE EDUCATION/TRAINING PROGRAM

## 2024-06-21 PROCEDURE — 36415 COLL VENOUS BLD VENIPUNCTURE: CPT

## 2024-06-21 PROCEDURE — 99214 OFFICE O/P EST MOD 30 MIN: CPT | Performed by: STUDENT IN AN ORGANIZED HEALTH CARE EDUCATION/TRAINING PROGRAM

## 2024-06-21 PROCEDURE — 80053 COMPREHEN METABOLIC PANEL: CPT

## 2024-06-21 PROCEDURE — 6360000002 HC RX W HCPCS: Performed by: STUDENT IN AN ORGANIZED HEALTH CARE EDUCATION/TRAINING PROGRAM

## 2024-06-21 PROCEDURE — 85025 COMPLETE CBC W/AUTO DIFF WBC: CPT

## 2024-06-21 PROCEDURE — 96413 CHEMO IV INFUSION 1 HR: CPT

## 2024-06-21 PROCEDURE — 84443 ASSAY THYROID STIM HORMONE: CPT

## 2024-06-21 PROCEDURE — 96375 TX/PRO/DX INJ NEW DRUG ADDON: CPT

## 2024-06-21 RX ORDER — ONDANSETRON 2 MG/ML
8 INJECTION INTRAMUSCULAR; INTRAVENOUS
Status: COMPLETED | OUTPATIENT
Start: 2024-06-21 | End: 2024-06-21

## 2024-06-21 RX ORDER — HEPARIN SODIUM (PORCINE) LOCK FLUSH IV SOLN 100 UNIT/ML 100 UNIT/ML
500 SOLUTION INTRAVENOUS PRN
Status: CANCELLED | OUTPATIENT
Start: 2024-06-21

## 2024-06-21 RX ORDER — ACETAMINOPHEN 325 MG/1
650 TABLET ORAL
Status: CANCELLED | OUTPATIENT
Start: 2024-06-21

## 2024-06-21 RX ORDER — SODIUM CHLORIDE 0.9 % (FLUSH) 0.9 %
5-40 SYRINGE (ML) INJECTION PRN
Status: CANCELLED | OUTPATIENT
Start: 2024-06-21

## 2024-06-21 RX ORDER — SODIUM CHLORIDE 0.9 % (FLUSH) 0.9 %
5-40 SYRINGE (ML) INJECTION PRN
Status: DISCONTINUED | OUTPATIENT
Start: 2024-06-21 | End: 2024-06-22 | Stop reason: HOSPADM

## 2024-06-21 RX ORDER — ONDANSETRON 2 MG/ML
8 INJECTION INTRAMUSCULAR; INTRAVENOUS
Status: CANCELLED | OUTPATIENT
Start: 2024-06-21

## 2024-06-21 RX ORDER — MEPERIDINE HYDROCHLORIDE 50 MG/ML
12.5 INJECTION INTRAMUSCULAR; INTRAVENOUS; SUBCUTANEOUS PRN
Status: CANCELLED | OUTPATIENT
Start: 2024-06-21

## 2024-06-21 RX ORDER — EPINEPHRINE 1 MG/ML
0.3 INJECTION, SOLUTION, CONCENTRATE INTRAVENOUS PRN
Status: CANCELLED | OUTPATIENT
Start: 2024-06-21

## 2024-06-21 RX ORDER — FAMOTIDINE 10 MG/ML
20 INJECTION, SOLUTION INTRAVENOUS
Status: CANCELLED | OUTPATIENT
Start: 2024-06-21

## 2024-06-21 RX ORDER — SODIUM CHLORIDE 9 MG/ML
5-250 INJECTION, SOLUTION INTRAVENOUS PRN
Status: CANCELLED | OUTPATIENT
Start: 2024-06-21

## 2024-06-21 RX ORDER — ALBUTEROL SULFATE 90 UG/1
4 AEROSOL, METERED RESPIRATORY (INHALATION) PRN
Status: CANCELLED | OUTPATIENT
Start: 2024-06-21

## 2024-06-21 RX ORDER — SODIUM CHLORIDE 9 MG/ML
5-250 INJECTION, SOLUTION INTRAVENOUS PRN
Status: DISCONTINUED | OUTPATIENT
Start: 2024-06-21 | End: 2024-06-22 | Stop reason: HOSPADM

## 2024-06-21 RX ORDER — SODIUM CHLORIDE 9 MG/ML
INJECTION, SOLUTION INTRAVENOUS CONTINUOUS
Status: CANCELLED | OUTPATIENT
Start: 2024-06-21

## 2024-06-21 RX ORDER — DIPHENHYDRAMINE HYDROCHLORIDE 50 MG/ML
50 INJECTION INTRAMUSCULAR; INTRAVENOUS
Status: CANCELLED | OUTPATIENT
Start: 2024-06-21

## 2024-06-21 RX ADMIN — SODIUM CHLORIDE 200 MG: 9 INJECTION, SOLUTION INTRAVENOUS at 14:08

## 2024-06-21 RX ADMIN — ONDANSETRON 8 MG: 2 INJECTION INTRAMUSCULAR; INTRAVENOUS at 13:56

## 2024-06-21 RX ADMIN — SODIUM CHLORIDE 20 ML/HR: 9 INJECTION, SOLUTION INTRAVENOUS at 13:41

## 2024-06-21 ASSESSMENT — PAIN DESCRIPTION - DESCRIPTORS: DESCRIPTORS: ACHING

## 2024-06-21 ASSESSMENT — PAIN DESCRIPTION - PAIN TYPE: TYPE: CHRONIC PAIN

## 2024-06-21 ASSESSMENT — PAIN SCALES - GENERAL: PAINLEVEL_OUTOF10: 8

## 2024-06-21 ASSESSMENT — PAIN DESCRIPTION - LOCATION: LOCATION: THROAT

## 2024-06-21 NOTE — PROGRESS NOTES
[facility] MEDICAL ONCOLOGY  [address] [address]  Dept: [phone]  Loc: [phone]      Attending Clinic Note    Reason for Visit: Follow-up on a patient with p16 + Oropharyngeal Squamous Cell Carcinoma    PCP:  Jason Foy DO    Chief Complaint   Patient presents with    Follow-up     Squamous cell carcinoma of oropharynx         Subjective:  Doing well. No changes. Here for next treatment and to discuss PET.   C/o dry mouth.     Oncology History:  73 y.o.  male who presented to the ENT team for persistent left-sided neck fullness without associated difficulty swallowing    CT soft tissue neck 02/23/2022: Mass located in the left aspect of floor of the mouth extending into the left oropharyngeal soft tissue concerning for squamous cell carcinoma  Multiple enlarged necrotic left anterior cervical chain lymph nodes    Panendoscopy on 3/17/2022:  Findings: L lingual tonsil hypertrophy, biopsy negative, left level II neck node FNA performed   FNA left neck mass level 2:  Inconclusive for malignant cells.  Few atypical squamous cells with degenerative change  A.  Tongue, left base, biopsy:   Squamous epithelial-lined lymphoid tissue with reactive germinal centers, compatible with lingual tonsil.   Negative for dysplasia; Negative for malignancy.     B.  Tongue, left base, biopsy:   Squamous epithelial-lined lymphoid tissue with reactive germinal centers, compatible with lingual tonsil.   Focal lobules of benign mucinous glands.   Negative for dysplasia; Negative for malignancy    On 07/21/2022: Panendoscopy excision left level 2 lymph node  Findings:  left base of tongue mass, left cervical lymphadenopathy  A.  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    See comment.   B.  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   C.  Left base of

## 2024-06-24 DIAGNOSIS — C10.9 OROPHARYNGEAL CANCER (HCC): ICD-10-CM

## 2024-06-24 RX ORDER — OXYCODONE HYDROCHLORIDE 5 MG/1
5 TABLET ORAL EVERY 6 HOURS PRN
Qty: 90 TABLET | Refills: 0 | Status: SHIPPED | OUTPATIENT
Start: 2024-06-24 | End: 2024-07-24

## 2024-06-24 NOTE — TELEPHONE ENCOUNTER
Patient Ignacio's spouse Erin called for refill oxycodone 5mg. Next appointment 8-. [facility]. Verified pharmacy with Erin

## 2024-06-25 ENCOUNTER — OFFICE VISIT (OUTPATIENT)
Dept: ENT CLINIC | Age: 74
End: 2024-06-25

## 2024-06-25 VITALS
BODY MASS INDEX: 26.38 KG/M2 | HEART RATE: 71 BPM | HEIGHT: 67 IN | WEIGHT: 168.1 LBS | SYSTOLIC BLOOD PRESSURE: 111 MMHG | DIASTOLIC BLOOD PRESSURE: 70 MMHG

## 2024-06-25 DIAGNOSIS — C13.9 CARCINOMA OF HYPOPHARYNX (HCC): Primary | ICD-10-CM

## 2024-06-25 DIAGNOSIS — C10.2 MALIGNANT NEOPLASM OF LATERAL WALL OF OROPHARYNX (HCC): ICD-10-CM

## 2024-06-25 DIAGNOSIS — C10.9 SQUAMOUS CELL CARCINOMA OF OROPHARYNX (HCC): ICD-10-CM

## 2024-06-25 RX ORDER — CHLORHEXIDINE GLUCONATE ORAL RINSE 1.2 MG/ML
15 SOLUTION DENTAL 2 TIMES DAILY
Qty: 420 ML | Refills: 0 | Status: SHIPPED | OUTPATIENT
Start: 2024-06-25 | End: 2024-07-09

## 2024-06-25 NOTE — PROGRESS NOTES
Tobacco Use    Smoking status: Former     Current packs/day: 0.00     Average packs/day: 0.9 packs/day for 30.0 years (27.0 ttl pk-yrs)     Types: Cigarettes     Start date: 1990     Quit date: 2017     Years since quittin.5    Smokeless tobacco: Never   Vaping Use    Vaping Use: Never used   Substance Use Topics    Alcohol use: Not Currently    Drug use: Never     Family History   Problem Relation Age of Onset    High Blood Pressure Mother     Arthritis Mother     Cancer Mother     High Blood Pressure Father     Cancer Father 82        kidney    Abdominal aortic aneurysm Father     Alcohol Abuse Father     Vision Loss Father        Review of Systems    /70 (Site: Left Upper Arm, Position: Sitting, Cuff Size: Medium Adult)   Pulse 71   Ht 1.702 m (5' 7.01\")   Wt 76.2 kg (168 lb 1.6 oz)   BMI 26.32 kg/m²   Physical Exam  Procedure Note    Pre-operative Diagnosis: Hypopharyngeal, laryngeal carcinoma status post chemoradiation 2023    Post-operative Diagnosis: same    Anesthesia: Lidocaine 2% and Ry-Synephrine 1/2%    Endoscopy Type:  Flexible Laryngoscopy    Procedure Details:    The patient was placed in the sitting position.  After topical anesthesia and decongestion, the 4 mm laryngoscope was passed.  The nasal cavities, nasopharynx, oropharynx, hypopharynx, and larynx were all examined.  Vocal cords were examined during respiration and phonation.  The following findings were noted:    Findings: Nasopharynx no mass tumors or lesions, epiglottis is omega shaped with postradiation changes thick and mucous membranes throughout with normal vocal cord motion generalized erythema and woody edema secondary to radiation changes without mass tumor lesions noted of the piriform sinus tongue base or epiglottis surface    Condition:  Stable.  Patient tolerated procedure well.    Complications:  None  IMPRESSION/PLAN:  1. Carcinoma of hypopharynx (HCC)  2. Squamous cell carcinoma of

## 2024-07-12 ENCOUNTER — OFFICE VISIT (OUTPATIENT)
Dept: ONCOLOGY | Age: 74
End: 2024-07-12
Payer: MEDICARE

## 2024-07-12 ENCOUNTER — HOSPITAL ENCOUNTER (OUTPATIENT)
Dept: INFUSION THERAPY | Age: 74
Discharge: HOME | End: 2024-07-12
Payer: MEDICARE

## 2024-07-12 VITALS
DIASTOLIC BLOOD PRESSURE: 74 MMHG | TEMPERATURE: 97.8 F | WEIGHT: 167.4 LBS | HEIGHT: 67 IN | HEART RATE: 81 BPM | OXYGEN SATURATION: 99 % | BODY MASS INDEX: 26.27 KG/M2 | SYSTOLIC BLOOD PRESSURE: 111 MMHG

## 2024-07-12 VITALS
TEMPERATURE: 97 F | HEART RATE: 85 BPM | SYSTOLIC BLOOD PRESSURE: 103 MMHG | RESPIRATION RATE: 18 BRPM | DIASTOLIC BLOOD PRESSURE: 70 MMHG | OXYGEN SATURATION: 98 %

## 2024-07-12 DIAGNOSIS — R53.83 OTHER FATIGUE: ICD-10-CM

## 2024-07-12 DIAGNOSIS — C10.9 SQUAMOUS CELL CARCINOMA OF OROPHARYNX (HCC): Primary | ICD-10-CM

## 2024-07-12 DIAGNOSIS — C10.9 SQUAMOUS CELL CARCINOMA OF OROPHARYNX (HCC): ICD-10-CM

## 2024-07-12 LAB
ALBUMIN SERPL-MCNC: 3.8 G/DL (ref 3.5–5.2)
ALP SERPL-CCNC: 77 U/L (ref 40–129)
ALT SERPL-CCNC: 15 U/L (ref 0–40)
ANION GAP SERPL CALCULATED.3IONS-SCNC: 9 MMOL/L (ref 7–16)
AST SERPL-CCNC: 18 U/L (ref 0–39)
BASOPHILS # BLD: 0 K/UL (ref 0–0.2)
BASOPHILS NFR BLD: 0 % (ref 0–2)
BILIRUB SERPL-MCNC: 0.4 MG/DL (ref 0–1.2)
BUN SERPL-MCNC: 31 MG/DL (ref 6–23)
CALCIUM SERPL-MCNC: 10 MG/DL (ref 8.6–10.2)
CHLORIDE SERPL-SCNC: 102 MMOL/L (ref 98–107)
CO2 SERPL-SCNC: 26 MMOL/L (ref 22–29)
CREAT SERPL-MCNC: 1.2 MG/DL (ref 0.7–1.2)
EOSINOPHIL # BLD: 0.25 K/UL (ref 0.05–0.5)
EOSINOPHILS RELATIVE PERCENT: 4 % (ref 0–6)
ERYTHROCYTE [DISTWIDTH] IN BLOOD BY AUTOMATED COUNT: 13.4 % (ref 11.5–15)
GFR, ESTIMATED: 63 ML/MIN/1.73M2
GLUCOSE SERPL-MCNC: 165 MG/DL (ref 74–99)
HCT VFR BLD AUTO: 39.2 % (ref 37–54)
HGB BLD-MCNC: 13 G/DL (ref 12.5–16.5)
LYMPHOCYTES NFR BLD: 0.25 K/UL (ref 1.5–4)
LYMPHOCYTES RELATIVE PERCENT: 5 % (ref 20–42)
MCH RBC QN AUTO: 31.4 PG (ref 26–35)
MCHC RBC AUTO-ENTMCNC: 33.2 G/DL (ref 32–34.5)
MCV RBC AUTO: 94.7 FL (ref 80–99.9)
MONOCYTES NFR BLD: 0.25 K/UL (ref 0.1–0.95)
MONOCYTES NFR BLD: 5 % (ref 2–12)
NEUTROPHILS NFR BLD: 87 % (ref 43–80)
NEUTS SEG NFR BLD: 4.85 K/UL (ref 1.8–7.3)
PLATELET # BLD AUTO: 197 K/UL (ref 130–450)
PMV BLD AUTO: 10.4 FL (ref 7–12)
POTASSIUM SERPL-SCNC: 3.8 MMOL/L (ref 3.5–5)
PROT SERPL-MCNC: 6.9 G/DL (ref 6.4–8.3)
RBC # BLD AUTO: 4.14 M/UL (ref 3.8–5.8)
RBC # BLD: NORMAL 10*6/UL
SODIUM SERPL-SCNC: 137 MMOL/L (ref 132–146)
TSH SERPL DL<=0.05 MIU/L-ACNC: 3.3 UIU/ML (ref 0.27–4.2)
WBC OTHER # BLD: 5.6 K/UL (ref 4.5–11.5)

## 2024-07-12 PROCEDURE — 2580000003 HC RX 258: Performed by: CLINICAL NURSE SPECIALIST

## 2024-07-12 PROCEDURE — 36415 COLL VENOUS BLD VENIPUNCTURE: CPT

## 2024-07-12 PROCEDURE — 85025 COMPLETE CBC W/AUTO DIFF WBC: CPT

## 2024-07-12 PROCEDURE — 96413 CHEMO IV INFUSION 1 HR: CPT

## 2024-07-12 PROCEDURE — 1123F ACP DISCUSS/DSCN MKR DOCD: CPT | Performed by: CLINICAL NURSE SPECIALIST

## 2024-07-12 PROCEDURE — 80053 COMPREHEN METABOLIC PANEL: CPT

## 2024-07-12 PROCEDURE — 99213 OFFICE O/P EST LOW 20 MIN: CPT | Performed by: CLINICAL NURSE SPECIALIST

## 2024-07-12 PROCEDURE — 96375 TX/PRO/DX INJ NEW DRUG ADDON: CPT

## 2024-07-12 PROCEDURE — 6360000002 HC RX W HCPCS: Performed by: CLINICAL NURSE SPECIALIST

## 2024-07-12 PROCEDURE — 84443 ASSAY THYROID STIM HORMONE: CPT

## 2024-07-12 RX ORDER — ONDANSETRON 2 MG/ML
8 INJECTION INTRAMUSCULAR; INTRAVENOUS
Status: CANCELLED | OUTPATIENT
Start: 2024-07-12

## 2024-07-12 RX ORDER — SODIUM CHLORIDE 9 MG/ML
INJECTION, SOLUTION INTRAVENOUS CONTINUOUS
Status: CANCELLED | OUTPATIENT
Start: 2024-07-12

## 2024-07-12 RX ORDER — ACETAMINOPHEN 325 MG/1
650 TABLET ORAL
Status: CANCELLED | OUTPATIENT
Start: 2024-07-12

## 2024-07-12 RX ORDER — DIPHENHYDRAMINE HYDROCHLORIDE 50 MG/ML
50 INJECTION INTRAMUSCULAR; INTRAVENOUS
Status: CANCELLED | OUTPATIENT
Start: 2024-07-12

## 2024-07-12 RX ORDER — SODIUM CHLORIDE 0.9 % (FLUSH) 0.9 %
5-40 SYRINGE (ML) INJECTION PRN
Status: DISCONTINUED | OUTPATIENT
Start: 2024-07-12 | End: 2024-07-13 | Stop reason: HOSPADM

## 2024-07-12 RX ORDER — EPINEPHRINE 1 MG/ML
0.3 INJECTION, SOLUTION, CONCENTRATE INTRAVENOUS PRN
Status: CANCELLED | OUTPATIENT
Start: 2024-07-12

## 2024-07-12 RX ORDER — ONDANSETRON 2 MG/ML
8 INJECTION INTRAMUSCULAR; INTRAVENOUS
Status: COMPLETED | OUTPATIENT
Start: 2024-07-12 | End: 2024-07-12

## 2024-07-12 RX ORDER — MEPERIDINE HYDROCHLORIDE 50 MG/ML
12.5 INJECTION INTRAMUSCULAR; INTRAVENOUS; SUBCUTANEOUS PRN
Status: CANCELLED | OUTPATIENT
Start: 2024-07-12

## 2024-07-12 RX ORDER — SODIUM CHLORIDE 9 MG/ML
5-250 INJECTION, SOLUTION INTRAVENOUS PRN
Status: DISCONTINUED | OUTPATIENT
Start: 2024-07-12 | End: 2024-07-13 | Stop reason: HOSPADM

## 2024-07-12 RX ORDER — SODIUM CHLORIDE 9 MG/ML
5-250 INJECTION, SOLUTION INTRAVENOUS PRN
Status: CANCELLED | OUTPATIENT
Start: 2024-07-12

## 2024-07-12 RX ORDER — FAMOTIDINE 10 MG/ML
20 INJECTION, SOLUTION INTRAVENOUS
Status: CANCELLED | OUTPATIENT
Start: 2024-07-12

## 2024-07-12 RX ORDER — HEPARIN SODIUM (PORCINE) LOCK FLUSH IV SOLN 100 UNIT/ML 100 UNIT/ML
500 SOLUTION INTRAVENOUS PRN
Status: CANCELLED | OUTPATIENT
Start: 2024-07-12

## 2024-07-12 RX ORDER — SODIUM CHLORIDE 0.9 % (FLUSH) 0.9 %
5-40 SYRINGE (ML) INJECTION PRN
Status: CANCELLED | OUTPATIENT
Start: 2024-07-12

## 2024-07-12 RX ORDER — ALBUTEROL SULFATE 90 UG/1
4 AEROSOL, METERED RESPIRATORY (INHALATION) PRN
Status: CANCELLED | OUTPATIENT
Start: 2024-07-12

## 2024-07-12 RX ADMIN — ONDANSETRON 8 MG: 2 INJECTION INTRAMUSCULAR; INTRAVENOUS at 13:47

## 2024-07-12 RX ADMIN — SODIUM CHLORIDE, PRESERVATIVE FREE 10 ML: 5 INJECTION INTRAVENOUS at 13:33

## 2024-07-12 RX ADMIN — SODIUM CHLORIDE 200 MG: 9 INJECTION, SOLUTION INTRAVENOUS at 14:01

## 2024-07-12 RX ADMIN — SODIUM CHLORIDE 20 ML/HR: 9 INJECTION, SOLUTION INTRAVENOUS at 13:33

## 2024-07-12 ASSESSMENT — PAIN DESCRIPTION - PAIN TYPE: TYPE: CHRONIC PAIN

## 2024-07-12 ASSESSMENT — PAIN DESCRIPTION - ONSET: ONSET: ON-GOING

## 2024-07-12 ASSESSMENT — PAIN - FUNCTIONAL ASSESSMENT: PAIN_FUNCTIONAL_ASSESSMENT: ACTIVITIES ARE NOT PREVENTED

## 2024-07-12 ASSESSMENT — PAIN DESCRIPTION - FREQUENCY: FREQUENCY: CONTINUOUS

## 2024-07-12 ASSESSMENT — PAIN DESCRIPTION - DESCRIPTORS: DESCRIPTORS: DISCOMFORT

## 2024-07-12 ASSESSMENT — PAIN DESCRIPTION - LOCATION: LOCATION: THROAT

## 2024-07-12 ASSESSMENT — PAIN SCALES - GENERAL: PAINLEVEL_OUTOF10: 9

## 2024-07-12 NOTE — PROGRESS NOTES
activity at the phylicia hepatis has increased as compared to prior.  Increased activity seen at the floor of the mouth as compared to prior.   Continue Keytruda and repeat scans in 3 months. Recommend to f/u with ENT for scope examination  Cycle # 5 Keytruda was on 06/20/2023.   Cycle # 6 Keytruda was on 07/11/2023.   Cycle # 7 Keytruda is ordered and given today 08/01/2023. Labs reviewed ok to proceed. Dry mouth secondary to treatment; continue Biotene and increase po fluid intake. 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 Increase Synthroid to 100 mcg po daily.   Productive coughing yellowish/phlegm; recommended and ordered Z-Anthony    Cycle #8 Keytruda is ordered and given today 8/22/2023 Labs reviewed okay to proceed.  Slight elevation in creatinine patient reports slight decrease in his p.o. intake recommended he increase water, hypothyroidism related to immunotherapy TSH WNL 2.51 will keep Synthroid at 100 mcg per day.  Initial improvement in cough with Z-Anthony some return of drainage ,ENT f/u soon .     9/12/2023 Cycle #9 Keytruda is ordered and given today , labs revied creatinine is 2.4 continue to encourage p.o. fluid intake seems to be tolerating treatment well with no complaints, ENT on 9/29 , refill for Carafate, Magic mouthwash and salagen sent     PET scan 9/22/2023   IMPRESSION:  IMPRESSION:  Slightly decreased but persistent intense uptake along the floor of the  mouth, left greater than right.  Worsening right upper quadrant metastatic  lymphadenopathy.  Slightly decreased but persistent intense uptake of a liver  metastasis.  Dr. Hernandez note reviewed stable; no sign of recurrent disease; mucus radiation changes was omega shaped epiglottis and no focal findings of mass tumors or lesions  Reviewed labs can proceed with treatment today cycle #10 Keytruda  No signs or symptoms of illness palpation of abdomen and groin area no lymph nodes palpable patient denies

## 2024-07-22 DIAGNOSIS — C10.9 OROPHARYNGEAL CANCER (HCC): ICD-10-CM

## 2024-07-22 RX ORDER — OXYCODONE HYDROCHLORIDE 5 MG/1
5 TABLET ORAL EVERY 6 HOURS PRN
Qty: 90 TABLET | Refills: 0 | Status: SHIPPED | OUTPATIENT
Start: 2024-07-22 | End: 2024-08-21

## 2024-07-22 NOTE — TELEPHONE ENCOUNTER
Patient Ignacio's wife Erin called for refill oxycodone 5mg. Next appointment 8-. [facility]. Verified pharmacy with spouse

## 2024-07-30 ENCOUNTER — OFFICE VISIT (OUTPATIENT)
Dept: FAMILY MEDICINE CLINIC | Age: 74
End: 2024-07-30
Payer: MEDICARE

## 2024-07-30 VITALS
HEIGHT: 67 IN | WEIGHT: 168 LBS | HEART RATE: 60 BPM | SYSTOLIC BLOOD PRESSURE: 108 MMHG | OXYGEN SATURATION: 99 % | BODY MASS INDEX: 26.37 KG/M2 | RESPIRATION RATE: 16 BRPM | TEMPERATURE: 98.1 F | DIASTOLIC BLOOD PRESSURE: 58 MMHG

## 2024-07-30 DIAGNOSIS — R97.20 ELEVATED PSA: ICD-10-CM

## 2024-07-30 DIAGNOSIS — E11.22 TYPE 2 DIABETES MELLITUS WITH STAGE 2 CHRONIC KIDNEY DISEASE, WITHOUT LONG-TERM CURRENT USE OF INSULIN (HCC): Primary | ICD-10-CM

## 2024-07-30 DIAGNOSIS — N18.2 TYPE 2 DIABETES MELLITUS WITH STAGE 2 CHRONIC KIDNEY DISEASE, WITHOUT LONG-TERM CURRENT USE OF INSULIN (HCC): Primary | ICD-10-CM

## 2024-07-30 DIAGNOSIS — E55.9 VITAMIN D DEFICIENCY: ICD-10-CM

## 2024-07-30 DIAGNOSIS — E78.1 HYPERTRIGLYCERIDEMIA: ICD-10-CM

## 2024-07-30 DIAGNOSIS — Z23 NEED FOR SHINGLES VACCINE: ICD-10-CM

## 2024-07-30 DIAGNOSIS — E03.2 HYPOTHYROIDISM DUE TO MEDICATION: ICD-10-CM

## 2024-07-30 PROCEDURE — 99214 OFFICE O/P EST MOD 30 MIN: CPT | Performed by: FAMILY MEDICINE

## 2024-07-30 PROCEDURE — 1123F ACP DISCUSS/DSCN MKR DOCD: CPT | Performed by: FAMILY MEDICINE

## 2024-07-30 ASSESSMENT — ENCOUNTER SYMPTOMS
SHORTNESS OF BREATH: 0
BLOOD IN STOOL: 0
COUGH: 0
ABDOMINAL PAIN: 0

## 2024-07-30 NOTE — PROGRESS NOTES
Ignacio Bhatia   Patient is a 74 y.o. year old male who presents with:  Chief Complaint   Patient presents with    6 Month Follow-Up    Fatigue     Patient also follows with: GI, ENT, oncology, radiation oncology    HPI    Labs were not done, some were done through onc.    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     Following with GI and undergoing somewhat routine esophageal dilation    Skin problem  Last visit:  Complains of multiple lesions at the posteiror hands which began around one month ago.  Has been using compound W w/o improvement.   Today:   Referred to derm last visit, saw derm and dxd as warts    DM  Current tx: none  Past treatment: Metformin  Lab Results   Component Value Date    LABA1C 5.1 07/25/2023    LABA1C 5.3 03/06/2023    LABA1C 4.5 12/05/2022     Hypertriglyceridemia, dyslipidemia  Current treatment: fenofibrate 145 mg daily  Recent changes in medications: None  Indications for statin therapy include: none at this time.   Never took a statin  Lab Results   Component Value Date    CHOL 120 07/25/2023    HDL 32 (L) 07/25/2023    TRIG 92 07/25/2023   The ASCVD Risk score (Tracie DELACRUZ, et al., 2019) failed to calculate for the following reasons:    The valid total cholesterol range is 130 to 320 mg/dL    Hypothyroidism-managed by oncology  Current treatment: levothyroxine 100ug daily  Recent changes in medication: none  Lab Results   Component Value Date    TSH 3.30 07/12/2024    T4FREE 0.9 07/25/2023       Prostate Ca screening/elevated PSA  PSA 3.94 7/2022  Lab Results   Component Value Date    PSA 6.40 (H) 10/03/2023    PSA 3.27 08/12/2015       Review of Systems   Constitutional:  Positive for fatigue. Negative for unexpected weight change.   Respiratory:  Negative for cough and shortness of breath.    Cardiovascular:  Negative for chest pain and leg swelling.   Gastrointestinal:  Negative for abdominal pain and blood in

## 2024-08-01 DIAGNOSIS — E03.2 HYPOTHYROIDISM DUE TO MEDICATION: ICD-10-CM

## 2024-08-01 DIAGNOSIS — E55.9 VITAMIN D DEFICIENCY: ICD-10-CM

## 2024-08-01 DIAGNOSIS — E78.5 DYSLIPIDEMIA: Chronic | ICD-10-CM

## 2024-08-01 DIAGNOSIS — E11.9 TYPE 2 DIABETES MELLITUS WITHOUT COMPLICATION, WITHOUT LONG-TERM CURRENT USE OF INSULIN (HCC): ICD-10-CM

## 2024-08-01 DIAGNOSIS — C10.9 OROPHARYNGEAL CANCER (HCC): ICD-10-CM

## 2024-08-01 DIAGNOSIS — C78.7 SECONDARY MALIGNANT NEOPLASM OF LIVER AND INTRAHEPATIC BILE DUCT (HCC): ICD-10-CM

## 2024-08-01 DIAGNOSIS — E11.9 TYPE 2 DIABETES MELLITUS WITHOUT COMPLICATION, WITHOUT LONG-TERM CURRENT USE OF INSULIN (HCC): Primary | ICD-10-CM

## 2024-08-01 DIAGNOSIS — F11.20 OPIOID DEPENDENCE WITH CURRENT USE (HCC): ICD-10-CM

## 2024-08-01 DIAGNOSIS — R97.20 ELEVATED PSA: ICD-10-CM

## 2024-08-01 LAB
ALBUMIN: 3.8 G/DL (ref 3.5–5.2)
ALP BLD-CCNC: 55 U/L (ref 40–129)
ALT SERPL-CCNC: 11 U/L (ref 0–40)
ANION GAP SERPL CALCULATED.3IONS-SCNC: 14 MMOL/L (ref 7–16)
AST SERPL-CCNC: 20 U/L (ref 0–39)
BASOPHILS ABSOLUTE: 0 K/UL (ref 0–0.2)
BASOPHILS RELATIVE PERCENT: 0 % (ref 0–2)
BILIRUB SERPL-MCNC: 0.5 MG/DL (ref 0–1.2)
BUN BLDV-MCNC: 25 MG/DL (ref 6–23)
CALCIUM SERPL-MCNC: 9.6 MG/DL (ref 8.6–10.2)
CHLORIDE BLD-SCNC: 104 MMOL/L (ref 98–107)
CHOLESTEROL, TOTAL: 96 MG/DL
CO2: 24 MMOL/L (ref 22–29)
CREAT SERPL-MCNC: 1.2 MG/DL (ref 0.7–1.2)
CREATININE URINE: 151.8 MG/DL (ref 40–278)
EOSINOPHILS ABSOLUTE: 0.32 K/UL (ref 0.05–0.5)
EOSINOPHILS RELATIVE PERCENT: 8 % (ref 0–6)
GFR, ESTIMATED: 62 ML/MIN/1.73M2
GLUCOSE BLD-MCNC: 100 MG/DL (ref 74–99)
HBA1C MFR BLD: 5.6 % (ref 4–5.6)
HCT VFR BLD CALC: 39.4 % (ref 37–54)
HDLC SERPL-MCNC: 23 MG/DL
HEMOGLOBIN: 12.8 G/DL (ref 12.5–16.5)
LDL CHOLESTEROL: 48 MG/DL
LYMPHOCYTES ABSOLUTE: 0.25 K/UL (ref 1.5–4)
LYMPHOCYTES RELATIVE PERCENT: 6 % (ref 20–42)
MCH RBC QN AUTO: 31.2 PG (ref 26–35)
MCHC RBC AUTO-ENTMCNC: 32.5 G/DL (ref 32–34.5)
MCV RBC AUTO: 96.1 FL (ref 80–99.9)
MONOCYTES ABSOLUTE: 0.21 K/UL (ref 0.1–0.95)
MONOCYTES RELATIVE PERCENT: 5 % (ref 2–12)
NEUTROPHILS ABSOLUTE: 3.32 K/UL (ref 1.8–7.3)
NEUTROPHILS RELATIVE PERCENT: 81 % (ref 43–80)
PDW BLD-RTO: 14.5 % (ref 11.5–15)
PLATELET # BLD: 201 K/UL (ref 130–450)
PMV BLD AUTO: 10.8 FL (ref 7–12)
POTASSIUM SERPL-SCNC: 4.5 MMOL/L (ref 3.5–5)
PROSTATE SPECIFIC ANTIGEN: 4.93 NG/ML (ref 0–4)
RBC # BLD: 4.1 M/UL (ref 3.8–5.8)
RBC # BLD: ABNORMAL 10*6/UL
SODIUM BLD-SCNC: 142 MMOL/L (ref 132–146)
T4 FREE: 1.5 NG/DL (ref 0.9–1.7)
TOTAL PROTEIN, URINE: 15 MG/DL (ref 0–12)
TOTAL PROTEIN: 7.1 G/DL (ref 6.4–8.3)
TRIGL SERPL-MCNC: 123 MG/DL
TSH SERPL DL<=0.05 MIU/L-ACNC: 4.01 UIU/ML (ref 0.27–4.2)
URINE TOTAL PROTEIN CREATININE RATIO: 0.1 (ref 0–0.2)
VITAMIN D 25-HYDROXY: 50.2 NG/ML (ref 30–100)
VLDLC SERPL CALC-MCNC: 25 MG/DL
WBC # BLD: 4.1 K/UL (ref 4.5–11.5)

## 2024-08-02 ENCOUNTER — HOSPITAL ENCOUNTER (OUTPATIENT)
Dept: INFUSION THERAPY | Age: 74
Discharge: HOME | End: 2024-08-02
Payer: MEDICARE

## 2024-08-02 ENCOUNTER — OFFICE VISIT (OUTPATIENT)
Dept: ONCOLOGY | Age: 74
End: 2024-08-02
Payer: MEDICARE

## 2024-08-02 VITALS
OXYGEN SATURATION: 95 % | HEART RATE: 78 BPM | DIASTOLIC BLOOD PRESSURE: 62 MMHG | TEMPERATURE: 97.1 F | RESPIRATION RATE: 16 BRPM | SYSTOLIC BLOOD PRESSURE: 100 MMHG

## 2024-08-02 VITALS
SYSTOLIC BLOOD PRESSURE: 121 MMHG | DIASTOLIC BLOOD PRESSURE: 73 MMHG | HEART RATE: 79 BPM | TEMPERATURE: 98 F | HEIGHT: 67 IN | WEIGHT: 168 LBS | OXYGEN SATURATION: 98 % | BODY MASS INDEX: 26.37 KG/M2

## 2024-08-02 DIAGNOSIS — R53.83 OTHER FATIGUE: ICD-10-CM

## 2024-08-02 DIAGNOSIS — E03.9 HYPOTHYROIDISM, UNSPECIFIED TYPE: ICD-10-CM

## 2024-08-02 DIAGNOSIS — C10.9 SQUAMOUS CELL CARCINOMA OF OROPHARYNX (HCC): Primary | ICD-10-CM

## 2024-08-02 DIAGNOSIS — C10.9 SQUAMOUS CELL CARCINOMA OF OROPHARYNX (HCC): ICD-10-CM

## 2024-08-02 LAB
ALBUMIN SERPL-MCNC: 4 G/DL (ref 3.5–5.2)
ALP SERPL-CCNC: 65 U/L (ref 40–129)
ALT SERPL-CCNC: 13 U/L (ref 0–40)
ANION GAP SERPL CALCULATED.3IONS-SCNC: 10 MMOL/L (ref 7–16)
AST SERPL-CCNC: 18 U/L (ref 0–39)
BASOPHILS # BLD: 0 K/UL (ref 0–0.2)
BASOPHILS NFR BLD: 0 % (ref 0–2)
BILIRUB SERPL-MCNC: 0.3 MG/DL (ref 0–1.2)
BUN SERPL-MCNC: 25 MG/DL (ref 6–23)
CALCIUM SERPL-MCNC: 9.9 MG/DL (ref 8.6–10.2)
CHLORIDE SERPL-SCNC: 104 MMOL/L (ref 98–107)
CO2 SERPL-SCNC: 25 MMOL/L (ref 22–29)
CREAT SERPL-MCNC: 1.1 MG/DL (ref 0.7–1.2)
EOSINOPHIL # BLD: 0.12 K/UL (ref 0.05–0.5)
EOSINOPHILS RELATIVE PERCENT: 3 % (ref 0–6)
ERYTHROCYTE [DISTWIDTH] IN BLOOD BY AUTOMATED COUNT: 14.1 % (ref 11.5–15)
GFR, ESTIMATED: 70 ML/MIN/1.73M2
GLUCOSE SERPL-MCNC: 141 MG/DL (ref 74–99)
HCT VFR BLD AUTO: 38.3 % (ref 37–54)
HGB BLD-MCNC: 12.6 G/DL (ref 12.5–16.5)
LYMPHOCYTES NFR BLD: 0.16 K/UL (ref 1.5–4)
LYMPHOCYTES RELATIVE PERCENT: 4 % (ref 20–42)
MCH RBC QN AUTO: 30.8 PG (ref 26–35)
MCHC RBC AUTO-ENTMCNC: 32.9 G/DL (ref 32–34.5)
MCV RBC AUTO: 93.6 FL (ref 80–99.9)
MONOCYTES NFR BLD: 0.23 K/UL (ref 0.1–0.95)
MONOCYTES NFR BLD: 5 % (ref 2–12)
NEUTROPHILS NFR BLD: 89 % (ref 43–80)
NEUTS SEG NFR BLD: 3.99 K/UL (ref 1.8–7.3)
PLATELET # BLD AUTO: 219 K/UL (ref 130–450)
PMV BLD AUTO: 10.4 FL (ref 7–12)
POTASSIUM SERPL-SCNC: 4 MMOL/L (ref 3.5–5)
PROT SERPL-MCNC: 7 G/DL (ref 6.4–8.3)
RBC # BLD AUTO: 4.09 M/UL (ref 3.8–5.8)
RBC # BLD: ABNORMAL 10*6/UL
SODIUM SERPL-SCNC: 139 MMOL/L (ref 132–146)
TSH SERPL DL<=0.05 MIU/L-ACNC: 4.16 UIU/ML (ref 0.27–4.2)
WBC OTHER # BLD: 4.5 K/UL (ref 4.5–11.5)

## 2024-08-02 PROCEDURE — 36415 COLL VENOUS BLD VENIPUNCTURE: CPT

## 2024-08-02 PROCEDURE — 84443 ASSAY THYROID STIM HORMONE: CPT

## 2024-08-02 PROCEDURE — 1123F ACP DISCUSS/DSCN MKR DOCD: CPT | Performed by: STUDENT IN AN ORGANIZED HEALTH CARE EDUCATION/TRAINING PROGRAM

## 2024-08-02 PROCEDURE — 99214 OFFICE O/P EST MOD 30 MIN: CPT | Performed by: STUDENT IN AN ORGANIZED HEALTH CARE EDUCATION/TRAINING PROGRAM

## 2024-08-02 PROCEDURE — 96413 CHEMO IV INFUSION 1 HR: CPT

## 2024-08-02 PROCEDURE — 6360000002 HC RX W HCPCS: Performed by: STUDENT IN AN ORGANIZED HEALTH CARE EDUCATION/TRAINING PROGRAM

## 2024-08-02 PROCEDURE — 2580000003 HC RX 258: Performed by: STUDENT IN AN ORGANIZED HEALTH CARE EDUCATION/TRAINING PROGRAM

## 2024-08-02 PROCEDURE — 80053 COMPREHEN METABOLIC PANEL: CPT

## 2024-08-02 PROCEDURE — 85025 COMPLETE CBC W/AUTO DIFF WBC: CPT

## 2024-08-02 RX ORDER — HEPARIN SODIUM (PORCINE) LOCK FLUSH IV SOLN 100 UNIT/ML 100 UNIT/ML
500 SOLUTION INTRAVENOUS PRN
Status: CANCELLED | OUTPATIENT
Start: 2024-08-02

## 2024-08-02 RX ORDER — LEVOTHYROXINE SODIUM 100 MCG
100 TABLET ORAL DAILY
Qty: 30 TABLET | Refills: 3 | Status: SHIPPED | OUTPATIENT
Start: 2024-08-02

## 2024-08-02 RX ORDER — ONDANSETRON 2 MG/ML
8 INJECTION INTRAMUSCULAR; INTRAVENOUS
Status: CANCELLED | OUTPATIENT
Start: 2024-08-02

## 2024-08-02 RX ORDER — SODIUM CHLORIDE 0.9 % (FLUSH) 0.9 %
5-40 SYRINGE (ML) INJECTION PRN
Status: DISCONTINUED | OUTPATIENT
Start: 2024-08-02 | End: 2024-08-03 | Stop reason: HOSPADM

## 2024-08-02 RX ORDER — SODIUM CHLORIDE 9 MG/ML
5-250 INJECTION, SOLUTION INTRAVENOUS PRN
Status: DISCONTINUED | OUTPATIENT
Start: 2024-08-02 | End: 2024-08-03 | Stop reason: HOSPADM

## 2024-08-02 RX ORDER — SODIUM CHLORIDE 9 MG/ML
5-250 INJECTION, SOLUTION INTRAVENOUS PRN
Status: CANCELLED | OUTPATIENT
Start: 2024-08-02

## 2024-08-02 RX ORDER — MEPERIDINE HYDROCHLORIDE 50 MG/ML
12.5 INJECTION INTRAMUSCULAR; INTRAVENOUS; SUBCUTANEOUS PRN
Status: CANCELLED | OUTPATIENT
Start: 2024-08-02

## 2024-08-02 RX ORDER — ACETAMINOPHEN 325 MG/1
650 TABLET ORAL
Status: CANCELLED | OUTPATIENT
Start: 2024-08-02

## 2024-08-02 RX ORDER — SODIUM CHLORIDE 0.9 % (FLUSH) 0.9 %
5-40 SYRINGE (ML) INJECTION PRN
Status: CANCELLED | OUTPATIENT
Start: 2024-08-02

## 2024-08-02 RX ORDER — ALBUTEROL SULFATE 90 UG/1
4 AEROSOL, METERED RESPIRATORY (INHALATION) PRN
Status: CANCELLED | OUTPATIENT
Start: 2024-08-02

## 2024-08-02 RX ORDER — DIPHENHYDRAMINE HYDROCHLORIDE 50 MG/ML
50 INJECTION INTRAMUSCULAR; INTRAVENOUS
Status: CANCELLED | OUTPATIENT
Start: 2024-08-02

## 2024-08-02 RX ORDER — FAMOTIDINE 10 MG/ML
20 INJECTION, SOLUTION INTRAVENOUS
Status: CANCELLED | OUTPATIENT
Start: 2024-08-02

## 2024-08-02 RX ORDER — SODIUM CHLORIDE 9 MG/ML
INJECTION, SOLUTION INTRAVENOUS CONTINUOUS
Status: CANCELLED | OUTPATIENT
Start: 2024-08-02

## 2024-08-02 RX ORDER — EPINEPHRINE 1 MG/ML
0.3 INJECTION, SOLUTION, CONCENTRATE INTRAVENOUS PRN
Status: CANCELLED | OUTPATIENT
Start: 2024-08-02

## 2024-08-02 RX ADMIN — SODIUM CHLORIDE, PRESERVATIVE FREE 10 ML: 5 INJECTION INTRAVENOUS at 14:15

## 2024-08-02 RX ADMIN — SODIUM CHLORIDE 200 MG: 9 INJECTION, SOLUTION INTRAVENOUS at 14:36

## 2024-08-02 RX ADMIN — SODIUM CHLORIDE 20 ML/HR: 9 INJECTION, SOLUTION INTRAVENOUS at 14:15

## 2024-08-02 NOTE — PROGRESS NOTES
to f/u with ENT for scope examination  Cycle # 5 Keytruda was on 06/20/2023.   Cycle # 6 Keytruda was on 07/11/2023.   Cycle # 7 Keytruda is ordered and given today 08/01/2023. Labs reviewed ok to proceed. Dry mouth secondary to treatment; continue Biotene and increase po fluid intake. 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 Increase Synthroid to 100 mcg po daily.   Productive coughing yellowish/phlegm; recommended and ordered Z-Anthony    Cycle #8 Keytruda is ordered and given today 8/22/2023 Labs reviewed okay to proceed.  Slight elevation in creatinine patient reports slight decrease in his p.o. intake recommended he increase water, hypothyroidism related to immunotherapy TSH WNL 2.51 will keep Synthroid at 100 mcg per day.  Initial improvement in cough with Z-Anthony some return of drainage ,ENT f/u soon .     9/12/2023 Cycle #9 Keytruda is ordered and given today , labs revied creatinine is 2.4 continue to encourage p.o. fluid intake seems to be tolerating treatment well with no complaints, ENT on 9/29 , refill for Carafate, Magic mouthwash and salagen sent     PET scan 9/22/2023   IMPRESSION:  IMPRESSION:  Slightly decreased but persistent intense uptake along the floor of the  mouth, left greater than right.  Worsening right upper quadrant metastatic  lymphadenopathy.  Slightly decreased but persistent intense uptake of a liver  metastasis.  Dr. Hernandez note reviewed stable; no sign of recurrent disease; mucus radiation changes was omega shaped epiglottis and no focal findings of mass tumors or lesions  Reviewed labs can proceed with treatment today cycle #10 Keytruda  No signs or symptoms of illness palpation of abdomen and groin area no lymph nodes palpable patient denies diarrhea/constipation abdominal pain  RTC 3 weeks with labs       Patient had received much of his care with Dr. Mcghee, who has now moved on from our practice.  The patient transitioned oncologic care with me (

## 2024-08-19 DIAGNOSIS — C10.9 OROPHARYNGEAL CANCER (HCC): ICD-10-CM

## 2024-08-19 RX ORDER — OXYCODONE HYDROCHLORIDE 5 MG/1
5 TABLET ORAL EVERY 6 HOURS PRN
Qty: 90 TABLET | Refills: 0 | Status: SHIPPED | OUTPATIENT
Start: 2024-08-19 | End: 2024-09-18

## 2024-08-19 NOTE — TELEPHONE ENCOUNTER
Patient Ignacio's spouse Erin called for refill oxycodone 5mg. Next appointment 8-. [facility]. Pharmacy verified on perfect serve. Routed call to A Lin, NP

## 2024-08-23 ENCOUNTER — OFFICE VISIT (OUTPATIENT)
Dept: ONCOLOGY | Age: 74
End: 2024-08-23
Payer: MEDICARE

## 2024-08-23 ENCOUNTER — HOSPITAL ENCOUNTER (OUTPATIENT)
Dept: INFUSION THERAPY | Age: 74
Discharge: HOME | End: 2024-08-23
Payer: MEDICARE

## 2024-08-23 VITALS
TEMPERATURE: 97.6 F | DIASTOLIC BLOOD PRESSURE: 74 MMHG | HEIGHT: 67 IN | BODY MASS INDEX: 26.53 KG/M2 | SYSTOLIC BLOOD PRESSURE: 123 MMHG | HEART RATE: 74 BPM | OXYGEN SATURATION: 97 % | WEIGHT: 169 LBS

## 2024-08-23 DIAGNOSIS — C10.9 SQUAMOUS CELL CARCINOMA OF OROPHARYNX (HCC): ICD-10-CM

## 2024-08-23 DIAGNOSIS — C10.9 OROPHARYNGEAL CANCER (HCC): Primary | ICD-10-CM

## 2024-08-23 DIAGNOSIS — R53.83 OTHER FATIGUE: ICD-10-CM

## 2024-08-23 LAB
ALBUMIN SERPL-MCNC: 4.2 G/DL (ref 3.5–5.2)
ALP SERPL-CCNC: 65 U/L (ref 40–129)
ALT SERPL-CCNC: 16 U/L (ref 0–40)
ANION GAP SERPL CALCULATED.3IONS-SCNC: 9 MMOL/L (ref 7–16)
AST SERPL-CCNC: 21 U/L (ref 0–39)
BASOPHILS # BLD: 0.05 K/UL (ref 0–0.2)
BASOPHILS NFR BLD: 1 % (ref 0–2)
BILIRUB SERPL-MCNC: 0.5 MG/DL (ref 0–1.2)
BUN SERPL-MCNC: 24 MG/DL (ref 6–23)
CALCIUM SERPL-MCNC: 10.1 MG/DL (ref 8.6–10.2)
CHLORIDE SERPL-SCNC: 105 MMOL/L (ref 98–107)
CO2 SERPL-SCNC: 27 MMOL/L (ref 22–29)
CREAT SERPL-MCNC: 1.2 MG/DL (ref 0.7–1.2)
EOSINOPHIL # BLD: 0.27 K/UL (ref 0.05–0.5)
EOSINOPHILS RELATIVE PERCENT: 5 % (ref 0–6)
ERYTHROCYTE [DISTWIDTH] IN BLOOD BY AUTOMATED COUNT: 14.5 % (ref 11.5–15)
GFR, ESTIMATED: 64 ML/MIN/1.73M2
GLUCOSE SERPL-MCNC: 156 MG/DL (ref 74–99)
HCT VFR BLD AUTO: 40.1 % (ref 37–54)
HGB BLD-MCNC: 13.1 G/DL (ref 12.5–16.5)
LYMPHOCYTES NFR BLD: 0.14 K/UL (ref 1.5–4)
LYMPHOCYTES RELATIVE PERCENT: 3 % (ref 20–42)
MCH RBC QN AUTO: 31.2 PG (ref 26–35)
MCHC RBC AUTO-ENTMCNC: 32.7 G/DL (ref 32–34.5)
MCV RBC AUTO: 95.5 FL (ref 80–99.9)
MONOCYTES NFR BLD: 0.32 K/UL (ref 0.1–0.95)
MONOCYTES NFR BLD: 6 % (ref 2–12)
NEUTROPHILS NFR BLD: 85 % (ref 43–80)
NEUTS SEG NFR BLD: 4.42 K/UL (ref 1.8–7.3)
PLATELET # BLD AUTO: 215 K/UL (ref 130–450)
PMV BLD AUTO: 10.4 FL (ref 7–12)
POTASSIUM SERPL-SCNC: 4 MMOL/L (ref 3.5–5)
PROT SERPL-MCNC: 7.6 G/DL (ref 6.4–8.3)
RBC # BLD AUTO: 4.2 M/UL (ref 3.8–5.8)
RBC # BLD: NORMAL 10*6/UL
SODIUM SERPL-SCNC: 141 MMOL/L (ref 132–146)
TSH SERPL DL<=0.05 MIU/L-ACNC: 4.07 UIU/ML (ref 0.27–4.2)
WBC OTHER # BLD: 5.2 K/UL (ref 4.5–11.5)

## 2024-08-23 PROCEDURE — 80053 COMPREHEN METABOLIC PANEL: CPT

## 2024-08-23 PROCEDURE — 36415 COLL VENOUS BLD VENIPUNCTURE: CPT

## 2024-08-23 PROCEDURE — 1123F ACP DISCUSS/DSCN MKR DOCD: CPT | Performed by: STUDENT IN AN ORGANIZED HEALTH CARE EDUCATION/TRAINING PROGRAM

## 2024-08-23 PROCEDURE — 84443 ASSAY THYROID STIM HORMONE: CPT

## 2024-08-23 PROCEDURE — 85025 COMPLETE CBC W/AUTO DIFF WBC: CPT

## 2024-08-23 PROCEDURE — 99213 OFFICE O/P EST LOW 20 MIN: CPT | Performed by: STUDENT IN AN ORGANIZED HEALTH CARE EDUCATION/TRAINING PROGRAM

## 2024-08-28 ENCOUNTER — OFFICE VISIT (OUTPATIENT)
Dept: PALLATIVE CARE | Age: 74
End: 2024-08-28
Payer: MEDICARE

## 2024-08-28 VITALS
TEMPERATURE: 97.2 F | HEART RATE: 80 BPM | OXYGEN SATURATION: 98 % | DIASTOLIC BLOOD PRESSURE: 76 MMHG | WEIGHT: 171 LBS | BODY MASS INDEX: 26.78 KG/M2 | SYSTOLIC BLOOD PRESSURE: 123 MMHG

## 2024-08-28 DIAGNOSIS — C10.9 OROPHARYNGEAL CANCER (HCC): ICD-10-CM

## 2024-08-28 DIAGNOSIS — G89.3 PAIN DUE TO NEOPLASM: ICD-10-CM

## 2024-08-28 DIAGNOSIS — Z51.5 PALLIATIVE CARE BY SPECIALIST: Primary | ICD-10-CM

## 2024-08-28 DIAGNOSIS — K11.7 XEROSTOMIA: ICD-10-CM

## 2024-08-28 PROCEDURE — 99211 OFF/OP EST MAY X REQ PHY/QHP: CPT | Performed by: NURSE PRACTITIONER

## 2024-08-28 PROCEDURE — 99213 OFFICE O/P EST LOW 20 MIN: CPT | Performed by: NURSE PRACTITIONER

## 2024-08-28 PROCEDURE — 1123F ACP DISCUSS/DSCN MKR DOCD: CPT | Performed by: NURSE PRACTITIONER

## 2024-08-28 NOTE — PROGRESS NOTES
Department of Palliative Medicine  Ambulatory Note  Provider: MARY Hickman - CNP      Location of service: [facility]  Chief Complaint: Ignacio Bhatia is a 74 y.o. 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 Le for oncology  -s/p concurrent chemoradiation therapy  -Progression noted in Feb. 2023  -Now on Keytruda, patient chose to hold for now    Pain due to neoplasm:  -Oxycodone 5 mg Q 6 hours  -Tylenol 650 mg as needed  -Start Magic mouthwash  -PEG tube    Constipation  -Continue senna S  -Lactulose- has not needed    Xerostomia/loss of taste:  -Patient chose to stop pilocarpine  -Continue zinc 50 mg twice daily  -Encouraged diligent use of oral rinses    Follow Up:  3 months.  Encouraged to call with any questions, concerns, needs, or changes in symptoms.    Subjective:   HPI: Ignacio Bhatia is a 72 y.o. male with significant past medical history of squamous cell carcinoma of the left base of the tongue and tonsil, diabetes mellitus, Lyme disease, hypertension, arthritis who was referred to [facility] for symptom management.  He completed chemo and radiation.  He was found to have development of multiple hepatic metastasis.  PET scan in February 2023 showed resolution of at adenopathy of the neck and improved activity at the floor of the mouth, it showed development of osseous metastatic disease to the sacrum, T12, and right third rib.  He was started on Keytruda.    8/28/24  Met with Ke at the outpatient clinic, with his wife present    Overall he is doing well.    He continues to have pain primarily in his throat, worse with swallowing  He does state that his dry mouth is continue to worsen as well, and he is using his PEG tube more often, and using very little oral intake  He utilizes oxycodone oftentimes 3 times per day with good relief  He chose

## 2024-09-10 NOTE — PROGRESS NOTES
History of Present Illness    Bandar Petit is a 74 y.o. male who is seen as self-referred.  He was treated with a combination of chemotherapy and radiation therapy a few years ago for a base of tongue cancer.  He eventually represented with some metastatic disease to his lungs and liver.  He was treated with Keytruda.  He has stopped the medication for a little bit.  His main complaint has to do with some dryness and some very thick secretions in his throat that he has a hard time getting out.  He has been PEG dependent for the past 2 years.  He has had a few dilations in the past.    Past Medical History    His past medical history otherwise is completely negative.  His medications are documented in the chart.  He does not have any known allergies to medicine.  He does not smoke.  He is a retired.  He is here today with his wife.    Physical Exam    The patient is alert and oriented. Examination of the external ears, ear canals, and eardrums, is within normal limits. Examination of the anterior and external nose is negative. Examination of the oral cavity and oropharynx is negative except for a few teeth that are missing.  He does have some thicker secretions in the posterior pharyngeal wall.  There is no evidence of any mucosal lesions. There is good mobility of the tongue and palate. There is good mandibular excursion. Palpation of the parotid, neck, and thyroid field fails to show any worrisome masses or adenopathies.  He does have a fair amount of induration.  Assessment and Plan    Status post treatment of a base of tongue cancer that eventually metastasized.  He has responded well to Keytruda.  His last PET scan does not show any evidence of persistent disease.  He does have some significant issues with swallowing.  I suggested a swallowing evaluation.  We discussed how to get the secretions out of there.  The patient is to give it some thoughts.    I will see him on a as needed basis.

## 2024-09-11 ENCOUNTER — OFFICE VISIT (OUTPATIENT)
Dept: OTOLARYNGOLOGY | Facility: CLINIC | Age: 74
End: 2024-09-11
Payer: MEDICARE

## 2024-09-11 VITALS — BODY MASS INDEX: 26.6 KG/M2 | TEMPERATURE: 97.8 F | WEIGHT: 169.5 LBS | HEIGHT: 67 IN

## 2024-09-11 DIAGNOSIS — C10.9 MALIGNANT NEOPLASM OF OROPHARYNX (MULTI): Primary | ICD-10-CM

## 2024-09-11 DIAGNOSIS — R13.12 OROPHARYNGEAL DYSPHAGIA: ICD-10-CM

## 2024-09-11 PROCEDURE — 99203 OFFICE O/P NEW LOW 30 MIN: CPT | Performed by: OTOLARYNGOLOGY

## 2024-09-11 PROCEDURE — 31575 DIAGNOSTIC LARYNGOSCOPY: CPT | Performed by: OTOLARYNGOLOGY

## 2024-09-11 PROCEDURE — 3008F BODY MASS INDEX DOCD: CPT | Performed by: OTOLARYNGOLOGY

## 2024-09-11 PROCEDURE — 1160F RVW MEDS BY RX/DR IN RCRD: CPT | Performed by: OTOLARYNGOLOGY

## 2024-09-11 PROCEDURE — 1159F MED LIST DOCD IN RCRD: CPT | Performed by: OTOLARYNGOLOGY

## 2024-09-11 PROCEDURE — 1036F TOBACCO NON-USER: CPT | Performed by: OTOLARYNGOLOGY

## 2024-09-11 RX ORDER — FLUTICASONE PROPIONATE 50 MCG
2 SPRAY, SUSPENSION (ML) NASAL
COMMUNITY
Start: 2024-04-24

## 2024-09-11 RX ORDER — OMEPRAZOLE 40 MG/1
1 CAPSULE, DELAYED RELEASE ORAL DAILY
COMMUNITY
Start: 2023-08-31

## 2024-09-11 RX ORDER — OXYCODONE HYDROCHLORIDE 5 MG/1
1 TABLET ORAL EVERY 6 HOURS PRN
COMMUNITY
Start: 2024-08-19 | End: 2024-09-18

## 2024-09-11 RX ORDER — LEVOTHYROXINE SODIUM 100 UG/1
100 TABLET ORAL DAILY
COMMUNITY

## 2024-09-11 RX ORDER — FENOFIBRATE 145 MG/1
1 TABLET, FILM COATED ORAL DAILY
COMMUNITY
Start: 2024-03-13

## 2024-09-11 RX ORDER — ONDANSETRON 4 MG/1
4 TABLET, FILM COATED ORAL
COMMUNITY
Start: 2023-12-06

## 2024-09-11 RX ORDER — PEMBROLIZUMAB 25 MG/ML
INJECTION, SOLUTION INTRAVENOUS
COMMUNITY

## 2024-09-11 RX ORDER — SUCRALFATE 1 G/10ML
1 SUSPENSION ORAL
COMMUNITY
Start: 2023-09-12

## 2024-09-11 RX ORDER — IBUPROFEN 800 MG/1
800 TABLET ORAL EVERY 8 HOURS PRN
COMMUNITY
Start: 2024-07-27

## 2024-09-11 ASSESSMENT — PATIENT HEALTH QUESTIONNAIRE - PHQ9
1. LITTLE INTEREST OR PLEASURE IN DOING THINGS: NOT AT ALL
SUM OF ALL RESPONSES TO PHQ9 QUESTIONS 1 AND 2: 0
2. FEELING DOWN, DEPRESSED OR HOPELESS: NOT AT ALL

## 2024-09-18 RX ORDER — FENOFIBRATE 145 MG/1
145 TABLET, COATED ORAL DAILY
Qty: 90 TABLET | Refills: 1 | Status: SHIPPED | OUTPATIENT
Start: 2024-09-18

## 2024-09-20 ENCOUNTER — HOSPITAL ENCOUNTER (OUTPATIENT)
Dept: INFUSION THERAPY | Age: 74
Discharge: HOME | End: 2024-09-20
Payer: MEDICARE

## 2024-09-20 ENCOUNTER — CLINICAL DOCUMENTATION (OUTPATIENT)
Dept: INFUSION THERAPY | Age: 74
End: 2024-09-20

## 2024-09-20 ENCOUNTER — OFFICE VISIT (OUTPATIENT)
Dept: ONCOLOGY | Age: 74
End: 2024-09-20
Payer: MEDICARE

## 2024-09-20 VITALS
SYSTOLIC BLOOD PRESSURE: 119 MMHG | OXYGEN SATURATION: 97 % | HEIGHT: 67 IN | WEIGHT: 171 LBS | HEART RATE: 79 BPM | BODY MASS INDEX: 26.84 KG/M2 | DIASTOLIC BLOOD PRESSURE: 72 MMHG | TEMPERATURE: 98.1 F

## 2024-09-20 VITALS
RESPIRATION RATE: 18 BRPM | OXYGEN SATURATION: 96 % | SYSTOLIC BLOOD PRESSURE: 137 MMHG | DIASTOLIC BLOOD PRESSURE: 68 MMHG | TEMPERATURE: 97.2 F | HEART RATE: 74 BPM

## 2024-09-20 DIAGNOSIS — R53.83 OTHER FATIGUE: ICD-10-CM

## 2024-09-20 DIAGNOSIS — C10.9 SQUAMOUS CELL CARCINOMA OF OROPHARYNX (HCC): Primary | ICD-10-CM

## 2024-09-20 DIAGNOSIS — C10.9 OROPHARYNGEAL CANCER (HCC): ICD-10-CM

## 2024-09-20 DIAGNOSIS — C10.9 SQUAMOUS CELL CARCINOMA OF OROPHARYNX (HCC): ICD-10-CM

## 2024-09-20 LAB
ALBUMIN SERPL-MCNC: 4.4 G/DL (ref 3.5–5.2)
ALP SERPL-CCNC: 75 U/L (ref 40–129)
ALT SERPL-CCNC: 17 U/L (ref 0–40)
ANION GAP SERPL CALCULATED.3IONS-SCNC: 8 MMOL/L (ref 7–16)
AST SERPL-CCNC: 20 U/L (ref 0–39)
BASOPHILS # BLD: 0 K/UL (ref 0–0.2)
BASOPHILS NFR BLD: 0 % (ref 0–2)
BILIRUB SERPL-MCNC: 0.4 MG/DL (ref 0–1.2)
BUN SERPL-MCNC: 25 MG/DL (ref 6–23)
CALCIUM SERPL-MCNC: 10.6 MG/DL (ref 8.6–10.2)
CHLORIDE SERPL-SCNC: 105 MMOL/L (ref 98–107)
CO2 SERPL-SCNC: 27 MMOL/L (ref 22–29)
CREAT SERPL-MCNC: 1.2 MG/DL (ref 0.7–1.2)
EOSINOPHIL # BLD: 0 K/UL (ref 0.05–0.5)
EOSINOPHILS RELATIVE PERCENT: 0 % (ref 0–6)
ERYTHROCYTE [DISTWIDTH] IN BLOOD BY AUTOMATED COUNT: 14.4 % (ref 11.5–15)
GFR, ESTIMATED: 63 ML/MIN/1.73M2
GLUCOSE SERPL-MCNC: 171 MG/DL (ref 74–99)
HCT VFR BLD AUTO: 42 % (ref 37–54)
HGB BLD-MCNC: 14 G/DL (ref 12.5–16.5)
LYMPHOCYTES NFR BLD: 0.09 K/UL (ref 1.5–4)
LYMPHOCYTES RELATIVE PERCENT: 2 % (ref 20–42)
MCH RBC QN AUTO: 31.5 PG (ref 26–35)
MCHC RBC AUTO-ENTMCNC: 33.3 G/DL (ref 32–34.5)
MCV RBC AUTO: 94.6 FL (ref 80–99.9)
MONOCYTES NFR BLD: 0.23 K/UL (ref 0.1–0.95)
MONOCYTES NFR BLD: 4 % (ref 2–12)
NEUTROPHILS NFR BLD: 94 % (ref 43–80)
NEUTS SEG NFR BLD: 4.78 K/UL (ref 1.8–7.3)
PLATELET # BLD AUTO: 238 K/UL (ref 130–450)
PMV BLD AUTO: 10.7 FL (ref 7–12)
POTASSIUM SERPL-SCNC: 4 MMOL/L (ref 3.5–5)
PROT SERPL-MCNC: 8.1 G/DL (ref 6.4–8.3)
RBC # BLD AUTO: 4.44 M/UL (ref 3.8–5.8)
RBC # BLD: NORMAL 10*6/UL
SODIUM SERPL-SCNC: 140 MMOL/L (ref 132–146)
TSH SERPL DL<=0.05 MIU/L-ACNC: 3.1 UIU/ML (ref 0.27–4.2)
WBC OTHER # BLD: 5.1 K/UL (ref 4.5–11.5)

## 2024-09-20 PROCEDURE — 99214 OFFICE O/P EST MOD 30 MIN: CPT | Performed by: STUDENT IN AN ORGANIZED HEALTH CARE EDUCATION/TRAINING PROGRAM

## 2024-09-20 PROCEDURE — 1123F ACP DISCUSS/DSCN MKR DOCD: CPT | Performed by: STUDENT IN AN ORGANIZED HEALTH CARE EDUCATION/TRAINING PROGRAM

## 2024-09-20 PROCEDURE — 84443 ASSAY THYROID STIM HORMONE: CPT

## 2024-09-20 PROCEDURE — 96375 TX/PRO/DX INJ NEW DRUG ADDON: CPT

## 2024-09-20 PROCEDURE — 85025 COMPLETE CBC W/AUTO DIFF WBC: CPT

## 2024-09-20 PROCEDURE — 80053 COMPREHEN METABOLIC PANEL: CPT

## 2024-09-20 PROCEDURE — 96361 HYDRATE IV INFUSION ADD-ON: CPT

## 2024-09-20 PROCEDURE — 96413 CHEMO IV INFUSION 1 HR: CPT

## 2024-09-20 PROCEDURE — 2580000003 HC RX 258: Performed by: STUDENT IN AN ORGANIZED HEALTH CARE EDUCATION/TRAINING PROGRAM

## 2024-09-20 PROCEDURE — 6360000002 HC RX W HCPCS: Performed by: STUDENT IN AN ORGANIZED HEALTH CARE EDUCATION/TRAINING PROGRAM

## 2024-09-20 PROCEDURE — 36415 COLL VENOUS BLD VENIPUNCTURE: CPT

## 2024-09-20 RX ORDER — DIPHENHYDRAMINE HYDROCHLORIDE 50 MG/ML
50 INJECTION INTRAMUSCULAR; INTRAVENOUS
Status: CANCELLED | OUTPATIENT
Start: 2024-09-20

## 2024-09-20 RX ORDER — OXYCODONE HYDROCHLORIDE 5 MG/1
5 TABLET ORAL EVERY 6 HOURS PRN
Qty: 90 TABLET | Refills: 0 | Status: SHIPPED | OUTPATIENT
Start: 2024-09-20 | End: 2024-10-20

## 2024-09-20 RX ORDER — ACETAMINOPHEN 325 MG/1
650 TABLET ORAL
Status: CANCELLED | OUTPATIENT
Start: 2024-09-20

## 2024-09-20 RX ORDER — ALBUTEROL SULFATE 90 UG/1
4 INHALANT RESPIRATORY (INHALATION) PRN
Status: CANCELLED | OUTPATIENT
Start: 2024-09-20

## 2024-09-20 RX ORDER — SODIUM CHLORIDE 9 MG/ML
5-250 INJECTION, SOLUTION INTRAVENOUS PRN
Status: CANCELLED | OUTPATIENT
Start: 2024-09-20

## 2024-09-20 RX ORDER — HEPARIN SODIUM (PORCINE) LOCK FLUSH IV SOLN 100 UNIT/ML 100 UNIT/ML
500 SOLUTION INTRAVENOUS PRN
Status: CANCELLED | OUTPATIENT
Start: 2024-09-20

## 2024-09-20 RX ORDER — ONDANSETRON 2 MG/ML
8 INJECTION INTRAMUSCULAR; INTRAVENOUS
Status: CANCELLED | OUTPATIENT
Start: 2024-09-20

## 2024-09-20 RX ORDER — SODIUM CHLORIDE 9 MG/ML
5-250 INJECTION, SOLUTION INTRAVENOUS PRN
Status: DISCONTINUED | OUTPATIENT
Start: 2024-09-20 | End: 2024-09-21 | Stop reason: HOSPADM

## 2024-09-20 RX ORDER — MEPERIDINE HYDROCHLORIDE 50 MG/ML
12.5 INJECTION INTRAMUSCULAR; INTRAVENOUS; SUBCUTANEOUS PRN
Status: CANCELLED | OUTPATIENT
Start: 2024-09-20

## 2024-09-20 RX ORDER — SODIUM CHLORIDE 0.9 % (FLUSH) 0.9 %
5-40 SYRINGE (ML) INJECTION PRN
Status: CANCELLED | OUTPATIENT
Start: 2024-09-20

## 2024-09-20 RX ORDER — SODIUM CHLORIDE 9 MG/ML
INJECTION, SOLUTION INTRAVENOUS CONTINUOUS
Status: CANCELLED | OUTPATIENT
Start: 2024-09-20

## 2024-09-20 RX ORDER — EPINEPHRINE 1 MG/ML
0.3 INJECTION, SOLUTION, CONCENTRATE INTRAVENOUS PRN
Status: CANCELLED | OUTPATIENT
Start: 2024-09-20

## 2024-09-20 RX ORDER — ONDANSETRON 2 MG/ML
8 INJECTION INTRAMUSCULAR; INTRAVENOUS
Status: COMPLETED | OUTPATIENT
Start: 2024-09-20 | End: 2024-09-20

## 2024-09-20 RX ORDER — FAMOTIDINE 10 MG/ML
20 INJECTION, SOLUTION INTRAVENOUS
Status: CANCELLED | OUTPATIENT
Start: 2024-09-20

## 2024-09-20 RX ADMIN — SODIUM CHLORIDE 20 ML/HR: 9 INJECTION, SOLUTION INTRAVENOUS at 13:39

## 2024-09-20 RX ADMIN — SODIUM CHLORIDE 200 MG: 9 INJECTION, SOLUTION INTRAVENOUS at 14:14

## 2024-09-20 RX ADMIN — ONDANSETRON 8 MG: 2 INJECTION INTRAMUSCULAR; INTRAVENOUS at 13:54

## 2024-09-27 ENCOUNTER — OFFICE VISIT (OUTPATIENT)
Dept: ENT CLINIC | Age: 74
End: 2024-09-27
Payer: MEDICARE

## 2024-09-27 ENCOUNTER — HOSPITAL ENCOUNTER (OUTPATIENT)
Dept: NUCLEAR MEDICINE | Age: 74
Discharge: HOME | End: 2024-09-27
Payer: MEDICARE

## 2024-09-27 VITALS
WEIGHT: 168.3 LBS | DIASTOLIC BLOOD PRESSURE: 85 MMHG | HEIGHT: 67 IN | SYSTOLIC BLOOD PRESSURE: 126 MMHG | BODY MASS INDEX: 26.42 KG/M2 | HEART RATE: 75 BPM

## 2024-09-27 DIAGNOSIS — R22.1 MASS OF LEFT SIDE OF NECK: ICD-10-CM

## 2024-09-27 DIAGNOSIS — C10.9 OROPHARYNGEAL CANCER (HCC): ICD-10-CM

## 2024-09-27 DIAGNOSIS — C10.2 MALIGNANT NEOPLASM OF LATERAL WALL OF OROPHARYNX (HCC): ICD-10-CM

## 2024-09-27 DIAGNOSIS — C10.9 SQUAMOUS CELL CARCINOMA OF OROPHARYNX (HCC): Primary | ICD-10-CM

## 2024-09-27 PROCEDURE — 1123F ACP DISCUSS/DSCN MKR DOCD: CPT | Performed by: OTOLARYNGOLOGY

## 2024-09-27 PROCEDURE — 99213 OFFICE O/P EST LOW 20 MIN: CPT | Performed by: OTOLARYNGOLOGY

## 2024-09-27 PROCEDURE — A9609 HC RX DIAGNOSTIC RADIOPHARMACEUTICAL: HCPCS | Performed by: RADIOLOGY

## 2024-09-27 PROCEDURE — 3430000000 HC RX DIAGNOSTIC RADIOPHARMACEUTICAL: Performed by: RADIOLOGY

## 2024-09-27 RX ORDER — CHLORHEXIDINE GLUCONATE ORAL RINSE 1.2 MG/ML
15 SOLUTION DENTAL 2 TIMES DAILY
Qty: 420 ML | Refills: 2 | Status: SHIPPED | OUTPATIENT
Start: 2024-09-27 | End: 2024-10-11

## 2024-09-27 RX ORDER — FLUDEOXYGLUCOSE F 18 200 MCI/ML
10 INJECTION, SOLUTION INTRAVENOUS
Status: COMPLETED | OUTPATIENT
Start: 2024-09-27 | End: 2024-09-27

## 2024-09-27 RX ADMIN — FLUDEOXYGLUCOSE F 18 10 MILLICURIE: 200 INJECTION, SOLUTION INTRAVENOUS at 18:10

## 2024-09-27 NOTE — PROGRESS NOTES
[facility]  Dr. Kris Hernandez D.O. Ms.Ed        Patient Name:  Ignacio Bhatia  :  1950     CHIEF C/O:    Chief Complaint   Patient presents with    Follow-up     3 mo oropharynx CA/SCOPE       HISTORY OBTAINED FROM:  patient    HISTORY OF PRESENT ILLNESS:       Ignacio is a 74 y.o. year old male, here today for follow up of:       Hx of laryngeal carcinoma, patient is complaining of bleeding in throat, coughing up clots of blood, then blood with mucus. Radiation changes of the soft pallet and base of tongue, a lot of radiation changes to the left neck. Scope examination was normal, shows bleeding is coming from post nasal pharynx            Past Medical History:   Diagnosis Date    Arthritis     Cancer (HCC)     oropharynx    Chronic renal disease, stage III (HCC) [928437] 2023    Diabetes mellitus (HCC)     Essential hypertension 11/10/2020    GERD (gastroesophageal reflux disease)     Hyperlipidemia     Hypertension     Lyme disease     Thrombocytopenia (HCC)      Past Surgical History:   Procedure Laterality Date    BRONCHOSCOPY N/A 10/28/2019    BRONCHOSCOPY performed by Laurent Bhakta MD at [facility] ENDOSCOPY    CARDIAC CATHETERIZATION      dr harris    CARDIAC CATHETERIZATION  2017    COLONOSCOPY  2019    GASTROSTOMY TUBE PLACEMENT N/A 2022    PEG TUBE PLACEMENT performed by Roberto Thomas MD at [facility] ENDOSCOPY    IR BIOPSY LIVER PERCUTANEOUS  3/22/2023    IR BIOPSY LIVER PERCUTANEOUS 3/22/2023 [facility] CT    LARYNGOSCOPY Left 3/17/2022    PANENDOSCOPY WITH BIOPSY performed by Kris Hernandez DO at [facility] OR    LARYNGOSCOPY Left 2022    PANENDOSCOPY performed by Kris Hernandez DO at [facility] OR    NECK SURGERY Left 2022    EXCISION LEFT LEVEL II LYMPH NODE, PANENDOSCOPY performed by Kris Hernandez DO at [facility] OR    TOOTH EXTRACTION Left 2021    VASECTOMY         Current Outpatient Medications:     oxyCODONE (ROXICODONE) 5 MG immediate release

## 2024-10-11 ENCOUNTER — OFFICE VISIT (OUTPATIENT)
Dept: ONCOLOGY | Age: 74
End: 2024-10-11
Payer: MEDICARE

## 2024-10-11 ENCOUNTER — APPOINTMENT (OUTPATIENT)
Dept: OTOLARYNGOLOGY | Facility: HOSPITAL | Age: 74
End: 2024-10-11
Payer: MEDICARE

## 2024-10-11 ENCOUNTER — HOSPITAL ENCOUNTER (OUTPATIENT)
Dept: INFUSION THERAPY | Age: 74
Discharge: HOME | End: 2024-10-11
Payer: MEDICARE

## 2024-10-11 VITALS
BODY MASS INDEX: 26.81 KG/M2 | SYSTOLIC BLOOD PRESSURE: 127 MMHG | OXYGEN SATURATION: 98 % | DIASTOLIC BLOOD PRESSURE: 79 MMHG | TEMPERATURE: 98.2 F | HEART RATE: 81 BPM | HEIGHT: 67 IN | WEIGHT: 170.8 LBS

## 2024-10-11 VITALS
SYSTOLIC BLOOD PRESSURE: 105 MMHG | TEMPERATURE: 97.5 F | DIASTOLIC BLOOD PRESSURE: 61 MMHG | HEART RATE: 73 BPM | OXYGEN SATURATION: 96 % | RESPIRATION RATE: 16 BRPM

## 2024-10-11 DIAGNOSIS — R53.83 OTHER FATIGUE: ICD-10-CM

## 2024-10-11 DIAGNOSIS — E03.9 HYPOTHYROIDISM, UNSPECIFIED TYPE: ICD-10-CM

## 2024-10-11 DIAGNOSIS — C10.9 SQUAMOUS CELL CARCINOMA OF OROPHARYNX (HCC): Primary | ICD-10-CM

## 2024-10-11 DIAGNOSIS — C10.9 SQUAMOUS CELL CARCINOMA OF OROPHARYNX (HCC): ICD-10-CM

## 2024-10-11 DIAGNOSIS — K22.9 ESOPHAGEAL LESION: ICD-10-CM

## 2024-10-11 DIAGNOSIS — J02.8 PHARYNGITIS DUE TO OTHER ORGANISM: ICD-10-CM

## 2024-10-11 LAB
ALBUMIN SERPL-MCNC: 4.1 G/DL (ref 3.5–5.2)
ALP SERPL-CCNC: 73 U/L (ref 40–129)
ALT SERPL-CCNC: 17 U/L (ref 0–40)
ANION GAP SERPL CALCULATED.3IONS-SCNC: 9 MMOL/L (ref 7–16)
AST SERPL-CCNC: 21 U/L (ref 0–39)
BASOPHILS # BLD: 0.04 K/UL (ref 0–0.2)
BASOPHILS NFR BLD: 1 % (ref 0–2)
BILIRUB SERPL-MCNC: 0.4 MG/DL (ref 0–1.2)
BUN SERPL-MCNC: 24 MG/DL (ref 6–23)
CALCIUM SERPL-MCNC: 10.3 MG/DL (ref 8.6–10.2)
CHLORIDE SERPL-SCNC: 105 MMOL/L (ref 98–107)
CO2 SERPL-SCNC: 26 MMOL/L (ref 22–29)
CREAT SERPL-MCNC: 1.3 MG/DL (ref 0.7–1.2)
EOSINOPHIL # BLD: 0.34 K/UL (ref 0.05–0.5)
EOSINOPHILS RELATIVE PERCENT: 7 % (ref 0–6)
ERYTHROCYTE [DISTWIDTH] IN BLOOD BY AUTOMATED COUNT: 14.5 % (ref 11.5–15)
GFR, ESTIMATED: 57 ML/MIN/1.73M2
GLUCOSE SERPL-MCNC: 149 MG/DL (ref 74–99)
HCT VFR BLD AUTO: 40 % (ref 37–54)
HGB BLD-MCNC: 13.3 G/DL (ref 12.5–16.5)
LYMPHOCYTES NFR BLD: 0.25 K/UL (ref 1.5–4)
LYMPHOCYTES RELATIVE PERCENT: 5 % (ref 20–42)
MCH RBC QN AUTO: 31.1 PG (ref 26–35)
MCHC RBC AUTO-ENTMCNC: 33.3 G/DL (ref 32–34.5)
MCV RBC AUTO: 93.5 FL (ref 80–99.9)
MONOCYTES NFR BLD: 0.25 K/UL (ref 0.1–0.95)
MONOCYTES NFR BLD: 5 % (ref 2–12)
NEUTROPHILS NFR BLD: 82 % (ref 43–80)
NEUTS SEG NFR BLD: 3.92 K/UL (ref 1.8–7.3)
NUCLEATED RED BLOOD CELLS: 2 PER 100 WBC
PLATELET # BLD AUTO: 226 K/UL (ref 130–450)
PMV BLD AUTO: 10.4 FL (ref 7–12)
POTASSIUM SERPL-SCNC: 4.1 MMOL/L (ref 3.5–5)
PROT SERPL-MCNC: 7.3 G/DL (ref 6.4–8.3)
RBC # BLD AUTO: 4.28 M/UL (ref 3.8–5.8)
RBC # BLD: ABNORMAL 10*6/UL
RBC # BLD: ABNORMAL 10*6/UL
SODIUM SERPL-SCNC: 140 MMOL/L (ref 132–146)
TSH SERPL DL<=0.05 MIU/L-ACNC: 4.43 UIU/ML (ref 0.27–4.2)
WBC OTHER # BLD: 4.8 K/UL (ref 4.5–11.5)

## 2024-10-11 PROCEDURE — 1123F ACP DISCUSS/DSCN MKR DOCD: CPT | Performed by: STUDENT IN AN ORGANIZED HEALTH CARE EDUCATION/TRAINING PROGRAM

## 2024-10-11 PROCEDURE — 84443 ASSAY THYROID STIM HORMONE: CPT

## 2024-10-11 PROCEDURE — 96413 CHEMO IV INFUSION 1 HR: CPT

## 2024-10-11 PROCEDURE — 2580000003 HC RX 258: Performed by: STUDENT IN AN ORGANIZED HEALTH CARE EDUCATION/TRAINING PROGRAM

## 2024-10-11 PROCEDURE — 96361 HYDRATE IV INFUSION ADD-ON: CPT

## 2024-10-11 PROCEDURE — 36415 COLL VENOUS BLD VENIPUNCTURE: CPT

## 2024-10-11 PROCEDURE — 6360000002 HC RX W HCPCS: Performed by: STUDENT IN AN ORGANIZED HEALTH CARE EDUCATION/TRAINING PROGRAM

## 2024-10-11 PROCEDURE — 85025 COMPLETE CBC W/AUTO DIFF WBC: CPT

## 2024-10-11 PROCEDURE — 96375 TX/PRO/DX INJ NEW DRUG ADDON: CPT

## 2024-10-11 PROCEDURE — 80053 COMPREHEN METABOLIC PANEL: CPT

## 2024-10-11 PROCEDURE — 99214 OFFICE O/P EST MOD 30 MIN: CPT | Performed by: STUDENT IN AN ORGANIZED HEALTH CARE EDUCATION/TRAINING PROGRAM

## 2024-10-11 RX ORDER — HEPARIN SODIUM (PORCINE) LOCK FLUSH IV SOLN 100 UNIT/ML 100 UNIT/ML
500 SOLUTION INTRAVENOUS PRN
Status: CANCELLED | OUTPATIENT
Start: 2024-10-11

## 2024-10-11 RX ORDER — SODIUM CHLORIDE 9 MG/ML
5-250 INJECTION, SOLUTION INTRAVENOUS PRN
Status: DISCONTINUED | OUTPATIENT
Start: 2024-10-11 | End: 2024-10-12 | Stop reason: HOSPADM

## 2024-10-11 RX ORDER — SODIUM CHLORIDE 0.9 % (FLUSH) 0.9 %
5-40 SYRINGE (ML) INJECTION PRN
Status: DISCONTINUED | OUTPATIENT
Start: 2024-10-11 | End: 2024-10-12 | Stop reason: HOSPADM

## 2024-10-11 RX ORDER — MEPERIDINE HYDROCHLORIDE 50 MG/ML
12.5 INJECTION INTRAMUSCULAR; INTRAVENOUS; SUBCUTANEOUS PRN
Status: CANCELLED | OUTPATIENT
Start: 2024-10-11

## 2024-10-11 RX ORDER — SODIUM CHLORIDE 9 MG/ML
INJECTION, SOLUTION INTRAVENOUS CONTINUOUS
Status: CANCELLED | OUTPATIENT
Start: 2024-10-11

## 2024-10-11 RX ORDER — SODIUM CHLORIDE 0.9 % (FLUSH) 0.9 %
5-40 SYRINGE (ML) INJECTION PRN
Status: CANCELLED | OUTPATIENT
Start: 2024-10-11

## 2024-10-11 RX ORDER — ONDANSETRON 2 MG/ML
8 INJECTION INTRAMUSCULAR; INTRAVENOUS
Status: CANCELLED | OUTPATIENT
Start: 2024-10-11

## 2024-10-11 RX ORDER — SODIUM CHLORIDE 9 MG/ML
5-250 INJECTION, SOLUTION INTRAVENOUS PRN
Status: CANCELLED | OUTPATIENT
Start: 2024-10-11

## 2024-10-11 RX ORDER — ACETAMINOPHEN 325 MG/1
650 TABLET ORAL
Status: CANCELLED | OUTPATIENT
Start: 2024-10-11

## 2024-10-11 RX ORDER — ALBUTEROL SULFATE 90 UG/1
4 INHALANT RESPIRATORY (INHALATION) PRN
Status: CANCELLED | OUTPATIENT
Start: 2024-10-11

## 2024-10-11 RX ORDER — EPINEPHRINE 1 MG/ML
0.3 INJECTION, SOLUTION, CONCENTRATE INTRAVENOUS PRN
Status: CANCELLED | OUTPATIENT
Start: 2024-10-11

## 2024-10-11 RX ORDER — LEVOTHYROXINE SODIUM 100 MCG
100 TABLET ORAL DAILY
Qty: 30 TABLET | Refills: 3 | Status: SHIPPED | OUTPATIENT
Start: 2024-10-11

## 2024-10-11 RX ORDER — ONDANSETRON 2 MG/ML
8 INJECTION INTRAMUSCULAR; INTRAVENOUS
Status: COMPLETED | OUTPATIENT
Start: 2024-10-11 | End: 2024-10-11

## 2024-10-11 RX ORDER — DIPHENHYDRAMINE HYDROCHLORIDE 50 MG/ML
50 INJECTION INTRAMUSCULAR; INTRAVENOUS
Status: CANCELLED | OUTPATIENT
Start: 2024-10-11

## 2024-10-11 RX ORDER — FAMOTIDINE 10 MG/ML
20 INJECTION, SOLUTION INTRAVENOUS
Status: CANCELLED | OUTPATIENT
Start: 2024-10-11

## 2024-10-11 RX ADMIN — SODIUM CHLORIDE 20 ML/HR: 9 INJECTION, SOLUTION INTRAVENOUS at 13:53

## 2024-10-11 RX ADMIN — ONDANSETRON 8 MG: 2 INJECTION INTRAMUSCULAR; INTRAVENOUS at 14:01

## 2024-10-11 RX ADMIN — SODIUM CHLORIDE 200 MG: 9 INJECTION, SOLUTION INTRAVENOUS at 14:19

## 2024-10-11 RX ADMIN — SODIUM CHLORIDE, PRESERVATIVE FREE 10 ML: 5 INJECTION INTRAVENOUS at 13:51

## 2024-10-11 NOTE — PROGRESS NOTES
completed on 11/03/2022.     PET/CT scan 02/10/2023:   Hypermetabolic adenopathy within the neck has resolved. There is also improved activity at the floor of the mouth; residual intermediate level activity at the anterior left floor of mouth can reflect post treatment related inflammation or residual local disease. Interval development of osseous metastatic disease of the sacrum, T12, and possibly anterior right 3rd rib, compared to PET/CT scan dated 08/01/2022.  Interval development of multiple hepatic metastases  Overall disease progression.     PD-L1 on tissue: CPS 50    CT guided core needle biopsy liver mass 03/22/2023.   Metastatic HPV-related squamous cell carcinoma   Comment: The squamous cell carcinoma is immunoreactive with pankeratin, p40, and p16 (surrogate marker for high risk HPV).  The TTF-1, chromogranin, and synaptophysin immunostains are negative in tumor cells.  Intradepartmental consultation is obtained    Metastatic Squamous Cell Carcinoma  Since CPS 50>20; recommend single agent Keytruda.   Cycle # 1 Keytruda was on 03/28/2023.   Cycle # 2 Keytruda was on 04/18/2023.  Cycle # 3 Keytruda was on 05/09/2023.  Cycle # 4 Keytruda was on 05/30/2023.   PET/CT scan 06/19/2023:   Findings are compatible with mixed treatment response as compared to prior exam dated 2/10/2023. Previously demonstrated osseous hypermetabolism has resolved and there has been decreased activity within hepatic metastasis (with residual activity in the dominant lesion in the inferior right hepatic lobe).  Barry activity at the phylicai hepatis has increased as compared to prior.  Increased activity seen at the floor of the mouth as compared to prior.   Continue Keytruda and repeat scans in 3 months. Recommend to f/u with ENT for scope examination  Cycle # 5 Keytruda was on 06/20/2023.   Cycle # 6 Keytruda was on 07/11/2023.   Cycle # 7 Keytruda is ordered and given today 08/01/2023. Labs reviewed ok to proceed. Dry mouth

## 2024-10-18 DIAGNOSIS — C10.9 OROPHARYNGEAL CANCER (HCC): ICD-10-CM

## 2024-10-18 RX ORDER — OXYCODONE HYDROCHLORIDE 5 MG/1
5 TABLET ORAL EVERY 6 HOURS PRN
Qty: 90 TABLET | Refills: 0 | Status: SHIPPED | OUTPATIENT
Start: 2024-10-18 | End: 2024-11-17

## 2024-10-18 NOTE — TELEPHONE ENCOUNTER
Patient Ignacio's wife Erin called for refill oxycodone 5mg IR. Next appointment 11-. [facility]

## 2024-10-21 ENCOUNTER — OFFICE VISIT (OUTPATIENT)
Dept: SURGERY | Age: 74
End: 2024-10-21
Payer: MEDICARE

## 2024-10-21 VITALS
DIASTOLIC BLOOD PRESSURE: 71 MMHG | BODY MASS INDEX: 26.75 KG/M2 | SYSTOLIC BLOOD PRESSURE: 116 MMHG | HEIGHT: 67 IN | TEMPERATURE: 97 F | HEART RATE: 72 BPM

## 2024-10-21 DIAGNOSIS — K94.23 PEG TUBE MALFUNCTION (HCC): Primary | ICD-10-CM

## 2024-10-21 PROCEDURE — 1123F ACP DISCUSS/DSCN MKR DOCD: CPT | Performed by: SURGERY

## 2024-10-21 PROCEDURE — 99204 OFFICE O/P NEW MOD 45 MIN: CPT | Performed by: SURGERY

## 2024-10-21 NOTE — PROGRESS NOTES
General Surgery History and Physical    Patient's Name/Date of Birth: Ignacio Bhatia / 1950    Date: October 21, 2024     Surgeon: Roberto Thomas M.D.    PCP: Jason Foy DO     Chief Complaint: peg leak    HPI:   Ignacio Bhatia is a 74 y.o. male who presents for evaluation of peg leak in tubing and peg has been in place for over 2 years.       Past Medical History:   Diagnosis Date    Arthritis     Cancer (HCC) 2022    oropharynx    Chronic renal disease, stage III (HCC) [115277] 08/22/2023    Diabetes mellitus (HCC)     Essential hypertension 11/10/2020    GERD (gastroesophageal reflux disease)     Hyperlipidemia     Hypertension     Lyme disease     Thrombocytopenia (HCC)        Past Surgical History:   Procedure Laterality Date    BRONCHOSCOPY N/A 10/28/2019    BRONCHOSCOPY performed by Laurent Bhakta MD at [facility] ENDOSCOPY    CARDIAC CATHETERIZATION  2017    dr harris    CARDIAC CATHETERIZATION  03/2017    COLONOSCOPY  03/2019    GASTROSTOMY TUBE PLACEMENT N/A 9/30/2022    PEG TUBE PLACEMENT performed by Roberto Thomas MD at [facility] ENDOSCOPY    IR BIOPSY LIVER PERCUTANEOUS  3/22/2023    IR BIOPSY LIVER PERCUTANEOUS 3/22/2023 [facility] CT    LARYNGOSCOPY Left 3/17/2022    PANENDOSCOPY WITH BIOPSY performed by Kris Hernandez DO at [facility] OR    LARYNGOSCOPY Left 7/21/2022    PANENDOSCOPY performed by Kris Hernandez DO at [facility] OR    NECK SURGERY Left 7/21/2022    EXCISION LEFT LEVEL II LYMPH NODE, PANENDOSCOPY performed by Kris Hernandez DO at [facility] OR    TOOTH EXTRACTION Left 12/28/2021    VASECTOMY         Current Outpatient Medications   Medication Sig Dispense Refill    oxyCODONE (ROXICODONE) 5 MG immediate release tablet Take 1 tablet by mouth every 6 hours as needed for Pain for up to 30 days. Max Daily Amount: 20 mg 90 tablet 0    SYNTHROID 100 MCG tablet Take 1 tablet by mouth Daily 30 tablet 3    fenofibrate (TRICOR) 145 MG tablet Take 1 tablet by mouth daily 90 tablet 1    ondansetron

## 2024-10-24 DIAGNOSIS — J02.8 PHARYNGITIS DUE TO OTHER ORGANISM: ICD-10-CM

## 2024-11-01 ENCOUNTER — HOSPITAL ENCOUNTER (OUTPATIENT)
Dept: INFUSION THERAPY | Age: 74
Discharge: HOME | End: 2024-11-01
Payer: MEDICARE

## 2024-11-01 ENCOUNTER — OFFICE VISIT (OUTPATIENT)
Dept: ONCOLOGY | Age: 74
End: 2024-11-01

## 2024-11-01 VITALS
DIASTOLIC BLOOD PRESSURE: 67 MMHG | SYSTOLIC BLOOD PRESSURE: 116 MMHG | TEMPERATURE: 97.3 F | OXYGEN SATURATION: 98 % | BODY MASS INDEX: 26.76 KG/M2 | HEART RATE: 72 BPM | HEIGHT: 67 IN | WEIGHT: 170.5 LBS

## 2024-11-01 VITALS
SYSTOLIC BLOOD PRESSURE: 104 MMHG | OXYGEN SATURATION: 96 % | RESPIRATION RATE: 18 BRPM | DIASTOLIC BLOOD PRESSURE: 68 MMHG | HEART RATE: 70 BPM | TEMPERATURE: 97.6 F

## 2024-11-01 DIAGNOSIS — C10.9 SQUAMOUS CELL CARCINOMA OF OROPHARYNX (HCC): Primary | ICD-10-CM

## 2024-11-01 DIAGNOSIS — R53.83 OTHER FATIGUE: ICD-10-CM

## 2024-11-01 DIAGNOSIS — C10.9 SQUAMOUS CELL CARCINOMA OF OROPHARYNX (HCC): ICD-10-CM

## 2024-11-01 LAB
ALBUMIN SERPL-MCNC: 4.1 G/DL (ref 3.5–5.2)
ALP SERPL-CCNC: 67 U/L (ref 40–129)
ALT SERPL-CCNC: 16 U/L (ref 0–40)
ANION GAP SERPL CALCULATED.3IONS-SCNC: 8 MMOL/L (ref 7–16)
AST SERPL-CCNC: 19 U/L (ref 0–39)
BASOPHILS # BLD: 0 K/UL (ref 0–0.2)
BASOPHILS NFR BLD: 0 % (ref 0–2)
BILIRUB SERPL-MCNC: 0.4 MG/DL (ref 0–1.2)
BUN SERPL-MCNC: 24 MG/DL (ref 6–23)
CALCIUM SERPL-MCNC: 10.1 MG/DL (ref 8.6–10.2)
CHLORIDE SERPL-SCNC: 104 MMOL/L (ref 98–107)
CO2 SERPL-SCNC: 26 MMOL/L (ref 22–29)
CREAT SERPL-MCNC: 1.2 MG/DL (ref 0.7–1.2)
EOSINOPHIL # BLD: 0.12 K/UL (ref 0.05–0.5)
EOSINOPHILS RELATIVE PERCENT: 3 % (ref 0–6)
ERYTHROCYTE [DISTWIDTH] IN BLOOD BY AUTOMATED COUNT: 13.8 % (ref 11.5–15)
GFR, ESTIMATED: 66 ML/MIN/1.73M2
GLUCOSE SERPL-MCNC: 140 MG/DL (ref 74–99)
HCT VFR BLD AUTO: 40.3 % (ref 37–54)
HGB BLD-MCNC: 13.5 G/DL (ref 12.5–16.5)
LYMPHOCYTES NFR BLD: 0.12 K/UL (ref 1.5–4)
LYMPHOCYTES RELATIVE PERCENT: 3 % (ref 20–42)
MCH RBC QN AUTO: 31 PG (ref 26–35)
MCHC RBC AUTO-ENTMCNC: 33.5 G/DL (ref 32–34.5)
MCV RBC AUTO: 92.6 FL (ref 80–99.9)
MONOCYTES NFR BLD: 0.27 K/UL (ref 0.1–0.95)
MONOCYTES NFR BLD: 6 % (ref 2–12)
NEUTROPHILS NFR BLD: 89 % (ref 43–80)
NEUTS SEG NFR BLD: 3.99 K/UL (ref 1.8–7.3)
PLATELET # BLD AUTO: 229 K/UL (ref 130–450)
PMV BLD AUTO: 10 FL (ref 7–12)
POTASSIUM SERPL-SCNC: 3.9 MMOL/L (ref 3.5–5)
PROT SERPL-MCNC: 7.4 G/DL (ref 6.4–8.3)
RBC # BLD AUTO: 4.35 M/UL (ref 3.8–5.8)
RBC # BLD: NORMAL 10*6/UL
SODIUM SERPL-SCNC: 138 MMOL/L (ref 132–146)
TSH SERPL DL<=0.05 MIU/L-ACNC: 2.08 UIU/ML (ref 0.27–4.2)
WBC OTHER # BLD: 4.5 K/UL (ref 4.5–11.5)

## 2024-11-01 PROCEDURE — 85025 COMPLETE CBC W/AUTO DIFF WBC: CPT

## 2024-11-01 PROCEDURE — 96413 CHEMO IV INFUSION 1 HR: CPT

## 2024-11-01 PROCEDURE — 80053 COMPREHEN METABOLIC PANEL: CPT

## 2024-11-01 PROCEDURE — 6360000002 HC RX W HCPCS: Performed by: STUDENT IN AN ORGANIZED HEALTH CARE EDUCATION/TRAINING PROGRAM

## 2024-11-01 PROCEDURE — 36415 COLL VENOUS BLD VENIPUNCTURE: CPT

## 2024-11-01 PROCEDURE — 96361 HYDRATE IV INFUSION ADD-ON: CPT

## 2024-11-01 PROCEDURE — 84443 ASSAY THYROID STIM HORMONE: CPT

## 2024-11-01 PROCEDURE — 2580000003 HC RX 258: Performed by: STUDENT IN AN ORGANIZED HEALTH CARE EDUCATION/TRAINING PROGRAM

## 2024-11-01 PROCEDURE — 96375 TX/PRO/DX INJ NEW DRUG ADDON: CPT

## 2024-11-01 RX ORDER — ALBUTEROL SULFATE 90 UG/1
4 INHALANT RESPIRATORY (INHALATION) PRN
Status: CANCELLED | OUTPATIENT
Start: 2024-11-01

## 2024-11-01 RX ORDER — FAMOTIDINE 10 MG/ML
20 INJECTION, SOLUTION INTRAVENOUS
Status: CANCELLED | OUTPATIENT
Start: 2024-11-01

## 2024-11-01 RX ORDER — ONDANSETRON 2 MG/ML
8 INJECTION INTRAMUSCULAR; INTRAVENOUS
Status: CANCELLED | OUTPATIENT
Start: 2024-11-01

## 2024-11-01 RX ORDER — SODIUM CHLORIDE 0.9 % (FLUSH) 0.9 %
5-40 SYRINGE (ML) INJECTION PRN
Status: CANCELLED | OUTPATIENT
Start: 2024-11-01

## 2024-11-01 RX ORDER — HEPARIN SODIUM (PORCINE) LOCK FLUSH IV SOLN 100 UNIT/ML 100 UNIT/ML
500 SOLUTION INTRAVENOUS PRN
Status: CANCELLED | OUTPATIENT
Start: 2024-11-01

## 2024-11-01 RX ORDER — MEPERIDINE HYDROCHLORIDE 50 MG/ML
12.5 INJECTION INTRAMUSCULAR; INTRAVENOUS; SUBCUTANEOUS PRN
Status: CANCELLED | OUTPATIENT
Start: 2024-11-01

## 2024-11-01 RX ORDER — SODIUM CHLORIDE 9 MG/ML
5-250 INJECTION, SOLUTION INTRAVENOUS PRN
Status: CANCELLED | OUTPATIENT
Start: 2024-11-01

## 2024-11-01 RX ORDER — DIPHENHYDRAMINE HYDROCHLORIDE 50 MG/ML
50 INJECTION INTRAMUSCULAR; INTRAVENOUS
Status: CANCELLED | OUTPATIENT
Start: 2024-11-01

## 2024-11-01 RX ORDER — SODIUM CHLORIDE 9 MG/ML
INJECTION, SOLUTION INTRAVENOUS CONTINUOUS
Status: CANCELLED | OUTPATIENT
Start: 2024-11-01

## 2024-11-01 RX ORDER — SODIUM CHLORIDE 0.9 % (FLUSH) 0.9 %
5-40 SYRINGE (ML) INJECTION PRN
Status: DISCONTINUED | OUTPATIENT
Start: 2024-11-01 | End: 2024-11-02 | Stop reason: HOSPADM

## 2024-11-01 RX ORDER — ONDANSETRON 2 MG/ML
8 INJECTION INTRAMUSCULAR; INTRAVENOUS
Status: COMPLETED | OUTPATIENT
Start: 2024-11-01 | End: 2024-11-01

## 2024-11-01 RX ORDER — ACETAMINOPHEN 325 MG/1
650 TABLET ORAL
Status: CANCELLED | OUTPATIENT
Start: 2024-11-01

## 2024-11-01 RX ORDER — EPINEPHRINE 1 MG/ML
0.3 INJECTION, SOLUTION, CONCENTRATE INTRAVENOUS PRN
Status: CANCELLED | OUTPATIENT
Start: 2024-11-01

## 2024-11-01 RX ORDER — SODIUM CHLORIDE 9 MG/ML
5-250 INJECTION, SOLUTION INTRAVENOUS PRN
Status: DISCONTINUED | OUTPATIENT
Start: 2024-11-01 | End: 2024-11-02 | Stop reason: HOSPADM

## 2024-11-01 RX ADMIN — SODIUM CHLORIDE 200 MG: 9 INJECTION, SOLUTION INTRAVENOUS at 14:06

## 2024-11-01 RX ADMIN — SODIUM CHLORIDE, PRESERVATIVE FREE 10 ML: 5 INJECTION INTRAVENOUS at 13:37

## 2024-11-01 RX ADMIN — SODIUM CHLORIDE 20 ML/HR: 9 INJECTION, SOLUTION INTRAVENOUS at 13:37

## 2024-11-01 RX ADMIN — ONDANSETRON 8 MG: 2 INJECTION INTRAMUSCULAR; INTRAVENOUS at 13:45

## 2024-11-01 NOTE — PROGRESS NOTES
capsule 1    pembrolizumab (KEYTRUDA) 100 MG/4ML SOLN Infuse intravenously 1Q3W      lidocaine viscous hcl (XYLOCAINE) 2 % SOLN solution       fluticasone (FLONASE) 50 MCG/ACT nasal spray 2 sprays by Each Nostril route daily for 14 days 1 each 0     No current facility-administered medications on file prior to visit.   Reviewed and reconciled.    Allergies:  No Known Allergies        REVIEW OF SYSTEMS:  CONSTITUTIONAL :  No fever, chills, fatigue, night sweats, or unintended weight loss  EYES: Dry eyes  ENMT: Dry mouth; dysphagia; decreased mucus secretions.  RESPIRATORY: No shortness of breath   CARDIOVASCULAR: No chest pain, palpitations  GASTROINTESTINAL: No nausea, vomiting, abdominal pain, diarrhea  GENITOURINARY: No dysuria, urinary frequency, hematuria  ENDOCRINE: No polydipsia, polyuria, cold or heat intolerance.  MUSCULOSKELETAL: No arthralgias, myalgias, or bony pain  INTEGUMENT.: No skin rash or bruises.  HEM/LYMPHATICS: No adenopathy, bruising or prolonged bleeding  NEURO: No headache, dizziness, syncope/presyncope, focal deficits or loss of balance.           Vitals:    11/01/24 1302   BP: 116/67   Pulse: 72   Temp: 97.3 °F (36.3 °C)   SpO2: 98%     PHYSICAL EXAM:  GENERAL: Well developed, well nourished; in no acute distress  HEENT:  Nornmocephalic, atraumatic.  EOMI. No scleral icterus. Oral cavity is dry.  NECK:  Supple.  Normal range of motion.  LUNGS: No respiratory distress or stridor.   ABDOMEN:  Non distended. No ascites.   New PEG in place.  EXTREMITIES: without clubbing, cyanosis, or edema.  NEUROLOGIC:  Alert, awake, oriented to time, place and person. No focal deficits.  LYMPHATICS: No cervical lymphadenopathy.  SKIN : No Rash. No Bruising.    ECOG PS 0-1    Lab Results   Component Value Date    WBC 4.5 11/01/2024    HGB 13.5 11/01/2024    HCT 40.3 11/01/2024    MCV 92.6 11/01/2024     11/01/2024     Lab Results   Component Value Date     10/11/2024    K 4.1 10/11/2024

## 2024-11-15 DIAGNOSIS — C10.9 OROPHARYNGEAL CANCER (HCC): ICD-10-CM

## 2024-11-15 RX ORDER — OXYCODONE HYDROCHLORIDE 5 MG/1
5 TABLET ORAL EVERY 6 HOURS PRN
Qty: 90 TABLET | Refills: 0 | Status: SHIPPED | OUTPATIENT
Start: 2024-11-15 | End: 2024-12-15

## 2024-11-15 NOTE — TELEPHONE ENCOUNTER
Call from Ke's wife Erin requesting refill for Oxy IR 5 mg. Pharmacy is [facility]. Next richy 11/20/24.

## 2024-11-20 ENCOUNTER — OFFICE VISIT (OUTPATIENT)
Dept: PALLATIVE CARE | Age: 74
End: 2024-11-20
Payer: MEDICARE

## 2024-11-20 VITALS
HEART RATE: 87 BPM | DIASTOLIC BLOOD PRESSURE: 71 MMHG | SYSTOLIC BLOOD PRESSURE: 118 MMHG | OXYGEN SATURATION: 97 % | BODY MASS INDEX: 26.56 KG/M2 | WEIGHT: 169.6 LBS | TEMPERATURE: 97.2 F

## 2024-11-20 DIAGNOSIS — Z51.5 PALLIATIVE CARE BY SPECIALIST: Primary | ICD-10-CM

## 2024-11-20 PROCEDURE — 99211 OFF/OP EST MAY X REQ PHY/QHP: CPT | Performed by: NURSE PRACTITIONER

## 2024-11-20 RX ORDER — LIDOCAINE HYDROCHLORIDE 20 MG/ML
15 SOLUTION OROPHARYNGEAL
Qty: 600 ML | Refills: 2 | Status: SHIPPED | OUTPATIENT
Start: 2024-11-20

## 2024-11-20 RX ORDER — IBUPROFEN 800 MG/1
800 TABLET, FILM COATED ORAL EVERY 8 HOURS PRN
COMMUNITY
Start: 2024-07-27

## 2024-11-20 NOTE — PROGRESS NOTES
Department of Palliative Medicine  Ambulatory Note  Provider: MARY Hickman - CNP      Location of service: [facility]    Chief Complaint: Ignacio Bhatia is a 74 y.o. 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 Le for oncology  -s/p concurrent chemoradiation therapy  -Progression noted in Feb. 2023  -Now on Keytruda, patient chose to hold for now    Pain due to neoplasm:  -Oxycodone 5 mg Q 6 hours  -Tylenol 650 mg as needed  -Lidocaine viscous, encouraged to use as infrequently as possible  -PEG tube    Constipation  -Continue senna S  -Lactulose- has not needed    Xerostomia/loss of taste:  -Patient chose to stop pilocarpine  -Continue zinc 50 mg twice daily  -Encouraged diligent use of oral rinses  -Stop Magic mouthwash  -Encouraged to use very little lidocaine if possible    Follow Up:  3 months.  Encouraged to call with any questions, concerns, needs, or changes in symptoms.    Subjective:   HPI: Ignacio Bhatia is a 72 y.o. male with significant past medical history of squamous cell carcinoma of the left base of the tongue and tonsil, diabetes mellitus, Lyme disease, hypertension, arthritis who was referred to [facility] for symptom management.  He completed chemo and radiation.  He was found to have development of multiple hepatic metastasis.  PET scan in February 2023 showed resolution of at adenopathy of the neck and improved activity at the floor of the mouth, it showed development of osseous metastatic disease to the sacrum, T12, and right third rib.  He was started on Keytruda.    11/20/24  Met with Ke at the outpatient clinic, with his wife present    Overall he is doing well    He continues to have pain primarily in his throat, worse with swallowing  He did recently undergo esophageal dilation, and had some increase in sore throat following

## 2024-11-22 ENCOUNTER — OFFICE VISIT (OUTPATIENT)
Dept: ONCOLOGY | Age: 74
End: 2024-11-22
Payer: MEDICARE

## 2024-11-22 ENCOUNTER — HOSPITAL ENCOUNTER (OUTPATIENT)
Dept: INFUSION THERAPY | Age: 74
Discharge: HOME | End: 2024-11-22
Payer: MEDICARE

## 2024-11-22 VITALS
TEMPERATURE: 97.8 F | RESPIRATION RATE: 16 BRPM | HEART RATE: 81 BPM | SYSTOLIC BLOOD PRESSURE: 116 MMHG | OXYGEN SATURATION: 97 % | DIASTOLIC BLOOD PRESSURE: 65 MMHG

## 2024-11-22 VITALS
SYSTOLIC BLOOD PRESSURE: 120 MMHG | WEIGHT: 168.8 LBS | HEIGHT: 67 IN | TEMPERATURE: 97.9 F | RESPIRATION RATE: 16 BRPM | DIASTOLIC BLOOD PRESSURE: 83 MMHG | OXYGEN SATURATION: 97 % | HEART RATE: 87 BPM | BODY MASS INDEX: 26.49 KG/M2

## 2024-11-22 DIAGNOSIS — C10.9 SQUAMOUS CELL CARCINOMA OF OROPHARYNX (HCC): Primary | ICD-10-CM

## 2024-11-22 DIAGNOSIS — R53.83 OTHER FATIGUE: ICD-10-CM

## 2024-11-22 DIAGNOSIS — C10.9 SQUAMOUS CELL CARCINOMA OF OROPHARYNX (HCC): ICD-10-CM

## 2024-11-22 LAB
ALBUMIN SERPL-MCNC: 4.1 G/DL (ref 3.5–5.2)
ALP SERPL-CCNC: 68 U/L (ref 40–129)
ALT SERPL-CCNC: 14 U/L (ref 0–40)
ANION GAP SERPL CALCULATED.3IONS-SCNC: 9 MMOL/L (ref 7–16)
AST SERPL-CCNC: 18 U/L (ref 0–39)
BASOPHILS # BLD: 0 K/UL (ref 0–0.2)
BASOPHILS NFR BLD: 0 % (ref 0–2)
BILIRUB SERPL-MCNC: 0.5 MG/DL (ref 0–1.2)
BUN SERPL-MCNC: 22 MG/DL (ref 6–23)
CALCIUM SERPL-MCNC: 10 MG/DL (ref 8.6–10.2)
CHLORIDE SERPL-SCNC: 104 MMOL/L (ref 98–107)
CO2 SERPL-SCNC: 27 MMOL/L (ref 22–29)
CREAT SERPL-MCNC: 1.3 MG/DL (ref 0.7–1.2)
EOSINOPHIL # BLD: 0.17 K/UL (ref 0.05–0.5)
EOSINOPHILS RELATIVE PERCENT: 3 % (ref 0–6)
ERYTHROCYTE [DISTWIDTH] IN BLOOD BY AUTOMATED COUNT: 13.7 % (ref 11.5–15)
GFR, ESTIMATED: 60 ML/MIN/1.73M2
GLUCOSE SERPL-MCNC: 147 MG/DL (ref 74–99)
HCT VFR BLD AUTO: 39.8 % (ref 37–54)
HGB BLD-MCNC: 13.1 G/DL (ref 12.5–16.5)
LYMPHOCYTES NFR BLD: 0.22 K/UL (ref 1.5–4)
LYMPHOCYTES RELATIVE PERCENT: 4 % (ref 20–42)
MCH RBC QN AUTO: 30.5 PG (ref 26–35)
MCHC RBC AUTO-ENTMCNC: 32.9 G/DL (ref 32–34.5)
MCV RBC AUTO: 92.8 FL (ref 80–99.9)
MONOCYTES NFR BLD: 0.06 K/UL (ref 0.1–0.95)
MONOCYTES NFR BLD: 1 % (ref 2–12)
NEUTROPHILS NFR BLD: 93 % (ref 43–80)
NEUTS SEG NFR BLD: 5.95 K/UL (ref 1.8–7.3)
PLATELET # BLD AUTO: 246 K/UL (ref 130–450)
PMV BLD AUTO: 10 FL (ref 7–12)
POTASSIUM SERPL-SCNC: 3.9 MMOL/L (ref 3.5–5)
PROT SERPL-MCNC: 7.4 G/DL (ref 6.4–8.3)
RBC # BLD AUTO: 4.29 M/UL (ref 3.8–5.8)
RBC # BLD: ABNORMAL 10*6/UL
SODIUM SERPL-SCNC: 140 MMOL/L (ref 132–146)
TSH SERPL DL<=0.05 MIU/L-ACNC: 1.41 UIU/ML (ref 0.27–4.2)
WBC OTHER # BLD: 6.4 K/UL (ref 4.5–11.5)

## 2024-11-22 PROCEDURE — 6360000002 HC RX W HCPCS: Performed by: STUDENT IN AN ORGANIZED HEALTH CARE EDUCATION/TRAINING PROGRAM

## 2024-11-22 PROCEDURE — 2580000003 HC RX 258: Performed by: STUDENT IN AN ORGANIZED HEALTH CARE EDUCATION/TRAINING PROGRAM

## 2024-11-22 PROCEDURE — 1123F ACP DISCUSS/DSCN MKR DOCD: CPT | Performed by: STUDENT IN AN ORGANIZED HEALTH CARE EDUCATION/TRAINING PROGRAM

## 2024-11-22 PROCEDURE — 99214 OFFICE O/P EST MOD 30 MIN: CPT | Performed by: STUDENT IN AN ORGANIZED HEALTH CARE EDUCATION/TRAINING PROGRAM

## 2024-11-22 PROCEDURE — 90656 IIV3 VACC NO PRSV 0.5 ML IM: CPT | Performed by: STUDENT IN AN ORGANIZED HEALTH CARE EDUCATION/TRAINING PROGRAM

## 2024-11-22 PROCEDURE — 96413 CHEMO IV INFUSION 1 HR: CPT

## 2024-11-22 PROCEDURE — 96372 THER/PROPH/DIAG INJ SC/IM: CPT

## 2024-11-22 PROCEDURE — 36415 COLL VENOUS BLD VENIPUNCTURE: CPT

## 2024-11-22 PROCEDURE — 80053 COMPREHEN METABOLIC PANEL: CPT

## 2024-11-22 PROCEDURE — 1159F MED LIST DOCD IN RCRD: CPT | Performed by: STUDENT IN AN ORGANIZED HEALTH CARE EDUCATION/TRAINING PROGRAM

## 2024-11-22 PROCEDURE — 1125F AMNT PAIN NOTED PAIN PRSNT: CPT | Performed by: STUDENT IN AN ORGANIZED HEALTH CARE EDUCATION/TRAINING PROGRAM

## 2024-11-22 PROCEDURE — G0008 ADMIN INFLUENZA VIRUS VAC: HCPCS | Performed by: STUDENT IN AN ORGANIZED HEALTH CARE EDUCATION/TRAINING PROGRAM

## 2024-11-22 PROCEDURE — 84443 ASSAY THYROID STIM HORMONE: CPT

## 2024-11-22 PROCEDURE — 85025 COMPLETE CBC W/AUTO DIFF WBC: CPT

## 2024-11-22 PROCEDURE — 96361 HYDRATE IV INFUSION ADD-ON: CPT

## 2024-11-22 RX ORDER — DIPHENHYDRAMINE HYDROCHLORIDE 50 MG/ML
50 INJECTION INTRAMUSCULAR; INTRAVENOUS
Status: CANCELLED | OUTPATIENT
Start: 2024-11-22

## 2024-11-22 RX ORDER — ONDANSETRON 2 MG/ML
8 INJECTION INTRAMUSCULAR; INTRAVENOUS
Status: CANCELLED | OUTPATIENT
Start: 2024-11-22

## 2024-11-22 RX ORDER — ALBUTEROL SULFATE 90 UG/1
4 INHALANT RESPIRATORY (INHALATION) PRN
Status: CANCELLED | OUTPATIENT
Start: 2024-11-22

## 2024-11-22 RX ORDER — FAMOTIDINE 10 MG/ML
20 INJECTION, SOLUTION INTRAVENOUS
Status: CANCELLED | OUTPATIENT
Start: 2024-11-22

## 2024-11-22 RX ORDER — ACETAMINOPHEN 325 MG/1
650 TABLET ORAL
Status: CANCELLED | OUTPATIENT
Start: 2024-11-22

## 2024-11-22 RX ORDER — SODIUM CHLORIDE 9 MG/ML
5-250 INJECTION, SOLUTION INTRAVENOUS PRN
Status: CANCELLED | OUTPATIENT
Start: 2024-11-22

## 2024-11-22 RX ORDER — SODIUM CHLORIDE 0.9 % (FLUSH) 0.9 %
5-40 SYRINGE (ML) INJECTION PRN
Status: DISCONTINUED | OUTPATIENT
Start: 2024-11-22 | End: 2024-11-23 | Stop reason: HOSPADM

## 2024-11-22 RX ORDER — MEPERIDINE HYDROCHLORIDE 50 MG/ML
12.5 INJECTION INTRAMUSCULAR; INTRAVENOUS; SUBCUTANEOUS PRN
Status: CANCELLED | OUTPATIENT
Start: 2024-11-22

## 2024-11-22 RX ORDER — SODIUM CHLORIDE 9 MG/ML
5-250 INJECTION, SOLUTION INTRAVENOUS PRN
Status: DISCONTINUED | OUTPATIENT
Start: 2024-11-22 | End: 2024-11-23 | Stop reason: HOSPADM

## 2024-11-22 RX ORDER — EPINEPHRINE 1 MG/ML
0.3 INJECTION, SOLUTION, CONCENTRATE INTRAVENOUS PRN
Status: CANCELLED | OUTPATIENT
Start: 2024-11-22

## 2024-11-22 RX ORDER — HYDROCORTISONE SODIUM SUCCINATE 100 MG/2ML
100 INJECTION INTRAMUSCULAR; INTRAVENOUS
Status: CANCELLED | OUTPATIENT
Start: 2024-11-22

## 2024-11-22 RX ORDER — SODIUM CHLORIDE 0.9 % (FLUSH) 0.9 %
5-40 SYRINGE (ML) INJECTION PRN
Status: CANCELLED | OUTPATIENT
Start: 2024-11-22

## 2024-11-22 RX ORDER — HEPARIN SODIUM (PORCINE) LOCK FLUSH IV SOLN 100 UNIT/ML 100 UNIT/ML
500 SOLUTION INTRAVENOUS PRN
Status: CANCELLED | OUTPATIENT
Start: 2024-11-22

## 2024-11-22 RX ORDER — SODIUM CHLORIDE 9 MG/ML
INJECTION, SOLUTION INTRAVENOUS CONTINUOUS
Status: CANCELLED | OUTPATIENT
Start: 2024-11-22

## 2024-11-22 RX ADMIN — SODIUM CHLORIDE 200 MG: 9 INJECTION, SOLUTION INTRAVENOUS at 14:00

## 2024-11-22 RX ADMIN — SODIUM CHLORIDE 20 ML/HR: 9 INJECTION, SOLUTION INTRAVENOUS at 13:31

## 2024-11-22 RX ADMIN — SODIUM CHLORIDE, PRESERVATIVE FREE 10 ML: 5 INJECTION INTRAVENOUS at 14:36

## 2024-11-22 RX ADMIN — INFLUENZA A VIRUS A/VICTORIA/4897/2022 IVR-238 (H1N1) ANTIGEN (PROPIOLACTONE INACTIVATED), INFLUENZA A VIRUS A/THAILAND/8/2022 IVR-237 (H3N2) ANTIGEN (PROPIOLACTONE INACTIVATED), INFLUENZA B VIRUS B/AUSTRIA/1359417/2021 BVR-26 ANTIGEN (PROPIOLACTONE INACTIVATED) 0.5 ML: 15; 15; 15 INJECTION, SUSPENSION INTRAMUSCULAR at 14:36

## 2024-11-22 NOTE — PROGRESS NOTES
[facility]  [address] [address]  Dept: [phone]  Loc: [phone]      Attending Clinic Note    Reason for Visit: Follow-up on a patient with p16 + Oropharyngeal Squamous Cell Carcinoma    PCP:  Jason Foy DO    Chief Complaint   Patient presents with    Treatment         Subjective:  Continues to do well.  Has recurrence of dry thick mucus, after completion of Augmentin.  Has dry.  Otherwise patient appears comfortable and seems largely unchanged      Oncology History:  74 y.o.  male who presented to the ENT team for persistent left-sided neck fullness without associated difficulty swallowing    CT soft tissue neck 02/23/2022: Mass located in the left aspect of floor of the mouth extending into the left oropharyngeal soft tissue concerning for squamous cell carcinoma  Multiple enlarged necrotic left anterior cervical chain lymph nodes    Panendoscopy on 3/17/2022:  Findings: L lingual tonsil hypertrophy, biopsy negative, left level II neck node FNA performed   FNA left neck mass level 2:  Inconclusive for malignant cells.  Few atypical squamous cells with degenerative change  A.  Tongue, left base, biopsy:   Squamous epithelial-lined lymphoid tissue with reactive germinal centers, compatible with lingual tonsil.   Negative for dysplasia; Negative for malignancy.     B.  Tongue, left base, biopsy:   Squamous epithelial-lined lymphoid tissue with reactive germinal centers, compatible with lingual tonsil.   Focal lobules of benign mucinous glands.   Negative for dysplasia; Negative for malignancy    On 07/21/2022: Panendoscopy excision left level 2 lymph node  Findings:  left base of tongue mass, left cervical lymphadenopathy  A.  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    See comment.   B.  Left neck mass, level 2: Metastatic HPV-related squamous carcinoma involving lymphoid

## 2024-11-22 NOTE — FLOWSHEET NOTE
Patient arrived to clinic for treatment and office visit. Tolerated treatment well, VSS. Patient alert and oriented x3, no questions or concerns at the time of discharge. Verbalized next appointment time and date. Patient received flu vaccine while in clinic today.

## 2024-12-12 ENCOUNTER — OFFICE VISIT (OUTPATIENT)
Dept: SURGERY | Age: 74
End: 2024-12-12
Payer: MEDICARE

## 2024-12-12 VITALS
SYSTOLIC BLOOD PRESSURE: 122 MMHG | WEIGHT: 168 LBS | TEMPERATURE: 97.6 F | BODY MASS INDEX: 26.37 KG/M2 | HEIGHT: 67 IN | RESPIRATION RATE: 18 BRPM | DIASTOLIC BLOOD PRESSURE: 74 MMHG | HEART RATE: 73 BPM | OXYGEN SATURATION: 97 %

## 2024-12-12 DIAGNOSIS — C10.9 OROPHARYNGEAL CANCER (HCC): ICD-10-CM

## 2024-12-12 DIAGNOSIS — K94.23 PEG TUBE MALFUNCTION (HCC): Primary | ICD-10-CM

## 2024-12-12 PROCEDURE — 99214 OFFICE O/P EST MOD 30 MIN: CPT | Performed by: SURGERY

## 2024-12-12 PROCEDURE — 1123F ACP DISCUSS/DSCN MKR DOCD: CPT | Performed by: SURGERY

## 2024-12-12 PROCEDURE — 1159F MED LIST DOCD IN RCRD: CPT | Performed by: SURGERY

## 2024-12-12 RX ORDER — OXYCODONE HYDROCHLORIDE 5 MG/1
5 TABLET ORAL EVERY 6 HOURS PRN
Qty: 90 TABLET | Refills: 0 | Status: SHIPPED | OUTPATIENT
Start: 2024-12-12 | End: 2025-01-11

## 2024-12-12 NOTE — PROGRESS NOTES
bilateral  Heart: Reg rate  Abdomen: soft, nontender, nondistended, peg with hole  Skin; no lesions  Gu: no cva tenderness  Extremities: extremities normal, atraumatic, no cyanosis or edema      Assessment:  74 y.o. male with peg leak    Plan:  We replaced bedside with 20fr peg, balloon to 10ml    Roberto Thomas MD  10:29 AM  12/12/2024

## 2024-12-12 NOTE — TELEPHONE ENCOUNTER
Patient Ignacio's wife Erin called for refill oxycodone 5mg. [facility]. Next appointment 2-. Routed call to A STANFORD Heard

## 2024-12-13 ENCOUNTER — OFFICE VISIT (OUTPATIENT)
Dept: ONCOLOGY | Age: 74
End: 2024-12-13
Payer: MEDICARE

## 2024-12-13 ENCOUNTER — HOSPITAL ENCOUNTER (OUTPATIENT)
Dept: INFUSION THERAPY | Age: 74
Discharge: HOME | End: 2024-12-13
Payer: MEDICARE

## 2024-12-13 VITALS
HEART RATE: 73 BPM | SYSTOLIC BLOOD PRESSURE: 110 MMHG | DIASTOLIC BLOOD PRESSURE: 71 MMHG | OXYGEN SATURATION: 100 % | TEMPERATURE: 97.3 F | RESPIRATION RATE: 16 BRPM

## 2024-12-13 VITALS
HEIGHT: 67 IN | WEIGHT: 169.3 LBS | DIASTOLIC BLOOD PRESSURE: 73 MMHG | BODY MASS INDEX: 26.57 KG/M2 | TEMPERATURE: 98.2 F | SYSTOLIC BLOOD PRESSURE: 126 MMHG | OXYGEN SATURATION: 96 % | HEART RATE: 73 BPM

## 2024-12-13 DIAGNOSIS — C10.9 SQUAMOUS CELL CARCINOMA OF OROPHARYNX (HCC): Primary | ICD-10-CM

## 2024-12-13 DIAGNOSIS — R53.83 OTHER FATIGUE: ICD-10-CM

## 2024-12-13 DIAGNOSIS — C10.9 SQUAMOUS CELL CARCINOMA OF OROPHARYNX (HCC): ICD-10-CM

## 2024-12-13 LAB
ALBUMIN SERPL-MCNC: 4.1 G/DL (ref 3.5–5.2)
ALP SERPL-CCNC: 65 U/L (ref 40–129)
ALT SERPL-CCNC: 14 U/L (ref 0–40)
ANION GAP SERPL CALCULATED.3IONS-SCNC: 10 MMOL/L (ref 7–16)
AST SERPL-CCNC: 19 U/L (ref 0–39)
BASOPHILS # BLD: 0.04 K/UL (ref 0–0.2)
BASOPHILS NFR BLD: 1 % (ref 0–2)
BILIRUB SERPL-MCNC: 0.4 MG/DL (ref 0–1.2)
BUN SERPL-MCNC: 22 MG/DL (ref 6–23)
CALCIUM SERPL-MCNC: 10.1 MG/DL (ref 8.6–10.2)
CHLORIDE SERPL-SCNC: 104 MMOL/L (ref 98–107)
CO2 SERPL-SCNC: 26 MMOL/L (ref 22–29)
CREAT SERPL-MCNC: 1.3 MG/DL (ref 0.7–1.2)
EOSINOPHIL # BLD: 0.27 K/UL (ref 0.05–0.5)
EOSINOPHILS RELATIVE PERCENT: 6 % (ref 0–6)
ERYTHROCYTE [DISTWIDTH] IN BLOOD BY AUTOMATED COUNT: 14.3 % (ref 11.5–15)
GFR, ESTIMATED: 61 ML/MIN/1.73M2
GLUCOSE SERPL-MCNC: 126 MG/DL (ref 74–99)
HCT VFR BLD AUTO: 41.7 % (ref 37–54)
HGB BLD-MCNC: 13.5 G/DL (ref 12.5–16.5)
LYMPHOCYTES NFR BLD: 0.16 K/UL (ref 1.5–4)
LYMPHOCYTES RELATIVE PERCENT: 4 % (ref 20–42)
MCH RBC QN AUTO: 29.9 PG (ref 26–35)
MCHC RBC AUTO-ENTMCNC: 32.4 G/DL (ref 32–34.5)
MCV RBC AUTO: 92.5 FL (ref 80–99.9)
MONOCYTES NFR BLD: 0.27 K/UL (ref 0.1–0.95)
MONOCYTES NFR BLD: 6 % (ref 2–12)
NEUTROPHILS NFR BLD: 84 % (ref 43–80)
NEUTS SEG NFR BLD: 3.76 K/UL (ref 1.8–7.3)
PLATELET # BLD AUTO: 244 K/UL (ref 130–450)
PMV BLD AUTO: 10.1 FL (ref 7–12)
POTASSIUM SERPL-SCNC: 4 MMOL/L (ref 3.5–5)
PROT SERPL-MCNC: 7.5 G/DL (ref 6.4–8.3)
RBC # BLD AUTO: 4.51 M/UL (ref 3.8–5.8)
RBC # BLD: ABNORMAL 10*6/UL
SODIUM SERPL-SCNC: 140 MMOL/L (ref 132–146)
TSH SERPL DL<=0.05 MIU/L-ACNC: 4.32 UIU/ML (ref 0.27–4.2)
WBC OTHER # BLD: 4.5 K/UL (ref 4.5–11.5)

## 2024-12-13 PROCEDURE — 96413 CHEMO IV INFUSION 1 HR: CPT

## 2024-12-13 PROCEDURE — 1159F MED LIST DOCD IN RCRD: CPT | Performed by: STUDENT IN AN ORGANIZED HEALTH CARE EDUCATION/TRAINING PROGRAM

## 2024-12-13 PROCEDURE — 84443 ASSAY THYROID STIM HORMONE: CPT

## 2024-12-13 PROCEDURE — 80053 COMPREHEN METABOLIC PANEL: CPT

## 2024-12-13 PROCEDURE — 96361 HYDRATE IV INFUSION ADD-ON: CPT

## 2024-12-13 PROCEDURE — 96375 TX/PRO/DX INJ NEW DRUG ADDON: CPT

## 2024-12-13 PROCEDURE — 99214 OFFICE O/P EST MOD 30 MIN: CPT | Performed by: STUDENT IN AN ORGANIZED HEALTH CARE EDUCATION/TRAINING PROGRAM

## 2024-12-13 PROCEDURE — 1123F ACP DISCUSS/DSCN MKR DOCD: CPT | Performed by: STUDENT IN AN ORGANIZED HEALTH CARE EDUCATION/TRAINING PROGRAM

## 2024-12-13 PROCEDURE — 85025 COMPLETE CBC W/AUTO DIFF WBC: CPT

## 2024-12-13 PROCEDURE — 2580000003 HC RX 258: Performed by: STUDENT IN AN ORGANIZED HEALTH CARE EDUCATION/TRAINING PROGRAM

## 2024-12-13 PROCEDURE — 6360000002 HC RX W HCPCS: Performed by: STUDENT IN AN ORGANIZED HEALTH CARE EDUCATION/TRAINING PROGRAM

## 2024-12-13 PROCEDURE — 36415 COLL VENOUS BLD VENIPUNCTURE: CPT

## 2024-12-13 RX ORDER — SODIUM CHLORIDE 9 MG/ML
5-250 INJECTION, SOLUTION INTRAVENOUS PRN
Status: CANCELLED | OUTPATIENT
Start: 2024-12-13

## 2024-12-13 RX ORDER — ONDANSETRON 2 MG/ML
8 INJECTION INTRAMUSCULAR; INTRAVENOUS
Status: CANCELLED | OUTPATIENT
Start: 2024-12-13

## 2024-12-13 RX ORDER — HYDROCORTISONE SODIUM SUCCINATE 100 MG/2ML
100 INJECTION INTRAMUSCULAR; INTRAVENOUS
Status: CANCELLED | OUTPATIENT
Start: 2024-12-13

## 2024-12-13 RX ORDER — FAMOTIDINE 10 MG/ML
20 INJECTION, SOLUTION INTRAVENOUS
Status: CANCELLED | OUTPATIENT
Start: 2024-12-13

## 2024-12-13 RX ORDER — SODIUM CHLORIDE 9 MG/ML
INJECTION, SOLUTION INTRAVENOUS CONTINUOUS
Status: CANCELLED | OUTPATIENT
Start: 2024-12-13

## 2024-12-13 RX ORDER — ONDANSETRON 2 MG/ML
8 INJECTION INTRAMUSCULAR; INTRAVENOUS
Status: COMPLETED | OUTPATIENT
Start: 2024-12-13 | End: 2024-12-13

## 2024-12-13 RX ORDER — ACETAMINOPHEN 325 MG/1
650 TABLET ORAL
Status: CANCELLED | OUTPATIENT
Start: 2024-12-13

## 2024-12-13 RX ORDER — SODIUM CHLORIDE 9 MG/ML
5-250 INJECTION, SOLUTION INTRAVENOUS PRN
Status: DISCONTINUED | OUTPATIENT
Start: 2024-12-13 | End: 2024-12-14 | Stop reason: HOSPADM

## 2024-12-13 RX ORDER — MEPERIDINE HYDROCHLORIDE 50 MG/ML
12.5 INJECTION INTRAMUSCULAR; INTRAVENOUS; SUBCUTANEOUS PRN
Status: CANCELLED | OUTPATIENT
Start: 2024-12-13

## 2024-12-13 RX ORDER — SODIUM CHLORIDE 0.9 % (FLUSH) 0.9 %
5-40 SYRINGE (ML) INJECTION PRN
Status: CANCELLED | OUTPATIENT
Start: 2024-12-13

## 2024-12-13 RX ORDER — DIPHENHYDRAMINE HYDROCHLORIDE 50 MG/ML
50 INJECTION INTRAMUSCULAR; INTRAVENOUS
Status: CANCELLED | OUTPATIENT
Start: 2024-12-13

## 2024-12-13 RX ORDER — HEPARIN SODIUM (PORCINE) LOCK FLUSH IV SOLN 100 UNIT/ML 100 UNIT/ML
500 SOLUTION INTRAVENOUS PRN
Status: CANCELLED | OUTPATIENT
Start: 2024-12-13

## 2024-12-13 RX ORDER — SODIUM CHLORIDE 0.9 % (FLUSH) 0.9 %
5-40 SYRINGE (ML) INJECTION PRN
Status: DISCONTINUED | OUTPATIENT
Start: 2024-12-13 | End: 2024-12-14 | Stop reason: HOSPADM

## 2024-12-13 RX ORDER — EPINEPHRINE 1 MG/ML
0.3 INJECTION, SOLUTION, CONCENTRATE INTRAVENOUS PRN
Status: CANCELLED | OUTPATIENT
Start: 2024-12-13

## 2024-12-13 RX ORDER — ALBUTEROL SULFATE 90 UG/1
4 INHALANT RESPIRATORY (INHALATION) PRN
Status: CANCELLED | OUTPATIENT
Start: 2024-12-13

## 2024-12-13 RX ADMIN — ONDANSETRON 8 MG: 2 INJECTION INTRAMUSCULAR; INTRAVENOUS at 14:01

## 2024-12-13 RX ADMIN — SODIUM CHLORIDE, PRESERVATIVE FREE 10 ML: 5 INJECTION INTRAVENOUS at 13:41

## 2024-12-13 RX ADMIN — SODIUM CHLORIDE 200 MG: 9 INJECTION, SOLUTION INTRAVENOUS at 14:25

## 2024-12-13 RX ADMIN — SODIUM CHLORIDE 20 ML/HR: 9 INJECTION, SOLUTION INTRAVENOUS at 13:42

## 2024-12-13 ASSESSMENT — PAIN DESCRIPTION - PAIN TYPE: TYPE: CHRONIC PAIN

## 2024-12-13 ASSESSMENT — PAIN DESCRIPTION - LOCATION: LOCATION: MOUTH

## 2024-12-13 ASSESSMENT — PAIN SCALES - GENERAL: PAINLEVEL_OUTOF10: 8

## 2024-12-13 NOTE — PROGRESS NOTES
dry mouth which has been very bothersome for patient. He feels symptoms had symptoms abdominal radiation. Otherwise he feels well. We addressed possibility that his symptoms may be related to immunotherapy. I noted even if we were to stop, likely would not be reversible. Pt suggest his symptoms are worsening.     09/20/2024:  Here to resume Cycle 25 of pembro after a break. Wife convinced patient to seek 2nd ENT opinion for his dry mouth. No new meds.     10/11/2024: Doing ok. Here for Cycle 26. Followed up with ENT. Reviewed prog note and image from scope. Continues to c/o dry mouth/throat and dysphagia. I also disclosed results of PET. There is mention of a new focal uptake with distal esophagus. Pt states he had dilation last done around early 2024. Pt and wife also c/o PEG has had some leakage but he is unsure where. PEG appears old. Pt recalls being told to consider antibiotics to potentially help with his thick mucus.     11/01/2024: Patient overall doing well.  He had received 2 courses of Augmentin, which had improved his oropharyngeal secretions.  He also had esophageal dilation recently by Dr. Hinds, overall feeling better from a swallowing standpoint.  Also had PEG tube recently replaced. Still c/o dry mouth.    11/20/2024: No major issues.  Strength after the mucus secretions again after completing Augmentin.  No alarm signs or symptoms.  He continues to do well.    12/13/2024: Overall doing well.  Complains of some Oral Pain, with Sensation of lips, although patient reports that his lips are not chapped.  And has some tongue pain.  Still struggling with dry mouth.  As well as dry eye.  Tolerating treatment without major issues.    PLAN:  Proceed with cycle 29 of pembro  Follow up with Dr. Hernandez as directed     Continue to trend PSA.     Follow-up with palliative care as directed  Follow-up with Dr. Hinds with GI if additional dilations recommended  Also taken note of recent PET findings  Consider

## 2025-01-03 ENCOUNTER — OFFICE VISIT (OUTPATIENT)
Dept: ENT CLINIC | Age: 75
End: 2025-01-03

## 2025-01-03 ENCOUNTER — HOSPITAL ENCOUNTER (OUTPATIENT)
Dept: INFUSION THERAPY | Age: 75
Discharge: HOME | End: 2025-01-03
Payer: MEDICARE

## 2025-01-03 ENCOUNTER — OFFICE VISIT (OUTPATIENT)
Dept: ONCOLOGY | Age: 75
End: 2025-01-03
Payer: MEDICARE

## 2025-01-03 ENCOUNTER — CLINICAL DOCUMENTATION (OUTPATIENT)
Dept: INFUSION THERAPY | Age: 75
End: 2025-01-03

## 2025-01-03 VITALS
RESPIRATION RATE: 20 BRPM | TEMPERATURE: 97.1 F | SYSTOLIC BLOOD PRESSURE: 107 MMHG | DIASTOLIC BLOOD PRESSURE: 68 MMHG | HEART RATE: 62 BPM

## 2025-01-03 VITALS
HEART RATE: 76 BPM | BODY MASS INDEX: 25.84 KG/M2 | WEIGHT: 165 LBS | SYSTOLIC BLOOD PRESSURE: 114 MMHG | DIASTOLIC BLOOD PRESSURE: 75 MMHG

## 2025-01-03 VITALS
HEART RATE: 69 BPM | DIASTOLIC BLOOD PRESSURE: 78 MMHG | BODY MASS INDEX: 25.9 KG/M2 | TEMPERATURE: 97.6 F | SYSTOLIC BLOOD PRESSURE: 121 MMHG | HEIGHT: 67 IN | OXYGEN SATURATION: 100 % | WEIGHT: 165 LBS

## 2025-01-03 DIAGNOSIS — R97.20 ELEVATED PSA: ICD-10-CM

## 2025-01-03 DIAGNOSIS — R13.19 OTHER DYSPHAGIA: Primary | ICD-10-CM

## 2025-01-03 DIAGNOSIS — C10.9 SQUAMOUS CELL CARCINOMA OF OROPHARYNX (HCC): Primary | ICD-10-CM

## 2025-01-03 DIAGNOSIS — R53.83 OTHER FATIGUE: ICD-10-CM

## 2025-01-03 DIAGNOSIS — E78.1 HYPERTRIGLYCERIDEMIA: ICD-10-CM

## 2025-01-03 DIAGNOSIS — E03.2 HYPOTHYROIDISM DUE TO MEDICATION: ICD-10-CM

## 2025-01-03 DIAGNOSIS — E11.22 TYPE 2 DIABETES MELLITUS WITH STAGE 2 CHRONIC KIDNEY DISEASE, WITHOUT LONG-TERM CURRENT USE OF INSULIN (HCC): ICD-10-CM

## 2025-01-03 DIAGNOSIS — C13.9 CARCINOMA OF HYPOPHARYNX (HCC): ICD-10-CM

## 2025-01-03 DIAGNOSIS — N18.2 TYPE 2 DIABETES MELLITUS WITH STAGE 2 CHRONIC KIDNEY DISEASE, WITHOUT LONG-TERM CURRENT USE OF INSULIN (HCC): ICD-10-CM

## 2025-01-03 DIAGNOSIS — C10.9 SQUAMOUS CELL CARCINOMA OF OROPHARYNX (HCC): ICD-10-CM

## 2025-01-03 DIAGNOSIS — E55.9 VITAMIN D DEFICIENCY: ICD-10-CM

## 2025-01-03 LAB
ALBUMIN SERPL-MCNC: 4.2 G/DL (ref 3.5–5.2)
ALBUMIN: 4 G/DL (ref 3.5–5.2)
ALP BLD-CCNC: 58 U/L (ref 40–129)
ALP SERPL-CCNC: 55 U/L (ref 40–129)
ALT SERPL-CCNC: 13 U/L (ref 0–40)
ALT SERPL-CCNC: 13 U/L (ref 0–40)
ANION GAP SERPL CALCULATED.3IONS-SCNC: 11 MMOL/L (ref 7–16)
ANION GAP SERPL CALCULATED.3IONS-SCNC: 9 MMOL/L (ref 7–16)
AST SERPL-CCNC: 19 U/L (ref 0–39)
AST SERPL-CCNC: 20 U/L (ref 0–39)
ATYPICAL LYMPHOCYTE ABSOLUTE COUNT: 0.04 K/UL (ref 0–0.46)
ATYPICAL LYMPHOCYTES: 1 % (ref 0–4)
BASOPHILS # BLD: 0.09 K/UL (ref 0–0.2)
BASOPHILS ABSOLUTE: 0 K/UL (ref 0–0.2)
BASOPHILS NFR BLD: 2 % (ref 0–2)
BASOPHILS RELATIVE PERCENT: 0 % (ref 0–2)
BILIRUB SERPL-MCNC: 0.5 MG/DL (ref 0–1.2)
BILIRUB SERPL-MCNC: 0.5 MG/DL (ref 0–1.2)
BUN BLDV-MCNC: 21 MG/DL (ref 6–23)
BUN SERPL-MCNC: 21 MG/DL (ref 6–23)
CALCIUM SERPL-MCNC: 10.1 MG/DL (ref 8.6–10.2)
CALCIUM SERPL-MCNC: 10.3 MG/DL (ref 8.6–10.2)
CHLORIDE BLD-SCNC: 104 MMOL/L (ref 98–107)
CHLORIDE SERPL-SCNC: 104 MMOL/L (ref 98–107)
CHOLESTEROL, TOTAL: 105 MG/DL
CO2 SERPL-SCNC: 27 MMOL/L (ref 22–29)
CO2: 26 MMOL/L (ref 22–29)
CREAT SERPL-MCNC: 1.3 MG/DL (ref 0.7–1.2)
CREAT SERPL-MCNC: 1.3 MG/DL (ref 0.7–1.2)
EOSINOPHIL # BLD: 0 K/UL (ref 0.05–0.5)
EOSINOPHILS ABSOLUTE: 0.25 K/UL (ref 0.05–0.5)
EOSINOPHILS RELATIVE PERCENT: 0 % (ref 0–6)
EOSINOPHILS RELATIVE PERCENT: 5 % (ref 0–6)
ERYTHROCYTE [DISTWIDTH] IN BLOOD BY AUTOMATED COUNT: 14.5 % (ref 11.5–15)
GFR, ESTIMATED: 59 ML/MIN/1.73M2
GFR, ESTIMATED: 61 ML/MIN/1.73M2
GLUCOSE BLD-MCNC: 95 MG/DL (ref 74–99)
GLUCOSE SERPL-MCNC: 92 MG/DL (ref 74–99)
HBA1C MFR BLD: 5.2 % (ref 4–5.6)
HCT VFR BLD AUTO: 42.1 % (ref 37–54)
HCT VFR BLD CALC: 40.8 % (ref 37–54)
HDLC SERPL-MCNC: 26 MG/DL
HEMOGLOBIN: 13.1 G/DL (ref 12.5–16.5)
HGB BLD-MCNC: 14 G/DL (ref 12.5–16.5)
LDL CHOLESTEROL: 52 MG/DL
LYMPHOCYTES ABSOLUTE: 0.29 K/UL (ref 1.5–4)
LYMPHOCYTES NFR BLD: 0.34 K/UL (ref 1.5–4)
LYMPHOCYTES RELATIVE PERCENT: 6 % (ref 20–42)
LYMPHOCYTES RELATIVE PERCENT: 7 % (ref 20–42)
MCH RBC QN AUTO: 29.6 PG (ref 26–35)
MCH RBC QN AUTO: 30.6 PG (ref 26–35)
MCHC RBC AUTO-ENTMCNC: 32.1 G/DL (ref 32–34.5)
MCHC RBC AUTO-ENTMCNC: 33.3 G/DL (ref 32–34.5)
MCV RBC AUTO: 92.1 FL (ref 80–99.9)
MCV RBC AUTO: 92.3 FL (ref 80–99.9)
MONOCYTES ABSOLUTE: 0.08 K/UL (ref 0.1–0.95)
MONOCYTES NFR BLD: 0.17 K/UL (ref 0.1–0.95)
MONOCYTES NFR BLD: 4 % (ref 2–12)
MONOCYTES RELATIVE PERCENT: 2 % (ref 2–12)
NEUTROPHILS ABSOLUTE: 4.14 K/UL (ref 1.8–7.3)
NEUTROPHILS NFR BLD: 88 % (ref 43–80)
NEUTROPHILS RELATIVE PERCENT: 86 % (ref 43–80)
NEUTS SEG NFR BLD: 4.3 K/UL (ref 1.8–7.3)
PDW BLD-RTO: 14.7 % (ref 11.5–15)
PLATELET # BLD AUTO: 207 K/UL (ref 130–450)
PLATELET # BLD: 211 K/UL (ref 130–450)
PMV BLD AUTO: 10.8 FL (ref 7–12)
PMV BLD AUTO: 9.9 FL (ref 7–12)
POTASSIUM SERPL-SCNC: 4.2 MMOL/L (ref 3.5–5)
POTASSIUM SERPL-SCNC: 4.5 MMOL/L (ref 3.5–5)
PROSTATE SPECIFIC ANTIGEN: 5.72 NG/ML (ref 0–4)
PROT SERPL-MCNC: 7.4 G/DL (ref 6.4–8.3)
RBC # BLD AUTO: 4.57 M/UL (ref 3.8–5.8)
RBC # BLD: 4.42 M/UL (ref 3.8–5.8)
RBC # BLD: ABNORMAL 10*6/UL
SODIUM BLD-SCNC: 141 MMOL/L (ref 132–146)
SODIUM SERPL-SCNC: 140 MMOL/L (ref 132–146)
T4 FREE: 1.6 NG/DL (ref 0.9–1.7)
TOTAL PROTEIN: 7.3 G/DL (ref 6.4–8.3)
TRIGL SERPL-MCNC: 135 MG/DL
TSH SERPL DL<=0.05 MIU/L-ACNC: 3.68 UIU/ML (ref 0.27–4.2)
TSH SERPL DL<=0.05 MIU/L-ACNC: 4.3 UIU/ML (ref 0.27–4.2)
VITAMIN D 25-HYDROXY: 48.9 NG/ML (ref 30–100)
VLDLC SERPL CALC-MCNC: 27 MG/DL
WBC # BLD: 4.8 K/UL (ref 4.5–11.5)
WBC OTHER # BLD: 4.9 K/UL (ref 4.5–11.5)

## 2025-01-03 PROCEDURE — 1123F ACP DISCUSS/DSCN MKR DOCD: CPT | Performed by: STUDENT IN AN ORGANIZED HEALTH CARE EDUCATION/TRAINING PROGRAM

## 2025-01-03 PROCEDURE — 6360000002 HC RX W HCPCS: Performed by: STUDENT IN AN ORGANIZED HEALTH CARE EDUCATION/TRAINING PROGRAM

## 2025-01-03 PROCEDURE — 2580000003 HC RX 258: Performed by: STUDENT IN AN ORGANIZED HEALTH CARE EDUCATION/TRAINING PROGRAM

## 2025-01-03 PROCEDURE — 85025 COMPLETE CBC W/AUTO DIFF WBC: CPT

## 2025-01-03 PROCEDURE — 80053 COMPREHEN METABOLIC PANEL: CPT

## 2025-01-03 PROCEDURE — 96413 CHEMO IV INFUSION 1 HR: CPT

## 2025-01-03 PROCEDURE — 1125F AMNT PAIN NOTED PAIN PRSNT: CPT | Performed by: STUDENT IN AN ORGANIZED HEALTH CARE EDUCATION/TRAINING PROGRAM

## 2025-01-03 PROCEDURE — 1159F MED LIST DOCD IN RCRD: CPT | Performed by: STUDENT IN AN ORGANIZED HEALTH CARE EDUCATION/TRAINING PROGRAM

## 2025-01-03 PROCEDURE — 84443 ASSAY THYROID STIM HORMONE: CPT

## 2025-01-03 PROCEDURE — 99214 OFFICE O/P EST MOD 30 MIN: CPT | Performed by: STUDENT IN AN ORGANIZED HEALTH CARE EDUCATION/TRAINING PROGRAM

## 2025-01-03 PROCEDURE — 36415 COLL VENOUS BLD VENIPUNCTURE: CPT

## 2025-01-03 RX ORDER — EPINEPHRINE 1 MG/ML
0.3 INJECTION, SOLUTION, CONCENTRATE INTRAVENOUS PRN
OUTPATIENT
Start: 2025-01-03

## 2025-01-03 RX ORDER — SODIUM CHLORIDE 0.9 % (FLUSH) 0.9 %
5-40 SYRINGE (ML) INJECTION PRN
OUTPATIENT
Start: 2025-01-03

## 2025-01-03 RX ORDER — ALBUTEROL SULFATE 90 UG/1
4 INHALANT RESPIRATORY (INHALATION) PRN
OUTPATIENT
Start: 2025-01-03

## 2025-01-03 RX ORDER — DIPHENHYDRAMINE HYDROCHLORIDE 50 MG/ML
50 INJECTION INTRAMUSCULAR; INTRAVENOUS
OUTPATIENT
Start: 2025-01-03

## 2025-01-03 RX ORDER — ACETAMINOPHEN 325 MG/1
650 TABLET ORAL
OUTPATIENT
Start: 2025-01-03

## 2025-01-03 RX ORDER — FAMOTIDINE 10 MG/ML
20 INJECTION, SOLUTION INTRAVENOUS
OUTPATIENT
Start: 2025-01-03

## 2025-01-03 RX ORDER — HYDROCORTISONE SODIUM SUCCINATE 100 MG/2ML
100 INJECTION INTRAMUSCULAR; INTRAVENOUS
OUTPATIENT
Start: 2025-01-03

## 2025-01-03 RX ORDER — SODIUM CHLORIDE 9 MG/ML
5-250 INJECTION, SOLUTION INTRAVENOUS PRN
Status: CANCELLED | OUTPATIENT
Start: 2025-01-03

## 2025-01-03 RX ORDER — SODIUM CHLORIDE 9 MG/ML
5-250 INJECTION, SOLUTION INTRAVENOUS PRN
Status: DISCONTINUED | OUTPATIENT
Start: 2025-01-03 | End: 2025-01-04 | Stop reason: HOSPADM

## 2025-01-03 RX ORDER — SODIUM CHLORIDE 9 MG/ML
5-250 INJECTION, SOLUTION INTRAVENOUS PRN
OUTPATIENT
Start: 2025-01-03

## 2025-01-03 RX ORDER — ONDANSETRON 2 MG/ML
8 INJECTION INTRAMUSCULAR; INTRAVENOUS
OUTPATIENT
Start: 2025-01-03

## 2025-01-03 RX ORDER — MEPERIDINE HYDROCHLORIDE 50 MG/ML
12.5 INJECTION INTRAMUSCULAR; INTRAVENOUS; SUBCUTANEOUS PRN
OUTPATIENT
Start: 2025-01-03

## 2025-01-03 RX ORDER — HEPARIN SODIUM (PORCINE) LOCK FLUSH IV SOLN 100 UNIT/ML 100 UNIT/ML
500 SOLUTION INTRAVENOUS PRN
OUTPATIENT
Start: 2025-01-03

## 2025-01-03 RX ORDER — SODIUM CHLORIDE 9 MG/ML
INJECTION, SOLUTION INTRAVENOUS CONTINUOUS
OUTPATIENT
Start: 2025-01-03

## 2025-01-03 RX ADMIN — SODIUM CHLORIDE 20 ML/HR: 9 INJECTION, SOLUTION INTRAVENOUS at 13:28

## 2025-01-03 RX ADMIN — SODIUM CHLORIDE 200 MG: 9 INJECTION, SOLUTION INTRAVENOUS at 13:50

## 2025-01-03 ASSESSMENT — ENCOUNTER SYMPTOMS
VOMITING: 0
SHORTNESS OF BREATH: 0
COUGH: 0
RHINORRHEA: 0
TROUBLE SWALLOWING: 1

## 2025-01-03 NOTE — PROGRESS NOTES
Ed.  Otolaryngology Facial Plastic Surgery  :  [facility] Otol[facility]ology [facility]  Associate Clinical Professor:  PAUL PORTILLO [employer]  [facility]

## 2025-01-03 NOTE — PROGRESS NOTES
symptoms may be related to immunotherapy. I noted even if we were to stop, likely would not be reversible. Pt suggest his symptoms are worsening.     09/20/2024:  Here to resume Cycle 25 of pembro after a break. Wife convinced patient to seek 2nd ENT opinion for his dry mouth. No new meds.     10/11/2024: Doing ok. Here for Cycle 26. Followed up with ENT. Reviewed prog note and image from scope. Continues to c/o dry mouth/throat and dysphagia. I also disclosed results of PET. There is mention of a new focal uptake with distal esophagus. Pt states he had dilation last done around early 2024. Pt and wife also c/o PEG has had some leakage but he is unsure where. PEG appears old. Pt recalls being told to consider antibiotics to potentially help with his thick mucus.     11/01/2024: Patient overall doing well.  He had received 2 courses of Augmentin, which had improved his oropharyngeal secretions.  He also had esophageal dilation recently by Dr. Hinds, overall feeling better from a swallowing standpoint.  Also had PEG tube recently replaced. Still c/o dry mouth.    11/20/2024: No major issues.  Strength after the mucus secretions again after completing Augmentin.  No alarm signs or symptoms.  He continues to do well.    12/13/2024: Overall doing well.  Complains of some Oral Pain, with Sensation of lips, although patient reports that his lips are not chapped.  And has some tongue pain.  Still struggling with dry mouth.  As well as dry eye.  Tolerating treatment without major issues.    1/3/2025: Here for cycle 30 of pembrolizumab today.  Patient overall doing well, reports using a mouth spray, which she feels helps.  Otherwise tolerating treatment well.  He is due for scans.    PLAN:  Proceed with cycle 30 of pembro  Follow up with Dr. Hernandez as directed     Continue to trend PSA.     Follow-up with palliative care as directed  Follow-up with Dr. Hinds with GI if additional dilations recommended  Repeat systemic

## 2025-01-03 NOTE — PROGRESS NOTES
Ignacio REYES Bhatia  1/3/2025  Ht Readings from Last 1 Encounters:   01/03/25 1.702 m (5' 7\")     Wt Readings from Last 10 Encounters:   01/03/25 74.8 kg (165 lb)   01/03/25 74.8 kg (165 lb)   12/13/24 76.8 kg (169 lb 4.8 oz)   12/12/24 76.2 kg (168 lb)   11/22/24 76.6 kg (168 lb 12.8 oz)   11/20/24 76.9 kg (169 lb 9.6 oz)   11/01/24 77.3 kg (170 lb 8 oz)   10/11/24 77.5 kg (170 lb 12.8 oz)   09/27/24 76.3 kg (168 lb 4.8 oz)   09/20/24 77.6 kg (171 lb)     BMI=25.84    Assessment: Met with Ke while in for OTV with Dr. Le for H&N cancer. Day 1 cycle 30 pembrolizumab. Ke continues to have poor oral intake. Orally he takes water and has started drinking coffee. Ke complains of feeling like food is sticking in his throat. He has had esophageal dilation with Dr. Hinds and Ke reports that has not made much difference. Ke saw Dr. Hernandez today and was ordered dysphagia evaluation. Ke reports coffee starting to taste better and utilizes spray for xerostomia which help. He does complain of thick secretions, discussed some remedies that may help. Denies N/V/D/C. Continues to be reliant on EN for calorie/protein needs. Utilizing 3 cartons of Boost VHC per day and 2 pouches of [employer] Blends per day. EN providing 2190kcals-2290kcals/day depending on which [employer] Blends he uses. Ke gives himself at breakfast 2 cartons of Boost VHC and 1 pouch of [employer] Blends and water flush. Encouraged Ke to divide EN into 3-4 feedings per day, instead of 2 feedings per day.     Weight change: 2.54% weight loss x 1 month, 3.4% weight loss x 3 months  Appetite: fair appetite but poor oral intake due to complaints of dysphagia  Nutritional Side Effects: dysphagia, dysgeusia, xerostomia  Calculated Needs: 28-30 kcal/kg CBW = 5712-1970 kcal, 1.2-1.4 gm/kg/CBW =  gm pro, 1 ml/kcal = 5496-9076 ml fluids  Malnutrition Status: At risk    Recommendations: Due to weight loss over past month when EN of Boost VHC decreased to 3  Consent: Written Consent was obtained from the patient. The risks, benefits and alternatives to therapy were discussed in detail. Specifically, the risks of infection, scarring, bleeding, prolonged wound healing, nerve injury, incomplete removal, allergy to anesthesia and recurrence were addressed. Alternatives to ED&C, such as: surgical removal and XRT were also discussed.  Prior to the procedure, the treatment site was clearly identified and confirmed by the patient. All components of Universal Protocol/PAUSE Rule completed.

## 2025-01-09 DIAGNOSIS — C10.9 OROPHARYNGEAL CANCER (HCC): ICD-10-CM

## 2025-01-09 RX ORDER — OXYCODONE HYDROCHLORIDE 5 MG/1
5 TABLET ORAL EVERY 6 HOURS PRN
Qty: 90 TABLET | Refills: 0 | Status: SHIPPED | OUTPATIENT
Start: 2025-01-09 | End: 2025-02-08

## 2025-01-09 NOTE — TELEPHONE ENCOUNTER
Patient Ignacio's wife Erin called for refill oxycodone 5 mg. Next appointment 2-. [facility]. Routed call to A Lin, NP

## 2025-01-24 ENCOUNTER — HOSPITAL ENCOUNTER (OUTPATIENT)
Dept: INFUSION THERAPY | Age: 75
Discharge: HOME | End: 2025-01-24
Payer: MEDICARE

## 2025-01-24 ENCOUNTER — CLINICAL DOCUMENTATION (OUTPATIENT)
Dept: INFUSION THERAPY | Age: 75
End: 2025-01-24

## 2025-01-24 ENCOUNTER — OFFICE VISIT (OUTPATIENT)
Dept: ONCOLOGY | Age: 75
End: 2025-01-24
Payer: MEDICARE

## 2025-01-24 VITALS
SYSTOLIC BLOOD PRESSURE: 120 MMHG | HEART RATE: 75 BPM | WEIGHT: 169.1 LBS | OXYGEN SATURATION: 97 % | BODY MASS INDEX: 26.54 KG/M2 | HEIGHT: 67 IN | TEMPERATURE: 97 F | DIASTOLIC BLOOD PRESSURE: 74 MMHG

## 2025-01-24 VITALS
TEMPERATURE: 97.1 F | DIASTOLIC BLOOD PRESSURE: 63 MMHG | HEART RATE: 70 BPM | OXYGEN SATURATION: 98 % | SYSTOLIC BLOOD PRESSURE: 113 MMHG | RESPIRATION RATE: 16 BRPM

## 2025-01-24 DIAGNOSIS — C10.9 SQUAMOUS CELL CARCINOMA OF OROPHARYNX (HCC): Primary | ICD-10-CM

## 2025-01-24 DIAGNOSIS — R53.83 OTHER FATIGUE: ICD-10-CM

## 2025-01-24 DIAGNOSIS — C10.9 SQUAMOUS CELL CARCINOMA OF OROPHARYNX (HCC): ICD-10-CM

## 2025-01-24 LAB
ALBUMIN SERPL-MCNC: 4.2 G/DL (ref 3.5–5.2)
ALP SERPL-CCNC: 65 U/L (ref 40–129)
ALT SERPL-CCNC: 17 U/L (ref 0–40)
ANION GAP SERPL CALCULATED.3IONS-SCNC: 10 MMOL/L (ref 7–16)
AST SERPL-CCNC: 21 U/L (ref 0–39)
BASOPHILS # BLD: 0.08 K/UL (ref 0–0.2)
BASOPHILS NFR BLD: 2 % (ref 0–2)
BILIRUB SERPL-MCNC: 0.5 MG/DL (ref 0–1.2)
BUN SERPL-MCNC: 21 MG/DL (ref 6–23)
CALCIUM SERPL-MCNC: 9.9 MG/DL (ref 8.6–10.2)
CHLORIDE SERPL-SCNC: 105 MMOL/L (ref 98–107)
CO2 SERPL-SCNC: 26 MMOL/L (ref 22–29)
CREAT SERPL-MCNC: 1.3 MG/DL (ref 0.7–1.2)
EOSINOPHIL # BLD: 0.13 K/UL (ref 0.05–0.5)
EOSINOPHILS RELATIVE PERCENT: 3 % (ref 0–6)
ERYTHROCYTE [DISTWIDTH] IN BLOOD BY AUTOMATED COUNT: 14.5 % (ref 11.5–15)
GFR, ESTIMATED: 56 ML/MIN/1.73M2
GLUCOSE SERPL-MCNC: 149 MG/DL (ref 74–99)
HCT VFR BLD AUTO: 45.5 % (ref 37–54)
HGB BLD-MCNC: 15 G/DL (ref 12.5–16.5)
LYMPHOCYTES NFR BLD: 0.25 K/UL (ref 1.5–4)
LYMPHOCYTES RELATIVE PERCENT: 5 % (ref 20–42)
MCH RBC QN AUTO: 30.5 PG (ref 26–35)
MCHC RBC AUTO-ENTMCNC: 33 G/DL (ref 32–34.5)
MCV RBC AUTO: 92.7 FL (ref 80–99.9)
MONOCYTES NFR BLD: 0.25 K/UL (ref 0.1–0.95)
MONOCYTES NFR BLD: 5 % (ref 2–12)
MYELOCYTES ABSOLUTE COUNT: 0.04 K/UL
MYELOCYTES: 1 %
NEUTROPHILS NFR BLD: 84 % (ref 43–80)
NEUTS SEG NFR BLD: 4.04 K/UL (ref 1.8–7.3)
PLATELET # BLD AUTO: 208 K/UL (ref 130–450)
PMV BLD AUTO: 10 FL (ref 7–12)
POTASSIUM SERPL-SCNC: 3.8 MMOL/L (ref 3.5–5)
PROT SERPL-MCNC: 7.7 G/DL (ref 6.4–8.3)
RBC # BLD AUTO: 4.91 M/UL (ref 3.8–5.8)
RBC # BLD: NORMAL 10*6/UL
SODIUM SERPL-SCNC: 141 MMOL/L (ref 132–146)
TSH SERPL DL<=0.05 MIU/L-ACNC: 4.42 UIU/ML (ref 0.27–4.2)
WBC OTHER # BLD: 4.8 K/UL (ref 4.5–11.5)

## 2025-01-24 PROCEDURE — 80053 COMPREHEN METABOLIC PANEL: CPT

## 2025-01-24 PROCEDURE — 2580000003 HC RX 258: Performed by: STUDENT IN AN ORGANIZED HEALTH CARE EDUCATION/TRAINING PROGRAM

## 2025-01-24 PROCEDURE — 84443 ASSAY THYROID STIM HORMONE: CPT

## 2025-01-24 PROCEDURE — 1123F ACP DISCUSS/DSCN MKR DOCD: CPT | Performed by: STUDENT IN AN ORGANIZED HEALTH CARE EDUCATION/TRAINING PROGRAM

## 2025-01-24 PROCEDURE — 96413 CHEMO IV INFUSION 1 HR: CPT

## 2025-01-24 PROCEDURE — 85025 COMPLETE CBC W/AUTO DIFF WBC: CPT

## 2025-01-24 PROCEDURE — 36415 COLL VENOUS BLD VENIPUNCTURE: CPT

## 2025-01-24 PROCEDURE — 1159F MED LIST DOCD IN RCRD: CPT | Performed by: STUDENT IN AN ORGANIZED HEALTH CARE EDUCATION/TRAINING PROGRAM

## 2025-01-24 PROCEDURE — 99214 OFFICE O/P EST MOD 30 MIN: CPT | Performed by: STUDENT IN AN ORGANIZED HEALTH CARE EDUCATION/TRAINING PROGRAM

## 2025-01-24 PROCEDURE — 6360000002 HC RX W HCPCS: Performed by: STUDENT IN AN ORGANIZED HEALTH CARE EDUCATION/TRAINING PROGRAM

## 2025-01-24 RX ORDER — HYDROCORTISONE SODIUM SUCCINATE 100 MG/2ML
100 INJECTION INTRAMUSCULAR; INTRAVENOUS
Status: CANCELLED | OUTPATIENT
Start: 2025-01-24

## 2025-01-24 RX ORDER — ACETAMINOPHEN 325 MG/1
650 TABLET ORAL
Status: CANCELLED | OUTPATIENT
Start: 2025-01-24

## 2025-01-24 RX ORDER — SODIUM CHLORIDE 0.9 % (FLUSH) 0.9 %
5-40 SYRINGE (ML) INJECTION PRN
Status: DISCONTINUED | OUTPATIENT
Start: 2025-01-24 | End: 2025-01-25 | Stop reason: HOSPADM

## 2025-01-24 RX ORDER — DIPHENHYDRAMINE HYDROCHLORIDE 50 MG/ML
50 INJECTION INTRAMUSCULAR; INTRAVENOUS
Status: CANCELLED | OUTPATIENT
Start: 2025-01-24

## 2025-01-24 RX ORDER — ONDANSETRON 2 MG/ML
8 INJECTION INTRAMUSCULAR; INTRAVENOUS
Status: CANCELLED | OUTPATIENT
Start: 2025-01-24

## 2025-01-24 RX ORDER — MEPERIDINE HYDROCHLORIDE 50 MG/ML
12.5 INJECTION INTRAMUSCULAR; INTRAVENOUS; SUBCUTANEOUS PRN
Status: CANCELLED | OUTPATIENT
Start: 2025-01-24

## 2025-01-24 RX ORDER — SODIUM CHLORIDE 9 MG/ML
5-250 INJECTION, SOLUTION INTRAVENOUS PRN
Status: CANCELLED | OUTPATIENT
Start: 2025-01-24

## 2025-01-24 RX ORDER — HEPARIN SODIUM (PORCINE) LOCK FLUSH IV SOLN 100 UNIT/ML 100 UNIT/ML
500 SOLUTION INTRAVENOUS PRN
Status: CANCELLED | OUTPATIENT
Start: 2025-01-24

## 2025-01-24 RX ORDER — SODIUM CHLORIDE 9 MG/ML
INJECTION, SOLUTION INTRAVENOUS CONTINUOUS
Status: CANCELLED | OUTPATIENT
Start: 2025-01-24

## 2025-01-24 RX ORDER — FAMOTIDINE 10 MG/ML
20 INJECTION, SOLUTION INTRAVENOUS
Status: CANCELLED | OUTPATIENT
Start: 2025-01-24

## 2025-01-24 RX ORDER — SODIUM CHLORIDE 9 MG/ML
5-250 INJECTION, SOLUTION INTRAVENOUS PRN
Status: DISCONTINUED | OUTPATIENT
Start: 2025-01-24 | End: 2025-01-25 | Stop reason: HOSPADM

## 2025-01-24 RX ORDER — ALBUTEROL SULFATE 90 UG/1
4 INHALANT RESPIRATORY (INHALATION) PRN
Status: CANCELLED | OUTPATIENT
Start: 2025-01-24

## 2025-01-24 RX ORDER — EPINEPHRINE 1 MG/ML
0.3 INJECTION, SOLUTION, CONCENTRATE INTRAVENOUS PRN
Status: CANCELLED | OUTPATIENT
Start: 2025-01-24

## 2025-01-24 RX ORDER — SODIUM CHLORIDE 0.9 % (FLUSH) 0.9 %
5-40 SYRINGE (ML) INJECTION PRN
Status: CANCELLED | OUTPATIENT
Start: 2025-01-24

## 2025-01-24 RX ADMIN — SODIUM CHLORIDE 200 MG: 9 INJECTION, SOLUTION INTRAVENOUS at 13:40

## 2025-01-24 RX ADMIN — SODIUM CHLORIDE 20 ML/HR: 9 INJECTION, SOLUTION INTRAVENOUS at 13:37

## 2025-01-24 NOTE — PROGRESS NOTES
[facility] MEDICAL ONCOLOGY  [address] [address]  Dept: [phone]  Loc: [phone]      Attending Clinic Note    Reason for Visit: Follow-up on a patient with p16 + Oropharyngeal Squamous Cell Carcinoma    PCP:  Jason Foy DO    Chief Complaint   Patient presents with    Follow-up     Squamous cell carcinoma of oropharynx (HCC)             Subjective:  Doing well.  Patient has been using xylitol for his dry mouth, which is helping.  Denies of new symptoms or side effects.    Oncology History:  74 y.o.  male who presented to the ENT team for persistent left-sided neck fullness without associated difficulty swallowing    CT soft tissue neck 02/23/2022: Mass located in the left aspect of floor of the mouth extending into the left oropharyngeal soft tissue concerning for squamous cell carcinoma  Multiple enlarged necrotic left anterior cervical chain lymph nodes    Panendoscopy on 3/17/2022:  Findings: L lingual tonsil hypertrophy, biopsy negative, left level II neck node FNA performed   FNA left neck mass level 2:  Inconclusive for malignant cells.  Few atypical squamous cells with degenerative change  A.  Tongue, left base, biopsy:   Squamous epithelial-lined lymphoid tissue with reactive germinal centers, compatible with lingual tonsil.   Negative for dysplasia; Negative for malignancy.     B.  Tongue, left base, biopsy:   Squamous epithelial-lined lymphoid tissue with reactive germinal centers, compatible with lingual tonsil.   Focal lobules of benign mucinous glands.   Negative for dysplasia; Negative for malignancy    On 07/21/2022: Panendoscopy excision left level 2 lymph node  Findings:  left base of tongue mass, left cervical lymphadenopathy  A.  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    See comment.   B.  Left neck mass, level 2: Metastatic HPV-related squamous carcinoma involving

## 2025-01-24 NOTE — PROGRESS NOTES
Patient tolerated infusion well. Patient alert and oriented x3.   No distress noted.   Vital signs stable.   Patient denies any new or worsening pain. Patient educated on signs and symptoms of reaction to medication.   Educated patient on possible side effects and treatment of medication.  Patient verbalize understanding.   Offered patient education an/or discharge material.  Patient declined.   Patient denies any needs.  All questions answered.

## 2025-01-24 NOTE — PROGRESS NOTES
Patient is still pending re approval for 2025 for keytruda.  Patient is aware that medication is not here, but patient would like to move forward with treatment today.  Pharmacy updated to please charge for todays dose.

## 2025-01-27 ENCOUNTER — HOSPITAL ENCOUNTER (OUTPATIENT)
Dept: NUCLEAR MEDICINE | Age: 75
Discharge: HOME | End: 2025-01-27

## 2025-01-27 DIAGNOSIS — C10.9 SQUAMOUS CELL CARCINOMA OF OROPHARYNX (HCC): ICD-10-CM

## 2025-01-28 RX ORDER — FLUDEOXYGLUCOSE F 18 200 MCI/ML
15 INJECTION, SOLUTION INTRAVENOUS
Status: COMPLETED | OUTPATIENT
Start: 2025-01-28 | End: 2025-01-28

## 2025-01-28 RX ADMIN — FLUDEOXYGLUCOSE F 18 15 MILLICURIE: 200 INJECTION, SOLUTION INTRAVENOUS at 10:53

## 2025-01-30 ENCOUNTER — OFFICE VISIT (OUTPATIENT)
Dept: FAMILY MEDICINE CLINIC | Age: 75
End: 2025-01-30
Payer: MEDICARE

## 2025-01-30 VITALS
DIASTOLIC BLOOD PRESSURE: 70 MMHG | SYSTOLIC BLOOD PRESSURE: 110 MMHG | TEMPERATURE: 97.3 F | OXYGEN SATURATION: 96 % | BODY MASS INDEX: 26.06 KG/M2 | WEIGHT: 166 LBS | HEART RATE: 78 BPM | RESPIRATION RATE: 18 BRPM | HEIGHT: 67 IN

## 2025-01-30 DIAGNOSIS — E03.2 HYPOTHYROIDISM DUE TO MEDICATION: ICD-10-CM

## 2025-01-30 DIAGNOSIS — E55.9 VITAMIN D DEFICIENCY: ICD-10-CM

## 2025-01-30 DIAGNOSIS — Z23 NEED FOR SHINGLES VACCINE: ICD-10-CM

## 2025-01-30 DIAGNOSIS — C78.7 SECONDARY MALIGNANT NEOPLASM OF LIVER AND INTRAHEPATIC BILE DUCT (HCC): ICD-10-CM

## 2025-01-30 DIAGNOSIS — N18.2 TYPE 2 DIABETES MELLITUS WITH STAGE 2 CHRONIC KIDNEY DISEASE, WITHOUT LONG-TERM CURRENT USE OF INSULIN (HCC): ICD-10-CM

## 2025-01-30 DIAGNOSIS — F11.20 OPIOID DEPENDENCE WITH CURRENT USE (HCC): ICD-10-CM

## 2025-01-30 DIAGNOSIS — E11.22 TYPE 2 DIABETES MELLITUS WITH STAGE 2 CHRONIC KIDNEY DISEASE, WITHOUT LONG-TERM CURRENT USE OF INSULIN (HCC): ICD-10-CM

## 2025-01-30 DIAGNOSIS — E78.1 HYPERTRIGLYCERIDEMIA: Primary | ICD-10-CM

## 2025-01-30 DIAGNOSIS — R97.20 ELEVATED PSA: ICD-10-CM

## 2025-01-30 PROCEDURE — 1160F RVW MEDS BY RX/DR IN RCRD: CPT | Performed by: FAMILY MEDICINE

## 2025-01-30 PROCEDURE — 1159F MED LIST DOCD IN RCRD: CPT | Performed by: FAMILY MEDICINE

## 2025-01-30 PROCEDURE — 99214 OFFICE O/P EST MOD 30 MIN: CPT | Performed by: FAMILY MEDICINE

## 2025-01-30 PROCEDURE — 3044F HG A1C LEVEL LT 7.0%: CPT | Performed by: FAMILY MEDICINE

## 2025-01-30 PROCEDURE — 1123F ACP DISCUSS/DSCN MKR DOCD: CPT | Performed by: FAMILY MEDICINE

## 2025-01-30 SDOH — ECONOMIC STABILITY: FOOD INSECURITY: WITHIN THE PAST 12 MONTHS, YOU WORRIED THAT YOUR FOOD WOULD RUN OUT BEFORE YOU GOT MONEY TO BUY MORE.: NEVER TRUE

## 2025-01-30 SDOH — ECONOMIC STABILITY: INCOME INSECURITY: IN THE LAST 12 MONTHS, WAS THERE A TIME WHEN YOU WERE NOT ABLE TO PAY THE MORTGAGE OR RENT ON TIME?: NO

## 2025-01-30 SDOH — ECONOMIC STABILITY: FOOD INSECURITY: WITHIN THE PAST 12 MONTHS, THE FOOD YOU BOUGHT JUST DIDN'T LAST AND YOU DIDN'T HAVE MONEY TO GET MORE.: NEVER TRUE

## 2025-01-30 SDOH — ECONOMIC STABILITY: TRANSPORTATION INSECURITY
IN THE PAST 12 MONTHS, HAS THE LACK OF TRANSPORTATION KEPT YOU FROM MEDICAL APPOINTMENTS OR FROM GETTING MEDICATIONS?: NO

## 2025-01-30 ASSESSMENT — PATIENT HEALTH QUESTIONNAIRE - PHQ9
SUM OF ALL RESPONSES TO PHQ QUESTIONS 1-9: 0
2. FEELING DOWN, DEPRESSED OR HOPELESS: NOT AT ALL
SUM OF ALL RESPONSES TO PHQ9 QUESTIONS 1 & 2: 0
SUM OF ALL RESPONSES TO PHQ QUESTIONS 1-9: 0
1. LITTLE INTEREST OR PLEASURE IN DOING THINGS: NOT AT ALL
1. LITTLE INTEREST OR PLEASURE IN DOING THINGS: NOT AT ALL
SUM OF ALL RESPONSES TO PHQ9 QUESTIONS 1 & 2: 0
SUM OF ALL RESPONSES TO PHQ QUESTIONS 1-9: 0
2. FEELING DOWN, DEPRESSED OR HOPELESS: NOT AT ALL
SUM OF ALL RESPONSES TO PHQ QUESTIONS 1-9: 0

## 2025-01-30 NOTE — PROGRESS NOTES
Ignacio Bhatia   Patient is a 74 y.o. year old male who presents with:  Chief Complaint   Patient presents with    6 Month Follow-Up     No new concerns; had PET scan Monday and it was clear    Health Maintenance     Needs AWV scheduled     Patient also follows with: GI, ENT, oncology, radiation oncology     History of Present Illness  The patient is a 74-year-old male here for a 6-month follow-up.    He underwent a PET scan on Monday, which revealed an enlarged prostate. He has been managing this condition for approximately 15 years. His PSA levels have shown a decrease from 6.7 in June to 5.2 or 6, as reported by Dr. Le. Despite a referral to urology, he has not yet consulted with them. He experiences difficulty initiating urination in the morning, but once started, the stream is strong. This issue does not persist throughout the day. He is not currently on any medication for his enlarged prostate and has been advised to consider Flomax. He has expressed interest in exploring pharmacological treatment options for his enlarged prostate.    He reports experiencing dry mouth and altered taste sensation, which he attributes to radiation therapy received 2 years ago. He also experiences numbness of the tongue and difficulty swallowing, which he believes is due to muscle weakness. He has been advised to perform exercises to strengthen his swallowing muscles. He describes his throat as being extremely dry and notes the presence of greenish sputum. He has undergone dilation procedures for strictures.    He is up to date on his tetanus vaccine and has received his influenza vaccine. He has not received the RSV vaccine.    MEDICATIONS  Current: Fenofibrate, Flonase, ibuprofen, viscous lidocaine, Zofran, oxycodone, Keytruda, sucralfate, Synthroid  Discontinued: Omeprazole    IMMUNIZATIONS  He is up to date on his tetanus vaccine and has received his influenza vaccine. He has not received the RSV

## 2025-02-06 DIAGNOSIS — C10.9 OROPHARYNGEAL CANCER (HCC): ICD-10-CM

## 2025-02-06 RX ORDER — OXYCODONE HYDROCHLORIDE 5 MG/1
5 TABLET ORAL EVERY 6 HOURS PRN
Qty: 90 TABLET | Refills: 0 | Status: SHIPPED | OUTPATIENT
Start: 2025-02-06 | End: 2025-03-08

## 2025-02-06 NOTE — TELEPHONE ENCOUNTER
Call from Ke's wife Erin, requesting refill for his Oxy IR 5 mg. Pharmacy is [facility]. Next richy 2/12/25.

## 2025-02-12 ENCOUNTER — OFFICE VISIT (OUTPATIENT)
Dept: PALLATIVE CARE | Age: 75
End: 2025-02-12
Payer: MEDICARE

## 2025-02-12 VITALS
TEMPERATURE: 97.2 F | SYSTOLIC BLOOD PRESSURE: 123 MMHG | WEIGHT: 169 LBS | DIASTOLIC BLOOD PRESSURE: 79 MMHG | BODY MASS INDEX: 26.46 KG/M2 | HEART RATE: 68 BPM | OXYGEN SATURATION: 96 %

## 2025-02-12 DIAGNOSIS — Z51.5 PALLIATIVE CARE BY SPECIALIST: Primary | ICD-10-CM

## 2025-02-12 PROCEDURE — 99211 OFF/OP EST MAY X REQ PHY/QHP: CPT | Performed by: NURSE PRACTITIONER

## 2025-02-12 NOTE — PROGRESS NOTES
Arthritis Mother     Cancer Mother     High Blood Pressure Father     Cancer Father 82        kidney    Abdominal aortic aneurysm Father     Alcohol Abuse Father     Vision Loss Father        ROS: UNLESS STATED ABOVE PATIENT DENIES:  CONSTITUTIONAL:  fever, chill, rigors, nausea, vomiting, fatigue.  HEENT: blurry vision, double vision, hearing problem, tinnitus, hoarseness, dysphagia, odynophagia  RESPIRATORY: cough, shortness of breath, sputum expectoration.  CARDIOVASCULAR:  Chest pain/pressure, palpitation, syncope, irregular beats  GASTROINTESTINAL:  abdominal or rectal pain, diarrhea, constipation, .  GENITOURINARY:  Burning, frequency, urgency, incontinence, discharge  INTEGUMENTARY: rash, wound, pruritis  HEMATOLOGIC/LYMPHATIC:  Swelling, sores, gum bleeding, easy bruising, pica.  MUSCULOSKELETAL:  pain, edema, joint swelling or redness  NEUROLOGICAL:  light headed, dizziness, loss of consciousness, weakness, change in memory, seizures, tremors    Objective:     Physical Exam  Wt Readings from Last 3 Encounters:   02/12/25 76.7 kg (169 lb)   01/30/25 75.3 kg (166 lb)   01/24/25 76.7 kg (169 lb 1.6 oz)     /79   Pulse 68   Temp 97.2 °F (36.2 °C)   Wt 76.7 kg (169 lb)   SpO2 96% Comment: RA  BMI 26.46 kg/m²     Gen:  Alert, appears stated age, well nourished, in no acute distress  HEENT:  Normocephalic, no drainage, white patches of the tongue and buccal mucosa  Neck:  Supple  Lungs:  non labored breathing  Heart::  Regular rate  Abd:  Soft,   M/S/Ext:  moving all extremities   Skin:  no visible lesions  Neuro:  PERRL, Alert, oriented x 3; following commands    Wolcott Symptom Assessment Score       2/12/2025    10:00 AM 11/20/2024     9:00 AM 8/28/2024    11:00 AM 6/5/2024    10:00 AM 3/13/2024     9:00 AM   Wolcott Score   Pain Score 7 7 8 Worst possible pain 8   Tiredness Score 5 2  4 4   Nausea Score Not nauseated Not nauseated Not nauseated Not nauseated Not nauseated   Depression Score 4 5 6

## 2025-02-14 ENCOUNTER — HOSPITAL ENCOUNTER (OUTPATIENT)
Dept: INFUSION THERAPY | Age: 75
Discharge: HOME | End: 2025-02-14
Payer: MEDICARE

## 2025-02-14 ENCOUNTER — OFFICE VISIT (OUTPATIENT)
Dept: ONCOLOGY | Age: 75
End: 2025-02-14
Payer: MEDICARE

## 2025-02-14 VITALS
BODY MASS INDEX: 26.24 KG/M2 | HEIGHT: 67 IN | HEART RATE: 70 BPM | SYSTOLIC BLOOD PRESSURE: 115 MMHG | WEIGHT: 167.2 LBS | TEMPERATURE: 98 F | OXYGEN SATURATION: 99 % | DIASTOLIC BLOOD PRESSURE: 71 MMHG

## 2025-02-14 DIAGNOSIS — C10.9 SQUAMOUS CELL CARCINOMA OF OROPHARYNX (HCC): Primary | ICD-10-CM

## 2025-02-14 DIAGNOSIS — C10.9 SQUAMOUS CELL CARCINOMA OF OROPHARYNX (HCC): ICD-10-CM

## 2025-02-14 DIAGNOSIS — R53.83 OTHER FATIGUE: ICD-10-CM

## 2025-02-14 LAB
ALBUMIN SERPL-MCNC: 4 G/DL (ref 3.5–5.2)
ALP SERPL-CCNC: 60 U/L (ref 40–129)
ALT SERPL-CCNC: 15 U/L (ref 0–40)
ANION GAP SERPL CALCULATED.3IONS-SCNC: 9 MMOL/L (ref 7–16)
AST SERPL-CCNC: 20 U/L (ref 0–39)
BASOPHILS # BLD: 0.04 K/UL (ref 0–0.2)
BASOPHILS NFR BLD: 1 % (ref 0–2)
BILIRUB SERPL-MCNC: 0.3 MG/DL (ref 0–1.2)
BUN SERPL-MCNC: 24 MG/DL (ref 6–23)
CALCIUM SERPL-MCNC: 10 MG/DL (ref 8.6–10.2)
CHLORIDE SERPL-SCNC: 104 MMOL/L (ref 98–107)
CO2 SERPL-SCNC: 27 MMOL/L (ref 22–29)
CREAT SERPL-MCNC: 1.2 MG/DL (ref 0.7–1.2)
EOSINOPHIL # BLD: 0.17 K/UL (ref 0.05–0.5)
EOSINOPHILS RELATIVE PERCENT: 4 % (ref 0–6)
ERYTHROCYTE [DISTWIDTH] IN BLOOD BY AUTOMATED COUNT: 14.2 % (ref 11.5–15)
GFR, ESTIMATED: 65 ML/MIN/1.73M2
GLUCOSE SERPL-MCNC: 123 MG/DL (ref 74–99)
HCT VFR BLD AUTO: 40.1 % (ref 37–54)
HGB BLD-MCNC: 13.2 G/DL (ref 12.5–16.5)
LYMPHOCYTES NFR BLD: 0.17 K/UL (ref 1.5–4)
LYMPHOCYTES RELATIVE PERCENT: 4 % (ref 20–42)
MCH RBC QN AUTO: 30.3 PG (ref 26–35)
MCHC RBC AUTO-ENTMCNC: 32.9 G/DL (ref 32–34.5)
MCV RBC AUTO: 92.2 FL (ref 80–99.9)
MONOCYTES NFR BLD: 0.29 K/UL (ref 0.1–0.95)
MONOCYTES NFR BLD: 6 % (ref 2–12)
NEUTROPHILS NFR BLD: 86 % (ref 43–80)
NEUTS SEG NFR BLD: 4.13 K/UL (ref 1.8–7.3)
PLATELET # BLD AUTO: 221 K/UL (ref 130–450)
PMV BLD AUTO: 10.3 FL (ref 7–12)
POTASSIUM SERPL-SCNC: 3.9 MMOL/L (ref 3.5–5)
PROT SERPL-MCNC: 7.2 G/DL (ref 6.4–8.3)
RBC # BLD AUTO: 4.35 M/UL (ref 3.8–5.8)
RBC # BLD: NORMAL 10*6/UL
SODIUM SERPL-SCNC: 140 MMOL/L (ref 132–146)
TSH SERPL DL<=0.05 MIU/L-ACNC: 2.46 UIU/ML (ref 0.27–4.2)
WBC OTHER # BLD: 4.8 K/UL (ref 4.5–11.5)

## 2025-02-14 PROCEDURE — 36415 COLL VENOUS BLD VENIPUNCTURE: CPT

## 2025-02-14 PROCEDURE — 1123F ACP DISCUSS/DSCN MKR DOCD: CPT | Performed by: STUDENT IN AN ORGANIZED HEALTH CARE EDUCATION/TRAINING PROGRAM

## 2025-02-14 PROCEDURE — 80053 COMPREHEN METABOLIC PANEL: CPT

## 2025-02-14 PROCEDURE — 1159F MED LIST DOCD IN RCRD: CPT | Performed by: STUDENT IN AN ORGANIZED HEALTH CARE EDUCATION/TRAINING PROGRAM

## 2025-02-14 PROCEDURE — 99213 OFFICE O/P EST LOW 20 MIN: CPT | Performed by: STUDENT IN AN ORGANIZED HEALTH CARE EDUCATION/TRAINING PROGRAM

## 2025-02-14 PROCEDURE — 84443 ASSAY THYROID STIM HORMONE: CPT

## 2025-02-14 PROCEDURE — 85025 COMPLETE CBC W/AUTO DIFF WBC: CPT

## 2025-02-14 PROCEDURE — 1125F AMNT PAIN NOTED PAIN PRSNT: CPT | Performed by: STUDENT IN AN ORGANIZED HEALTH CARE EDUCATION/TRAINING PROGRAM

## 2025-02-14 RX ORDER — ACETAMINOPHEN 325 MG/1
650 TABLET ORAL
Status: CANCELLED | OUTPATIENT
Start: 2025-03-07

## 2025-02-14 RX ORDER — FAMOTIDINE 10 MG/ML
20 INJECTION, SOLUTION INTRAVENOUS
Status: CANCELLED | OUTPATIENT
Start: 2025-03-07

## 2025-02-14 RX ORDER — HEPARIN SODIUM (PORCINE) LOCK FLUSH IV SOLN 100 UNIT/ML 100 UNIT/ML
500 SOLUTION INTRAVENOUS PRN
Status: CANCELLED | OUTPATIENT
Start: 2025-03-07

## 2025-02-14 RX ORDER — DIPHENHYDRAMINE HYDROCHLORIDE 50 MG/ML
50 INJECTION INTRAMUSCULAR; INTRAVENOUS
Status: CANCELLED | OUTPATIENT
Start: 2025-03-07

## 2025-02-14 RX ORDER — ONDANSETRON 2 MG/ML
8 INJECTION INTRAMUSCULAR; INTRAVENOUS
Status: CANCELLED | OUTPATIENT
Start: 2025-03-07

## 2025-02-14 RX ORDER — EPINEPHRINE 1 MG/ML
0.3 INJECTION, SOLUTION, CONCENTRATE INTRAVENOUS PRN
Status: CANCELLED | OUTPATIENT
Start: 2025-03-07

## 2025-02-14 RX ORDER — MEPERIDINE HYDROCHLORIDE 50 MG/ML
12.5 INJECTION INTRAMUSCULAR; INTRAVENOUS; SUBCUTANEOUS PRN
Status: CANCELLED | OUTPATIENT
Start: 2025-03-07

## 2025-02-14 RX ORDER — SODIUM CHLORIDE 9 MG/ML
5-250 INJECTION, SOLUTION INTRAVENOUS PRN
Status: CANCELLED | OUTPATIENT
Start: 2025-03-07

## 2025-02-14 RX ORDER — SODIUM CHLORIDE 9 MG/ML
INJECTION, SOLUTION INTRAVENOUS CONTINUOUS
Status: CANCELLED | OUTPATIENT
Start: 2025-03-07

## 2025-02-14 RX ORDER — ALBUTEROL SULFATE 90 UG/1
4 INHALANT RESPIRATORY (INHALATION) PRN
Status: CANCELLED | OUTPATIENT
Start: 2025-03-07

## 2025-02-14 RX ORDER — SODIUM CHLORIDE 0.9 % (FLUSH) 0.9 %
5-40 SYRINGE (ML) INJECTION PRN
Status: CANCELLED | OUTPATIENT
Start: 2025-03-07

## 2025-02-14 RX ORDER — HYDROCORTISONE SODIUM SUCCINATE 100 MG/2ML
100 INJECTION INTRAMUSCULAR; INTRAVENOUS
Status: CANCELLED | OUTPATIENT
Start: 2025-03-07

## 2025-02-14 NOTE — PROGRESS NOTES
[facility] MEDICAL ONCOLOGY  [address]  [place] [address]  Dept: [phone]  Loc: [phone]      Attending Clinic Note    Reason for Visit: Follow-up on a patient with p16 + Oropharyngeal Squamous Cell Carcinoma    PCP:  Jason Foy DO    Chief Complaint   Patient presents with    OPHARANGEAL KEYTRUDA    Follow-up         Subjective:  Overall patient is doing well.  Still has issues with dry mouth.  Has been using aloe vera mouth spray which helps some.  Otherwise denies of new symptoms.  He remains in good spirits.    Oncology History:  74 y.o.  male who presented to the ENT team for persistent left-sided neck fullness without associated difficulty swallowing    CT soft tissue neck 02/23/2022: Mass located in the left aspect of floor of the mouth extending into the left oropharyngeal soft tissue concerning for squamous cell carcinoma  Multiple enlarged necrotic left anterior cervical chain lymph nodes    Panendoscopy on 3/17/2022:  Findings: L lingual tonsil hypertrophy, biopsy negative, left level II neck node FNA performed   FNA left neck mass level 2:  Inconclusive for malignant cells.  Few atypical squamous cells with degenerative change  A.  Tongue, left base, biopsy:   Squamous epithelial-lined lymphoid tissue with reactive germinal centers, compatible with lingual tonsil.   Negative for dysplasia; Negative for malignancy.     B.  Tongue, left base, biopsy:   Squamous epithelial-lined lymphoid tissue with reactive germinal centers, compatible with lingual tonsil.   Focal lobules of benign mucinous glands.   Negative for dysplasia; Negative for malignancy    On 07/21/2022: Panendoscopy excision left level 2 lymph node  Findings:  left base of tongue mass, left cervical lymphadenopathy  A.  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    See comment.   B.  Left neck mass, level 2: Metastatic

## 2025-02-26 ENCOUNTER — TELEPHONE (OUTPATIENT)
Dept: ENT CLINIC | Age: 75
End: 2025-02-26

## 2025-02-26 NOTE — TELEPHONE ENCOUNTER
Left several messages giving pt the number to call speech to get scheduled from his referral Dr. Hernandez put in when he was here in January. Left several messages asking pt to call the office to let office know if he still wishes to get scheduled with speech or if he has decided he doesn't want to continue with the speech referral. Pt has not returned any calls and as of 2/26/25 he is not scheduled with speech and final message left asking pt to call office.

## 2025-03-06 DIAGNOSIS — C10.9 OROPHARYNGEAL CANCER (HCC): ICD-10-CM

## 2025-03-07 ENCOUNTER — HOSPITAL ENCOUNTER (OUTPATIENT)
Dept: INFUSION THERAPY | Age: 75
Discharge: HOME | End: 2025-03-07
Payer: MEDICARE

## 2025-03-07 ENCOUNTER — OFFICE VISIT (OUTPATIENT)
Dept: ONCOLOGY | Age: 75
End: 2025-03-07

## 2025-03-07 VITALS
SYSTOLIC BLOOD PRESSURE: 120 MMHG | DIASTOLIC BLOOD PRESSURE: 74 MMHG | HEIGHT: 67 IN | TEMPERATURE: 98.2 F | HEART RATE: 73 BPM | BODY MASS INDEX: 26.02 KG/M2 | OXYGEN SATURATION: 98 % | WEIGHT: 165.8 LBS

## 2025-03-07 VITALS
SYSTOLIC BLOOD PRESSURE: 107 MMHG | TEMPERATURE: 97.5 F | DIASTOLIC BLOOD PRESSURE: 62 MMHG | HEART RATE: 73 BPM | RESPIRATION RATE: 16 BRPM | OXYGEN SATURATION: 95 %

## 2025-03-07 DIAGNOSIS — C10.9 OROPHARYNGEAL CANCER (HCC): ICD-10-CM

## 2025-03-07 DIAGNOSIS — R53.83 OTHER FATIGUE: ICD-10-CM

## 2025-03-07 DIAGNOSIS — C10.9 SQUAMOUS CELL CARCINOMA OF OROPHARYNX (HCC): Primary | ICD-10-CM

## 2025-03-07 DIAGNOSIS — C10.9 SQUAMOUS CELL CARCINOMA OF OROPHARYNX (HCC): ICD-10-CM

## 2025-03-07 LAB
ALBUMIN SERPL-MCNC: 4.1 G/DL (ref 3.5–5.2)
ALP SERPL-CCNC: 66 U/L (ref 40–129)
ALT SERPL-CCNC: 15 U/L (ref 0–40)
ANION GAP SERPL CALCULATED.3IONS-SCNC: 12 MMOL/L (ref 7–16)
AST SERPL-CCNC: 21 U/L (ref 0–39)
BASOPHILS # BLD: 0 K/UL (ref 0–0.2)
BASOPHILS NFR BLD: 0 % (ref 0–2)
BILIRUB SERPL-MCNC: 0.4 MG/DL (ref 0–1.2)
BUN SERPL-MCNC: 21 MG/DL (ref 6–23)
CALCIUM SERPL-MCNC: 10.3 MG/DL (ref 8.6–10.2)
CHLORIDE SERPL-SCNC: 103 MMOL/L (ref 98–107)
CO2 SERPL-SCNC: 25 MMOL/L (ref 22–29)
CREAT SERPL-MCNC: 1.2 MG/DL (ref 0.7–1.2)
EOSINOPHIL # BLD: 0.2 K/UL (ref 0.05–0.5)
EOSINOPHILS RELATIVE PERCENT: 4 % (ref 0–6)
ERYTHROCYTE [DISTWIDTH] IN BLOOD BY AUTOMATED COUNT: 14.7 % (ref 11.5–15)
GFR, ESTIMATED: 64 ML/MIN/1.73M2
GLUCOSE SERPL-MCNC: 132 MG/DL (ref 74–99)
HCT VFR BLD AUTO: 40.7 % (ref 37–54)
HGB BLD-MCNC: 13.7 G/DL (ref 12.5–16.5)
LYMPHOCYTES NFR BLD: 0.28 K/UL (ref 1.5–4)
LYMPHOCYTES RELATIVE PERCENT: 6 % (ref 20–42)
MCH RBC QN AUTO: 30.5 PG (ref 26–35)
MCHC RBC AUTO-ENTMCNC: 33.7 G/DL (ref 32–34.5)
MCV RBC AUTO: 90.6 FL (ref 80–99.9)
MONOCYTES NFR BLD: 0.44 K/UL (ref 0.1–0.95)
MONOCYTES NFR BLD: 10 % (ref 2–12)
NEUTROPHILS NFR BLD: 80 % (ref 43–80)
NEUTS SEG NFR BLD: 3.67 K/UL (ref 1.8–7.3)
PLATELET # BLD AUTO: 223 K/UL (ref 130–450)
PMV BLD AUTO: 9.9 FL (ref 7–12)
POTASSIUM SERPL-SCNC: 3.9 MMOL/L (ref 3.5–5)
PROT SERPL-MCNC: 7.5 G/DL (ref 6.4–8.3)
RBC # BLD AUTO: 4.49 M/UL (ref 3.8–5.8)
RBC # BLD: NORMAL 10*6/UL
SODIUM SERPL-SCNC: 140 MMOL/L (ref 132–146)
TSH SERPL DL<=0.05 MIU/L-ACNC: 5.49 UIU/ML (ref 0.27–4.2)
WBC OTHER # BLD: 4.6 K/UL (ref 4.5–11.5)

## 2025-03-07 PROCEDURE — 2580000003 HC RX 258: Performed by: STUDENT IN AN ORGANIZED HEALTH CARE EDUCATION/TRAINING PROGRAM

## 2025-03-07 PROCEDURE — 85025 COMPLETE CBC W/AUTO DIFF WBC: CPT

## 2025-03-07 PROCEDURE — 6360000002 HC RX W HCPCS: Performed by: STUDENT IN AN ORGANIZED HEALTH CARE EDUCATION/TRAINING PROGRAM

## 2025-03-07 PROCEDURE — 96413 CHEMO IV INFUSION 1 HR: CPT

## 2025-03-07 PROCEDURE — 84443 ASSAY THYROID STIM HORMONE: CPT

## 2025-03-07 PROCEDURE — 36415 COLL VENOUS BLD VENIPUNCTURE: CPT

## 2025-03-07 PROCEDURE — 80053 COMPREHEN METABOLIC PANEL: CPT

## 2025-03-07 RX ORDER — SODIUM CHLORIDE 9 MG/ML
5-250 INJECTION, SOLUTION INTRAVENOUS PRN
Status: CANCELLED | OUTPATIENT
Start: 2025-03-07

## 2025-03-07 RX ORDER — ONDANSETRON 2 MG/ML
8 INJECTION INTRAMUSCULAR; INTRAVENOUS
Status: CANCELLED | OUTPATIENT
Start: 2025-03-07

## 2025-03-07 RX ORDER — DIPHENHYDRAMINE HYDROCHLORIDE 50 MG/ML
50 INJECTION INTRAMUSCULAR; INTRAVENOUS
Status: CANCELLED | OUTPATIENT
Start: 2025-03-07

## 2025-03-07 RX ORDER — SODIUM CHLORIDE 0.9 % (FLUSH) 0.9 %
5-40 SYRINGE (ML) INJECTION PRN
Status: CANCELLED | OUTPATIENT
Start: 2025-03-07

## 2025-03-07 RX ORDER — SODIUM CHLORIDE 0.9 % (FLUSH) 0.9 %
5-40 SYRINGE (ML) INJECTION PRN
Status: DISCONTINUED | OUTPATIENT
Start: 2025-03-07 | End: 2025-03-08 | Stop reason: HOSPADM

## 2025-03-07 RX ORDER — SODIUM CHLORIDE 9 MG/ML
INJECTION, SOLUTION INTRAVENOUS CONTINUOUS
Status: CANCELLED | OUTPATIENT
Start: 2025-03-07

## 2025-03-07 RX ORDER — ACETAMINOPHEN 325 MG/1
650 TABLET ORAL
Status: CANCELLED | OUTPATIENT
Start: 2025-03-07

## 2025-03-07 RX ORDER — OXYCODONE HYDROCHLORIDE 5 MG/1
5 TABLET ORAL EVERY 6 HOURS PRN
Qty: 90 TABLET | Refills: 0 | Status: SHIPPED | OUTPATIENT
Start: 2025-03-07 | End: 2025-04-06

## 2025-03-07 RX ORDER — FAMOTIDINE 10 MG/ML
20 INJECTION, SOLUTION INTRAVENOUS
Status: CANCELLED | OUTPATIENT
Start: 2025-03-07

## 2025-03-07 RX ORDER — SODIUM CHLORIDE 9 MG/ML
5-250 INJECTION, SOLUTION INTRAVENOUS PRN
Status: DISCONTINUED | OUTPATIENT
Start: 2025-03-07 | End: 2025-03-08 | Stop reason: HOSPADM

## 2025-03-07 RX ORDER — HYDROCORTISONE SODIUM SUCCINATE 100 MG/2ML
100 INJECTION INTRAMUSCULAR; INTRAVENOUS
Status: CANCELLED | OUTPATIENT
Start: 2025-03-07

## 2025-03-07 RX ORDER — HEPARIN SODIUM (PORCINE) LOCK FLUSH IV SOLN 100 UNIT/ML 100 UNIT/ML
500 SOLUTION INTRAVENOUS PRN
Status: CANCELLED | OUTPATIENT
Start: 2025-03-07

## 2025-03-07 RX ORDER — MEPERIDINE HYDROCHLORIDE 50 MG/ML
12.5 INJECTION INTRAMUSCULAR; INTRAVENOUS; SUBCUTANEOUS PRN
Status: CANCELLED | OUTPATIENT
Start: 2025-03-07

## 2025-03-07 RX ORDER — EPINEPHRINE 1 MG/ML
0.3 INJECTION, SOLUTION, CONCENTRATE INTRAVENOUS PRN
Status: CANCELLED | OUTPATIENT
Start: 2025-03-07

## 2025-03-07 RX ORDER — ALBUTEROL SULFATE 90 UG/1
4 INHALANT RESPIRATORY (INHALATION) PRN
Status: CANCELLED | OUTPATIENT
Start: 2025-03-07

## 2025-03-07 RX ADMIN — SODIUM CHLORIDE 200 MG: 9 INJECTION, SOLUTION INTRAVENOUS at 13:38

## 2025-03-07 ASSESSMENT — PAIN DESCRIPTION - LOCATION: LOCATION: THROAT

## 2025-03-07 ASSESSMENT — PAIN DESCRIPTION - PAIN TYPE: TYPE: SURGICAL PAIN

## 2025-03-07 ASSESSMENT — PAIN SCALES - GENERAL: PAINLEVEL_OUTOF10: 6

## 2025-03-07 ASSESSMENT — PAIN DESCRIPTION - DESCRIPTORS: DESCRIPTORS: ACHING;DISCOMFORT;DULL

## 2025-03-07 NOTE — TELEPHONE ENCOUNTER
Patient Ignacio messaged on my chart for refill oxycodone 5 mg. Next appointment 5-7-2025. [facility]. Routed conversation to A Lin, NP

## 2025-03-07 NOTE — PROGRESS NOTES
superiorly to the left soft palate and inferiorly to the left floor of mouth and left vallecula.  Maximum SUV of this mass measures 17.4.  The mass causes narrowing of the oropharyngeal airway.   There are multiple hypermetabolic enlarged, necrotic and someone confluent left level 2 and left level 3 lymph nodes.  Confluent left level 2 adenopathy demonstrates a maximum SUV of 15.8.  Additional hypermetabolic left level 5/3 junction lymph node is noted with maximum SUV 5.0  Mild focal hypermetabolic activity is noted in the left parapharyngeal/retropharyngeal region (maximum SUV 3.8) which could represent an underlying small lymph node.    There is a hypermetabolic right level 2 lymph node with maximum SUV 6.5    Recommended concurrent chemoradiation therapy with weekly Cisplatin.    Cycle # 1 weekly Cisplatin was on 09/06/2022  Cycle # 2 weekly Cisplatin was on 09/13/2022.   Cycle # 3 weekly Cisplatin was on 09/20/2022.   ER visit 09/26/2022 fatigue dysphagia dehydration; CTA chest 9/26/2022 no evidence of PE or acute pulmonary abnormality.  CT abdomen/pelvis 09/26/2022 diffuse colonic diverticulosis with mild stranding of the fat in the left lower quadrant concerning for early diverticulitis.  No evidence of obstructive uropathy or acute appendicitis. Given IV fluid with improvement.   IVF daily for 3 days started 09/27/2022.   PEG tube inserted on 09/30/2022  Cycle # 4 weekly Cisplatin was on 10/04/2022.   Cycle # 5 weekly Cisplatin was on 10/11/2022.  Cycle # 6 weekly Cisplatin was on 10/18/2022  Cycle # 7 weekly Cisplatin was on 10/25/2022.   RT was completed on 11/03/2022.     PET/CT scan 02/10/2023:   Hypermetabolic adenopathy within the neck has resolved. There is also improved activity at the floor of the mouth; residual intermediate level activity at the anterior left floor of mouth can reflect post treatment related inflammation or residual local disease. Interval development of osseous metastatic disease

## 2025-03-19 ENCOUNTER — TRANSCRIBE ORDERS (OUTPATIENT)
Dept: ADMINISTRATIVE | Age: 75
End: 2025-03-19

## 2025-03-19 DIAGNOSIS — R13.10 DYSPHAGIA, UNSPECIFIED TYPE: Primary | ICD-10-CM

## 2025-03-20 RX ORDER — FENOFIBRATE 145 MG/1
145 TABLET, COATED ORAL DAILY
Qty: 90 TABLET | Refills: 1 | Status: SHIPPED | OUTPATIENT
Start: 2025-03-20

## 2025-03-20 NOTE — TELEPHONE ENCOUNTER
Name of Medication(s) Requested:  Requested Prescriptions     Pending Prescriptions Disp Refills    fenofibrate (TRICOR) 145 MG tablet 90 tablet 1     Sig: Take 1 tablet by mouth daily       Medication is on current medication list Yes    Dosage and directions were verified? Yes    Quantity verified: 90 day supply     Pharmacy Verified?  Yes    Last Appointment:  1/30/2025    Future appts:  Future Appointments   Date Time Provider Department Center   3/28/2025 12:00 PM [facility] LABS ROOM MEDICAL ONCOLOGY [facility] MED ONC [place]   3/28/2025  1:00 PM Erika Mendez, MARY - CNP [facility] MedONC [facility]   3/28/2025  1:20 PM [facility] CHAIR 04 MEDICAL ONCOLOGY [facility] MED ONC [place]   5/6/2025 10:30 AM Kris Hernandez DO [place] [facility]   5/7/2025 10:30 AM  PALLIATIVE PROVIDER  [facility]        (If no appt send self scheduling link. .REFILLAPPT)  Scheduling request sent?     [x] Yes  [] No    Does patient need updated?  [] Yes  [x] No

## 2025-03-28 ENCOUNTER — OFFICE VISIT (OUTPATIENT)
Dept: ONCOLOGY | Age: 75
End: 2025-03-28
Payer: MEDICARE

## 2025-03-28 ENCOUNTER — HOSPITAL ENCOUNTER (OUTPATIENT)
Dept: INFUSION THERAPY | Age: 75
Discharge: HOME | End: 2025-03-28
Payer: MEDICARE

## 2025-03-28 VITALS
SYSTOLIC BLOOD PRESSURE: 105 MMHG | HEIGHT: 67 IN | DIASTOLIC BLOOD PRESSURE: 68 MMHG | WEIGHT: 169.6 LBS | BODY MASS INDEX: 26.62 KG/M2 | OXYGEN SATURATION: 98 % | HEART RATE: 82 BPM | TEMPERATURE: 97.6 F

## 2025-03-28 VITALS
DIASTOLIC BLOOD PRESSURE: 66 MMHG | SYSTOLIC BLOOD PRESSURE: 96 MMHG | HEART RATE: 71 BPM | RESPIRATION RATE: 16 BRPM | TEMPERATURE: 98.1 F

## 2025-03-28 DIAGNOSIS — C10.9 SQUAMOUS CELL CARCINOMA OF OROPHARYNX: Primary | ICD-10-CM

## 2025-03-28 DIAGNOSIS — R53.83 OTHER FATIGUE: ICD-10-CM

## 2025-03-28 LAB
ALBUMIN SERPL-MCNC: 4.1 G/DL (ref 3.5–5.2)
ALP SERPL-CCNC: 67 U/L (ref 40–129)
ALT SERPL-CCNC: 15 U/L (ref 0–40)
ANION GAP SERPL CALCULATED.3IONS-SCNC: 8 MMOL/L (ref 7–16)
AST SERPL-CCNC: 19 U/L (ref 0–39)
BASOPHILS # BLD: 0 K/UL (ref 0–0.2)
BASOPHILS NFR BLD: 0 % (ref 0–2)
BILIRUB SERPL-MCNC: 0.4 MG/DL (ref 0–1.2)
BUN SERPL-MCNC: 24 MG/DL (ref 6–23)
CALCIUM SERPL-MCNC: 10.1 MG/DL (ref 8.6–10.2)
CHLORIDE SERPL-SCNC: 103 MMOL/L (ref 98–107)
CO2 SERPL-SCNC: 26 MMOL/L (ref 22–29)
CREAT SERPL-MCNC: 1.3 MG/DL (ref 0.7–1.2)
EOSINOPHIL # BLD: 0.12 K/UL (ref 0.05–0.5)
EOSINOPHILS RELATIVE PERCENT: 3 % (ref 0–6)
ERYTHROCYTE [DISTWIDTH] IN BLOOD BY AUTOMATED COUNT: 14.3 % (ref 11.5–15)
GFR, ESTIMATED: 59 ML/MIN/1.73M2
GLUCOSE SERPL-MCNC: 131 MG/DL (ref 74–99)
HCT VFR BLD AUTO: 42.2 % (ref 37–54)
HGB BLD-MCNC: 14.4 G/DL (ref 12.5–16.5)
LYMPHOCYTES NFR BLD: 0.41 K/UL (ref 1.5–4)
LYMPHOCYTES RELATIVE PERCENT: 9 % (ref 20–42)
MCH RBC QN AUTO: 31.3 PG (ref 26–35)
MCHC RBC AUTO-ENTMCNC: 34.1 G/DL (ref 32–34.5)
MCV RBC AUTO: 91.7 FL (ref 80–99.9)
MONOCYTES NFR BLD: 0.33 K/UL (ref 0.1–0.95)
MONOCYTES NFR BLD: 7 % (ref 2–12)
NEUTROPHILS NFR BLD: 81 % (ref 43–80)
NEUTS SEG NFR BLD: 3.74 K/UL (ref 1.8–7.3)
PLATELET # BLD AUTO: 210 K/UL (ref 130–450)
PMV BLD AUTO: 10.3 FL (ref 7–12)
POTASSIUM SERPL-SCNC: 4 MMOL/L (ref 3.5–5)
PROT SERPL-MCNC: 7.4 G/DL (ref 6.4–8.3)
RBC # BLD AUTO: 4.6 M/UL (ref 3.8–5.8)
SODIUM SERPL-SCNC: 137 MMOL/L (ref 132–146)
TSH SERPL DL<=0.05 MIU/L-ACNC: 2.05 UIU/ML (ref 0.27–4.2)
WBC OTHER # BLD: 4.6 K/UL (ref 4.5–11.5)

## 2025-03-28 PROCEDURE — 36415 COLL VENOUS BLD VENIPUNCTURE: CPT

## 2025-03-28 PROCEDURE — 85025 COMPLETE CBC W/AUTO DIFF WBC: CPT

## 2025-03-28 PROCEDURE — 99214 OFFICE O/P EST MOD 30 MIN: CPT | Performed by: NURSE PRACTITIONER

## 2025-03-28 PROCEDURE — 1123F ACP DISCUSS/DSCN MKR DOCD: CPT | Performed by: NURSE PRACTITIONER

## 2025-03-28 PROCEDURE — 2580000003 HC RX 258: Performed by: NURSE PRACTITIONER

## 2025-03-28 PROCEDURE — 1160F RVW MEDS BY RX/DR IN RCRD: CPT | Performed by: NURSE PRACTITIONER

## 2025-03-28 PROCEDURE — 84443 ASSAY THYROID STIM HORMONE: CPT

## 2025-03-28 PROCEDURE — 6360000002 HC RX W HCPCS: Performed by: NURSE PRACTITIONER

## 2025-03-28 PROCEDURE — 1159F MED LIST DOCD IN RCRD: CPT | Performed by: NURSE PRACTITIONER

## 2025-03-28 PROCEDURE — 96413 CHEMO IV INFUSION 1 HR: CPT

## 2025-03-28 PROCEDURE — 80053 COMPREHEN METABOLIC PANEL: CPT

## 2025-03-28 RX ORDER — MEPERIDINE HYDROCHLORIDE 50 MG/ML
12.5 INJECTION INTRAMUSCULAR; INTRAVENOUS; SUBCUTANEOUS PRN
Status: CANCELLED | OUTPATIENT
Start: 2025-03-28

## 2025-03-28 RX ORDER — SODIUM CHLORIDE 0.9 % (FLUSH) 0.9 %
5-40 SYRINGE (ML) INJECTION PRN
Status: DISCONTINUED | OUTPATIENT
Start: 2025-03-28 | End: 2025-03-29 | Stop reason: HOSPADM

## 2025-03-28 RX ORDER — SODIUM CHLORIDE 9 MG/ML
5-250 INJECTION, SOLUTION INTRAVENOUS PRN
Status: CANCELLED | OUTPATIENT
Start: 2025-03-28

## 2025-03-28 RX ORDER — FAMOTIDINE 10 MG/ML
20 INJECTION, SOLUTION INTRAVENOUS
Status: CANCELLED | OUTPATIENT
Start: 2025-03-28

## 2025-03-28 RX ORDER — ALBUTEROL SULFATE 90 UG/1
4 INHALANT RESPIRATORY (INHALATION) PRN
Status: CANCELLED | OUTPATIENT
Start: 2025-03-28

## 2025-03-28 RX ORDER — ACETAMINOPHEN 325 MG/1
650 TABLET ORAL
Status: CANCELLED | OUTPATIENT
Start: 2025-03-28

## 2025-03-28 RX ORDER — DIPHENHYDRAMINE HYDROCHLORIDE 50 MG/ML
50 INJECTION, SOLUTION INTRAMUSCULAR; INTRAVENOUS
Status: CANCELLED | OUTPATIENT
Start: 2025-03-28

## 2025-03-28 RX ORDER — SODIUM CHLORIDE 9 MG/ML
5-250 INJECTION, SOLUTION INTRAVENOUS PRN
Status: DISCONTINUED | OUTPATIENT
Start: 2025-03-28 | End: 2025-03-29 | Stop reason: HOSPADM

## 2025-03-28 RX ORDER — ONDANSETRON 2 MG/ML
8 INJECTION INTRAMUSCULAR; INTRAVENOUS
Status: CANCELLED | OUTPATIENT
Start: 2025-03-28

## 2025-03-28 RX ORDER — SODIUM CHLORIDE 9 MG/ML
INJECTION, SOLUTION INTRAVENOUS CONTINUOUS
Status: CANCELLED | OUTPATIENT
Start: 2025-03-28

## 2025-03-28 RX ORDER — HYDROCORTISONE SODIUM SUCCINATE 100 MG/2ML
100 INJECTION INTRAMUSCULAR; INTRAVENOUS
Status: CANCELLED | OUTPATIENT
Start: 2025-03-28

## 2025-03-28 RX ORDER — HEPARIN SODIUM (PORCINE) LOCK FLUSH IV SOLN 100 UNIT/ML 100 UNIT/ML
500 SOLUTION INTRAVENOUS PRN
Status: CANCELLED | OUTPATIENT
Start: 2025-03-28

## 2025-03-28 RX ORDER — SODIUM CHLORIDE 0.9 % (FLUSH) 0.9 %
5-40 SYRINGE (ML) INJECTION PRN
Status: CANCELLED | OUTPATIENT
Start: 2025-03-28

## 2025-03-28 RX ORDER — EPINEPHRINE 1 MG/ML
0.3 INJECTION, SOLUTION, CONCENTRATE INTRAVENOUS PRN
Status: CANCELLED | OUTPATIENT
Start: 2025-03-28

## 2025-03-28 RX ADMIN — SODIUM CHLORIDE 20 ML/HR: 0.9 INJECTION, SOLUTION INTRAVENOUS at 14:00

## 2025-03-28 RX ADMIN — SODIUM CHLORIDE 200 MG: 9 INJECTION, SOLUTION INTRAVENOUS at 14:02

## 2025-03-28 ASSESSMENT — PAIN DESCRIPTION - LOCATION: LOCATION: THROAT

## 2025-03-28 ASSESSMENT — PAIN SCALES - GENERAL: PAINLEVEL_OUTOF10: 8

## 2025-03-28 ASSESSMENT — PAIN DESCRIPTION - DESCRIPTORS: DESCRIPTORS: ACHING;DISCOMFORT

## 2025-03-28 NOTE — PROGRESS NOTES
[facility] [place] [facility] MEDICAL ONCOLOGY  [address] [address]  Dept: [phone]  Loc: [phone]      Attending Clinic Note    Reason for Visit: Follow-up on a patient with p16 + Oropharyngeal Squamous Cell Carcinoma    PCP:  Jason Foy DO    Chief Complaint   Patient presents with    Follow-up     Squamous cell carcinoma of oropharynx (HCC)             Subjective:  Patient overall feeling well beside fatigue. Admits to not being active. Denies nausea, vomiting, abdominal pain, or diarrhea. Using PEG tube for nutrition.     Oncology History:  74 y.o.  male who presented to the ENT team for persistent left-sided neck fullness without associated difficulty swallowing    CT soft tissue neck 02/23/2022: Mass located in the left aspect of floor of the mouth extending into the left oropharyngeal soft tissue concerning for squamous cell carcinoma  Multiple enlarged necrotic left anterior cervical chain lymph nodes    Panendoscopy on 3/17/2022:  Findings: L lingual tonsil hypertrophy, biopsy negative, left level II neck node FNA performed   FNA left neck mass level 2:  Inconclusive for malignant cells.  Few atypical squamous cells with degenerative change  A.  Tongue, left base, biopsy:   Squamous epithelial-lined lymphoid tissue with reactive germinal centers, compatible with lingual tonsil.   Negative for dysplasia; Negative for malignancy.     B.  Tongue, left base, biopsy:   Squamous epithelial-lined lymphoid tissue with reactive germinal centers, compatible with lingual tonsil.   Focal lobules of benign mucinous glands.   Negative for dysplasia; Negative for malignancy    On 07/21/2022: Panendoscopy excision left level 2 lymph node  Findings:  left base of tongue mass, left cervical lymphadenopathy  A.  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    See comment.   B.  Left neck mass, level 2: Metastatic

## 2025-04-02 ENCOUNTER — HOSPITAL ENCOUNTER (OUTPATIENT)
Dept: GENERAL RADIOLOGY | Age: 75
Discharge: HOME | End: 2025-04-04
Attending: INTERNAL MEDICINE
Payer: MEDICARE

## 2025-04-02 DIAGNOSIS — R13.10 DYSPHAGIA, UNSPECIFIED TYPE: ICD-10-CM

## 2025-04-02 DIAGNOSIS — C10.9 OROPHARYNGEAL CANCER (HCC): ICD-10-CM

## 2025-04-02 PROCEDURE — 74230 X-RAY XM SWLNG FUNCJ C+: CPT

## 2025-04-02 PROCEDURE — 2500000003 HC RX 250 WO HCPCS: Performed by: INTERNAL MEDICINE

## 2025-04-02 PROCEDURE — 92611 MOTION FLUOROSCOPY/SWALLOW: CPT | Performed by: SPEECH-LANGUAGE PATHOLOGIST

## 2025-04-02 PROCEDURE — 92526 ORAL FUNCTION THERAPY: CPT | Performed by: SPEECH-LANGUAGE PATHOLOGIST

## 2025-04-02 RX ORDER — OXYCODONE HYDROCHLORIDE 5 MG/1
5 TABLET ORAL EVERY 6 HOURS PRN
Qty: 90 TABLET | Refills: 0 | Status: CANCELLED | OUTPATIENT
Start: 2025-04-02 | End: 2025-05-02

## 2025-04-02 RX ADMIN — BARIUM SULFATE 45 ML: 400 PASTE ORAL at 09:09

## 2025-04-02 RX ADMIN — BARIUM SULFATE 45 G: 0.81 POWDER, FOR SUSPENSION ORAL at 09:09

## 2025-04-02 RX ADMIN — BARIUM SULFATE 45 ML: 400 SUSPENSION ORAL at 09:10

## 2025-04-02 NOTE — PROGRESS NOTES
SPEECH/LANGUAGE PATHOLOGY  VIDEOFLUOROSCOPIC STUDY OF SWALLOWING (MBS)   and PLAN OF CARE    PATIENT NAME:  Ignacio Bhatia  (male)     MRN:  [id number]    :  1950  (74 y.o.)  STATUS:  Outpatient    TODAY'S DATE:  2025  ORDER DATE, DESCRIPTION AND REFERRING PROVIDER:  Sheba Hinds : [place] MODIFIED BARIUM SWALLOW W VIDEO :  3/19/25  REASON FOR REFERRAL: dysphagia    EVALUATING THERAPIST: Carey Conroy, SLP      RESULTS:      DYSPHAGIA DIAGNOSIS:  Clinical indicators of mild-moderate oropharyngeal phase dysphagia     DIET RECOMMENDATIONS:  Pureed consistency solids (IDDSI level 4) with  thin liquids (IDDSI level 0)    FEEDING RECOMMENDATIONS:    Assistance level:  No assistance needed     Compensatory strategies recommended: Double swallow to clear pharyngeal residue   SINGLE cup sips  Liquid wash after thicker items to assist with clearing pharyngeal residue   Throat clear and re-swallow immediately     Discussed recommendations with:  Patient  and report sent to referring provider    Laryngeal Penetration and Aspiration:  Penetration WITHOUT aspiration was observed in today's study with  thin liquid, mildly thick liquid (nectar), pureed foods    SPEECH THERAPY  PLAN OF CARE   The dysphagia POC is established based on physician order and dysphagia diagnosis    Outpatient speech therapy  intervention for dysphagia management is recommended to address the established treatment plan frequency and duration at the discretion of treating SLP      Conditions Requiring Skilled Therapeutic Intervention for dysphagia:    repetitive/disorganized lingual motion   oral residue   swallow triggered once bolus head in the pyriform sinus which increases risk of airway compromise  bolus escape to the nasal cavity  impaired superior movement of the thyroid cartilage with reduced approximation of the arytenoids to the epiglottic petiole resulting in barium contrast entering the laryngeal vestibule   impaired anterior hyoid movement

## 2025-04-04 DIAGNOSIS — C10.9 OROPHARYNGEAL CANCER (HCC): ICD-10-CM

## 2025-04-04 RX ORDER — OXYCODONE HYDROCHLORIDE 5 MG/1
5 TABLET ORAL EVERY 6 HOURS PRN
Qty: 90 TABLET | Refills: 0 | Status: SHIPPED | OUTPATIENT
Start: 2025-04-04 | End: 2025-05-04

## 2025-04-17 DIAGNOSIS — C10.9 OROPHARYNGEAL CANCER (HCC): Primary | ICD-10-CM

## 2025-04-18 ENCOUNTER — HOSPITAL ENCOUNTER (OUTPATIENT)
Dept: INFUSION THERAPY | Age: 75
Discharge: HOME | End: 2025-04-18
Payer: MEDICARE

## 2025-04-18 ENCOUNTER — OFFICE VISIT (OUTPATIENT)
Dept: ONCOLOGY | Age: 75
End: 2025-04-18
Payer: MEDICARE

## 2025-04-18 VITALS
BODY MASS INDEX: 26.3 KG/M2 | TEMPERATURE: 98.1 F | OXYGEN SATURATION: 96 % | WEIGHT: 167.6 LBS | SYSTOLIC BLOOD PRESSURE: 121 MMHG | HEIGHT: 67 IN | DIASTOLIC BLOOD PRESSURE: 72 MMHG | HEART RATE: 78 BPM

## 2025-04-18 VITALS
OXYGEN SATURATION: 95 % | DIASTOLIC BLOOD PRESSURE: 72 MMHG | SYSTOLIC BLOOD PRESSURE: 113 MMHG | HEART RATE: 71 BPM | RESPIRATION RATE: 16 BRPM | TEMPERATURE: 97.5 F

## 2025-04-18 DIAGNOSIS — R53.83 OTHER FATIGUE: ICD-10-CM

## 2025-04-18 DIAGNOSIS — C10.9 SQUAMOUS CELL CARCINOMA OF OROPHARYNX (HCC): Primary | ICD-10-CM

## 2025-04-18 DIAGNOSIS — C10.9 OROPHARYNGEAL CANCER (HCC): ICD-10-CM

## 2025-04-18 DIAGNOSIS — C10.9 SQUAMOUS CELL CARCINOMA OF OROPHARYNX: Primary | ICD-10-CM

## 2025-04-18 LAB
ALBUMIN SERPL-MCNC: 4.1 G/DL (ref 3.5–5.2)
ALP SERPL-CCNC: 69 U/L (ref 40–129)
ALT SERPL-CCNC: 14 U/L (ref 0–40)
ANION GAP SERPL CALCULATED.3IONS-SCNC: 9 MMOL/L (ref 7–16)
AST SERPL-CCNC: 19 U/L (ref 0–39)
BASOPHILS # BLD: 0.04 K/UL (ref 0–0.2)
BASOPHILS NFR BLD: 1 % (ref 0–2)
BILIRUB SERPL-MCNC: 0.5 MG/DL (ref 0–1.2)
BUN SERPL-MCNC: 24 MG/DL (ref 6–23)
CALCIUM SERPL-MCNC: 10.2 MG/DL (ref 8.6–10.2)
CHLORIDE SERPL-SCNC: 101 MMOL/L (ref 98–107)
CO2 SERPL-SCNC: 27 MMOL/L (ref 22–29)
CREAT SERPL-MCNC: 1.3 MG/DL (ref 0.7–1.2)
EOSINOPHIL # BLD: 0.11 K/UL (ref 0.05–0.5)
EOSINOPHILS RELATIVE PERCENT: 3 % (ref 0–6)
ERYTHROCYTE [DISTWIDTH] IN BLOOD BY AUTOMATED COUNT: 14.3 % (ref 11.5–15)
GFR, ESTIMATED: 57 ML/MIN/1.73M2
GLUCOSE SERPL-MCNC: 133 MG/DL (ref 74–99)
HCT VFR BLD AUTO: 42.5 % (ref 37–54)
HGB BLD-MCNC: 14.1 G/DL (ref 12.5–16.5)
LYMPHOCYTES NFR BLD: 0.19 K/UL (ref 1.5–4)
LYMPHOCYTES RELATIVE PERCENT: 4 % (ref 20–42)
MCH RBC QN AUTO: 30.9 PG (ref 26–35)
MCHC RBC AUTO-ENTMCNC: 33.2 G/DL (ref 32–34.5)
MCV RBC AUTO: 93 FL (ref 80–99.9)
MONOCYTES NFR BLD: 0.33 K/UL (ref 0.1–0.95)
MONOCYTES NFR BLD: 8 % (ref 2–12)
NEUTROPHILS NFR BLD: 85 % (ref 43–80)
NEUTS SEG NFR BLD: 3.63 K/UL (ref 1.8–7.3)
PLATELET # BLD AUTO: 221 K/UL (ref 130–450)
PMV BLD AUTO: 10 FL (ref 7–12)
POTASSIUM SERPL-SCNC: 3.9 MMOL/L (ref 3.5–5)
PROT SERPL-MCNC: 7.3 G/DL (ref 6.4–8.3)
RBC # BLD AUTO: 4.57 M/UL (ref 3.8–5.8)
RBC # BLD: NORMAL 10*6/UL
SODIUM SERPL-SCNC: 137 MMOL/L (ref 132–146)
TSH SERPL DL<=0.05 MIU/L-ACNC: 1.82 UIU/ML (ref 0.27–4.2)
WBC OTHER # BLD: 4.3 K/UL (ref 4.5–11.5)

## 2025-04-18 PROCEDURE — 85025 COMPLETE CBC W/AUTO DIFF WBC: CPT

## 2025-04-18 PROCEDURE — 96413 CHEMO IV INFUSION 1 HR: CPT

## 2025-04-18 PROCEDURE — 1159F MED LIST DOCD IN RCRD: CPT | Performed by: STUDENT IN AN ORGANIZED HEALTH CARE EDUCATION/TRAINING PROGRAM

## 2025-04-18 PROCEDURE — 2580000003 HC RX 258: Performed by: STUDENT IN AN ORGANIZED HEALTH CARE EDUCATION/TRAINING PROGRAM

## 2025-04-18 PROCEDURE — 84443 ASSAY THYROID STIM HORMONE: CPT

## 2025-04-18 PROCEDURE — 36415 COLL VENOUS BLD VENIPUNCTURE: CPT

## 2025-04-18 PROCEDURE — 99214 OFFICE O/P EST MOD 30 MIN: CPT | Performed by: STUDENT IN AN ORGANIZED HEALTH CARE EDUCATION/TRAINING PROGRAM

## 2025-04-18 PROCEDURE — 6360000002 HC RX W HCPCS: Performed by: STUDENT IN AN ORGANIZED HEALTH CARE EDUCATION/TRAINING PROGRAM

## 2025-04-18 PROCEDURE — 80053 COMPREHEN METABOLIC PANEL: CPT

## 2025-04-18 PROCEDURE — 1123F ACP DISCUSS/DSCN MKR DOCD: CPT | Performed by: STUDENT IN AN ORGANIZED HEALTH CARE EDUCATION/TRAINING PROGRAM

## 2025-04-18 RX ORDER — ACETAMINOPHEN 325 MG/1
650 TABLET ORAL
OUTPATIENT
Start: 2025-04-18

## 2025-04-18 RX ORDER — DIPHENHYDRAMINE HYDROCHLORIDE 50 MG/ML
50 INJECTION, SOLUTION INTRAMUSCULAR; INTRAVENOUS
OUTPATIENT
Start: 2025-04-18

## 2025-04-18 RX ORDER — FAMOTIDINE 10 MG/ML
20 INJECTION, SOLUTION INTRAVENOUS
OUTPATIENT
Start: 2025-04-18

## 2025-04-18 RX ORDER — HEPARIN SODIUM (PORCINE) LOCK FLUSH IV SOLN 100 UNIT/ML 100 UNIT/ML
500 SOLUTION INTRAVENOUS PRN
OUTPATIENT
Start: 2025-04-18

## 2025-04-18 RX ORDER — ALBUTEROL SULFATE 90 UG/1
4 INHALANT RESPIRATORY (INHALATION) PRN
OUTPATIENT
Start: 2025-04-18

## 2025-04-18 RX ORDER — SODIUM CHLORIDE 9 MG/ML
INJECTION, SOLUTION INTRAVENOUS CONTINUOUS
OUTPATIENT
Start: 2025-04-18

## 2025-04-18 RX ORDER — SODIUM CHLORIDE 9 MG/ML
5-250 INJECTION, SOLUTION INTRAVENOUS PRN
OUTPATIENT
Start: 2025-04-18

## 2025-04-18 RX ORDER — ONDANSETRON 2 MG/ML
8 INJECTION INTRAMUSCULAR; INTRAVENOUS
OUTPATIENT
Start: 2025-04-18

## 2025-04-18 RX ORDER — SODIUM CHLORIDE 9 MG/ML
5-250 INJECTION, SOLUTION INTRAVENOUS PRN
Status: CANCELLED | OUTPATIENT
Start: 2025-04-18

## 2025-04-18 RX ORDER — HYDROCORTISONE SODIUM SUCCINATE 100 MG/2ML
100 INJECTION INTRAMUSCULAR; INTRAVENOUS
OUTPATIENT
Start: 2025-04-18

## 2025-04-18 RX ORDER — SODIUM CHLORIDE 0.9 % (FLUSH) 0.9 %
5-40 SYRINGE (ML) INJECTION PRN
OUTPATIENT
Start: 2025-04-18

## 2025-04-18 RX ORDER — SODIUM CHLORIDE 9 MG/ML
5-250 INJECTION, SOLUTION INTRAVENOUS PRN
Status: DISCONTINUED | OUTPATIENT
Start: 2025-04-18 | End: 2025-04-19 | Stop reason: HOSPADM

## 2025-04-18 RX ORDER — EPINEPHRINE 1 MG/ML
0.3 INJECTION, SOLUTION, CONCENTRATE INTRAVENOUS PRN
OUTPATIENT
Start: 2025-04-18

## 2025-04-18 RX ORDER — MEPERIDINE HYDROCHLORIDE 50 MG/ML
12.5 INJECTION INTRAMUSCULAR; INTRAVENOUS; SUBCUTANEOUS PRN
OUTPATIENT
Start: 2025-04-18

## 2025-04-18 RX ADMIN — SODIUM CHLORIDE 20 ML/HR: 0.9 INJECTION, SOLUTION INTRAVENOUS at 13:49

## 2025-04-18 RX ADMIN — SODIUM CHLORIDE 200 MG: 9 INJECTION, SOLUTION INTRAVENOUS at 13:51

## 2025-04-18 NOTE — PROGRESS NOTES
squamous carcinoma involving lymphoid tissue, see comment.   C.  Left base of tongue, biopsy: Squamous mucosa, negative for neoplasm.   D.  Right base of tongue, biopsy: Squamous mucosa, negative for neoplasm.   E.  Midline tongue, biopsy: Squamous mucosa, negative for neoplasm.   Comment:   The squamous carcinoma is diffusely and strongly positive for p16 immunostain, supporting the above interpretation    PET/CT scan 08/01/2022:  There is a large hypermetabolic mass centered in the left oropharynx involving the left lateral oropharynx, left tonsillar and left base of tongue regions extending superiorly to the left soft palate and inferiorly to the left floor of mouth and left vallecula.  Maximum SUV of this mass measures 17.4.  The mass causes narrowing of the oropharyngeal airway.   There are multiple hypermetabolic enlarged, necrotic and someone confluent left level 2 and left level 3 lymph nodes.  Confluent left level 2 adenopathy demonstrates a maximum SUV of 15.8.  Additional hypermetabolic left level 5/3 junction lymph node is noted with maximum SUV 5.0  Mild focal hypermetabolic activity is noted in the left parapharyngeal/retropharyngeal region (maximum SUV 3.8) which could represent an underlying small lymph node.    There is a hypermetabolic right level 2 lymph node with maximum SUV 6.5    Recommended concurrent chemoradiation therapy with weekly Cisplatin.  Side effects reviewed.  He agreed to proceed.  Authorization obtained.   Cycle # 1 weekly Cisplatin was on 09/06/2022  Cycle # 2 weekly Cisplatin was on 09/13/2022.   Cycle # 3 weekly Cisplatin was on 09/20/2022.     ER visit 09/26/2022 fatigue dysphagia dehydration; CTA chest 9/26/2022 no evidence of PE or acute pulmonary abnormality.  CT abdomen/pelvis 09/26/2022 diffuse colonic diverticulosis with mild stranding of the fat in the left lower quadrant concerning for early diverticulitis.  No evidence of obstructive uropathy or acute appendicitis.

## 2025-04-24 RX ORDER — OXYCODONE HYDROCHLORIDE 5 MG/1
5 TABLET ORAL EVERY 6 HOURS PRN
Qty: 90 TABLET | Refills: 0 | OUTPATIENT
Start: 2025-04-24 | End: 2025-05-24

## 2025-05-02 DIAGNOSIS — C10.9 OROPHARYNGEAL CANCER (HCC): ICD-10-CM

## 2025-05-02 RX ORDER — OXYCODONE HYDROCHLORIDE 5 MG/1
5 TABLET ORAL EVERY 6 HOURS PRN
Qty: 90 TABLET | Refills: 0 | Status: SHIPPED | OUTPATIENT
Start: 2025-05-02 | End: 2025-06-01

## 2025-05-02 NOTE — TELEPHONE ENCOUNTER
Call from Ke Khan's wife requesting refill for Oxy IR 5 mg that he will need over the weekend. Pharmacy is [facility]. Next richy 5/7/25.  
Admit/Transfer to Inpatient Psychiatry

## 2025-05-06 ENCOUNTER — OFFICE VISIT (OUTPATIENT)
Dept: ENT CLINIC | Age: 75
End: 2025-05-06
Payer: MEDICARE

## 2025-05-06 VITALS
SYSTOLIC BLOOD PRESSURE: 121 MMHG | HEART RATE: 75 BPM | BODY MASS INDEX: 25.69 KG/M2 | TEMPERATURE: 98 F | WEIGHT: 163.7 LBS | HEIGHT: 67 IN | DIASTOLIC BLOOD PRESSURE: 65 MMHG | OXYGEN SATURATION: 95 %

## 2025-05-06 DIAGNOSIS — C10.9 SQUAMOUS CELL CARCINOMA OF OROPHARYNX (HCC): ICD-10-CM

## 2025-05-06 DIAGNOSIS — C13.9 CARCINOMA OF HYPOPHARYNX (HCC): ICD-10-CM

## 2025-05-06 DIAGNOSIS — R13.19 OTHER DYSPHAGIA: Primary | ICD-10-CM

## 2025-05-06 PROCEDURE — 99213 OFFICE O/P EST LOW 20 MIN: CPT | Performed by: OTOLARYNGOLOGY

## 2025-05-06 PROCEDURE — 1123F ACP DISCUSS/DSCN MKR DOCD: CPT | Performed by: OTOLARYNGOLOGY

## 2025-05-07 ENCOUNTER — OFFICE VISIT (OUTPATIENT)
Dept: PALLATIVE CARE | Age: 75
End: 2025-05-07
Payer: MEDICARE

## 2025-05-07 VITALS
OXYGEN SATURATION: 96 % | DIASTOLIC BLOOD PRESSURE: 74 MMHG | SYSTOLIC BLOOD PRESSURE: 132 MMHG | BODY MASS INDEX: 26 KG/M2 | WEIGHT: 166 LBS | HEART RATE: 86 BPM

## 2025-05-07 DIAGNOSIS — R13.10 DYSPHAGIA, UNSPECIFIED TYPE: ICD-10-CM

## 2025-05-07 DIAGNOSIS — G89.3 PAIN DUE TO NEOPLASM: ICD-10-CM

## 2025-05-07 DIAGNOSIS — K11.7 XEROSTOMIA: ICD-10-CM

## 2025-05-07 DIAGNOSIS — C10.9 OROPHARYNGEAL CANCER (HCC): Primary | ICD-10-CM

## 2025-05-07 DIAGNOSIS — Z51.5 PALLIATIVE CARE BY SPECIALIST: ICD-10-CM

## 2025-05-07 PROCEDURE — 99211 OFF/OP EST MAY X REQ PHY/QHP: CPT | Performed by: NURSE PRACTITIONER

## 2025-05-07 PROCEDURE — 99214 OFFICE O/P EST MOD 30 MIN: CPT | Performed by: NURSE PRACTITIONER

## 2025-05-07 PROCEDURE — 1159F MED LIST DOCD IN RCRD: CPT | Performed by: NURSE PRACTITIONER

## 2025-05-07 PROCEDURE — 1123F ACP DISCUSS/DSCN MKR DOCD: CPT | Performed by: NURSE PRACTITIONER

## 2025-05-07 NOTE — PROGRESS NOTES
aortic aneurysm Father     Alcohol Abuse Father     Vision Loss Father        ROS: UNLESS STATED ABOVE PATIENT DENIES:  CONSTITUTIONAL:  fever, chill, rigors, nausea, vomiting, fatigue.  HEENT: blurry vision, double vision, hearing problem, tinnitus, hoarseness, dysphagia, odynophagia  RESPIRATORY: cough, shortness of breath, sputum expectoration.  CARDIOVASCULAR:  Chest pain/pressure, palpitation, syncope, irregular beats  GASTROINTESTINAL:  abdominal or rectal pain, diarrhea, constipation, .  GENITOURINARY:  Burning, frequency, urgency, incontinence, discharge  INTEGUMENTARY: rash, wound, pruritis  HEMATOLOGIC/LYMPHATIC:  Swelling, sores, gum bleeding, easy bruising, pica.  MUSCULOSKELETAL:  pain, edema, joint swelling or redness  NEUROLOGICAL:  light headed, dizziness, loss of consciousness, weakness, change in memory, seizures, tremors    Objective:     Physical Exam  Wt Readings from Last 3 Encounters:   05/07/25 75.3 kg (166 lb)   05/06/25 74.3 kg (163 lb 11.2 oz)   04/18/25 76 kg (167 lb 9.6 oz)     /74   Pulse 86   Wt 75.3 kg (166 lb)   SpO2 96% Comment: RA  BMI 26.00 kg/m²     Gen:  Alert, appears stated age, well nourished, in no acute distress  HEENT:  Normocephalic, no drainage, white patches of the tongue and buccal mucosa  Neck:  Supple  Lungs:  non labored breathing  Heart::  Regular rate  Abd:  Soft,   M/S/Ext:  moving all extremities   Skin:  no visible lesions  Neuro:  PERRL, Alert, oriented x 3; following commands    [place] Symptom Assessment Score       5/7/2025     1:00 PM 2/12/2025    10:00 AM 11/20/2024     9:00 AM 8/28/2024    11:00 AM 6/5/2024    10:00 AM   [place] Score   Pain Score 8 7 7 8 Worst possible pain   Tiredness Score 3 5 2  4   Nausea Score Not nauseated Not nauseated Not nauseated Not nauseated Not nauseated   Depression Score 2 4 5 6 3   Anxiety Score 2 3 6 9 1   Drowsiness Score 3 2 8 6 8   Appetite Score Best appetite 2 9 9 Best appetite   Wellbeing Score 1 2

## 2025-05-08 NOTE — PROGRESS NOTES
Mother     High Blood Pressure Father     Cancer Father 82        kidney    Abdominal aortic aneurysm Father     Alcohol Abuse Father     Vision Loss Father        Review of Systems    /65 (BP Site: Left Upper Arm, Patient Position: Sitting, BP Cuff Size: Medium Adult)   Pulse 75   Temp 98 °F (36.7 °C) (Temporal)   Ht 1.702 m (5' 7\")   Wt 74.3 kg (163 lb 11.2 oz)   SpO2 95%   BMI 25.64 kg/m²   Physical Exam  Vitals and nursing note reviewed.   Constitutional:       Appearance: He is well-developed.   HENT:      Head: Normocephalic and atraumatic.        Right Ear: Tympanic membrane and ear canal normal.      Left Ear: Tympanic membrane and ear canal normal.      Nose: No congestion or rhinorrhea.   Eyes:      Pupils: Pupils are equal, round, and reactive to light.   Neck:      Thyroid: No thyromegaly.      Trachea: No tracheal deviation.   Cardiovascular:      Rate and Rhythm: Normal rate.   Pulmonary:      Effort: Pulmonary effort is normal. No respiratory distress.   Musculoskeletal:         General: Normal range of motion.      Cervical back: Normal range of motion.   Lymphadenopathy:      Cervical: No cervical adenopathy.   Skin:     General: Skin is warm.      Findings: No erythema.   Neurological:      Mental Status: He is alert.      Cranial Nerves: No cranial nerve deficit.       Results  Testing  Swallow study showed no aspiration into the lungs.   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  After topical anesthesia and decongestion, the 4 mm laryngoscope was passed.  The nasal cavities, nasopharynx, oropharynx, hypopharynx, and larynx were all examined.  Vocal cords were examined during respiration and phonation.  The

## 2025-05-09 ENCOUNTER — OFFICE VISIT (OUTPATIENT)
Dept: ONCOLOGY | Age: 75
End: 2025-05-09
Payer: MEDICARE

## 2025-05-09 ENCOUNTER — HOSPITAL ENCOUNTER (OUTPATIENT)
Dept: INFUSION THERAPY | Age: 75
Discharge: HOME | End: 2025-05-09
Payer: MEDICARE

## 2025-05-09 VITALS
BODY MASS INDEX: 26.01 KG/M2 | OXYGEN SATURATION: 96 % | DIASTOLIC BLOOD PRESSURE: 76 MMHG | HEART RATE: 82 BPM | SYSTOLIC BLOOD PRESSURE: 121 MMHG | TEMPERATURE: 97.8 F | HEIGHT: 67 IN | WEIGHT: 165.7 LBS

## 2025-05-09 VITALS
SYSTOLIC BLOOD PRESSURE: 107 MMHG | HEART RATE: 72 BPM | RESPIRATION RATE: 17 BRPM | DIASTOLIC BLOOD PRESSURE: 69 MMHG | TEMPERATURE: 98.2 F | OXYGEN SATURATION: 96 %

## 2025-05-09 DIAGNOSIS — R53.83 OTHER FATIGUE: ICD-10-CM

## 2025-05-09 DIAGNOSIS — C10.9 SQUAMOUS CELL CARCINOMA OF OROPHARYNX (HCC): Primary | ICD-10-CM

## 2025-05-09 DIAGNOSIS — C10.9 SQUAMOUS CELL CARCINOMA OF OROPHARYNX (HCC): ICD-10-CM

## 2025-05-09 LAB
ALBUMIN SERPL-MCNC: 4.1 G/DL (ref 3.5–5.2)
ALP SERPL-CCNC: 70 U/L (ref 40–129)
ALT SERPL-CCNC: 14 U/L (ref 0–40)
ANION GAP SERPL CALCULATED.3IONS-SCNC: 11 MMOL/L (ref 7–16)
AST SERPL-CCNC: 19 U/L (ref 0–39)
BASOPHILS # BLD: 0 K/UL (ref 0–0.2)
BASOPHILS NFR BLD: 0 % (ref 0–2)
BILIRUB SERPL-MCNC: 0.4 MG/DL (ref 0–1.2)
BUN SERPL-MCNC: 22 MG/DL (ref 6–23)
CALCIUM SERPL-MCNC: 10 MG/DL (ref 8.6–10.2)
CHLORIDE SERPL-SCNC: 103 MMOL/L (ref 98–107)
CO2 SERPL-SCNC: 24 MMOL/L (ref 22–29)
CREAT SERPL-MCNC: 1.2 MG/DL (ref 0.7–1.2)
EOSINOPHIL # BLD: 0 K/UL (ref 0.05–0.5)
EOSINOPHILS RELATIVE PERCENT: 0 % (ref 0–6)
ERYTHROCYTE [DISTWIDTH] IN BLOOD BY AUTOMATED COUNT: 14 % (ref 11.5–15)
GFR, ESTIMATED: 64 ML/MIN/1.73M2
GLUCOSE SERPL-MCNC: 147 MG/DL (ref 74–99)
HCT VFR BLD AUTO: 41.6 % (ref 37–54)
HGB BLD-MCNC: 13.8 G/DL (ref 12.5–16.5)
LYMPHOCYTES NFR BLD: 0.17 K/UL (ref 1.5–4)
LYMPHOCYTES RELATIVE PERCENT: 3 % (ref 20–42)
MCH RBC QN AUTO: 30.5 PG (ref 26–35)
MCHC RBC AUTO-ENTMCNC: 33.2 G/DL (ref 32–34.5)
MCV RBC AUTO: 92 FL (ref 80–99.9)
MONOCYTES NFR BLD: 0.28 K/UL (ref 0.1–0.95)
MONOCYTES NFR BLD: 5 % (ref 2–12)
NEUTROPHILS NFR BLD: 93 % (ref 43–80)
NEUTS SEG NFR BLD: 5.85 K/UL (ref 1.8–7.3)
PLATELET # BLD AUTO: 225 K/UL (ref 130–450)
PMV BLD AUTO: 10.3 FL (ref 7–12)
POTASSIUM SERPL-SCNC: 4.1 MMOL/L (ref 3.5–5)
PROT SERPL-MCNC: 7.5 G/DL (ref 6.4–8.3)
RBC # BLD AUTO: 4.52 M/UL (ref 3.8–5.8)
RBC # BLD: ABNORMAL 10*6/UL
SODIUM SERPL-SCNC: 138 MMOL/L (ref 132–146)
TSH SERPL DL<=0.05 MIU/L-ACNC: 1.97 UIU/ML (ref 0.27–4.2)
WBC OTHER # BLD: 6.3 K/UL (ref 4.5–11.5)

## 2025-05-09 PROCEDURE — 99214 OFFICE O/P EST MOD 30 MIN: CPT | Performed by: STUDENT IN AN ORGANIZED HEALTH CARE EDUCATION/TRAINING PROGRAM

## 2025-05-09 PROCEDURE — 36415 COLL VENOUS BLD VENIPUNCTURE: CPT

## 2025-05-09 PROCEDURE — 96413 CHEMO IV INFUSION 1 HR: CPT

## 2025-05-09 PROCEDURE — 85025 COMPLETE CBC W/AUTO DIFF WBC: CPT

## 2025-05-09 PROCEDURE — 1159F MED LIST DOCD IN RCRD: CPT | Performed by: STUDENT IN AN ORGANIZED HEALTH CARE EDUCATION/TRAINING PROGRAM

## 2025-05-09 PROCEDURE — 80053 COMPREHEN METABOLIC PANEL: CPT

## 2025-05-09 PROCEDURE — 6360000002 HC RX W HCPCS: Performed by: STUDENT IN AN ORGANIZED HEALTH CARE EDUCATION/TRAINING PROGRAM

## 2025-05-09 PROCEDURE — 84443 ASSAY THYROID STIM HORMONE: CPT

## 2025-05-09 PROCEDURE — 2580000003 HC RX 258: Performed by: STUDENT IN AN ORGANIZED HEALTH CARE EDUCATION/TRAINING PROGRAM

## 2025-05-09 PROCEDURE — 1123F ACP DISCUSS/DSCN MKR DOCD: CPT | Performed by: STUDENT IN AN ORGANIZED HEALTH CARE EDUCATION/TRAINING PROGRAM

## 2025-05-09 RX ORDER — HYDROCORTISONE SODIUM SUCCINATE 100 MG/2ML
100 INJECTION INTRAMUSCULAR; INTRAVENOUS
Status: CANCELLED | OUTPATIENT
Start: 2025-05-09

## 2025-05-09 RX ORDER — SODIUM CHLORIDE 0.9 % (FLUSH) 0.9 %
5-40 SYRINGE (ML) INJECTION PRN
Status: CANCELLED | OUTPATIENT
Start: 2025-05-09

## 2025-05-09 RX ORDER — ONDANSETRON 2 MG/ML
8 INJECTION INTRAMUSCULAR; INTRAVENOUS
Status: CANCELLED | OUTPATIENT
Start: 2025-05-09

## 2025-05-09 RX ORDER — DIPHENHYDRAMINE HYDROCHLORIDE 50 MG/ML
50 INJECTION, SOLUTION INTRAMUSCULAR; INTRAVENOUS
Status: CANCELLED | OUTPATIENT
Start: 2025-05-09

## 2025-05-09 RX ORDER — SODIUM CHLORIDE 9 MG/ML
INJECTION, SOLUTION INTRAVENOUS CONTINUOUS
Status: CANCELLED | OUTPATIENT
Start: 2025-05-09

## 2025-05-09 RX ORDER — FAMOTIDINE 10 MG/ML
20 INJECTION, SOLUTION INTRAVENOUS
Status: CANCELLED | OUTPATIENT
Start: 2025-05-09

## 2025-05-09 RX ORDER — SODIUM CHLORIDE 9 MG/ML
5-250 INJECTION, SOLUTION INTRAVENOUS PRN
Status: CANCELLED | OUTPATIENT
Start: 2025-05-09

## 2025-05-09 RX ORDER — EPINEPHRINE 1 MG/ML
0.3 INJECTION, SOLUTION, CONCENTRATE INTRAVENOUS PRN
Status: CANCELLED | OUTPATIENT
Start: 2025-05-09

## 2025-05-09 RX ORDER — HEPARIN SODIUM (PORCINE) LOCK FLUSH IV SOLN 100 UNIT/ML 100 UNIT/ML
500 SOLUTION INTRAVENOUS PRN
Status: CANCELLED | OUTPATIENT
Start: 2025-05-09

## 2025-05-09 RX ORDER — ALBUTEROL SULFATE 90 UG/1
4 INHALANT RESPIRATORY (INHALATION) PRN
Status: CANCELLED | OUTPATIENT
Start: 2025-05-09

## 2025-05-09 RX ORDER — SODIUM CHLORIDE 0.9 % (FLUSH) 0.9 %
5-40 SYRINGE (ML) INJECTION PRN
Status: DISCONTINUED | OUTPATIENT
Start: 2025-05-09 | End: 2025-05-10 | Stop reason: HOSPADM

## 2025-05-09 RX ORDER — SODIUM CHLORIDE 9 MG/ML
5-250 INJECTION, SOLUTION INTRAVENOUS PRN
Status: DISCONTINUED | OUTPATIENT
Start: 2025-05-09 | End: 2025-05-10 | Stop reason: HOSPADM

## 2025-05-09 RX ORDER — ACETAMINOPHEN 325 MG/1
650 TABLET ORAL
Status: CANCELLED | OUTPATIENT
Start: 2025-05-09

## 2025-05-09 RX ORDER — MEPERIDINE HYDROCHLORIDE 50 MG/ML
12.5 INJECTION INTRAMUSCULAR; INTRAVENOUS; SUBCUTANEOUS PRN
Status: CANCELLED | OUTPATIENT
Start: 2025-05-09

## 2025-05-09 RX ADMIN — SODIUM CHLORIDE 200 MG: 9 INJECTION, SOLUTION INTRAVENOUS at 14:47

## 2025-05-09 RX ADMIN — SODIUM CHLORIDE 20 ML/HR: 0.9 INJECTION, SOLUTION INTRAVENOUS at 14:26

## 2025-05-09 ASSESSMENT — PAIN DESCRIPTION - DESCRIPTORS: DESCRIPTORS: ACHING

## 2025-05-09 ASSESSMENT — PAIN DESCRIPTION - LOCATION: LOCATION: THROAT

## 2025-05-09 ASSESSMENT — PAIN DESCRIPTION - PAIN TYPE: TYPE: CHRONIC PAIN

## 2025-05-09 ASSESSMENT — PAIN SCALES - GENERAL: PAINLEVEL_OUTOF10: 8

## 2025-05-09 NOTE — PROGRESS NOTES
proceed.  Slight elevation in creatinine patient reports slight decrease in his p.o. intake recommended he increase water, hypothyroidism related to immunotherapy TSH WNL 2.51 will keep Synthroid at 100 mcg per day.  Initial improvement in cough with Z-Anthony some return of drainage ,ENT f/u soon .     9/12/2023 Cycle #9 Keytruda is ordered and given today , labs revied creatinine is 2.4 continue to encourage p.o. fluid intake seems to be tolerating treatment well with no complaints, ENT on 9/29 , refill for Carafate, Magic mouthwash and salagen sent     PET scan 9/22/2023   IMPRESSION:  IMPRESSION:  Slightly decreased but persistent intense uptake along the floor of the  mouth, left greater than right.  Worsening right upper quadrant metastatic  lymphadenopathy.  Slightly decreased but persistent intense uptake of a liver  metastasis.  Dr. Hernandez note reviewed stable; no sign of recurrent disease; mucus radiation changes was omega shaped epiglottis and no focal findings of mass tumors or lesions  Reviewed labs can proceed with treatment today cycle #10 Keytruda  No signs or symptoms of illness palpation of abdomen and groin area no lymph nodes palpable patient denies diarrhea/constipation abdominal pain  RTC 3 weeks with labs       Patient had received much of his care with Dr. Mcghee, who has now moved on from our practice.  The patient transitioned oncologic care with me (Dr. Riley Le) on 10/25/23.      ASSESSMENT:  Metastatic Oropharyngeal squamous cell carcinoma, p16+  Presented with dysphagia  Left floor of mouth mass detected on CT on 2/23/22  Diagnosed on panendoscopy on 3/17/22  PET completed on 8/1/22  Concurrent chemoRT (weekly cisplatin x7 cycles) from 9/6/22 - 10/25/22  RT completed on 11/3/22  Post treatment PET on 2/10/23 detected liver metastasis  Confirmed Metastatic squamous cell carcinoma on liver biopsy on 3/22/23  Pembrolizumab started on 3/28/23 - present  PET from 9/22/23 noted

## 2025-05-11 DIAGNOSIS — E03.9 HYPOTHYROIDISM, UNSPECIFIED TYPE: ICD-10-CM

## 2025-05-12 DIAGNOSIS — E03.9 HYPOTHYROIDISM, UNSPECIFIED TYPE: ICD-10-CM

## 2025-05-12 RX ORDER — LEVOTHYROXINE SODIUM 100 MCG
100 TABLET ORAL DAILY
Qty: 30 TABLET | Refills: 3 | Status: SHIPPED | OUTPATIENT
Start: 2025-05-12

## 2025-05-13 RX ORDER — LEVOTHYROXINE SODIUM 100 MCG
100 TABLET ORAL DAILY
Qty: 30 TABLET | Refills: 11 | OUTPATIENT
Start: 2025-05-13

## 2025-05-23 ENCOUNTER — HOSPITAL ENCOUNTER (OUTPATIENT)
Dept: NUCLEAR MEDICINE | Age: 75
Discharge: HOME | End: 2025-05-23
Payer: MEDICARE

## 2025-05-23 DIAGNOSIS — C10.9 SQUAMOUS CELL CARCINOMA OF OROPHARYNX (HCC): ICD-10-CM

## 2025-05-23 PROCEDURE — A9609 HC RX DIAGNOSTIC RADIOPHARMACEUTICAL: HCPCS | Performed by: RADIOLOGY

## 2025-05-23 PROCEDURE — 3430000000 HC RX DIAGNOSTIC RADIOPHARMACEUTICAL: Performed by: RADIOLOGY

## 2025-05-23 RX ORDER — FLUDEOXYGLUCOSE F 18 200 MCI/ML
10 INJECTION, SOLUTION INTRAVENOUS
Status: COMPLETED | OUTPATIENT
Start: 2025-05-23 | End: 2025-05-23

## 2025-05-23 RX ADMIN — FLUDEOXYGLUCOSE F 18 10 MILLICURIE: 200 INJECTION, SOLUTION INTRAVENOUS at 13:24

## 2025-05-29 ENCOUNTER — APPOINTMENT (OUTPATIENT)
Dept: GENERAL RADIOLOGY | Age: 75
End: 2025-05-29
Payer: MEDICARE

## 2025-05-29 ENCOUNTER — HOSPITAL ENCOUNTER (EMERGENCY)
Age: 75
Discharge: HOME | End: 2025-05-29
Attending: STUDENT IN AN ORGANIZED HEALTH CARE EDUCATION/TRAINING PROGRAM
Payer: MEDICARE

## 2025-05-29 VITALS
HEART RATE: 71 BPM | BODY MASS INDEX: 25.9 KG/M2 | WEIGHT: 165 LBS | DIASTOLIC BLOOD PRESSURE: 75 MMHG | SYSTOLIC BLOOD PRESSURE: 113 MMHG | TEMPERATURE: 97.5 F | HEIGHT: 67 IN | OXYGEN SATURATION: 98 % | RESPIRATION RATE: 18 BRPM

## 2025-05-29 DIAGNOSIS — K94.23 PEG TUBE MALFUNCTION (HCC): Primary | ICD-10-CM

## 2025-05-29 PROCEDURE — 99283 EMERGENCY DEPT VISIT LOW MDM: CPT

## 2025-05-29 PROCEDURE — 74018 RADEX ABDOMEN 1 VIEW: CPT

## 2025-05-29 PROCEDURE — 43762 RPLC GTUBE NO REVJ TRC: CPT

## 2025-05-29 PROCEDURE — 6360000004 HC RX CONTRAST MEDICATION: Performed by: STUDENT IN AN ORGANIZED HEALTH CARE EDUCATION/TRAINING PROGRAM

## 2025-05-29 RX ORDER — DIATRIZOATE MEGLUMINE AND DIATRIZOATE SODIUM 660; 100 MG/ML; MG/ML
60 SOLUTION ORAL; RECTAL
Status: DISCONTINUED | OUTPATIENT
Start: 2025-05-29 | End: 2025-05-29 | Stop reason: HOSPADM

## 2025-05-29 RX ADMIN — DIATRIZOATE MEGLUMINE AND DIATRIZOATE SODIUM 60 ML: 660; 100 LIQUID ORAL; RECTAL at 13:47

## 2025-05-29 NOTE — ED PROVIDER NOTES
[facility] EMERGENCY DEPARTMENT  EMERGENCY DEPARTMENT ENCOUNTER    Pt Name: Ignacio Bhatia  MRN: [id number]  Birthdate 1950  Date of evaluation: 5/29/2025  Provider: Marclelo Vu MD  PCP: Jason Foy DO  Note Started: 11:51 AM EDT 5/29/25    HPI     Patient is a 74 y.o. male presents with a chief complaint of   Chief Complaint   Patient presents with    OTHER     Accidentally pulled feed tube out approx one hour ago. Unknown size of tube. No pain nor discomfort, some yellow discharge noted. States slight pain when tube was pulled out.   .    Patient presents because he accidentally pulled his PEG tube out just prior to arrival.  Patient denies any fevers or chills.  No chest pain or shortness of breath.  No changes in urinary or bowel habits.  No abdominal pain.  Patient states that he does not know what size it is.  Stated that he had a little bit of pain when he actually came home.    Nursing Notes were all reviewed and agreed with or any disagreements were addressed in the HPI.    History From: Patient    Review of Systems   Pertinent positives and negatives as per HPI.     Physical Exam  Vitals and nursing note reviewed.   Constitutional:       Appearance: He is well-developed.   HENT:      Head: Normocephalic and atraumatic.   Eyes:      Conjunctiva/sclera: Conjunctivae normal.   Cardiovascular:      Rate and Rhythm: Normal rate and regular rhythm.      Heart sounds: Normal heart sounds. No murmur heard.  Pulmonary:      Effort: Pulmonary effort is normal. No respiratory distress.      Breath sounds: Normal breath sounds. No wheezing or rales.   Abdominal:      General: Bowel sounds are normal.      Palpations: Abdomen is soft.      Tenderness: There is no abdominal tenderness. There is no guarding or rebound.      Comments: No abdominal tenderness.   Musculoskeletal:         General: No tenderness or deformity.      Cervical back: Normal range of motion and neck supple.  activity is seen in the lymph nodes of the neck. No abnormal activity is noted in the soft tissues of the nasopharynx, oropharynx, glottic, or subglottic compartments. No abnormal metabolic activity is seen in the remainder of the soft tissues of the neck. The CT portion of the study demonstrates no masses or lymphadenopathy in the neck. CHEST: No abnormal activity is seen in the hilum or mediastinum. No metabolic activity is seen in the lung parenchyma. The CT portion of the chest demonstrates no lung parenchymal nodules or masses. ABDOMEN: No abnormal metabolic activity is seen in the lymph nodes of the retroperitoneum,  mesentery, phylicia hepatis, gastrohepatic ligament. No abnormal activity is noted in the liver. No abnormal metabolic activity is seen in the  remainder of the intra-abdominal solid organs. Unilateral left adrenal gland hyperplasia is noted. CT portion of the study of the abdomen demonstrates no abnormality of the intra-abdominal solid organs. No abnormality of the stomach the remainder of the enteric tract. PELVIS: No iliac chain lymphadenopathy is seen. No other lymph nodes are seen in the pelvis. No abnormal metabolic activity is seen in the pelvic soft tissues. BONES/SOFT TISSUE: No abnormal metabolic activity is seen in the subcutaneous soft tissues. No abnormal metabolic activity is seen in the skeletal system. The CT portion of the study in bone window settings demonstrates no osteolytic or osteoblastic lesions. INCIDENTAL CT FINDINGS: The prostate gland is enlarged and measures 6.6 cm transverse by 5.1 cm AP by 6.8 cm cranial caudally.. Multiple diverticula are demonstrated throughout the colon.  No significant paracolic inflammatory changes are identified.     No metabolically active tumor is seen.  No findings of tumor recurrence. Enlarged prostate as before. Diverticulosis without evidence of diverticulitis. Unilateral left adrenal gland hyperplasia.       No results found.  Marcello

## 2025-05-30 ENCOUNTER — OFFICE VISIT (OUTPATIENT)
Age: 75
End: 2025-05-30
Payer: MEDICARE

## 2025-05-30 ENCOUNTER — HOSPITAL ENCOUNTER (OUTPATIENT)
Dept: INFUSION THERAPY | Age: 75
Discharge: HOME | End: 2025-05-30
Payer: MEDICARE

## 2025-05-30 ENCOUNTER — CLINICAL DOCUMENTATION (OUTPATIENT)
Age: 75
End: 2025-05-30

## 2025-05-30 VITALS
OXYGEN SATURATION: 98 % | HEART RATE: 80 BPM | SYSTOLIC BLOOD PRESSURE: 112 MMHG | WEIGHT: 165.1 LBS | TEMPERATURE: 98 F | DIASTOLIC BLOOD PRESSURE: 79 MMHG | BODY MASS INDEX: 25.91 KG/M2 | HEIGHT: 67 IN

## 2025-05-30 VITALS
SYSTOLIC BLOOD PRESSURE: 113 MMHG | OXYGEN SATURATION: 96 % | RESPIRATION RATE: 18 BRPM | HEART RATE: 79 BPM | DIASTOLIC BLOOD PRESSURE: 72 MMHG | TEMPERATURE: 98.2 F

## 2025-05-30 DIAGNOSIS — C10.9 OROPHARYNGEAL CANCER (HCC): ICD-10-CM

## 2025-05-30 DIAGNOSIS — R53.83 OTHER FATIGUE: ICD-10-CM

## 2025-05-30 DIAGNOSIS — C10.9 SQUAMOUS CELL CARCINOMA OF OROPHARYNX (HCC): Primary | ICD-10-CM

## 2025-05-30 LAB
ALBUMIN SERPL-MCNC: 4 G/DL (ref 3.5–5.2)
ALP SERPL-CCNC: 72 U/L (ref 40–129)
ALT SERPL-CCNC: 14 U/L (ref 0–40)
ANION GAP SERPL CALCULATED.3IONS-SCNC: 11 MMOL/L (ref 7–16)
AST SERPL-CCNC: 20 U/L (ref 0–39)
BASOPHILS # BLD: 0 K/UL (ref 0–0.2)
BASOPHILS NFR BLD: 0 % (ref 0–2)
BILIRUB SERPL-MCNC: 0.5 MG/DL (ref 0–1.2)
BUN SERPL-MCNC: 23 MG/DL (ref 6–23)
CALCIUM SERPL-MCNC: 10.6 MG/DL (ref 8.6–10.2)
CHLORIDE SERPL-SCNC: 102 MMOL/L (ref 98–107)
CO2 SERPL-SCNC: 26 MMOL/L (ref 22–29)
CREAT SERPL-MCNC: 1.3 MG/DL (ref 0.7–1.2)
EOSINOPHIL # BLD: 0.07 K/UL (ref 0.05–0.5)
EOSINOPHILS RELATIVE PERCENT: 1 % (ref 0–6)
ERYTHROCYTE [DISTWIDTH] IN BLOOD BY AUTOMATED COUNT: 14.2 % (ref 11.5–15)
GFR, ESTIMATED: 59 ML/MIN/1.73M2
GLUCOSE SERPL-MCNC: 148 MG/DL (ref 74–99)
HCT VFR BLD AUTO: 41.9 % (ref 37–54)
HGB BLD-MCNC: 13.7 G/DL (ref 12.5–16.5)
LYMPHOCYTES NFR BLD: 0 K/UL (ref 1.5–4)
LYMPHOCYTES RELATIVE PERCENT: 0 % (ref 20–42)
MCH RBC QN AUTO: 30.6 PG (ref 26–35)
MCHC RBC AUTO-ENTMCNC: 32.7 G/DL (ref 32–34.5)
MCV RBC AUTO: 93.7 FL (ref 80–99.9)
MONOCYTES NFR BLD: 0.21 K/UL (ref 0.1–0.95)
MONOCYTES NFR BLD: 3 % (ref 2–12)
NEUTROPHILS NFR BLD: 97 % (ref 43–80)
NEUTS SEG NFR BLD: 7.82 K/UL (ref 1.8–7.3)
PLATELET # BLD AUTO: 229 K/UL (ref 130–450)
PMV BLD AUTO: 10.2 FL (ref 7–12)
POTASSIUM SERPL-SCNC: 3.9 MMOL/L (ref 3.5–5)
PROT SERPL-MCNC: 7.5 G/DL (ref 6.4–8.3)
RBC # BLD AUTO: 4.47 M/UL (ref 3.8–5.8)
RBC # BLD: NORMAL 10*6/UL
SODIUM SERPL-SCNC: 139 MMOL/L (ref 132–146)
TSH SERPL DL<=0.05 MIU/L-ACNC: 1.39 UIU/ML (ref 0.27–4.2)
WBC OTHER # BLD: 8.1 K/UL (ref 4.5–11.5)

## 2025-05-30 PROCEDURE — 85025 COMPLETE CBC W/AUTO DIFF WBC: CPT

## 2025-05-30 PROCEDURE — 1159F MED LIST DOCD IN RCRD: CPT | Performed by: CLINICAL NURSE SPECIALIST

## 2025-05-30 PROCEDURE — 2500000003 HC RX 250 WO HCPCS: Performed by: CLINICAL NURSE SPECIALIST

## 2025-05-30 PROCEDURE — 80053 COMPREHEN METABOLIC PANEL: CPT

## 2025-05-30 PROCEDURE — 1123F ACP DISCUSS/DSCN MKR DOCD: CPT | Performed by: CLINICAL NURSE SPECIALIST

## 2025-05-30 PROCEDURE — 96413 CHEMO IV INFUSION 1 HR: CPT

## 2025-05-30 PROCEDURE — 2580000003 HC RX 258: Performed by: CLINICAL NURSE SPECIALIST

## 2025-05-30 PROCEDURE — 36415 COLL VENOUS BLD VENIPUNCTURE: CPT

## 2025-05-30 PROCEDURE — 1125F AMNT PAIN NOTED PAIN PRSNT: CPT | Performed by: CLINICAL NURSE SPECIALIST

## 2025-05-30 PROCEDURE — 1160F RVW MEDS BY RX/DR IN RCRD: CPT | Performed by: CLINICAL NURSE SPECIALIST

## 2025-05-30 PROCEDURE — 99213 OFFICE O/P EST LOW 20 MIN: CPT | Performed by: CLINICAL NURSE SPECIALIST

## 2025-05-30 PROCEDURE — 6360000002 HC RX W HCPCS: Performed by: CLINICAL NURSE SPECIALIST

## 2025-05-30 PROCEDURE — 84443 ASSAY THYROID STIM HORMONE: CPT

## 2025-05-30 RX ORDER — MEPERIDINE HYDROCHLORIDE 50 MG/ML
12.5 INJECTION INTRAMUSCULAR; INTRAVENOUS; SUBCUTANEOUS PRN
Status: CANCELLED | OUTPATIENT
Start: 2025-05-30

## 2025-05-30 RX ORDER — DIPHENHYDRAMINE HYDROCHLORIDE 50 MG/ML
50 INJECTION, SOLUTION INTRAMUSCULAR; INTRAVENOUS
Status: CANCELLED | OUTPATIENT
Start: 2025-05-30

## 2025-05-30 RX ORDER — ALBUTEROL SULFATE 90 UG/1
4 INHALANT RESPIRATORY (INHALATION) PRN
Status: CANCELLED | OUTPATIENT
Start: 2025-05-30

## 2025-05-30 RX ORDER — OXYCODONE HYDROCHLORIDE 5 MG/1
5 TABLET ORAL EVERY 6 HOURS PRN
Qty: 90 TABLET | Refills: 0 | Status: SHIPPED | OUTPATIENT
Start: 2025-05-30 | End: 2025-06-29

## 2025-05-30 RX ORDER — ACETAMINOPHEN 325 MG/1
650 TABLET ORAL
Status: CANCELLED | OUTPATIENT
Start: 2025-05-30

## 2025-05-30 RX ORDER — EPINEPHRINE 1 MG/ML
0.3 INJECTION, SOLUTION, CONCENTRATE INTRAVENOUS PRN
Status: CANCELLED | OUTPATIENT
Start: 2025-05-30

## 2025-05-30 RX ORDER — ONDANSETRON 2 MG/ML
8 INJECTION INTRAMUSCULAR; INTRAVENOUS
Status: CANCELLED | OUTPATIENT
Start: 2025-05-30

## 2025-05-30 RX ORDER — HEPARIN SODIUM (PORCINE) LOCK FLUSH IV SOLN 100 UNIT/ML 100 UNIT/ML
500 SOLUTION INTRAVENOUS PRN
Status: CANCELLED | OUTPATIENT
Start: 2025-05-30

## 2025-05-30 RX ORDER — SODIUM CHLORIDE 9 MG/ML
INJECTION, SOLUTION INTRAVENOUS CONTINUOUS
Status: CANCELLED | OUTPATIENT
Start: 2025-05-30

## 2025-05-30 RX ORDER — SODIUM CHLORIDE 9 MG/ML
5-250 INJECTION, SOLUTION INTRAVENOUS PRN
Status: CANCELLED | OUTPATIENT
Start: 2025-05-30

## 2025-05-30 RX ORDER — FAMOTIDINE 10 MG/ML
20 INJECTION, SOLUTION INTRAVENOUS
Status: CANCELLED | OUTPATIENT
Start: 2025-05-30

## 2025-05-30 RX ORDER — SODIUM CHLORIDE 9 MG/ML
5-250 INJECTION, SOLUTION INTRAVENOUS PRN
Status: DISCONTINUED | OUTPATIENT
Start: 2025-05-30 | End: 2025-05-31 | Stop reason: HOSPADM

## 2025-05-30 RX ORDER — HYDROCORTISONE SODIUM SUCCINATE 100 MG/2ML
100 INJECTION INTRAMUSCULAR; INTRAVENOUS
Status: CANCELLED | OUTPATIENT
Start: 2025-05-30

## 2025-05-30 RX ORDER — SODIUM CHLORIDE 0.9 % (FLUSH) 0.9 %
5-40 SYRINGE (ML) INJECTION PRN
Status: CANCELLED | OUTPATIENT
Start: 2025-05-30

## 2025-05-30 RX ORDER — SODIUM CHLORIDE 0.9 % (FLUSH) 0.9 %
5-40 SYRINGE (ML) INJECTION PRN
Status: DISCONTINUED | OUTPATIENT
Start: 2025-05-30 | End: 2025-05-31 | Stop reason: HOSPADM

## 2025-05-30 RX ADMIN — SODIUM CHLORIDE, PRESERVATIVE FREE 10 ML: 5 INJECTION INTRAVENOUS at 13:26

## 2025-05-30 RX ADMIN — SODIUM CHLORIDE 20 ML/HR: 0.9 INJECTION, SOLUTION INTRAVENOUS at 13:28

## 2025-05-30 RX ADMIN — SODIUM CHLORIDE 200 MG: 9 INJECTION, SOLUTION INTRAVENOUS at 13:49

## 2025-05-30 ASSESSMENT — PAIN DESCRIPTION - LOCATION: LOCATION: THROAT

## 2025-05-30 ASSESSMENT — PAIN DESCRIPTION - PAIN TYPE: TYPE: CHRONIC PAIN

## 2025-05-30 ASSESSMENT — PAIN DESCRIPTION - DESCRIPTORS: DESCRIPTORS: ACHING;DISCOMFORT

## 2025-05-30 NOTE — PROGRESS NOTES
increase water, hypothyroidism related to immunotherapy TSH WNL 2.51 will keep Synthroid at 100 mcg per day.  Initial improvement in cough with Z-Anthony some return of drainage ,ENT f/u soon .     9/12/2023 Cycle #9 Keytruda is ordered and given today , labs revied creatinine is 2.4 continue to encourage p.o. fluid intake seems to be tolerating treatment well with no complaints, ENT on 9/29 , refill for Carafate, Magic mouthwash and salagen sent     PET scan 9/22/2023   IMPRESSION:  IMPRESSION:  Slightly decreased but persistent intense uptake along the floor of the  mouth, left greater than right.  Worsening right upper quadrant metastatic  lymphadenopathy.  Slightly decreased but persistent intense uptake of a liver  metastasis.  Dr. Hernandez note reviewed stable; no sign of recurrent disease; mucus radiation changes was omega shaped epiglottis and no focal findings of mass tumors or lesions  Reviewed labs can proceed with treatment today cycle #10 Keytruda  No signs or symptoms of illness palpation of abdomen and groin area no lymph nodes palpable patient denies diarrhea/constipation abdominal pain  RTC 3 weeks with labs       Patient had received much of his care with Dr. Mcghee, who has now moved on from our practice.  The patient transitioned oncologic care with me (Dr. Riley Le) on 10/25/23.      ASSESSMENT:  Metastatic Oropharyngeal squamous cell carcinoma, p16+  Presented with dysphagia  Left floor of mouth mass detected on CT on 2/23/22  Diagnosed on panendoscopy on 3/17/22  PET completed on 8/1/22  Concurrent chemoRT (weekly cisplatin x7 cycles) from 9/6/22 - 10/25/22  RT completed on 11/3/22  Post treatment PET on 2/10/23 detected liver metastasis  Confirmed Metastatic squamous cell carcinoma on liver biopsy on 3/22/23  Pembrolizumab started on 3/28/23 - present  PET from 9/22/23 noted new/progressive perihepatic lymph nodes  Continued pembro but also treated LN's with SBRT, completed around

## 2025-05-30 NOTE — TELEPHONE ENCOUNTER
Patient Ignacio's wife Erin called for refill oxycodone 5 mg. Next appointment 7-. [facility]. Routed call to A Lin, NP

## 2025-05-30 NOTE — PROGRESS NOTES
Ignacio Bhatia  5/30/2025  Ht Readings from Last 1 Encounters:   05/30/25 1.702 m (5' 7\")     Wt Readings from Last 10 Encounters:   05/30/25 74.9 kg (165 lb 1.6 oz)   05/29/25 74.8 kg (165 lb)   05/09/25 75.2 kg (165 lb 11.2 oz)   05/07/25 75.3 kg (166 lb)   05/06/25 74.3 kg (163 lb 11.2 oz)   04/18/25 76 kg (167 lb 9.6 oz)   03/28/25 76.9 kg (169 lb 9.6 oz)   03/07/25 75.2 kg (165 lb 12.8 oz)   02/14/25 75.8 kg (167 lb 3.2 oz)   02/12/25 76.7 kg (169 lb)     BMI=25.86    Assessment: Met with Ke while in for OTV with Naomi HERNANDEZ-CNP for H&N cancer. Cycle 36 keytruda. Ke continues to utilize PEG tube for the majority of his nutrition/hydration. MBS was completed 4/2/25 and pureed diet with thin liquids was recommended. Ke drinks water but usually no other oral intake. He complains that it feels like food is sticking in his throat. PEG tube accidentally dislodged yesterday and new PEG placed in hospital. New PEG has Enfit connector, previously had Legacy connector. A few Enfit syringes were provided today and message left for [facility] notifying them of need for Enfit syringes. Ke using Boost VHC 3 cartons per day and 1 pouch [employer] Blends per day. Weight stable without significant weight change. No concerns voiced at this time.     Mouna Ascencio RD LD

## 2025-06-06 DIAGNOSIS — B00.1 COLD SORE: Primary | ICD-10-CM

## 2025-06-06 RX ORDER — VALACYCLOVIR HYDROCHLORIDE 500 MG/1
500 TABLET, FILM COATED ORAL 2 TIMES DAILY
Qty: 4 TABLET | Refills: 0 | Status: SHIPPED | OUTPATIENT
Start: 2025-06-06 | End: 2025-06-08

## 2025-06-20 ENCOUNTER — HOSPITAL ENCOUNTER (OUTPATIENT)
Dept: INFUSION THERAPY | Age: 75
Discharge: HOME | End: 2025-06-20
Payer: MEDICARE

## 2025-06-20 ENCOUNTER — OFFICE VISIT (OUTPATIENT)
Age: 75
End: 2025-06-20
Payer: MEDICARE

## 2025-06-20 VITALS
HEART RATE: 75 BPM | TEMPERATURE: 98 F | BODY MASS INDEX: 25.62 KG/M2 | HEIGHT: 67 IN | SYSTOLIC BLOOD PRESSURE: 109 MMHG | WEIGHT: 163.2 LBS | OXYGEN SATURATION: 99 % | DIASTOLIC BLOOD PRESSURE: 90 MMHG

## 2025-06-20 DIAGNOSIS — C10.9 SQUAMOUS CELL CARCINOMA OF OROPHARYNX (HCC): Primary | ICD-10-CM

## 2025-06-20 DIAGNOSIS — D72.810 LYMPHOPENIA: ICD-10-CM

## 2025-06-20 LAB
ALBUMIN SERPL-MCNC: 4 G/DL (ref 3.5–5.2)
ALP SERPL-CCNC: 67 U/L (ref 40–129)
ALT SERPL-CCNC: 14 U/L (ref 0–50)
ANION GAP SERPL CALCULATED.3IONS-SCNC: 10 MMOL/L (ref 7–16)
AST SERPL-CCNC: 22 U/L (ref 0–50)
BASOPHILS # BLD: 0.04 K/UL (ref 0–0.2)
BASOPHILS NFR BLD: 1 % (ref 0–2)
BILIRUB SERPL-MCNC: 0.4 MG/DL (ref 0–1.2)
BUN SERPL-MCNC: 23 MG/DL (ref 8–23)
CALCIUM SERPL-MCNC: 10.8 MG/DL (ref 8.8–10.2)
CHLORIDE SERPL-SCNC: 102 MMOL/L (ref 98–107)
CO2 SERPL-SCNC: 25 MMOL/L (ref 22–29)
CREAT SERPL-MCNC: 1.4 MG/DL (ref 0.7–1.2)
EOSINOPHIL # BLD: 0.16 K/UL (ref 0.05–0.5)
EOSINOPHILS RELATIVE PERCENT: 3 % (ref 0–6)
ERYTHROCYTE [DISTWIDTH] IN BLOOD BY AUTOMATED COUNT: 14.1 % (ref 11.5–15)
GFR, ESTIMATED: 55 ML/MIN/1.73M2
GLUCOSE SERPL-MCNC: 120 MG/DL (ref 74–99)
HCT VFR BLD AUTO: 42 % (ref 37–54)
HGB BLD-MCNC: 13.8 G/DL (ref 12.5–16.5)
LYMPHOCYTES NFR BLD: 0.04 K/UL (ref 1.5–4)
LYMPHOCYTES RELATIVE PERCENT: 1 % (ref 20–42)
MCH RBC QN AUTO: 30.1 PG (ref 26–35)
MCHC RBC AUTO-ENTMCNC: 32.9 G/DL (ref 32–34.5)
MCV RBC AUTO: 91.7 FL (ref 80–99.9)
MONOCYTES NFR BLD: 0.12 K/UL (ref 0.1–0.95)
MONOCYTES NFR BLD: 3 % (ref 2–12)
MYELOCYTES ABSOLUTE COUNT: 0.04 K/UL
MYELOCYTES: 1 %
NEUTROPHILS NFR BLD: 91 % (ref 43–80)
NEUTS SEG NFR BLD: 4.29 K/UL (ref 1.8–7.3)
PLATELET # BLD AUTO: 234 K/UL (ref 130–450)
PMV BLD AUTO: 10.3 FL (ref 7–12)
POTASSIUM SERPL-SCNC: 4.1 MMOL/L (ref 3.5–5.1)
PROT SERPL-MCNC: 7.3 G/DL (ref 6.4–8.3)
RBC # BLD AUTO: 4.58 M/UL (ref 3.8–5.8)
RBC # BLD: ABNORMAL 10*6/UL
SODIUM SERPL-SCNC: 137 MMOL/L (ref 136–145)
TSH SERPL DL<=0.05 MIU/L-ACNC: 2.84 UIU/ML (ref 0.27–4.2)
WBC OTHER # BLD: 4.7 K/UL (ref 4.5–11.5)

## 2025-06-20 PROCEDURE — 99213 OFFICE O/P EST LOW 20 MIN: CPT | Performed by: STUDENT IN AN ORGANIZED HEALTH CARE EDUCATION/TRAINING PROGRAM

## 2025-06-20 PROCEDURE — 1123F ACP DISCUSS/DSCN MKR DOCD: CPT | Performed by: STUDENT IN AN ORGANIZED HEALTH CARE EDUCATION/TRAINING PROGRAM

## 2025-06-20 PROCEDURE — 36415 COLL VENOUS BLD VENIPUNCTURE: CPT

## 2025-06-20 PROCEDURE — 84443 ASSAY THYROID STIM HORMONE: CPT

## 2025-06-20 PROCEDURE — 80053 COMPREHEN METABOLIC PANEL: CPT

## 2025-06-20 PROCEDURE — 85025 COMPLETE CBC W/AUTO DIFF WBC: CPT

## 2025-06-20 PROCEDURE — 1125F AMNT PAIN NOTED PAIN PRSNT: CPT | Performed by: STUDENT IN AN ORGANIZED HEALTH CARE EDUCATION/TRAINING PROGRAM

## 2025-06-20 NOTE — PROGRESS NOTES
[facility]  [address] [place] [address]  Dept: [phone]  Loc: [phone]    Xochitl Carvalho MD   ·   MD Naomi Verde APRN  ·  MARY Enciso      [place] [facility] in [address] [address]  P: [phone]  F: [phone] [place] [facility] in [place]  [address] [place] [address]  P: [phone]  F: [phone]  [place] [facility] in [place]  [address] [place] [address]  P: [phone]  F: [phone]               Attending Clinic Note    Reason for Visit: Follow-up on a patient with p16 + Oropharyngeal Squamous Cell Carcinoma    PCP:  Jason Foy DO    Chief Complaint   Patient presents with    Squamous cell carcinoma of oropharynx    Follow-up         Subjective:  No new changes. Pt feels well.   Continue to have dry mouth symptoms.         Oncology History:  74 y.o.  male who presented to the ENT team for persistent left-sided neck fullness without associated difficulty swallowing    CT soft tissue neck 02/23/2022: Mass located in the left aspect of floor of the mouth extending into the left oropharyngeal soft tissue concerning for squamous cell carcinoma  Multiple enlarged necrotic left anterior cervical chain lymph nodes    Panendoscopy on 3/17/2022:  Findings: L lingual tonsil hypertrophy, biopsy negative, left level II neck node FNA performed   FNA left neck mass level 2:  Inconclusive for malignant cells.  Few atypical squamous cells with degenerative change  A.  Tongue, left base, biopsy:   Squamous epithelial-lined lymphoid tissue with reactive germinal centers, compatible with lingual tonsil.   Negative for dysplasia; Negative for malignancy.     B.  Tongue, left base, biopsy:   Squamous epithelial-lined lymphoid tissue with reactive germinal centers, compatible with lingual tonsil.   Focal lobules of benign mucinous glands.   Negative

## 2025-06-26 DIAGNOSIS — C10.9 OROPHARYNGEAL CANCER (HCC): ICD-10-CM

## 2025-06-26 RX ORDER — OXYCODONE HYDROCHLORIDE 5 MG/1
5 TABLET ORAL EVERY 6 HOURS PRN
Qty: 90 TABLET | Refills: 0 | Status: SHIPPED | OUTPATIENT
Start: 2025-06-26 | End: 2025-07-26

## 2025-07-24 DIAGNOSIS — C10.9 OROPHARYNGEAL CANCER (HCC): ICD-10-CM

## 2025-07-24 RX ORDER — OXYCODONE HYDROCHLORIDE 5 MG/1
5 TABLET ORAL EVERY 6 HOURS PRN
Qty: 90 TABLET | Refills: 0 | Status: SHIPPED | OUTPATIENT
Start: 2025-07-24 | End: 2025-07-25 | Stop reason: SDUPTHER

## 2025-07-24 NOTE — TELEPHONE ENCOUNTER
Call from Ke Khan's wife requesting a refill for Oxy IR 5 mg tht he may need in a few days. Pharmacy is [facility]. Next richy 8/6/25.

## 2025-07-25 DIAGNOSIS — C10.9 OROPHARYNGEAL CANCER (HCC): ICD-10-CM

## 2025-07-25 RX ORDER — OXYCODONE HYDROCHLORIDE 5 MG/1
5 TABLET ORAL EVERY 6 HOURS PRN
Qty: 90 TABLET | Refills: 0 | Status: SHIPPED | OUTPATIENT
Start: 2025-07-25 | End: 2025-08-24

## 2025-07-25 NOTE — TELEPHONE ENCOUNTER
Received message, [facility] did not receive prescription sent yesterday, they states they had E-Scribing issues. Pharmacy ask for prescription to be resent. Send prescription for Oxy IR 5mg to [facility]. Next richy 8/6/25.

## 2025-07-25 NOTE — TELEPHONE ENCOUNTER
Call from Ke Khan's wife, stating [facility] did not receive prescription sent yesterday. Called and spoke with Pharmacy, they states they had E-Scribing issues yesterday and ask for prescription to be resent. Send prescription for Oxy IR 5mg to [facility]. Next richy 8/6/25.

## 2025-07-31 NOTE — PROGRESS NOTES
[facility]  [address] [place] [address]  Dept: [phone]  Loc: [phone]    Xochitl Carvalho MD   ·   MD Naomi Verde APRN  ·  MARY Enciso      [place] [facility] in [address] [address]  P: [phone]  F: [phone] [place] [facility] in [place]  [address] [place] [address]  P: [phone]  F: [phone]  [place] [facility] in [place]  [address] [place] [address]  P: [phone]  F: [phone]               Attending Clinic Note    Reason for Visit: Follow-up on a patient with p16 + Oropharyngeal Squamous Cell Carcinoma    PCP:  Jason Foy, DO    No chief complaint on file.        Subjective:  No new changes. Pt feels well.   Continue to have dry mouth symptoms.   PEG tube in place         Oncology History:  75 y.o.  male who presented to the ENT team for persistent left-sided neck fullness without associated difficulty swallowing    CT soft tissue neck 02/23/2022: Mass located in the left aspect of floor of the mouth extending into the left oropharyngeal soft tissue concerning for squamous cell carcinoma  Multiple enlarged necrotic left anterior cervical chain lymph nodes    Panendoscopy on 3/17/2022:  Findings: L lingual tonsil hypertrophy, biopsy negative, left level II neck node FNA performed   FNA left neck mass level 2:  Inconclusive for malignant cells.  Few atypical squamous cells with degenerative change  A.  Tongue, left base, biopsy:   Squamous epithelial-lined lymphoid tissue with reactive germinal centers, compatible with lingual tonsil.   Negative for dysplasia; Negative for malignancy.     B.  Tongue, left base, biopsy:   Squamous epithelial-lined lymphoid tissue with reactive germinal centers, compatible with lingual tonsil.   Focal lobules of benign mucinous glands.   Negative for dysplasia; Negative for malignancy    On

## 2025-08-01 ENCOUNTER — HOSPITAL ENCOUNTER (OUTPATIENT)
Dept: INFUSION THERAPY | Age: 75
Discharge: HOME | End: 2025-08-01
Payer: MEDICARE

## 2025-08-01 ENCOUNTER — OFFICE VISIT (OUTPATIENT)
Age: 75
End: 2025-08-01
Payer: MEDICARE

## 2025-08-01 VITALS
BODY MASS INDEX: 25.63 KG/M2 | DIASTOLIC BLOOD PRESSURE: 67 MMHG | TEMPERATURE: 97.3 F | WEIGHT: 163.3 LBS | SYSTOLIC BLOOD PRESSURE: 119 MMHG | HEIGHT: 67 IN | HEART RATE: 77 BPM | OXYGEN SATURATION: 95 %

## 2025-08-01 DIAGNOSIS — C10.9 SQUAMOUS CELL CARCINOMA OF OROPHARYNX (HCC): Primary | ICD-10-CM

## 2025-08-01 DIAGNOSIS — C10.9 SQUAMOUS CELL CARCINOMA OF OROPHARYNX (HCC): ICD-10-CM

## 2025-08-01 DIAGNOSIS — C78.7 SECONDARY MALIGNANT NEOPLASM OF LIVER AND INTRAHEPATIC BILE DUCT (HCC): Primary | ICD-10-CM

## 2025-08-01 DIAGNOSIS — D72.810 LYMPHOPENIA: ICD-10-CM

## 2025-08-01 DIAGNOSIS — D70.2: ICD-10-CM

## 2025-08-01 LAB
ALBUMIN SERPL-MCNC: 3.8 G/DL (ref 3.5–5.2)
ALP SERPL-CCNC: 66 U/L (ref 40–129)
ALT SERPL-CCNC: 13 U/L (ref 0–50)
ANION GAP SERPL CALCULATED.3IONS-SCNC: 11 MMOL/L (ref 7–16)
AST SERPL-CCNC: 19 U/L (ref 0–50)
BASOPHILS # BLD: 0 K/UL (ref 0–0.2)
BASOPHILS NFR BLD: 0 % (ref 0–2)
BILIRUB SERPL-MCNC: 0.4 MG/DL (ref 0–1.2)
BUN SERPL-MCNC: 26 MG/DL (ref 8–23)
CALCIUM SERPL-MCNC: 9.9 MG/DL (ref 8.8–10.2)
CHLORIDE SERPL-SCNC: 104 MMOL/L (ref 98–107)
CO2 SERPL-SCNC: 24 MMOL/L (ref 22–29)
CREAT SERPL-MCNC: 1.3 MG/DL (ref 0.7–1.2)
EOSINOPHIL # BLD: 0.13 K/UL (ref 0.05–0.5)
EOSINOPHILS RELATIVE PERCENT: 3 % (ref 0–6)
ERYTHROCYTE [DISTWIDTH] IN BLOOD BY AUTOMATED COUNT: 14.1 % (ref 11.5–15)
FOLATE SERPL-MCNC: 14.9 NG/ML (ref 4.6–34.8)
GFR, ESTIMATED: 59 ML/MIN/1.73M2
GLUCOSE SERPL-MCNC: 135 MG/DL (ref 74–99)
HCT VFR BLD AUTO: 40.5 % (ref 37–54)
HGB BLD-MCNC: 13.1 G/DL (ref 12.5–16.5)
LYMPHOCYTES NFR BLD: 0.31 K/UL (ref 1.5–4)
LYMPHOCYTES RELATIVE PERCENT: 6 % (ref 20–42)
MCH RBC QN AUTO: 29.4 PG (ref 26–35)
MCHC RBC AUTO-ENTMCNC: 32.3 G/DL (ref 32–34.5)
MCV RBC AUTO: 91 FL (ref 80–99.9)
MONOCYTES NFR BLD: 0.26 K/UL (ref 0.1–0.95)
MONOCYTES NFR BLD: 5 % (ref 2–12)
NEUTROPHILS NFR BLD: 86 % (ref 43–80)
NEUTS SEG NFR BLD: 4.19 K/UL (ref 1.8–7.3)
PLATELET # BLD AUTO: 234 K/UL (ref 130–450)
PMV BLD AUTO: 10.4 FL (ref 7–12)
POTASSIUM SERPL-SCNC: 3.8 MMOL/L (ref 3.5–5.1)
PROT SERPL-MCNC: 7.6 G/DL (ref 6.4–8.3)
RBC # BLD AUTO: 4.45 M/UL (ref 3.8–5.8)
RBC # BLD: ABNORMAL 10*6/UL
RBC # BLD: ABNORMAL 10*6/UL
SODIUM SERPL-SCNC: 139 MMOL/L (ref 136–145)
TSH SERPL DL<=0.05 MIU/L-ACNC: 2.21 UIU/ML (ref 0.27–4.2)
VIT B12 SERPL-MCNC: 437 PG/ML (ref 232–1245)
WBC OTHER # BLD: 4.9 K/UL (ref 4.5–11.5)

## 2025-08-01 PROCEDURE — 80053 COMPREHEN METABOLIC PANEL: CPT

## 2025-08-01 PROCEDURE — 1123F ACP DISCUSS/DSCN MKR DOCD: CPT | Performed by: INTERNAL MEDICINE

## 2025-08-01 PROCEDURE — 85025 COMPLETE CBC W/AUTO DIFF WBC: CPT

## 2025-08-01 PROCEDURE — 84630 ASSAY OF ZINC: CPT

## 2025-08-01 PROCEDURE — 99215 OFFICE O/P EST HI 40 MIN: CPT | Performed by: INTERNAL MEDICINE

## 2025-08-01 PROCEDURE — 1159F MED LIST DOCD IN RCRD: CPT | Performed by: INTERNAL MEDICINE

## 2025-08-01 PROCEDURE — 82746 ASSAY OF FOLIC ACID SERUM: CPT

## 2025-08-01 PROCEDURE — 82607 VITAMIN B-12: CPT

## 2025-08-01 PROCEDURE — 84443 ASSAY THYROID STIM HORMONE: CPT

## 2025-08-01 PROCEDURE — 82525 ASSAY OF COPPER: CPT

## 2025-08-01 PROCEDURE — G2211 COMPLEX E/M VISIT ADD ON: HCPCS | Performed by: INTERNAL MEDICINE

## 2025-08-01 PROCEDURE — 36415 COLL VENOUS BLD VENIPUNCTURE: CPT

## 2025-08-03 LAB — ZINC SERPL-MCNC: 55.9 UG/DL (ref 60–120)

## 2025-08-04 LAB — COPPER SERPL-MCNC: 109.3 UG/DL (ref 70–140)

## 2025-08-06 ENCOUNTER — OFFICE VISIT (OUTPATIENT)
Age: 75
End: 2025-08-06
Payer: MEDICARE

## 2025-08-06 VITALS
OXYGEN SATURATION: 97 % | HEART RATE: 73 BPM | DIASTOLIC BLOOD PRESSURE: 58 MMHG | WEIGHT: 161 LBS | BODY MASS INDEX: 25.22 KG/M2 | TEMPERATURE: 97.9 F | SYSTOLIC BLOOD PRESSURE: 124 MMHG

## 2025-08-06 DIAGNOSIS — C10.9 OROPHARYNGEAL CANCER (HCC): Primary | ICD-10-CM

## 2025-08-06 DIAGNOSIS — Z51.5 PALLIATIVE CARE BY SPECIALIST: ICD-10-CM

## 2025-08-06 DIAGNOSIS — G89.3 PAIN DUE TO NEOPLASM: ICD-10-CM

## 2025-08-06 DIAGNOSIS — K11.7 XEROSTOMIA: ICD-10-CM

## 2025-08-06 PROCEDURE — 1123F ACP DISCUSS/DSCN MKR DOCD: CPT | Performed by: NURSE PRACTITIONER

## 2025-08-06 PROCEDURE — 1159F MED LIST DOCD IN RCRD: CPT | Performed by: NURSE PRACTITIONER

## 2025-08-06 PROCEDURE — 99213 OFFICE O/P EST LOW 20 MIN: CPT | Performed by: NURSE PRACTITIONER

## 2025-08-06 RX ORDER — CEVIMELINE HYDROCHLORIDE 30 MG/1
30 CAPSULE ORAL 3 TIMES DAILY
Qty: 90 CAPSULE | Refills: 2 | Status: SHIPPED | OUTPATIENT
Start: 2025-08-06 | End: 2025-11-04

## 2025-08-12 DIAGNOSIS — E03.9 HYPOTHYROIDISM, UNSPECIFIED TYPE: ICD-10-CM

## 2025-08-12 RX ORDER — LEVOTHYROXINE SODIUM 100 MCG
100 TABLET ORAL DAILY
Qty: 30 TABLET | Refills: 3 | Status: SHIPPED | OUTPATIENT
Start: 2025-08-12

## 2025-08-22 DIAGNOSIS — C10.9 OROPHARYNGEAL CANCER (HCC): ICD-10-CM

## 2025-08-22 RX ORDER — OXYCODONE HYDROCHLORIDE 5 MG/1
5 TABLET ORAL EVERY 6 HOURS PRN
Qty: 90 TABLET | Refills: 0 | Status: SHIPPED | OUTPATIENT
Start: 2025-08-22 | End: 2025-09-21

## 2025-08-29 ENCOUNTER — HOSPITAL ENCOUNTER (OUTPATIENT)
Dept: INFUSION THERAPY | Age: 75
Discharge: HOME | End: 2025-08-29
Payer: MEDICARE

## 2025-08-29 ENCOUNTER — OFFICE VISIT (OUTPATIENT)
Age: 75
End: 2025-08-29
Payer: MEDICARE

## 2025-08-29 VITALS
HEART RATE: 80 BPM | BODY MASS INDEX: 25.3 KG/M2 | SYSTOLIC BLOOD PRESSURE: 119 MMHG | HEIGHT: 67 IN | WEIGHT: 161.2 LBS | TEMPERATURE: 97.6 F | DIASTOLIC BLOOD PRESSURE: 81 MMHG | OXYGEN SATURATION: 96 %

## 2025-08-29 DIAGNOSIS — C10.9 SQUAMOUS CELL CARCINOMA OF OROPHARYNX (HCC): Primary | ICD-10-CM

## 2025-08-29 DIAGNOSIS — C78.7 SECONDARY MALIGNANT NEOPLASM OF LIVER AND INTRAHEPATIC BILE DUCT (HCC): Primary | ICD-10-CM

## 2025-08-29 DIAGNOSIS — D70.2: ICD-10-CM

## 2025-08-29 LAB
ALBUMIN SERPL-MCNC: 4 G/DL (ref 3.5–5.2)
ALP SERPL-CCNC: 65 U/L (ref 40–129)
ALT SERPL-CCNC: 15 U/L (ref 0–50)
ANION GAP SERPL CALCULATED.3IONS-SCNC: 12 MMOL/L (ref 7–16)
AST SERPL-CCNC: 24 U/L (ref 0–50)
BASOPHILS # BLD: 0.09 K/UL (ref 0–0.2)
BASOPHILS NFR BLD: 2 % (ref 0–2)
BILIRUB SERPL-MCNC: 0.5 MG/DL (ref 0–1.2)
BUN SERPL-MCNC: 23 MG/DL (ref 8–23)
CALCIUM SERPL-MCNC: 10.2 MG/DL (ref 8.8–10.2)
CHLORIDE SERPL-SCNC: 104 MMOL/L (ref 98–107)
CO2 SERPL-SCNC: 24 MMOL/L (ref 22–29)
CREAT SERPL-MCNC: 1.3 MG/DL (ref 0.7–1.2)
EOSINOPHIL # BLD: 0.23 K/UL (ref 0.05–0.5)
EOSINOPHILS RELATIVE PERCENT: 4 % (ref 0–6)
ERYTHROCYTE [DISTWIDTH] IN BLOOD BY AUTOMATED COUNT: 14.6 % (ref 11.5–15)
GFR, ESTIMATED: 60 ML/MIN/1.73M2
GLUCOSE SERPL-MCNC: 138 MG/DL (ref 74–99)
HCT VFR BLD AUTO: 41 % (ref 37–54)
HGB BLD-MCNC: 13.7 G/DL (ref 12.5–16.5)
LYMPHOCYTES NFR BLD: 0.18 K/UL (ref 1.5–4)
LYMPHOCYTES RELATIVE PERCENT: 4 % (ref 20–42)
MCH RBC QN AUTO: 30.6 PG (ref 26–35)
MCHC RBC AUTO-ENTMCNC: 33.4 G/DL (ref 32–34.5)
MCV RBC AUTO: 91.7 FL (ref 80–99.9)
MONOCYTES NFR BLD: 0.36 K/UL (ref 0.1–0.95)
MONOCYTES NFR BLD: 7 % (ref 2–12)
NEUTROPHILS NFR BLD: 83 % (ref 43–80)
NEUTS SEG NFR BLD: 4.24 K/UL (ref 1.8–7.3)
PLATELET # BLD AUTO: 222 K/UL (ref 130–450)
PMV BLD AUTO: 10.5 FL (ref 7–12)
POTASSIUM SERPL-SCNC: 4 MMOL/L (ref 3.5–5.1)
PROT SERPL-MCNC: 7.4 G/DL (ref 6.4–8.3)
RBC # BLD AUTO: 4.47 M/UL (ref 3.8–5.8)
RBC # BLD: NORMAL 10*6/UL
SODIUM SERPL-SCNC: 139 MMOL/L (ref 136–145)
TSH SERPL DL<=0.05 MIU/L-ACNC: 1.43 UIU/ML (ref 0.27–4.2)
WBC OTHER # BLD: 5.1 K/UL (ref 4.5–11.5)

## 2025-08-29 PROCEDURE — 1126F AMNT PAIN NOTED NONE PRSNT: CPT | Performed by: INTERNAL MEDICINE

## 2025-08-29 PROCEDURE — 1159F MED LIST DOCD IN RCRD: CPT | Performed by: INTERNAL MEDICINE

## 2025-08-29 PROCEDURE — 80053 COMPREHEN METABOLIC PANEL: CPT

## 2025-08-29 PROCEDURE — 85025 COMPLETE CBC W/AUTO DIFF WBC: CPT

## 2025-08-29 PROCEDURE — 84443 ASSAY THYROID STIM HORMONE: CPT

## 2025-08-29 PROCEDURE — 99215 OFFICE O/P EST HI 40 MIN: CPT | Performed by: INTERNAL MEDICINE

## 2025-08-29 PROCEDURE — G2211 COMPLEX E/M VISIT ADD ON: HCPCS | Performed by: INTERNAL MEDICINE

## 2025-08-29 PROCEDURE — 36415 COLL VENOUS BLD VENIPUNCTURE: CPT

## 2025-08-29 PROCEDURE — 1123F ACP DISCUSS/DSCN MKR DOCD: CPT | Performed by: INTERNAL MEDICINE

## (undated) DEVICE — MICRODISSECTION NEEDLE STRAIGHT SLEEVE: Brand: COLORADO

## (undated) DEVICE — NEEDLE HYPO 18GA L1.5IN PNK POLYPR HUB S STL REG BVL STR

## (undated) DEVICE — SYRINGE MED 10ML LUERLOCK TIP W/O SFTY DISP

## (undated) DEVICE — Device

## (undated) DEVICE — MARKER,SKIN,WI/RULER AND LABELS: Brand: MEDLINE

## (undated) DEVICE — NEEDLE FLTR 18GA L1.5IN MEM THK5UM BLNT DISP

## (undated) DEVICE — SINGLE USE BIOPSY VALVE MAJ-210: Brand: SINGLE USE BIOPSY VALVE (STERILE)

## (undated) DEVICE — SYRINGE WITH HYPODERMIC SAFETY NEEDLE: Brand: MAGELLAN

## (undated) DEVICE — SPONGE GZ 4IN 4IN 4 PLY N WVN AVANT

## (undated) DEVICE — LUBRICANT SURG JELLY ST BACTER TUBE 4.25OZ

## (undated) DEVICE — STANDARD HYPODERMIC NEEDLE,POLYPROPYLENE HUB: Brand: MONOJECT

## (undated) DEVICE — GLOVE SURG SZ 75 L12IN FNGR THK94MIL TRNSLUC YEL LTX

## (undated) DEVICE — MASK,FACE,MAXFLUIDPROTECT,SHIELD/ERLPS: Brand: MEDLINE

## (undated) DEVICE — BLOCK BITE 60FR CAREGUARD

## (undated) DEVICE — PATTIES COTTONOID SURG 1.5X1.5IN 1

## (undated) DEVICE — AGENT HEMSTAT W2XL3IN OXIDIZED REGENERATED CELOS ABSRB

## (undated) DEVICE — GOWN ISOLATN REG YEL M WT MULTIPLY SIDETIE LEV 2

## (undated) DEVICE — TRAP SPEC COLL 40CC MUCOUS CALIB UNIV CONN FOR OPN SUCT

## (undated) DEVICE — SPONGE,PEANUT,XRAY,ST,SM,3/8",5/CARD: Brand: MEDLINE INDUSTRIES, INC.

## (undated) DEVICE — SOLUTION IV IRRIG POUR BRL 0.9% SODIUM CHL 2F7124

## (undated) DEVICE — MEDI-VAC YANKAUER SUCTION HANDLE W/BULBOUS TIP: Brand: CARDINAL HEALTH

## (undated) DEVICE — TOWEL,OR,DSP,ST,BLUE,STD,6/PK,12PK/CS: Brand: MEDLINE

## (undated) DEVICE — Z DISCONTINUED GLOVE SURG SZ 7.5 L12IN FNGR THK13MIL WHT ISOLEX

## (undated) DEVICE — Device: Brand: DEFENDO VALVE AND CONNECTOR KIT

## (undated) DEVICE — PAPER FILTER 1 32CM

## (undated) DEVICE — GAUZE,SPONGE,4"X4",16PLY,XRAY,STRL,LF: Brand: MEDLINE

## (undated) DEVICE — CODMAN® SURGICAL PATTIES 1/2" X 1/2" (1.27CM X 1.27CM): Brand: CODMAN®

## (undated) DEVICE — GLOVE ORANGE PI 7 1/2   MSG9075

## (undated) DEVICE — KIT BEDSIDE REVITAL OX 500ML

## (undated) DEVICE — BINDER ABD M/L H12IN FOR 46-62IN WHT 4 SLD PNL DSGN HOOP

## (undated) DEVICE — HEAD AND NECK: Brand: MEDLINE INDUSTRIES, INC.

## (undated) DEVICE — 20 ML SYRINGE REGULAR TIP: Brand: MONOJECT

## (undated) DEVICE — PAD,EYE,1-5/8X2 5/8,STERILE,LF,1/PK: Brand: MEDLINE

## (undated) DEVICE — TUBING, SUCTION, 1/4" X 10', STRAIGHT: Brand: MEDLINE

## (undated) DEVICE — GLOVE SURG SZ 65 THK91MIL LTX FREE SYN POLYISOPRENE

## (undated) DEVICE — SINGLE USE SUCTION VALVE MAJ-209: Brand: SINGLE USE SUCTION VALVE (STERILE)

## (undated) DEVICE — CONTAINER SPEC COLL 960ML POLYPR TRIANG GRAD INTAKE/OUTPUT

## (undated) DEVICE — HOOK RETRCT 12MM S STL BLNT E STAY LONE STAR

## (undated) DEVICE — 6 X 9  1.75MIL 4-WALL LABGUARD: Brand: MINIGRIP COMMERCIAL LLC

## (undated) DEVICE — SOLUTION IV IRRIG 1000ML POUR BTL 2F7114

## (undated) DEVICE — KIT,ANTI FOG,W/SPONGE & FLUID,SOFT PACK: Brand: MEDLINE

## (undated) DEVICE — YANKAUER,BULB TIP,W/O VENT,RIGID,STERILE: Brand: MEDLINE

## (undated) DEVICE — MEDI-VAC NON-CONDUCTIVE SUCTION TUBING: Brand: CARDINAL HEALTH

## (undated) DEVICE — KENDALL 450 SERIES MONITORING FOAM ELECTRODE - RECTANGULAR SHAPE ( 3/PK): Brand: KENDALL

## (undated) DEVICE — TRAY ENDO ROOM BRONCH

## (undated) DEVICE — COVER,LIGHT HANDLE,FLX,1/PK: Brand: MEDLINE INDUSTRIES, INC.